# Patient Record
Sex: FEMALE | Race: BLACK OR AFRICAN AMERICAN | Employment: UNEMPLOYED
[De-identification: names, ages, dates, MRNs, and addresses within clinical notes are randomized per-mention and may not be internally consistent; named-entity substitution may affect disease eponyms.]

---

## 2017-01-11 ENCOUNTER — OFFICE VISIT (OUTPATIENT)
Dept: INTERNAL MEDICINE | Facility: CLINIC | Age: 67
End: 2017-01-11

## 2017-01-11 VITALS
SYSTOLIC BLOOD PRESSURE: 146 MMHG | WEIGHT: 262 LBS | BODY MASS INDEX: 44.73 KG/M2 | HEART RATE: 59 BPM | DIASTOLIC BLOOD PRESSURE: 87 MMHG | HEIGHT: 64 IN

## 2017-01-11 DIAGNOSIS — R79.89 LOW VITAMIN D LEVEL: ICD-10-CM

## 2017-01-11 DIAGNOSIS — I10 ESSENTIAL HYPERTENSION: ICD-10-CM

## 2017-01-11 DIAGNOSIS — E11.8 TYPE 2 DIABETES MELLITUS WITH COMPLICATION, WITHOUT LONG-TERM CURRENT USE OF INSULIN (HCC): Primary | ICD-10-CM

## 2017-01-11 DIAGNOSIS — M17.0 PRIMARY OSTEOARTHRITIS OF BOTH KNEES: Chronic | ICD-10-CM

## 2017-01-11 DIAGNOSIS — I25.5 ISCHEMIC CARDIOMYOPATHY: ICD-10-CM

## 2017-01-11 PROCEDURE — 99214 OFFICE O/P EST MOD 30 MIN: CPT | Performed by: INTERNAL MEDICINE

## 2017-01-11 RX ORDER — AMLODIPINE BESYLATE 2.5 MG/1
2.5 TABLET ORAL DAILY
Qty: 30 TABLET | Refills: 3 | Status: SHIPPED | OUTPATIENT
Start: 2017-01-11 | End: 2017-04-11 | Stop reason: SDUPTHER

## 2017-01-11 RX ORDER — B-COMPLEX WITH VITAMIN C
1 TABLET ORAL 2 TIMES DAILY
Qty: 60 TABLET | Refills: 11 | Status: SHIPPED | OUTPATIENT
Start: 2017-01-11 | End: 2018-01-11

## 2017-01-11 RX ORDER — FUROSEMIDE 40 MG/1
40 TABLET ORAL DAILY
Qty: 30 TABLET | Refills: 10 | Status: SHIPPED | OUTPATIENT
Start: 2017-01-11 | End: 2017-10-31 | Stop reason: SDUPTHER

## 2017-01-11 RX ORDER — GLUCOSAMINE HCL/CHONDROITIN SU 500-400 MG
CAPSULE ORAL
Qty: 50 STRIP | Refills: 11 | Status: SHIPPED | OUTPATIENT
Start: 2017-01-11 | End: 2017-07-17 | Stop reason: SDUPTHER

## 2017-01-11 ASSESSMENT — ENCOUNTER SYMPTOMS
BLOOD IN STOOL: 0
CONSTIPATION: 0
BLURRED VISION: 0
NAUSEA: 0
BACK PAIN: 1
ABDOMINAL PAIN: 0
EYE REDNESS: 0
SHORTNESS OF BREATH: 0
COUGH: 0

## 2017-02-06 ENCOUNTER — TELEPHONE (OUTPATIENT)
Dept: INTERNAL MEDICINE | Facility: CLINIC | Age: 67
End: 2017-02-06

## 2017-03-02 ENCOUNTER — HOSPITAL ENCOUNTER (OUTPATIENT)
Dept: PAIN MANAGEMENT | Age: 67
Discharge: HOME | End: 2017-03-02
Payer: COMMERCIAL

## 2017-03-02 VITALS
SYSTOLIC BLOOD PRESSURE: 142 MMHG | DIASTOLIC BLOOD PRESSURE: 80 MMHG | WEIGHT: 262 LBS | TEMPERATURE: 98 F | HEART RATE: 60 BPM | RESPIRATION RATE: 18 BRPM | HEIGHT: 64 IN | BODY MASS INDEX: 44.73 KG/M2

## 2017-03-02 DIAGNOSIS — M47.816 FACET ARTHRITIS OF LUMBAR REGION: Primary | Chronic | ICD-10-CM

## 2017-03-02 DIAGNOSIS — M17.0 PRIMARY OSTEOARTHRITIS OF BOTH KNEES: Chronic | ICD-10-CM

## 2017-03-02 PROCEDURE — 99214 OFFICE O/P EST MOD 30 MIN: CPT | Performed by: NURSE PRACTITIONER

## 2017-03-02 RX ORDER — OXYCODONE HYDROCHLORIDE AND ACETAMINOPHEN 5; 325 MG/1; MG/1
1 TABLET ORAL EVERY 12 HOURS PRN
Qty: 60 TABLET | Refills: 0 | Status: SHIPPED | OUTPATIENT
Start: 2017-03-09 | End: 2017-04-04 | Stop reason: SDUPTHER

## 2017-03-02 ASSESSMENT — PAIN SCALES - GENERAL: PAINLEVEL_OUTOF10: 6

## 2017-03-02 ASSESSMENT — PAIN DESCRIPTION - ORIENTATION: ORIENTATION: RIGHT;LEFT

## 2017-03-02 ASSESSMENT — PAIN DESCRIPTION - PAIN TYPE: TYPE: CHRONIC PAIN

## 2017-03-02 ASSESSMENT — PAIN DESCRIPTION - DESCRIPTORS: DESCRIPTORS: ACHING;CONSTANT;DULL;SHARP

## 2017-03-02 ASSESSMENT — PAIN DESCRIPTION - FREQUENCY: FREQUENCY: CONTINUOUS

## 2017-03-02 ASSESSMENT — PAIN DESCRIPTION - PROGRESSION: CLINICAL_PROGRESSION: NOT CHANGED

## 2017-03-02 ASSESSMENT — PAIN DESCRIPTION - ONSET: ONSET: ON-GOING

## 2017-03-02 ASSESSMENT — PAIN DESCRIPTION - LOCATION: LOCATION: BACK;KNEE

## 2017-03-22 DIAGNOSIS — I10 UNCONTROLLED HYPERTENSION: ICD-10-CM

## 2017-03-23 RX ORDER — METOPROLOL TARTRATE 100 MG/1
TABLET ORAL
Qty: 56 TABLET | Refills: 0 | Status: SHIPPED | OUTPATIENT
Start: 2017-03-23 | End: 2017-04-11 | Stop reason: SDUPTHER

## 2017-03-23 RX ORDER — OMEPRAZOLE 20 MG/1
CAPSULE, DELAYED RELEASE ORAL
Qty: 28 CAPSULE | Refills: 0 | Status: SHIPPED | OUTPATIENT
Start: 2017-03-23 | End: 2017-04-19 | Stop reason: SDUPTHER

## 2017-04-04 ENCOUNTER — HOSPITAL ENCOUNTER (OUTPATIENT)
Dept: PAIN MANAGEMENT | Age: 67
Discharge: HOME | End: 2017-04-04
Payer: MEDICARE

## 2017-04-04 VITALS
TEMPERATURE: 98.1 F | SYSTOLIC BLOOD PRESSURE: 150 MMHG | RESPIRATION RATE: 12 BRPM | DIASTOLIC BLOOD PRESSURE: 60 MMHG | HEART RATE: 85 BPM

## 2017-04-04 DIAGNOSIS — M25.561 CHRONIC PAIN OF RIGHT KNEE: ICD-10-CM

## 2017-04-04 DIAGNOSIS — M96.1 POSTLAMINECTOMY SYNDROME, UNSPECIFIED REGION: Primary | ICD-10-CM

## 2017-04-04 DIAGNOSIS — G89.4 CHRONIC PAIN SYNDROME: ICD-10-CM

## 2017-04-04 DIAGNOSIS — G89.29 CHRONIC PAIN OF RIGHT KNEE: ICD-10-CM

## 2017-04-04 PROCEDURE — 99214 OFFICE O/P EST MOD 30 MIN: CPT

## 2017-04-04 RX ORDER — OXYCODONE HYDROCHLORIDE AND ACETAMINOPHEN 5; 325 MG/1; MG/1
1 TABLET ORAL EVERY 12 HOURS PRN
Qty: 60 TABLET | Refills: 0 | Status: SHIPPED | OUTPATIENT
Start: 2017-04-15 | End: 2017-05-02 | Stop reason: SDUPTHER

## 2017-04-04 ASSESSMENT — ENCOUNTER SYMPTOMS
SHORTNESS OF BREATH: 0
COUGH: 0
CONSTIPATION: 0
BACK PAIN: 1

## 2017-04-04 ASSESSMENT — PAIN DESCRIPTION - ORIENTATION: ORIENTATION: LEFT;RIGHT

## 2017-04-04 ASSESSMENT — PAIN DESCRIPTION - PAIN TYPE: TYPE: CHRONIC PAIN

## 2017-04-04 ASSESSMENT — PAIN SCALES - GENERAL: PAINLEVEL_OUTOF10: 10

## 2017-04-04 ASSESSMENT — PAIN DESCRIPTION - LOCATION: LOCATION: BACK;KNEE

## 2017-04-11 ENCOUNTER — OFFICE VISIT (OUTPATIENT)
Dept: INTERNAL MEDICINE | Age: 67
End: 2017-04-11
Payer: MEDICARE

## 2017-04-11 ENCOUNTER — HOSPITAL ENCOUNTER (OUTPATIENT)
Age: 67
Setting detail: SPECIMEN
Discharge: HOME | End: 2017-04-11
Payer: MEDICARE

## 2017-04-11 VITALS
HEIGHT: 64 IN | SYSTOLIC BLOOD PRESSURE: 140 MMHG | BODY MASS INDEX: 42.68 KG/M2 | DIASTOLIC BLOOD PRESSURE: 92 MMHG | WEIGHT: 250 LBS | HEART RATE: 66 BPM

## 2017-04-11 DIAGNOSIS — I25.5 ISCHEMIC CARDIOMYOPATHY: ICD-10-CM

## 2017-04-11 DIAGNOSIS — M17.0 PRIMARY OSTEOARTHRITIS OF BOTH KNEES: Chronic | ICD-10-CM

## 2017-04-11 DIAGNOSIS — I10 ESSENTIAL HYPERTENSION: ICD-10-CM

## 2017-04-11 DIAGNOSIS — E11.8 TYPE 2 DIABETES MELLITUS WITH COMPLICATION, WITHOUT LONG-TERM CURRENT USE OF INSULIN (HCC): ICD-10-CM

## 2017-04-11 DIAGNOSIS — E66.01 MORBID OBESITY, UNSPECIFIED OBESITY TYPE (HCC): ICD-10-CM

## 2017-04-11 DIAGNOSIS — R79.89 LOW VITAMIN D LEVEL: ICD-10-CM

## 2017-04-11 DIAGNOSIS — G47.33 OSA (OBSTRUCTIVE SLEEP APNEA): ICD-10-CM

## 2017-04-11 DIAGNOSIS — I10 ESSENTIAL HYPERTENSION: Primary | ICD-10-CM

## 2017-04-11 DIAGNOSIS — D64.9 ANEMIA, UNSPECIFIED TYPE: ICD-10-CM

## 2017-04-11 DIAGNOSIS — F32.A MILD DEPRESSION: ICD-10-CM

## 2017-04-11 LAB
ANION GAP SERPL CALCULATED.3IONS-SCNC: 12 MMOL/L (ref 9–17)
BUN BLDV-MCNC: 16 MG/DL (ref 8–23)
BUN/CREAT BLD: ABNORMAL (ref 9–20)
CALCIUM SERPL-MCNC: 9.8 MG/DL (ref 8.6–10.4)
CHLORIDE BLD-SCNC: 103 MMOL/L (ref 98–107)
CHOLESTEROL/HDL RATIO: 3.6
CHOLESTEROL: 189 MG/DL
CO2: 27 MMOL/L (ref 20–31)
CREAT SERPL-MCNC: 1.02 MG/DL (ref 0.5–0.9)
GFR AFRICAN AMERICAN: >60 ML/MIN
GFR NON-AFRICAN AMERICAN: 55 ML/MIN
GFR SERPL CREATININE-BSD FRML MDRD: ABNORMAL ML/MIN/{1.73_M2}
GFR SERPL CREATININE-BSD FRML MDRD: ABNORMAL ML/MIN/{1.73_M2}
GLUCOSE BLD-MCNC: 122 MG/DL (ref 70–99)
HCT VFR BLD CALC: 37.6 % (ref 36–46)
HDLC SERPL-MCNC: 52 MG/DL
HEMOGLOBIN: 12.1 G/DL (ref 12–16)
LDL CHOLESTEROL: 123 MG/DL (ref 0–130)
MCH RBC QN AUTO: 27.3 PG (ref 26–34)
MCHC RBC AUTO-ENTMCNC: 32.3 G/DL (ref 31–37)
MCV RBC AUTO: 84.5 FL (ref 80–100)
PDW BLD-RTO: 16.7 % (ref 12.5–15.4)
PLATELET # BLD: 266 K/UL (ref 140–450)
PMV BLD AUTO: 8.7 FL (ref 6–12)
POTASSIUM SERPL-SCNC: 4.7 MMOL/L (ref 3.7–5.3)
RBC # BLD: 4.45 M/UL (ref 4–5.2)
SODIUM BLD-SCNC: 142 MMOL/L (ref 135–144)
TRIGL SERPL-MCNC: 70 MG/DL
VITAMIN D 25-HYDROXY: 16.7 NG/ML (ref 30–100)
VLDLC SERPL CALC-MCNC: NORMAL MG/DL (ref 1–30)
WBC # BLD: 5.7 K/UL (ref 3.5–11)

## 2017-04-11 PROCEDURE — 82306 VITAMIN D 25 HYDROXY: CPT

## 2017-04-11 PROCEDURE — 99214 OFFICE O/P EST MOD 30 MIN: CPT | Performed by: INTERNAL MEDICINE

## 2017-04-11 PROCEDURE — 80061 LIPID PANEL: CPT

## 2017-04-11 PROCEDURE — 80048 BASIC METABOLIC PNL TOTAL CA: CPT

## 2017-04-11 PROCEDURE — 36415 COLL VENOUS BLD VENIPUNCTURE: CPT

## 2017-04-11 PROCEDURE — 85027 COMPLETE CBC AUTOMATED: CPT

## 2017-04-11 RX ORDER — AMLODIPINE BESYLATE 5 MG/1
5 TABLET ORAL DAILY
Qty: 30 TABLET | Refills: 5 | Status: SHIPPED | OUTPATIENT
Start: 2017-04-11 | End: 2017-07-17 | Stop reason: SDUPTHER

## 2017-04-11 RX ORDER — METOPROLOL TARTRATE 100 MG/1
TABLET ORAL
Qty: 56 TABLET | Refills: 3 | Status: SHIPPED | OUTPATIENT
Start: 2017-04-11 | End: 2017-07-17 | Stop reason: SDUPTHER

## 2017-04-11 RX ORDER — SPIRONOLACTONE 25 MG/1
25 TABLET ORAL DAILY
Qty: 30 TABLET | Refills: 2 | Status: SHIPPED | OUTPATIENT
Start: 2017-04-11 | End: 2017-07-17 | Stop reason: SDUPTHER

## 2017-04-11 ASSESSMENT — ENCOUNTER SYMPTOMS
COUGH: 0
BLOOD IN STOOL: 0
SPUTUM PRODUCTION: 0
BACK PAIN: 1
NAUSEA: 0
PHOTOPHOBIA: 0
BLURRED VISION: 1
SORE THROAT: 0
ABDOMINAL PAIN: 0
CONSTIPATION: 0
EYE REDNESS: 0
SHORTNESS OF BREATH: 0

## 2017-04-17 ENCOUNTER — HOSPITAL ENCOUNTER (OUTPATIENT)
Dept: PAIN MANAGEMENT | Age: 67
Discharge: HOME | End: 2017-04-17
Payer: MEDICARE

## 2017-04-17 VITALS
SYSTOLIC BLOOD PRESSURE: 132 MMHG | HEART RATE: 68 BPM | WEIGHT: 250 LBS | TEMPERATURE: 97.6 F | OXYGEN SATURATION: 99 % | HEIGHT: 64 IN | RESPIRATION RATE: 18 BRPM | DIASTOLIC BLOOD PRESSURE: 83 MMHG | BODY MASS INDEX: 42.68 KG/M2

## 2017-04-17 DIAGNOSIS — M25.561 RIGHT KNEE PAIN, UNSPECIFIED CHRONICITY: ICD-10-CM

## 2017-04-17 LAB — GLUCOSE BLD-MCNC: 130 MG/DL (ref 65–105)

## 2017-04-17 PROCEDURE — 20610 DRAIN/INJ JOINT/BURSA W/O US: CPT

## 2017-04-17 PROCEDURE — 2500000003 HC RX 250 WO HCPCS: Performed by: ANESTHESIOLOGY

## 2017-04-17 PROCEDURE — 2500000003 HC RX 250 WO HCPCS

## 2017-04-17 PROCEDURE — 6360000002 HC RX W HCPCS: Performed by: ANESTHESIOLOGY

## 2017-04-17 PROCEDURE — 77002 NEEDLE LOCALIZATION BY XRAY: CPT

## 2017-04-17 PROCEDURE — 82947 ASSAY GLUCOSE BLOOD QUANT: CPT

## 2017-04-17 PROCEDURE — 2580000003 HC RX 258: Performed by: ANESTHESIOLOGY

## 2017-04-17 RX ORDER — LIDOCAINE HYDROCHLORIDE 10 MG/ML
5 INJECTION, SOLUTION EPIDURAL; INFILTRATION; INTRACAUDAL; PERINEURAL ONCE
Status: COMPLETED | OUTPATIENT
Start: 2017-04-17 | End: 2017-04-17

## 2017-04-17 RX ORDER — MIDAZOLAM HYDROCHLORIDE 1 MG/ML
0.5 INJECTION INTRAMUSCULAR; INTRAVENOUS
Status: DISCONTINUED | OUTPATIENT
Start: 2017-04-17 | End: 2017-04-18 | Stop reason: HOSPADM

## 2017-04-17 RX ORDER — SODIUM CHLORIDE, SODIUM LACTATE, POTASSIUM CHLORIDE, CALCIUM CHLORIDE 600; 310; 30; 20 MG/100ML; MG/100ML; MG/100ML; MG/100ML
500 INJECTION, SOLUTION INTRAVENOUS CONTINUOUS
Status: DISCONTINUED | OUTPATIENT
Start: 2017-04-17 | End: 2017-04-18 | Stop reason: HOSPADM

## 2017-04-17 RX ORDER — FENTANYL CITRATE 50 UG/ML
25 INJECTION, SOLUTION INTRAMUSCULAR; INTRAVENOUS
Status: DISCONTINUED | OUTPATIENT
Start: 2017-04-17 | End: 2017-04-18 | Stop reason: HOSPADM

## 2017-04-17 RX ADMIN — MIDAZOLAM HYDROCHLORIDE 1 MG: 1 INJECTION, SOLUTION INTRAMUSCULAR; INTRAVENOUS at 11:27

## 2017-04-17 RX ADMIN — LIDOCAINE HYDROCHLORIDE 0.2 ML: 10 INJECTION, SOLUTION EPIDURAL; INFILTRATION; INTRACAUDAL; PERINEURAL at 10:48

## 2017-04-17 RX ADMIN — FENTANYL CITRATE 50 MCG: 50 INJECTION, SOLUTION INTRAMUSCULAR; INTRAVENOUS at 11:26

## 2017-04-17 RX ADMIN — SODIUM CHLORIDE, POTASSIUM CHLORIDE, SODIUM LACTATE AND CALCIUM CHLORIDE 500 ML: 600; 310; 30; 20 INJECTION, SOLUTION INTRAVENOUS at 10:48

## 2017-04-17 ASSESSMENT — PAIN DESCRIPTION - LOCATION: LOCATION: BACK;KNEE

## 2017-04-17 ASSESSMENT — PAIN DESCRIPTION - DESCRIPTORS: DESCRIPTORS: CONSTANT;SHARP;STABBING

## 2017-04-17 ASSESSMENT — PAIN - FUNCTIONAL ASSESSMENT
PAIN_FUNCTIONAL_ASSESSMENT: 0-10
PAIN_FUNCTIONAL_ASSESSMENT: 0-10

## 2017-04-17 ASSESSMENT — PAIN SCALES - GENERAL: PAINLEVEL_OUTOF10: 6

## 2017-04-17 ASSESSMENT — PAIN DESCRIPTION - ORIENTATION: ORIENTATION: RIGHT

## 2017-04-17 ASSESSMENT — PAIN DESCRIPTION - FREQUENCY: FREQUENCY: CONTINUOUS

## 2017-04-17 ASSESSMENT — PAIN DESCRIPTION - PAIN TYPE: TYPE: CHRONIC PAIN

## 2017-04-17 ASSESSMENT — PAIN DESCRIPTION - PROGRESSION: CLINICAL_PROGRESSION: GRADUALLY WORSENING

## 2017-04-17 ASSESSMENT — PAIN DESCRIPTION - ONSET: ONSET: ON-GOING

## 2017-04-20 RX ORDER — OMEPRAZOLE 20 MG/1
CAPSULE, DELAYED RELEASE ORAL
Qty: 28 CAPSULE | Refills: 3 | Status: SHIPPED | OUTPATIENT
Start: 2017-04-20 | End: 2017-07-17 | Stop reason: SDUPTHER

## 2017-05-02 ENCOUNTER — HOSPITAL ENCOUNTER (OUTPATIENT)
Dept: PAIN MANAGEMENT | Age: 67
Discharge: HOME | End: 2017-05-02
Payer: MEDICARE

## 2017-05-02 VITALS
TEMPERATURE: 97.5 F | DIASTOLIC BLOOD PRESSURE: 80 MMHG | HEART RATE: 85 BPM | SYSTOLIC BLOOD PRESSURE: 190 MMHG | RESPIRATION RATE: 12 BRPM

## 2017-05-02 PROCEDURE — 80307 DRUG TEST PRSMV CHEM ANLYZR: CPT

## 2017-05-02 PROCEDURE — 99214 OFFICE O/P EST MOD 30 MIN: CPT

## 2017-05-02 RX ORDER — OXYCODONE HYDROCHLORIDE AND ACETAMINOPHEN 5; 325 MG/1; MG/1
1 TABLET ORAL EVERY 12 HOURS PRN
Qty: 60 TABLET | Refills: 0 | Status: SHIPPED | OUTPATIENT
Start: 2017-05-17 | End: 2017-06-14 | Stop reason: SDUPTHER

## 2017-05-02 ASSESSMENT — PAIN DESCRIPTION - ORIENTATION: ORIENTATION: LOWER;RIGHT

## 2017-05-02 ASSESSMENT — PAIN DESCRIPTION - LOCATION: LOCATION: KNEE;BACK

## 2017-05-02 ASSESSMENT — PAIN SCALES - GENERAL: PAINLEVEL_OUTOF10: 5

## 2017-05-02 ASSESSMENT — PAIN DESCRIPTION - PAIN TYPE: TYPE: CHRONIC PAIN

## 2017-05-02 ASSESSMENT — PAIN DESCRIPTION - PROGRESSION: CLINICAL_PROGRESSION: GRADUALLY IMPROVING

## 2017-05-02 ASSESSMENT — PAIN DESCRIPTION - DESCRIPTORS: DESCRIPTORS: CONSTANT;ACHING

## 2017-05-04 LAB
6-ACETYLMORPHINE, UR: NOT DETECTED
7-AMINOCLONAZEPAM, URINE: NOT DETECTED
ALPHA-OH-ALPRAZ, URINE: NOT DETECTED
ALPRAZOLAM, URINE: NOT DETECTED
AMPHETAMINES, URINE: NOT DETECTED
BARBITURATES, URINE: NOT DETECTED
BENZOYLECGONINE, UR: NOT DETECTED
BUPRENORPHINE URINE: NOT DETECTED
CARISOPRODOL, UR: NOT DETECTED
CLONAZEPAM, URINE: NOT DETECTED
CODEINE, URINE: NOT DETECTED
CREATININE URINE: 78.7 MG/DL (ref 20–400)
DIAZEPAM, URINE: NOT DETECTED
EER PAIN MGT DRUG PANEL, HIGH RES/EMIT U: NORMAL
ETHYL GLUCURONIDE UR: NOT DETECTED
FENTANYL URINE: NOT DETECTED
HYDROCODONE, URINE: NOT DETECTED
HYDROMORPHONE, URINE: NOT DETECTED
LORAZEPAM, URINE: NOT DETECTED
MARIJUANA METAB, UR: NOT DETECTED
MDA, UR: NOT DETECTED
MDEA, EVE, UR: NOT DETECTED
MDMA URINE: NOT DETECTED
MEPERIDINE METAB, UR: NOT DETECTED
METHADONE, URINE: NOT DETECTED
METHAMPHETAMINE, URINE: NOT DETECTED
METHYLPHENIDATE: NOT DETECTED
MIDAZOLAM, URINE: NOT DETECTED
MORPHINE URINE: NOT DETECTED
NORBUPRENORPHINE, URINE: NOT DETECTED
NORDIAZEPAM, URINE: NOT DETECTED
NORFENTANYL, URINE: NOT DETECTED
NORHYDROCODONE, URINE: NOT DETECTED
NOROXYCODONE, URINE: PRESENT
NOROXYMORPHONE, URINE: PRESENT
OXAZEPAM, URINE: NOT DETECTED
OXYCODONE URINE: PRESENT
OXYMORPHONE, URINE: PRESENT
PAIN MGT DRUG PANEL, HI RES, UR: NORMAL
PCP,URINE: NOT DETECTED
PHENTERMINE, UR: NOT DETECTED
PROPOXYPHENE, URINE: NOT DETECTED
TAPENTADOL, URINE: NOT DETECTED
TAPENTADOL-O-SULFATE, URINE: NOT DETECTED
TEMAZEPAM, URINE: NOT DETECTED
TRAMADOL, URINE: NOT DETECTED
ZOLPIDEM, URINE: NOT DETECTED

## 2017-06-14 ENCOUNTER — HOSPITAL ENCOUNTER (OUTPATIENT)
Dept: PAIN MANAGEMENT | Age: 67
Discharge: HOME | End: 2017-06-14
Payer: MEDICARE

## 2017-06-14 ENCOUNTER — HOSPITAL ENCOUNTER (EMERGENCY)
Age: 67
Discharge: HOME | End: 2017-06-14
Attending: EMERGENCY MEDICINE
Payer: MEDICARE

## 2017-06-14 VITALS
HEART RATE: 77 BPM | RESPIRATION RATE: 20 BRPM | HEIGHT: 64 IN | OXYGEN SATURATION: 97 % | WEIGHT: 230 LBS | SYSTOLIC BLOOD PRESSURE: 153 MMHG | BODY MASS INDEX: 39.27 KG/M2 | DIASTOLIC BLOOD PRESSURE: 93 MMHG | TEMPERATURE: 98.4 F

## 2017-06-14 VITALS
SYSTOLIC BLOOD PRESSURE: 205 MMHG | RESPIRATION RATE: 18 BRPM | TEMPERATURE: 98.2 F | HEART RATE: 75 BPM | DIASTOLIC BLOOD PRESSURE: 126 MMHG

## 2017-06-14 DIAGNOSIS — M96.1 POSTLAMINECTOMY SYNDROME, UNSPECIFIED REGION: Primary | ICD-10-CM

## 2017-06-14 DIAGNOSIS — I10 ESSENTIAL HYPERTENSION: Primary | ICD-10-CM

## 2017-06-14 DIAGNOSIS — M17.0 PRIMARY OSTEOARTHRITIS OF BOTH KNEES: Chronic | ICD-10-CM

## 2017-06-14 PROCEDURE — 6370000000 HC RX 637 (ALT 250 FOR IP): Performed by: EMERGENCY MEDICINE

## 2017-06-14 PROCEDURE — 99283 EMERGENCY DEPT VISIT LOW MDM: CPT

## 2017-06-14 PROCEDURE — 80307 DRUG TEST PRSMV CHEM ANLYZR: CPT

## 2017-06-14 PROCEDURE — 99214 OFFICE O/P EST MOD 30 MIN: CPT

## 2017-06-14 RX ORDER — ISOSORBIDE MONONITRATE 30 MG/1
30 TABLET, EXTENDED RELEASE ORAL DAILY
Status: DISCONTINUED | OUTPATIENT
Start: 2017-06-14 | End: 2017-06-14 | Stop reason: HOSPADM

## 2017-06-14 RX ORDER — SPIRONOLACTONE 25 MG/1
25 TABLET ORAL DAILY
Status: DISCONTINUED | OUTPATIENT
Start: 2017-06-14 | End: 2017-06-14 | Stop reason: HOSPADM

## 2017-06-14 RX ORDER — METOPROLOL TARTRATE 50 MG/1
100 TABLET, FILM COATED ORAL ONCE
Status: COMPLETED | OUTPATIENT
Start: 2017-06-14 | End: 2017-06-14

## 2017-06-14 RX ORDER — OXYCODONE HYDROCHLORIDE AND ACETAMINOPHEN 5; 325 MG/1; MG/1
1 TABLET ORAL EVERY 12 HOURS PRN
Qty: 28 TABLET | Refills: 0 | Status: SHIPPED | OUTPATIENT
Start: 2017-06-19 | End: 2017-07-14 | Stop reason: SDUPTHER

## 2017-06-14 RX ORDER — AMLODIPINE BESYLATE 10 MG/1
5 TABLET ORAL DAILY
Status: DISCONTINUED | OUTPATIENT
Start: 2017-06-14 | End: 2017-06-14 | Stop reason: HOSPADM

## 2017-06-14 RX ADMIN — METOPROLOL TARTRATE 100 MG: 50 TABLET, FILM COATED ORAL at 13:53

## 2017-06-14 RX ADMIN — AMLODIPINE BESYLATE 5 MG: 10 TABLET ORAL at 13:53

## 2017-06-14 RX ADMIN — ISOSORBIDE MONONITRATE 30 MG: 30 TABLET ORAL at 13:54

## 2017-06-14 RX ADMIN — SPIRONOLACTONE 25 MG: 25 TABLET ORAL at 13:53

## 2017-06-14 ASSESSMENT — PAIN DESCRIPTION - FREQUENCY
FREQUENCY: CONTINUOUS
FREQUENCY: CONTINUOUS

## 2017-06-14 ASSESSMENT — PAIN DESCRIPTION - ORIENTATION
ORIENTATION: LOWER;RIGHT
ORIENTATION: RIGHT

## 2017-06-14 ASSESSMENT — PAIN SCALES - GENERAL
PAINLEVEL_OUTOF10: 6
PAINLEVEL_OUTOF10: 6

## 2017-06-14 ASSESSMENT — ENCOUNTER SYMPTOMS
COUGH: 0
BACK PAIN: 1
CONSTIPATION: 0
SHORTNESS OF BREATH: 0

## 2017-06-14 ASSESSMENT — PAIN DESCRIPTION - ONSET: ONSET: ON-GOING

## 2017-06-14 ASSESSMENT — PAIN DESCRIPTION - DESCRIPTORS
DESCRIPTORS: ACHING;CONSTANT
DESCRIPTORS: ACHING

## 2017-06-14 ASSESSMENT — PAIN DESCRIPTION - LOCATION
LOCATION: HEAD
LOCATION: BACK

## 2017-06-14 ASSESSMENT — PAIN DESCRIPTION - PAIN TYPE
TYPE: CHRONIC PAIN
TYPE: ACUTE PAIN

## 2017-06-14 ASSESSMENT — PAIN DESCRIPTION - PROGRESSION: CLINICAL_PROGRESSION: NOT CHANGED

## 2017-06-15 ENCOUNTER — TELEPHONE (OUTPATIENT)
Dept: INTERNAL MEDICINE | Age: 67
End: 2017-06-15

## 2017-06-15 ASSESSMENT — ENCOUNTER SYMPTOMS
RHINORRHEA: 0
TROUBLE SWALLOWING: 0
NAUSEA: 0
DIARRHEA: 0
BACK PAIN: 0
CONSTIPATION: 0
VOMITING: 0
SHORTNESS OF BREATH: 0
ABDOMINAL PAIN: 0

## 2017-06-17 LAB

## 2017-06-27 ENCOUNTER — TELEPHONE (OUTPATIENT)
Dept: INTERNAL MEDICINE | Age: 67
End: 2017-06-27

## 2017-07-10 DIAGNOSIS — I25.10 CORONARY ARTERY DISEASE INVOLVING NATIVE CORONARY ARTERY OF NATIVE HEART WITHOUT ANGINA PECTORIS: ICD-10-CM

## 2017-07-10 DIAGNOSIS — E78.00 PURE HYPERCHOLESTEROLEMIA: ICD-10-CM

## 2017-07-10 DIAGNOSIS — E11.319 TYPE 2 DIABETES MELLITUS WITH RETINOPATHY OF BOTH EYES, WITHOUT LONG-TERM CURRENT USE OF INSULIN, MACULAR EDEMA PRESENCE UNSPECIFIED, UNSPECIFIED RETINOPATHY SEVERITY (HCC): ICD-10-CM

## 2017-07-10 RX ORDER — ISOSORBIDE MONONITRATE 30 MG/1
TABLET, EXTENDED RELEASE ORAL
Qty: 28 TABLET | Refills: 3 | Status: SHIPPED | OUTPATIENT
Start: 2017-07-10 | End: 2017-07-17 | Stop reason: SDUPTHER

## 2017-07-11 RX ORDER — ISOSORBIDE MONONITRATE 30 MG/1
TABLET, EXTENDED RELEASE ORAL
Qty: 30 TABLET | Refills: 5 | Status: ON HOLD | OUTPATIENT
Start: 2017-07-11 | End: 2018-02-26 | Stop reason: SDUPTHER

## 2017-07-11 RX ORDER — ATORVASTATIN CALCIUM 80 MG/1
TABLET, FILM COATED ORAL
Qty: 30 TABLET | Refills: 5 | Status: ON HOLD | OUTPATIENT
Start: 2017-07-11 | End: 2018-02-26 | Stop reason: SDUPTHER

## 2017-07-14 ENCOUNTER — HOSPITAL ENCOUNTER (OUTPATIENT)
Dept: PAIN MANAGEMENT | Age: 67
Discharge: HOME | End: 2017-07-14
Payer: MEDICARE

## 2017-07-14 VITALS — TEMPERATURE: 97.2 F | SYSTOLIC BLOOD PRESSURE: 130 MMHG | HEART RATE: 62 BPM | DIASTOLIC BLOOD PRESSURE: 74 MMHG

## 2017-07-14 DIAGNOSIS — M47.816 FACET ARTHRITIS OF LUMBAR REGION: Primary | Chronic | ICD-10-CM

## 2017-07-14 DIAGNOSIS — M96.1 POSTLAMINECTOMY SYNDROME, UNSPECIFIED REGION: ICD-10-CM

## 2017-07-14 DIAGNOSIS — M17.0 PRIMARY OSTEOARTHRITIS OF BOTH KNEES: Chronic | ICD-10-CM

## 2017-07-14 PROCEDURE — 99214 OFFICE O/P EST MOD 30 MIN: CPT

## 2017-07-14 RX ORDER — OXYCODONE HYDROCHLORIDE AND ACETAMINOPHEN 5; 325 MG/1; MG/1
1 TABLET ORAL EVERY 12 HOURS PRN
Qty: 28 TABLET | Refills: 0 | Status: SHIPPED | OUTPATIENT
Start: 2017-07-14 | End: 2017-07-27 | Stop reason: SDUPTHER

## 2017-07-14 ASSESSMENT — ENCOUNTER SYMPTOMS
CONSTIPATION: 0
BACK PAIN: 1
COUGH: 0
SHORTNESS OF BREATH: 0

## 2017-07-14 ASSESSMENT — PAIN SCALES - GENERAL: PAINLEVEL_OUTOF10: 7

## 2017-07-17 ENCOUNTER — OFFICE VISIT (OUTPATIENT)
Dept: INTERNAL MEDICINE | Age: 67
End: 2017-07-17
Payer: MEDICARE

## 2017-07-17 VITALS
BODY MASS INDEX: 44.46 KG/M2 | SYSTOLIC BLOOD PRESSURE: 166 MMHG | WEIGHT: 259 LBS | RESPIRATION RATE: 18 BRPM | DIASTOLIC BLOOD PRESSURE: 88 MMHG | HEART RATE: 61 BPM

## 2017-07-17 DIAGNOSIS — E11.8 TYPE 2 DIABETES MELLITUS WITH COMPLICATION, WITHOUT LONG-TERM CURRENT USE OF INSULIN (HCC): Primary | ICD-10-CM

## 2017-07-17 DIAGNOSIS — I25.10 CORONARY ARTERY DISEASE INVOLVING NATIVE CORONARY ARTERY OF NATIVE HEART WITHOUT ANGINA PECTORIS: ICD-10-CM

## 2017-07-17 DIAGNOSIS — F32.A MILD DEPRESSION: ICD-10-CM

## 2017-07-17 DIAGNOSIS — E66.01 MORBID OBESITY DUE TO EXCESS CALORIES (HCC): ICD-10-CM

## 2017-07-17 DIAGNOSIS — I25.5 ISCHEMIC CARDIOMYOPATHY: ICD-10-CM

## 2017-07-17 DIAGNOSIS — M17.0 PRIMARY OSTEOARTHRITIS OF BOTH KNEES: Chronic | ICD-10-CM

## 2017-07-17 DIAGNOSIS — I10 UNCONTROLLED HYPERTENSION: ICD-10-CM

## 2017-07-17 DIAGNOSIS — R79.89 LOW VITAMIN D LEVEL: ICD-10-CM

## 2017-07-17 LAB — HBA1C MFR BLD: 7.1 %

## 2017-07-17 PROCEDURE — 3014F SCREEN MAMMO DOC REV: CPT | Performed by: INTERNAL MEDICINE

## 2017-07-17 PROCEDURE — G8427 DOCREV CUR MEDS BY ELIG CLIN: HCPCS | Performed by: INTERNAL MEDICINE

## 2017-07-17 PROCEDURE — G8417 CALC BMI ABV UP PARAM F/U: HCPCS | Performed by: INTERNAL MEDICINE

## 2017-07-17 PROCEDURE — 1036F TOBACCO NON-USER: CPT | Performed by: INTERNAL MEDICINE

## 2017-07-17 PROCEDURE — 99214 OFFICE O/P EST MOD 30 MIN: CPT | Performed by: INTERNAL MEDICINE

## 2017-07-17 PROCEDURE — 4040F PNEUMOC VAC/ADMIN/RCVD: CPT | Performed by: INTERNAL MEDICINE

## 2017-07-17 PROCEDURE — G8598 ASA/ANTIPLAT THER USED: HCPCS | Performed by: INTERNAL MEDICINE

## 2017-07-17 PROCEDURE — 1123F ACP DISCUSS/DSCN MKR DOCD: CPT | Performed by: INTERNAL MEDICINE

## 2017-07-17 PROCEDURE — 83036 HEMOGLOBIN GLYCOSYLATED A1C: CPT | Performed by: INTERNAL MEDICINE

## 2017-07-17 PROCEDURE — 1090F PRES/ABSN URINE INCON ASSESS: CPT | Performed by: INTERNAL MEDICINE

## 2017-07-17 PROCEDURE — 3017F COLORECTAL CA SCREEN DOC REV: CPT | Performed by: INTERNAL MEDICINE

## 2017-07-17 PROCEDURE — 3046F HEMOGLOBIN A1C LEVEL >9.0%: CPT | Performed by: INTERNAL MEDICINE

## 2017-07-17 PROCEDURE — 99213 OFFICE O/P EST LOW 20 MIN: CPT

## 2017-07-17 PROCEDURE — G8400 PT W/DXA NO RESULTS DOC: HCPCS | Performed by: INTERNAL MEDICINE

## 2017-07-17 RX ORDER — LOSARTAN POTASSIUM 100 MG/1
100 TABLET ORAL DAILY
Qty: 30 TABLET | Refills: 5 | Status: SHIPPED | OUTPATIENT
Start: 2017-07-17 | End: 2017-09-01 | Stop reason: SDUPTHER

## 2017-07-17 RX ORDER — RANOLAZINE 500 MG/1
TABLET, EXTENDED RELEASE ORAL
Qty: 60 TABLET | Refills: 6 | Status: SHIPPED | OUTPATIENT
Start: 2017-07-17 | End: 2017-10-31

## 2017-07-17 RX ORDER — SPIRONOLACTONE 25 MG/1
25 TABLET ORAL DAILY
Qty: 30 TABLET | Refills: 2 | Status: SHIPPED | OUTPATIENT
Start: 2017-07-17 | End: 2017-09-01 | Stop reason: SDUPTHER

## 2017-07-17 RX ORDER — LANCETS 30 GAUGE
EACH MISCELLANEOUS
Qty: 50 EACH | Refills: 11 | Status: SHIPPED | OUTPATIENT
Start: 2017-07-17

## 2017-07-17 RX ORDER — ASPIRIN 81 MG/1
81 TABLET ORAL DAILY
Qty: 30 TABLET | Refills: 11 | Status: SHIPPED | OUTPATIENT
Start: 2017-07-17 | End: 2018-08-13 | Stop reason: SDUPTHER

## 2017-07-17 RX ORDER — CLOPIDOGREL BISULFATE 75 MG/1
75 TABLET ORAL DAILY
Qty: 30 TABLET | Refills: 11 | Status: SHIPPED | OUTPATIENT
Start: 2017-07-17 | End: 2018-06-12 | Stop reason: SDUPTHER

## 2017-07-17 RX ORDER — GLUCOSAMINE HCL/CHONDROITIN SU 500-400 MG
CAPSULE ORAL
Qty: 50 STRIP | Refills: 11 | Status: SHIPPED | OUTPATIENT
Start: 2017-07-17 | End: 2017-10-31 | Stop reason: SDUPTHER

## 2017-07-17 RX ORDER — METOPROLOL TARTRATE 100 MG/1
TABLET ORAL
Qty: 56 TABLET | Refills: 3 | Status: SHIPPED | OUTPATIENT
Start: 2017-07-17 | End: 2017-09-01 | Stop reason: SDUPTHER

## 2017-07-17 RX ORDER — OMEPRAZOLE 20 MG/1
CAPSULE, DELAYED RELEASE ORAL
Qty: 28 CAPSULE | Refills: 3 | Status: SHIPPED | OUTPATIENT
Start: 2017-07-17 | End: 2017-10-31 | Stop reason: SDUPTHER

## 2017-07-17 RX ORDER — MULTIVIT-MIN/IRON FUM/FOLIC AC 7.5 MG-4
1 TABLET ORAL DAILY
Qty: 30 TABLET | Refills: 3 | Status: SHIPPED | OUTPATIENT
Start: 2017-07-17 | End: 2019-07-17

## 2017-07-17 RX ORDER — AMLODIPINE BESYLATE 5 MG/1
5 TABLET ORAL DAILY
Qty: 30 TABLET | Refills: 5 | Status: SHIPPED | OUTPATIENT
Start: 2017-07-17 | End: 2017-10-31 | Stop reason: SDUPTHER

## 2017-07-17 ASSESSMENT — PATIENT HEALTH QUESTIONNAIRE - PHQ9
SUM OF ALL RESPONSES TO PHQ QUESTIONS 1-9: 0
2. FEELING DOWN, DEPRESSED OR HOPELESS: 0
SUM OF ALL RESPONSES TO PHQ9 QUESTIONS 1 & 2: 0
1. LITTLE INTEREST OR PLEASURE IN DOING THINGS: 0

## 2017-07-17 ASSESSMENT — ENCOUNTER SYMPTOMS
ABDOMINAL PAIN: 0
CONSTIPATION: 0
HEARTBURN: 0
SORE THROAT: 0
PHOTOPHOBIA: 0
BACK PAIN: 1
NAUSEA: 0
COUGH: 0
BLURRED VISION: 1
EYE REDNESS: 0
SHORTNESS OF BREATH: 0

## 2017-07-21 ENCOUNTER — TELEPHONE (OUTPATIENT)
Dept: INTERNAL MEDICINE | Age: 67
End: 2017-07-21

## 2017-07-21 DIAGNOSIS — E11.9 TYPE 2 DIABETES MELLITUS WITHOUT COMPLICATION, WITH LONG-TERM CURRENT USE OF INSULIN (HCC): Primary | ICD-10-CM

## 2017-07-21 DIAGNOSIS — Z79.4 TYPE 2 DIABETES MELLITUS WITHOUT COMPLICATION, WITH LONG-TERM CURRENT USE OF INSULIN (HCC): Primary | ICD-10-CM

## 2017-08-11 ENCOUNTER — HOSPITAL ENCOUNTER (OUTPATIENT)
Dept: PAIN MANAGEMENT | Age: 67
Discharge: HOME | End: 2017-08-11
Payer: COMMERCIAL

## 2017-08-16 ENCOUNTER — HOSPITAL ENCOUNTER (OUTPATIENT)
Dept: PAIN MANAGEMENT | Age: 67
Discharge: HOME | End: 2017-08-16
Payer: COMMERCIAL

## 2017-08-16 VITALS
DIASTOLIC BLOOD PRESSURE: 100 MMHG | RESPIRATION RATE: 20 BRPM | HEART RATE: 75 BPM | SYSTOLIC BLOOD PRESSURE: 170 MMHG | BODY MASS INDEX: 44.22 KG/M2 | HEIGHT: 64 IN | TEMPERATURE: 98.2 F | WEIGHT: 259 LBS

## 2017-08-16 DIAGNOSIS — M96.1 POSTLAMINECTOMY SYNDROME: Primary | ICD-10-CM

## 2017-08-16 PROCEDURE — 80307 DRUG TEST PRSMV CHEM ANLYZR: CPT

## 2017-08-16 PROCEDURE — G0463 HOSPITAL OUTPT CLINIC VISIT: HCPCS

## 2017-08-16 PROCEDURE — 99214 OFFICE O/P EST MOD 30 MIN: CPT

## 2017-08-16 PROCEDURE — 99213 OFFICE O/P EST LOW 20 MIN: CPT

## 2017-08-16 RX ORDER — OXYCODONE HYDROCHLORIDE AND ACETAMINOPHEN 5; 325 MG/1; MG/1
1 TABLET ORAL EVERY 12 HOURS PRN
Qty: 28 TABLET | Refills: 0 | Status: SHIPPED | OUTPATIENT
Start: 2017-08-16 | End: 2017-08-30

## 2017-08-16 ASSESSMENT — PAIN DESCRIPTION - PROGRESSION: CLINICAL_PROGRESSION: GRADUALLY WORSENING

## 2017-08-16 ASSESSMENT — PAIN DESCRIPTION - LOCATION: LOCATION: BACK;KNEE

## 2017-08-16 ASSESSMENT — ENCOUNTER SYMPTOMS
COUGH: 0
SHORTNESS OF BREATH: 0
BOWEL INCONTINENCE: 0
BACK PAIN: 1
CONSTIPATION: 0

## 2017-08-16 ASSESSMENT — PAIN DESCRIPTION - DESCRIPTORS: DESCRIPTORS: ACHING;CONSTANT

## 2017-08-16 ASSESSMENT — PAIN DESCRIPTION - PAIN TYPE: TYPE: CHRONIC PAIN

## 2017-08-16 ASSESSMENT — PAIN SCALES - GENERAL: PAINLEVEL_OUTOF10: 7

## 2017-08-16 ASSESSMENT — PAIN DESCRIPTION - FREQUENCY: FREQUENCY: CONTINUOUS

## 2017-08-16 ASSESSMENT — PAIN DESCRIPTION - ORIENTATION: ORIENTATION: RIGHT;LOWER;LEFT

## 2017-08-19 LAB
6-ACETYLMORPHINE, UR: NOT DETECTED
7-AMINOCLONAZEPAM, URINE: NOT DETECTED
ALPHA-OH-ALPRAZ, URINE: NOT DETECTED
ALPRAZOLAM, URINE: NOT DETECTED
AMPHETAMINES, URINE: NOT DETECTED
BARBITURATES, URINE: NOT DETECTED
BENZOYLECGONINE, UR: NOT DETECTED
BUPRENORPHINE URINE: NOT DETECTED
CARISOPRODOL, UR: NOT DETECTED
CLONAZEPAM, URINE: NOT DETECTED
CODEINE, URINE: NOT DETECTED
CREATININE URINE: 137.1 MG/DL (ref 20–400)
DIAZEPAM, URINE: NOT DETECTED
EER PAIN MGT DRUG PANEL, HIGH RES/EMIT U: NORMAL
ETHYL GLUCURONIDE UR: PRESENT
FENTANYL URINE: NOT DETECTED
HYDROCODONE, URINE: NOT DETECTED
HYDROMORPHONE, URINE: NOT DETECTED
LORAZEPAM, URINE: NOT DETECTED
MARIJUANA METAB, UR: NOT DETECTED
MDA, UR: NOT DETECTED
MDEA, EVE, UR: NOT DETECTED
MDMA URINE: NOT DETECTED
MEPERIDINE METAB, UR: NOT DETECTED
METHADONE, URINE: NOT DETECTED
METHAMPHETAMINE, URINE: NOT DETECTED
METHYLPHENIDATE: NOT DETECTED
MIDAZOLAM, URINE: NOT DETECTED
MORPHINE URINE: NOT DETECTED
NORBUPRENORPHINE, URINE: NOT DETECTED
NORDIAZEPAM, URINE: NOT DETECTED
NORFENTANYL, URINE: NOT DETECTED
NORHYDROCODONE, URINE: NOT DETECTED
NOROXYCODONE, URINE: PRESENT
NOROXYMORPHONE, URINE: PRESENT
OXAZEPAM, URINE: NOT DETECTED
OXYCODONE URINE: PRESENT
OXYMORPHONE, URINE: NOT DETECTED
PAIN MGT DRUG PANEL, HI RES, UR: NORMAL
PCP,URINE: NOT DETECTED
PHENTERMINE, UR: NOT DETECTED
PROPOXYPHENE, URINE: NOT DETECTED
TAPENTADOL, URINE: NOT DETECTED
TAPENTADOL-O-SULFATE, URINE: NOT DETECTED
TEMAZEPAM, URINE: NOT DETECTED
TRAMADOL, URINE: NOT DETECTED
ZOLPIDEM, URINE: NOT DETECTED

## 2017-08-29 ENCOUNTER — TELEPHONE (OUTPATIENT)
Dept: INTERNAL MEDICINE | Age: 67
End: 2017-08-29

## 2017-09-01 ENCOUNTER — OFFICE VISIT (OUTPATIENT)
Dept: INTERNAL MEDICINE | Age: 67
End: 2017-09-01
Payer: COMMERCIAL

## 2017-09-01 VITALS
DIASTOLIC BLOOD PRESSURE: 97 MMHG | HEART RATE: 83 BPM | SYSTOLIC BLOOD PRESSURE: 154 MMHG | BODY MASS INDEX: 44.46 KG/M2 | WEIGHT: 259 LBS

## 2017-09-01 DIAGNOSIS — E66.01 MORBID OBESITY WITH BMI OF 40.0-44.9, ADULT (HCC): ICD-10-CM

## 2017-09-01 DIAGNOSIS — I10 UNCONTROLLED HYPERTENSION: ICD-10-CM

## 2017-09-01 DIAGNOSIS — I25.10 CORONARY ARTERY DISEASE INVOLVING NATIVE CORONARY ARTERY OF NATIVE HEART WITHOUT ANGINA PECTORIS: ICD-10-CM

## 2017-09-01 DIAGNOSIS — R07.89 CHEST WALL PAIN: ICD-10-CM

## 2017-09-01 DIAGNOSIS — M17.0 PRIMARY OSTEOARTHRITIS OF BOTH KNEES: ICD-10-CM

## 2017-09-01 DIAGNOSIS — E11.8 TYPE 2 DIABETES MELLITUS WITH COMPLICATION, WITHOUT LONG-TERM CURRENT USE OF INSULIN (HCC): Primary | ICD-10-CM

## 2017-09-01 DIAGNOSIS — I25.5 ISCHEMIC CARDIOMYOPATHY: ICD-10-CM

## 2017-09-01 DIAGNOSIS — M79.641 PAIN OF RIGHT HAND: ICD-10-CM

## 2017-09-01 DIAGNOSIS — F32.A MILD DEPRESSION: ICD-10-CM

## 2017-09-01 PROCEDURE — 99214 OFFICE O/P EST MOD 30 MIN: CPT | Performed by: INTERNAL MEDICINE

## 2017-09-01 RX ORDER — METOPROLOL TARTRATE 100 MG/1
TABLET ORAL
Qty: 56 TABLET | Refills: 3 | Status: SHIPPED | OUTPATIENT
Start: 2017-09-01 | End: 2018-01-29 | Stop reason: SDUPTHER

## 2017-09-01 RX ORDER — SPIRONOLACTONE 25 MG/1
25 TABLET ORAL DAILY
Qty: 30 TABLET | Refills: 2 | Status: SHIPPED | OUTPATIENT
Start: 2017-09-01 | End: 2018-03-12 | Stop reason: SDUPTHER

## 2017-09-01 RX ORDER — LOSARTAN POTASSIUM 100 MG/1
100 TABLET ORAL DAILY
Qty: 30 TABLET | Refills: 5 | Status: SHIPPED | OUTPATIENT
Start: 2017-09-01 | End: 2018-03-12 | Stop reason: SDUPTHER

## 2017-09-01 ASSESSMENT — ENCOUNTER SYMPTOMS
SHORTNESS OF BREATH: 0
ABDOMINAL PAIN: 0
BLURRED VISION: 1
PHOTOPHOBIA: 0
HEARTBURN: 0
SORE THROAT: 0
EYE REDNESS: 0
CONSTIPATION: 0
NAUSEA: 0
BACK PAIN: 1
COUGH: 0

## 2017-09-04 ASSESSMENT — ENCOUNTER SYMPTOMS
EYE PAIN: 0
EYE DISCHARGE: 0

## 2017-09-07 ENCOUNTER — TELEPHONE (OUTPATIENT)
Dept: INTERNAL MEDICINE | Age: 67
End: 2017-09-07

## 2017-09-12 ENCOUNTER — HOSPITAL ENCOUNTER (OUTPATIENT)
Dept: PAIN MANAGEMENT | Age: 67
Discharge: HOME | End: 2017-09-12
Payer: COMMERCIAL

## 2017-09-12 VITALS
HEIGHT: 64 IN | RESPIRATION RATE: 18 BRPM | BODY MASS INDEX: 42.68 KG/M2 | HEART RATE: 63 BPM | WEIGHT: 250 LBS | TEMPERATURE: 97.9 F | DIASTOLIC BLOOD PRESSURE: 77 MMHG | SYSTOLIC BLOOD PRESSURE: 141 MMHG

## 2017-09-12 DIAGNOSIS — M96.1 POSTLAMINECTOMY SYNDROME, UNSPECIFIED REGION: Primary | ICD-10-CM

## 2017-09-12 PROCEDURE — 99213 OFFICE O/P EST LOW 20 MIN: CPT

## 2017-09-12 PROCEDURE — 99214 OFFICE O/P EST MOD 30 MIN: CPT

## 2017-09-12 PROCEDURE — 80307 DRUG TEST PRSMV CHEM ANLYZR: CPT

## 2017-09-12 RX ORDER — TRAMADOL HYDROCHLORIDE 50 MG/1
50 TABLET ORAL 2 TIMES DAILY PRN
Qty: 28 TABLET | Refills: 0 | Status: SHIPPED | OUTPATIENT
Start: 2017-09-12 | End: 2017-09-27 | Stop reason: SDUPTHER

## 2017-09-12 ASSESSMENT — PAIN SCALES - GENERAL: PAINLEVEL_OUTOF10: 5

## 2017-09-12 ASSESSMENT — PAIN DESCRIPTION - ORIENTATION: ORIENTATION: RIGHT;LOWER

## 2017-09-12 ASSESSMENT — PAIN DESCRIPTION - PROGRESSION: CLINICAL_PROGRESSION: NOT CHANGED

## 2017-09-12 ASSESSMENT — PAIN DESCRIPTION - DESCRIPTORS: DESCRIPTORS: CONSTANT;ACHING

## 2017-09-12 ASSESSMENT — PAIN DESCRIPTION - PAIN TYPE: TYPE: CHRONIC PAIN

## 2017-09-13 ENCOUNTER — TELEPHONE (OUTPATIENT)
Dept: INTERNAL MEDICINE | Age: 67
End: 2017-09-13

## 2017-09-15 ENCOUNTER — TELEPHONE (OUTPATIENT)
Dept: INTERNAL MEDICINE | Age: 67
End: 2017-09-15

## 2017-09-16 LAB
6-ACETYLMORPHINE, UR: NOT DETECTED
7-AMINOCLONAZEPAM, URINE: NOT DETECTED
ALPHA-OH-ALPRAZ, URINE: NOT DETECTED
ALPRAZOLAM, URINE: NOT DETECTED
AMPHETAMINES, URINE: NOT DETECTED
BARBITURATES, URINE: NOT DETECTED
BENZOYLECGONINE, UR: NOT DETECTED
BUPRENORPHINE URINE: NOT DETECTED
CARISOPRODOL, UR: NOT DETECTED
CLONAZEPAM, URINE: NOT DETECTED
CODEINE, URINE: NOT DETECTED
CREATININE URINE: 194.1 MG/DL (ref 20–400)
DIAZEPAM, URINE: NOT DETECTED
EER PAIN MGT DRUG PANEL, HIGH RES/EMIT U: NORMAL
ETHYL GLUCURONIDE UR: NOT DETECTED
FENTANYL URINE: NOT DETECTED
HYDROCODONE, URINE: NOT DETECTED
HYDROMORPHONE, URINE: NOT DETECTED
LORAZEPAM, URINE: NOT DETECTED
MARIJUANA METAB, UR: NOT DETECTED
MDA, UR: NOT DETECTED
MDEA, EVE, UR: NOT DETECTED
MDMA URINE: NOT DETECTED
MEPERIDINE METAB, UR: NOT DETECTED
METHADONE, URINE: NOT DETECTED
METHAMPHETAMINE, URINE: NOT DETECTED
METHYLPHENIDATE: NOT DETECTED
MIDAZOLAM, URINE: NOT DETECTED
MORPHINE URINE: NOT DETECTED
NORBUPRENORPHINE, URINE: NOT DETECTED
NORDIAZEPAM, URINE: NOT DETECTED
NORFENTANYL, URINE: NOT DETECTED
NORHYDROCODONE, URINE: NOT DETECTED
NOROXYCODONE, URINE: PRESENT
NOROXYMORPHONE, URINE: NOT DETECTED
OXAZEPAM, URINE: NOT DETECTED
OXYCODONE URINE: PRESENT
OXYMORPHONE, URINE: NOT DETECTED
PAIN MGT DRUG PANEL, HI RES, UR: NORMAL
PCP,URINE: NOT DETECTED
PHENTERMINE, UR: NOT DETECTED
PROPOXYPHENE, URINE: NOT DETECTED
TAPENTADOL, URINE: NOT DETECTED
TAPENTADOL-O-SULFATE, URINE: NOT DETECTED
TEMAZEPAM, URINE: NOT DETECTED
TRAMADOL, URINE: NOT DETECTED
ZOLPIDEM, URINE: NOT DETECTED

## 2017-09-27 ENCOUNTER — TELEPHONE (OUTPATIENT)
Dept: INTERNAL MEDICINE | Age: 67
End: 2017-09-27

## 2017-10-09 ENCOUNTER — HOSPITAL ENCOUNTER (OUTPATIENT)
Dept: PAIN MANAGEMENT | Age: 67
Discharge: HOME | End: 2017-10-09
Payer: COMMERCIAL

## 2017-10-09 VITALS — TEMPERATURE: 98 F | HEART RATE: 62 BPM | SYSTOLIC BLOOD PRESSURE: 151 MMHG | DIASTOLIC BLOOD PRESSURE: 87 MMHG

## 2017-10-09 DIAGNOSIS — M17.11 PRIMARY OSTEOARTHRITIS OF RIGHT KNEE: Chronic | ICD-10-CM

## 2017-10-09 DIAGNOSIS — M47.816 FACET ARTHRITIS OF LUMBAR REGION: Primary | Chronic | ICD-10-CM

## 2017-10-09 DIAGNOSIS — M96.1 POSTLAMINECTOMY SYNDROME: ICD-10-CM

## 2017-10-09 DIAGNOSIS — Z51.81 MEDICATION MONITORING ENCOUNTER: ICD-10-CM

## 2017-10-09 DIAGNOSIS — M96.1 POSTLAMINECTOMY SYNDROME, UNSPECIFIED REGION: ICD-10-CM

## 2017-10-09 PROCEDURE — 99214 OFFICE O/P EST MOD 30 MIN: CPT | Performed by: NURSE PRACTITIONER

## 2017-10-09 PROCEDURE — 99214 OFFICE O/P EST MOD 30 MIN: CPT

## 2017-10-09 RX ORDER — OXYCODONE HYDROCHLORIDE AND ACETAMINOPHEN 5; 325 MG/1; MG/1
1 TABLET ORAL 2 TIMES DAILY PRN
Qty: 28 TABLET | Refills: 0 | Status: SHIPPED | OUTPATIENT
Start: 2017-10-09 | End: 2017-10-20 | Stop reason: SDUPTHER

## 2017-10-09 ASSESSMENT — PAIN DESCRIPTION - PAIN TYPE: TYPE: CHRONIC PAIN

## 2017-10-09 ASSESSMENT — PAIN SCALES - GENERAL: PAINLEVEL_OUTOF10: 6

## 2017-10-09 ASSESSMENT — ENCOUNTER SYMPTOMS
SHORTNESS OF BREATH: 0
COUGH: 0
CONSTIPATION: 0
BACK PAIN: 1

## 2017-10-09 ASSESSMENT — PAIN DESCRIPTION - FREQUENCY: FREQUENCY: CONTINUOUS

## 2017-10-09 ASSESSMENT — PAIN DESCRIPTION - PROGRESSION: CLINICAL_PROGRESSION: NOT CHANGED

## 2017-10-09 ASSESSMENT — PAIN DESCRIPTION - ONSET: ONSET: ON-GOING

## 2017-10-09 ASSESSMENT — PAIN DESCRIPTION - ORIENTATION: ORIENTATION: LEFT

## 2017-10-09 ASSESSMENT — PAIN DESCRIPTION - DESCRIPTORS: DESCRIPTORS: ACHING;CONSTANT

## 2017-10-09 NOTE — PROGRESS NOTES
Patient is here today to review medication contract. Chief Complaint:  Low back and right knee pain    [facility]     Patient complains of constant aching, burning pain in the low back and right knee. Her pain is worse with activity and relieved with rest and pain medication. She had Right knee geniculate nerve block in April with two days of relief. Discussed at length the details regarding the RF. But pt not interested in injection at this time. She was placed on 2 week refills in June 2017 due to shortage, and changed to tramadol 2 week fills in Sept due to memory issues. Per POC note, Dr Rosa Miller agrees to change back to percocet  if family member assumes control of the medication. The patient is here today with her daughter Patria Cortes. Contract procedure explained and questions answerered. She agrees to monitor medication for the patient and come to monthly appts. Pt will remain on 2 week refills at this time. Pt does have visiting home health aid that comes 2-3 times a week. Currently being treated for trigger finger to right 4th digit with PCP, and to refer to ortho is no improvement. HPI:   Back Pain   This is a chronic problem. The problem is unchanged. The pain is present in the lumbar spine, sacro-iliac and gluteal. The quality of the pain is described as aching. The pain radiates to the right foot and left foot. The pain is at a severity of 6/10. The pain is moderate. The pain is worse during the day. The symptoms are aggravated by position, sitting and standing. Pertinent negatives include no chest pain or fever. Risk factors include lack of exercise, menopause and obesity. She has tried analgesics, NSAIDs and muscle relaxant for the symptoms. The treatment provided mild relief. Knee Pain    Incident onset: chronic. The pain is present in the right knee. The quality of the pain is described as aching. The pain is at a severity of 8/10. The pain is moderate. The pain has been constant since onset.  The  NERVE BLOCK  4/23/2012    Right MBNB L3, L4, L5    NERVE BLOCK  5/22/2012    Right MBNB L3, L4, and L5     NERVE BLOCK  01/14/13    Right knee injection #1 - Synvisc    NERVE BLOCK  01/21/13    Right knee injection #2 - Synvisc    NERVE BLOCK  1/28/2013     Rigth knee synvisc injection #3    NERVE BLOCK Right 04/17/2017    Rt genicular nerve block. no steroid used    OTHER SURGICAL HISTORY Right 7/14/2014    synvisc one knee injection    OTHER SURGICAL HISTORY Right 3/16/2015    synvisc one knee injection    OTHER SURGICAL HISTORY Right 06-13-16    synvisc right knee injection    SPINE SURGERY      TONSILLECTOMY      UPPER GASTROINTESTINAL ENDOSCOPY      VENA CAVA FILTER PLACEMENT  2007    PE and B/L LE emboli       Allergies   Allergen Reactions    Bactrim     Penicillins     Tylenol [Acetaminophen] Other (See Comments)     constipation         Current Outpatient Prescriptions:     oxyCODONE-acetaminophen (PERCOCET) 5-325 MG per tablet, Take 1 tablet by mouth 2 times daily as needed for Pain ., Disp: 28 tablet, Rfl: 0    traMADol (ULTRAM) 50 MG tablet, Take 1 tablet by mouth 2 times daily as needed for Pain, Disp: 28 tablet, Rfl: 0    metoprolol (LOPRESSOR) 100 MG tablet, Take 1 tablet by mouth 2 times daily, Disp: 56 tablet, Rfl: 3    spironolactone (ALDACTONE) 25 MG tablet, Take 1 tablet by mouth daily, Disp: 30 tablet, Rfl: 2    losartan (COZAAR) 100 MG tablet, Take 1 tablet by mouth daily, Disp: 30 tablet, Rfl: 5    sertraline (ZOLOFT) 50 MG tablet, Take 1.5 tablets by mouth daily, Disp: 45 tablet, Rfl: 3    glucose blood VI test strips (ASCENSIA AUTODISC VI;ONE TOUCH ULTRA TEST VI) strip, 1 each by In Vitro route 3 times daily As needed. , Disp: 100 each, Rfl: 5    albuterol-ipratropium (COMBIVENT RESPIMAT)  MCG/ACT AERS inhaler, Inhale 1 puff into the lungs every 6 hours as needed for Wheezing, Disp: 1 Inhaler, Rfl: 5    linagliptin (TRADJENTA) 5 MG tablet, Take 1 tablet by Never Used    Alcohol use 0.6 oz/week     1 Cans of beer per week      Comment: occasionally    Drug use: No    Sexual activity: No     Other Topics Concern    Not on file     Social History Narrative    No narrative on file       Review of Systems:  Review of Systems   Constitution: Negative for chills and fever. Cardiovascular: Negative for chest pain. Respiratory: Negative for cough and shortness of breath. Musculoskeletal: Positive for back pain. Gastrointestinal: Negative for constipation. Physical Exam:  BP (!) 151/87   Pulse 62   Temp 98 °F (36.7 °C) (Oral)   LMP  (LMP Unknown)     Physical Exam   Constitutional: She is oriented to person, place, and time and well-developed, well-nourished, and in no distress. obese   Cardiovascular: Normal rate. Pulmonary/Chest: Effort normal.   Musculoskeletal: Normal range of motion. Neurological: She is alert and oriented to person, place, and time. Antalgic gait using a cane     Skin: Skin is warm and dry. Psychiatric: Affect normal.       Record/Diagnostics Review:    XR Lumbar spine 2015     Impression: Moderate to severe spondylosis and degenerative disc disease of the lumbar spine in addition to arthrosis, without convincing evidence of acute fracture.     XR right knee 2015 - Impression: Right knee Tricompartment degenerative arthritis worst in the medial knee compartment. No evidence for acute fractures.       Assessment:  Problem List Items Addressed This Visit     Facet arthritis of lumbar region ([address] - Primary (Chronic)    Relevant Medications    oxyCODONE-acetaminophen (PERCOCET) 5-325 MG per tablet    Knee osteoarthritis (Chronic)    Relevant Medications    oxyCODONE-acetaminophen (PERCOCET) 5-325 MG per tablet    Medication monitoring encounter    Postlaminectomy syndrome    Postlaminectomy syndrome, unspecified region      Other Visit Diagnoses    None.            Treatment Plan:  DISCUSSION: Treatment options discussed with patient and all questions answered to patient's satisfaction. TREATMENT OPTIONS:     Continue opioid therapy, remains on 2 week refills, medication changed to Percocet BID prn  Contract requirements met. Satisfactory pain management plan. Medication helps with personal goals and self care needs. Patient is stable on current regimen of meds and medication is effectively managing pain, we will continue current medications without changes. As always, we encourage daily stretching and strengthening exercises, and recommend minimizing use of pain medications unless patient cannot get through daily activities due to pain.   Follow up appointment made

## 2017-10-11 ENCOUNTER — OFFICE VISIT (OUTPATIENT)
Dept: BEHAVIORAL/MENTAL HEALTH CLINIC | Age: 67
End: 2017-10-11
Payer: COMMERCIAL

## 2017-10-11 DIAGNOSIS — F32.A DEPRESSION, UNSPECIFIED DEPRESSION TYPE: Primary | ICD-10-CM

## 2017-10-11 PROCEDURE — 1036F TOBACCO NON-USER: CPT | Performed by: PSYCHOLOGIST

## 2017-10-11 PROCEDURE — 90791 PSYCH DIAGNOSTIC EVALUATION: CPT | Performed by: PSYCHOLOGIST

## 2017-10-11 NOTE — PATIENT INSTRUCTIONS
1. Aiden from [facility] will be working with you before you leave today. 2. Follow up with Dr. Lloyd Rodriguez after your next appointment with Dr. Frieda Hutchins.

## 2017-10-16 ENCOUNTER — TELEPHONE (OUTPATIENT)
Dept: INTERNAL MEDICINE | Age: 67
End: 2017-10-16

## 2017-10-18 NOTE — TELEPHONE ENCOUNTER
[id number] [address] Mosqueda. Will address next visit.  Please advise her to keep appt next week and bring glucose monitor

## 2017-10-18 NOTE — TELEPHONE ENCOUNTER
PC to [address] [place], [place] [address]Lo[address]-- spoke with her and let her know what MD advised-- she states she will see pt and let her know to bring glucometer to appt

## 2017-10-23 ENCOUNTER — HOSPITAL ENCOUNTER (OUTPATIENT)
Dept: GENERAL RADIOLOGY | Age: 67
Discharge: HOME | End: 2017-10-23
Payer: COMMERCIAL

## 2017-10-23 ENCOUNTER — HOSPITAL ENCOUNTER (OUTPATIENT)
Age: 67
Discharge: HOME | End: 2017-10-23
Payer: COMMERCIAL

## 2017-10-23 DIAGNOSIS — M79.641 PAIN OF RIGHT HAND: ICD-10-CM

## 2017-10-23 DIAGNOSIS — R07.89 CHEST WALL PAIN: ICD-10-CM

## 2017-10-23 PROCEDURE — 71101 X-RAY EXAM UNILAT RIBS/CHEST: CPT

## 2017-10-23 PROCEDURE — 73130 X-RAY EXAM OF HAND: CPT

## 2017-10-24 ENCOUNTER — TELEPHONE (OUTPATIENT)
Dept: BEHAVIORAL/MENTAL HEALTH CLINIC | Age: 67
End: 2017-10-24

## 2017-10-24 NOTE — TELEPHONE ENCOUNTER
Completed outreach after no call, no show. Pt states she forgot this appointment She was informed that she missed two appointments today. She believes that she called to reschedule her appointment to tomorrow with Dr. Shaneka Morgan. Pt sounds as if she woke up recently or is otherwise drowsy and having a hard time understanding this writer's request that she call the main number to reschedule both appointments that she missed today as there is no appointment with Dr. Shaneka Morgan scheduled for tomorrow.

## 2017-10-30 ENCOUNTER — TELEPHONE (OUTPATIENT)
Dept: INTERNAL MEDICINE | Age: 67
End: 2017-10-30

## 2017-10-30 NOTE — TELEPHONE ENCOUNTER
Pt came to window asking to be seen she thought her apt's with Dr. Bebo Alcantara and Dr. Garrett North were today. She missed her apt's last tues 10/24/17 with both Dr's and was scheduled for one week later this tues 10/31/17. Pt was offered a apt with dr. Bebo Alcantara for today but cannot get an apt with Dr. Garrett North until tomorrow. Pt states she will come in tomorrow because she wants both apt's together. Pt also states she will have to ride her scooter tomorrow.

## 2017-10-31 ENCOUNTER — HOSPITAL ENCOUNTER (OUTPATIENT)
Age: 67
Setting detail: SPECIMEN
Discharge: HOME | End: 2017-10-31
Payer: COMMERCIAL

## 2017-10-31 ENCOUNTER — OFFICE VISIT (OUTPATIENT)
Dept: INTERNAL MEDICINE | Age: 67
End: 2017-10-31
Payer: COMMERCIAL

## 2017-10-31 ENCOUNTER — OFFICE VISIT (OUTPATIENT)
Dept: BEHAVIORAL/MENTAL HEALTH CLINIC | Age: 67
End: 2017-10-31
Payer: COMMERCIAL

## 2017-10-31 VITALS
HEIGHT: 64 IN | SYSTOLIC BLOOD PRESSURE: 142 MMHG | WEIGHT: 273 LBS | HEART RATE: 62 BPM | DIASTOLIC BLOOD PRESSURE: 90 MMHG | BODY MASS INDEX: 46.61 KG/M2

## 2017-10-31 DIAGNOSIS — I25.5 ISCHEMIC CARDIOMYOPATHY: ICD-10-CM

## 2017-10-31 DIAGNOSIS — F32.1 MODERATE SINGLE CURRENT EPISODE OF MAJOR DEPRESSIVE DISORDER (HCC): ICD-10-CM

## 2017-10-31 DIAGNOSIS — E78.2 MIXED HYPERLIPIDEMIA: ICD-10-CM

## 2017-10-31 DIAGNOSIS — F32.1 MAJOR DEPRESSIVE DISORDER, SINGLE EPISODE, MODERATE (HCC): Primary | ICD-10-CM

## 2017-10-31 DIAGNOSIS — I10 UNCONTROLLED HYPERTENSION: ICD-10-CM

## 2017-10-31 DIAGNOSIS — E66.01 MORBID OBESITY WITH BMI OF 45.0-49.9, ADULT (HCC): ICD-10-CM

## 2017-10-31 DIAGNOSIS — I10 UNCONTROLLED HYPERTENSION: Primary | ICD-10-CM

## 2017-10-31 DIAGNOSIS — R06.09 DOE (DYSPNEA ON EXERTION): ICD-10-CM

## 2017-10-31 DIAGNOSIS — M17.0 PRIMARY OSTEOARTHRITIS OF BOTH KNEES: ICD-10-CM

## 2017-10-31 LAB
ANION GAP SERPL CALCULATED.3IONS-SCNC: 13 MMOL/L (ref 9–17)
BNP INTERPRETATION: ABNORMAL
BUN BLDV-MCNC: 13 MG/DL (ref 8–23)
BUN/CREAT BLD: ABNORMAL (ref 9–20)
CALCIUM SERPL-MCNC: 9.1 MG/DL (ref 8.6–10.4)
CHLORIDE BLD-SCNC: 104 MMOL/L (ref 98–107)
CO2: 27 MMOL/L (ref 20–31)
CREAT SERPL-MCNC: 0.6 MG/DL (ref 0.5–0.9)
CREATININE URINE: 135.1 MG/DL (ref 28–217)
GFR AFRICAN AMERICAN: >60 ML/MIN
GFR NON-AFRICAN AMERICAN: >60 ML/MIN
GFR SERPL CREATININE-BSD FRML MDRD: ABNORMAL ML/MIN/{1.73_M2}
GFR SERPL CREATININE-BSD FRML MDRD: ABNORMAL ML/MIN/{1.73_M2}
GLUCOSE BLD-MCNC: 138 MG/DL (ref 70–99)
MICROALBUMIN/CREAT 24H UR: 80 MG/L
MICROALBUMIN/CREAT UR-RTO: 59 MCG/MG CREAT
POTASSIUM SERPL-SCNC: 4.6 MMOL/L (ref 3.7–5.3)
PRO-BNP: 1605 PG/ML
SODIUM BLD-SCNC: 144 MMOL/L (ref 135–144)

## 2017-10-31 PROCEDURE — G8484 FLU IMMUNIZE NO ADMIN: HCPCS | Performed by: INTERNAL MEDICINE

## 2017-10-31 PROCEDURE — 1036F TOBACCO NON-USER: CPT | Performed by: PSYCHOLOGIST

## 2017-10-31 PROCEDURE — 3014F SCREEN MAMMO DOC REV: CPT | Performed by: INTERNAL MEDICINE

## 2017-10-31 PROCEDURE — 82570 ASSAY OF URINE CREATININE: CPT

## 2017-10-31 PROCEDURE — 1123F ACP DISCUSS/DSCN MKR DOCD: CPT | Performed by: INTERNAL MEDICINE

## 2017-10-31 PROCEDURE — G8400 PT W/DXA NO RESULTS DOC: HCPCS | Performed by: INTERNAL MEDICINE

## 2017-10-31 PROCEDURE — 4040F PNEUMOC VAC/ADMIN/RCVD: CPT | Performed by: INTERNAL MEDICINE

## 2017-10-31 PROCEDURE — 90834 PSYTX W PT 45 MINUTES: CPT | Performed by: PSYCHOLOGIST

## 2017-10-31 PROCEDURE — 82043 UR ALBUMIN QUANTITATIVE: CPT

## 2017-10-31 PROCEDURE — 1090F PRES/ABSN URINE INCON ASSESS: CPT | Performed by: INTERNAL MEDICINE

## 2017-10-31 PROCEDURE — 3017F COLORECTAL CA SCREEN DOC REV: CPT | Performed by: INTERNAL MEDICINE

## 2017-10-31 PROCEDURE — 83880 ASSAY OF NATRIURETIC PEPTIDE: CPT

## 2017-10-31 PROCEDURE — 1036F TOBACCO NON-USER: CPT | Performed by: INTERNAL MEDICINE

## 2017-10-31 PROCEDURE — G8427 DOCREV CUR MEDS BY ELIG CLIN: HCPCS | Performed by: INTERNAL MEDICINE

## 2017-10-31 PROCEDURE — 3045F PR MOST RECENT HEMOGLOBIN A1C LEVEL 7.0-9.0%: CPT | Performed by: INTERNAL MEDICINE

## 2017-10-31 PROCEDURE — 99214 OFFICE O/P EST MOD 30 MIN: CPT | Performed by: INTERNAL MEDICINE

## 2017-10-31 PROCEDURE — 80048 BASIC METABOLIC PNL TOTAL CA: CPT

## 2017-10-31 PROCEDURE — G8598 ASA/ANTIPLAT THER USED: HCPCS | Performed by: INTERNAL MEDICINE

## 2017-10-31 PROCEDURE — 36415 COLL VENOUS BLD VENIPUNCTURE: CPT

## 2017-10-31 PROCEDURE — G8417 CALC BMI ABV UP PARAM F/U: HCPCS | Performed by: INTERNAL MEDICINE

## 2017-10-31 RX ORDER — FUROSEMIDE 40 MG/1
40 TABLET ORAL DAILY
Qty: 30 TABLET | Refills: 10 | Status: SHIPPED | OUTPATIENT
Start: 2017-10-31 | End: 2018-01-29 | Stop reason: SDUPTHER

## 2017-10-31 RX ORDER — FUROSEMIDE 20 MG/1
20 TABLET ORAL DAILY
Qty: 15 TABLET | Refills: 3 | Status: SHIPPED | OUTPATIENT
Start: 2017-10-31 | End: 2017-11-16

## 2017-10-31 RX ORDER — OMEPRAZOLE 20 MG/1
CAPSULE, DELAYED RELEASE ORAL
Qty: 28 CAPSULE | Refills: 3 | Status: ON HOLD | OUTPATIENT
Start: 2017-10-31 | End: 2018-02-26 | Stop reason: SDUPTHER

## 2017-10-31 RX ORDER — AMLODIPINE BESYLATE 10 MG/1
10 TABLET ORAL DAILY
Qty: 30 TABLET | Refills: 5 | Status: SHIPPED | OUTPATIENT
Start: 2017-10-31 | End: 2017-11-16 | Stop reason: SDUPTHER

## 2017-10-31 ASSESSMENT — ENCOUNTER SYMPTOMS
SHORTNESS OF BREATH: 0
CONSTIPATION: 0
HEARTBURN: 0
COUGH: 0
PHOTOPHOBIA: 0
ABDOMINAL PAIN: 0
EYE REDNESS: 0
NAUSEA: 0
SORE THROAT: 0
BACK PAIN: 1
BLURRED VISION: 1

## 2017-10-31 ASSESSMENT — PATIENT HEALTH QUESTIONNAIRE - PHQ9
4. FEELING TIRED OR HAVING LITTLE ENERGY: 3
5. POOR APPETITE OR OVEREATING: 2
9. THOUGHTS THAT YOU WOULD BE BETTER OFF DEAD, OR OF HURTING YOURSELF: 0
1. LITTLE INTEREST OR PLEASURE IN DOING THINGS: 1
2. FEELING DOWN, DEPRESSED OR HOPELESS: 2
7. TROUBLE CONCENTRATING ON THINGS, SUCH AS READING THE NEWSPAPER OR WATCHING TELEVISION: 0
SUM OF ALL RESPONSES TO PHQ9 QUESTIONS 1 & 2: 3
6. FEELING BAD ABOUT YOURSELF - OR THAT YOU ARE A FAILURE OR HAVE LET YOURSELF OR YOUR FAMILY DOWN: 3
3. TROUBLE FALLING OR STAYING ASLEEP: 3
10. IF YOU CHECKED OFF ANY PROBLEMS, HOW DIFFICULT HAVE THESE PROBLEMS MADE IT FOR YOU TO DO YOUR WORK, TAKE CARE OF THINGS AT HOME, OR GET ALONG WITH OTHER PEOPLE: 3
SUM OF ALL RESPONSES TO PHQ QUESTIONS 1-9: 17
8. MOVING OR SPEAKING SO SLOWLY THAT OTHER PEOPLE COULD HAVE NOTICED. OR THE OPPOSITE, BEING SO FIGETY OR RESTLESS THAT YOU HAVE BEEN MOVING AROUND A LOT MORE THAN USUAL: 3

## 2017-10-31 NOTE — PROGRESS NOTES
[facility]    Progress Note    Date of patient's visit: 10/31/2017    Patient's Name:  Marycarmen Sanchez    YOB: 1950            Patient Care Team:  Alexander Portillo MD as PCP - General (Internal Medicine)  Latosha Nuno MD as Consulting Physician (Pain Management)  Alexander Portillo MD as Consulting Physician (Internal Medicine)  Adalberto Castillo MD as Consulting Physician (Cardiology)    REASON FOR VISIT: Routine outpatient follow     Chief Complaint   Patient presents with    Diabetes    Hypertension    Forms     Pt has transportation forms that she would like filled out          HISTORY OF PRESENT ILLNESS:    History was obtained from the patient. Marycarmen Sanchez is a 74 y.o. is here for follow-up on her diabetes, hypertension and other medical problems. She needs transportation forms filled. She has been riding her scooter but has poor vision and has fallen. She is not safe to ride her scooter on streets. She has been depressed. She has an appt with . She is c/o JONES. She has gained weight. She was wearing heavier shoes and clothes today. She has not seen cardiology since last December. She did not bring in her blood sugars. She is getting her meds now in a bubble pack. She is getting her medications bubble packed from her pharmacy. She saw ophthalmology in August. She has referral to [facility]. Echo 2016  CONCLUSIONS    Summary  LV chamber dimension is within normal limits with increase in the wall  thickness. Systolic function is reduced with a calculated EF of 44%. Apical akinesis noted. Grade I (mild) left ventricular diastolic dysfunction. Estimated right ventricular systolic pressure is 37 mmHg.       Coronary angiogram 2016  Conclusions      Procedure Summary      Apical akinesia with occluded 100% distal LAD   Patent mid LAD and D stents   Patent LCX with distal 40-50% stenosis   RCA distal 90% and mid 80% and had BMS both lesions       Past Medical History:   Diagnosis Date    Allergic rhinitis     Anxiety 10/25/2016    Asthma     CAD (coronary artery disease)     s/p stents RCA and LAD 2009,    Chronic back pain     Congestive heart failure ([address]     Depression     Headache(784.0)     Hypercholesteremia 2/21/2012    Hypertension     Kidney stones     years ago    Obesity     Osteoarthritis     Postlaminectomy syndrome 6/6/2012    Type II or unspecified type diabetes mellitus without mention of complication, not stated as uncontrolled     Unspecified sleep apnea     Urinary incontinence        Past Surgical History:   Procedure Laterality Date    CARDIAC CATHETERIZATION  01/2013    patent stents    COLONOSCOPY  09/2015    normal    CORONARY ANGIOPLASTY WITH STENT PLACEMENT  1012-16    stents x 3    JOINT REPLACEMENT      l knee    KNEE SURGERY  10/21/2013    rt knee synvisc injection     KNEE SURGERY  12/02/2013    knee synvisc injection rt #2    KNEE SURGERY  12/09/2013    rt knee synvisc inj    LUMBAR SPINE SURGERY  2007    NERVE BLOCK  4/23/2012    Right MBNB L3, L4, L5    NERVE BLOCK  5/22/2012    Right MBNB L3, L4, and L5     NERVE BLOCK  01/14/13    Right knee injection #1 - Synvisc    NERVE BLOCK  01/21/13    Right knee injection #2 - Synvisc    NERVE BLOCK  1/28/2013     Rigth knee synvisc injection #3    NERVE BLOCK Right 04/17/2017    Rt genicular nerve block.  no steroid used    OTHER SURGICAL HISTORY Right 7/14/2014    synvisc one knee injection    OTHER SURGICAL HISTORY Right 3/16/2015    synvisc one knee injection    OTHER SURGICAL HISTORY Right 06-13-16    synvisc right knee injection    SPINE SURGERY      TONSILLECTOMY      UPPER GASTROINTESTINAL ENDOSCOPY      VENA CAVA FILTER PLACEMENT  2007    PE and B/L LE emboli         ALLERGIES      Allergies   Allergen Reactions    Bactrim     Penicillins     Tylenol [Acetaminophen] Other (See Comments)     constipation MEDICATIONS:      Current Outpatient Prescriptions on File Prior to Visit   Medication Sig Dispense Refill    oxyCODONE-acetaminophen (PERCOCET) 5-325 MG per tablet Take 1 tablet by mouth 2 times daily as needed for Pain . Earliest Fill Date: 10/23/17 28 tablet 0    metoprolol (LOPRESSOR) 100 MG tablet Take 1 tablet by mouth 2 times daily 56 tablet 3    spironolactone (ALDACTONE) 25 MG tablet Take 1 tablet by mouth daily 30 tablet 2    losartan (COZAAR) 100 MG tablet Take 1 tablet by mouth daily 30 tablet 5    sertraline (ZOLOFT) 50 MG tablet Take 1.5 tablets by mouth daily 45 tablet 3    glucose blood VI test strips (ASCENSIA AUTODISC VI;ONE TOUCH ULTRA TEST VI) strip 1 each by In Vitro route 3 times daily As needed.  100 each 5    albuterol-ipratropium (COMBIVENT RESPIMAT)  MCG/ACT AERS inhaler Inhale 1 puff into the lungs every 6 hours as needed for Wheezing 1 Inhaler 5    linagliptin (TRADJENTA) 5 MG tablet Take 1 tablet by mouth daily 30 tablet 10    omeprazole (PRILOSEC) 20 MG delayed release capsule TAKE 1 CAPSULE BY MOUTH DAILY 28 capsule 3    amLODIPine (NORVASC) 5 MG tablet Take 1 tablet by mouth daily 30 tablet 5    clopidogrel (PLAVIX) 75 MG tablet Take 1 tablet by mouth daily 30 tablet 11    aspirin (RA ASPIRIN EC) 81 MG EC tablet Take 1 tablet by mouth daily 30 tablet 11    Lancets MISC Use 1 -2 times daily Insulin dependent diabetes mellitus 50 each 11    Multiple Vitamins-Minerals (MULTIVITAMIN WITH MINERALS) tablet Take 1 tablet by mouth daily 30 tablet 3    atorvastatin (LIPITOR) 80 MG tablet TAKE 1 TABLET BY MOUTH DAILY 30 tablet 5    isosorbide mononitrate (IMDUR) 30 MG extended release tablet TAKE 1 TABLET BY MOUTH DAILY 30 tablet 5    Calcium Carbonate-Vitamin D (OYSTER SHELL CALCIUM/D) 500-200 MG-UNIT TABS Take 1 tablet by mouth 2 times daily 60 tablet 11    furosemide (LASIX) 40 MG tablet Take 1 tablet by mouth daily 30 tablet 10    nitroGLYCERIN (NITROSTAT) 0.4 MG index is 46.86 kg/m². BP Readings from Last 3 Encounters:   10/31/17 (!) 159/90   10/09/17 (!) 151/87   09/12/17 (!) 141/77        Wt Readings from Last 3 Encounters:   10/31/17 273 lb (123.8 kg)   09/12/17 250 lb (113.4 kg)   09/01/17 259 lb (117.5 kg)       Physical Exam      HENT:  Normocephalic, Atraumatic,  Oropharynx moist, No oral exudates, Nose normal. Neck- Normal range of motion, No tenderness, Supple  Eyes:  PERRL, EOMI, Conjunctiva normal, No icterus  Respiratory:  Normal breath sounds, No respiratory distress, No wheezing, No chest tenderness. Cardiovascular:  Normal heart rate, Normal rhythm, systolic murmur   GI:  Bowel sounds normal, Soft, No tenderness  Musculoskeletal:  Intact distal pulses, 1+ edema  Integument:  Warm, Dry, No erythema, No rash. Lymphatic:  No lymphadenopathy noted. Neurologic:  Alert & oriented x 3, Normal motor function, decreased sensation on feet b/l, function, No focal deficits noted.    Psychiatric:  Affect normal    LABORATORY FINDINGS:    CBC:  Lab Results   Component Value Date    WBC 5.7 04/11/2017    HGB 12.1 04/11/2017     04/11/2017     02/24/2012     BMP:    Lab Results   Component Value Date     04/11/2017    K 4.7 04/11/2017     04/11/2017    CO2 27 04/11/2017    BUN 16 04/11/2017    CREATININE 1.02 04/11/2017    GLUCOSE 122 04/11/2017    GLUCOSE 105 02/24/2012     HEMOGLOBIN A1C:   Lab Results   Component Value Date    LABA1C 7.1 07/17/2017     MICROALBUMIN URINE:   Lab Results   Component Value Date    MICROALBUR 62 10/20/2016     FASTING LIPID PANEL:  Lab Results   Component Value Date    CHOL 189 04/11/2017    HDL 52 04/11/2017    TRIG 70 04/11/2017     Lab Results   Component Value Date    LDLCHOLESTEROL 123 04/11/2017       LIVER PROFILE:  Lab Results   Component Value Date    ALT 12 10/12/2016    AST 21 10/12/2016    PROT 7.7 10/12/2016    BILITOT 0.40 10/12/2016    BILIDIR <0.08 10/12/2016    LABALBU 4.1 10/12/2016 LABALBU 4.2 02/24/2012      THYROID FUNCTION:   Lab Results   Component Value Date    TSH 1.77 07/15/2015      URINE ANALYSIS: No results found for: LABURIN  ASSESSMENT AND PLAN:         1. Uncontrolled hypertension  Continue Metoprolol and losartan    Aldactone  Non compliant    - amLODIPine (NORVASC) 10 MG tablet; Take 1 tablet by mouth daily  Dispense: 30 tablet; Refill: 5  - Basic Metabolic Panel; Future    2. Uncontrolled type 2 diabetes mellitus with diabetic polyneuropathy, with long-term current use of insulin ([facility]  Bring blood sugars  Vision [place] Burn  Poor vision  Podiatry follow up    - Microalbumin, Ur; Future  - Basic Metabolic Panel; Future    3. JONES (dyspnea on exertion)    - furosemide (LASIX) 40 MG tablet; Take 1 tablet by mouth daily  Dispense: 30 tablet; Refill: 10  - furosemide (LASIX) 20 MG tablet; Take 1 tablet by mouth daily for 15 days  Dispense: 15 tablet; Refill: 3  - Brain Natriuretic Peptide; Future  - NM cardiac persantine 1 day; Future    4. Ischemic cardiomyopathy  Increase Lasix to 60 mg daily  Cardiology follow up   Low salt diet    - Basic Metabolic Panel; Future  - furosemide (LASIX) 40 MG tablet; Take 1 tablet by mouth daily  Dispense: 30 tablet; Refill: 10  - furosemide (LASIX) 20 MG tablet; Take 1 tablet by mouth daily for 15 days  Dispense: 15 tablet; Refill: 3  - Brain Natriuretic Peptide; Future  - NM cardiac persantine 1 day; Future    5. Mixed hyperlipidemia  on lipitor    6. Moderate single current episode of major depressive disorder ([facility]    On sertraline  Follow up with     7. Primary osteoarthritis of both knees  Weight loss  Follow up with orthopedics    8. Morbid obesity with BMI of 45.0-49.9, adult ([facility]  Diet changes      FOLLOW UP AND INSTRUCTIONS:   Return in about 2 weeks (around 11/14/2017). 1. Mary Lou Chen received counseling on the following healthy behaviors: nutrition, exercise and medication adherence    2. Reviewed prior labs and health maintenance. 3. Discussed use, benefit, and side effects of prescribed medications. Barriers to medication compliance addressed. All patient questions answered. Pt voiced understanding.        Justina Tillman  Attending Physician, [address] [place] [address], Internal Medicine Residency Program  [address] [place] [address]  10/31/2017, 8:48 AM

## 2017-10-31 NOTE — PROGRESS NOTES
cognitive distortions. Associations were characterized by flight of ideas cognitive processes. Pt was orientated oriented to person, place, time, and general circumstances;  recent:  fair and remote:  fair. Insight and judgment were estimated to be poor, AEB, a poor understanding of cyclical maladaptive patterns, and the ability to use insight to inform behavior change. A:   Attempted with significant difficulty to complete PHQ-9 verbally with pt. She expressed confusion when the scale was provided to her (i.e., 0= not at all, 1= several days, 2= over half the days, 3= nearly everyday), and this was written on a piece of paper, in sharpie, in very large letters/numbers. She then places this on the desk and creates her own responses; therefore, her screening today is an estimate of what she communicated converted into the appropriate scale. PHQ Scores 10/31/2017 7/17/2017 1/11/2016 11/9/2015 11/9/2015   PHQ2 Score 3 0 1 0 0   PHQ9 Score 17 0 1 0 0     Interpretation of Total Score Depression Severity: 1-4 = Minimal depression, 5-9 = Mild depression, 10-14 = Moderate depression, 15-19 = Moderately severe depression, 20-27 = Severe depression    Pt interventions:  Provided education, Discussed self-care (sleep, nutrition, rewarding activities, social support, exercise), Supportive techniques and Identified maladaptive thoughts      Pt Behavioral Change Plan:   1. Make a list of what is hard for you emotionally. Things that stress you out or things that bother you such as pain. 2. Follow up in 2 weeks. Diagnosis:  The encounter diagnosis was Major depressive disorder, single episode, moderate ([address]       Diagnosis Date    Allergic rhinitis     Anxiety 10/25/2016    Asthma     CAD (coronary artery disease)     s/p stents RCA and LAD 2009,    Chronic back pain     Congestive heart failure ([address]     Depression     Headache(784.0)     Hypercholesteremia 2/21/2012    Hypertension     Kidney stones     years ago    Obesity     Osteoarthritis     Postlaminectomy syndrome 6/6/2012    Type II or unspecified type diabetes mellitus without mention of complication, not stated as uncontrolled     Unspecified sleep apnea     Urinary incontinence        History:    Medications:   Current Outpatient Prescriptions   Medication Sig Dispense Refill    amLODIPine (NORVASC) 10 MG tablet Take 1 tablet by mouth daily 30 tablet 5    oxyCODONE-acetaminophen (PERCOCET) 5-325 MG per tablet Take 1 tablet by mouth 2 times daily as needed for Pain . Earliest Fill Date: 10/23/17 28 tablet 0    metoprolol (LOPRESSOR) 100 MG tablet Take 1 tablet by mouth 2 times daily 56 tablet 3    spironolactone (ALDACTONE) 25 MG tablet Take 1 tablet by mouth daily 30 tablet 2    losartan (COZAAR) 100 MG tablet Take 1 tablet by mouth daily 30 tablet 5    sertraline (ZOLOFT) 50 MG tablet Take 1.5 tablets by mouth daily 45 tablet 3    glucose blood VI test strips (ASCENSIA AUTODISC VI;ONE TOUCH ULTRA TEST VI) strip 1 each by In Vitro route 3 times daily As needed.  100 each 5    albuterol-ipratropium (COMBIVENT RESPIMAT)  MCG/ACT AERS inhaler Inhale 1 puff into the lungs every 6 hours as needed for Wheezing 1 Inhaler 5    linagliptin (TRADJENTA) 5 MG tablet Take 1 tablet by mouth daily 30 tablet 10    omeprazole (PRILOSEC) 20 MG delayed release capsule TAKE 1 CAPSULE BY MOUTH DAILY 28 capsule 3    clopidogrel (PLAVIX) 75 MG tablet Take 1 tablet by mouth daily 30 tablet 11    aspirin (RA ASPIRIN EC) 81 MG EC tablet Take 1 tablet by mouth daily 30 tablet 11    Lancets MISC Use 1 -2 times daily Insulin dependent diabetes mellitus 50 each 11    Multiple Vitamins-Minerals (MULTIVITAMIN WITH MINERALS) tablet Take 1 tablet by mouth daily 30 tablet 3    atorvastatin (LIPITOR) 80 MG tablet TAKE 1 TABLET BY MOUTH DAILY 30 tablet 5    isosorbide mononitrate (IMDUR) 30 MG extended release tablet TAKE 1 TABLET BY MOUTH DAILY 30 tablet 5    Calcium Carbonate-Vitamin D (OYSTER SHELL CALCIUM/D) 500-200 MG-UNIT TABS Take 1 tablet by mouth 2 times daily 60 tablet 11    furosemide (LASIX) 40 MG tablet Take 1 tablet by mouth daily 30 tablet 10    nitroGLYCERIN (NITROSTAT) 0.4 MG SL tablet TAKE 1 TABLET UNDER THE TONGUE EVERY 5 MINUTES AS NEEDED FOR CHEST 20 tablet 5     No current facility-administered medications for this visit. Social History:   Social History     Social History    Marital status: Legally      Spouse name: N/A    Number of children: N/A    Years of education: N/A     Occupational History    Not on file. Social History Main Topics    Smoking status: Former Smoker     Years: 0.00    Smokeless tobacco: Never Used    Alcohol use 0.6 oz/week     1 Cans of beer per week      Comment: occasionally    Drug use: No    Sexual activity: No     Other Topics Concern    Not on file     Social History Narrative    No narrative on file       TOBACCO:   reports that she has quit smoking. She quit after 0.00 years of use. She has never used smokeless tobacco.  ETOH:   reports that she drinks about 0.6 oz of alcohol per week .     Family History:   Family History   Problem Relation Age of Onset    Diabetes Mother     Cancer Father     High Blood Pressure Sister     High Blood Pressure Brother

## 2017-11-14 ENCOUNTER — HOSPITAL ENCOUNTER (OUTPATIENT)
Dept: PAIN MANAGEMENT | Age: 67
Discharge: HOME | End: 2017-11-14
Payer: COMMERCIAL

## 2017-11-14 ENCOUNTER — TELEPHONE (OUTPATIENT)
Dept: BEHAVIORAL/MENTAL HEALTH CLINIC | Age: 67
End: 2017-11-14

## 2017-11-14 VITALS
HEART RATE: 61 BPM | SYSTOLIC BLOOD PRESSURE: 142 MMHG | RESPIRATION RATE: 14 BRPM | DIASTOLIC BLOOD PRESSURE: 75 MMHG | TEMPERATURE: 97.6 F

## 2017-11-14 DIAGNOSIS — Z51.81 MEDICATION MONITORING ENCOUNTER: ICD-10-CM

## 2017-11-14 DIAGNOSIS — M96.1 POSTLAMINECTOMY SYNDROME, LUMBAR: ICD-10-CM

## 2017-11-14 DIAGNOSIS — M17.11 PRIMARY OSTEOARTHRITIS OF RIGHT KNEE: Primary | Chronic | ICD-10-CM

## 2017-11-14 PROCEDURE — 99214 OFFICE O/P EST MOD 30 MIN: CPT

## 2017-11-14 PROCEDURE — 99213 OFFICE O/P EST LOW 20 MIN: CPT | Performed by: NURSE PRACTITIONER

## 2017-11-14 RX ORDER — OXYCODONE HYDROCHLORIDE AND ACETAMINOPHEN 5; 325 MG/1; MG/1
1 TABLET ORAL 2 TIMES DAILY PRN
Qty: 28 TABLET | Refills: 0 | Status: SHIPPED | OUTPATIENT
Start: 2017-11-14 | End: 2017-11-28 | Stop reason: SDUPTHER

## 2017-11-14 ASSESSMENT — ENCOUNTER SYMPTOMS
CONSTIPATION: 0
BACK PAIN: 1
SHORTNESS OF BREATH: 0
COUGH: 0
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
## 2017-11-16 ENCOUNTER — OFFICE VISIT (OUTPATIENT)
Dept: INTERNAL MEDICINE | Age: 67
End: 2017-11-16
Payer: COMMERCIAL

## 2017-11-16 VITALS
WEIGHT: 269.8 LBS | HEART RATE: 59 BPM | SYSTOLIC BLOOD PRESSURE: 131 MMHG | BODY MASS INDEX: 46.31 KG/M2 | DIASTOLIC BLOOD PRESSURE: 68 MMHG

## 2017-11-16 DIAGNOSIS — E78.2 MIXED HYPERLIPIDEMIA: ICD-10-CM

## 2017-11-16 DIAGNOSIS — H35.30 MACULAR DEGENERATION: ICD-10-CM

## 2017-11-16 DIAGNOSIS — E66.01 MORBID OBESITY WITH BMI OF 45.0-49.9, ADULT (HCC): ICD-10-CM

## 2017-11-16 DIAGNOSIS — M47.816 LUMBAR SPONDYLOSIS: ICD-10-CM

## 2017-11-16 DIAGNOSIS — M17.11 PRIMARY OSTEOARTHRITIS OF RIGHT KNEE: Chronic | ICD-10-CM

## 2017-11-16 DIAGNOSIS — F32.1 MODERATE MAJOR DEPRESSION (HCC): ICD-10-CM

## 2017-11-16 DIAGNOSIS — I25.5 ISCHEMIC CARDIOMYOPATHY: ICD-10-CM

## 2017-11-16 DIAGNOSIS — I10 ESSENTIAL HYPERTENSION: Primary | ICD-10-CM

## 2017-11-16 PROCEDURE — 1123F ACP DISCUSS/DSCN MKR DOCD: CPT | Performed by: INTERNAL MEDICINE

## 2017-11-16 PROCEDURE — G8484 FLU IMMUNIZE NO ADMIN: HCPCS | Performed by: INTERNAL MEDICINE

## 2017-11-16 PROCEDURE — G8598 ASA/ANTIPLAT THER USED: HCPCS | Performed by: INTERNAL MEDICINE

## 2017-11-16 PROCEDURE — G8400 PT W/DXA NO RESULTS DOC: HCPCS | Performed by: INTERNAL MEDICINE

## 2017-11-16 PROCEDURE — G8417 CALC BMI ABV UP PARAM F/U: HCPCS | Performed by: INTERNAL MEDICINE

## 2017-11-16 PROCEDURE — 3017F COLORECTAL CA SCREEN DOC REV: CPT | Performed by: INTERNAL MEDICINE

## 2017-11-16 PROCEDURE — 1036F TOBACCO NON-USER: CPT | Performed by: INTERNAL MEDICINE

## 2017-11-16 PROCEDURE — 99214 OFFICE O/P EST MOD 30 MIN: CPT | Performed by: INTERNAL MEDICINE

## 2017-11-16 PROCEDURE — 3045F PR MOST RECENT HEMOGLOBIN A1C LEVEL 7.0-9.0%: CPT | Performed by: INTERNAL MEDICINE

## 2017-11-16 PROCEDURE — 4040F PNEUMOC VAC/ADMIN/RCVD: CPT | Performed by: INTERNAL MEDICINE

## 2017-11-16 PROCEDURE — G8427 DOCREV CUR MEDS BY ELIG CLIN: HCPCS | Performed by: INTERNAL MEDICINE

## 2017-11-16 PROCEDURE — 1090F PRES/ABSN URINE INCON ASSESS: CPT | Performed by: INTERNAL MEDICINE

## 2017-11-16 PROCEDURE — 3014F SCREEN MAMMO DOC REV: CPT | Performed by: INTERNAL MEDICINE

## 2017-11-16 RX ORDER — AMLODIPINE BESYLATE 10 MG/1
10 TABLET ORAL DAILY
Qty: 30 TABLET | Refills: 5 | Status: SHIPPED | OUTPATIENT
Start: 2017-11-16 | End: 2018-03-12 | Stop reason: SDUPTHER

## 2017-11-16 ASSESSMENT — ENCOUNTER SYMPTOMS
BACK PAIN: 1
COUGH: 0
ABDOMINAL PAIN: 0
SORE THROAT: 0
PHOTOPHOBIA: 0
EYE REDNESS: 0
NAUSEA: 0
SHORTNESS OF BREATH: 0
HEARTBURN: 0
BLURRED VISION: 1
CONSTIPATION: 0

## 2017-11-16 NOTE — PROGRESS NOTES
to stay with one pharmacy and because of her poor vision it is better if she gets her medications bubble packed. She saw ophthalmology in August. She has referral to [facility]. Echo 2016  CONCLUSIONS    Summary  LV chamber dimension is within normal limits with increase in the wall  thickness. Systolic function is reduced with a calculated EF of 44%. Apical akinesis noted. Grade I (mild) left ventricular diastolic dysfunction. Estimated right ventricular systolic pressure is 37 mmHg.       Coronary angiogram 2016  Conclusions      Procedure Summary      Apical akinesia with occluded 100% distal LAD   Patent mid LAD and D stents   Patent LCX with distal 40-50% stenosis   RCA distal 90% and mid 80% and had BMS both lesions       Past Medical History:   Diagnosis Date    Allergic rhinitis     Anxiety 10/25/2016    Asthma     CAD (coronary artery disease)     s/p stents RCA and LAD 2009,    Chronic back pain     Congestive heart failure ([facility] [address]     Depression     Headache(784.0)     Hypercholesteremia 2/21/2012    Hypertension     Kidney stones     years ago    Obesity     Osteoarthritis     Postlaminectomy syndrome 6/6/2012    Type II or unspecified type diabetes mellitus without mention of complication, not stated as uncontrolled     Unspecified sleep apnea     Urinary incontinence        Past Surgical History:   Procedure Laterality Date    CARDIAC CATHETERIZATION  01/2013    patent stents    COLONOSCOPY  09/2015    normal    CORONARY ANGIOPLASTY WITH STENT PLACEMENT  1012-16    stents x 3    JOINT REPLACEMENT      l knee    KNEE SURGERY  10/21/2013    rt knee synvisc injection     KNEE SURGERY  12/02/2013    knee synvisc injection rt #2    KNEE SURGERY  12/09/2013    rt knee synvisc inj    LUMBAR SPINE SURGERY  2007    NERVE BLOCK  4/23/2012    Right MBNB L3, L4, L5    NERVE BLOCK  5/22/2012    Right MBNB L3, L4, and L5     NERVE BLOCK  01/14/13    Right knee injection #1 - Synvisc    NERVE BLOCK  01/21/13    Right knee injection #2 - Synvisc    NERVE BLOCK  1/28/2013     Rigth knee synvisc injection #3    NERVE BLOCK Right 04/17/2017    Rt genicular nerve block. no steroid used    OTHER SURGICAL HISTORY Right 7/14/2014    synvisc one knee injection    OTHER SURGICAL HISTORY Right 3/16/2015    synvisc one knee injection    OTHER SURGICAL HISTORY Right 06-13-16    synvisc right knee injection    SPINE SURGERY      TONSILLECTOMY      UPPER GASTROINTESTINAL ENDOSCOPY      VENA CAVA FILTER PLACEMENT  2007    PE and B/L LE emboli         ALLERGIES      Allergies   Allergen Reactions    Bactrim     Penicillins     Tylenol [Acetaminophen] Other (See Comments)     constipation       MEDICATIONS:      Current Outpatient Prescriptions on File Prior to Visit   Medication Sig Dispense Refill    oxyCODONE-acetaminophen (PERCOCET) 5-325 MG per tablet Take 1 tablet by mouth 2 times daily as needed for Pain .  28 tablet 0    amLODIPine (NORVASC) 10 MG tablet Take 1 tablet by mouth daily 30 tablet 5    furosemide (LASIX) 40 MG tablet Take 1 tablet by mouth daily 30 tablet 10    omeprazole (PRILOSEC) 20 MG delayed release capsule TAKE 1 CAPSULE BY MOUTH DAILY 28 capsule 3    metoprolol (LOPRESSOR) 100 MG tablet Take 1 tablet by mouth 2 times daily 56 tablet 3    spironolactone (ALDACTONE) 25 MG tablet Take 1 tablet by mouth daily 30 tablet 2    losartan (COZAAR) 100 MG tablet Take 1 tablet by mouth daily 30 tablet 5    sertraline (ZOLOFT) 50 MG tablet Take 1.5 tablets by mouth daily 45 tablet 3    albuterol-ipratropium (COMBIVENT RESPIMAT)  MCG/ACT AERS inhaler Inhale 1 puff into the lungs every 6 hours as needed for Wheezing 1 Inhaler 5    linagliptin (TRADJENTA) 5 MG tablet Take 1 tablet by mouth daily 30 tablet 10    clopidogrel (PLAVIX) 75 MG tablet Take 1 tablet by mouth daily 30 tablet 11    aspirin (RA ASPIRIN EC) 81 MG EC tablet Take 1 tablet by mouth daily 30 tablet 11    Multiple Vitamins-Minerals (MULTIVITAMIN WITH MINERALS) tablet Take 1 tablet by mouth daily 30 tablet 3    atorvastatin (LIPITOR) 80 MG tablet TAKE 1 TABLET BY MOUTH DAILY 30 tablet 5    isosorbide mononitrate (IMDUR) 30 MG extended release tablet TAKE 1 TABLET BY MOUTH DAILY 30 tablet 5    Calcium Carbonate-Vitamin D (OYSTER SHELL CALCIUM/D) 500-200 MG-UNIT TABS Take 1 tablet by mouth 2 times daily 60 tablet 11    furosemide (LASIX) 20 MG tablet Take 1 tablet by mouth daily for 15 days 15 tablet 3    glucose blood VI test strips (ASCENSIA AUTODISC VI;ONE TOUCH ULTRA TEST VI) strip 1 each by In Vitro route 3 times daily As needed. 100 each 5    Lancets MISC Use 1 -2 times daily Insulin dependent diabetes mellitus 50 each 11    nitroGLYCERIN (NITROSTAT) 0.4 MG SL tablet TAKE 1 TABLET UNDER THE TONGUE EVERY 5 MINUTES AS NEEDED FOR CHEST 20 tablet 5     No current facility-administered medications on file prior to visit. SOCIAL HISTORY    Reviewed and no change from previous record. Venice Isbell  reports that she has quit smoking. She quit after 0.00 years of use. She has never used smokeless tobacco.    FAMILY HISTORY:    Reviewed and No change from previous visit    HEALTH MAINTENANCE DUE:      Health Maintenance Due   Topic Date Due    Zostavax vaccine  04/20/2012    Diabetic retinal exam  11/09/2016    DEXA (modify frequency per FRAX score)  04/20/2017       REVIEW OF SYSTEMS:    12 point review of symptoms completed and found to be normal except noted in the HPI    Review of Systems   Constitutional: Positive for malaise/fatigue. Negative for fever. HENT: Negative for congestion and sore throat. Eyes: Positive for blurred vision. Negative for photophobia and redness. Respiratory: Negative for cough and shortness of breath. Cardiovascular: Negative for chest pain, palpitations and leg swelling. Gastrointestinal: Negative for abdominal pain, constipation, heartburn and nausea. Musculoskeletal: Positive for back pain and joint pain. Negative for falls and myalgias. Skin: Negative for itching and rash. Neurological: Positive for tingling and sensory change. Negative for dizziness, tremors, focal weakness, loss of consciousness, weakness and headaches. Endo/Heme/Allergies: Negative for polydipsia. Does not bruise/bleed easily. Psychiatric/Behavioral: Positive for depression. Negative for substance abuse and suicidal ideas. The patient is nervous/anxious. The patient does not have insomnia. PHYSICAL EXAM:      Vitals:    11/16/17 1120   BP: 131/68   Site: Right Arm   Position: Sitting   Cuff Size: Large Adult   Pulse: 59   Weight: 269 lb 12.8 oz (122.4 kg)     Body mass index is 46.31 kg/m². BP Readings from Last 3 Encounters:   11/16/17 131/68   11/14/17 (!) 142/75   10/31/17 (!) 142/90        Wt Readings from Last 3 Encounters:   11/16/17 269 lb 12.8 oz (122.4 kg)   10/31/17 273 lb (123.8 kg)   09/12/17 250 lb (113.4 kg)       Physical Exam      HENT:  Normocephalic, Atraumatic,  Oropharynx moist, No oral exudates, Nose normal. Neck- Normal range of motion, No tenderness, Supple  Eyes:  PERRL, EOMI, Conjunctiva normal, No icterus  Respiratory:  Normal breath sounds, No respiratory distress, No wheezing, No chest tenderness. Cardiovascular:  Normal heart rate, Normal rhythm, systolic murmur   GI:  Bowel sounds normal, Soft, No tenderness  Musculoskeletal:  Intact distal pulses, 1+ edema  Integument:  Warm, Dry, No erythema, No rash. Lymphatic:  No lymphadenopathy noted. Neurologic:  Alert & oriented x 3, Normal motor function, decreased sensation on feet b/l, function, No focal deficits noted.    Psychiatric:  Affect anxious    LABORATORY FINDINGS:    CBC:  Lab Results   Component Value Date    WBC 5.7 04/11/2017    HGB 12.1 04/11/2017     04/11/2017     02/24/2012     BMP:    Lab Results   Component Value Date     10/31/2017    K 4.6 10/31/2017  10/31/2017    CO2 27 10/31/2017    BUN 13 10/31/2017    CREATININE 0.60 10/31/2017    GLUCOSE 138 10/31/2017    GLUCOSE 105 02/24/2012     HEMOGLOBIN A1C:   Lab Results   Component Value Date    LABA1C 7.1 07/17/2017     MICROALBUMIN URINE:   Lab Results   Component Value Date    MICROALBUR 80 10/31/2017     FASTING LIPID PANEL:  Lab Results   Component Value Date    CHOL 189 04/11/2017    HDL 52 04/11/2017    TRIG 70 04/11/2017     Lab Results   Component Value Date    LDLCHOLESTEROL 123 04/11/2017       LIVER PROFILE:  Lab Results   Component Value Date    ALT 12 10/12/2016    AST 21 10/12/2016    PROT 7.7 10/12/2016    BILITOT 0.40 10/12/2016    BILIDIR <0.08 10/12/2016    LABALBU 4.1 10/12/2016    LABALBU 4.2 02/24/2012      THYROID FUNCTION:   Lab Results   Component Value Date    TSH 1.77 07/15/2015      URINE ANALYSIS: No results found for: LABURIN  ASSESSMENT AND PLAN:        1. Essential hypertension  Same meds  Advised to continue with bubble packs ue to poor vision and concerns regarding safety    - amLODIPine (NORVASC) 10 MG tablet; Take 1 tablet by mouth daily  Dispense: 30 tablet; Refill: 5    2. Uncontrolled type 2 diabetes mellitus with diabetic polyneuropathy, with long-term current use of insulin ([facility]  Same meds  Bring blood sugars to visit      3. Ischemic cardiomyopathy  Asa  Statins  BB    4. Mixed hyperlipidemia  Statins      5. Moderate major depression ([facility] [address]  Follow up with     - sertraline (ZOLOFT) 50 MG tablet; Take 1.5 tablets by mouth daily  Dispense: 45 tablet; Refill: 3    6. Morbid obesity with BMI of 45.0-49.9, adult (HCC)  Diet changes    7. Primary osteoarthritis of right knee  Weight loss  PT    8. Lumbar spondylosis  Follows up with pain management    9. Macular degeneration  Follow up with ophthalmology  Sight center referral          FOLLOW UP AND INSTRUCTIONS:   Return in about 3 months (around 2/16/2018).     1. Tori Gray received counseling on the following healthy behaviors: nutrition, exercise and medication adherence    2. Reviewed prior labs and health maintenance. 3. Discussed use, benefit, and side effects of prescribed medications. Barriers to medication compliance addressed. All patient questions answered. Pt voiced understanding.        Phoebe Councilman  Attending Physician, [address] [place] [address], Internal Medicine Residency Program  [address] [place] [address]  11/16/2017, 11:29 AM

## 2017-11-16 NOTE — PATIENT INSTRUCTIONS
Phone call to Δη[address]νη [address] at [facility] out patient pharmacy he states that he will D/C norvasc 5 mg and replace with norvasc 10 mg but patient got this filled 10/31 and zaheer baugh will call insurance if needed for expidited refilled. Your medications for this visit were escribed to your preferred pharmacy. Avs was given and reviewed appt card given with next appt.  SHERIDAN

## 2017-11-16 NOTE — PROGRESS NOTES
Visit Information    Have you changed or started any medications since your last visit including any over-the-counter medicines, vitamins, or herbal medicines? no   Are you having any side effects from any of your medications? -  no  Have you stopped taking any of your medications? Is so, why? -  no    Have you seen any other physician or provider since your last visit? Yes - Records Obtained  Have you had any other diagnostic tests since your last visit? No  Have you been seen in the emergency room and/or had an admission to a hospital since we last saw you? No  Have you had your routine dental cleaning in the past 6 months? no    Have you activated your [employer] account? If not, what are your barriers?  No:      Patient Care Team:  Jay Johnson MD as PCP - General (Internal Medicine)  Bernardino Singh MD as Consulting Physician (Pain Management)  Jay Johnson MD as Consulting Physician (Internal Medicine)  Jairo Pritchard MD as Consulting Physician (Cardiology)    Medical History Review  Past Medical, Family, and Social History reviewed and does contribute to the patient presenting condition    Health Maintenance   Topic Date Due    Zostavax vaccine  04/20/2012    Diabetic retinal exam  11/09/2016    DEXA (modify frequency per FRAX score)  04/20/2017    Cervical cancer screen  12/01/2017 (Originally 7/31/2017)    Flu vaccine (1) 12/13/2017 (Originally 9/1/2017)    DTaP/Tdap/Td vaccine (1 - Tdap) 04/11/2018 (Originally 4/20/1969)    HIV screen  07/13/2018 (Originally 4/20/1965)    Pneumococcal low/med risk (1 of 2 - PCV13) 07/17/2018 (Originally 4/20/2017)    Diabetic foot exam  12/13/2017    Lipid screen  04/11/2018    Breast cancer screen  05/24/2018    Diabetic hemoglobin A1C test  07/17/2018    Diabetic microalbuminuria test  10/31/2018    Colon cancer screen colonoscopy  09/16/2025    Hepatitis C screen  Completed

## 2017-11-17 DIAGNOSIS — I25.5 ISCHEMIC CARDIOMYOPATHY: ICD-10-CM

## 2017-11-17 NOTE — TELEPHONE ENCOUNTER
escribe request for spironolactone (ALDACTONE) 25 MG tablet, future appt scheduled      Health Maintenance   Topic Date Due    Zostavax vaccine  04/20/2012    Diabetic retinal exam  11/09/2016    DEXA (modify frequency per FRAX score)  04/20/2017    Cervical cancer screen  12/01/2017 (Originally 7/31/2017)    Flu vaccine (1) 12/13/2017 (Originally 9/1/2017)    DTaP/Tdap/Td vaccine (1 - Tdap) 04/11/2018 (Originally [birth year]20[birth year]1971)    HIV screen  07/13/2018 (Originally 4/20/1965)    Pneumococcal low/med risk (1 of 2 - PCV13) 07/17/2018 (Originally 4/20/2017)    Diabetic foot exam  12/13/2017    Lipid screen  04/11/2018    Breast cancer screen  05/24/2018    Diabetic hemoglobin A1C test  07/17/2018    Diabetic microalbuminuria test  10/31/2018    Colon cancer screen colonoscopy  09/16/2025    Hepatitis C screen  Completed             (applicable per patient's age: Cancer Screenings, Depression Screening, Fall Risk Screening, Immunizations)    Hemoglobin A1C (%)   Date Value   07/17/2017 7.1   12/13/2016 6.5   08/24/2016 6.6     Microalb/Crt.  Ratio (mcg/mg creat)   Date Value   10/31/2017 59 (H)     LDL Cholesterol (mg/dL)   Date Value   04/11/2017 123     AST (U/L)   Date Value   10/12/2016 21     ALT (U/L)   Date Value   10/12/2016 12     BUN (mg/dL)   Date Value   10/31/2017 13      (goal A1C is < 7)   (goal LDL is <100) need 30-50% reduction from baseline     BP Readings from Last 3 Encounters:   11/16/17 131/68   11/14/17 (!) 142/75   10/31/17 (!) 142/90    (goal /80)      All Future Testing planned in [facility]:  Lab Frequency Next Occurrence   NM cardiac persantine 1 day Once 11/16/2017       Next Visit Date:  Future Appointments  Date Time Provider Shruthi Griggs   12/13/2017 2:00 PM Gaston Ortiz, PhD [facility]   12/13/2017 2:20 PM Otis Toro CNP [facility]   3/6/2018 10:00 AM Jonnie De Leon MD [facility] IM [id number]            Patient Active Problem List:     DM (diabetes mellitus) ([facility] [address]     HTN (hypertension)     CAD (coronary artery disease) s/p 3 stents     Smoker     Back pain     Asthma     Facet arthritis of lumbar region [facility])     Knee osteoarthritis     Edema     Spinal stenosis in cervical region     Chest pain     Hypoglycemia     Lower urinary tract infectious disease     YUDITH (obstructive sleep apnea)     Morbid obesity (HCC)     Knee pain, bilateral     S/P TKR (total knee replacement)     Cervicalgia     Urge incontinence of urine     Lumbar spondylosis     Cervical spondylosis     Chronic use of opiate drugs therapeutic purposes     Chronic knee pain     Hypertensive urgency     Acute coronary syndrome (HCC)     Congestive heart failure (HCC)     Lymphadenopathy     Type 2 diabetes mellitus without complication (HCC)     Chronic pain     Coronary artery disease involving native coronary artery without angina pectoris     Chronic prescription opiate use     Type 2 diabetes mellitus with complication, without long-term current use of insulin (HCC)     S/P coronary artery stent placement - RCA 10/12/16 - Dr. Gabriel Victor     Depression (emotion)     Postlaminectomy syndrome, lumbar     Medication monitoring encounter     Postlaminectomy syndrome     Postlaminectomy syndrome     Primary osteoarthritis of right knee

## 2017-11-20 RX ORDER — SPIRONOLACTONE 25 MG/1
TABLET ORAL
Qty: 28 TABLET | Refills: 0 | Status: SHIPPED | OUTPATIENT
Start: 2017-11-20 | End: 2018-01-29 | Stop reason: SDUPTHER

## 2017-12-13 ENCOUNTER — HOSPITAL ENCOUNTER (OUTPATIENT)
Dept: PAIN MANAGEMENT | Age: 67
Discharge: HOME | End: 2017-12-13
Payer: COMMERCIAL

## 2017-12-13 VITALS
RESPIRATION RATE: 16 BRPM | SYSTOLIC BLOOD PRESSURE: 172 MMHG | DIASTOLIC BLOOD PRESSURE: 96 MMHG | HEART RATE: 62 BPM | TEMPERATURE: 98.2 F

## 2017-12-13 DIAGNOSIS — Z51.81 MEDICATION MONITORING ENCOUNTER: Primary | ICD-10-CM

## 2017-12-13 DIAGNOSIS — M96.1 POSTLAMINECTOMY SYNDROME: ICD-10-CM

## 2017-12-13 PROCEDURE — 99213 OFFICE O/P EST LOW 20 MIN: CPT | Performed by: NURSE PRACTITIONER

## 2017-12-13 PROCEDURE — 99213 OFFICE O/P EST LOW 20 MIN: CPT

## 2017-12-13 PROCEDURE — 99214 OFFICE O/P EST MOD 30 MIN: CPT

## 2017-12-13 RX ORDER — OXYCODONE HYDROCHLORIDE AND ACETAMINOPHEN 5; 325 MG/1; MG/1
1 TABLET ORAL 2 TIMES DAILY PRN
Qty: 28 TABLET | Refills: 0 | Status: SHIPPED | OUTPATIENT
Start: 2017-12-14 | End: 2018-01-10 | Stop reason: SDUPTHER

## 2017-12-13 ASSESSMENT — ENCOUNTER SYMPTOMS
SHORTNESS OF BREATH: 0
CONSTIPATION: 0
BACK PAIN: 1
COUGH: 0

## 2017-12-13 NOTE — PROGRESS NOTES
Patient is here today to review medication contract. Chief Complaint: back pain    PMH:  Patient complains of constant aching, burning pain in the low back and right knee. Her pain is worse with activity and relieved with rest and pain medication. She had Right knee geniculate nerve block in April with two days of relief. Discussed at length the details regarding the RF. But pt not interested in injection at this time. She was placed on 2 week refills in June 2017 due to shortage, and changed to tramadol 2 week fills in Sept due to memory issues. Per POC note, Dr Terrell Dunn agrees to change back to percocet  if family member assumes control of the medication. Her daughter Sheree Lehman has agreed to monitor the medication and come to monthly appts but she has not attended last two appointments. Pt states her daughter works and cannot come with her every month. Pt will remain on 2 week refills at this time. Back Pain   This is a chronic problem. The current episode started more than 1 year ago. The problem occurs intermittently. The problem has been gradually improving since onset. The pain is present in the lumbar spine. Quality: sore. The pain radiates to the left thigh and right thigh. The pain is at a severity of 6/10. The pain is moderate. The pain is worse during the day. The symptoms are aggravated by standing and bending. Stiffness is present in the morning. Associated symptoms include numbness and weakness. Pertinent negatives include no chest pain or fever. Risk factors include sedentary lifestyle, lack of exercise, menopause and obesity. She has tried analgesics and heat for the symptoms. The treatment provided mild relief. Patient denies any new neurological symptoms. No bowel or bladder incontinence, no weakness, and no falling.     Pill count: appropriate    Morphine equivalent: 15    Controlled Substances Monitoring:     Attestation: The Prescription Monitoring Report for this patient was reviewed today. (Neal Ballard CNP)  Documentation: Possible medication side effects, risk of tolerance and/or dependence, and alternative treatments discussed., No signs of potential drug abuse or diversion identified., Existing medication contract. Neal Ballard CNP)    Past Medical History:   Diagnosis Date    Allergic rhinitis     Anxiety 10/25/2016    Asthma     CAD (coronary artery disease)     s/p stents RCA and LAD 2009,    Chronic back pain     Congestive heart failure ([address]     Depression     Headache(784.0)     Hypercholesteremia 2/21/2012    Hypertension     Kidney stones     years ago    Obesity     Osteoarthritis     Postlaminectomy syndrome 6/6/2012    Type II or unspecified type diabetes mellitus without mention of complication, not stated as uncontrolled     Unspecified sleep apnea     Urinary incontinence        Past Surgical History:   Procedure Laterality Date    CARDIAC CATHETERIZATION  01/2013    patent stents    COLONOSCOPY  09/2015    normal    CORONARY ANGIOPLASTY WITH STENT PLACEMENT  1012-16    stents x 3    JOINT REPLACEMENT      l knee    KNEE SURGERY  10/21/2013    rt knee synvisc injection     KNEE SURGERY  12/02/2013    knee synvisc injection rt #2    KNEE SURGERY  12/09/2013    rt knee synvisc inj    LUMBAR SPINE SURGERY  2007    NERVE BLOCK  4/23/2012    Right MBNB L3, L4, L5    NERVE BLOCK  5/22/2012    Right MBNB L3, L4, and L5     NERVE BLOCK  01/14/13    Right knee injection #1 - Synvisc    NERVE BLOCK  01/21/13    Right knee injection #2 - Synvisc    NERVE BLOCK  1/28/2013     Rigth knee synvisc injection #3    NERVE BLOCK Right 04/17/2017    Rt genicular nerve block.  no steroid used    OTHER SURGICAL HISTORY Right 7/14/2014    synvisc one knee injection    OTHER SURGICAL HISTORY Right 3/16/2015    synvisc one knee injection    OTHER SURGICAL HISTORY Right 06-13-16    synvisc right knee injection    SPINE SURGERY      TONSILLECTOMY  UPPER GASTROINTESTINAL ENDOSCOPY      VENA CAVA FILTER PLACEMENT  2007    PE and B/L LE emboli       Allergies   Allergen Reactions    Bactrim     Penicillins     Tylenol [Acetaminophen] Other (See Comments)     constipation         Current Outpatient Prescriptions:     spironolactone (ALDACTONE) 25 MG tablet, TAKE (1) TABLET BY MOUTH ONE TIME DAILY. , Disp: 28 tablet, Rfl: 0    sertraline (ZOLOFT) 50 MG tablet, Take 1.5 tablets by mouth daily, Disp: 45 tablet, Rfl: 3    amLODIPine (NORVASC) 10 MG tablet, Take 1 tablet by mouth daily, Disp: 30 tablet, Rfl: 5    furosemide (LASIX) 40 MG tablet, Take 1 tablet by mouth daily, Disp: 30 tablet, Rfl: 10    omeprazole (PRILOSEC) 20 MG delayed release capsule, TAKE 1 CAPSULE BY MOUTH DAILY, Disp: 28 capsule, Rfl: 3    metoprolol (LOPRESSOR) 100 MG tablet, Take 1 tablet by mouth 2 times daily, Disp: 56 tablet, Rfl: 3    spironolactone (ALDACTONE) 25 MG tablet, Take 1 tablet by mouth daily, Disp: 30 tablet, Rfl: 2    losartan (COZAAR) 100 MG tablet, Take 1 tablet by mouth daily, Disp: 30 tablet, Rfl: 5    glucose blood VI test strips (ASCENSIA AUTODISC VI;ONE TOUCH ULTRA TEST VI) strip, 1 each by In Vitro route 3 times daily As needed. , Disp: 100 each, Rfl: 5    albuterol-ipratropium (COMBIVENT RESPIMAT)  MCG/ACT AERS inhaler, Inhale 1 puff into the lungs every 6 hours as needed for Wheezing, Disp: 1 Inhaler, Rfl: 5    linagliptin (TRADJENTA) 5 MG tablet, Take 1 tablet by mouth daily, Disp: 30 tablet, Rfl: 10    clopidogrel (PLAVIX) 75 MG tablet, Take 1 tablet by mouth daily, Disp: 30 tablet, Rfl: 11    aspirin (RA ASPIRIN EC) 81 MG EC tablet, Take 1 tablet by mouth daily, Disp: 30 tablet, Rfl: 11    Lancets MISC, Use 1 -2 times daily Insulin dependent diabetes mellitus, Disp: 50 each, Rfl: 11    Multiple Vitamins-Minerals (MULTIVITAMIN WITH MINERALS) tablet, Take 1 tablet by mouth daily, Disp: 30 tablet, Rfl: 3    atorvastatin (LIPITOR) 80 MG normal.   Musculoskeletal:        Lumbar back: She exhibits decreased range of motion, tenderness and pain. Neurological: She is alert and oriented to person, place, and time. Skin: Skin is warm and dry. Psychiatric: Affect normal.       Record/Diagnostics Review:    XR Lumbar 2015 -     Findings: There are severe multilevel disc disease, evidenced by loss of disc space and vacuum phenomena, most pronounced at L1-2 and L2-3. There is minimal anterolisthesis of L3 in relation to L4. There is no significant loss of vertebral body heights. Moderate to severe arthrosis is also demonstrated at multiple levels. Bulky marginal osteophytosis are demonstrated at L1-2 and L2-3. Degenerative changes of demonstrated in bilateral sacroiliac joints. Incidental note is made of a permanent IVC filter   which projects over L2-3.     Impression: Moderate to severe spondylosis and degenerative disc disease of the lumbar spine in addition to arthrosis, without convincing evidence of acute fracture. Assessment:  Problem List Items Addressed This Visit     Medication monitoring encounter - Primary    Postlaminectomy syndrome      Other Visit Diagnoses    None. Treatment Plan:  DISCUSSION: Treatment options discussed with patient and all questions answered to patient's satisfaction. TREATMENT OPTIONS:   Continue two week fills - patient is unhappy with this  Discussed with patient importance of daughter coming to appointments with her to help monitor her medication  Patient states she may not want to continue taking the medication - she will let us know  Contract compliance requirements are met. Satisfactory pain management plan. Medications help with personal goals and self-care needs. Follow up appointment scheduled.

## 2017-12-13 NOTE — PLAN OF CARE
[facility] was seen for  a follow-up evaluation to determine level of care and assess for mental health concerns. 1. Level of Care will  remain at high (red) due to history of short pill counts and ETOH in [place]. CNP/Physician notes have expressed concerns about her ability to manage her medication and emphasis on daughter's involvement. 2.   She should be scheduled for their next followup appointment in 1 month. 3. daughter needs to be involved for medication management; or a ; or a system which will ensure compliance.

## 2017-12-13 NOTE — BH NOTE
Abdiaziz Woo was seen today for a follow-up appointment as part of the [facility] for [facility] patients. The focus of today's session was an evaluation of mood disorder symptoms, potential for suicide and potential for chemical dependency or abuse. Diagnosis code: M17.11    Psych code: F32.9    FINDINGS:     1) The patient arrived on time for appointment and was cooperative and good eye contact. Overall affect was depressed. The patient did demonstrate pain behaviors during the session. She was A & O x3.      2) Patient reports current pain level to be 6/10 pain . Patient reports usual pain level is 2-3/10. Pain is exacerbated on an ascending scale of 0-10, by weather. She states pain is relieved by : resting. 3) Patient states current mood is:  \"ok. \" \"I've been hurting\". Patient denied feelings of helplessness, hopelessness, and worthlessness. Patient denied current suicidal thoughts and does not suicidal plan or intent. She does not report issues related to sleep, varies-sometimes gets very little (wakes frequently). The patient does not report issues related to appetite. Further stated that they receive social support from daughter and that they find enjoyment in Tv, visit with friends, talk on phone. Daughter supposed to be coming with patient but Ang Chavez had to work today. \"  Stated she has been helpful with medication management. She stated she didn't know she was supposed to come; this desire has be reported in several previous notes. Pain medication is  helpful. Patient reports functioning is not improved by medication. 4) Current Medications:   Prior to Admission medications    Medication Sig Start Date End Date Taking? Authorizing Provider   spironolactone (ALDACTONE) 25 MG tablet TAKE (1) TABLET BY MOUTH ONE TIME DAILY.  11/20/17   Spenser Blackman MD   sertraline (ZOLOFT) 50 MG tablet Take 1.5 tablets by mouth daily 11/16/17   Spenser Blackman MD amLODIPine (NORVASC) 10 MG tablet Take 1 tablet by mouth daily 11/16/17   Bossman Emerson, MD   furosemide (LASIX) 40 MG tablet Take 1 tablet by mouth daily 10/31/17   Bossman Emerson, MD   omeprazole (PRILOSEC) 20 MG delayed release capsule TAKE 1 CAPSULE BY MOUTH DAILY 10/31/17   Bossman Emerson, MD   metoprolol (LOPRESSOR) 100 MG tablet Take 1 tablet by mouth 2 times daily 9/1/17   Bossman Emerson, MD   spironolactone (ALDACTONE) 25 MG tablet Take 1 tablet by mouth daily 9/1/17   Bossman Emerson, MD   losartan (COZAAR) 100 MG tablet Take 1 tablet by mouth daily 9/1/17   Bossman Emerson, MD   glucose blood VI test strips (ASCENSIA AUTODISC VI;ONE TOUCH ULTRA TEST VI) strip 1 each by In Vitro route 3 times daily As needed.  8/1/17   Willi Luciano MD   albuterol-ipratropium (COMBIVENT RESPIMAT)  MCG/ACT AERS inhaler Inhale 1 puff into the lungs every 6 hours as needed for Wheezing 7/17/17   Bossman Emerson, MD   linagliptin (TRADJENTA) 5 MG tablet Take 1 tablet by mouth daily 7/17/17   Bossman Emerson, MD   clopidogrel (PLAVIX) 75 MG tablet Take 1 tablet by mouth daily 7/17/17   Bossman Emerson, MD   aspirin (RA ASPIRIN EC) 81 MG EC tablet Take 1 tablet by mouth daily 7/17/17   MD Eva Mike MISC Use 1 -2 times daily Insulin dependent diabetes mellitus 7/17/17   Bossman Emerson, MD   Multiple Vitamins-Minerals (MULTIVITAMIN WITH MINERALS) tablet Take 1 tablet by mouth daily 7/17/17   Bossman Emerson, MD   atorvastatin (LIPITOR) 80 MG tablet TAKE 1 TABLET BY MOUTH DAILY 7/11/17   Bossman Emerson MD   isosorbide mononitrate (IMDUR) 30 MG extended release tablet TAKE 1 TABLET BY MOUTH DAILY 7/11/17   Bossman Emerson MD   Calcium Carbonate-Vitamin D (OYSTER SHELL CALCIUM/D) 500-200 MG-UNIT TABS Take 1 tablet by mouth 2 times daily 1/11/17 1/11/18  Bossman Emerson MD   nitroGLYCERIN (NITROSTAT) 0.4 MG SL tablet TAKE 1 TABLET UNDER THE TONGUE EVERY 5 MINUTES AS NEEDED FOR CHEST 4/11/16

## 2018-01-10 ENCOUNTER — HOSPITAL ENCOUNTER (OUTPATIENT)
Dept: PAIN MANAGEMENT | Age: 68
Discharge: HOME | End: 2018-01-10
Payer: COMMERCIAL

## 2018-01-10 VITALS — RESPIRATION RATE: 18 BRPM | HEIGHT: 63 IN | TEMPERATURE: 98.3 F | BODY MASS INDEX: 45.18 KG/M2 | WEIGHT: 255 LBS

## 2018-01-10 DIAGNOSIS — M96.1 POSTLAMINECTOMY SYNDROME: Primary | ICD-10-CM

## 2018-01-10 DIAGNOSIS — Z51.81 MEDICATION MONITORING ENCOUNTER: ICD-10-CM

## 2018-01-10 PROCEDURE — 99214 OFFICE O/P EST MOD 30 MIN: CPT

## 2018-01-10 PROCEDURE — 99213 OFFICE O/P EST LOW 20 MIN: CPT | Performed by: NURSE PRACTITIONER

## 2018-01-10 RX ORDER — OXYCODONE HYDROCHLORIDE AND ACETAMINOPHEN 5; 325 MG/1; MG/1
1 TABLET ORAL 2 TIMES DAILY PRN
Qty: 28 TABLET | Refills: 0 | Status: SHIPPED | OUTPATIENT
Start: 2018-01-10 | End: 2018-01-24 | Stop reason: SDUPTHER

## 2018-01-10 ASSESSMENT — ENCOUNTER SYMPTOMS
CONSTIPATION: 0
COUGH: 0
SHORTNESS OF BREATH: 0
BACK PAIN: 1

## 2018-01-10 NOTE — PROGRESS NOTES
Patient is here today to review medication contract. Chief Complaint: back pain    PMH: Patient complains of constant aching, burning pain in the low back and right knee. Her pain is worse with activity and relieved with rest and pain medication. She had Right knee geniculate nerve block in April with two days of relief. Discussed at length the details regarding the RF. But pt not interested in injection at this time. She was placed on 2 week refills in June 2017 due to shortage, and changed to tramadol 2 week fills in Sept due to memory issues. Per POC note, Dr Darshan Herrera agrees to change back to percocet  if family member assumes control of the medication. Her daughter Sharon Mitchell has agreed to monitor the medication and come to monthly appts but she has not attended last two appointments. Pt states her daughter works and cannot come with her every month. Pt will remain on 2 week refills at this time. She states her daughter will be at her February appointment. Back Pain   This is a chronic problem. The current episode started more than 1 year ago. The problem occurs constantly. The problem has been gradually worsening since onset. The pain is present in the lumbar spine and sacro-iliac (generalized joint pain ). The pain radiates to the right foot, right thigh, left knee, left foot, left thigh and right knee. The pain is at a severity of 8/10. The pain is severe. The pain is the same all the time. The symptoms are aggravated by bending, lying down, sitting and twisting. Stiffness is present all day. Associated symptoms include leg pain and weakness. Pertinent negatives include no chest pain or fever. She has tried analgesics and bed rest for the symptoms. The treatment provided mild relief. Patient denies any new neurological symptoms. No bowel or bladder incontinence, no weakness, and no falling.     Pill count: past due to fill    Morphine equivalent: 15    Controlled Substances Monitoring: Attestation: The Prescription Monitoring Report for this patient was reviewed today. (Mayito Ramirez CNP)  Documentation: Possible medication side effects, risk of tolerance and/or dependence, and alternative treatments discussed., No signs of potential drug abuse or diversion identified. Mayito Ramirez CNP)  Medication Contracts: Existing medication contract. (Mayito Ramirez CNP)    Past Medical History:   Diagnosis Date    Allergic rhinitis     Anxiety 10/25/2016    Asthma     CAD (coronary artery disease)     s/p stents RCA and LAD 2009,    Chronic back pain     Congestive heart failure ([address]     Depression     Headache(784.0)     Hypercholesteremia 2/21/2012    Hypertension     Kidney stones     years ago    Obesity     Osteoarthritis     Postlaminectomy syndrome 6/6/2012    Type II or unspecified type diabetes mellitus without mention of complication, not stated as uncontrolled     Unspecified sleep apnea     Urinary incontinence        Past Surgical History:   Procedure Laterality Date    CARDIAC CATHETERIZATION  01/2013    patent stents    COLONOSCOPY  09/2015    normal    CORONARY ANGIOPLASTY WITH STENT PLACEMENT  1012-16    stents x 3    JOINT REPLACEMENT      l knee    KNEE SURGERY  10/21/2013    rt knee synvisc injection     KNEE SURGERY  12/02/2013    knee synvisc injection rt #2    KNEE SURGERY  12/09/2013    rt knee synvisc inj    LUMBAR SPINE SURGERY  2007    NERVE BLOCK  4/23/2012    Right MBNB L3, L4, L5    NERVE BLOCK  5/22/2012    Right MBNB L3, L4, and L5     NERVE BLOCK  01/14/13    Right knee injection #1 - Synvisc    NERVE BLOCK  01/21/13    Right knee injection #2 - Synvisc    NERVE BLOCK  1/28/2013     Rigth knee synvisc injection #3    NERVE BLOCK Right 04/17/2017    Rt genicular nerve block.  no steroid used    OTHER SURGICAL HISTORY Right 7/14/2014    synvisc one knee injection    OTHER SURGICAL HISTORY Right 3/16/2015 synvisc one knee injection    OTHER SURGICAL HISTORY Right 06-13-16    synvisc right knee injection    SPINE SURGERY      TONSILLECTOMY      UPPER GASTROINTESTINAL ENDOSCOPY      VENA CAVA FILTER PLACEMENT  2007    PE and B/L LE emboli       Allergies   Allergen Reactions    Bactrim     Penicillins     Tylenol [Acetaminophen] Other (See Comments)     constipation         Current Outpatient Prescriptions:     spironolactone (ALDACTONE) 25 MG tablet, TAKE (1) TABLET BY MOUTH ONE TIME DAILY. , Disp: 28 tablet, Rfl: 0    sertraline (ZOLOFT) 50 MG tablet, Take 1.5 tablets by mouth daily, Disp: 45 tablet, Rfl: 3    amLODIPine (NORVASC) 10 MG tablet, Take 1 tablet by mouth daily, Disp: 30 tablet, Rfl: 5    furosemide (LASIX) 40 MG tablet, Take 1 tablet by mouth daily, Disp: 30 tablet, Rfl: 10    omeprazole (PRILOSEC) 20 MG delayed release capsule, TAKE 1 CAPSULE BY MOUTH DAILY, Disp: 28 capsule, Rfl: 3    metoprolol (LOPRESSOR) 100 MG tablet, Take 1 tablet by mouth 2 times daily, Disp: 56 tablet, Rfl: 3    spironolactone (ALDACTONE) 25 MG tablet, Take 1 tablet by mouth daily, Disp: 30 tablet, Rfl: 2    losartan (COZAAR) 100 MG tablet, Take 1 tablet by mouth daily, Disp: 30 tablet, Rfl: 5    glucose blood VI test strips (ASCENSIA AUTODISC VI;ONE TOUCH ULTRA TEST VI) strip, 1 each by In Vitro route 3 times daily As needed. , Disp: 100 each, Rfl: 5    albuterol-ipratropium (COMBIVENT RESPIMAT)  MCG/ACT AERS inhaler, Inhale 1 puff into the lungs every 6 hours as needed for Wheezing, Disp: 1 Inhaler, Rfl: 5    linagliptin (TRADJENTA) 5 MG tablet, Take 1 tablet by mouth daily, Disp: 30 tablet, Rfl: 10    clopidogrel (PLAVIX) 75 MG tablet, Take 1 tablet by mouth daily, Disp: 30 tablet, Rfl: 11    aspirin (RA ASPIRIN EC) 81 MG EC tablet, Take 1 tablet by mouth daily, Disp: 30 tablet, Rfl: 11    Lancets MISC, Use 1 -2 times daily Insulin dependent diabetes mellitus, Disp: 50 each, Rfl: 11    Multiple Vitamins-Minerals (MULTIVITAMIN WITH MINERALS) tablet, Take 1 tablet by mouth daily, Disp: 30 tablet, Rfl: 3    atorvastatin (LIPITOR) 80 MG tablet, TAKE 1 TABLET BY MOUTH DAILY, Disp: 30 tablet, Rfl: 5    isosorbide mononitrate (IMDUR) 30 MG extended release tablet, TAKE 1 TABLET BY MOUTH DAILY, Disp: 30 tablet, Rfl: 5    Calcium Carbonate-Vitamin D (OYSTER SHELL CALCIUM/D) 500-200 MG-UNIT TABS, Take 1 tablet by mouth 2 times daily, Disp: 60 tablet, Rfl: 11    nitroGLYCERIN (NITROSTAT) 0.4 MG SL tablet, TAKE 1 TABLET UNDER THE TONGUE EVERY 5 MINUTES AS NEEDED FOR CHEST, Disp: 20 tablet, Rfl: 5    Family History   Problem Relation Age of Onset    Diabetes Mother     Cancer Father     High Blood Pressure Sister     High Blood Pressure Brother        Social History     Social History    Marital status: Legally      Spouse name: N/A    Number of children: N/A    Years of education: N/A     Occupational History    Not on file. Social History Main Topics    Smoking status: Former Smoker     Years: 0.00    Smokeless tobacco: Never Used    Alcohol use 0.6 oz/week     1 Cans of beer per week      Comment: occasionally    Drug use: No    Sexual activity: No     Other Topics Concern    Not on file     Social History Narrative    No narrative on file       Review of Systems:  Review of Systems   Constitution: Positive for weakness. Negative for chills and fever. Cardiovascular: Negative for chest pain and irregular heartbeat. Respiratory: Negative for cough and shortness of breath. Musculoskeletal: Positive for back pain. Gastrointestinal: Negative for constipation. Neurological: Negative for disturbances in coordination and loss of balance.    Uses cane    Physical Exam:  Temp 98.3 °F (36.8 °C) (Oral)   Resp 18   Ht 5' 3\" (1.6 m)   Wt 255 lb (115.7 kg)   LMP  (LMP Unknown)   BMI 45.17 kg/m²   /91    Physical Exam   Constitutional: She is oriented to person, place, and time and well-developed, well-nourished, and in no distress. HENT:   Head: Normocephalic. Eyes: EOM are normal.   Neck: Normal range of motion. Pulmonary/Chest: Effort normal.   Musculoskeletal:        Lumbar back: She exhibits decreased range of motion, tenderness and pain. Neurological: She is alert and oriented to person, place, and time. Skin: Skin is warm and dry. Psychiatric: Affect normal.       Record/Diagnostics Review:    XR Lumbar 2015 -     Findings: There are severe multilevel disc disease, evidenced by loss of disc space and vacuum phenomena, most pronounced at L1-2 and L2-3. There is minimal anterolisthesis of L3 in relation to L4. There is no significant loss of vertebral body heights. Moderate to severe arthrosis is also demonstrated at multiple levels. Bulky marginal osteophytosis are demonstrated at L1-2 and L2-3. Degenerative changes of demonstrated in bilateral sacroiliac joints. Incidental note is made of a permanent IVC filter   which projects over L2-3.     Impression: Moderate to severe spondylosis and degenerative disc disease of the lumbar spine in addition to arthrosis, without convincing evidence of acute fracture. Assessment:  Problem List Items Addressed This Visit     Medication monitoring encounter    Postlaminectomy syndrome - Primary    Relevant Medications    oxyCODONE-acetaminophen (PERCOCET) 5-325 MG per tablet      Other Visit Diagnoses    None. Treatment Plan:  DISCUSSION: Treatment options discussed with patient and all questions answered to patient's satisfaction. TREATMENT OPTIONS:   Continue current medication  Remain on two week fills  Stressed importance of having her daughter attend appointments to help manage her medication  Contract compliance requirements are met. Satisfactory pain management plan. Medications help with personal goals and self-care needs. Follow up appointment scheduled.

## 2018-01-22 ENCOUNTER — TELEPHONE (OUTPATIENT)
Dept: INTERNAL MEDICINE | Age: 68
End: 2018-01-22

## 2018-01-24 RX ORDER — OXYCODONE HYDROCHLORIDE AND ACETAMINOPHEN 5; 325 MG/1; MG/1
1 TABLET ORAL 2 TIMES DAILY PRN
Qty: 28 TABLET | Refills: 0 | Status: SHIPPED | OUTPATIENT
Start: 2018-01-24 | End: 2018-02-13 | Stop reason: SDUPTHER

## 2018-01-29 DIAGNOSIS — I10 UNCONTROLLED HYPERTENSION: ICD-10-CM

## 2018-01-29 DIAGNOSIS — I25.5 ISCHEMIC CARDIOMYOPATHY: ICD-10-CM

## 2018-01-29 DIAGNOSIS — R06.09 DOE (DYSPNEA ON EXERTION): ICD-10-CM

## 2018-01-29 RX ORDER — FUROSEMIDE 40 MG/1
40 TABLET ORAL DAILY
Qty: 30 TABLET | Refills: 10 | Status: SHIPPED | OUTPATIENT
Start: 2018-01-29 | End: 2018-09-13 | Stop reason: SDUPTHER

## 2018-01-29 RX ORDER — METOPROLOL TARTRATE 100 MG/1
TABLET ORAL
Qty: 56 TABLET | Refills: 3 | Status: SHIPPED | OUTPATIENT
Start: 2018-01-29 | End: 2018-03-12 | Stop reason: SDUPTHER

## 2018-01-29 RX ORDER — B-COMPLEX WITH VITAMIN C
TABLET ORAL
Qty: 60 TABLET | Refills: 11 | Status: ON HOLD | OUTPATIENT
Start: 2018-01-29 | End: 2018-02-26 | Stop reason: SDUPTHER

## 2018-01-29 RX ORDER — SPIRONOLACTONE 25 MG/1
TABLET ORAL
Qty: 28 TABLET | Refills: 0 | Status: SHIPPED | OUTPATIENT
Start: 2018-01-29 | End: 2018-02-13 | Stop reason: SDUPTHER

## 2018-01-29 NOTE — TELEPHONE ENCOUNTER
Next Visit Date:  Future Appointments  Date Time Provider Shruthi Griggs   2/6/2018 11:30 AM Linda Espino, PhD [facility]   2/6/2018 12:30 PM [facility]O RM 1 [facility] PAIN [facility]   3/12/2018 2:00 PM Gena Gross MD [facility]   REFILL REQUEST FOR CALCIUM, SPIRONOLACTONE, LOPRESSOR, LASIX    Health Maintenance   Topic Date Due    Zostavax vaccine  04/20/2012    Diabetic retinal exam  11/09/2016    DEXA (modify frequency per FRAX score)  04/20/2017    Cervical cancer screen  07/31/2017    Flu vaccine (1) 09/01/2017    Diabetic foot exam  12/13/2017    DTaP/Tdap/Td vaccine (1 - Tdap) 04/11/2018 (Originally 4/20/1969)    HIV screen  07/13/2018 (Originally 4/20/1965)    Pneumococcal low/med risk (1 of 2 - PCV13) 07/17/2018 (Originally 4/20/2017)    Lipid screen  04/11/2018    Breast cancer screen  05/24/2018    A1C test (Diabetic or Prediabetic)  07/17/2018    Diabetic microalbuminuria test  10/31/2018    Potassium monitoring  10/31/2018    Creatinine monitoring  10/31/2018    Colon cancer screen colonoscopy  09/16/2025    Hepatitis C screen  Completed       Hemoglobin A1C (%)   Date Value   07/17/2017 7.1   12/13/2016 6.5   08/24/2016 6.6             ( goal A1C is < 7)   Microalb/Crt.  Ratio (mcg/mg creat)   Date Value   10/31/2017 59 (H)     LDL Cholesterol (mg/dL)   Date Value   04/11/2017 123       (goal LDL is <100)   AST (U/L)   Date Value   10/12/2016 21     ALT (U/L)   Date Value   10/12/2016 12     BUN (mg/dL)   Date Value   10/31/2017 13     BP Readings from Last 3 Encounters:   12/13/17 (!) 172/96   11/16/17 131/68   11/14/17 (!) 142/75          (goal 120/80)    All Future Testing planned in [facility]  Lab Frequency Next Occurrence   NM cardiac persantine 1 day Once 03/01/2018               Patient Active Problem List:     DM (diabetes mellitus) ([address]     HTN (hypertension)     CAD (coronary artery disease) s/p 3 stents     Smoker     Back pain     Asthma

## 2018-02-13 ENCOUNTER — HOSPITAL ENCOUNTER (OUTPATIENT)
Age: 68
Discharge: HOME | End: 2018-02-15
Payer: COMMERCIAL

## 2018-02-13 ENCOUNTER — HOSPITAL ENCOUNTER (OUTPATIENT)
Dept: PAIN MANAGEMENT | Age: 68
Discharge: HOME | End: 2018-02-13
Payer: COMMERCIAL

## 2018-02-13 ENCOUNTER — HOSPITAL ENCOUNTER (OUTPATIENT)
Dept: GENERAL RADIOLOGY | Age: 68
Discharge: HOME | End: 2018-02-15
Payer: COMMERCIAL

## 2018-02-13 VITALS
HEIGHT: 63 IN | TEMPERATURE: 97.8 F | BODY MASS INDEX: 45.18 KG/M2 | WEIGHT: 255 LBS | SYSTOLIC BLOOD PRESSURE: 179 MMHG | DIASTOLIC BLOOD PRESSURE: 100 MMHG | RESPIRATION RATE: 18 BRPM | HEART RATE: 61 BPM

## 2018-02-13 DIAGNOSIS — M47.816 LUMBAR SPONDYLOSIS: ICD-10-CM

## 2018-02-13 DIAGNOSIS — M96.1 POSTLAMINECTOMY SYNDROME: ICD-10-CM

## 2018-02-13 DIAGNOSIS — Z79.02 LONG TERM (CURRENT) USE OF ANTITHROMBOTICS/ANTIPLATELETS: Chronic | ICD-10-CM

## 2018-02-13 DIAGNOSIS — M17.11 PRIMARY OSTEOARTHRITIS OF RIGHT KNEE: Chronic | ICD-10-CM

## 2018-02-13 DIAGNOSIS — Z79.891 CHRONIC USE OF OPIATE DRUGS THERAPEUTIC PURPOSES: ICD-10-CM

## 2018-02-13 DIAGNOSIS — G47.33 OSA (OBSTRUCTIVE SLEEP APNEA): ICD-10-CM

## 2018-02-13 DIAGNOSIS — E66.01 MORBID OBESITY (HCC): ICD-10-CM

## 2018-02-13 DIAGNOSIS — M96.1 POSTLAMINECTOMY SYNDROME, LUMBAR: ICD-10-CM

## 2018-02-13 DIAGNOSIS — G89.4 CHRONIC PAIN SYNDROME: ICD-10-CM

## 2018-02-13 DIAGNOSIS — M96.1 POSTLAMINECTOMY SYNDROME, LUMBAR: Primary | ICD-10-CM

## 2018-02-13 PROCEDURE — 99214 OFFICE O/P EST MOD 30 MIN: CPT | Performed by: ANESTHESIOLOGY

## 2018-02-13 PROCEDURE — 99214 OFFICE O/P EST MOD 30 MIN: CPT

## 2018-02-13 PROCEDURE — 72100 X-RAY EXAM L-S SPINE 2/3 VWS: CPT

## 2018-02-13 PROCEDURE — 73560 X-RAY EXAM OF KNEE 1 OR 2: CPT

## 2018-02-13 RX ORDER — RANOLAZINE 500 MG/1
500 TABLET, EXTENDED RELEASE ORAL 2 TIMES DAILY
COMMUNITY
End: 2018-06-12 | Stop reason: ALTCHOICE

## 2018-02-13 RX ORDER — OXYCODONE HYDROCHLORIDE AND ACETAMINOPHEN 5; 325 MG/1; MG/1
1 TABLET ORAL 2 TIMES DAILY PRN
Qty: 28 TABLET | Refills: 0 | Status: ON HOLD | OUTPATIENT
Start: 2018-02-13 | End: 2018-02-28 | Stop reason: HOSPADM

## 2018-02-13 ASSESSMENT — PAIN DESCRIPTION - LOCATION: LOCATION: BACK;BUTTOCKS;LEG

## 2018-02-13 ASSESSMENT — PAIN DESCRIPTION - DESCRIPTORS: DESCRIPTORS: CONSTANT;ACHING

## 2018-02-13 ASSESSMENT — PAIN DESCRIPTION - PAIN TYPE: TYPE: CHRONIC PAIN

## 2018-02-13 ASSESSMENT — PAIN DESCRIPTION - PROGRESSION: CLINICAL_PROGRESSION: NOT CHANGED

## 2018-02-13 ASSESSMENT — ENCOUNTER SYMPTOMS
NAUSEA: 0
BLURRED VISION: 0
VOMITING: 0
COUGH: 0

## 2018-02-13 ASSESSMENT — PAIN DESCRIPTION - FREQUENCY: FREQUENCY: CONTINUOUS

## 2018-02-13 ASSESSMENT — PAIN DESCRIPTION - ONSET: ONSET: ON-GOING

## 2018-02-13 ASSESSMENT — PAIN SCALES - GENERAL: PAINLEVEL_OUTOF10: 8

## 2018-02-13 ASSESSMENT — PAIN DESCRIPTION - ORIENTATION: ORIENTATION: RIGHT;LEFT;LOWER

## 2018-02-13 NOTE — PROGRESS NOTES
[facility] Pain Management  Patient Pain Assessment  Follow Up  No att. providers found    Primary Care Physician: Evangelina Lozano MD    Chief complaint:   Chief Complaint   Patient presents with    Back Pain   . HISTORY OF PRESENT ILLNESS:  Roya Babcock is 74 y.o. female with    HPI  This is a pleasant 62-year-old woman with past medical history of morbid obesity BMI more than 45, obstructive sleep apnea and noncompliant with CPAP and diabetes mellitus last hemoglobin A1c 7.1    She is seen here for chronic pain related to her back and bilateral knee. Past history significant for lumbar spine surgery and left total knee replacement. She continued to suffer from chronic pain related to lower back both legs and knees right sided more than left. She is managed with opioids Percocet 5 mg twice a day  Today she is here for medication refill no signs and symptoms of aberrancy  OA RRS report was reviewed and is consistent with prescription history  Patient denies any side effects of the medication and to find the medication somewhat helpful       Current Pain Assessment  Pain Assessment  Pain Level: 8  Pain Type: Chronic pain  Pain Location: Back, Buttocks, Leg (\"pain all over\", overall joint pain also)  Pain Orientation: Right, Left, Lower  Pain Radiating Towards: LOW BACK TO OMARI LEGS TO OMARI FEET  Pain Descriptors: Constant, Aching  Pain Frequency: Continuous  Pain Onset: On-going  Clinical Progression: Not changed  Effect of Pain on Daily Activities: 3-4/5 lives alone and does adl but family helps,difficult to do housework,cook, etc  Pain Intervention(s): Medication (see eMar), Heat applied, Rest, Other (Comment) (topical rub)  POSS Score (Patient Ctrl Analgesia): 1        Associated Symptoms  1 Associated symptoms: weakness to omari knees and legs  2. Red Flags:    Chills NA              Weight Loss :No              Loss of Bladder Control :No              Loss of Bowel Control: No  3. Last UDS :

## 2018-02-23 ENCOUNTER — APPOINTMENT (OUTPATIENT)
Dept: GENERAL RADIOLOGY | Age: 68
DRG: 194 | End: 2018-02-23
Payer: COMMERCIAL

## 2018-02-23 ENCOUNTER — HOSPITAL ENCOUNTER (INPATIENT)
Age: 68
LOS: 4 days | Discharge: HOME | DRG: 194 | End: 2018-02-28
Attending: EMERGENCY MEDICINE | Admitting: FAMILY MEDICINE
Payer: COMMERCIAL

## 2018-02-23 DIAGNOSIS — M48.02 SPINAL STENOSIS IN CERVICAL REGION: Chronic | ICD-10-CM

## 2018-02-23 DIAGNOSIS — G89.4 CHRONIC PAIN SYNDROME: ICD-10-CM

## 2018-02-23 DIAGNOSIS — J10.1 INFLUENZA B: Primary | ICD-10-CM

## 2018-02-23 LAB
ABSOLUTE EOS #: 0 K/UL (ref 0–0.4)
ABSOLUTE IMMATURE GRANULOCYTE: ABNORMAL K/UL (ref 0–0.3)
ABSOLUTE LYMPH #: 2.6 K/UL (ref 1–4.8)
ABSOLUTE MONO #: 0.7 K/UL (ref 0.2–0.8)
ANION GAP SERPL CALCULATED.3IONS-SCNC: 14 MMOL/L (ref 9–17)
BASOPHILS # BLD: 1 % (ref 0–2)
BASOPHILS ABSOLUTE: 0.1 K/UL (ref 0–0.2)
BUN BLDV-MCNC: 13 MG/DL (ref 8–23)
BUN/CREAT BLD: 13 (ref 9–20)
CALCIUM SERPL-MCNC: 9.2 MG/DL (ref 8.6–10.4)
CHLORIDE BLD-SCNC: 101 MMOL/L (ref 98–107)
CO2: 25 MMOL/L (ref 20–31)
CREAT SERPL-MCNC: 1.03 MG/DL (ref 0.5–0.9)
DIFFERENTIAL TYPE: ABNORMAL
DIRECT EXAM: ABNORMAL
EOSINOPHILS RELATIVE PERCENT: 1 % (ref 1–4)
GFR AFRICAN AMERICAN: >60 ML/MIN
GFR NON-AFRICAN AMERICAN: 54 ML/MIN
GFR SERPL CREATININE-BSD FRML MDRD: ABNORMAL ML/MIN/{1.73_M2}
GFR SERPL CREATININE-BSD FRML MDRD: ABNORMAL ML/MIN/{1.73_M2}
GLUCOSE BLD-MCNC: 127 MG/DL (ref 70–99)
HCT VFR BLD CALC: 39.6 % (ref 36–46)
HEMOGLOBIN: 12.6 G/DL (ref 12–16)
IMMATURE GRANULOCYTES: ABNORMAL %
LYMPHOCYTES # BLD: 40 % (ref 24–44)
Lab: ABNORMAL
MCH RBC QN AUTO: 28.1 PG (ref 26–34)
MCHC RBC AUTO-ENTMCNC: 31.9 G/DL (ref 31–37)
MCV RBC AUTO: 88.2 FL (ref 80–100)
MONOCYTES # BLD: 10 % (ref 1–7)
NRBC AUTOMATED: ABNORMAL PER 100 WBC
PDW BLD-RTO: 14.6 % (ref 11.5–14.5)
PLATELET # BLD: 201 K/UL (ref 130–400)
PLATELET ESTIMATE: ABNORMAL
PMV BLD AUTO: 8.3 FL (ref 6–12)
POTASSIUM SERPL-SCNC: 3.7 MMOL/L (ref 3.7–5.3)
RBC # BLD: 4.49 M/UL (ref 4–5.2)
RBC # BLD: ABNORMAL 10*6/UL
SEG NEUTROPHILS: 48 % (ref 36–66)
SEGMENTED NEUTROPHILS ABSOLUTE COUNT: 3.1 K/UL (ref 1.8–7.7)
SODIUM BLD-SCNC: 140 MMOL/L (ref 135–144)
SPECIMEN DESCRIPTION: ABNORMAL
STATUS: ABNORMAL
WBC # BLD: 6.4 K/UL (ref 3.5–11)
WBC # BLD: ABNORMAL 10*3/UL

## 2018-02-23 PROCEDURE — 85025 COMPLETE CBC W/AUTO DIFF WBC: CPT

## 2018-02-23 PROCEDURE — 94640 AIRWAY INHALATION TREATMENT: CPT

## 2018-02-23 PROCEDURE — 71045 X-RAY EXAM CHEST 1 VIEW: CPT

## 2018-02-23 PROCEDURE — 6370000000 HC RX 637 (ALT 250 FOR IP): Performed by: EMERGENCY MEDICINE

## 2018-02-23 PROCEDURE — 99284 EMERGENCY DEPT VISIT MOD MDM: CPT

## 2018-02-23 PROCEDURE — 80048 BASIC METABOLIC PNL TOTAL CA: CPT

## 2018-02-23 PROCEDURE — 87804 INFLUENZA ASSAY W/OPTIC: CPT

## 2018-02-23 PROCEDURE — 36415 COLL VENOUS BLD VENIPUNCTURE: CPT

## 2018-02-23 PROCEDURE — 94150 VITAL CAPACITY TEST: CPT

## 2018-02-23 PROCEDURE — 6360000002 HC RX W HCPCS: Performed by: EMERGENCY MEDICINE

## 2018-02-23 RX ORDER — ALBUTEROL SULFATE 90 UG/1
2 AEROSOL, METERED RESPIRATORY (INHALATION)
Status: DISCONTINUED | OUTPATIENT
Start: 2018-02-23 | End: 2018-02-24

## 2018-02-23 RX ORDER — SODIUM CHLORIDE 9 MG/ML
INJECTION, SOLUTION INTRAVENOUS CONTINUOUS
Status: DISCONTINUED | OUTPATIENT
Start: 2018-02-23 | End: 2018-02-24 | Stop reason: SDUPTHER

## 2018-02-23 RX ORDER — IPRATROPIUM BROMIDE AND ALBUTEROL SULFATE 2.5; .5 MG/3ML; MG/3ML
1 SOLUTION RESPIRATORY (INHALATION)
Status: DISCONTINUED | OUTPATIENT
Start: 2018-02-23 | End: 2018-02-24

## 2018-02-23 RX ORDER — ALBUTEROL SULFATE 2.5 MG/3ML
5 SOLUTION RESPIRATORY (INHALATION)
Status: DISCONTINUED | OUTPATIENT
Start: 2018-02-23 | End: 2018-02-24

## 2018-02-23 RX ORDER — IBUPROFEN 800 MG/1
800 TABLET ORAL ONCE
Status: COMPLETED | OUTPATIENT
Start: 2018-02-23 | End: 2018-02-23

## 2018-02-23 RX ADMIN — ALBUTEROL SULFATE 5 MG: 5 SOLUTION RESPIRATORY (INHALATION) at 22:59

## 2018-02-23 RX ADMIN — IBUPROFEN 800 MG: 800 TABLET, FILM COATED ORAL at 22:58

## 2018-02-23 ASSESSMENT — ENCOUNTER SYMPTOMS
DIARRHEA: 0
ABDOMINAL PAIN: 0
EYE REDNESS: 0
EYE DISCHARGE: 0
COUGH: 1
CONSTIPATION: 0
VOMITING: 0
FACIAL SWELLING: 0
COLOR CHANGE: 0
SHORTNESS OF BREATH: 1

## 2018-02-23 ASSESSMENT — PAIN SCALES - GENERAL
PAINLEVEL_OUTOF10: 9
PAINLEVEL_OUTOF10: 9

## 2018-02-23 ASSESSMENT — PAIN DESCRIPTION - PAIN TYPE: TYPE: ACUTE PAIN

## 2018-02-23 ASSESSMENT — PAIN DESCRIPTION - PROGRESSION: CLINICAL_PROGRESSION: NOT CHANGED

## 2018-02-23 ASSESSMENT — PAIN DESCRIPTION - ORIENTATION: ORIENTATION: RIGHT;LEFT

## 2018-02-23 ASSESSMENT — PAIN DESCRIPTION - FREQUENCY: FREQUENCY: CONTINUOUS

## 2018-02-23 ASSESSMENT — PAIN DESCRIPTION - LOCATION: LOCATION: LEG

## 2018-02-23 ASSESSMENT — PAIN DESCRIPTION - ONSET: ONSET: ON-GOING

## 2018-02-24 PROBLEM — J10.1 INFLUENZA B: Status: ACTIVE | Noted: 2018-02-24

## 2018-02-24 PROBLEM — J45.901 REACTIVE AIRWAY DISEASE WITH ACUTE EXACERBATION: Status: ACTIVE | Noted: 2018-02-24

## 2018-02-24 LAB
GLUCOSE BLD-MCNC: 131 MG/DL (ref 65–105)
GLUCOSE BLD-MCNC: 237 MG/DL (ref 65–105)
GLUCOSE BLD-MCNC: 279 MG/DL (ref 65–105)
GLUCOSE BLD-MCNC: 284 MG/DL (ref 65–105)

## 2018-02-24 PROCEDURE — 94760 N-INVAS EAR/PLS OXIMETRY 1: CPT

## 2018-02-24 PROCEDURE — 2580000003 HC RX 258: Performed by: NURSE PRACTITIONER

## 2018-02-24 PROCEDURE — 82947 ASSAY GLUCOSE BLOOD QUANT: CPT

## 2018-02-24 PROCEDURE — 6360000002 HC RX W HCPCS: Performed by: INTERNAL MEDICINE

## 2018-02-24 PROCEDURE — 94640 AIRWAY INHALATION TREATMENT: CPT

## 2018-02-24 PROCEDURE — 1200000000 HC SEMI PRIVATE

## 2018-02-24 PROCEDURE — 6370000000 HC RX 637 (ALT 250 FOR IP): Performed by: NURSE PRACTITIONER

## 2018-02-24 PROCEDURE — 6370000000 HC RX 637 (ALT 250 FOR IP): Performed by: INTERNAL MEDICINE

## 2018-02-24 PROCEDURE — 2580000003 HC RX 258: Performed by: EMERGENCY MEDICINE

## 2018-02-24 PROCEDURE — 6370000000 HC RX 637 (ALT 250 FOR IP): Performed by: EMERGENCY MEDICINE

## 2018-02-24 PROCEDURE — 87040 BLOOD CULTURE FOR BACTERIA: CPT

## 2018-02-24 PROCEDURE — 94664 DEMO&/EVAL PT USE INHALER: CPT

## 2018-02-24 PROCEDURE — 6360000002 HC RX W HCPCS: Performed by: NURSE PRACTITIONER

## 2018-02-24 PROCEDURE — 99223 1ST HOSP IP/OBS HIGH 75: CPT | Performed by: INTERNAL MEDICINE

## 2018-02-24 PROCEDURE — 36415 COLL VENOUS BLD VENIPUNCTURE: CPT

## 2018-02-24 RX ORDER — ISOSORBIDE MONONITRATE 30 MG/1
30 TABLET, EXTENDED RELEASE ORAL DAILY
Status: DISCONTINUED | OUTPATIENT
Start: 2018-02-24 | End: 2018-02-28 | Stop reason: HOSPADM

## 2018-02-24 RX ORDER — ALBUTEROL SULFATE 2.5 MG/3ML
2.5 SOLUTION RESPIRATORY (INHALATION)
Status: DISCONTINUED | OUTPATIENT
Start: 2018-02-24 | End: 2018-02-28 | Stop reason: HOSPADM

## 2018-02-24 RX ORDER — AMLODIPINE BESYLATE 10 MG/1
10 TABLET ORAL DAILY
Status: DISCONTINUED | OUTPATIENT
Start: 2018-02-24 | End: 2018-02-28 | Stop reason: HOSPADM

## 2018-02-24 RX ORDER — SODIUM CHLORIDE 0.9 % (FLUSH) 0.9 %
10 SYRINGE (ML) INJECTION PRN
Status: DISCONTINUED | OUTPATIENT
Start: 2018-02-24 | End: 2018-02-28 | Stop reason: HOSPADM

## 2018-02-24 RX ORDER — ONDANSETRON 2 MG/ML
4 INJECTION INTRAMUSCULAR; INTRAVENOUS EVERY 6 HOURS PRN
Status: DISCONTINUED | OUTPATIENT
Start: 2018-02-24 | End: 2018-02-28 | Stop reason: HOSPADM

## 2018-02-24 RX ORDER — ASPIRIN 81 MG/1
81 TABLET ORAL DAILY
Status: DISCONTINUED | OUTPATIENT
Start: 2018-02-24 | End: 2018-02-28 | Stop reason: HOSPADM

## 2018-02-24 RX ORDER — NICOTINE 21 MG/24HR
1 PATCH, TRANSDERMAL 24 HOURS TRANSDERMAL DAILY PRN
Status: DISCONTINUED | OUTPATIENT
Start: 2018-02-24 | End: 2018-02-28 | Stop reason: HOSPADM

## 2018-02-24 RX ORDER — MORPHINE SULFATE 2 MG/ML
2 INJECTION, SOLUTION INTRAMUSCULAR; INTRAVENOUS
Status: DISCONTINUED | OUTPATIENT
Start: 2018-02-24 | End: 2018-02-26

## 2018-02-24 RX ORDER — SPIRONOLACTONE 25 MG/1
25 TABLET ORAL DAILY
Status: DISCONTINUED | OUTPATIENT
Start: 2018-02-24 | End: 2018-02-28 | Stop reason: HOSPADM

## 2018-02-24 RX ORDER — OSELTAMIVIR PHOSPHATE 75 MG/1
75 CAPSULE ORAL 2 TIMES DAILY
Status: DISCONTINUED | OUTPATIENT
Start: 2018-02-24 | End: 2018-02-28 | Stop reason: HOSPADM

## 2018-02-24 RX ORDER — BISACODYL 10 MG
10 SUPPOSITORY, RECTAL RECTAL DAILY PRN
Status: DISCONTINUED | OUTPATIENT
Start: 2018-02-24 | End: 2018-02-28 | Stop reason: HOSPADM

## 2018-02-24 RX ORDER — METOPROLOL TARTRATE 100 MG/1
100 TABLET ORAL 2 TIMES DAILY
Status: DISCONTINUED | OUTPATIENT
Start: 2018-02-24 | End: 2018-02-28 | Stop reason: HOSPADM

## 2018-02-24 RX ORDER — PANTOPRAZOLE SODIUM 40 MG/1
40 TABLET, DELAYED RELEASE ORAL
Status: DISCONTINUED | OUTPATIENT
Start: 2018-02-24 | End: 2018-02-28 | Stop reason: HOSPADM

## 2018-02-24 RX ORDER — LOSARTAN POTASSIUM 100 MG/1
100 TABLET ORAL DAILY
Status: DISCONTINUED | OUTPATIENT
Start: 2018-02-24 | End: 2018-02-28 | Stop reason: HOSPADM

## 2018-02-24 RX ORDER — OSELTAMIVIR PHOSPHATE 75 MG/1
75 CAPSULE ORAL ONCE
Status: COMPLETED | OUTPATIENT
Start: 2018-02-24 | End: 2018-02-24

## 2018-02-24 RX ORDER — NITROGLYCERIN 0.4 MG/1
0.4 TABLET SUBLINGUAL EVERY 5 MIN PRN
Status: DISCONTINUED | OUTPATIENT
Start: 2018-02-24 | End: 2018-02-28 | Stop reason: HOSPADM

## 2018-02-24 RX ORDER — ATORVASTATIN CALCIUM 80 MG/1
80 TABLET, FILM COATED ORAL DAILY
Status: DISCONTINUED | OUTPATIENT
Start: 2018-02-24 | End: 2018-02-28 | Stop reason: HOSPADM

## 2018-02-24 RX ORDER — IPRATROPIUM BROMIDE AND ALBUTEROL SULFATE 2.5; .5 MG/3ML; MG/3ML
1 SOLUTION RESPIRATORY (INHALATION)
Status: DISCONTINUED | OUTPATIENT
Start: 2018-02-24 | End: 2018-02-28 | Stop reason: HOSPADM

## 2018-02-24 RX ORDER — SODIUM CHLORIDE 0.9 % (FLUSH) 0.9 %
10 SYRINGE (ML) INJECTION EVERY 12 HOURS SCHEDULED
Status: DISCONTINUED | OUTPATIENT
Start: 2018-02-24 | End: 2018-02-28 | Stop reason: HOSPADM

## 2018-02-24 RX ORDER — CLOPIDOGREL BISULFATE 75 MG/1
75 TABLET ORAL DAILY
Status: DISCONTINUED | OUTPATIENT
Start: 2018-02-24 | End: 2018-02-28 | Stop reason: HOSPADM

## 2018-02-24 RX ORDER — B-COMPLEX WITH VITAMIN C
1 TABLET ORAL 2 TIMES DAILY
Status: DISCONTINUED | OUTPATIENT
Start: 2018-02-24 | End: 2018-02-24 | Stop reason: CLARIF

## 2018-02-24 RX ORDER — RANOLAZINE 500 MG/1
500 TABLET, EXTENDED RELEASE ORAL 2 TIMES DAILY
Status: DISCONTINUED | OUTPATIENT
Start: 2018-02-24 | End: 2018-02-28 | Stop reason: HOSPADM

## 2018-02-24 RX ORDER — METHYLPREDNISOLONE SODIUM SUCCINATE 125 MG/2ML
80 INJECTION, POWDER, LYOPHILIZED, FOR SOLUTION INTRAMUSCULAR; INTRAVENOUS EVERY 8 HOURS
Status: DISCONTINUED | OUTPATIENT
Start: 2018-02-24 | End: 2018-02-24

## 2018-02-24 RX ORDER — MORPHINE SULFATE 4 MG/ML
4 INJECTION, SOLUTION INTRAMUSCULAR; INTRAVENOUS
Status: DISCONTINUED | OUTPATIENT
Start: 2018-02-24 | End: 2018-02-26

## 2018-02-24 RX ORDER — CALCIUM CARBONATE/VITAMIN D3 600 MG-10
1 TABLET ORAL 2 TIMES DAILY
Status: DISCONTINUED | OUTPATIENT
Start: 2018-02-24 | End: 2018-02-28 | Stop reason: HOSPADM

## 2018-02-24 RX ORDER — AZITHROMYCIN 250 MG/1
500 TABLET, FILM COATED ORAL DAILY
Status: COMPLETED | OUTPATIENT
Start: 2018-02-24 | End: 2018-02-24

## 2018-02-24 RX ORDER — FUROSEMIDE 40 MG/1
40 TABLET ORAL DAILY
Status: DISCONTINUED | OUTPATIENT
Start: 2018-02-24 | End: 2018-02-28 | Stop reason: HOSPADM

## 2018-02-24 RX ORDER — SODIUM CHLORIDE 9 MG/ML
INJECTION, SOLUTION INTRAVENOUS CONTINUOUS
Status: DISCONTINUED | OUTPATIENT
Start: 2018-02-24 | End: 2018-02-28 | Stop reason: HOSPADM

## 2018-02-24 RX ORDER — METHYLPREDNISOLONE SODIUM SUCCINATE 40 MG/ML
40 INJECTION, POWDER, LYOPHILIZED, FOR SOLUTION INTRAMUSCULAR; INTRAVENOUS EVERY 12 HOURS
Status: COMPLETED | OUTPATIENT
Start: 2018-02-24 | End: 2018-02-26

## 2018-02-24 RX ORDER — AZITHROMYCIN 250 MG/1
250 TABLET, FILM COATED ORAL DAILY
Status: COMPLETED | OUTPATIENT
Start: 2018-02-25 | End: 2018-02-28

## 2018-02-24 RX ADMIN — IPRATROPIUM BROMIDE AND ALBUTEROL SULFATE 1 AMPULE: .5; 3 SOLUTION RESPIRATORY (INHALATION) at 07:26

## 2018-02-24 RX ADMIN — RANOLAZINE 500 MG: 500 TABLET, FILM COATED, EXTENDED RELEASE ORAL at 03:51

## 2018-02-24 RX ADMIN — ISOSORBIDE MONONITRATE 30 MG: 30 TABLET ORAL at 08:39

## 2018-02-24 RX ADMIN — IPRATROPIUM BROMIDE AND ALBUTEROL SULFATE 1 AMPULE: .5; 3 SOLUTION RESPIRATORY (INHALATION) at 15:10

## 2018-02-24 RX ADMIN — AMLODIPINE BESYLATE 10 MG: 10 TABLET ORAL at 08:40

## 2018-02-24 RX ADMIN — SODIUM CHLORIDE: 9 INJECTION, SOLUTION INTRAVENOUS at 00:14

## 2018-02-24 RX ADMIN — METHYLPREDNISOLONE SODIUM SUCCINATE 80 MG: 125 INJECTION, POWDER, FOR SOLUTION INTRAMUSCULAR; INTRAVENOUS at 03:51

## 2018-02-24 RX ADMIN — Medication 1 TABLET: at 21:56

## 2018-02-24 RX ADMIN — LINAGLIPTIN 5 MG: 5 TABLET, FILM COATED ORAL at 08:39

## 2018-02-24 RX ADMIN — OSELTAMIVIR PHOSPHATE 75 MG: 75 CAPSULE ORAL at 00:40

## 2018-02-24 RX ADMIN — METOPROLOL TARTRATE 100 MG: 100 TABLET ORAL at 08:39

## 2018-02-24 RX ADMIN — METOPROLOL TARTRATE 100 MG: 100 TABLET ORAL at 03:51

## 2018-02-24 RX ADMIN — ENOXAPARIN SODIUM 40 MG: 40 INJECTION SUBCUTANEOUS at 08:38

## 2018-02-24 RX ADMIN — IPRATROPIUM BROMIDE AND ALBUTEROL SULFATE 1 AMPULE: .5; 3 SOLUTION RESPIRATORY (INHALATION) at 11:01

## 2018-02-24 RX ADMIN — FUROSEMIDE 40 MG: 40 TABLET ORAL at 08:40

## 2018-02-24 RX ADMIN — SODIUM CHLORIDE: 9 INJECTION, SOLUTION INTRAVENOUS at 08:38

## 2018-02-24 RX ADMIN — PANTOPRAZOLE SODIUM 40 MG: 40 TABLET, DELAYED RELEASE ORAL at 08:40

## 2018-02-24 RX ADMIN — ATORVASTATIN CALCIUM 80 MG: 80 TABLET, FILM COATED ORAL at 08:39

## 2018-02-24 RX ADMIN — LOSARTAN POTASSIUM 100 MG: 100 TABLET, FILM COATED ORAL at 08:39

## 2018-02-24 RX ADMIN — CLOPIDOGREL BISULFATE 75 MG: 75 TABLET ORAL at 08:38

## 2018-02-24 RX ADMIN — METHYLPREDNISOLONE SODIUM SUCCINATE: 125 INJECTION, POWDER, FOR SOLUTION INTRAMUSCULAR; INTRAVENOUS at 22:05

## 2018-02-24 RX ADMIN — RANOLAZINE 500 MG: 500 TABLET, FILM COATED, EXTENDED RELEASE ORAL at 21:56

## 2018-02-24 RX ADMIN — Medication 1 TABLET: at 08:39

## 2018-02-24 RX ADMIN — RANOLAZINE 500 MG: 500 TABLET, FILM COATED, EXTENDED RELEASE ORAL at 08:39

## 2018-02-24 RX ADMIN — SPIRONOLACTONE 25 MG: 25 TABLET ORAL at 08:39

## 2018-02-24 RX ADMIN — OSELTAMIVIR PHOSPHATE 75 MG: 75 CAPSULE ORAL at 08:39

## 2018-02-24 RX ADMIN — ASPIRIN 81 MG: 81 TABLET, COATED ORAL at 08:39

## 2018-02-24 RX ADMIN — SODIUM CHLORIDE: 9 INJECTION, SOLUTION INTRAVENOUS at 03:51

## 2018-02-24 RX ADMIN — Medication 10 ML: at 22:01

## 2018-02-24 RX ADMIN — SERTRALINE 75 MG: 25 TABLET, FILM COATED ORAL at 08:39

## 2018-02-24 RX ADMIN — METOPROLOL TARTRATE 100 MG: 100 TABLET ORAL at 21:56

## 2018-02-24 RX ADMIN — AZITHROMYCIN 500 MG: 250 TABLET, FILM COATED ORAL at 08:38

## 2018-02-24 RX ADMIN — OSELTAMIVIR PHOSPHATE 75 MG: 75 CAPSULE ORAL at 22:00

## 2018-02-24 ASSESSMENT — PAIN SCALES - GENERAL
PAINLEVEL_OUTOF10: 8
PAINLEVEL_OUTOF10: 0
PAINLEVEL_OUTOF10: 3

## 2018-02-24 ASSESSMENT — PAIN DESCRIPTION - FREQUENCY: FREQUENCY: INTERMITTENT

## 2018-02-24 ASSESSMENT — PAIN DESCRIPTION - PAIN TYPE: TYPE: ACUTE PAIN

## 2018-02-24 ASSESSMENT — PAIN DESCRIPTION - LOCATION: LOCATION: HEAD

## 2018-02-24 NOTE — FLOWSHEET NOTE
unable to visit patient at this time,  left note of visit and prayer for possible follow up as needed.       02/24/18 1331   Encounter Summary   Services provided to: Patient not available   Referral/Consult From: Rounding   Continue Visiting (2-24-18)   Complexity of Encounter Low   Length of Encounter 15 minutes   Spiritual Assessment Completed Yes   Routine   Type Initial   Assessment Unable to respond   Intervention Prayer   Outcome Did not respond

## 2018-02-24 NOTE — ED PROVIDER NOTES
VIEW OF THE CHEST 2/23/2018 10:50 pm COMPARISON: 10/23/2017 HISTORY: ORDERING SYSTEM PROVIDED HISTORY: cough TECHNOLOGIST PROVIDED HISTORY: Reason for exam:->cough Ordering Physician Provided Reason for Exam: cough x 3 days Acuity: Acute Type of Exam: Initial Additional signs and symptoms: fever Relevant Medical/Surgical History: asthma FINDINGS: Cardiomegaly. No focal consolidation. No significant pleural effusion. No pneumothorax. Degenerative osteoarthritis of the left glenohumeral joint. 1. No focal airspace disease. 2. Cardiomegaly. LABS:  Labs Reviewed   RAPID INFLUENZA A/B ANTIGENS - Abnormal; Notable for the following:        Result Value    Direct Exam POSITIVE for Influenza B Antigen (*)     All other components within normal limits   CBC WITH AUTO DIFFERENTIAL - Abnormal; Notable for the following:     RDW 14.6 (*)     Monocytes 10 (*)     All other components within normal limits   BASIC METABOLIC PANEL - Abnormal; Notable for the following:     Glucose 127 (*)     CREATININE 1.03 (*)     GFR Non- 54 (*)     All other components within normal limits       All other labs were within normal range or not returned as of this dictation.     EMERGENCY DEPARTMENT COURSE and DIFFERENTIAL DIAGNOSIS/MDM:   Vitals:    Vitals:    02/23/18 2347 02/24/18 0011 02/24/18 0012 02/24/18 0015   BP: (!) 172/148  (!) 164/83    Pulse: 102 107     Resp: 21 23     Temp:    100.2 °F (37.9 °C)   TempSrc:    Oral   SpO2: 98% 95%     Weight:       Height:           Orders Placed This Encounter   Medications    ibuprofen (ADVIL;MOTRIN) tablet 800 mg    0.9 % sodium chloride infusion    albuterol (PROVENTIL) nebulizer solution 5 mg    ipratropium-albuterol (DUONEB) nebulizer solution 1 ampule    albuterol (PROVENTIL) nebulizer solution 5 mg    albuterol sulfate  (90 Base) MCG/ACT inhaler 2 puff    AND Linked Order Group     albuterol sulfate  (90 Base) MCG/ACT inhaler 2 puff     ipratropium (ATROVENT HFA) 17 MCG/ACT inhaler 2 puff    ipratropium (ATROVENT) 0.02 % nebulizer solution 0.5 mg    oseltamivir (TAMIFLU) capsule 75 mg       Medical Decision Making: My clinical impression is that she has influenza type B. She has been wheezing and is feeling weak and is being admitted. Treatment diagnosis and disposition were discussed with the patient. CONSULTS:  IP CONSULT TO HOSPITALIST    PROCEDURES:  None    FINAL IMPRESSION      1. Influenza B          DISPOSITION/PLAN   DISPOSITION Decision To Admit 02/24/2018 12:35:07 AM      PATIENT REFERRED TO:   No follow-up provider specified.     DISCHARGE MEDICATIONS:     New Prescriptions    No medications on file         (Please note that portions of this note were completed with a voice recognition program.  Efforts were made to edit the dictations but occasionally words are mis-transcribed.)    Luis Antonio Price MD  Attending Emergency Physician             Luis Antonio Price MD  02/24/18 0997

## 2018-02-24 NOTE — H&P
Onset    Diabetes Mother     Cancer Father     High Blood Pressure Sister     High Blood Pressure Brother        Review of Systems:     Positive and Negative as described in HPI. CONSTITUTIONAL: has fevers overnight  HEENT:  negative for vision, hearing changes, runny nose, throat pain  RESPIRATORY:exertional SOB, coughing. CARDIOVASCULAR:  negative for chest pain, palpitations. GASTROINTESTINAL:  negative for nausea, vomiting, diarrhea, constipation, change in bowel habits, abdominal pain   GENITOURINARY:  negative for difficulty of urination, burning with urination, frequency   INTEGUMENT:  negative for rash, skin lesions, easy bruising   HEMATOLOGIC/LYMPHATIC:  negative for swelling/edema   ALLERGIC/IMMUNOLOGIC:  negative for urticaria , itching  ENDOCRINE:  negative increase in drinking, increase in urination, hot or cold intolerance  MUSCULOSKELETAL:  Multiple area pains in back, legs, feet  NEUROLOGICAL:  negative for headaches, dizziness, lightheadedness, numbness, pain, tingling extremities  BEHAVIOR/PSYCH:  negative for depression, anxiety    Physical Exam:   BP (!) 150/85   Pulse 74   Temp 98.1 °F (36.7 °C) (Oral)   Resp 18   Ht 5' 4\" (1.626 m)   Wt 240 lb (108.9 kg)   LMP  (LMP Unknown)   SpO2 97%   BMI 41.20 kg/m²   Temp (24hrs), Av.3 °F (37.4 °C), Min:97.3 °F (36.3 °C), Max:101 °F (38.3 °C)    Recent Labs      18   0609  18   1121   POCGLU  131*  237*       Intake/Output Summary (Last 24 hours) at 18 1342  Last data filed at 18 7801   Gross per 24 hour   Intake              320 ml   Output                0 ml   Net              320 ml     General Appearance:  alert, well appearing, and in no acute distress  Mental status: oriented to person, place, and time with normal affect  Head:  normocephalic, atraumatic.   Eye: no icterus, redness, pupils equal and reactive, extraocular eye movements intact, conjunctiva clear  Ear: normal external ear, no discharge, hearing intact  Nose:  no drainage noted  Mouth: mucous membranes moist  Neck: supple, no carotid bruits, thyroid not palpable  Lungs: Bilateral equal air entry, bilateral wheezing noted  Cardiovascular: normal rate, regular rhythm, no murmur, gallop, rub. Abdomen: Soft, nontender, nondistended, normal bowel sounds, no hepatomegaly or splenomegaly  Neurologic: There are no new focal motor or sensory deficits, normal muscle tone and bulk, no abnormal sensation, normal speech, cranial nerves II through XII grossly intact  Skin: No gross lesions, rashes, bruising or bleeding on exposed skin area  Extremities:  peripheral pulses palpable, chronic mild lymphedema noted  Psych: normal affect    Investigations:      Laboratory Testing:  Recent Results (from the past 24 hour(s))   Flu A/B Ag Detection    Collection Time: 02/23/18 10:50 PM   Result Value Ref Range    Specimen Description . NASOPHARYNGEAL SWAB     Special Requests NOT REPORTED     Direct Exam POSITIVE for Influenza B Antigen (A)     Direct Exam NEGATIVE for Influenza A Antigen     Direct Exam       Performed at [address], [facility]    Direct Exam  [phone]     Status FINAL 02/23/2018    CBC Auto Differential    Collection Time: 02/23/18 11:35 PM   Result Value Ref Range    WBC 6.4 3.5 - 11.0 k/uL    RBC 4.49 4.0 - 5.2 m/uL    Hemoglobin 12.6 12.0 - 16.0 g/dL    Hematocrit 39.6 36 - 46 %    MCV 88.2 80 - 100 fL    MCH 28.1 26 - 34 pg    MCHC 31.9 31 - 37 g/dL    RDW 14.6 (H) 11.5 - 14.5 %    Platelets 755 267 - 384 k/uL    MPV 8.3 6.0 - 12.0 fL    NRBC Automated NOT REPORTED per 100 WBC    Differential Type NOT REPORTED     Seg Neutrophils 48 36 - 66 %    Lymphocytes 40 24 - 44 %    Monocytes 10 (H) 1 - 7 %    Eosinophils % 1 1 - 4 %    Basophils 1 0 - 2 %    Immature Granulocytes NOT REPORTED 0 %    Segs Absolute 3.10 1.8 - 7.7 k/uL    Absolute Lymph # 2.60 1.0 - 4.8 k/uL    Absolute Mono # 0.70 0.2 - 0.8 k/uL    Absolute Eos # 0.00 0.0 - 0.4 k/uL    Basophils # 0.10 0.0 - 0.2 k/uL    Absolute Immature Granulocyte NOT REPORTED 0.00 - 0.30 k/uL    WBC Morphology NOT REPORTED     RBC Morphology NOT REPORTED     Platelet Estimate NOT REPORTED    Basic Metabolic Panel    Collection Time: 02/23/18 11:35 PM   Result Value Ref Range    Glucose 127 (H) 70 - 99 mg/dL    BUN 13 8 - 23 mg/dL    CREATININE 1.03 (H) 0.50 - 0.90 mg/dL    Bun/Cre Ratio 13 9 - 20    Calcium 9.2 8.6 - 10.4 mg/dL    Sodium 140 135 - 144 mmol/L    Potassium 3.7 3.7 - 5.3 mmol/L    Chloride 101 98 - 107 mmol/L    CO2 25 20 - 31 mmol/L    Anion Gap 14 9 - 17 mmol/L    GFR Non-African American 54 (L) >60 mL/min    GFR African American >60 >60 mL/min    GFR Comment          GFR Staging NOT REPORTED    CULTURE BLOOD #1    Collection Time: 02/24/18  3:36 AM   Result Value Ref Range    Specimen Description       . BLOOD RT HAND 9ML Performed at [address] [place] [address]    Specimen Description  [address] [phone]     Special Requests NOT REPORTED     Culture NO GROWTH 2 HOURS     Culture       Performed at [address] [phone]    Status Pending    CULTURE BLOOD #2    Collection Time: 02/24/18  3:37 AM   Result Value Ref Range    Specimen Description       . BLOOD LT HAND 9ML Performed at [address] [place] [address]    Specimen Description  [address] [phone]     Special Requests NOT REPORTED     Culture NO GROWTH 2 HOURS     Culture       Performed at [address] [phone]    Status Pending    POC Glucose Fingerstick    Collection Time: 02/24/18  6:09 AM   Result Value Ref Range    POC Glucose 131 (H) 65 - 105 mg/dL   POC Glucose Fingerstick    Collection Time: 02/24/18 11:21 AM   Result Value Ref Range    POC Glucose 237 (H) 65 - 105 mg/dL     Assessment :      Principal Problem:    Influenza B  Active

## 2018-02-24 NOTE — ED NOTES
Report given to [facility]- bed 2011.2     Matty Low [facility]  02/24/18 5729

## 2018-02-24 NOTE — PROGRESS NOTES
· Bronchodilator assessment   []    Bronchodilator Assessment    FEV1 % PREDICTED   FEV1 actual: POOR PATIENT EFFORT  PEFR    PEFR % Predicted  RR 16  Bronchodilator assessment at level  3  BRONCHODILATOR ASSESSMENT SCORE  Score 1 2 3 4   Breath Sounds   []  Clear []  Mild Wheezing with good aeration [x]  Moderate I/E wheezing with adequate aeration []  Poor Aeration or diffuse wheezing   Respiratory Rate [x]  Less than 20 []  20-25 []  Greater than 25  []  Greater than 35    Dyspnea []  No SOB  [x]  SOB with minimal activity []  Speaking in partial sentences []  Acute/ At rest   Peakflow (asthma) []  80 % or greater predicted/PB  [x]  Unable []  70% or greater predicted/PB  [x]  Unable []  51%-70% predicted/PB  [x]  Unable []  Less than 50% predicted/PB  []  Unable due to distress   FEV1 % Predicted []  Greater than 69%  [x]  Unable  []  Less than 50%-69%  [x]  Unable  []  Less than 35%-49%  [x]  Unable  []  Less than 35%  []  Unable due to distress     · Bronchodilator assessment   []    Bronchodilator Assessment    FEV1 % PREDICTED   FEV1 actual:   PEFR    PEFR % Predicted   RR 16  Bronchodilator assessment at level  2  BRONCHODILATOR ASSESSMENT SCORE  Score 1 2 3 4   Breath Sounds   []  Clear [x]  Mild Wheezing with good aeration []  Moderate I/E wheezing with adequate aeration []  Poor Aeration or diffuse wheezing   Respiratory Rate [x]  Less than 20 []  20-25 []  Greater than 25  []  Greater than 35    Dyspnea []  No SOB  [x]  SOB with minimal activity []  Speaking in partial sentences []  Acute/ At rest   Peakflow (asthma) []  80 % or greater predicted/PB  [x]  Unable []  70% or greater predicted/PB  [x]  Unable []  51%-70% predicted/PB  [x]  Unable []  Less than 50% predicted/PB  []  Unable due to distress   FEV1 % Predicted []  Greater than 69%  [x]  Unable  []  Less than 50%-69%  [x]  Unable  []  Less than 35%-49%  [x]  Unable  []  Less than 35%  []  Unable due to distress   · Bronchodilator assessment []    Bronchodilator Assessment    FEV1 % PREDICTED   FEV1 actual:   PEFR    PEFR % Predicted   RR   Bronchodilator assessment at level    BRONCHODILATOR ASSESSMENT SCORE  Score 1 2 3 4   Breath Sounds   []  Clear []  Mild Wheezing with good aeration []  Moderate I/E wheezing with adequate aeration []  Poor Aeration or diffuse wheezing   Respiratory Rate []  Less than 20 []  20-25 []  Greater than 25  []  Greater than 35    Dyspnea []  No SOB  []  SOB with minimal activity []  Speaking in partial sentences []  Acute/ At rest   Peakflow (asthma) []  80 % or greater predicted/PB  []  Unable []  70% or greater predicted/PB  []  Unable []  51%-70% predicted/PB  []  Unable []  Less than 50% predicted/PB  []  Unable due to distress   FEV1 % Predicted []  Greater than 69%  []  Unable  []  Less than 50%-69%  []  Unable  []  Less than 35%-49%  []  Unable  []  Less than 35%  []  Unable due to distress     MDI Instruction

## 2018-02-24 NOTE — CARE COORDINATION
Case Management Initial Discharge Plan  Ted Whitaker,         Readmission Risk              Readmission Risk:        4.75       Age 74 or Greater:  1    Admitted from SNF or Requires Paid or Family Care:  0    Currently has CHF,COPD,ARF,CRI,or is on dialysis:  0    Takes more than 5 Prescription Medications:  0    Takes Digoxin,Insulin,Anticoagulants,Narcotics or ASA/Plavix:  [address] in Past 12 Months:  0    On Disability:  0    Patient Considers own Health:  3.75            Met with:patient to discuss discharge plans. Information verified: address, contacts, phone number, , insurance Yes  PCP: Ananda Garcia MD  Date of last visit: 2017    Insurance Provider: [employer]    Discharge Planning  Current Residence:  apt  Living Arrangements:  Alone   Home has 1 stories/0 stairs to climb  Support Systems:  Children, Family Members, Friends/Neighbors  Current Services PTA:   none Agency:  none  Patient able to perform ADL's:Independent  DME in home:  Cpap, cane  DME used to aid ambulation prior to admission:   cane  DME used during admission:  cane    Potential Assistance Needed:  N/A    Pharmacy:  [facility] on [address] Medications:  No  Does patient want to participate in local refill/ meds to beds program?  No    Patient agreeable to home care: No  [place] of choice provided:  n/a      Type of Home Care Services:  None  Patient expects to be discharged to:  home (home)    Prior SNF/Rehab Placement and Facility:  none  Agreeable to SNF/Rehab: No  [place] of choice provided: n/a   Evaluation: no    Expected Discharge date:  18  Follow Up Appointment: Best Day/ Time: Tuesday PM    Transportation provider: medical cab, [place], and dtr  Transportation arrangements needed for discharge: No    Discharge Plan: Met with pt at bedside. Pt lives alone and has been independent in her apt. Pt hs CPAP at home,but needs new mask.   Pt states DME is on

## 2018-02-25 ENCOUNTER — APPOINTMENT (OUTPATIENT)
Dept: GENERAL RADIOLOGY | Age: 68
DRG: 194 | End: 2018-02-25
Payer: COMMERCIAL

## 2018-02-25 LAB
ANION GAP SERPL CALCULATED.3IONS-SCNC: 15 MMOL/L (ref 9–17)
BUN BLDV-MCNC: 16 MG/DL (ref 8–23)
BUN/CREAT BLD: 19 (ref 9–20)
CALCIUM SERPL-MCNC: 8.8 MG/DL (ref 8.6–10.4)
CHLORIDE BLD-SCNC: 102 MMOL/L (ref 98–107)
CO2: 22 MMOL/L (ref 20–31)
CREAT SERPL-MCNC: 0.86 MG/DL (ref 0.5–0.9)
GFR AFRICAN AMERICAN: >60 ML/MIN
GFR NON-AFRICAN AMERICAN: >60 ML/MIN
GFR SERPL CREATININE-BSD FRML MDRD: ABNORMAL ML/MIN/{1.73_M2}
GFR SERPL CREATININE-BSD FRML MDRD: ABNORMAL ML/MIN/{1.73_M2}
GLUCOSE BLD-MCNC: 207 MG/DL (ref 65–105)
GLUCOSE BLD-MCNC: 248 MG/DL (ref 70–99)
GLUCOSE BLD-MCNC: 274 MG/DL (ref 65–105)
HCT VFR BLD CALC: 35.1 % (ref 36–46)
HEMOGLOBIN: 11.4 G/DL (ref 12–16)
MCH RBC QN AUTO: 28.4 PG (ref 26–34)
MCHC RBC AUTO-ENTMCNC: 32.5 G/DL (ref 31–37)
MCV RBC AUTO: 87.2 FL (ref 80–100)
NRBC AUTOMATED: ABNORMAL PER 100 WBC
PDW BLD-RTO: 14.6 % (ref 11.5–14.5)
PLATELET # BLD: 178 K/UL (ref 130–400)
PMV BLD AUTO: 8.9 FL (ref 6–12)
POTASSIUM SERPL-SCNC: 4.7 MMOL/L (ref 3.7–5.3)
RBC # BLD: 4.02 M/UL (ref 4–5.2)
SODIUM BLD-SCNC: 139 MMOL/L (ref 135–144)
WBC # BLD: 4.5 K/UL (ref 3.5–11)

## 2018-02-25 PROCEDURE — 94640 AIRWAY INHALATION TREATMENT: CPT

## 2018-02-25 PROCEDURE — 6370000000 HC RX 637 (ALT 250 FOR IP): Performed by: FAMILY MEDICINE

## 2018-02-25 PROCEDURE — 6370000000 HC RX 637 (ALT 250 FOR IP): Performed by: NURSE PRACTITIONER

## 2018-02-25 PROCEDURE — 99232 SBSQ HOSP IP/OBS MODERATE 35: CPT | Performed by: FAMILY MEDICINE

## 2018-02-25 PROCEDURE — 80048 BASIC METABOLIC PNL TOTAL CA: CPT

## 2018-02-25 PROCEDURE — 6360000002 HC RX W HCPCS: Performed by: NURSE PRACTITIONER

## 2018-02-25 PROCEDURE — 94760 N-INVAS EAR/PLS OXIMETRY 1: CPT

## 2018-02-25 PROCEDURE — 1200000000 HC SEMI PRIVATE

## 2018-02-25 PROCEDURE — 36415 COLL VENOUS BLD VENIPUNCTURE: CPT

## 2018-02-25 PROCEDURE — 6360000002 HC RX W HCPCS: Performed by: INTERNAL MEDICINE

## 2018-02-25 PROCEDURE — 6370000000 HC RX 637 (ALT 250 FOR IP): Performed by: INTERNAL MEDICINE

## 2018-02-25 PROCEDURE — 71045 X-RAY EXAM CHEST 1 VIEW: CPT

## 2018-02-25 PROCEDURE — 82947 ASSAY GLUCOSE BLOOD QUANT: CPT

## 2018-02-25 PROCEDURE — 85027 COMPLETE CBC AUTOMATED: CPT

## 2018-02-25 PROCEDURE — 2580000003 HC RX 258: Performed by: NURSE PRACTITIONER

## 2018-02-25 RX ORDER — GUAIFENESIN 600 MG/1
600 TABLET, EXTENDED RELEASE ORAL 2 TIMES DAILY
Status: DISCONTINUED | OUTPATIENT
Start: 2018-02-25 | End: 2018-02-28 | Stop reason: HOSPADM

## 2018-02-25 RX ADMIN — ISOSORBIDE MONONITRATE 30 MG: 30 TABLET ORAL at 07:54

## 2018-02-25 RX ADMIN — OSELTAMIVIR PHOSPHATE 75 MG: 75 CAPSULE ORAL at 21:08

## 2018-02-25 RX ADMIN — METHYLPREDNISOLONE SODIUM SUCCINATE 40 MG: 125 INJECTION, POWDER, FOR SOLUTION INTRAMUSCULAR; INTRAVENOUS at 12:29

## 2018-02-25 RX ADMIN — MORPHINE SULFATE 2 MG: 2 INJECTION, SOLUTION INTRAMUSCULAR; INTRAVENOUS at 17:16

## 2018-02-25 RX ADMIN — ATORVASTATIN CALCIUM 80 MG: 80 TABLET, FILM COATED ORAL at 07:54

## 2018-02-25 RX ADMIN — METOPROLOL TARTRATE 100 MG: 100 TABLET ORAL at 21:08

## 2018-02-25 RX ADMIN — LOSARTAN POTASSIUM 100 MG: 100 TABLET, FILM COATED ORAL at 07:53

## 2018-02-25 RX ADMIN — METHYLPREDNISOLONE SODIUM SUCCINATE 40 MG: 125 INJECTION, POWDER, FOR SOLUTION INTRAMUSCULAR; INTRAVENOUS at 22:20

## 2018-02-25 RX ADMIN — IPRATROPIUM BROMIDE AND ALBUTEROL SULFATE 1 AMPULE: .5; 3 SOLUTION RESPIRATORY (INHALATION) at 15:13

## 2018-02-25 RX ADMIN — GUAIFENESIN 600 MG: 600 TABLET, EXTENDED RELEASE ORAL at 22:12

## 2018-02-25 RX ADMIN — LINAGLIPTIN 5 MG: 5 TABLET, FILM COATED ORAL at 07:53

## 2018-02-25 RX ADMIN — AZITHROMYCIN 250 MG: 250 TABLET, FILM COATED ORAL at 07:54

## 2018-02-25 RX ADMIN — ENOXAPARIN SODIUM 40 MG: 40 INJECTION SUBCUTANEOUS at 07:54

## 2018-02-25 RX ADMIN — METOPROLOL TARTRATE 100 MG: 100 TABLET ORAL at 07:53

## 2018-02-25 RX ADMIN — PANTOPRAZOLE SODIUM 40 MG: 40 TABLET, DELAYED RELEASE ORAL at 07:54

## 2018-02-25 RX ADMIN — SERTRALINE 75 MG: 25 TABLET, FILM COATED ORAL at 07:54

## 2018-02-25 RX ADMIN — IPRATROPIUM BROMIDE AND ALBUTEROL SULFATE 1 AMPULE: .5; 3 SOLUTION RESPIRATORY (INHALATION) at 20:46

## 2018-02-25 RX ADMIN — IPRATROPIUM BROMIDE AND ALBUTEROL SULFATE 1 AMPULE: .5; 3 SOLUTION RESPIRATORY (INHALATION) at 11:14

## 2018-02-25 RX ADMIN — FUROSEMIDE 40 MG: 40 TABLET ORAL at 07:54

## 2018-02-25 RX ADMIN — CLOPIDOGREL BISULFATE 75 MG: 75 TABLET ORAL at 07:54

## 2018-02-25 RX ADMIN — AMLODIPINE BESYLATE 10 MG: 10 TABLET ORAL at 07:54

## 2018-02-25 RX ADMIN — IPRATROPIUM BROMIDE AND ALBUTEROL SULFATE 1 AMPULE: .5; 3 SOLUTION RESPIRATORY (INHALATION) at 07:23

## 2018-02-25 RX ADMIN — Medication 1 TABLET: at 07:53

## 2018-02-25 RX ADMIN — Medication 1 TABLET: at 21:08

## 2018-02-25 RX ADMIN — Medication 10 ML: at 07:55

## 2018-02-25 RX ADMIN — SPIRONOLACTONE 25 MG: 25 TABLET ORAL at 07:53

## 2018-02-25 RX ADMIN — RANOLAZINE 500 MG: 500 TABLET, FILM COATED, EXTENDED RELEASE ORAL at 07:54

## 2018-02-25 RX ADMIN — OSELTAMIVIR PHOSPHATE 75 MG: 75 CAPSULE ORAL at 07:54

## 2018-02-25 RX ADMIN — ASPIRIN 81 MG: 81 TABLET, COATED ORAL at 07:53

## 2018-02-25 RX ADMIN — RANOLAZINE 500 MG: 500 TABLET, FILM COATED, EXTENDED RELEASE ORAL at 21:08

## 2018-02-25 RX ADMIN — SODIUM CHLORIDE: 9 INJECTION, SOLUTION INTRAVENOUS at 21:09

## 2018-02-25 ASSESSMENT — PAIN SCALES - GENERAL
PAINLEVEL_OUTOF10: 6
PAINLEVEL_OUTOF10: 3

## 2018-02-25 NOTE — PLAN OF CARE
[facility]    Second Visit Note  For more detailed information please refer to the progress note of the day      2/25/2018    4:58 PM    Name:   Dozier Bamberger  MRN:     [id number]     [place]:      [de-identified]   Room:   2011/2011-02  IP Day:  1  Admit Date:  2/23/2018 10:27 PM    PCP:   Bebe Cooney MD  Code Status:  Full Code        Pt vitals were reviewed   New labs were reviewed   Patient was seen  Patient continue to be wheezing. Patient states she feels her chest is congested and wants to bring up phlegm but is unable to. Patient states solumedrol makes her a little nauseated, but no vomiting. Updated plan :   1. Add mucinex  2. Will keep steroid at 40 mg bid due to nausea.          Luis Eduardo Gregory MD  2/25/2018  4:58 PM

## 2018-02-25 NOTE — PLAN OF CARE
Problem: Falls - Risk of  Goal: Absence of falls  Outcome: Ongoing  Patient free from falls this shift. Call light within reach. Side rails up x2. Bed alarm refused. Non skid slippers available. Problem: Activity:  Goal: Energy level will increase  Energy level will increase   Outcome: Ongoing  Patient ambulates independently to bathroom. Patient tolerates well. Encouraged patient to call for help PRN. Problem: Fluid Volume:  Goal: Maintenance of adequate hydration will improve  Maintenance of adequate hydration will improve   Outcome: Ongoing  Patient refuses to IV fluid. Encouraged patient to drink more fluids. Problem: Respiratory:  Goal: Respiratory status will improve  Respiratory status will improve   Outcome: Ongoing  Patient coughing at times. Cough is productive per patient.

## 2018-02-25 NOTE — PROGRESS NOTES
Patient became aggravated with IV pump going off when she bends her arm. She insisted it be turned off. RN offered to move IV, she refused. States she needs sleep to et over the flu.

## 2018-02-25 NOTE — PLAN OF CARE
Problem: Falls - Risk of  Goal: Absence of falls  Outcome: Ongoing  Siderails up x 2  Hourly rounding. Call light in reach. Instructed to call for assist before attempting out of bed. Remains free from falls and accidental injury at this time. Floor free from obstacles, and bed is locked and in lowest position. Adequate lighting provided. Bed alarm on, red falling star and Stay with Me signs posted. Problem:  Activity:  Goal: Energy level will increase  Energy level will increase   Outcome: Ongoing

## 2018-02-25 NOTE — PROGRESS NOTES
mononitrate  30 mg Oral Daily    linagliptin  5 mg Oral Daily    losartan  100 mg Oral Daily    metoprolol  100 mg Oral BID    pantoprazole  40 mg Oral QAM AC    ranolazine  500 mg Oral BID    sertraline  75 mg Oral Daily    spironolactone  25 mg Oral Daily    sodium chloride flush  10 mL Intravenous 2 times per day    enoxaparin  40 mg Subcutaneous Daily    ipratropium-albuterol  1 ampule Inhalation Q4H WA    azithromycin  250 mg Oral Daily    oseltamivir  75 mg Oral BID    Calcium Carb-Cholecalciferol  1 tablet Oral BID    methylPREDNISolone  40 mg Intravenous Q12H     Continuous Infusions:    sodium chloride 75 mL/hr at 18 0838     PRN Meds: nitroGLYCERIN, sodium chloride flush, morphine **OR** morphine, magnesium hydroxide, bisacodyl, ondansetron, nicotine, albuterol    Data:     Past Medical History:   has a past medical history of Allergic rhinitis; Anxiety; Asthma; CAD (coronary artery disease); Chronic back pain; Congestive heart failure ([address] Depression; Headache(784.0); Hiatal hernia; Hypercholesteremia; Hypertension; Kidney stones; Obesity; Osteoarthritis; Postlaminectomy syndrome; Type II or unspecified type diabetes mellitus without mention of complication, not stated as uncontrolled; Unspecified sleep apnea; and Urinary incontinence. Social History:   reports that she has quit smoking. She quit after 0.00 years of use. She has never used smokeless tobacco. She reports that she drinks about 0.6 oz of alcohol per week . She reports that she does not use drugs.      Family History:   Family History   Problem Relation Age of Onset    Diabetes Mother     Cancer Father     High Blood Pressure Sister     High Blood Pressure Brother        Vitals:  BP (!) 166/86   Pulse 64   Temp 98.7 °F (37.1 °C) (Oral)   Resp 18   Ht 5' 4\" (1.626 m)   Wt 240 lb (108.9 kg)   LMP  (LMP Unknown)   SpO2 97%   BMI 41.20 kg/m²   Temp (24hrs), Av.4 °F (36.9 °C), Min:98.1 °F (36.7 °C), is noted at L1-L2 through L5-S1. There is no evidence of an acute fracture. Vascular calcifications are noted in the aorta. There is an IVC filter. There are left-sided renal calculi again demonstrated, the largest of which is noted in the lower pole measuring 9 mm. 1. Severe degenerative disc disease is noted in the lumbar spine, increased since the 11/04/2015 exam. 2. Marked facet arthropathy at L1-L2 through L5-S1. 3. No evidence of an acute fracture. 4. Left-sided renal calculi. Xr Knee Right (1-2 Views)    Result Date: 2/13/2018  EXAMINATION: 2 VIEWS OF THE RIGHT KNEE 2/13/2018 1:55 pm COMPARISON: November 23, 2015. HISTORY: ORDERING SYSTEM PROVIDED HISTORY: Primary osteoarthritis of right knee FINDINGS: Advanced osteoarthritic changes are redemonstrated with genu varus deformity. Significant narrowing of the medial knee joint compartment noted with subchondral sclerosis and prominent articular marginal spurs. The patellofemoral joint appears mildly stent narrowed with articular marginal spur. There is no evidence acute fracture or dislocation no gross soft tissue abnormalities present. Marked atherosclerotic calcification are present below the knee joint. Stable Advanced tricompartment osteoarthritic changes particularly affecting medial knee joint compartment with genu varum deformity. Xr Chest Portable    Result Date: 2/25/2018  EXAMINATION: SINGLE VIEW OF THE CHEST 2/25/2018 7:45 am COMPARISON: February 23 HISTORY: ORDERING SYSTEM PROVIDED HISTORY: AECOPD TECHNOLOGIST PROVIDED HISTORY: Reason for exam:->AECOPD Acuity: Unknown Type of Exam: Unknown FINDINGS: Heart size is enlarged, unchanged. There is no lung consolidation. There is no pneumothorax. Severe left shoulder degenerative changes are noted. Incidentally, there is a vena cava filter in the superior aspect of the right upper quadrant.      No acute abnormality     Xr Chest Portable    Result Date: 2/24/2018  EXAMINATION: SINGLE

## 2018-02-26 DIAGNOSIS — I25.10 CORONARY ARTERY DISEASE INVOLVING NATIVE CORONARY ARTERY OF NATIVE HEART WITHOUT ANGINA PECTORIS: ICD-10-CM

## 2018-02-26 DIAGNOSIS — E11.319 TYPE 2 DIABETES MELLITUS WITH RETINOPATHY OF BOTH EYES, WITHOUT LONG-TERM CURRENT USE OF INSULIN, MACULAR EDEMA PRESENCE UNSPECIFIED, UNSPECIFIED RETINOPATHY SEVERITY (HCC): ICD-10-CM

## 2018-02-26 DIAGNOSIS — E78.00 PURE HYPERCHOLESTEROLEMIA: ICD-10-CM

## 2018-02-26 DIAGNOSIS — I25.5 ISCHEMIC CARDIOMYOPATHY: ICD-10-CM

## 2018-02-26 LAB
GLUCOSE BLD-MCNC: 246 MG/DL (ref 65–105)
GLUCOSE BLD-MCNC: 253 MG/DL (ref 65–105)
GLUCOSE BLD-MCNC: 253 MG/DL (ref 65–105)
GLUCOSE BLD-MCNC: 357 MG/DL (ref 65–105)

## 2018-02-26 PROCEDURE — 97161 PT EVAL LOW COMPLEX 20 MIN: CPT

## 2018-02-26 PROCEDURE — 6370000000 HC RX 637 (ALT 250 FOR IP): Performed by: INTERNAL MEDICINE

## 2018-02-26 PROCEDURE — 94760 N-INVAS EAR/PLS OXIMETRY 1: CPT

## 2018-02-26 PROCEDURE — 2580000003 HC RX 258: Performed by: NURSE PRACTITIONER

## 2018-02-26 PROCEDURE — 94640 AIRWAY INHALATION TREATMENT: CPT

## 2018-02-26 PROCEDURE — 1200000000 HC SEMI PRIVATE

## 2018-02-26 PROCEDURE — 97535 SELF CARE MNGMENT TRAINING: CPT

## 2018-02-26 PROCEDURE — G8979 MOBILITY GOAL STATUS: HCPCS

## 2018-02-26 PROCEDURE — 99232 SBSQ HOSP IP/OBS MODERATE 35: CPT | Performed by: INTERNAL MEDICINE

## 2018-02-26 PROCEDURE — 97530 THERAPEUTIC ACTIVITIES: CPT

## 2018-02-26 PROCEDURE — 97116 GAIT TRAINING THERAPY: CPT

## 2018-02-26 PROCEDURE — 97166 OT EVAL MOD COMPLEX 45 MIN: CPT

## 2018-02-26 PROCEDURE — 6370000000 HC RX 637 (ALT 250 FOR IP): Performed by: NURSE PRACTITIONER

## 2018-02-26 PROCEDURE — 6360000002 HC RX W HCPCS: Performed by: NURSE PRACTITIONER

## 2018-02-26 PROCEDURE — 6370000000 HC RX 637 (ALT 250 FOR IP): Performed by: FAMILY MEDICINE

## 2018-02-26 PROCEDURE — G8978 MOBILITY CURRENT STATUS: HCPCS

## 2018-02-26 PROCEDURE — G8988 SELF CARE GOAL STATUS: HCPCS

## 2018-02-26 PROCEDURE — G8987 SELF CARE CURRENT STATUS: HCPCS

## 2018-02-26 PROCEDURE — 82947 ASSAY GLUCOSE BLOOD QUANT: CPT

## 2018-02-26 PROCEDURE — 6360000002 HC RX W HCPCS: Performed by: INTERNAL MEDICINE

## 2018-02-26 RX ORDER — NICOTINE POLACRILEX 4 MG
15 LOZENGE BUCCAL PRN
Status: DISCONTINUED | OUTPATIENT
Start: 2018-02-26 | End: 2018-02-28 | Stop reason: HOSPADM

## 2018-02-26 RX ORDER — METHYLPREDNISOLONE 4 MG/1
8 TABLET ORAL NIGHTLY
Status: DISCONTINUED | OUTPATIENT
Start: 2018-02-28 | End: 2018-02-28 | Stop reason: HOSPADM

## 2018-02-26 RX ORDER — DEXTROSE MONOHYDRATE 25 G/50ML
12.5 INJECTION, SOLUTION INTRAVENOUS PRN
Status: DISCONTINUED | OUTPATIENT
Start: 2018-02-26 | End: 2018-02-28 | Stop reason: HOSPADM

## 2018-02-26 RX ORDER — METHYLPREDNISOLONE 4 MG/1
4 TABLET ORAL NIGHTLY
Status: DISCONTINUED | OUTPATIENT
Start: 2019-02-01 | End: 2018-02-27

## 2018-02-26 RX ORDER — METHYLPREDNISOLONE 4 MG/1
4 TABLET ORAL
Status: DISCONTINUED | OUTPATIENT
Start: 2018-02-28 | End: 2018-02-28 | Stop reason: HOSPADM

## 2018-02-26 RX ORDER — DEXTROSE MONOHYDRATE 50 MG/ML
100 INJECTION, SOLUTION INTRAVENOUS PRN
Status: DISCONTINUED | OUTPATIENT
Start: 2018-02-26 | End: 2018-02-28 | Stop reason: HOSPADM

## 2018-02-26 RX ORDER — OXYCODONE HYDROCHLORIDE 5 MG/1
5 TABLET ORAL EVERY 4 HOURS PRN
Status: DISCONTINUED | OUTPATIENT
Start: 2018-02-26 | End: 2018-02-28 | Stop reason: HOSPADM

## 2018-02-26 RX ORDER — METHYLPREDNISOLONE 4 MG/1
24 TABLET ORAL ONCE
Status: COMPLETED | OUTPATIENT
Start: 2018-02-27 | End: 2018-02-27

## 2018-02-26 RX ADMIN — GUAIFENESIN 600 MG: 600 TABLET, EXTENDED RELEASE ORAL at 21:56

## 2018-02-26 RX ADMIN — OXYCODONE HYDROCHLORIDE 5 MG: 5 TABLET ORAL at 12:14

## 2018-02-26 RX ADMIN — PANTOPRAZOLE SODIUM 40 MG: 40 TABLET, DELAYED RELEASE ORAL at 05:59

## 2018-02-26 RX ADMIN — ATORVASTATIN CALCIUM 80 MG: 80 TABLET, FILM COATED ORAL at 09:16

## 2018-02-26 RX ADMIN — METHYLPREDNISOLONE SODIUM SUCCINATE 40 MG: 125 INJECTION, POWDER, FOR SOLUTION INTRAMUSCULAR; INTRAVENOUS at 23:24

## 2018-02-26 RX ADMIN — ASPIRIN 81 MG: 81 TABLET, COATED ORAL at 09:16

## 2018-02-26 RX ADMIN — RANOLAZINE 500 MG: 500 TABLET, FILM COATED, EXTENDED RELEASE ORAL at 09:15

## 2018-02-26 RX ADMIN — Medication 1 TABLET: at 09:16

## 2018-02-26 RX ADMIN — IPRATROPIUM BROMIDE AND ALBUTEROL SULFATE 1 AMPULE: .5; 3 SOLUTION RESPIRATORY (INHALATION) at 14:50

## 2018-02-26 RX ADMIN — INSULIN LISPRO 3 UNITS: 100 INJECTION, SOLUTION INTRAVENOUS; SUBCUTANEOUS at 21:57

## 2018-02-26 RX ADMIN — OSELTAMIVIR PHOSPHATE 75 MG: 75 CAPSULE ORAL at 09:16

## 2018-02-26 RX ADMIN — METOPROLOL TARTRATE 100 MG: 100 TABLET ORAL at 09:16

## 2018-02-26 RX ADMIN — METOPROLOL TARTRATE 100 MG: 100 TABLET ORAL at 21:56

## 2018-02-26 RX ADMIN — IPRATROPIUM BROMIDE AND ALBUTEROL SULFATE 1 AMPULE: .5; 3 SOLUTION RESPIRATORY (INHALATION) at 08:36

## 2018-02-26 RX ADMIN — LOSARTAN POTASSIUM 100 MG: 100 TABLET, FILM COATED ORAL at 09:15

## 2018-02-26 RX ADMIN — INSULIN LISPRO 3 UNITS: 100 INJECTION, SOLUTION INTRAVENOUS; SUBCUTANEOUS at 16:33

## 2018-02-26 RX ADMIN — SODIUM CHLORIDE: 9 INJECTION, SOLUTION INTRAVENOUS at 11:10

## 2018-02-26 RX ADMIN — LINAGLIPTIN 5 MG: 5 TABLET, FILM COATED ORAL at 09:15

## 2018-02-26 RX ADMIN — IPRATROPIUM BROMIDE AND ALBUTEROL SULFATE 1 AMPULE: .5; 3 SOLUTION RESPIRATORY (INHALATION) at 18:17

## 2018-02-26 RX ADMIN — SERTRALINE 75 MG: 25 TABLET, FILM COATED ORAL at 09:15

## 2018-02-26 RX ADMIN — GUAIFENESIN 600 MG: 600 TABLET, EXTENDED RELEASE ORAL at 09:15

## 2018-02-26 RX ADMIN — OSELTAMIVIR PHOSPHATE 75 MG: 75 CAPSULE ORAL at 21:56

## 2018-02-26 RX ADMIN — ENOXAPARIN SODIUM 40 MG: 40 INJECTION SUBCUTANEOUS at 09:15

## 2018-02-26 RX ADMIN — Medication 1 TABLET: at 21:56

## 2018-02-26 RX ADMIN — IPRATROPIUM BROMIDE AND ALBUTEROL SULFATE 1 AMPULE: .5; 3 SOLUTION RESPIRATORY (INHALATION) at 12:06

## 2018-02-26 RX ADMIN — SPIRONOLACTONE 25 MG: 25 TABLET ORAL at 09:15

## 2018-02-26 RX ADMIN — AZITHROMYCIN 250 MG: 250 TABLET, FILM COATED ORAL at 09:15

## 2018-02-26 RX ADMIN — AMLODIPINE BESYLATE 10 MG: 10 TABLET ORAL at 09:16

## 2018-02-26 RX ADMIN — METHYLPREDNISOLONE SODIUM SUCCINATE 40 MG: 125 INJECTION, POWDER, FOR SOLUTION INTRAMUSCULAR; INTRAVENOUS at 11:08

## 2018-02-26 RX ADMIN — CLOPIDOGREL BISULFATE 75 MG: 75 TABLET ORAL at 09:16

## 2018-02-26 RX ADMIN — ISOSORBIDE MONONITRATE 30 MG: 30 TABLET ORAL at 09:16

## 2018-02-26 RX ADMIN — OXYCODONE HYDROCHLORIDE 5 MG: 5 TABLET ORAL at 21:56

## 2018-02-26 RX ADMIN — FUROSEMIDE 40 MG: 40 TABLET ORAL at 09:16

## 2018-02-26 RX ADMIN — INSULIN LISPRO 3 UNITS: 100 INJECTION, SOLUTION INTRAVENOUS; SUBCUTANEOUS at 12:13

## 2018-02-26 RX ADMIN — RANOLAZINE 500 MG: 500 TABLET, FILM COATED, EXTENDED RELEASE ORAL at 21:56

## 2018-02-26 ASSESSMENT — PAIN DESCRIPTION - ORIENTATION: ORIENTATION: LEFT

## 2018-02-26 ASSESSMENT — PAIN SCALES - GENERAL
PAINLEVEL_OUTOF10: 0
PAINLEVEL_OUTOF10: 1
PAINLEVEL_OUTOF10: 5
PAINLEVEL_OUTOF10: 6
PAINLEVEL_OUTOF10: 5

## 2018-02-26 ASSESSMENT — PAIN DESCRIPTION - PAIN TYPE: TYPE: ACUTE PAIN

## 2018-02-26 NOTE — PROGRESS NOTES
Physical Therapy  Facility/Department: [facility]  Daily Treatment Note  NAME: Griselda Sheldon  : 1950  MRN: [id number]  Discharge Recommendation:   Home with Home health PT  Date of Service: 2018    Patient Diagnosis(es):   Patient Active Problem List    Diagnosis Date Noted    Influenza B 2018    Reactive airway disease with acute exacerbation 2018    Long term (current) use of antithrombotics/antiplatelets     Medication monitoring encounter 10/09/2017    Postlaminectomy syndrome     Postlaminectomy syndrome     Primary osteoarthritis of right knee     Anxiety 10/25/2016    Depression (emotion) 10/25/2016    Postlaminectomy syndrome, lumbar 10/25/2016    S/P coronary artery stent placement - RCA 10/12/16 - Dr. Hoang Ramires 10/12/2016    Type 2 diabetes mellitus with complication, without long-term current use of insulin ([address] 2016    Chronic prescription opiate use 02/10/2016    Type 2 diabetes mellitus without complication ([address]     Chronic pain     Coronary artery disease involving native coronary artery without angina pectoris     Hypertensive urgency     Acute coronary syndrome (HCC)     Congestive heart failure ([address]     Lymphadenopathy     Chronic knee pain 2015    Chronic use of opiate drugs therapeutic purposes 2014    Lumbar spondylosis 2014    Cervical spondylosis 2014    Urge incontinence of urine 2014    Knee pain, bilateral 2014    S/P TKR (total knee replacement) 2014    Cervicalgia 2014    Morbid obesity ([address] 2014    Lower urinary tract infectious disease 2014    YUDITH (obstructive sleep apnea) 2014    Chest pain 2014    Hypoglycemia 2014    Spinal stenosis in cervical region 2013    Edema 2012    Facet arthritis of lumbar region ([address] 2012    Knee osteoarthritis 2012    DM (diabetes mellitus) ([address] 2012    HTN Restrictions  Restrictions/Precautions  Restrictions/Precautions: General Precautions, Fall Risk     Subjective   General  Chart Reviewed: Yes  General Comment  Comments: Patient requesting help to restroom for cleanup as patient was found urinating in trash can at EOB  Pain Screening  Patient Currently in Pain: Denies  Pain Assessment  Pain Assessment: 0-10  Pain Level: 0  Vital Signs  Patient Currently in Pain: Denies  Oxygen Therapy  O2 Device: None (Room air)            Objective   Transfers  Sit to Stand: Stand by assistance  Stand to sit: Stand by assistance  Stand Pivot Transfers: Stand by assistance  Ambulation  Ambulation?: Yes  Ambulation 1  Surface: level tile  Device: No Device  Assistance: Stand by assistance  Quality of Gait: slightly unsteady, slow shuffled steps to restroom, assistance needed with IV pole management   Distance: 10 ft x 2  Balance  Posture: Fair  Sitting - Static: Good  Sitting - Dynamic: Good  Standing - Static: Good  Standing - Dynamic: Fair;+  Comment: Assisted patient to restroom for cleanup, patient independent but requires assist with set up. Patient BTB at completion of treatment              Assessment   Body structures, Functions, Activity limitations: Decreased functional mobility ; Decreased ADL status; Decreased ROM; Decreased strength;Decreased endurance;Decreased balance  Assessment: Patient demos decreased safety awareness, especially with line management. Patient stood at sink x 10 min for cleanup, noticeable SOB following activity. Continue working with patient to improve endurance and strength to ensure safe return home  REQUIRES PT FOLLOW UP: Yes  Activity Tolerance  Activity Tolerance: Patient Tolerated treatment well;Patient limited by endurance       Discharge Recommendations:  Home with Home health PT             Goals  Short term goals  Time Frame for Short term goals: 12 visits:  Short term goal 1: Pt. to be indep with bed mob.    Short term goal 2: Pt. to

## 2018-02-26 NOTE — PROGRESS NOTES
Patient found sitting at the edge of bed with urine on the floor and a trash can between the legs. Patient states \"I had to go now. \" OT assisted patient to bathroom to get cleaned up. Patient alert and oriented. Patient denies being dizzy or confused. Encouraged use of call light. Patient refusing to have bed alarm on.

## 2018-02-26 NOTE — TELEPHONE ENCOUNTER
Magnesium     POCT glucose     HYPOGLYCEMIA TREATMENT: blood glucose less than 50 mg/dL and patient  ALERT and TOLERATING PO     HYPOGLYCEMIA TREATMENT: blood glucose less than 70 mg/dL and patient ALERT and TOLERATING PO     HYPOGLYCEMIA TREATMENT: blood glucose less than 70 mg/dL and patient NOT ALERT or NPO     POCT Glucose         Next Visit Date:  Future Appointments  Date Time Provider Shruthi Griggs   2/27/2018 10:00 AM [facility] 1 [facility]   3/9/2018 1:20 PM Joie Collet, APRN [facility]   3/12/2018 2:00 PM Lianna Yang MD [id number]            Patient Active Problem List:     DM (diabetes mellitus) ([facility]     HTN (hypertension)     CAD (coronary artery disease) s/p 3 stents     Smoker     Back pain     Asthma     Facet arthritis of lumbar region [facility])     Knee osteoarthritis     Edema     Spinal stenosis in cervical region     Chest pain     Hypoglycemia     Lower urinary tract infectious disease     YUDITH (obstructive sleep apnea)     Morbid obesity (HCC)     Knee pain, bilateral     S/P TKR (total knee replacement)     Cervicalgia     Urge incontinence of urine     Lumbar spondylosis     Cervical spondylosis     Chronic use of opiate drugs therapeutic purposes     Chronic knee pain     Hypertensive urgency     Acute coronary syndrome (HCC)     Congestive heart failure (HCC)     Lymphadenopathy     Type 2 diabetes mellitus without complication (HCC)     Chronic pain     Coronary artery disease involving native coronary artery without angina pectoris     Chronic prescription opiate use     Type 2 diabetes mellitus with complication, without long-term current use of insulin (HCC)     S/P coronary artery stent placement - RCA 10/12/16 - Dr. Roge Flores     Depression (emotion)     Postlaminectomy syndrome, lumbar     Medication monitoring encounter     Postlaminectomy syndrome     Postlaminectomy syndrome     Primary osteoarthritis of right knee     Long

## 2018-02-26 NOTE — PROGRESS NOTES
Physical Therapy    Facility/Department: [facility]  Initial Assessment    NAME: Brandon Cedillo  : 1950  MRN: [id number]  Discharge Recommendation:   Home with Home health PT    Date of Service: 2018  74year old f with history of hypertension, morbid obesity, anxiety, CAD status post stents, CHF presented to ER with complaint of not feeling well, flu like illness with nausea, diarrhea, coughing for last 2 days with associated exertional SOB. Work up showed Influenza positive, wheezing noted with no pneumonia. She feels some what better since admission but still has exertional SOB.        Patient Diagnosis(es): The encounter diagnosis was Influenza B.     has a past medical history of Allergic rhinitis; Anxiety; Asthma; CAD (coronary artery disease); Chronic back pain; Congestive heart failure ([address] Depression; Headache(784.0); Hiatal hernia; Hypercholesteremia; Hypertension; Kidney stones; Obesity; Osteoarthritis; Postlaminectomy syndrome; Type II or unspecified type diabetes mellitus without mention of complication, not stated as uncontrolled; Unspecified sleep apnea; and Urinary incontinence. has a past surgical history that includes Spine surgery; Nerve Block (2012); Nerve Block (2012); Nerve Block (13); Nerve Block (13); Nerve Block (2013 ); Cardiac catheterization (2013); Lumbar spine surgery (); Vena Cava Filter Placement (); Tonsillectomy; joint replacement; knee surgery (10/21/2013); knee surgery (2013); knee surgery (2013); other surgical history (Right, 2014); other surgical history (Right, 3/16/2015); Upper gastrointestinal endoscopy; Colonoscopy (2015); other surgical history (Right, 16); Coronary angioplasty with stent (7179-85); and Nerve Block (Right, 2017).     Restrictions     Vision/Hearing    Macular degeneration/poor eyesight   Subjective   General  Patient assessed for rehabilitation services?: Yes  Pain Screening  Patient Currently in Pain: Yes          Orientation  Orientation  Overall Orientation Status: Within Functional Limits    Social/Functional History  Social/Functional History  Lives With: Alone  Type of Home: Apartment  Home Layout: One level (lives on 3rd floor, has elevator and pt describes long hallways. Uses scooter when able )  Home Access: Level entry, Elevator  Bathroom Shower/Tub: Tub/Shower unit  Bathroom Toilet: Handicap height  Bathroom Equipment: Shower chair, Grab bars in shower, Grab bars around toilet  Home Equipment: Cane, Rolling walker, Electric scooter  ADL Assistance: [address]: Independent  Homemaking Responsibilities: Yes  Ambulation Assistance: Independent  Transfer Assistance: Independent  Active : No  Mode of Transportation: Cab  Additional Comments: history of falls, states she had an aide for a while, but not lately. Has daughters that live nearby and help as needed. Goes to grocery store with motorized scooter. Objective     Observation/Palpation  Posture: Fair    AROM RLE (degrees)  RLE AROM: WFL  AROM LLE (degrees)  LLE AROM : WFL  AROM RUE (degrees)  RUE AROM : WFL  AROM LUE (degrees)  LUE AROM : WFL  Strength RLE  Strength RLE: WFL  Strength LLE  Strength LLE: WFL  Strength RUE  Strength RUE: WFL  Strength LUE  Strength LUE: WFL  Tone RLE  RLE Tone: Normotonic  Tone LLE  LLE Tone: Normotonic     Bed mobility  Comment: no bed mob.  observed  Transfers  Sit to Stand: Contact guard assistance  Stand to sit: Contact guard assistance  Bed to Chair: Contact guard assistance  Ambulation  Ambulation?: Yes  Ambulation 1  Surface: level tile  Device: Rolling Walker  Assistance: Contact guard assistance  Quality of Gait: steady with RW  Distance: 50ft. ; SOB with activity  Comments: Up in chair at start and at end of eval     Balance  Posture: Good  Sitting - Static: Good  Sitting - Dynamic: Good  Standing - Static: Good  Standing - Dynamic: Good;-

## 2018-02-26 NOTE — PROGRESS NOTES
Occupational Therapy   Occupational Therapy Initial Assessment  Date: 2018   Patient Name: Florinda Mcgee  MRN: [id number]     : 1950    Discharge Recommendation:   Home with Home health OT       RN reports patient is medically stable for therapy treatment this date. Chart reviewed prior to treatment and patient is agreeable for therapy. All lines intact and patient positioned comfortably at end of treatment. All patient needs addressed prior to ending therapy session. Patient Diagnosis(es): The encounter diagnosis was Influenza B.     has a past medical history of Allergic rhinitis; Anxiety; Asthma; CAD (coronary artery disease); Chronic back pain; Congestive heart failure ([facility] Depression; Headache(784.0); Hiatal hernia; Hypercholesteremia; Hypertension; Kidney stones; Obesity; Osteoarthritis; Postlaminectomy syndrome; Type II or unspecified type diabetes mellitus without mention of complication, not stated as uncontrolled; Unspecified sleep apnea; and Urinary incontinence. has a past surgical history that includes Spine surgery; Nerve Block (2012); Nerve Block (2012); Nerve Block (13); Nerve Block (13); Nerve Block (2013 ); Cardiac catheterization (2013); Lumbar spine surgery (); Vena Cava Filter Placement (); Tonsillectomy; joint replacement; knee surgery (10/21/2013); knee surgery (2013); knee surgery (2013); other surgical history (Right, 2014); other surgical history (Right, 3/16/2015); Upper gastrointestinal endoscopy; Colonoscopy (2015); other surgical history (Right, 16); Coronary angioplasty with stent (1474-71); and Nerve Block (Right, 2017).            Restrictions  Restrictions/Precautions  Restrictions/Precautions: General Precautions, Fall Risk    Subjective   General  Chart Reviewed: Yes  Patient assessed for rehabilitation services?: Yes  Family / Caregiver Present: No  Pain Assessment  Patient Currently sink for grooming  ADL  Feeding: Independent  Grooming: Stand by assistance  UE Bathing: Minimal assistance  LE Bathing: Minimal assistance;Stand by assistance  UE Dressing: Minimal assistance;Stand by assistance  LE Dressing: Contact guard assistance;Minimal assistance  Toileting: Minimal assistance;Contact guard assistance  Additional Comments: pt fatigues easily and requires cues for pacing. Pt is very talkative and frequently needs to be redirected  Tone RUE  RUE Tone: Normotonic  Tone LUE  LUE Tone: Normotonic  Coordination  Movements Are Fluid And Coordinated: Yes        Transfers  Sit to stand: Contact guard assistance  Stand to sit: Contact guard assistance  Vision - Basic Assessment  Visual History: Macular degeneration  Cognition  Overall Cognitive Status: Exceptions  Following Commands: Follows one step commands with repetition  Attention Span: Difficulty dividing attention; Attends with cues to redirect (pt is very talkative and needs redirection throughout session. Somewhat tangential)  Safety Judgement: Decreased awareness of need for safety  Problem Solving: Assistance required to generate solutions;Assistance required to correct errors made;Decreased awareness of errors;Assistance required to implement solutions  Perception  Overall Perceptual Status: WFL     Sensation  Overall Sensation Status:  (N/T in B feet/legs, has neuropathy)        LUE AROM (degrees)  LUE AROM : WFL  RUE AROM (degrees)  RUE AROM : WFL  LUE Strength  Gross LUE Strength: WFL (L shoulder 3/5, elbow, wrist/hand 3+/5)  RUE Strength  Gross RUE Strength: WFL (R shoulder 3+/5, elbow/wrist/hand 3+/5)                  Assessment   Performance deficits / Impairments: Decreased functional mobility ; Decreased ADL status; Decreased strength;Decreased safe awareness;Decreased cognition;Decreased endurance;Decreased balance  Prognosis: Good  Decision Making: Medium Complexity  Patient Education: OT POC, discharge recommendations, safety with mob and transfers, pacing  Discharge Recommendations: Home with Home health OT; Home with assist PRN  REQUIRES OT FOLLOW UP: Yes  Activity Tolerance  Activity Tolerance: Patient Tolerated treatment well  Safety Devices  Safety Devices in place: Yes  Type of devices: Call light within reach;Nurse notified;Gait belt;Patient at risk for falls; Left in chair        Discharge Recommendations:  Home with Home health OT, Home with assist PRN     Plan   Plan  Times per week: 4-5x/week, 1-2x/day  Current Treatment Recommendations: Strengthening, Balance Training, Functional Mobility Training, Endurance Training, Self-Care / ADL, Safety Education & Training, Equipment Evaluation, Education, & procurement, Patient/Caregiver Education & Training    G-Code  OT G-codes  Functional Assessment Tool Used: [facility]   Score: 19  Functional Limitation: Self care  Self Care Current Status ():  At least 40 percent but less than 60 percent impaired, limited or restricted  Self Care Goal Status (): 0 percent impaired, limited or restricted  OutComes Score                                           AM-PAC Score        AM-PAC Inpatient Daily Activity Raw Score: 19  AM-PAC Inpatient ADL T-Scale Score : 40.22  ADL Inpatient CMS 0-100% Score: 42.8  ADL Inpatient CMS G-Code Modifier : CK    Goals  Short term goals  Time Frame for Short term goals: by discharge, pt will  Short term goal 1: demo S/MI with ADL transfers with good safety  Short term goal 2: demo S/MI with functional mob for ADL completion with good safety and pacing  Short term goal 3: demo S/MI with toileting routine   Short term goal 4: demo I with UB ADLs and SBA with LB ADLs with DME as approp and good pacing  Short term goal 5: verb good understand of fall prevention techs, EC/WS techs, and possible equip needs for use at home  Patient Goals   Patient goals : to go home       Therapy Time   Individual Concurrent Group Co-treatment   Time In 1906 [id number])         Time Out

## 2018-02-26 NOTE — PROGRESS NOTES
[facility] - Progress Note    2018   9:32 AM    Name:  Zoya Lemons  :    1950  Age:  74 y.o. female  MRN:    [id number]     Acct:     [de-identified]   Room:    IP Day: 2  Hospital: [facility] Date: 2018 10:27 PM  PCP: Miguelangel Arevalo MD    Subjective:     C/C:   Chief Complaint   Patient presents with    Fever     x 3 days    Cough     x 3 days. productive cough w/white phlegm. tussin OTC    Extremity Weakness     c/o leg weakness       Interval History: Status: improved. Still complaining about shortness of breath and cough. Patient states she did not get a flu shot this year secondary to a reaction in her left arm from the flu shot the year before. Patient states she is still too weak to go home. She is currently sitting up in a chair. Respirations are unlabored. Cough is present. History: \"Miss Sarah Kwok is a nice 74year old lady with history of hypertension, morbid obesity, anxiety, CAD status post stents, CHF, hypertension. She was admitted to ER with complaint of not feeling well, flu like illness with nausea, diarrhea, coughing for last 2 days with associated exertional SOB. Work up showed Influenza positive, wheezing noted with no pneumonia. She feels some what better since admission but still has exertional SOB. \"    ROS:  Constitutional: Negative for chills, diaphoresis and weight loss. Positive for fatigue. HENT: Negative for ear pain, hearing loss, nosebleeds, sore throat and tinnitus. Eyes: Negative for blurred vision, double vision, photophobia and pain. Respiratory: Negative for hemoptysis, sputum production and wheezing. Positive for exertional shortness of breath and coughing. Cardiovascular: Negative for palpitations, orthopnea, claudication, leg swelling and PND. Gastrointestinal: Negative for abdominal pain, blood in stool, constipation, diarrhea, heartburn, melena, nausea and vomiting.    Genitourinary: Negative for dysuria, flank pain, frequency, hematuria and urgency. Musculoskeletal: Positive for back pain and generalized arthralgias involving legs and feet. Skin: Negative for itching and rash. Neurological: Negative for dizziness, tingling, tremors, sensory change, focal weakness, seizures, weakness and headaches. Endo/Heme/Allergies: Does not bruise/bleed easily. Psychiatric/Behavioral: Negative for depression. The patient is not nervous/anxious. Medications: Allergies:    Allergies   Allergen Reactions    Bactrim     Penicillins     Tylenol [Acetaminophen] Other (See Comments)     constipation       Current Meds:   guaiFENesin (MUCINEX) extended release tablet 600 mg BID   amLODIPine (NORVASC) tablet 10 mg Daily   aspirin EC tablet 81 mg Daily   atorvastatin (LIPITOR) tablet 80 mg Daily   clopidogrel (PLAVIX) tablet 75 mg Daily   furosemide (LASIX) tablet 40 mg Daily   isosorbide mononitrate (IMDUR) extended release tablet 30 mg Daily   linagliptin (TRADJENTA) tablet 5 mg Daily   losartan (COZAAR) tablet 100 mg Daily   metoprolol (LOPRESSOR) tablet 100 mg BID   nitroGLYCERIN (NITROSTAT) SL tablet 0.4 mg Q5 Min PRN   pantoprazole (PROTONIX) tablet 40 mg QAM AC   ranolazine (RANEXA) extended release tablet 500 mg BID   sertraline (ZOLOFT) tablet 75 mg Daily   spironolactone (ALDACTONE) tablet 25 mg Daily   0.9 % sodium chloride infusion Continuous   sodium chloride flush 0.9 % injection 10 mL 2 times per day   sodium chloride flush 0.9 % injection 10 mL PRN   morphine injection 2 mg Q2H PRN   Or    morphine injection 4 mg Q2H PRN   magnesium hydroxide (MILK OF MAGNESIA) 400 MG/5ML suspension 30 mL Daily PRN   bisacodyl (DULCOLAX) suppository 10 mg Daily PRN   ondansetron (ZOFRAN) injection 4 mg Q6H PRN   nicotine (NICODERM CQ) 21 MG/24HR 1 patch Daily PRN   enoxaparin (LOVENOX) injection 40 mg Daily   albuterol (PROVENTIL) nebulizer solution 2.5 mg Q2H PRN   ipratropium-albuterol (DUONEB)

## 2018-02-27 LAB
ABSOLUTE EOS #: 0 K/UL (ref 0–0.4)
ABSOLUTE IMMATURE GRANULOCYTE: ABNORMAL K/UL (ref 0–0.3)
ABSOLUTE LYMPH #: 0.9 K/UL (ref 1–4.8)
ABSOLUTE MONO #: 0.2 K/UL (ref 0.2–0.8)
ANION GAP SERPL CALCULATED.3IONS-SCNC: 13 MMOL/L (ref 9–17)
BASOPHILS # BLD: 1 % (ref 0–2)
BASOPHILS ABSOLUTE: 0 K/UL (ref 0–0.2)
BUN BLDV-MCNC: 17 MG/DL (ref 8–23)
BUN/CREAT BLD: 23 (ref 9–20)
CALCIUM SERPL-MCNC: 8.9 MG/DL (ref 8.6–10.4)
CHLORIDE BLD-SCNC: 97 MMOL/L (ref 98–107)
CO2: 25 MMOL/L (ref 20–31)
CREAT SERPL-MCNC: 0.74 MG/DL (ref 0.5–0.9)
DIFFERENTIAL TYPE: ABNORMAL
EOSINOPHILS RELATIVE PERCENT: 0 % (ref 1–4)
GFR AFRICAN AMERICAN: >60 ML/MIN
GFR NON-AFRICAN AMERICAN: >60 ML/MIN
GFR SERPL CREATININE-BSD FRML MDRD: ABNORMAL ML/MIN/{1.73_M2}
GFR SERPL CREATININE-BSD FRML MDRD: ABNORMAL ML/MIN/{1.73_M2}
GLUCOSE BLD-MCNC: 242 MG/DL (ref 65–105)
GLUCOSE BLD-MCNC: 270 MG/DL (ref 65–105)
GLUCOSE BLD-MCNC: 275 MG/DL (ref 70–99)
GLUCOSE BLD-MCNC: 297 MG/DL (ref 65–105)
GLUCOSE BLD-MCNC: 302 MG/DL (ref 65–105)
HCT VFR BLD CALC: 35.6 % (ref 36–46)
HEMOGLOBIN: 11.6 G/DL (ref 12–16)
IMMATURE GRANULOCYTES: ABNORMAL %
LYMPHOCYTES # BLD: 13 % (ref 24–44)
MAGNESIUM: 1.5 MG/DL (ref 1.6–2.6)
MCH RBC QN AUTO: 28.4 PG (ref 26–34)
MCHC RBC AUTO-ENTMCNC: 32.5 G/DL (ref 31–37)
MCV RBC AUTO: 87.5 FL (ref 80–100)
MONOCYTES # BLD: 3 % (ref 1–7)
NRBC AUTOMATED: ABNORMAL PER 100 WBC
PDW BLD-RTO: 13.6 % (ref 11.5–14.5)
PLATELET # BLD: 195 K/UL (ref 130–400)
PLATELET ESTIMATE: ABNORMAL
PMV BLD AUTO: 8.8 FL (ref 6–12)
POTASSIUM SERPL-SCNC: 4.3 MMOL/L (ref 3.7–5.3)
PROCALCITONIN: 0.08 NG/ML
RBC # BLD: 4.07 M/UL (ref 4–5.2)
RBC # BLD: ABNORMAL 10*6/UL
SEG NEUTROPHILS: 83 % (ref 36–66)
SEGMENTED NEUTROPHILS ABSOLUTE COUNT: 5.6 K/UL (ref 1.8–7.7)
SODIUM BLD-SCNC: 135 MMOL/L (ref 135–144)
WBC # BLD: 6.6 K/UL (ref 3.5–11)
WBC # BLD: ABNORMAL 10*3/UL

## 2018-02-27 PROCEDURE — 94760 N-INVAS EAR/PLS OXIMETRY 1: CPT

## 2018-02-27 PROCEDURE — 36415 COLL VENOUS BLD VENIPUNCTURE: CPT

## 2018-02-27 PROCEDURE — 83735 ASSAY OF MAGNESIUM: CPT

## 2018-02-27 PROCEDURE — 85025 COMPLETE CBC W/AUTO DIFF WBC: CPT

## 2018-02-27 PROCEDURE — 6370000000 HC RX 637 (ALT 250 FOR IP): Performed by: NURSE PRACTITIONER

## 2018-02-27 PROCEDURE — 6370000000 HC RX 637 (ALT 250 FOR IP): Performed by: INTERNAL MEDICINE

## 2018-02-27 PROCEDURE — 84145 PROCALCITONIN (PCT): CPT

## 2018-02-27 PROCEDURE — 6360000002 HC RX W HCPCS: Performed by: INTERNAL MEDICINE

## 2018-02-27 PROCEDURE — 97110 THERAPEUTIC EXERCISES: CPT

## 2018-02-27 PROCEDURE — 82947 ASSAY GLUCOSE BLOOD QUANT: CPT

## 2018-02-27 PROCEDURE — 6360000002 HC RX W HCPCS: Performed by: NURSE PRACTITIONER

## 2018-02-27 PROCEDURE — 6370000000 HC RX 637 (ALT 250 FOR IP): Performed by: FAMILY MEDICINE

## 2018-02-27 PROCEDURE — 1200000000 HC SEMI PRIVATE

## 2018-02-27 PROCEDURE — 80048 BASIC METABOLIC PNL TOTAL CA: CPT

## 2018-02-27 PROCEDURE — 99232 SBSQ HOSP IP/OBS MODERATE 35: CPT | Performed by: INTERNAL MEDICINE

## 2018-02-27 PROCEDURE — 94640 AIRWAY INHALATION TREATMENT: CPT

## 2018-02-27 RX ORDER — METHYLPREDNISOLONE 4 MG/1
4 TABLET ORAL
Status: DISCONTINUED | OUTPATIENT
Start: 2018-02-28 | End: 2018-02-27

## 2018-02-27 RX ORDER — METHYLPREDNISOLONE 4 MG/1
4 TABLET ORAL NIGHTLY
Status: DISCONTINUED | OUTPATIENT
Start: 2018-03-01 | End: 2018-02-28 | Stop reason: HOSPADM

## 2018-02-27 RX ORDER — ATORVASTATIN CALCIUM 80 MG/1
TABLET, FILM COATED ORAL
Qty: 30 TABLET | Refills: 5 | Status: SHIPPED | OUTPATIENT
Start: 2018-02-27 | End: 2018-06-12 | Stop reason: SDUPTHER

## 2018-02-27 RX ORDER — B-COMPLEX WITH VITAMIN C
TABLET ORAL
Qty: 60 TABLET | Refills: 11 | Status: SHIPPED | OUTPATIENT
Start: 2018-02-27 | End: 2019-01-16 | Stop reason: SDUPTHER

## 2018-02-27 RX ORDER — METHYLPREDNISOLONE 4 MG/1
24 TABLET ORAL ONCE
Status: DISCONTINUED | OUTPATIENT
Start: 2018-02-27 | End: 2018-02-27

## 2018-02-27 RX ORDER — ISOSORBIDE MONONITRATE 30 MG/1
TABLET, EXTENDED RELEASE ORAL
Qty: 30 TABLET | Refills: 5 | Status: SHIPPED | OUTPATIENT
Start: 2018-02-27 | End: 2018-08-13 | Stop reason: SDUPTHER

## 2018-02-27 RX ORDER — SPIRONOLACTONE 25 MG/1
TABLET ORAL
Qty: 28 TABLET | Refills: 0 | Status: SHIPPED | OUTPATIENT
Start: 2018-02-27 | End: 2018-02-28 | Stop reason: HOSPADM

## 2018-02-27 RX ORDER — METHYLPREDNISOLONE 4 MG/1
8 TABLET ORAL NIGHTLY
Status: DISCONTINUED | OUTPATIENT
Start: 2018-02-28 | End: 2018-02-27

## 2018-02-27 RX ORDER — OMEPRAZOLE 20 MG/1
CAPSULE, DELAYED RELEASE ORAL
Qty: 28 CAPSULE | Refills: 3 | Status: SHIPPED | OUTPATIENT
Start: 2018-02-27 | End: 2018-06-18 | Stop reason: SDUPTHER

## 2018-02-27 RX ADMIN — CLOPIDOGREL BISULFATE 75 MG: 75 TABLET ORAL at 08:13

## 2018-02-27 RX ADMIN — OSELTAMIVIR PHOSPHATE 75 MG: 75 CAPSULE ORAL at 20:59

## 2018-02-27 RX ADMIN — ISOSORBIDE MONONITRATE 30 MG: 30 TABLET ORAL at 08:13

## 2018-02-27 RX ADMIN — LOSARTAN POTASSIUM 100 MG: 100 TABLET, FILM COATED ORAL at 08:14

## 2018-02-27 RX ADMIN — INSULIN LISPRO 2 UNITS: 100 INJECTION, SOLUTION INTRAVENOUS; SUBCUTANEOUS at 20:59

## 2018-02-27 RX ADMIN — AMLODIPINE BESYLATE 10 MG: 10 TABLET ORAL at 08:14

## 2018-02-27 RX ADMIN — Medication 1 TABLET: at 08:13

## 2018-02-27 RX ADMIN — RANOLAZINE 500 MG: 500 TABLET, FILM COATED, EXTENDED RELEASE ORAL at 08:13

## 2018-02-27 RX ADMIN — FUROSEMIDE 40 MG: 40 TABLET ORAL at 08:13

## 2018-02-27 RX ADMIN — AZITHROMYCIN 250 MG: 250 TABLET, FILM COATED ORAL at 08:13

## 2018-02-27 RX ADMIN — METOPROLOL TARTRATE 100 MG: 100 TABLET ORAL at 08:13

## 2018-02-27 RX ADMIN — PANTOPRAZOLE SODIUM 40 MG: 40 TABLET, DELAYED RELEASE ORAL at 06:51

## 2018-02-27 RX ADMIN — ENOXAPARIN SODIUM 40 MG: 40 INJECTION SUBCUTANEOUS at 08:13

## 2018-02-27 RX ADMIN — GUAIFENESIN 600 MG: 600 TABLET, EXTENDED RELEASE ORAL at 08:13

## 2018-02-27 RX ADMIN — IPRATROPIUM BROMIDE AND ALBUTEROL SULFATE 1 AMPULE: .5; 3 SOLUTION RESPIRATORY (INHALATION) at 07:59

## 2018-02-27 RX ADMIN — IPRATROPIUM BROMIDE AND ALBUTEROL SULFATE 1 AMPULE: .5; 3 SOLUTION RESPIRATORY (INHALATION) at 15:45

## 2018-02-27 RX ADMIN — LINAGLIPTIN 5 MG: 5 TABLET, FILM COATED ORAL at 08:13

## 2018-02-27 RX ADMIN — GUAIFENESIN 600 MG: 600 TABLET, EXTENDED RELEASE ORAL at 20:59

## 2018-02-27 RX ADMIN — ATORVASTATIN CALCIUM 80 MG: 80 TABLET, FILM COATED ORAL at 08:14

## 2018-02-27 RX ADMIN — RANOLAZINE 500 MG: 500 TABLET, FILM COATED, EXTENDED RELEASE ORAL at 20:59

## 2018-02-27 RX ADMIN — INSULIN LISPRO 3 UNITS: 100 INJECTION, SOLUTION INTRAVENOUS; SUBCUTANEOUS at 12:12

## 2018-02-27 RX ADMIN — Medication 1 TABLET: at 20:59

## 2018-02-27 RX ADMIN — INSULIN LISPRO 2 UNITS: 100 INJECTION, SOLUTION INTRAVENOUS; SUBCUTANEOUS at 08:12

## 2018-02-27 RX ADMIN — ASPIRIN 81 MG: 81 TABLET, COATED ORAL at 08:14

## 2018-02-27 RX ADMIN — METHYLPREDNISOLONE 24 MG: 4 TABLET ORAL at 08:13

## 2018-02-27 RX ADMIN — SERTRALINE 75 MG: 25 TABLET, FILM COATED ORAL at 08:14

## 2018-02-27 RX ADMIN — INSULIN LISPRO 3 UNITS: 100 INJECTION, SOLUTION INTRAVENOUS; SUBCUTANEOUS at 16:46

## 2018-02-27 RX ADMIN — ONDANSETRON 4 MG: 2 INJECTION INTRAMUSCULAR; INTRAVENOUS at 08:42

## 2018-02-27 RX ADMIN — OSELTAMIVIR PHOSPHATE 75 MG: 75 CAPSULE ORAL at 08:14

## 2018-02-27 RX ADMIN — IPRATROPIUM BROMIDE AND ALBUTEROL SULFATE 1 AMPULE: .5; 3 SOLUTION RESPIRATORY (INHALATION) at 18:19

## 2018-02-27 RX ADMIN — METOPROLOL TARTRATE 100 MG: 100 TABLET ORAL at 20:59

## 2018-02-27 RX ADMIN — IPRATROPIUM BROMIDE AND ALBUTEROL SULFATE 1 AMPULE: .5; 3 SOLUTION RESPIRATORY (INHALATION) at 11:59

## 2018-02-27 RX ADMIN — SPIRONOLACTONE 25 MG: 25 TABLET ORAL at 08:13

## 2018-02-27 NOTE — PROGRESS NOTES
[facility] - Progress Note    2018   12:46 PM    Name:  Ted Whitaker  :    1950  Age:  74 y.o. female  MRN:    [id number]     Acct:     [de-identified]   Room:     Day: [address]: [facility] Date: 2018 10:27 PM  PCP: Ananda Garcia MD    Subjective:     C/C:   Chief Complaint   Patient presents with    Fever     x 3 days    Cough     x 3 days. productive cough w/white phlegm. tussin OTC    Extremity Weakness     c/o leg weakness       Interval History: Status: Without change. Patient complained of nausea and emesis this a.m. It was verified by nursing. She states that she is not feeling as well today. Still complaining about shortness of breath and cough. Patient states she did not get a flu shot this year secondary to a reaction in her left arm from the flu shot the year before. Patient states she is still too weak to go home. She is currently sitting up in a chair. Respirations are unlabored. Cough is present. It is unlikely the patient can be discharged today. We will repeat her laboratories in the morning and hope to get her home tomorrow. History: \"Miss Crista Bryson is a nice 74year old lady with history of hypertension, morbid obesity, anxiety, CAD status post stents, CHF, hypertension. She was admitted to ER with complaint of not feeling well, flu like illness with nausea, diarrhea, coughing for last 2 days with associated exertional SOB. Work up showed Influenza positive, wheezing noted with no pneumonia. She feels some what better since admission but still has exertional SOB. \"    ROS:  Constitutional: Negative for chills, diaphoresis and weight loss. Positive for fatigue. HENT: Negative for ear pain, hearing loss, nosebleeds, sore throat and tinnitus. Eyes: Negative for blurred vision, double vision, photophobia and pain. Respiratory: Negative for hemoptysis, sputum production and wheezing.  Positive for exertional shortness of breath and coughing. Cardiovascular: Negative for palpitations, orthopnea, claudication, leg swelling and PND. Gastrointestinal: Negative for abdominal pain, blood in stool, constipation, diarrhea, heartburn, melena, nausea and vomiting. Genitourinary: Negative for dysuria, flank pain, frequency, hematuria and urgency. Musculoskeletal: Positive for back pain and generalized arthralgias involving legs and feet. Skin: Negative for itching and rash. Neurological: Negative for dizziness, tingling, tremors, sensory change, focal weakness, seizures, weakness and headaches. Endo/Heme/Allergies: Does not bruise/bleed easily. Psychiatric/Behavioral: Negative for depression. The patient is not nervous/anxious. Medications: Allergies:    Allergies   Allergen Reactions    Bactrim     Penicillins     Tylenol [Acetaminophen] Other (See Comments)     constipation       Current Meds:     insulin lispro (HUMALOG) injection vial 0-6 Units TID WC   insulin lispro (HUMALOG) injection vial 0-3 Units Nightly   glucose (GLUTOSE) 40 % oral gel 15 g PRN   dextrose 50 % solution 12.5 g PRN   glucagon (rDNA) injection 1 mg PRN   dextrose 5 % solution PRN   [START ON 2/28/2018] methylPREDNISolone (MEDROL) tablet 4 mg QAM AC   [START ON 2/28/2018] methylPREDNISolone (MEDROL) tablet 4 mg Lunch   [START ON 2/28/2018] methylPREDNISolone (MEDROL) tablet 4 mg Dinner   [START ON 2/28/2018] methylPREDNISolone (MEDROL) tablet 8 mg Nightly   [START ON 2/1/2019] methylPREDNISolone (MEDROL) tablet 4 mg Nightly   oxyCODONE (ROXICODONE) immediate release tablet 5 mg Q4H PRN   guaiFENesin (MUCINEX) extended release tablet 600 mg BID   amLODIPine (NORVASC) tablet 10 mg Daily   aspirin EC tablet 81 mg Daily   atorvastatin (LIPITOR) tablet 80 mg Daily   clopidogrel (PLAVIX) tablet 75 mg Daily   furosemide (LASIX) tablet 40 mg Daily   isosorbide mononitrate (IMDUR) extended release tablet 30 mg Daily   linagliptin (TRADJENTA) Physical Examination:    /76   Pulse 60   Temp 98.2 °F (36.8 °C) (Oral)   Resp 20   Ht 5' 4\" (1.626 m)   Wt 240 lb 14.4 oz (109.3 kg)   LMP  (LMP Unknown)   SpO2 91%   BMI 41.35 kg/m²     Intake/Output Summary (Last 24 hours) at 02/27/18 1246  Last data filed at 02/26/18 1723   Gross per 24 hour   Intake             1498 ml   Output                0 ml   Net             1498 ml       General Appearance:    Alert, cooperative, no distress, appears stated age   Head:    Normocephalic, without obvious abnormality, atraumatic   Eyes:    PERRL, conjunctiva/corneas clear, EOM's intact        Ears:    Normal external ear canals, both ears   Nose:   Nares normal, septum midline, mucosa normal, no drainage    or sinus tenderness   Throat:   Lips, mucosa, and tongue normal   Neck:   Supple, symmetrical, trachea midline, no adenopathy;        thyroid:  No enlargement/tenderness/nodules; no carotid    bruit or JVD   Back:     Symmetric, no curvature, ROM normal, no CVA tenderness   Lungs:     Clear to auscultation bilaterally, respirations unlabored, mild expiratory wheezing    Chest wall:    No tenderness or deformity   Heart:    Regular rate and rhythm, S1 and S2 normal, no murmur, rub   or gallop   Abdomen:     Soft, obese, non-tender, bowel sounds active all four quadrants,     no masses, no organomegaly   Extremities:   Extremities normal, atraumatic, no cyanosis or edema   Pulses:   2+ and symmetric all extremities   Skin:   Skin color, texture, turgor normal, no rashes or lesions   Lymph nodes:   Cervical, supraclavicular, and axillary nodes normal   Neurologic:   CNII-XII intact.  Normal strength, sensation and reflexes       throughout       Assessment:     Primary Problem  Influenza B     Active Hospital Problems    Diagnosis Date Noted    Influenza B [J10.1] 02/24/2018    Reactive airway disease with acute exacerbation [J45.901] 02/24/2018    Morbid obesity ([facility] [address] [E66.01] 04/03/2014    YUDITH (obstructive sleep apnea) [G47.33] 04/02/2014    Spinal stenosis in cervical region [M48.02] 02/06/2013    CAD (coronary artery disease) s/p 3 stents [I25.10] 02/21/2012     Past Medical History:   Diagnosis Date    Allergic rhinitis     Anxiety 10/25/2016    Asthma     CAD (coronary artery disease)     s/p stents RCA and LAD 2009,    Chronic back pain     Congestive heart failure ([facility] [address]     Depression     Headache(784.0)     Hiatal hernia     Hypercholesteremia 2/21/2012    Hypertension     Kidney stones     years ago    Obesity     Osteoarthritis     Postlaminectomy syndrome 6/6/2012    Type II or unspecified type diabetes mellitus without mention of complication, not stated as uncontrolled     Unspecified sleep apnea     Urinary incontinence         Consultations:     IP CONSULT TO HOSPITALIST    Plan:     1. DC Solu-Medrol after tonight's dose  2. Medrol Dosepak to begin in the morning  3. Continue azithromycin  4. Continue Tamiflu  5. Change Mucinex to Mucinex DM  6. Recheck laboratories in the morning  7.  Tentative discharge tomorrow      Electronically signed by Daija Leach DO on 2/27/2018 at 12:46 PM

## 2018-02-27 NOTE — PROGRESS NOTES
Occupational Therapy  DATE: 2018    NAME: Marcos Matos  MRN: [id number]   : 1950  [facility]  Occupational Therapy Not Seen Note    Patient not available for Occupational Therapy due to:    [] Testing:    [] Hemodialysis    [] Cancelled by RN:    [x]Refusal by Patient: Pt sleeping, attempted to wake, pt continued to deeply sleep and snore.  Nurse notified    [] Surgery:     [] Intubation:     [] Pain Medication:    [] Sedation:     [] Spine Precautions :    [] Medical Instability:    [] Other:        Vonne Najjar ALBRECHT/L

## 2018-02-28 VITALS
DIASTOLIC BLOOD PRESSURE: 83 MMHG | RESPIRATION RATE: 18 BRPM | TEMPERATURE: 98.6 F | BODY MASS INDEX: 41.13 KG/M2 | OXYGEN SATURATION: 98 % | HEART RATE: 66 BPM | HEIGHT: 64 IN | SYSTOLIC BLOOD PRESSURE: 169 MMHG | WEIGHT: 240.9 LBS

## 2018-02-28 PROBLEM — R07.81 RIB PAIN ON LEFT SIDE: Status: ACTIVE | Noted: 2018-02-28

## 2018-02-28 LAB
ABSOLUTE EOS #: 0 K/UL (ref 0–0.4)
ABSOLUTE IMMATURE GRANULOCYTE: ABNORMAL K/UL (ref 0–0.3)
ABSOLUTE LYMPH #: 1.8 K/UL (ref 1–4.8)
ABSOLUTE MONO #: 0.7 K/UL (ref 0.2–0.8)
ANION GAP SERPL CALCULATED.3IONS-SCNC: 12 MMOL/L (ref 9–17)
BASOPHILS # BLD: 1 % (ref 0–2)
BASOPHILS ABSOLUTE: 0 K/UL (ref 0–0.2)
BUN BLDV-MCNC: 17 MG/DL (ref 8–23)
BUN/CREAT BLD: 22 (ref 9–20)
CALCIUM SERPL-MCNC: 9 MG/DL (ref 8.6–10.4)
CHLORIDE BLD-SCNC: 99 MMOL/L (ref 98–107)
CO2: 27 MMOL/L (ref 20–31)
CREAT SERPL-MCNC: 0.78 MG/DL (ref 0.5–0.9)
DIFFERENTIAL TYPE: ABNORMAL
EOSINOPHILS RELATIVE PERCENT: 0 % (ref 1–4)
GFR AFRICAN AMERICAN: >60 ML/MIN
GFR NON-AFRICAN AMERICAN: >60 ML/MIN
GFR SERPL CREATININE-BSD FRML MDRD: ABNORMAL ML/MIN/{1.73_M2}
GFR SERPL CREATININE-BSD FRML MDRD: ABNORMAL ML/MIN/{1.73_M2}
GLUCOSE BLD-MCNC: 197 MG/DL (ref 65–105)
GLUCOSE BLD-MCNC: 210 MG/DL (ref 70–99)
GLUCOSE BLD-MCNC: 231 MG/DL (ref 65–105)
HCT VFR BLD CALC: 37.6 % (ref 36–46)
HEMOGLOBIN: 12.3 G/DL (ref 12–16)
IMMATURE GRANULOCYTES: ABNORMAL %
LYMPHOCYTES # BLD: 24 % (ref 24–44)
MAGNESIUM: 1.5 MG/DL (ref 1.6–2.6)
MCH RBC QN AUTO: 28.6 PG (ref 26–34)
MCHC RBC AUTO-ENTMCNC: 32.8 G/DL (ref 31–37)
MCV RBC AUTO: 87.3 FL (ref 80–100)
MONOCYTES # BLD: 9 % (ref 1–7)
NRBC AUTOMATED: ABNORMAL PER 100 WBC
PDW BLD-RTO: 14.5 % (ref 11.5–14.5)
PLATELET # BLD: 205 K/UL (ref 130–400)
PLATELET ESTIMATE: ABNORMAL
PMV BLD AUTO: 8.6 FL (ref 6–12)
POTASSIUM SERPL-SCNC: 3.9 MMOL/L (ref 3.7–5.3)
RBC # BLD: 4.31 M/UL (ref 4–5.2)
RBC # BLD: ABNORMAL 10*6/UL
SEG NEUTROPHILS: 66 % (ref 36–66)
SEGMENTED NEUTROPHILS ABSOLUTE COUNT: 5 K/UL (ref 1.8–7.7)
SODIUM BLD-SCNC: 138 MMOL/L (ref 135–144)
WBC # BLD: 7.5 K/UL (ref 3.5–11)
WBC # BLD: ABNORMAL 10*3/UL

## 2018-02-28 PROCEDURE — 94760 N-INVAS EAR/PLS OXIMETRY 1: CPT

## 2018-02-28 PROCEDURE — 97110 THERAPEUTIC EXERCISES: CPT

## 2018-02-28 PROCEDURE — 6360000002 HC RX W HCPCS: Performed by: NURSE PRACTITIONER

## 2018-02-28 PROCEDURE — 94640 AIRWAY INHALATION TREATMENT: CPT

## 2018-02-28 PROCEDURE — 6370000000 HC RX 637 (ALT 250 FOR IP): Performed by: FAMILY MEDICINE

## 2018-02-28 PROCEDURE — 6370000000 HC RX 637 (ALT 250 FOR IP): Performed by: NURSE PRACTITIONER

## 2018-02-28 PROCEDURE — 36415 COLL VENOUS BLD VENIPUNCTURE: CPT

## 2018-02-28 PROCEDURE — 80048 BASIC METABOLIC PNL TOTAL CA: CPT

## 2018-02-28 PROCEDURE — 97530 THERAPEUTIC ACTIVITIES: CPT

## 2018-02-28 PROCEDURE — 82947 ASSAY GLUCOSE BLOOD QUANT: CPT

## 2018-02-28 PROCEDURE — 6370000000 HC RX 637 (ALT 250 FOR IP): Performed by: INTERNAL MEDICINE

## 2018-02-28 PROCEDURE — 85025 COMPLETE CBC W/AUTO DIFF WBC: CPT

## 2018-02-28 PROCEDURE — 6360000002 HC RX W HCPCS: Performed by: INTERNAL MEDICINE

## 2018-02-28 PROCEDURE — 97116 GAIT TRAINING THERAPY: CPT

## 2018-02-28 PROCEDURE — 99232 SBSQ HOSP IP/OBS MODERATE 35: CPT | Performed by: INTERNAL MEDICINE

## 2018-02-28 PROCEDURE — 83735 ASSAY OF MAGNESIUM: CPT

## 2018-02-28 RX ORDER — GUAIFENESIN AND DEXTROMETHORPHAN HYDROBROMIDE 1200; 60 MG/1; MG/1
1 TABLET, EXTENDED RELEASE ORAL 2 TIMES DAILY
Qty: 28 TABLET | Refills: 0 | Status: SHIPPED | OUTPATIENT
Start: 2018-02-28 | End: 2018-03-14

## 2018-02-28 RX ORDER — OSELTAMIVIR PHOSPHATE 75 MG/1
75 CAPSULE ORAL 2 TIMES DAILY
Qty: 10 CAPSULE | Refills: 0 | Status: SHIPPED | OUTPATIENT
Start: 2018-02-28 | End: 2018-03-05

## 2018-02-28 RX ORDER — OXYCODONE HYDROCHLORIDE 5 MG/1
5 TABLET ORAL EVERY 6 HOURS PRN
Qty: 20 TABLET | Refills: 0 | Status: SHIPPED | OUTPATIENT
Start: 2018-02-28 | End: 2018-03-05

## 2018-02-28 RX ORDER — AZITHROMYCIN 250 MG/1
250 TABLET, FILM COATED ORAL DAILY
Qty: 5 TABLET | Refills: 0 | Status: SHIPPED | OUTPATIENT
Start: 2018-02-28 | End: 2018-02-28

## 2018-02-28 RX ORDER — AZITHROMYCIN 250 MG/1
250 TABLET, FILM COATED ORAL DAILY
Qty: 5 TABLET | Refills: 0 | Status: SHIPPED | OUTPATIENT
Start: 2018-02-28 | End: 2018-03-05

## 2018-02-28 RX ORDER — METHYLPREDNISOLONE 4 MG/1
TABLET ORAL
Qty: 1 KIT | Refills: 0 | Status: SHIPPED | OUTPATIENT
Start: 2018-02-28 | End: 2018-03-06

## 2018-02-28 RX ADMIN — ASPIRIN 81 MG: 81 TABLET, COATED ORAL at 10:12

## 2018-02-28 RX ADMIN — PANTOPRAZOLE SODIUM 40 MG: 40 TABLET, DELAYED RELEASE ORAL at 06:15

## 2018-02-28 RX ADMIN — METOPROLOL TARTRATE 100 MG: 100 TABLET ORAL at 10:13

## 2018-02-28 RX ADMIN — METHYLPREDNISOLONE 4 MG: 4 TABLET ORAL at 12:50

## 2018-02-28 RX ADMIN — AMLODIPINE BESYLATE 10 MG: 10 TABLET ORAL at 10:12

## 2018-02-28 RX ADMIN — INSULIN LISPRO 2 UNITS: 100 INJECTION, SOLUTION INTRAVENOUS; SUBCUTANEOUS at 12:51

## 2018-02-28 RX ADMIN — FUROSEMIDE 40 MG: 40 TABLET ORAL at 10:12

## 2018-02-28 RX ADMIN — ISOSORBIDE MONONITRATE 30 MG: 30 TABLET ORAL at 10:13

## 2018-02-28 RX ADMIN — METHYLPREDNISOLONE 4 MG: 4 TABLET ORAL at 06:15

## 2018-02-28 RX ADMIN — GUAIFENESIN 600 MG: 600 TABLET, EXTENDED RELEASE ORAL at 10:12

## 2018-02-28 RX ADMIN — RANOLAZINE 500 MG: 500 TABLET, FILM COATED, EXTENDED RELEASE ORAL at 10:13

## 2018-02-28 RX ADMIN — OSELTAMIVIR PHOSPHATE 75 MG: 75 CAPSULE ORAL at 10:13

## 2018-02-28 RX ADMIN — AZITHROMYCIN 250 MG: 250 TABLET, FILM COATED ORAL at 10:27

## 2018-02-28 RX ADMIN — LOSARTAN POTASSIUM 100 MG: 100 TABLET, FILM COATED ORAL at 10:27

## 2018-02-28 RX ADMIN — INSULIN LISPRO 1 UNITS: 100 INJECTION, SOLUTION INTRAVENOUS; SUBCUTANEOUS at 09:35

## 2018-02-28 RX ADMIN — SPIRONOLACTONE 25 MG: 25 TABLET ORAL at 10:14

## 2018-02-28 RX ADMIN — LINAGLIPTIN 5 MG: 5 TABLET, FILM COATED ORAL at 10:13

## 2018-02-28 RX ADMIN — ATORVASTATIN CALCIUM 80 MG: 80 TABLET, FILM COATED ORAL at 10:12

## 2018-02-28 RX ADMIN — SERTRALINE 75 MG: 25 TABLET, FILM COATED ORAL at 10:14

## 2018-02-28 RX ADMIN — Medication 1 TABLET: at 10:12

## 2018-02-28 RX ADMIN — CLOPIDOGREL BISULFATE 75 MG: 75 TABLET ORAL at 10:12

## 2018-02-28 RX ADMIN — IPRATROPIUM BROMIDE AND ALBUTEROL SULFATE 1 AMPULE: .5; 3 SOLUTION RESPIRATORY (INHALATION) at 08:01

## 2018-02-28 RX ADMIN — ENOXAPARIN SODIUM 40 MG: 40 INJECTION SUBCUTANEOUS at 10:12

## 2018-02-28 NOTE — DISCHARGE SUMMARY
[facility] Medicine Discharge Summary      Patient ID: Catherine Pineda  : 1950  MRN: [id number]     Acct:  [de-identified]   Hospital: [facility]      Patient's PCP: Jesus Hernández MD    Admit Date: 2018     Discharge Date:   2018     Admitting Physician: Venetta Cranker, MD    Discharge Physician: Irene Dubose DO     Discharge Diagnoses:    Primary Problem  Influenza B    Active Hospital Problems    Diagnosis Date Noted    Rib pain on left side [R07.81] 2018    Influenza B [J10.1] 2018    Reactive airway disease with acute exacerbation [J45.901] 2018    Morbid obesity ([facility] [address] [E66.01] 2014    YUDITH (obstructive sleep apnea) [G47.33] 2014    Spinal stenosis in cervical region [M48.02] 2013    CAD (coronary artery disease) s/p 3 stents [I25.10] 2012       Recommendations:  1. Discharge today  2. Medrol Dosepak to begin today  3. Continue azithromycin 250 mg daily for 5 days  4. Continue Tamiflu 75 mg twice a day for 5 days  5. Mucinex DM twice a day for 10 days  6. Oxycodone 5 mg 1 every 6 hours when necessary #20 no refills  7. Resume home medications  8.  Follow-up PCP in 1 week    Past Medical History:   Diagnosis Date    Allergic rhinitis     Anxiety 10/25/2016    Asthma     CAD (coronary artery disease)     s/p stents RCA and LAD ,    Chronic back pain     Congestive heart failure ([facility] [address]     Depression     Headache(784.0)     Hiatal hernia     Hypercholesteremia 2012    Hypertension     Kidney stones     years ago    Obesity     Osteoarthritis     Postlaminectomy syndrome 2012    Type II or unspecified type diabetes mellitus without mention of complication, not stated as uncontrolled     Unspecified sleep apnea     Urinary incontinence      The patient was seen and examined on day of discharge and this discharge summary is in conjunction with any daily progress note from day of discharge. Code Status:  Full Code    Hospital Course:     C/C:        Chief Complaint   Patient presents with    Fever       x 3 days    Cough       x 3 days. productive cough w/white phlegm. tussin OTC    Extremity Weakness       c/o leg weakness         Interval History: Status: Improved. Denies any further nausea or vomiting. Patient's shortness of breath is improved although cough is persistent. She is agreeable to be discharged home today. She is complaining about pain below her left ribs which appears to be a chronic issue. Respirations are unlabored. She'll be discharged on azithromycin for 5 more days as well as a Medrol dosepak. She received 5 days of Oxycodone 5 mg every 6 hours #20. Follow-up will be within one week with her family physician.     History: \"Miss Don Ma is a nice 74year old lady with history of hypertension, morbid obesity, anxiety, CAD status post stents, CHF, hypertension. She was admitted to ER with complaint of not feeling well, flu like illness with nausea, diarrhea, coughing for last 2 days with associated exertional SOB. Work up showed Influenza positive, wheezing noted with no pneumonia. She feels some what better since admission but still has exertional SOB.  \"      Consults:  none    Significant Diagnostic Studies: as above, and as follows:     Portable chest x-ray: 2/25/2018  No acute abnormality     Labs:     Hematology:       Recent Labs      02/27/18   0710  02/28/18   0623   WBC  6.6  7.5   RBC  4.07  4.31   HGB  11.6*  12.3   HCT  35.6*  37.6   MCV  87.5  87.3   MCH  28.4  28.6   MCHC  32.5  32.8   RDW  13.6  14.5   PLT  195  205   MPV  8.8  8.6      Chemistry:       Recent Labs      02/27/18   0710  02/28/18   0623   NA  135  138   K  4.3  3.9   CL  97*  99   CO2  25  27   GLUCOSE  275*  210*   BUN  17  17   CREATININE  0.74  0.78   MG  1.5*  1.5*   ANIONGAP  13  12   LABGLOM  >60  >60   GFRAA  >60  >60   CALCIUM  8.9  9.0      Culture and Sensitivities:  POSITIVE metoprolol (LOPRESSOR) 100 MG tablet  TAKE 1 TABLET BY MOUTH 2 TIMES DAILY             Multiple Vitamins-Minerals (MULTIVITAMIN WITH MINERALS) tablet  Take 1 tablet by mouth daily             nitroGLYCERIN (NITROSTAT) 0.4 MG SL tablet  TAKE 1 TABLET UNDER THE TONGUE EVERY 5 MINUTES AS NEEDED FOR CHEST             omeprazole (PRILOSEC) 20 MG delayed release capsule  TAKE 1 CAPSULE BY MOUTH DAILY             oseltamivir (TAMIFLU) 75 MG capsule  Take 1 capsule by mouth 2 times daily for 5 days             oxyCODONE (ROXICODONE) 5 MG immediate release tablet  Take 1 tablet by mouth every 6 hours as needed for Pain for up to 5 days. ranolazine (RANEXA) 500 MG extended release tablet  Take 500 mg by mouth 2 times daily             sertraline (ZOLOFT) 50 MG tablet  Take 1.5 tablets by mouth daily             spironolactone (ALDACTONE) 25 MG tablet  Take 1 tablet by mouth daily                  Activity: activity as tolerated    Diet: cardiac diet    Time Spent on discharge is more than 30 minutes in the examination, evaluation, counseling and review of medications and discharge plan. Electronically signed by Juliet Thurman DO on 2/28/2018 at 10:27 AM     Thank you Dr. aJmil Cordova MD for the opportunity to be involved in this patient's care.

## 2018-02-28 NOTE — PROGRESS NOTES
[facility] - Progress Note    2018   10:10 AM    Name:  Laure Blair  :    1950  Age:  74 y.o. female  MRN:    [id number]     Acct:     [de-identified]   [address]:     [address] [facility]uth[facility] Ch[facility] Date: 2018 10:27 PM  PCP: Maribell Irby MD    Subjective:     C/C:   Chief Complaint   Patient presents with    Fever     x 3 days    Cough     x 3 days. productive cough w/white phlegm. tussin OTC    Extremity Weakness     c/o leg weakness       Interval History: Status: Improved. Denies any further nausea or vomiting. Patient's shortness of breath is improved although cough is persistent. She is agreeable to be discharged home today. She is complaining about pain below her left ribs which appears to be a chronic issue. Respirations are unlabored. She'll be discharged on azithromycin for 5 more days as well as a Medrol dosepak. She received 5 days of Oxycodone 5 mg every 6 hours #20. Follow-up will be within one week with her family physician. History: \"Miss Nicolette Quintana is a nice 74year old lady with history of hypertension, morbid obesity, anxiety, CAD status post stents, CHF, hypertension. She was admitted to ER with complaint of not feeling well, flu like illness with nausea, diarrhea, coughing for last 2 days with associated exertional SOB. Work up showed Influenza positive, wheezing noted with no pneumonia. She feels some what better since admission but still has exertional SOB. \"    ROS:  Constitutional: Negative for chills, diaphoresis and weight loss. Positive for fatigue. HENT: Negative for ear pain, hearing loss, nosebleeds, sore throat and tinnitus. Eyes: Negative for blurred vision, double vision, photophobia and pain. Respiratory: Negative for hemoptysis, sputum production and wheezing. Positive for exertional shortness of breath and coughing.     Cardiovascular: Negative for palpitations, orthopnea, claudication, leg swelling and

## 2018-02-28 NOTE — PROGRESS NOTES
mellitus) ([facility] [address] 02/21/2012    HTN (hypertension) 02/21/2012    CAD (coronary artery disease) s/p 3 stents 02/21/2012    Smoker 02/21/2012    Back pain 02/21/2012    Asthma 02/21/2012       Past Medical History:   Diagnosis Date    Allergic rhinitis     Anxiety 10/25/2016    Asthma     CAD (coronary artery disease)     s/p stents RCA and LAD 2009,    Chronic back pain     Congestive heart failure ([facility] [address]     Depression     Headache(784.0)     Hiatal hernia     Hypercholesteremia 2/21/2012    Hypertension     Kidney stones     years ago    Obesity     Osteoarthritis     Postlaminectomy syndrome 6/6/2012    Type II or unspecified type diabetes mellitus without mention of complication, not stated as uncontrolled     Unspecified sleep apnea     Urinary incontinence      Past Surgical History:   Procedure Laterality Date    CARDIAC CATHETERIZATION  01/2013    patent stents    COLONOSCOPY  09/2015    normal    CORONARY ANGIOPLASTY WITH STENT PLACEMENT  1012-16    stents x 3    JOINT REPLACEMENT      left knee    KNEE SURGERY  10/21/2013    rt knee synvisc injection     KNEE SURGERY  12/02/2013    knee synvisc injection rt #2    KNEE SURGERY  12/09/2013    rt knee synvisc inj    LUMBAR SPINE SURGERY  2007    NERVE BLOCK  4/23/2012    Right MBNB L3, L4, L5    NERVE BLOCK  5/22/2012    Right MBNB L3, L4, and L5     NERVE BLOCK  01/14/13    Right knee injection #1 - Synvisc    NERVE BLOCK  01/21/13    Right knee injection #2 - Synvisc    NERVE BLOCK  1/28/2013     Rigth knee synvisc injection #3    NERVE BLOCK Right 04/17/2017    Rt genicular nerve block.  no steroid used    OTHER SURGICAL HISTORY Right 7/14/2014    synvisc one knee injection    OTHER SURGICAL HISTORY Right 3/16/2015    synvisc one knee injection    OTHER SURGICAL HISTORY Right 06-13-16    synvisc right knee injection    SPINE SURGERY      TONSILLECTOMY      UPPER GASTROINTESTINAL ENDOSCOPY      VENA CAVA FILTER PLACEMENT  2007    PE and B/L LE emboli       Restrictions  Restrictions/Precautions  Restrictions/Precautions: General Precautions, Fall Risk     Subjective   General  Response To Previous Treatment: Patient reporting fatigue but able to participate. Family / Caregiver Present: No  Subjective  Subjective: Patient states that she feels better today but is still tired  Pain Screening  Patient Currently in Pain: Denies  Vital Signs  Patient Currently in Pain: Denies       Orientation  Orientation  Overall Orientation Status: Within Functional Limits     Objective   Bed mobility  Rolling to Left: Supervision  Supine to Sit: Supervision  Scooting: Supervision  Transfers  Sit to Stand: Supervision  Stand to sit: Supervision  Bed to Chair: Stand by assistance  Ambulation  Ambulation?: Yes  Ambulation 1  Surface: level tile  Device: Rolling Walker  Assistance: Supervision  Quality of Gait: Patient demos steadier gait with use of walker with less need for assistance. Patient states that she lives in an apartment with no stairs to negotiate  Distance: 20 feet x 2     Balance  Posture: Good  Sitting - Static: Good  Sitting - Dynamic: Good  Standing - Static: Good  Standing - Dynamic: Good;Fair;+  Exercises  Comments: Patient performed ankle pumps and LAQ exercises in chair x 10 reps          Assessment   Body structures, Functions, Activity limitations: Decreased functional mobility ; Decreased endurance;Decreased balance;Decreased safe awareness     Patient tolerated session well with minimal deficits noted in bed mobility, transfers, ambulation, balance, and endurance this session. Demos improved tolerance to activity today evidenced by improved IND with all mobility and increased endurance this session. At current level of function, patient will benefit from continued inpatient PT services and will likely require home PT to promote improved safety and IND with all functional mobility after discharge.        Prognosis:

## 2018-02-28 NOTE — PLAN OF CARE
Problem: Falls - Risk of  Goal: Absence of falls  Outcome: Ongoing  Room free of clutter  Hourly rounding   Non-skid socks worn  Side rails up x2  Bed low and locked  Call light in reach  Instructed to call out before getting out of bed  Anticipatory needs met  Bed alarm on  Falling star at the door and on wristband      Problem: Pain:  Goal: Pain level will decrease  Pain level will decrease   Outcome: Ongoing  Pain level assessed and rated on a 0-10 scale  Assess characteristics of pain  PRN pain medication given per pt request  Non-pharmacological interventions implemented  Report ineffective pain management to physician  Update pt and family of any changes  Pt instructed to call out with new onset of pain  Continue to monitor

## 2018-03-01 LAB
GLUCOSE BLD-MCNC: 145 MG/DL (ref 65–105)
GLUCOSE BLD-MCNC: 249 MG/DL (ref 65–105)

## 2018-03-02 LAB
CULTURE: NORMAL
Lab: NORMAL
Lab: NORMAL
SPECIMEN DESCRIPTION: NORMAL
STATUS: NORMAL
STATUS: NORMAL

## 2018-03-09 ENCOUNTER — HOSPITAL ENCOUNTER (OUTPATIENT)
Dept: PAIN MANAGEMENT | Age: 68
Discharge: HOME | End: 2018-03-09
Payer: COMMERCIAL

## 2018-03-09 VITALS
RESPIRATION RATE: 18 BRPM | SYSTOLIC BLOOD PRESSURE: 162 MMHG | DIASTOLIC BLOOD PRESSURE: 82 MMHG | HEART RATE: 67 BPM | TEMPERATURE: 98.9 F

## 2018-03-09 DIAGNOSIS — M47.816 FACET ARTHRITIS OF LUMBAR REGION: Primary | Chronic | ICD-10-CM

## 2018-03-09 DIAGNOSIS — M96.1 POSTLAMINECTOMY SYNDROME: ICD-10-CM

## 2018-03-09 DIAGNOSIS — Z51.81 MEDICATION MONITORING ENCOUNTER: ICD-10-CM

## 2018-03-09 DIAGNOSIS — M17.11 PRIMARY OSTEOARTHRITIS OF RIGHT KNEE: Chronic | ICD-10-CM

## 2018-03-09 PROCEDURE — 99215 OFFICE O/P EST HI 40 MIN: CPT

## 2018-03-09 PROCEDURE — 99214 OFFICE O/P EST MOD 30 MIN: CPT | Performed by: NURSE PRACTITIONER

## 2018-03-09 RX ORDER — OXYCODONE HYDROCHLORIDE AND ACETAMINOPHEN 5; 325 MG/1; MG/1
1 TABLET ORAL 2 TIMES DAILY PRN
Qty: 28 TABLET | Refills: 0 | Status: SHIPPED | OUTPATIENT
Start: 2018-03-09 | End: 2018-03-23 | Stop reason: SDUPTHER

## 2018-03-09 ASSESSMENT — ENCOUNTER SYMPTOMS
COUGH: 1
CONSTIPATION: 0
BACK PAIN: 1

## 2018-03-09 NOTE — PROGRESS NOTES
omeprazole (PRILOSEC) 20 MG delayed release capsule, TAKE 1 CAPSULE BY MOUTH DAILY, Disp: 28 capsule, Rfl: 3    ranolazine (RANEXA) 500 MG extended release tablet, Take 500 mg by mouth 2 times daily, Disp: , Rfl:     metoprolol (LOPRESSOR) 100 MG tablet, TAKE 1 TABLET BY MOUTH 2 TIMES DAILY, Disp: 56 tablet, Rfl: 3    furosemide (LASIX) 40 MG tablet, TAKE 1 TABLET BY MOUTH DAILY, Disp: 30 tablet, Rfl: 10    sertraline (ZOLOFT) 50 MG tablet, Take 1.5 tablets by mouth daily, Disp: 45 tablet, Rfl: 3    amLODIPine (NORVASC) 10 MG tablet, Take 1 tablet by mouth daily, Disp: 30 tablet, Rfl: 5    spironolactone (ALDACTONE) 25 MG tablet, Take 1 tablet by mouth daily, Disp: 30 tablet, Rfl: 2    losartan (COZAAR) 100 MG tablet, Take 1 tablet by mouth daily, Disp: 30 tablet, Rfl: 5    glucose blood VI test strips (ASCENSIA AUTODISC VI;ONE TOUCH ULTRA TEST VI) strip, 1 each by In Vitro route 3 times daily As needed. , Disp: 100 each, Rfl: 5    albuterol-ipratropium (COMBIVENT RESPIMAT)  MCG/ACT AERS inhaler, Inhale 1 puff into the lungs every 6 hours as needed for Wheezing, Disp: 1 Inhaler, Rfl: 5    linagliptin (TRADJENTA) 5 MG tablet, Take 1 tablet by mouth daily, Disp: 30 tablet, Rfl: 10    clopidogrel (PLAVIX) 75 MG tablet, Take 1 tablet by mouth daily, Disp: 30 tablet, Rfl: 11    aspirin (RA ASPIRIN EC) 81 MG EC tablet, Take 1 tablet by mouth daily, Disp: 30 tablet, Rfl: 11    Lancets MISC, Use 1 -2 times daily Insulin dependent diabetes mellitus, Disp: 50 each, Rfl: 11    Multiple Vitamins-Minerals (MULTIVITAMIN WITH MINERALS) tablet, Take 1 tablet by mouth daily, Disp: 30 tablet, Rfl: 3    nitroGLYCERIN (NITROSTAT) 0.4 MG SL tablet, TAKE 1 TABLET UNDER THE TONGUE EVERY 5 MINUTES AS NEEDED FOR CHEST, Disp: 20 tablet, Rfl: 5    Family History   Problem Relation Age of Onset    Diabetes Mother     Cancer Father     High Blood Pressure Sister     High Blood Pressure Brother        Social History Social History    Marital status: Legally      Spouse name: N/A    Number of children: N/A    Years of education: N/A     Occupational History    Not on file. Social History Main Topics    Smoking status: Former Smoker     Years: 0.00     Quit date: 3/28/2013    Smokeless tobacco: Never Used    Alcohol use 0.6 oz/week     1 Cans of beer per week      Comment: occasionally    Drug use: No    Sexual activity: No     Other Topics Concern    Not on file     Social History Narrative    No narrative on file       Review of Systems:  Review of Systems   Musculoskeletal: Positive for back pain. Physical Exam:  BP (!) 162/82   Pulse 67   Temp 98.9 °F (37.2 °C) (Oral)   Resp 18   LMP  (LMP Unknown)     Physical Exam    Record/Diagnostics Review:    As above, I did review the imaging    Assessment:        Treatment Plan:  DISCUSSION: Treatment options discussed with patient and all questions answered to patient's satisfaction. OARRS Review: Reviewed and {Blank multiple:04658::\"acceptable\",\"not acceptable\"} for medications prescribed.   TREATMENT OPTIONS:     ***      Dayne Sargent M.D.

## 2018-03-09 NOTE — PROGRESS NOTES
Patient is here today to review medication contract. Chief Complaint:  Low back pain    [facility]   Patient complains of constant aching, burning pain in the low back and right knee. Her pain is worse with activity and relieved with rest and pain medication. She had Right knee geniculate nerve block in April with two days of relief. Discussed at length the details regarding the RF. But pt not interested in injection at this time. She was placed on 2 week refills in June 2017 due to shortage, and changed to tramadol 2 week fills in Sept due to memory issues. Per POC note, Dr Mary Rasmussen agrees to change back to percocet  if family member assumes control of the medication. In Oct her daughter Joann Mckenzie agreed to monitor the medication and come to monthly appts but she has not attended last 3 appointments. Pt states her daughter works and cannot come with her every month. I was able to reach daughter by phone and she will be a March appointment. HPI:   Back Pain   This is a chronic problem. The current episode started more than 1 year ago. The problem occurs intermittently. The problem has been gradually improving since onset. The pain is present in the gluteal, lumbar spine and sacro-iliac. The quality of the pain is described as aching. Radiates to: rt hip. The pain is at a severity of 2/10. The pain is mild. The pain is the same all the time. The symptoms are aggravated by standing, sitting and position. Risk factors include sedentary lifestyle and obesity. She has tried analgesics for the symptoms. The treatment provided mild relief. Knee Pain    The incident occurred more than 1 week ago. The pain is present in the right knee, right thigh and right hip. The pain is at a severity of 6/10. The pain is moderate. The pain has been constant since onset. Associated symptoms include an inability to bear weight. She reports no foreign bodies present. The symptoms are aggravated by movement and weight bearing.  She has use 0.6 oz/week     1 Cans of beer per week      Comment: occasionally    Drug use: No    Sexual activity: No     Other Topics Concern    Not on file     Social History Narrative    No narrative on file       Review of Systems:  Review of Systems   Constitution: Negative for chills and fever. Cardiovascular: Negative for chest pain. Respiratory: Positive for cough. Recent flu dx   Musculoskeletal: Positive for back pain, joint pain, muscle weakness, myalgias and stiffness. Gastrointestinal: Negative for constipation. Neurological: Positive for paresthesias. Physical Exam:  BP (!) 162/82   Pulse 67   Temp 98.9 °F (37.2 °C) (Oral)   Resp 18   LMP  (LMP Unknown)     Physical Exam   Constitutional: She is oriented to person, place, and time and well-developed, well-nourished, and in no distress. Cardiovascular: Normal rate. Pulmonary/Chest: Effort normal.   Musculoskeletal:        Lumbar back: She exhibits decreased range of motion and tenderness. Pain with ROM of knees   Neurological: She is alert and oriented to person, place, and time. Antalgic gait using cane   Skin: Skin is warm and dry. Psychiatric: Affect normal.       Record/Diagnostics Review:    Last andi Sept and was appropriate    XR Lumbar 2015 -      Findings: There are severe multilevel disc disease, evidenced by loss of disc space and vacuum phenomena, most pronounced at L1-2 and L2-3. There is minimal anterolisthesis of L3 in relation to L4. There is no significant loss of vertebral body heights. Moderate to severe arthrosis is also demonstrated at multiple levels. Bulky marginal osteophytosis are demonstrated at L1-2 and L2-3. Degenerative changes of demonstrated in bilateral sacroiliac joints.  Incidental note is made of a permanent IVC filter   which projects over L2-3.     Impression: Moderate to severe spondylosis and degenerative disc disease of the lumbar spine in addition to arthrosis, without

## 2018-03-12 ENCOUNTER — OFFICE VISIT (OUTPATIENT)
Dept: INTERNAL MEDICINE | Age: 68
End: 2018-03-12
Payer: COMMERCIAL

## 2018-03-12 VITALS
HEIGHT: 64 IN | HEART RATE: 66 BPM | WEIGHT: 259 LBS | SYSTOLIC BLOOD PRESSURE: 142 MMHG | DIASTOLIC BLOOD PRESSURE: 88 MMHG | BODY MASS INDEX: 44.22 KG/M2

## 2018-03-12 DIAGNOSIS — I10 UNCONTROLLED HYPERTENSION: ICD-10-CM

## 2018-03-12 DIAGNOSIS — I25.5 ISCHEMIC CARDIOMYOPATHY: ICD-10-CM

## 2018-03-12 DIAGNOSIS — M25.551 RIGHT HIP PAIN: ICD-10-CM

## 2018-03-12 DIAGNOSIS — E66.01 MORBID OBESITY (HCC): ICD-10-CM

## 2018-03-12 DIAGNOSIS — E11.8 TYPE 2 DIABETES MELLITUS WITH COMPLICATION, WITHOUT LONG-TERM CURRENT USE OF INSULIN (HCC): Primary | ICD-10-CM

## 2018-03-12 DIAGNOSIS — I25.10 CORONARY ARTERY DISEASE INVOLVING NATIVE CORONARY ARTERY OF NATIVE HEART WITHOUT ANGINA PECTORIS: ICD-10-CM

## 2018-03-12 DIAGNOSIS — F32.1 MODERATE MAJOR DEPRESSION (HCC): ICD-10-CM

## 2018-03-12 DIAGNOSIS — Z12.39 BREAST CANCER SCREENING: ICD-10-CM

## 2018-03-12 DIAGNOSIS — M17.11 PRIMARY OSTEOARTHRITIS OF RIGHT KNEE: ICD-10-CM

## 2018-03-12 PROBLEM — R07.81 RIB PAIN ON LEFT SIDE: Status: RESOLVED | Noted: 2018-02-28 | Resolved: 2018-03-12

## 2018-03-12 LAB — HBA1C MFR BLD: 7.5 %

## 2018-03-12 PROCEDURE — 4040F PNEUMOC VAC/ADMIN/RCVD: CPT | Performed by: INTERNAL MEDICINE

## 2018-03-12 PROCEDURE — 99214 OFFICE O/P EST MOD 30 MIN: CPT | Performed by: INTERNAL MEDICINE

## 2018-03-12 PROCEDURE — G8484 FLU IMMUNIZE NO ADMIN: HCPCS | Performed by: INTERNAL MEDICINE

## 2018-03-12 PROCEDURE — 1111F DSCHRG MED/CURRENT MED MERGE: CPT | Performed by: INTERNAL MEDICINE

## 2018-03-12 PROCEDURE — 1090F PRES/ABSN URINE INCON ASSESS: CPT | Performed by: INTERNAL MEDICINE

## 2018-03-12 PROCEDURE — 3017F COLORECTAL CA SCREEN DOC REV: CPT | Performed by: INTERNAL MEDICINE

## 2018-03-12 PROCEDURE — G8417 CALC BMI ABV UP PARAM F/U: HCPCS | Performed by: INTERNAL MEDICINE

## 2018-03-12 PROCEDURE — G8598 ASA/ANTIPLAT THER USED: HCPCS | Performed by: INTERNAL MEDICINE

## 2018-03-12 PROCEDURE — 99214 OFFICE O/P EST MOD 30 MIN: CPT

## 2018-03-12 PROCEDURE — 1123F ACP DISCUSS/DSCN MKR DOCD: CPT | Performed by: INTERNAL MEDICINE

## 2018-03-12 PROCEDURE — 3014F SCREEN MAMMO DOC REV: CPT | Performed by: INTERNAL MEDICINE

## 2018-03-12 PROCEDURE — 1036F TOBACCO NON-USER: CPT | Performed by: INTERNAL MEDICINE

## 2018-03-12 PROCEDURE — 3045F PR MOST RECENT HEMOGLOBIN A1C LEVEL 7.0-9.0%: CPT | Performed by: INTERNAL MEDICINE

## 2018-03-12 PROCEDURE — G8427 DOCREV CUR MEDS BY ELIG CLIN: HCPCS | Performed by: INTERNAL MEDICINE

## 2018-03-12 PROCEDURE — G8400 PT W/DXA NO RESULTS DOC: HCPCS | Performed by: INTERNAL MEDICINE

## 2018-03-12 PROCEDURE — 83036 HEMOGLOBIN GLYCOSYLATED A1C: CPT | Performed by: INTERNAL MEDICINE

## 2018-03-12 RX ORDER — SPIRONOLACTONE 25 MG/1
25 TABLET ORAL DAILY
Qty: 30 TABLET | Refills: 2 | Status: SHIPPED | OUTPATIENT
Start: 2018-03-12 | End: 2018-06-12 | Stop reason: SDUPTHER

## 2018-03-12 RX ORDER — AMLODIPINE BESYLATE 10 MG/1
10 TABLET ORAL DAILY
Qty: 30 TABLET | Refills: 5 | Status: SHIPPED | OUTPATIENT
Start: 2018-03-12 | End: 2018-03-12 | Stop reason: SDUPTHER

## 2018-03-12 RX ORDER — GLIMEPIRIDE 2 MG/1
2 TABLET ORAL EVERY MORNING
Qty: 30 TABLET | Refills: 3 | Status: SHIPPED | OUTPATIENT
Start: 2018-03-12 | End: 2018-04-10 | Stop reason: SDUPTHER

## 2018-03-12 RX ORDER — METOPROLOL TARTRATE 100 MG/1
TABLET ORAL
Qty: 56 TABLET | Refills: 3 | Status: SHIPPED | OUTPATIENT
Start: 2018-03-12 | End: 2018-06-12 | Stop reason: SDUPTHER

## 2018-03-12 RX ORDER — AMLODIPINE BESYLATE 10 MG/1
10 TABLET ORAL DAILY
Qty: 30 TABLET | Refills: 5 | Status: SHIPPED | OUTPATIENT
Start: 2018-03-12 | End: 2018-04-10 | Stop reason: SDUPTHER

## 2018-03-12 RX ORDER — LOSARTAN POTASSIUM 100 MG/1
100 TABLET ORAL DAILY
Qty: 30 TABLET | Refills: 5 | Status: SHIPPED | OUTPATIENT
Start: 2018-03-12 | End: 2018-09-13 | Stop reason: SDUPTHER

## 2018-03-12 RX ORDER — SERTRALINE HYDROCHLORIDE 100 MG/1
100 TABLET, FILM COATED ORAL DAILY
Qty: 30 TABLET | Refills: 5 | Status: SHIPPED | OUTPATIENT
Start: 2018-03-12 | End: 2018-09-05 | Stop reason: SDUPTHER

## 2018-03-12 RX ORDER — GLIMEPIRIDE 2 MG/1
2 TABLET ORAL EVERY MORNING
Qty: 30 TABLET | Refills: 3 | Status: SHIPPED | OUTPATIENT
Start: 2018-03-12 | End: 2018-03-12 | Stop reason: SDUPTHER

## 2018-03-12 ASSESSMENT — ENCOUNTER SYMPTOMS
NAUSEA: 0
SHORTNESS OF BREATH: 0
ABDOMINAL PAIN: 0
BACK PAIN: 1
SPUTUM PRODUCTION: 0
BLURRED VISION: 1
CONSTIPATION: 0
EYE REDNESS: 0

## 2018-03-12 NOTE — PROGRESS NOTES
[facility]    Progress Note    Date of patient's visit: 3/12/2018    Patient's Name:  Ted Whitaker    YOB: 1950            Patient Care Team:  Ananda Garcia MD as PCP - General (Internal Medicine)  Rahel Kirkpatrick MD as Consulting Physician (Pain Management)  Ananda Garcia MD as Consulting Physician (Internal Medicine)  Tenzin Mcknight MD as Consulting Physician (Cardiology)    REASON FOR VISIT: Routine outpatient follow     Chief Complaint   Patient presents with    Diabetes    Hypertension     Pt does not have norvasc in her bubble pack    [address] given to patient, dexa pended     Follow-Up from Hospital     Pt states that she was at hospital on 2/23/18 for the flu     Hip Pain     Pt c/o having hip ad leg pain on her R side, states she feels very weak today          HISTORY OF PRESENT ILLNESS:    History was obtained from the patient. Ted Whitaker is a 74 y.o. is here for Follow-up after hospitalization for flu and COPD exacerbation. She is feeling much better. She still taking Tessalon for cough. No fever or chills. She is complaining of fatigue. She has chronic back and knee pain. Today she's also complaining of pain on the right leg on the right thigh on the lateral aspect radiating up from her knee. She had recent x-rays which does show severe osteoarthritis of her right knee. She's had a previous total knee replacement of the left knee. She is following up with pain management for chronic back pain. She does have radiculopathy. She is requesting a hip x-ray also on the right side. Blood pressure is elevated today. It is noted amlodipine is not in her bubble pack. Pharmacy was called and they have forgotten to fill Norvasc for her for the last few months. She has diabetes which is not well controlled. She is only on Linagliptin. She does list just allergy to Bactrim but it's only a rash.   Will try PROFILE:  Lab Results   Component Value Date    ALT 12 10/12/2016    AST 21 10/12/2016    PROT 7.7 10/12/2016    BILITOT 0.40 10/12/2016    BILIDIR <0.08 10/12/2016    LABALBU 4.1 10/12/2016    LABALBU 4.2 02/24/2012      THYROID FUNCTION:   Lab Results   Component Value Date    TSH 1.77 07/15/2015      URINE ANALYSIS: No results found for: LABURIN  ASSESSMENT AND PLAN:    1. Type 2 diabetes mellitus with complication, without long-term current use of insulin (HCC)    - POCT glycosylated hemoglobin (Hb A1C)  - glimepiride (AMARYL) 2 MG tablet; Take 1 tablet by mouth every morning  Dispense: 30 tablet; Refill: 3    2. Primary osteoarthritis of right knee    - Enrique Cardenas DO, Orthoped[facility] [address]    3. Moderate major depression (HCC)    - sertraline (ZOLOFT) 100 MG tablet; Take 1 tablet by mouth daily  Dispense: 30 tablet; Refill: 5    4. Uncontrolled hypertension    - metoprolol (LOPRESSOR) 100 MG tablet; TAKE 1 TABLET BY MOUTH 2 TIMES DAILY  Dispense: 56 tablet; Refill: 3  - spironolactone (ALDACTONE) 25 MG tablet; Take 1 tablet by mouth daily  Dispense: 30 tablet; Refill: 2  - losartan (COZAAR) 100 MG tablet; Take 1 tablet by mouth daily  Dispense: 30 tablet; Refill: 5  - amLODIPine (NORVASC) 10 MG tablet; Take 1 tablet by mouth daily  Dispense: 30 tablet; Refill: 5    5. Right hip pain    - XR HIP RIGHT (2-3 VIEWS); Future    6. Ischemic cardiomyopathy    - metoprolol (LOPRESSOR) 100 MG tablet; TAKE 1 TABLET BY MOUTH 2 TIMES DAILY  Dispense: 56 tablet; Refill: 3  - spironolactone (ALDACTONE) 25 MG tablet; Take 1 tablet by mouth daily  Dispense: 30 tablet; Refill: 2  - losartan (COZAAR) 100 MG tablet; Take 1 tablet by mouth daily  Dispense: 30 tablet; Refill: 5    7. Coronary artery disease involving native coronary artery of native heart without angina pectoris  Asa  statins    8. Morbid obesity ([address]      9. Breast cancer screening    - [facility] DIGITAL SCREEN W CAD BILATERAL;  Future          FOLLOW UP

## 2018-03-16 ENCOUNTER — TELEPHONE (OUTPATIENT)
Dept: INTERNAL MEDICINE | Age: 68
End: 2018-03-16

## 2018-03-16 NOTE — LETTER
[address]  [address] [facility] [address] [facility]  Phone: [phone]  Fax: [phone]    Sakina Pierce MD        March 16, 2018    Julio César Saunders  [address]  [address] [facility] [place]      Dear Darryl Sayres: We are sending this letter because your PCP ordered NM cardiac persantine 1 day  for you to have done at your last visit here and they have not yet been completed. If you can please come to our office on the 2nd floor to  your orders to have them compelted. If you do not have a follow-up appointment scheduled you can either contact the office to make an appointment with us or you can make one when you come in to pick-up your orders. If you have any questions or concerns, please don't hesitate to call.     Sincerely,        Sakina Pierce MD

## 2018-03-18 ASSESSMENT — ENCOUNTER SYMPTOMS: COUGH: 1

## 2018-03-20 DIAGNOSIS — M25.561 RIGHT KNEE PAIN, UNSPECIFIED CHRONICITY: Primary | ICD-10-CM

## 2018-03-21 ENCOUNTER — TELEPHONE (OUTPATIENT)
Dept: INTERNAL MEDICINE | Age: 68
End: 2018-03-21

## 2018-03-21 DIAGNOSIS — M47.816 FACET ARTHRITIS OF LUMBAR REGION: Primary | Chronic | ICD-10-CM

## 2018-03-21 DIAGNOSIS — J45.909 ASTHMA, UNSPECIFIED ASTHMA SEVERITY, UNSPECIFIED WHETHER COMPLICATED, UNSPECIFIED WHETHER PERSISTENT: ICD-10-CM

## 2018-03-21 DIAGNOSIS — I10 HYPERTENSION, UNSPECIFIED TYPE: ICD-10-CM

## 2018-03-22 NOTE — TELEPHONE ENCOUNTER
Spoke to Oliverio Roe at [facility] ---They will do a nurse visit to educate patient on the use of [facility] Monitor. This records WT, BP, P, Heart Rate and Pulse ox and sends the information to [facility]. They will then visit patient if vitals are outside parameter and contact physician.     Order pended

## 2018-03-22 NOTE — TELEPHONE ENCOUNTER
Phone call from C[facility] Baker requesting home health orders she also would like last office note faxed with referral to [phone], please review and advise

## 2018-03-23 ENCOUNTER — HOSPITAL ENCOUNTER (OUTPATIENT)
Age: 68
Discharge: HOME | End: 2018-03-25
Payer: COMMERCIAL

## 2018-03-23 ENCOUNTER — OFFICE VISIT (OUTPATIENT)
Dept: ORTHOPEDIC SURGERY | Age: 68
End: 2018-03-23
Payer: COMMERCIAL

## 2018-03-23 ENCOUNTER — HOSPITAL ENCOUNTER (OUTPATIENT)
Dept: GENERAL RADIOLOGY | Age: 68
Discharge: HOME | End: 2018-03-25
Payer: COMMERCIAL

## 2018-03-23 VITALS — WEIGHT: 260 LBS | HEIGHT: 64 IN | BODY MASS INDEX: 44.39 KG/M2

## 2018-03-23 DIAGNOSIS — M25.551 RIGHT HIP PAIN: ICD-10-CM

## 2018-03-23 DIAGNOSIS — M96.1 POSTLAMINECTOMY SYNDROME: ICD-10-CM

## 2018-03-23 DIAGNOSIS — M17.11 ARTHRITIS OF RIGHT KNEE: Primary | ICD-10-CM

## 2018-03-23 PROCEDURE — 99213 OFFICE O/P EST LOW 20 MIN: CPT | Performed by: ORTHOPAEDIC SURGERY

## 2018-03-23 PROCEDURE — G8427 DOCREV CUR MEDS BY ELIG CLIN: HCPCS | Performed by: ORTHOPAEDIC SURGERY

## 2018-03-23 PROCEDURE — 4040F PNEUMOC VAC/ADMIN/RCVD: CPT | Performed by: ORTHOPAEDIC SURGERY

## 2018-03-23 PROCEDURE — 20610 DRAIN/INJ JOINT/BURSA W/O US: CPT | Performed by: ORTHOPAEDIC SURGERY

## 2018-03-23 PROCEDURE — 1036F TOBACCO NON-USER: CPT | Performed by: ORTHOPAEDIC SURGERY

## 2018-03-23 PROCEDURE — 3014F SCREEN MAMMO DOC REV: CPT | Performed by: ORTHOPAEDIC SURGERY

## 2018-03-23 PROCEDURE — 1090F PRES/ABSN URINE INCON ASSESS: CPT | Performed by: ORTHOPAEDIC SURGERY

## 2018-03-23 PROCEDURE — G8598 ASA/ANTIPLAT THER USED: HCPCS | Performed by: ORTHOPAEDIC SURGERY

## 2018-03-23 PROCEDURE — 3017F COLORECTAL CA SCREEN DOC REV: CPT | Performed by: ORTHOPAEDIC SURGERY

## 2018-03-23 PROCEDURE — 1123F ACP DISCUSS/DSCN MKR DOCD: CPT | Performed by: ORTHOPAEDIC SURGERY

## 2018-03-23 PROCEDURE — G8484 FLU IMMUNIZE NO ADMIN: HCPCS | Performed by: ORTHOPAEDIC SURGERY

## 2018-03-23 PROCEDURE — G8417 CALC BMI ABV UP PARAM F/U: HCPCS | Performed by: ORTHOPAEDIC SURGERY

## 2018-03-23 PROCEDURE — 1111F DSCHRG MED/CURRENT MED MERGE: CPT | Performed by: ORTHOPAEDIC SURGERY

## 2018-03-23 PROCEDURE — G8400 PT W/DXA NO RESULTS DOC: HCPCS | Performed by: ORTHOPAEDIC SURGERY

## 2018-03-23 PROCEDURE — 73502 X-RAY EXAM HIP UNI 2-3 VIEWS: CPT

## 2018-03-23 RX ORDER — BUPIVACAINE HYDROCHLORIDE 2.5 MG/ML
2 INJECTION, SOLUTION INFILTRATION; PERINEURAL ONCE
Status: COMPLETED | OUTPATIENT
Start: 2018-03-23 | End: 2018-03-23

## 2018-03-23 RX ORDER — METHYLPREDNISOLONE ACETATE 80 MG/ML
80 INJECTION, SUSPENSION INTRA-ARTICULAR; INTRALESIONAL; INTRAMUSCULAR; SOFT TISSUE ONCE
Status: COMPLETED | OUTPATIENT
Start: 2018-03-23 | End: 2018-03-23

## 2018-03-23 RX ORDER — OXYCODONE HYDROCHLORIDE AND ACETAMINOPHEN 5; 325 MG/1; MG/1
1 TABLET ORAL 2 TIMES DAILY PRN
Qty: 28 TABLET | Refills: 0 | Status: SHIPPED | OUTPATIENT
Start: 2018-03-23 | End: 2018-04-06

## 2018-03-23 RX ADMIN — METHYLPREDNISOLONE ACETATE 80 MG: 80 INJECTION, SUSPENSION INTRA-ARTICULAR; INTRALESIONAL; INTRAMUSCULAR; SOFT TISSUE at 12:02

## 2018-03-23 RX ADMIN — BUPIVACAINE HYDROCHLORIDE 5 MG: 2.5 INJECTION, SOLUTION INFILTRATION; PERINEURAL at 12:02

## 2018-03-23 ASSESSMENT — ENCOUNTER SYMPTOMS
COUGH: 0
NAUSEA: 0
CONSTIPATION: 0
DIARRHEA: 0

## 2018-04-10 DIAGNOSIS — I10 UNCONTROLLED HYPERTENSION: ICD-10-CM

## 2018-04-10 DIAGNOSIS — E11.8 TYPE 2 DIABETES MELLITUS WITH COMPLICATION, WITHOUT LONG-TERM CURRENT USE OF INSULIN (HCC): ICD-10-CM

## 2018-04-10 RX ORDER — GLIMEPIRIDE 2 MG/1
2 TABLET ORAL EVERY MORNING
Qty: 30 TABLET | Refills: 3 | Status: SHIPPED | OUTPATIENT
Start: 2018-04-10 | End: 2018-06-12 | Stop reason: SDUPTHER

## 2018-04-10 RX ORDER — AMLODIPINE BESYLATE 10 MG/1
10 TABLET ORAL DAILY
Qty: 30 TABLET | Refills: 5 | Status: SHIPPED | OUTPATIENT
Start: 2018-04-10 | End: 2018-09-13 | Stop reason: SDUPTHER

## 2018-04-25 ENCOUNTER — HOSPITAL ENCOUNTER (OUTPATIENT)
Dept: MAMMOGRAPHY | Age: 68
Discharge: HOME | End: 2018-04-27
Payer: COMMERCIAL

## 2018-04-25 DIAGNOSIS — Z12.39 BREAST CANCER SCREENING: ICD-10-CM

## 2018-04-25 PROCEDURE — 77067 SCR MAMMO BI INCL CAD: CPT

## 2018-06-12 ENCOUNTER — OFFICE VISIT (OUTPATIENT)
Dept: INTERNAL MEDICINE | Age: 68
End: 2018-06-12
Payer: COMMERCIAL

## 2018-06-12 VITALS
BODY MASS INDEX: 45.58 KG/M2 | HEART RATE: 60 BPM | SYSTOLIC BLOOD PRESSURE: 142 MMHG | DIASTOLIC BLOOD PRESSURE: 86 MMHG | HEIGHT: 64 IN | WEIGHT: 267 LBS

## 2018-06-12 DIAGNOSIS — E13.3319: ICD-10-CM

## 2018-06-12 DIAGNOSIS — M17.11 PRIMARY OSTEOARTHRITIS OF RIGHT KNEE: Chronic | ICD-10-CM

## 2018-06-12 DIAGNOSIS — E11.42 TYPE 2 DIABETES MELLITUS WITH DIABETIC POLYNEUROPATHY, WITHOUT LONG-TERM CURRENT USE OF INSULIN (HCC): Primary | ICD-10-CM

## 2018-06-12 DIAGNOSIS — I10 ESSENTIAL HYPERTENSION: ICD-10-CM

## 2018-06-12 DIAGNOSIS — Z23 NEED FOR VACCINATION WITH 13-POLYVALENT PNEUMOCOCCAL CONJUGATE VACCINE: ICD-10-CM

## 2018-06-12 DIAGNOSIS — N39.41 URGE INCONTINENCE: ICD-10-CM

## 2018-06-12 DIAGNOSIS — Z23 NEED FOR SHINGLES VACCINE: ICD-10-CM

## 2018-06-12 DIAGNOSIS — E66.01 MORBID OBESITY (HCC): ICD-10-CM

## 2018-06-12 DIAGNOSIS — I25.5 ISCHEMIC CARDIOMYOPATHY: ICD-10-CM

## 2018-06-12 DIAGNOSIS — E78.00 PURE HYPERCHOLESTEROLEMIA: ICD-10-CM

## 2018-06-12 DIAGNOSIS — I25.10 CORONARY ARTERY DISEASE INVOLVING NATIVE CORONARY ARTERY OF NATIVE HEART WITHOUT ANGINA PECTORIS: ICD-10-CM

## 2018-06-12 DIAGNOSIS — Z78.0 POST-MENOPAUSAL: ICD-10-CM

## 2018-06-12 PROCEDURE — 1036F TOBACCO NON-USER: CPT | Performed by: INTERNAL MEDICINE

## 2018-06-12 PROCEDURE — 99214 OFFICE O/P EST MOD 30 MIN: CPT | Performed by: INTERNAL MEDICINE

## 2018-06-12 PROCEDURE — 1090F PRES/ABSN URINE INCON ASSESS: CPT | Performed by: INTERNAL MEDICINE

## 2018-06-12 PROCEDURE — G8427 DOCREV CUR MEDS BY ELIG CLIN: HCPCS | Performed by: INTERNAL MEDICINE

## 2018-06-12 PROCEDURE — G0009 ADMIN PNEUMOCOCCAL VACCINE: HCPCS | Performed by: INTERNAL MEDICINE

## 2018-06-12 PROCEDURE — 3017F COLORECTAL CA SCREEN DOC REV: CPT | Performed by: INTERNAL MEDICINE

## 2018-06-12 PROCEDURE — G8400 PT W/DXA NO RESULTS DOC: HCPCS | Performed by: INTERNAL MEDICINE

## 2018-06-12 PROCEDURE — 4040F PNEUMOC VAC/ADMIN/RCVD: CPT | Performed by: INTERNAL MEDICINE

## 2018-06-12 PROCEDURE — 3045F PR MOST RECENT HEMOGLOBIN A1C LEVEL 7.0-9.0%: CPT | Performed by: INTERNAL MEDICINE

## 2018-06-12 PROCEDURE — 0509F URINE INCON PLAN DOCD: CPT | Performed by: INTERNAL MEDICINE

## 2018-06-12 PROCEDURE — 2022F DILAT RTA XM EVC RTNOPTHY: CPT | Performed by: INTERNAL MEDICINE

## 2018-06-12 PROCEDURE — G8417 CALC BMI ABV UP PARAM F/U: HCPCS | Performed by: INTERNAL MEDICINE

## 2018-06-12 PROCEDURE — G8598 ASA/ANTIPLAT THER USED: HCPCS | Performed by: INTERNAL MEDICINE

## 2018-06-12 PROCEDURE — 1123F ACP DISCUSS/DSCN MKR DOCD: CPT | Performed by: INTERNAL MEDICINE

## 2018-06-12 RX ORDER — CLOPIDOGREL BISULFATE 75 MG/1
75 TABLET ORAL DAILY
Qty: 30 TABLET | Refills: 11 | Status: SHIPPED | OUTPATIENT
Start: 2018-06-12 | End: 2018-08-13 | Stop reason: SDUPTHER

## 2018-06-12 RX ORDER — METOPROLOL TARTRATE 100 MG/1
TABLET ORAL
Qty: 56 TABLET | Refills: 3 | Status: SHIPPED | OUTPATIENT
Start: 2018-06-12 | End: 2018-09-13 | Stop reason: SDUPTHER

## 2018-06-12 RX ORDER — SPIRONOLACTONE 25 MG/1
25 TABLET ORAL DAILY
Qty: 30 TABLET | Refills: 2 | Status: SHIPPED | OUTPATIENT
Start: 2018-06-12 | End: 2018-09-13 | Stop reason: SDUPTHER

## 2018-06-12 RX ORDER — UNDERPADS 23" X 36"
EACH MISCELLANEOUS
Qty: 150 EACH | Refills: 11 | Status: SHIPPED | OUTPATIENT
Start: 2018-06-12

## 2018-06-12 RX ORDER — ATORVASTATIN CALCIUM 80 MG/1
TABLET, FILM COATED ORAL
Qty: 30 TABLET | Refills: 5 | Status: SHIPPED | OUTPATIENT
Start: 2018-06-12 | End: 2018-09-05 | Stop reason: SDUPTHER

## 2018-06-12 RX ORDER — GLIMEPIRIDE 2 MG/1
2 TABLET ORAL EVERY MORNING
Qty: 30 TABLET | Refills: 3 | Status: SHIPPED | OUTPATIENT
Start: 2018-06-12 | End: 2018-09-13 | Stop reason: SDUPTHER

## 2018-06-12 ASSESSMENT — ENCOUNTER SYMPTOMS
ABDOMINAL PAIN: 0
BACK PAIN: 1
COUGH: 0
EYE REDNESS: 0
SHORTNESS OF BREATH: 0
SPUTUM PRODUCTION: 0
BLURRED VISION: 1
CONSTIPATION: 0
NAUSEA: 0

## 2018-06-18 ENCOUNTER — TELEPHONE (OUTPATIENT)
Dept: INTERNAL MEDICINE | Age: 68
End: 2018-06-18

## 2018-06-18 DIAGNOSIS — I25.5 ISCHEMIC CARDIOMYOPATHY: ICD-10-CM

## 2018-06-18 DIAGNOSIS — I10 UNCONTROLLED HYPERTENSION: ICD-10-CM

## 2018-06-19 RX ORDER — SPIRONOLACTONE 25 MG/1
25 TABLET ORAL DAILY
Qty: 30 TABLET | Refills: 2 | Status: SHIPPED | OUTPATIENT
Start: 2018-06-19 | End: 2018-09-13 | Stop reason: SDUPTHER

## 2018-06-19 RX ORDER — OMEPRAZOLE 20 MG/1
CAPSULE, DELAYED RELEASE ORAL
Qty: 28 CAPSULE | Refills: 3 | Status: SHIPPED | OUTPATIENT
Start: 2018-06-19 | End: 2018-10-03 | Stop reason: SDUPTHER

## 2018-06-26 ENCOUNTER — TELEPHONE (OUTPATIENT)
Dept: INTERNAL MEDICINE | Age: 68
End: 2018-06-26

## 2018-07-13 ENCOUNTER — HOSPITAL ENCOUNTER (OUTPATIENT)
Age: 68
Discharge: HOME | End: 2018-07-13
Payer: COMMERCIAL

## 2018-07-13 ENCOUNTER — HOSPITAL ENCOUNTER (OUTPATIENT)
Dept: MAMMOGRAPHY | Age: 68
Discharge: HOME | End: 2018-07-15
Payer: COMMERCIAL

## 2018-07-13 DIAGNOSIS — I25.5 ISCHEMIC CARDIOMYOPATHY: ICD-10-CM

## 2018-07-13 DIAGNOSIS — E78.00 PURE HYPERCHOLESTEROLEMIA: ICD-10-CM

## 2018-07-13 DIAGNOSIS — E11.42 TYPE 2 DIABETES MELLITUS WITH DIABETIC POLYNEUROPATHY, WITHOUT LONG-TERM CURRENT USE OF INSULIN (HCC): ICD-10-CM

## 2018-07-13 DIAGNOSIS — I25.10 CORONARY ARTERY DISEASE INVOLVING NATIVE CORONARY ARTERY OF NATIVE HEART WITHOUT ANGINA PECTORIS: ICD-10-CM

## 2018-07-13 DIAGNOSIS — Z78.0 POST-MENOPAUSAL: ICD-10-CM

## 2018-07-13 LAB
CHOLESTEROL/HDL RATIO: 3.7
CHOLESTEROL: 217 MG/DL
HDLC SERPL-MCNC: 58 MG/DL
LDL CHOLESTEROL: 146 MG/DL (ref 0–130)
TRIGL SERPL-MCNC: 67 MG/DL
VLDLC SERPL CALC-MCNC: ABNORMAL MG/DL (ref 1–30)

## 2018-07-13 PROCEDURE — 36415 COLL VENOUS BLD VENIPUNCTURE: CPT

## 2018-07-13 PROCEDURE — 77080 DXA BONE DENSITY AXIAL: CPT

## 2018-07-13 PROCEDURE — 80061 LIPID PANEL: CPT

## 2018-07-16 ENCOUNTER — TELEPHONE (OUTPATIENT)
Dept: INTERNAL MEDICINE | Age: 68
End: 2018-07-16

## 2018-07-16 ENCOUNTER — NURSE ONLY (OUTPATIENT)
Dept: INTERNAL MEDICINE | Age: 68
End: 2018-07-16
Payer: COMMERCIAL

## 2018-07-16 VITALS — HEART RATE: 55 BPM | DIASTOLIC BLOOD PRESSURE: 68 MMHG | SYSTOLIC BLOOD PRESSURE: 128 MMHG

## 2018-07-16 DIAGNOSIS — I10 ESSENTIAL HYPERTENSION: Primary | ICD-10-CM

## 2018-07-16 PROCEDURE — 99212 OFFICE O/P EST SF 10 MIN: CPT | Performed by: INTERNAL MEDICINE

## 2018-07-16 PROCEDURE — 99211 OFF/OP EST MAY X REQ PHY/QHP: CPT | Performed by: INTERNAL MEDICINE

## 2018-07-16 NOTE — PROGRESS NOTES
S: pt presents at clinic today for bp check and reconcile meds from home with the meds list in the chart. O: pt oriented times 4 , Skin warm dry . Writer checked the current bubble pack from 7/11/18 against the current chart meds list in pt chart . The current bubble pack is correct. A:  (b/p taken see vitals in chart , vitals normal today , all bubble pack medications taken as directed by the pt today.),note sent to Dr. Mick Briggs , pt bubble packs are now correct. Home health called and notified the bubble packs dated 7/11/18 that were delivered to pt last week are now correct . P:.  Angelina Calhoun RN with [employer] [facility] notified the bubble packs are now correct. See phone note. Pt also reported her test strips arrived for her prodigy talking meter. And she now has a lancing device she knows how to use. pt return to clinic [email or web address] w/ DR. Mick Briggs

## 2018-08-13 DIAGNOSIS — I25.5 ISCHEMIC CARDIOMYOPATHY: ICD-10-CM

## 2018-08-13 DIAGNOSIS — I25.10 CORONARY ARTERY DISEASE INVOLVING NATIVE CORONARY ARTERY OF NATIVE HEART WITHOUT ANGINA PECTORIS: ICD-10-CM

## 2018-08-13 DIAGNOSIS — E11.8 TYPE 2 DIABETES MELLITUS WITH COMPLICATION, WITHOUT LONG-TERM CURRENT USE OF INSULIN (HCC): ICD-10-CM

## 2018-08-13 DIAGNOSIS — E11.42 TYPE 2 DIABETES MELLITUS WITH DIABETIC POLYNEUROPATHY, WITHOUT LONG-TERM CURRENT USE OF INSULIN (HCC): ICD-10-CM

## 2018-08-13 RX ORDER — ISOSORBIDE MONONITRATE 30 MG/1
TABLET, EXTENDED RELEASE ORAL
Qty: 30 TABLET | Refills: 5 | Status: SHIPPED | OUTPATIENT
Start: 2018-08-13 | End: 2018-12-19 | Stop reason: SDUPTHER

## 2018-08-13 RX ORDER — ASPIRIN 81 MG/1
81 TABLET ORAL DAILY
Qty: 30 TABLET | Refills: 11 | Status: SHIPPED | OUTPATIENT
Start: 2018-08-13

## 2018-08-13 RX ORDER — CLOPIDOGREL BISULFATE 75 MG/1
75 TABLET ORAL DAILY
Qty: 30 TABLET | Refills: 11 | Status: ON HOLD | OUTPATIENT
Start: 2018-08-13 | End: 2021-01-22 | Stop reason: SDUPTHER

## 2018-09-05 DIAGNOSIS — I25.10 CORONARY ARTERY DISEASE INVOLVING NATIVE CORONARY ARTERY OF NATIVE HEART WITHOUT ANGINA PECTORIS: ICD-10-CM

## 2018-09-05 DIAGNOSIS — E11.42 TYPE 2 DIABETES MELLITUS WITH DIABETIC POLYNEUROPATHY, WITHOUT LONG-TERM CURRENT USE OF INSULIN (HCC): ICD-10-CM

## 2018-09-05 DIAGNOSIS — I25.5 ISCHEMIC CARDIOMYOPATHY: ICD-10-CM

## 2018-09-05 DIAGNOSIS — F32.1 MODERATE MAJOR DEPRESSION (HCC): ICD-10-CM

## 2018-09-05 DIAGNOSIS — E78.00 PURE HYPERCHOLESTEROLEMIA: ICD-10-CM

## 2018-09-05 RX ORDER — ATORVASTATIN CALCIUM 80 MG/1
TABLET, FILM COATED ORAL
Qty: 30 TABLET | Refills: 5 | Status: SHIPPED | OUTPATIENT
Start: 2018-09-05

## 2018-09-05 RX ORDER — SERTRALINE HYDROCHLORIDE 100 MG/1
100 TABLET, FILM COATED ORAL DAILY
Qty: 30 TABLET | Refills: 5 | Status: SHIPPED | OUTPATIENT
Start: 2018-09-05

## 2018-09-05 NOTE — TELEPHONE ENCOUNTER
PC from [place] at . [address] requesting a refill on Atorvastatin 80 mg and Sertraline 100 mg. Health Maintenance   Topic Date Due    Colon cancer screen colonoscopy  10/03/2015    Diabetic foot exam  12/13/2017    Diabetic retinal exam  08/05/2018    Flu vaccine (1) 09/01/2018    DTaP/Tdap/Td vaccine (1 - Tdap) 12/12/2018 (Originally 4/20/1971)    Shingles Vaccine (1 of 2 - 2 Dose Series) 12/12/2018 (Originally 4/20/2002)    Diabetic microalbuminuria test  10/31/2018    Potassium monitoring  02/28/2019    Creatinine monitoring  02/28/2019    A1C test (Diabetic or Prediabetic)  03/12/2019    Pneumococcal low/med risk (2 of 2 - PPSV23) 06/12/2019    Lipid screen  07/13/2019    Breast cancer screen  04/25/2020    DEXA (modify frequency per FRAX score)  Completed    Hepatitis C screen  Completed             (applicable per patient's age: Cancer Screenings, Depression Screening, Fall Risk Screening, Immunizations)    Hemoglobin A1C (%)   Date Value   03/12/2018 7.5   07/17/2017 7.1   12/13/2016 6.5     Microalb/Crt.  Ratio (mcg/mg creat)   Date Value   10/31/2017 59 (H)     LDL Cholesterol (mg/dL)   Date Value   07/13/2018 146 (H)     AST (U/L)   Date Value   10/12/2016 21     ALT (U/L)   Date Value   10/12/2016 12     BUN (mg/dL)   Date Value   02/28/2018 17      (goal A1C is < 7)   (goal LDL is <100) need 30-50% reduction from baseline     BP Readings from Last 3 Encounters:   07/16/18 128/68   06/12/18 (!) 142/86   03/12/18 (!) 142/88    (goal /80)      All Future Testing planned in [facility]:  Lab Frequency Next Occurrence   PT aquatic therapy Once 02/13/2018   XR HIP RIGHT (2-3 VIEWS) Once 03/12/2019       Next Visit Date:  Future Appointments  Date Time Provider Shruthi Griggs   9/13/2018 1:15 PM Moraima Valencia MD [address] IM [facility]            Patient Active Problem List:     DM (diabetes mellitus) ([address]     HTN (hypertension)     CAD (coronary artery disease) s/p 3 stents Smoker     Back pain     Asthma     Facet arthritis of lumbar region [facility])     Knee osteoarthritis     Edema     Spinal stenosis in cervical region     Chest pain     Lower urinary tract infectious disease     YUDITH (obstructive sleep apnea)     Morbid obesity (HCC)     Knee pain, bilateral     S/P TKR (total knee replacement)     Urge incontinence of urine     Lumbar spondylosis     Cervical spondylosis     Chronic use of opiate drugs therapeutic purposes     Chronic knee pain     Hypertensive urgency     Acute coronary syndrome (HCC)     Congestive heart failure (HCC)     Lymphadenopathy     Type 2 diabetes mellitus without complication ([facility])     Chronic pain     Coronary artery disease involving native coronary artery without angina pectoris     Chronic prescription opiate use     Type 2 diabetes mellitus with complication, without long-term current use of insulin ([facility])     S/P coronary artery stent placement - RCA 10/12/16 - Dr. Je Agarwal     Depression (emotion)     Postlaminectomy syndrome, lumbar     Medication monitoring encounter     Postlaminectomy syndrome     Postlaminectomy syndrome     Primary osteoarthritis of right knee     Long term (current) use of antithrombotics/antiplatelets     Influenza B     Reactive airway disease with acute exacerbation

## 2018-09-13 ENCOUNTER — OFFICE VISIT (OUTPATIENT)
Dept: INTERNAL MEDICINE | Age: 68
End: 2018-09-13
Payer: COMMERCIAL

## 2018-09-13 VITALS
SYSTOLIC BLOOD PRESSURE: 140 MMHG | BODY MASS INDEX: 46.52 KG/M2 | WEIGHT: 271 LBS | HEART RATE: 60 BPM | DIASTOLIC BLOOD PRESSURE: 85 MMHG

## 2018-09-13 DIAGNOSIS — I25.10 CORONARY ARTERY DISEASE INVOLVING NATIVE CORONARY ARTERY OF NATIVE HEART WITHOUT ANGINA PECTORIS: ICD-10-CM

## 2018-09-13 DIAGNOSIS — M96.1 POSTLAMINECTOMY SYNDROME, LUMBAR: ICD-10-CM

## 2018-09-13 DIAGNOSIS — M85.80 OSTEOPENIA AFTER MENOPAUSE: ICD-10-CM

## 2018-09-13 DIAGNOSIS — Z78.0 OSTEOPENIA AFTER MENOPAUSE: ICD-10-CM

## 2018-09-13 DIAGNOSIS — E11.42 TYPE 2 DIABETES MELLITUS WITH DIABETIC POLYNEUROPATHY, WITHOUT LONG-TERM CURRENT USE OF INSULIN (HCC): Primary | ICD-10-CM

## 2018-09-13 DIAGNOSIS — I25.5 ISCHEMIC CARDIOMYOPATHY: ICD-10-CM

## 2018-09-13 DIAGNOSIS — M15.9 PRIMARY OSTEOARTHRITIS INVOLVING MULTIPLE JOINTS: ICD-10-CM

## 2018-09-13 DIAGNOSIS — G47.33 OSA (OBSTRUCTIVE SLEEP APNEA): ICD-10-CM

## 2018-09-13 DIAGNOSIS — E66.01 MORBID OBESITY (HCC): ICD-10-CM

## 2018-09-13 DIAGNOSIS — I10 UNCONTROLLED HYPERTENSION: ICD-10-CM

## 2018-09-13 LAB — HBA1C MFR BLD: 6.1 %

## 2018-09-13 PROCEDURE — G8427 DOCREV CUR MEDS BY ELIG CLIN: HCPCS | Performed by: INTERNAL MEDICINE

## 2018-09-13 PROCEDURE — 99214 OFFICE O/P EST MOD 30 MIN: CPT | Performed by: INTERNAL MEDICINE

## 2018-09-13 PROCEDURE — 3017F COLORECTAL CA SCREEN DOC REV: CPT | Performed by: INTERNAL MEDICINE

## 2018-09-13 PROCEDURE — 4040F PNEUMOC VAC/ADMIN/RCVD: CPT | Performed by: INTERNAL MEDICINE

## 2018-09-13 PROCEDURE — 1123F ACP DISCUSS/DSCN MKR DOCD: CPT | Performed by: INTERNAL MEDICINE

## 2018-09-13 PROCEDURE — G8598 ASA/ANTIPLAT THER USED: HCPCS | Performed by: INTERNAL MEDICINE

## 2018-09-13 PROCEDURE — 1090F PRES/ABSN URINE INCON ASSESS: CPT | Performed by: INTERNAL MEDICINE

## 2018-09-13 PROCEDURE — 1036F TOBACCO NON-USER: CPT | Performed by: INTERNAL MEDICINE

## 2018-09-13 PROCEDURE — G8417 CALC BMI ABV UP PARAM F/U: HCPCS | Performed by: INTERNAL MEDICINE

## 2018-09-13 PROCEDURE — 83036 HEMOGLOBIN GLYCOSYLATED A1C: CPT | Performed by: INTERNAL MEDICINE

## 2018-09-13 PROCEDURE — 1101F PT FALLS ASSESS-DOCD LE1/YR: CPT | Performed by: INTERNAL MEDICINE

## 2018-09-13 PROCEDURE — G8399 PT W/DXA RESULTS DOCUMENT: HCPCS | Performed by: INTERNAL MEDICINE

## 2018-09-13 PROCEDURE — 99211 OFF/OP EST MAY X REQ PHY/QHP: CPT | Performed by: INTERNAL MEDICINE

## 2018-09-13 PROCEDURE — 3044F HG A1C LEVEL LT 7.0%: CPT | Performed by: INTERNAL MEDICINE

## 2018-09-13 PROCEDURE — 2022F DILAT RTA XM EVC RTNOPTHY: CPT | Performed by: INTERNAL MEDICINE

## 2018-09-13 RX ORDER — GLIMEPIRIDE 2 MG/1
2 TABLET ORAL EVERY MORNING
Qty: 30 TABLET | Refills: 3 | Status: SHIPPED | OUTPATIENT
Start: 2018-09-13 | End: 2018-12-19 | Stop reason: SDUPTHER

## 2018-09-13 RX ORDER — LOSARTAN POTASSIUM 100 MG/1
100 TABLET ORAL DAILY
Qty: 30 TABLET | Refills: 5 | Status: ON HOLD | OUTPATIENT
Start: 2018-09-13 | End: 2020-12-29 | Stop reason: SDUPTHER

## 2018-09-13 RX ORDER — METOPROLOL TARTRATE 100 MG/1
TABLET ORAL
Qty: 56 TABLET | Refills: 3 | Status: SHIPPED | OUTPATIENT
Start: 2018-09-13 | End: 2018-12-19 | Stop reason: SDUPTHER

## 2018-09-13 RX ORDER — SPIRONOLACTONE 50 MG/1
50 TABLET, FILM COATED ORAL DAILY
Qty: 30 TABLET | Refills: 3 | Status: SHIPPED | OUTPATIENT
Start: 2018-09-13 | End: 2018-12-19 | Stop reason: SDUPTHER

## 2018-09-13 RX ORDER — AMLODIPINE BESYLATE 10 MG/1
10 TABLET ORAL DAILY
Qty: 30 TABLET | Refills: 5 | Status: ON HOLD | OUTPATIENT
Start: 2018-09-13 | End: 2021-01-20 | Stop reason: HOSPADM

## 2018-09-13 RX ORDER — FUROSEMIDE 40 MG/1
40 TABLET ORAL DAILY
Qty: 30 TABLET | Refills: 10 | Status: SHIPPED | OUTPATIENT
Start: 2018-09-13

## 2018-09-13 ASSESSMENT — ENCOUNTER SYMPTOMS
SHORTNESS OF BREATH: 0
SPUTUM PRODUCTION: 0
COUGH: 0
SINUS PAIN: 0
BLURRED VISION: 1
ABDOMINAL PAIN: 0
NAUSEA: 0
CONSTIPATION: 0
BACK PAIN: 1
EYE REDNESS: 0

## 2018-09-13 NOTE — PATIENT INSTRUCTIONS
Return To Clinic Wednesday 12/19/2018 @ 3:00. After Visit Summary  given and reviewed. It is very important for your care that you keep your appointment. If for some reason you are unable to keep your appointment it is equally important that you call our office at [phone] to cancel your appointment and reschedule. Failure to do so may result in your termination from our practice.     -Bloodwork orders given to patient, they will have them done before their next visit.   -Order for ECHO given to patient--scheduling will contact patient to set up appt. Scheduling information given to patient as well so they can call and schedule if they prefer. --Kathe        Records from [facility] eye requested

## 2018-09-13 NOTE — PROGRESS NOTES
[facility]    Progress Note    Date of patient's visit: 9/13/2018    Patient's Name:  Deepti Rivera    YOB: 1950            Patient Care Team:  Gay Zavala MD as PCP - General (Internal Medicine)  Tamara Linton MD as Consulting Physician (Pain Management)  Gay Zavala MD as Consulting Physician (Internal Medicine)  She Falk MD as Consulting Physician (Cardiology)    REASON FOR VISIT: Routine outpatient follow     Chief Complaint   Patient presents with    Diabetes    Hypertension    Health Maintenance     pt needs to make eye appt. Will request records from [address] [place] [address]:    History was obtained from the patient. Deepti Rivera is a 79 y.o. 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 Overall she is doing well. Blood pressure still running a little high. She is getting her medications blister packed and she brought it with her. She is complaining of little increasing swelling of her lower extremities. She is not compliant with diet. She has obstructive sleep apnea and has not been compliant with her CPAP. She says she needs a new mask. She denies chest pain. No shortness of breath. She is following up with podiatry and ophthalmologist.     She is no longer going to pain clinic. She was getting Percocets for back and knee pain. Unfortunately they're concerned about her memory and want her daughter to be present with her at every appointment but daughter is working and is unable to make all the appointments.     She has a history of coronary artery disease with stents. I'm not sure when she last saw cardiology. .  We will get notes from cardiologist.  Sunday Barney has chronic back, hip and knee pain.   She has seen  omeprazole (PRILOSEC) 20 MG delayed release capsule TAKE ONE CAPSULE BY MOUTH DAILY 28 capsule 3    Incontinence Supply Disposable (INCONTINENCE BRIEF LARGE) MISC Use 4-5/day as needed 150 each 11    metoprolol (LOPRESSOR) 100 MG tablet TAKE 1 TABLET BY MOUTH 2 TIMES DAILY 56 tablet 3    glimepiride (AMARYL) 2 MG tablet Take 1 tablet by mouth every morning 30 tablet 3    amLODIPine (NORVASC) 10 MG tablet Take 1 tablet by mouth daily 30 tablet 5    losartan (COZAAR) 100 MG tablet Take 1 tablet by mouth daily 30 tablet 5    Calcium Carbonate-Vitamin D (OYSTER SHELL CALCIUM/D) 500-200 MG-UNIT TABS TAKE 1 TABLET BY MOUTH 2 TIMES DAILY 60 tablet 11    furosemide (LASIX) 40 MG tablet TAKE 1 TABLET BY MOUTH DAILY 30 tablet 10    glucose blood VI test strips (ASCENSIA AUTODISC VI;ONE TOUCH ULTRA TEST VI) strip 1 each by In Vitro route 3 times daily As needed. 100 each 5    albuterol-ipratropium (COMBIVENT RESPIMAT)  MCG/ACT AERS inhaler Inhale 1 puff into the lungs every 6 hours as needed for Wheezing 1 Inhaler 5    Lancets MISC Use 1 -2 times daily Insulin dependent diabetes mellitus 50 each 11    Multiple Vitamins-Minerals (MULTIVITAMIN WITH MINERALS) tablet Take 1 tablet by mouth daily 30 tablet 3    nitroGLYCERIN (NITROSTAT) 0.4 MG SL tablet TAKE 1 TABLET UNDER THE TONGUE EVERY 5 MINUTES AS NEEDED FOR CHEST 20 tablet 5     No current facility-administered medications on file prior to visit. SOCIAL HISTORY    Reviewed and no change from previous record. Felicitas Gordon  reports that she quit smoking about 5 years ago. She smoked 0.50 packs per day for 0.00 years.  She has never used smokeless tobacco.    FAMILY HISTORY:    Reviewed and No change from previous visit    HEALTH MAINTENANCE DUE:      Health Maintenance Due   Topic Date Due    Colon cancer screen colonoscopy  10/03/2015    Diabetic foot exam  12/13/2017    Diabetic retinal exam  08/05/2018    Flu vaccine (1) 09/01/2018       REVIEW OF SYSTEMS:    12 point review of symptoms completed and found to be normal except noted in the HPI    Review of Systems   Constitutional: Positive for malaise/fatigue. Negative for fever and weight loss. HENT: Negative for congestion and sinus pain. Eyes: Positive for blurred vision. Negative for redness. Respiratory: Negative for cough, sputum production and shortness of breath. Cardiovascular: Positive for leg swelling. Negative for chest pain and palpitations. Gastrointestinal: Negative for abdominal pain, constipation and nausea. Musculoskeletal: Positive for back pain and joint pain. Skin: Negative for itching and rash. Neurological: Positive for sensory change. Negative for dizziness, loss of consciousness and headaches. Psychiatric/Behavioral: Positive for depression. Negative for substance abuse and suicidal ideas. The patient is not nervous/anxious and does not have insomnia. PHYSICAL EXAM:      Vitals:    09/13/18 1257   BP: (!) 140/85   Site: Left Upper Arm   Position: Sitting   Cuff Size: Large Adult   Pulse: 60   Weight: 271 lb (122.9 kg)     Body mass index is 46.52 kg/m². BP Readings from Last 3 Encounters:   09/13/18 (!) 140/85   07/16/18 128/68   06/12/18 (!) 142/86        Wt Readings from Last 3 Encounters:   09/13/18 271 lb (122.9 kg)   06/12/18 267 lb (121.1 kg)   03/23/18 260 lb (117.9 kg)       Physical Exam      HENT:  Normocephalic, Atraumatic, Bilateral external ears normal, Oropharynx moist. Neck- Normal range of motion, No tenderness, Supple, No stridor. Eyes:  PERRL, EOMI, Conjunctiva normal, No discharge. Respiratory:  Normal breath sounds, No respiratory distress, No wheezing, No chest tenderness. Cardiovascular:  Normal heart rate, Normal rhythm, No murmurs, No rubs, No gallops. GI:  Bowel sounds normal, Soft, No tenderness  Musculoskeletal:  Intact distal pulses, No edema, No tenderness. Integument:  Warm, Dry, No erythema, No rash.    Lymphatic: No lymphadenopathy noted. Neurologic:  Alert & oriented x 3, Normal motor function, Normal sensory function, No focal deficits noted. Psychiatric:  Affect normal    LABORATORY FINDINGS:    CBC:  Lab Results   Component Value Date    WBC 7.5 02/28/2018    HGB 12.3 02/28/2018     02/28/2018     02/24/2012     BMP:    Lab Results   Component Value Date     02/28/2018    K 3.9 02/28/2018    CL 99 02/28/2018    CO2 27 02/28/2018    BUN 17 02/28/2018    CREATININE 0.78 02/28/2018    GLUCOSE 210 02/28/2018    GLUCOSE 105 02/24/2012     HEMOGLOBIN A1C:   Lab Results   Component Value Date    LABA1C 6.1 09/13/2018     MICROALBUMIN URINE:   Lab Results   Component Value Date    MICROALBUR 80 10/31/2017     FASTING LIPID PANEL:  Lab Results   Component Value Date    CHOL 217 (H) 07/13/2018    HDL 58 07/13/2018    TRIG 67 07/13/2018     Lab Results   Component Value Date    LDLCHOLESTEROL 146 (H) 07/13/2018       LIVER PROFILE:  Lab Results   Component Value Date    ALT 12 10/12/2016    AST 21 10/12/2016    PROT 7.7 10/12/2016    BILITOT 0.40 10/12/2016    BILIDIR <0.08 10/12/2016    LABALBU 4.1 10/12/2016    LABALBU 4.2 02/24/2012      THYROID FUNCTION:   Lab Results   Component Value Date    TSH 1.77 07/15/2015      URINE ANALYSIS: No results found for: LABURIN  ASSESSMENT AND PLAN:    1. Type 2 diabetes mellitus with diabetic polyneuropathy, without long-term current use of insulin ([facility])    - POCT glycosylated hemoglobin (Hb A1C)  - glimepiride (AMARYL) 2 MG tablet; Take 1 tablet by mouth every morning  Dispense: 30 tablet; Refill: 3  - Basic Metabolic Panel; Future    2. Uncontrolled hypertension    - metoprolol (LOPRESSOR) 100 MG tablet; TAKE 1 TABLET BY MOUTH 2 TIMES DAILY  Dispense: 56 tablet; Refill: 3  - amLODIPine (NORVASC) 10 MG tablet; Take 1 tablet by mouth daily  Dispense: 30 tablet; Refill: 5  - losartan (COZAAR) 100 MG tablet; Take 1 tablet by mouth daily  Dispense: 30 tablet;  Refill:

## 2018-09-13 NOTE — PROGRESS NOTES
Visit Information    Have you changed or started any medications since your last visit including any over-the-counter medicines, vitamins, or herbal medicines? no   Are you having any side effects from any of your medications? -  no  Have you stopped taking any of your medications? Is so, why? -  no    Have you seen any other physician or provider since your last visit? Yes - Records Obtained  Have you had any other diagnostic tests since your last visit? Yes - Records Obtained  Have you been seen in the emergency room and/or had an admission to a hospital since we last saw you? Yes - Records Obtained  Have you had your routine dental cleaning in the past 6 months? no    Have you activated your [employer] account? If not, what are your barriers?  No    Patient Care Team:  Joaquin Hawkins MD as PCP - General (Internal Medicine)  Kaye Landrum MD as Consulting Physician (Pain Management)  Joaquin Hawkins MD as Consulting Physician (Internal Medicine)  Leonel Banuelos MD as Consulting Physician (Cardiology)    Medical History Review  Past Medical, Family, and Social History reviewed and does contribute to the patient presenting condition    Health Maintenance   Topic Date Due    Colon cancer screen colonoscopy  10/03/2015    Diabetic foot exam  12/13/2017    Diabetic retinal exam  08/05/2018    Flu vaccine (1) 09/01/2018    DTaP/Tdap/Td vaccine (1 - Tdap) 12/12/2018 (Originally 4/20/1969)    Shingles Vaccine (1 of 2 - 2 Dose Series) 12/12/2018 (Originally 4/20/2002)    Diabetic microalbuminuria test  10/31/2018    Potassium monitoring  02/28/2019    Creatinine monitoring  02/28/2019    A1C test (Diabetic or Prediabetic)  03/12/2019    Pneumococcal low/med risk (2 of 2 - PPSV23) 06/12/2019    Lipid screen  07/13/2019    Breast cancer screen  04/25/2020    DEXA (modify frequency per FRAX score)  Completed    Hepatitis C screen  Completed

## 2018-09-25 ENCOUNTER — TELEPHONE (OUTPATIENT)
Dept: INTERNAL MEDICINE | Age: 68
End: 2018-09-25

## 2018-09-25 NOTE — TELEPHONE ENCOUNTER
Jacqueline ESPINOZA/[facility] called to verify changes in BP medication for pt . RN went over recent AVS pt showed her from last visit with PCP and it showed an increase in the spirolactone to 50 mg daily confirmed with Jacqueline ESPINOZA this was increased at the last follow up apt. Jacqueline ESPINOZA reports she will check the new bubble packs that are due to be delivered and make sure the change has been made.

## 2018-10-04 RX ORDER — OMEPRAZOLE 20 MG/1
CAPSULE, DELAYED RELEASE ORAL
Qty: 28 CAPSULE | Refills: 3 | Status: SHIPPED | OUTPATIENT
Start: 2018-10-04 | End: 2019-01-16 | Stop reason: SDUPTHER

## 2018-10-09 ENCOUNTER — HOSPITAL ENCOUNTER (OUTPATIENT)
Dept: NON INVASIVE DIAGNOSTICS | Age: 68
Discharge: HOME | End: 2018-10-09
Payer: COMMERCIAL

## 2018-10-09 DIAGNOSIS — I25.5 ISCHEMIC CARDIOMYOPATHY: ICD-10-CM

## 2018-10-09 LAB
LV EF: 55 %
LVEF MODALITY: NORMAL

## 2018-10-09 PROCEDURE — 93306 TTE W/DOPPLER COMPLETE: CPT

## 2018-10-31 ENCOUNTER — TELEPHONE (OUTPATIENT)
Dept: INTERNAL MEDICINE | Age: 68
End: 2018-10-31

## 2018-11-02 NOTE — TELEPHONE ENCOUNTER
Unable to locate forms. Left VM for Chiki Bishop asking if she can resend the paperwork to the office. Fax number given was [phone].

## 2018-11-20 ENCOUNTER — TELEPHONE (OUTPATIENT)
Dept: INTERNAL MEDICINE | Age: 68
End: 2018-11-20

## 2018-12-19 ENCOUNTER — HOSPITAL ENCOUNTER (OUTPATIENT)
Age: 68
Setting detail: SPECIMEN
Discharge: HOME | End: 2018-12-19
Payer: COMMERCIAL

## 2018-12-19 ENCOUNTER — OFFICE VISIT (OUTPATIENT)
Dept: INTERNAL MEDICINE | Age: 68
End: 2018-12-19
Payer: COMMERCIAL

## 2018-12-19 VITALS
SYSTOLIC BLOOD PRESSURE: 145 MMHG | BODY MASS INDEX: 46.35 KG/M2 | HEART RATE: 54 BPM | DIASTOLIC BLOOD PRESSURE: 80 MMHG | WEIGHT: 270 LBS

## 2018-12-19 DIAGNOSIS — F33.1 MODERATE EPISODE OF RECURRENT MAJOR DEPRESSIVE DISORDER (HCC): ICD-10-CM

## 2018-12-19 DIAGNOSIS — I10 ESSENTIAL HYPERTENSION: ICD-10-CM

## 2018-12-19 DIAGNOSIS — R53.82 CHRONIC FATIGUE: ICD-10-CM

## 2018-12-19 DIAGNOSIS — E11.42 TYPE 2 DIABETES MELLITUS WITH DIABETIC POLYNEUROPATHY, WITHOUT LONG-TERM CURRENT USE OF INSULIN (HCC): Primary | ICD-10-CM

## 2018-12-19 DIAGNOSIS — F41.9 ANXIETY: ICD-10-CM

## 2018-12-19 DIAGNOSIS — G47.33 OSA (OBSTRUCTIVE SLEEP APNEA): ICD-10-CM

## 2018-12-19 DIAGNOSIS — G89.29 CHRONIC LOW BACK PAIN WITH SCIATICA, SCIATICA LATERALITY UNSPECIFIED, UNSPECIFIED BACK PAIN LATERALITY: ICD-10-CM

## 2018-12-19 DIAGNOSIS — I25.5 ISCHEMIC CARDIOMYOPATHY: ICD-10-CM

## 2018-12-19 DIAGNOSIS — E11.42 TYPE 2 DIABETES MELLITUS WITH DIABETIC POLYNEUROPATHY, WITHOUT LONG-TERM CURRENT USE OF INSULIN (HCC): ICD-10-CM

## 2018-12-19 DIAGNOSIS — E66.01 MORBID OBESITY WITH BMI OF 45.0-49.9, ADULT (HCC): ICD-10-CM

## 2018-12-19 DIAGNOSIS — M54.40 CHRONIC LOW BACK PAIN WITH SCIATICA, SCIATICA LATERALITY UNSPECIFIED, UNSPECIFIED BACK PAIN LATERALITY: ICD-10-CM

## 2018-12-19 DIAGNOSIS — M17.11 PRIMARY OSTEOARTHRITIS OF RIGHT KNEE: Chronic | ICD-10-CM

## 2018-12-19 DIAGNOSIS — I25.10 CORONARY ARTERY DISEASE INVOLVING NATIVE CORONARY ARTERY OF NATIVE HEART WITHOUT ANGINA PECTORIS: ICD-10-CM

## 2018-12-19 LAB
ALBUMIN SERPL-MCNC: 4.1 G/DL (ref 3.5–5.2)
ALBUMIN/GLOBULIN RATIO: 1.2 (ref 1–2.5)
ALP BLD-CCNC: 78 U/L (ref 35–104)
ALT SERPL-CCNC: 11 U/L (ref 5–33)
ANION GAP SERPL CALCULATED.3IONS-SCNC: 16 MMOL/L (ref 9–17)
AST SERPL-CCNC: 23 U/L
BILIRUB SERPL-MCNC: 0.37 MG/DL (ref 0.3–1.2)
BILIRUBIN DIRECT: 0.1 MG/DL
BILIRUBIN, INDIRECT: 0.27 MG/DL (ref 0–1)
BUN BLDV-MCNC: 20 MG/DL (ref 8–23)
BUN/CREAT BLD: NORMAL (ref 9–20)
CALCIUM SERPL-MCNC: 9.9 MG/DL (ref 8.6–10.4)
CHLORIDE BLD-SCNC: 105 MMOL/L (ref 98–107)
CO2: 21 MMOL/L (ref 20–31)
CREAT SERPL-MCNC: 0.66 MG/DL (ref 0.5–0.9)
CREATININE URINE: 160.7 MG/DL (ref 28–217)
GFR AFRICAN AMERICAN: >60 ML/MIN
GFR NON-AFRICAN AMERICAN: >60 ML/MIN
GFR SERPL CREATININE-BSD FRML MDRD: NORMAL ML/MIN/{1.73_M2}
GFR SERPL CREATININE-BSD FRML MDRD: NORMAL ML/MIN/{1.73_M2}
GLOBULIN: NORMAL G/DL (ref 1.5–3.8)
GLUCOSE BLD-MCNC: 74 MG/DL (ref 70–99)
HCT VFR BLD CALC: 42.1 % (ref 36.3–47.1)
HEMOGLOBIN: 13.1 G/DL (ref 11.9–15.1)
MCH RBC QN AUTO: 27.8 PG (ref 25.2–33.5)
MCHC RBC AUTO-ENTMCNC: 31.1 G/DL (ref 28.4–34.8)
MCV RBC AUTO: 89.2 FL (ref 82.6–102.9)
MICROALBUMIN/CREAT 24H UR: 63 MG/L
MICROALBUMIN/CREAT UR-RTO: 39 MCG/MG CREAT
NRBC AUTOMATED: 0 PER 100 WBC
PDW BLD-RTO: 13.6 % (ref 11.8–14.4)
PLATELET # BLD: 276 K/UL (ref 138–453)
PMV BLD AUTO: 10.9 FL (ref 8.1–13.5)
POTASSIUM SERPL-SCNC: 4.6 MMOL/L (ref 3.7–5.3)
RBC # BLD: 4.72 M/UL (ref 3.95–5.11)
SODIUM BLD-SCNC: 142 MMOL/L (ref 135–144)
TOTAL PROTEIN: 7.5 G/DL (ref 6.4–8.3)
TSH SERPL DL<=0.05 MIU/L-ACNC: 1.38 MIU/L (ref 0.3–5)
WBC # BLD: 6.1 K/UL (ref 3.5–11.3)

## 2018-12-19 PROCEDURE — 3017F COLORECTAL CA SCREEN DOC REV: CPT | Performed by: INTERNAL MEDICINE

## 2018-12-19 PROCEDURE — 36415 COLL VENOUS BLD VENIPUNCTURE: CPT

## 2018-12-19 PROCEDURE — G8484 FLU IMMUNIZE NO ADMIN: HCPCS | Performed by: INTERNAL MEDICINE

## 2018-12-19 PROCEDURE — 99211 OFF/OP EST MAY X REQ PHY/QHP: CPT | Performed by: INTERNAL MEDICINE

## 2018-12-19 PROCEDURE — 1036F TOBACCO NON-USER: CPT | Performed by: INTERNAL MEDICINE

## 2018-12-19 PROCEDURE — G8417 CALC BMI ABV UP PARAM F/U: HCPCS | Performed by: INTERNAL MEDICINE

## 2018-12-19 PROCEDURE — 80048 BASIC METABOLIC PNL TOTAL CA: CPT

## 2018-12-19 PROCEDURE — 82043 UR ALBUMIN QUANTITATIVE: CPT

## 2018-12-19 PROCEDURE — 1101F PT FALLS ASSESS-DOCD LE1/YR: CPT | Performed by: INTERNAL MEDICINE

## 2018-12-19 PROCEDURE — 85027 COMPLETE CBC AUTOMATED: CPT

## 2018-12-19 PROCEDURE — 80076 HEPATIC FUNCTION PANEL: CPT

## 2018-12-19 PROCEDURE — 99214 OFFICE O/P EST MOD 30 MIN: CPT | Performed by: INTERNAL MEDICINE

## 2018-12-19 PROCEDURE — 1090F PRES/ABSN URINE INCON ASSESS: CPT | Performed by: INTERNAL MEDICINE

## 2018-12-19 PROCEDURE — G8427 DOCREV CUR MEDS BY ELIG CLIN: HCPCS | Performed by: INTERNAL MEDICINE

## 2018-12-19 PROCEDURE — G8598 ASA/ANTIPLAT THER USED: HCPCS | Performed by: INTERNAL MEDICINE

## 2018-12-19 PROCEDURE — 3044F HG A1C LEVEL LT 7.0%: CPT | Performed by: INTERNAL MEDICINE

## 2018-12-19 PROCEDURE — 84443 ASSAY THYROID STIM HORMONE: CPT

## 2018-12-19 PROCEDURE — 2022F DILAT RTA XM EVC RTNOPTHY: CPT | Performed by: INTERNAL MEDICINE

## 2018-12-19 PROCEDURE — 4040F PNEUMOC VAC/ADMIN/RCVD: CPT | Performed by: INTERNAL MEDICINE

## 2018-12-19 PROCEDURE — 82570 ASSAY OF URINE CREATININE: CPT

## 2018-12-19 PROCEDURE — G8399 PT W/DXA RESULTS DOCUMENT: HCPCS | Performed by: INTERNAL MEDICINE

## 2018-12-19 PROCEDURE — 1123F ACP DISCUSS/DSCN MKR DOCD: CPT | Performed by: INTERNAL MEDICINE

## 2018-12-19 RX ORDER — GLIMEPIRIDE 2 MG/1
2 TABLET ORAL EVERY MORNING
Qty: 30 TABLET | Refills: 3 | Status: SHIPPED | OUTPATIENT
Start: 2018-12-19

## 2018-12-19 RX ORDER — SPIRONOLACTONE 50 MG/1
50 TABLET, FILM COATED ORAL DAILY
Qty: 30 TABLET | Refills: 3 | Status: ON HOLD | OUTPATIENT
Start: 2018-12-19 | End: 2020-12-29 | Stop reason: HOSPADM

## 2018-12-19 RX ORDER — MELOXICAM 15 MG/1
15 TABLET ORAL DAILY
Qty: 30 TABLET | Refills: 3 | Status: ON HOLD | OUTPATIENT
Start: 2018-12-19 | End: 2020-12-25

## 2018-12-19 RX ORDER — METOPROLOL TARTRATE 100 MG/1
TABLET ORAL
Qty: 56 TABLET | Refills: 3 | Status: ON HOLD | OUTPATIENT
Start: 2018-12-19 | End: 2020-12-29 | Stop reason: HOSPADM

## 2018-12-19 RX ORDER — CLONIDINE HYDROCHLORIDE 0.1 MG/1
0.1 TABLET ORAL DAILY
Qty: 30 TABLET | Refills: 3 | Status: ON HOLD | OUTPATIENT
Start: 2018-12-19 | End: 2021-01-20 | Stop reason: SDUPTHER

## 2018-12-19 RX ORDER — ISOSORBIDE MONONITRATE 30 MG/1
TABLET, EXTENDED RELEASE ORAL
Qty: 30 TABLET | Refills: 5 | Status: ON HOLD | OUTPATIENT
Start: 2018-12-19 | End: 2020-12-29 | Stop reason: HOSPADM

## 2018-12-19 ASSESSMENT — ENCOUNTER SYMPTOMS
CONSTIPATION: 0
COUGH: 0
ABDOMINAL PAIN: 0
SHORTNESS OF BREATH: 0
RHINORRHEA: 1
WHEEZING: 0
BACK PAIN: 1
EYE REDNESS: 0

## 2018-12-19 NOTE — PROGRESS NOTES
isosorbide mononitrate (IMDUR) 30 MG extended release tablet TAKE 1 TABLET BY MOUTH DAILY 30 tablet 5    aspirin (RA ASPIRIN EC) 81 MG EC tablet Take 1 tablet by mouth daily 30 tablet 11    Incontinence Supply Disposable (INCONTINENCE BRIEF LARGE) MISC Use 4-5/day as needed 150 each 11    Calcium Carbonate-Vitamin D (OYSTER SHELL CALCIUM/D) 500-200 MG-UNIT TABS TAKE 1 TABLET BY MOUTH 2 TIMES DAILY 60 tablet 11    glucose blood VI test strips (ASCENSIA AUTODISC VI;ONE TOUCH ULTRA TEST VI) strip 1 each by In Vitro route 3 times daily As needed. 100 each 5    albuterol-ipratropium (COMBIVENT RESPIMAT)  MCG/ACT AERS inhaler Inhale 1 puff into the lungs every 6 hours as needed for Wheezing 1 Inhaler 5    Lancets MISC Use 1 -2 times daily Insulin dependent diabetes mellitus 50 each 11    Multiple Vitamins-Minerals (MULTIVITAMIN WITH MINERALS) tablet Take 1 tablet by mouth daily 30 tablet 3    nitroGLYCERIN (NITROSTAT) 0.4 MG SL tablet TAKE 1 TABLET UNDER THE TONGUE EVERY 5 MINUTES AS NEEDED FOR CHEST 20 tablet 5     No current facility-administered medications on file prior to visit. SOCIAL HISTORY    Reviewed and no change from previous record. Thea Deleon  reports that she quit smoking about 5 years ago. She smoked 0.50 packs per day for 0.00 years. She has never used smokeless tobacco.    FAMILY HISTORY:    Reviewed and No change from previous visit    HEALTH MAINTENANCE DUE:      Health Maintenance Due   Topic Date Due    DTaP/Tdap/Td vaccine (1 - Tdap) 04/20/1971    Shingles Vaccine (1 of 2 - 2 Dose Series) 04/20/2002    Colon cancer screen colonoscopy  10/03/2015    Diabetic foot exam  12/13/2017    Flu vaccine (1) 09/01/2018    Diabetic microalbuminuria test  10/31/2018       REVIEW OF SYSTEMS:    12 point review of symptoms completed and found to be normal except noted in the HPI    Review of Systems   Constitutional: Positive for fatigue. Negative for chills and fever.    HENT: Positive for congestion and rhinorrhea. Eyes: Negative for redness and visual disturbance. Respiratory: Negative for cough, shortness of breath and wheezing. Cardiovascular: Positive for leg swelling. Negative for chest pain and palpitations. Gastrointestinal: Negative for abdominal pain, constipation and diarrhea. Endocrine: Negative for polydipsia and polyuria. Genitourinary: Positive for urgency. Negative for dysuria and frequency. Musculoskeletal: Positive for arthralgias, back pain and gait problem. Negative for joint swelling and myalgias. Allergic/Immunologic: Positive for environmental allergies. Negative for immunocompromised state. Neurological: Negative for dizziness, weakness and numbness. Hematological: Negative for adenopathy. Does not bruise/bleed easily. Psychiatric/Behavioral: Positive for decreased concentration, dysphoric mood and sleep disturbance. Negative for self-injury and suicidal ideas. The patient is nervous/anxious. PHYSICAL EXAM:     Vitals:    12/19/18 1506   BP: (!) 149/81   Site: Right Upper Arm   Position: Sitting   Cuff Size: Large Adult   Pulse: 54   Weight: 270 lb (122.5 kg)     Body mass index is 46.35 kg/m². BP Readings from Last 3 Encounters:   12/19/18 (!) 149/81   09/13/18 (!) 140/85   07/16/18 128/68        Wt Readings from Last 3 Encounters:   12/19/18 270 lb (122.5 kg)   09/13/18 271 lb (122.9 kg)   06/12/18 267 lb (121.1 kg)       Physical Exam      HENT: Normocephalic, Atraumatic, Bilateral external ears normal, Oropharynx moist, . Neck- Normal range of motion, No tenderness, Supple, No stridor. Eyes:  PERRL, EOMI, Conjunctiva normal, No discharge. Respiratory:  Normalbreath sounds, No respiratory distress, No wheezing, No chest tenderness. Cardiovascular:  Normal heart rate, Normal rhythm, No murmur  Musculoskeletal:  Intact distal pulses, No edema, b/l knee  tenderness, Back- No tenderness. decreased ROM of spine.    Integument:  Warm, Dry, No erythema, No rash. Lymphatic:  No lymphadenopathy noted. Neurologic:  Alert & oriented x 3, Normal motor function, decreased sensation of both feet with monofilament  Psychiatric:  Affect flat    LABORATORY FINDINGS:    CBC:  Lab Results   Component Value Date    WBC 7.5 02/28/2018    HGB 12.3 02/28/2018     02/28/2018     02/24/2012     BMP:    Lab Results   Component Value Date     02/28/2018    K 3.9 02/28/2018    CL 99 02/28/2018    CO2 27 02/28/2018    BUN 17 02/28/2018    CREATININE 0.78 02/28/2018    GLUCOSE 210 02/28/2018    GLUCOSE 105 02/24/2012     HEMOGLOBIN A1C:   Lab Results   Component Value Date    LABA1C 6.1 09/13/2018     MICROALBUMIN URINE:   Lab Results   Component Value Date    MICROALBUR 80 10/31/2017     FASTING LIPID [email or web address]  Lab Results   Component Value Date    LDLCHOLESTEROL 146 (H) 07/13/2018       LIVER PROFILE:  Lab Results   Component Value Date    ALT 12 10/12/2016    AST 21 10/12/2016    PROT 7.7 10/12/2016    BILITOT 0.40 10/12/2016    BILIDIR <0.08 10/12/2016    LABALBU 4.1 10/12/2016    LABALBU 4.2 02/24/2012      THYROID FUNCTION:   Lab Results   Component Value Date    TSH 1.77 07/15/2015      URINEANALYSIS: No results found for: LABURIN  ASSESSMENT AND PLAN:    1. Type 2 diabetes mellitus with diabetic polyneuropathy, without long-term current use of insulin (HCC)    - Basic Metabolic Panel; Future  - Microalbumin / Creatinine Urine Ratio; Future  - glimepiride (AMARYL) 2 MG tablet; Take 1 tablet by mouth every morning  Dispense: 30 tablet; Refill: 3  - linagliptin (TRADJENTA) 5 MG tablet; Take 1 tablet by mouth daily  Dispense: 30 tablet; Refill: 10    2. Primary osteoarthritis of right knee  Advised PT - refuses  Orthopedics    - meloxicam (MOBIC) 15 MG tablet; Take 1 tablet by mouth daily  Dispense: 30 tablet; Refill: 3    3. Essential hypertension    - Basic Metabolic Panel;  Future  - metoprolol (LOPRESSOR)

## 2018-12-23 ASSESSMENT — ENCOUNTER SYMPTOMS: DIARRHEA: 0

## 2019-01-17 RX ORDER — B-COMPLEX WITH VITAMIN C
TABLET ORAL
Qty: 60 TABLET | Refills: 3 | Status: SHIPPED | OUTPATIENT
Start: 2019-01-17

## 2019-01-17 RX ORDER — OMEPRAZOLE 20 MG/1
CAPSULE, DELAYED RELEASE ORAL
Qty: 28 CAPSULE | Refills: 3 | Status: ON HOLD | OUTPATIENT
Start: 2019-01-17 | End: 2020-12-25

## 2019-02-06 ENCOUNTER — TELEPHONE (OUTPATIENT)
Dept: INTERNAL MEDICINE | Age: 69
End: 2019-02-06

## 2019-04-05 ENCOUNTER — HOSPITAL ENCOUNTER (OUTPATIENT)
Age: 69
Setting detail: SPECIMEN
Discharge: HOME | End: 2019-04-05
Payer: COMMERCIAL

## 2019-04-05 LAB
ABSOLUTE EOS #: 0.24 K/UL (ref 0–0.44)
ABSOLUTE IMMATURE GRANULOCYTE: <0.03 K/UL (ref 0–0.3)
ABSOLUTE LYMPH #: 1.78 K/UL (ref 1.1–3.7)
ABSOLUTE MONO #: 0.54 K/UL (ref 0.1–1.2)
ALBUMIN SERPL-MCNC: 4.1 G/DL (ref 3.5–5.2)
ALBUMIN/GLOBULIN RATIO: 1.2 (ref 1–2.5)
ALP BLD-CCNC: 82 U/L (ref 35–104)
ALT SERPL-CCNC: 11 U/L (ref 5–33)
ANION GAP SERPL CALCULATED.3IONS-SCNC: 15 MMOL/L (ref 9–17)
AST SERPL-CCNC: 15 U/L
BASOPHILS # BLD: 0 % (ref 0–2)
BASOPHILS ABSOLUTE: <0.03 K/UL (ref 0–0.2)
BILIRUB SERPL-MCNC: 0.29 MG/DL (ref 0.3–1.2)
BUN BLDV-MCNC: 20 MG/DL (ref 8–23)
BUN/CREAT BLD: ABNORMAL (ref 9–20)
CALCIUM SERPL-MCNC: 9.5 MG/DL (ref 8.6–10.4)
CHLORIDE BLD-SCNC: 107 MMOL/L (ref 98–107)
CHOLESTEROL/HDL RATIO: 3.9
CHOLESTEROL: 188 MG/DL
CO2: 22 MMOL/L (ref 20–31)
CREAT SERPL-MCNC: 0.78 MG/DL (ref 0.5–0.9)
DIFFERENTIAL TYPE: NORMAL
EOSINOPHILS RELATIVE PERCENT: 4 % (ref 1–4)
GFR AFRICAN AMERICAN: >60 ML/MIN
GFR NON-AFRICAN AMERICAN: >60 ML/MIN
GFR SERPL CREATININE-BSD FRML MDRD: ABNORMAL ML/MIN/{1.73_M2}
GFR SERPL CREATININE-BSD FRML MDRD: ABNORMAL ML/MIN/{1.73_M2}
GLUCOSE BLD-MCNC: 104 MG/DL (ref 70–99)
HCT VFR BLD CALC: 39.9 % (ref 36.3–47.1)
HDLC SERPL-MCNC: 48 MG/DL
HEMOGLOBIN: 12.6 G/DL (ref 11.9–15.1)
IMMATURE GRANULOCYTES: 0 %
LDL CHOLESTEROL: 105 MG/DL (ref 0–130)
LYMPHOCYTES # BLD: 33 % (ref 24–43)
MCH RBC QN AUTO: 28.4 PG (ref 25.2–33.5)
MCHC RBC AUTO-ENTMCNC: 31.6 G/DL (ref 28.4–34.8)
MCV RBC AUTO: 90.1 FL (ref 82.6–102.9)
MONOCYTES # BLD: 10 % (ref 3–12)
NRBC AUTOMATED: 0 PER 100 WBC
PDW BLD-RTO: 13.8 % (ref 11.8–14.4)
PLATELET # BLD: 249 K/UL (ref 138–453)
PLATELET ESTIMATE: NORMAL
PMV BLD AUTO: 11.3 FL (ref 8.1–13.5)
POTASSIUM SERPL-SCNC: 4.3 MMOL/L (ref 3.7–5.3)
RBC # BLD: 4.43 M/UL (ref 3.95–5.11)
RBC # BLD: NORMAL 10*6/UL
SEG NEUTROPHILS: 53 % (ref 36–65)
SEGMENTED NEUTROPHILS ABSOLUTE COUNT: 2.88 K/UL (ref 1.5–8.1)
SODIUM BLD-SCNC: 144 MMOL/L (ref 135–144)
TOTAL PROTEIN: 7.4 G/DL (ref 6.4–8.3)
TRIGL SERPL-MCNC: 177 MG/DL
VLDLC SERPL CALC-MCNC: ABNORMAL MG/DL (ref 1–30)
WBC # BLD: 5.5 K/UL (ref 3.5–11.3)
WBC # BLD: NORMAL 10*3/UL

## 2019-04-07 LAB
ESTIMATED AVERAGE GLUCOSE: 137 MG/DL
HBA1C MFR BLD: 6.4 % (ref 4–6)

## 2019-04-29 ENCOUNTER — APPOINTMENT (OUTPATIENT)
Dept: GENERAL RADIOLOGY | Age: 69
End: 2019-04-29
Payer: COMMERCIAL

## 2019-04-29 ENCOUNTER — HOSPITAL ENCOUNTER (EMERGENCY)
Age: 69
Discharge: HOME | End: 2019-04-29
Attending: EMERGENCY MEDICINE
Payer: COMMERCIAL

## 2019-04-29 VITALS
DIASTOLIC BLOOD PRESSURE: 78 MMHG | HEART RATE: 72 BPM | WEIGHT: 230 LBS | RESPIRATION RATE: 20 BRPM | BODY MASS INDEX: 39.27 KG/M2 | TEMPERATURE: 98.8 F | HEIGHT: 64 IN | OXYGEN SATURATION: 96 % | SYSTOLIC BLOOD PRESSURE: 134 MMHG

## 2019-04-29 DIAGNOSIS — N39.0 URINARY TRACT INFECTION WITHOUT HEMATURIA, SITE UNSPECIFIED: ICD-10-CM

## 2019-04-29 DIAGNOSIS — J06.9 ACUTE UPPER RESPIRATORY INFECTION: Primary | ICD-10-CM

## 2019-04-29 LAB — CHP ED QC CHECK: NORMAL

## 2019-04-29 PROCEDURE — 99284 EMERGENCY DEPT VISIT MOD MDM: CPT

## 2019-04-29 PROCEDURE — 71046 X-RAY EXAM CHEST 2 VIEWS: CPT

## 2019-04-29 PROCEDURE — 81003 URINALYSIS AUTO W/O SCOPE: CPT

## 2019-04-29 RX ORDER — CEPHALEXIN 500 MG/1
500 CAPSULE ORAL 2 TIMES DAILY
Qty: 14 CAPSULE | Refills: 0 | Status: ON HOLD | OUTPATIENT
Start: 2019-04-29 | End: 2020-12-29 | Stop reason: HOSPADM

## 2019-04-29 RX ORDER — GUAIFENESIN AND CODEINE PHOSPHATE 100; 10 MG/5ML; MG/5ML
5 SOLUTION ORAL 3 TIMES DAILY PRN
Qty: 118 ML | Refills: 0 | Status: SHIPPED | OUTPATIENT
Start: 2019-04-29 | End: 2019-05-04

## 2019-04-29 ASSESSMENT — PAIN SCALES - GENERAL: PAINLEVEL_OUTOF10: 7

## 2019-04-30 ASSESSMENT — ENCOUNTER SYMPTOMS
SINUS PAIN: 1
CONSTIPATION: 0
SINUS PRESSURE: 1
RHINORRHEA: 0
ABDOMINAL PAIN: 0
COLOR CHANGE: 0
SHORTNESS OF BREATH: 0
SORE THROAT: 0
NAUSEA: 0
COUGH: 1
DIARRHEA: 0
WHEEZING: 0
VOMITING: 0

## 2019-04-30 NOTE — ED PROVIDER NOTES
tablet by mouth daily, Disp-30 tablet, R-3Normal      meloxicam (MOBIC) 15 MG tablet Take 1 tablet by mouth daily, Disp-30 tablet, R-3Normal      amLODIPine (NORVASC) 10 MG tablet Take 1 tablet by mouth daily, Disp-30 tablet, R-5Normal      losartan (COZAAR) 100 MG tablet Take 1 tablet by mouth daily, Disp-30 tablet, R-5Normal      furosemide (LASIX) 40 MG tablet Take 1 tablet by mouth daily, Disp-30 tablet, R-10Normal      atorvastatin (LIPITOR) 80 MG tablet TAKE 1 TABLET BY MOUTH DAILY, Disp-30 tablet, R-5Normal      sertraline (ZOLOFT) 100 MG tablet Take 1 tablet by mouth daily, Disp-30 tablet, R-5Normal      clopidogrel (PLAVIX) 75 MG tablet Take 1 tablet by mouth daily, Disp-30 tablet, R-11Normal      aspirin (RA ASPIRIN EC) 81 MG EC tablet Take 1 tablet by mouth daily, Disp-30 tablet, R-11Normal      Incontinence Supply Disposable (INCONTINENCE BRIEF LARGE) MISC Disp-150 each, R-11, PrintUse 4-5/day as needed      glucose blood VI test strips (ASCENSIA AUTODISC VI;ONE TOUCH ULTRA TEST VI) strip 3 TIMES DAILY Starting 8/1/2017, Until Discontinued, Disp-100 each, R-5, PrintAs needed.       albuterol-ipratropium (COMBIVENT RESPIMAT)  MCG/ACT AERS inhaler Inhale 1 puff into the lungs every 6 hours as needed for Wheezing, Disp-1 Inhaler, R-5Normal      Lancets MISC Disp-50 each, R-11, NormalUse 1 -2 times daily Insulin dependent diabetes mellitus      Multiple Vitamins-Minerals (MULTIVITAMIN WITH MINERALS) tablet Take 1 tablet by mouth daily, Disp-30 tablet, R-3Normal      nitroGLYCERIN (NITROSTAT) 0.4 MG SL tablet TAKE 1 TABLET UNDER THE TONGUE EVERY 5 MINUTES AS NEEDED FOR CHEST, Disp-20 tablet, R-5             PAST MEDICAL HISTORY         Diagnosis Date    Allergic rhinitis     Anxiety 10/25/2016    Asthma     CAD (coronary artery disease)     s/p stents RCA and LAD 2009,    Chronic back pain     Congestive heart failure ([facility] [address]     Depression     Headache(784.0)     Hiatal hernia     Hypercholesteremia 2012    Hypertension     Kidney stones     years ago    Obesity     Osteoarthritis     Postlaminectomy syndrome 2012    Type II or unspecified type diabetes mellitus without mention of complication, not stated as uncontrolled     Unspecified sleep apnea     Urinary incontinence        SURGICAL HISTORY           Procedure Laterality Date    CARDIAC CATHETERIZATION  2013    patent stents    COLONOSCOPY  2015    normal    CORONARY ANGIOPLASTY WITH STENT PLACEMENT  1012-16    stents x 3    JOINT REPLACEMENT      left knee    KNEE SURGERY  10/21/2013    rt knee synvisc injection     KNEE SURGERY  2013    knee synvisc injection rt #2    KNEE SURGERY  2013    rt knee synvisc inj    LUMBAR SPINE SURGERY  2007    NERVE BLOCK  2012    Right MBNB L3, L4, L5    NERVE BLOCK  2012    Right MBNB L3, L4, and L5     NERVE BLOCK  13    Right knee injection #1 - Synvisc    NERVE BLOCK  13    Right knee injection #2 - Synvisc    NERVE BLOCK  2013     Rigth knee synvisc injection #3    NERVE BLOCK Right 2017    Rt genicular nerve block. no steroid used    OTHER SURGICAL HISTORY Right 2014    synvisc one knee injection    OTHER SURGICAL HISTORY Right 3/16/2015    synvisc one knee injection    OTHER SURGICAL HISTORY Right 16    synvisc right knee injection    SPINE SURGERY      TONSILLECTOMY      UPPER GASTROINTESTINAL ENDOSCOPY      VENA CAVA FILTER PLACEMENT      PE and B/L LE emboli         FAMILY HISTORY           Problem Relation Age of Onset    Diabetes Mother     Cancer Father     High Blood Pressure Sister     High Blood Pressure Brother      Family Status   Relation Name Status    Mother      Father      Sister  (Not Specified)    Brother  (Not Specified)        SOCIAL HISTORY      reports that she quit smoking about 6 years ago. She smoked 0.50 packs per day for 0.00 years.  She has cervical adenopathy. Neurological: She is alert and oriented to person, place, and time. Skin: Skin is warm and dry. No rash noted. Psychiatric: She has a normal mood and affect. Vitals reviewed. RADIOLOGY:   Non-plain film images such as CT, Ultrasound and MRI are read by the radiologist. Plain radiographic images are visualized and preliminarily interpreted by the emergency physician with the below findings:    Xr Chest Standard (2 Vw)    Result Date: 4/29/2019  EXAMINATION: TWO VIEWS OF THE CHEST 4/29/2019 8:23 pm COMPARISON: February 25, 2018 HISTORY: ORDERING SYSTEM PROVIDED HISTORY: cough and congestion TECHNOLOGIST PROVIDED HISTORY: cough and congestion Ordering Physician Provided Reason for Exam: cough Acuity: Acute Type of Exam: Initial Additional signs and symptoms: congestion Relevant Medical/Surgical History: CAD, CHF FINDINGS: No focal consolidation. Mild cardiomegaly. Atherosclerotic calcification of the thoracic aorta. No pulmonary edema. Severe left shoulder degenerative changes. No acute findings. Interpretation per the Radiologist below, if available at the time of this note:    XR CHEST STANDARD (2 VW)   Final Result   No acute findings. LABS:  Labs Reviewed   POCT URINALYSIS DIPSTICK - Normal       All other labs were within normal range or not returned as of this dictation. EMERGENCY DEPARTMENT COURSE and DIFFERENTIAL DIAGNOSIS/MDM:   Vitals:    Vitals:    04/29/19 1940 04/29/19 1944   BP: 134/78    Pulse: 72    Resp: 20    Temp: 98.8 °F (37.1 °C)    TempSrc: Oral    SpO2: 96%    Weight:  230 lb (104.3 kg)   Height:  5' 4\" (1.626 m)       Medical Decision Making: She was found to have a urinary tract infection in addition to an upper respiratory infection as her chest x-ray is negative. We'll place her on Keflex which will cover both UTI and upper respiratory infection.   She is also given some Robitussin with codeine for her cough that will help her sleep. Follow-up with her primary care physician. FINAL IMPRESSION      1. Acute upper respiratory infection    2. Urinary tract infection without hematuria, site unspecified          DISPOSITION/PLAN   DISPOSITION Decision To Discharge 04/29/2019 09:33:48 PM      PATIENT REFERRED TO:   Lorenzo Martinez MD  [facility] [address]  [phone]    Call in 2 days      [address] [facility]  [address] [facility]  [phone]    If symptoms worsen      DISCHARGE MEDICATIONS:     Discharge Medication List as of 4/29/2019  9:35 PM      START taking these medications    Details   cephALEXin (KEFLEX) 500 MG capsule Take 1 capsule by mouth 2 times daily, Disp-14 capsule, R-0Print      guaiFENesin-codeine (TUSSI-ORGANIDIN NR) 100-10 MG/5ML syrup Take 5 mLs by mouth 3 times daily as needed for Cough for up to 5 days. , Disp-118 mL, R-0Print                 (Please note that portions of this note were completed with a voice recognition program.  Efforts were made to edit the dictations but occasionally words are mis-transcribed.)    [address] [facility] NP, APRN - CNP  Certified Nurse Practitioner          Deedee Langley, APRN - CNP  04/30/19 [address], APRN - CNP  04/30/19 1126

## 2019-04-30 NOTE — ED PROVIDER NOTES
The patient was seen and examined by me in conjunction with the mid-level provider. I agree with his/her assessment and treatment plan. Urinalysis shows presence of UTI and this will be treated. Chest x-ray per radiologist shows no infiltrates.      Karl Ambrose MD  04/29/19 2433

## 2019-07-16 DIAGNOSIS — M25.561 ACUTE PAIN OF RIGHT KNEE: Primary | ICD-10-CM

## 2019-07-17 ENCOUNTER — INITIAL CONSULT (OUTPATIENT)
Dept: PAIN MANAGEMENT | Age: 69
End: 2019-07-17
Payer: COMMERCIAL

## 2019-07-17 VITALS
SYSTOLIC BLOOD PRESSURE: 169 MMHG | BODY MASS INDEX: 40.75 KG/M2 | OXYGEN SATURATION: 96 % | DIASTOLIC BLOOD PRESSURE: 82 MMHG | WEIGHT: 230 LBS | HEART RATE: 76 BPM | HEIGHT: 63 IN

## 2019-07-17 DIAGNOSIS — M96.1 POSTLAMINECTOMY SYNDROME, LUMBAR: ICD-10-CM

## 2019-07-17 DIAGNOSIS — G47.33 OSA (OBSTRUCTIVE SLEEP APNEA): ICD-10-CM

## 2019-07-17 DIAGNOSIS — M17.11 PRIMARY OSTEOARTHRITIS OF RIGHT KNEE: Chronic | ICD-10-CM

## 2019-07-17 DIAGNOSIS — G89.4 CHRONIC PAIN SYNDROME: Primary | ICD-10-CM

## 2019-07-17 DIAGNOSIS — E66.01 MORBID OBESITY (HCC): ICD-10-CM

## 2019-07-17 DIAGNOSIS — Z79.02 LONG TERM (CURRENT) USE OF ANTITHROMBOTICS/ANTIPLATELETS: Chronic | ICD-10-CM

## 2019-07-17 PROCEDURE — 3017F COLORECTAL CA SCREEN DOC REV: CPT | Performed by: ANESTHESIOLOGY

## 2019-07-17 PROCEDURE — G8598 ASA/ANTIPLAT THER USED: HCPCS | Performed by: ANESTHESIOLOGY

## 2019-07-17 PROCEDURE — G8417 CALC BMI ABV UP PARAM F/U: HCPCS | Performed by: ANESTHESIOLOGY

## 2019-07-17 PROCEDURE — G8399 PT W/DXA RESULTS DOCUMENT: HCPCS | Performed by: ANESTHESIOLOGY

## 2019-07-17 PROCEDURE — 1090F PRES/ABSN URINE INCON ASSESS: CPT | Performed by: ANESTHESIOLOGY

## 2019-07-17 PROCEDURE — G8427 DOCREV CUR MEDS BY ELIG CLIN: HCPCS | Performed by: ANESTHESIOLOGY

## 2019-07-17 PROCEDURE — 99214 OFFICE O/P EST MOD 30 MIN: CPT | Performed by: ANESTHESIOLOGY

## 2019-07-17 PROCEDURE — 1036F TOBACCO NON-USER: CPT | Performed by: ANESTHESIOLOGY

## 2019-07-17 PROCEDURE — 1123F ACP DISCUSS/DSCN MKR DOCD: CPT | Performed by: ANESTHESIOLOGY

## 2019-07-17 PROCEDURE — 4040F PNEUMOC VAC/ADMIN/RCVD: CPT | Performed by: ANESTHESIOLOGY

## 2019-07-17 ASSESSMENT — ENCOUNTER SYMPTOMS
BACK PAIN: 1
RESPIRATORY NEGATIVE: 1

## 2019-07-17 NOTE — PROGRESS NOTES
The patient is a 81 y. o. Non-/non  female. Chief Complaint   Patient presents with    Neck Pain    Knee Pain    Back Pain        HPI  72-year-old woman who is been seen in my clinic for many years  She is seen for generalized chronic body pain  She identified her pain in her neck shoulder low back and both legs and both knees  She is able to locate majority of her pain in the lower back  Back pain is in the lumbar area across midline affect both sides with radiation down both legs  She had multiple previous lumbar spine surgeries  No physical therapy for back pain for many years  No lumbar spine injections for many years  No recent diagnostic work-up for lumbar spine  Denies any loss of bladder or bowel control    Chronic knee pain also have a history of chronic bilateral knee arthritis  History of left total knee replacement surgery  For right knee pain she is diagnosed with knee osteoarthritis and is planning to see orthopedics surgeon at [place] tomorrow  No recent knee injection      In past have been on opioids and is asking for opioids for pain management  Patient is here for back pain and knee pain, she states she has not had nothing for pain in a few months. She was seen at [facility].   Clinic in 2018    Past Medical History:   Diagnosis Date    Allergic rhinitis     Anxiety 10/25/2016    Asthma     CAD (coronary artery disease)     s/p stents RCA and LAD 2009,    Chronic back pain     Congestive heart failure ([address]     Depression     Headache(784.0)     Hiatal hernia     Hypercholesteremia 2/21/2012    Hypertension     Kidney stones     years ago    Obesity     Osteoarthritis     Postlaminectomy syndrome 6/6/2012    Type II or unspecified type diabetes mellitus without mention of complication, not stated as uncontrolled     Unspecified sleep apnea     Urinary incontinence       Past Surgical History:   Procedure Laterality Date    CARDIAC CATHETERIZATION

## 2019-08-05 ENCOUNTER — OFFICE VISIT (OUTPATIENT)
Dept: ORTHOPEDIC SURGERY | Age: 69
End: 2019-08-05
Payer: COMMERCIAL

## 2019-08-05 VITALS — BODY MASS INDEX: 39.27 KG/M2 | WEIGHT: 230 LBS | HEIGHT: 64 IN

## 2019-08-05 DIAGNOSIS — M17.11 PRIMARY OSTEOARTHRITIS OF RIGHT KNEE: Primary | ICD-10-CM

## 2019-08-05 PROCEDURE — 3017F COLORECTAL CA SCREEN DOC REV: CPT | Performed by: STUDENT IN AN ORGANIZED HEALTH CARE EDUCATION/TRAINING PROGRAM

## 2019-08-05 PROCEDURE — G8399 PT W/DXA RESULTS DOCUMENT: HCPCS | Performed by: STUDENT IN AN ORGANIZED HEALTH CARE EDUCATION/TRAINING PROGRAM

## 2019-08-05 PROCEDURE — 1090F PRES/ABSN URINE INCON ASSESS: CPT | Performed by: STUDENT IN AN ORGANIZED HEALTH CARE EDUCATION/TRAINING PROGRAM

## 2019-08-05 PROCEDURE — 4040F PNEUMOC VAC/ADMIN/RCVD: CPT | Performed by: STUDENT IN AN ORGANIZED HEALTH CARE EDUCATION/TRAINING PROGRAM

## 2019-08-05 PROCEDURE — G8417 CALC BMI ABV UP PARAM F/U: HCPCS | Performed by: STUDENT IN AN ORGANIZED HEALTH CARE EDUCATION/TRAINING PROGRAM

## 2019-08-05 PROCEDURE — G8598 ASA/ANTIPLAT THER USED: HCPCS | Performed by: STUDENT IN AN ORGANIZED HEALTH CARE EDUCATION/TRAINING PROGRAM

## 2019-08-05 PROCEDURE — 99213 OFFICE O/P EST LOW 20 MIN: CPT | Performed by: STUDENT IN AN ORGANIZED HEALTH CARE EDUCATION/TRAINING PROGRAM

## 2019-08-05 PROCEDURE — G8428 CUR MEDS NOT DOCUMENT: HCPCS | Performed by: STUDENT IN AN ORGANIZED HEALTH CARE EDUCATION/TRAINING PROGRAM

## 2019-08-05 PROCEDURE — 1123F ACP DISCUSS/DSCN MKR DOCD: CPT | Performed by: STUDENT IN AN ORGANIZED HEALTH CARE EDUCATION/TRAINING PROGRAM

## 2019-08-05 PROCEDURE — 1036F TOBACCO NON-USER: CPT | Performed by: STUDENT IN AN ORGANIZED HEALTH CARE EDUCATION/TRAINING PROGRAM

## 2019-08-05 NOTE — PROGRESS NOTES
[facility]  [address] [place] [address]  Dept: [phone]  Dept Fax: [phone]        Ambulatory Follow Up      Subjective:   Anyi Massey is a 81y.o. year old female who presents to our office today for routine followup regarding her     Chief Complaint   Patient presents with    Pain     righ tknee pain        HPI  Patient is a 72-year-old female here today for chronic right knee pain. Patient was last seen over one year ago at which time right knee corticosteroid injection was performed. Patient was given a medial  brace as well as physical therapy and given a prescription for Mobic. Patient states that none of the conservative interventions have provided much pain relief up to this point. She is here today to discuss possible surgical intervention for her chronic right knee pain. Patient notes that the knee pain is causing significant disability and inability to perform her daily activities to the pain. Of note patient does have a significant recent past medical history of multiple stents placed to the heart. Patient is also diabetic with most recent hemoglobin A1c of 6.4. Patient denies any new injuries or falls. She denies any numbness or tingling. She denies any recent fevers, chills, chest pain, shortness of breath, nausea, vomiting. Review of Systems  Negative otherwise noted in the HPI. I have reviewed the CC, HPI, ROS, PMH, FHX, Social History. I agree with the documentation provided by other staff, residents, and/or medical students and have reviewed their documentation prior to providing my signature indicating agreement. Objective :   General: Anyi Massey is a 81 y.o. female who is alert and oriented and sitting comfortably in our office. Neuro: alert. oriented  Eyes: Extra-ocular muscles intact  Mouth: Oral mucosa moist. No perioral lesions  Pulm: Respirations unlabored and regular.   Skin: warm, well perfused  Psych:   Patient has good fund of knowledge and displays understanging of exam, diagnosis, and plan. RLE: Range of motion from 5 to 95 degrees tender to palpation along the medial joint line, lateral joint line, and patellofemoral compartment. .  Crepitations throughout range of motion. Mild to moderate joint effusion noted. No increased erythema or warmth. Knee is stable to varus, valgus stress. Negative Lockman, negative anterior/posterior drawer. Radiology:   History:   73-year-old female with chronic right knee pain. Findings:   Views: 4V  Right Knee   Weight bearing: Yes   Findings: Tricompartmental degenerative joint disease with Medial lateral and patellofemoral joint space narrowing. osteophytic changes with subchondral sclerosis cystic changes noted both medially and laterally. Previous comparison films March 23, 2018 demonstrated gradual progression of her right knee osteoarthritis. Impression:  Tricompartmental osteoarthritis. Assessment:   1. Severe right knee osteoarthritis. Plan:   -Discussed with the patient both conservative and surgical treatment options. At this time patient has several medical issues over the past year which would preclude her from surgical intervention at this time.   -At this time we recommend discussion with her cardiologist to determine what our risks would be for any sort of surgical intervention. We also recommend follow-up with her primary care physician for assessment of her diabetic control.  -We will continue with conservative management at this time.    -Patient will need insurance prior authorization prior to receiving hyaluronic acid injections. We will submit for that at today's visit. Patient will follow-up when approved.   -Recommend anti-inflammatories, follow-up with cardiology to determine whether patient can safely take anti-inflammatories.  -Patient can follow-up in 3 months for repeat evaluation or sooner if symptoms

## 2019-10-03 ENCOUNTER — APPOINTMENT (OUTPATIENT)
Dept: GENERAL RADIOLOGY | Age: 69
End: 2019-10-03
Payer: COMMERCIAL

## 2019-10-03 ENCOUNTER — HOSPITAL ENCOUNTER (EMERGENCY)
Age: 69
Discharge: HOME | End: 2019-10-03
Attending: EMERGENCY MEDICINE
Payer: COMMERCIAL

## 2019-10-03 ENCOUNTER — APPOINTMENT (OUTPATIENT)
Dept: CT IMAGING | Age: 69
End: 2019-10-03
Payer: COMMERCIAL

## 2019-10-03 VITALS
OXYGEN SATURATION: 94 % | BODY MASS INDEX: 42.68 KG/M2 | HEART RATE: 60 BPM | HEIGHT: 64 IN | SYSTOLIC BLOOD PRESSURE: 154 MMHG | DIASTOLIC BLOOD PRESSURE: 74 MMHG | WEIGHT: 250 LBS | TEMPERATURE: 98.1 F | RESPIRATION RATE: 18 BRPM

## 2019-10-03 DIAGNOSIS — S39.012A STRAIN OF LUMBAR REGION, INITIAL ENCOUNTER: ICD-10-CM

## 2019-10-03 DIAGNOSIS — S16.1XXA STRAIN OF NECK MUSCLE, INITIAL ENCOUNTER: Primary | ICD-10-CM

## 2019-10-03 PROCEDURE — 73562 X-RAY EXAM OF KNEE 3: CPT

## 2019-10-03 PROCEDURE — 72125 CT NECK SPINE W/O DYE: CPT

## 2019-10-03 PROCEDURE — 74176 CT ABD & PELVIS W/O CONTRAST: CPT

## 2019-10-03 PROCEDURE — 99285 EMERGENCY DEPT VISIT HI MDM: CPT

## 2019-10-03 PROCEDURE — 71250 CT THORAX DX C-: CPT

## 2019-10-03 RX ORDER — PANTOPRAZOLE SODIUM 20 MG/1
20 TABLET, DELAYED RELEASE ORAL DAILY
Status: ON HOLD | COMMUNITY
End: 2020-12-25

## 2019-10-03 RX ORDER — HYDROCODONE BITARTRATE AND ACETAMINOPHEN 5; 325 MG/1; MG/1
1 TABLET ORAL EVERY 6 HOURS PRN
Qty: 12 TABLET | Refills: 0 | Status: SHIPPED | OUTPATIENT
Start: 2019-10-03 | End: 2019-10-06

## 2019-10-03 ASSESSMENT — PAIN DESCRIPTION - LOCATION: LOCATION: GENERALIZED;KNEE

## 2019-10-03 ASSESSMENT — PAIN DESCRIPTION - DESCRIPTORS: DESCRIPTORS: ACHING;THROBBING

## 2019-10-03 ASSESSMENT — ENCOUNTER SYMPTOMS
DIARRHEA: 0
ABDOMINAL PAIN: 0
SHORTNESS OF BREATH: 0
SINUS PRESSURE: 0
CONSTIPATION: 0
WHEEZING: 0
NAUSEA: 0
VOMITING: 0
COLOR CHANGE: 0
COUGH: 0
RHINORRHEA: 0
SORE THROAT: 0

## 2019-10-03 ASSESSMENT — PAIN DESCRIPTION - PAIN TYPE: TYPE: ACUTE PAIN

## 2019-10-03 ASSESSMENT — PAIN SCALES - GENERAL: PAINLEVEL_OUTOF10: 10

## 2019-10-03 ASSESSMENT — PAIN DESCRIPTION - ORIENTATION: ORIENTATION: LEFT;RIGHT

## 2019-11-15 ENCOUNTER — HOSPITAL ENCOUNTER (OUTPATIENT)
Dept: GENERAL RADIOLOGY | Age: 69
Discharge: HOME | End: 2019-11-17
Payer: COMMERCIAL

## 2019-11-15 ENCOUNTER — APPOINTMENT (OUTPATIENT)
Dept: GENERAL RADIOLOGY | Age: 69
End: 2019-11-15
Payer: COMMERCIAL

## 2019-11-15 ENCOUNTER — HOSPITAL ENCOUNTER (EMERGENCY)
Age: 69
Discharge: HOME | End: 2019-11-15
Attending: EMERGENCY MEDICINE
Payer: COMMERCIAL

## 2019-11-15 ENCOUNTER — HOSPITAL ENCOUNTER (OUTPATIENT)
Age: 69
Discharge: HOME | End: 2019-11-17
Payer: COMMERCIAL

## 2019-11-15 VITALS
HEIGHT: 64 IN | RESPIRATION RATE: 20 BRPM | HEART RATE: 82 BPM | DIASTOLIC BLOOD PRESSURE: 90 MMHG | TEMPERATURE: 98.2 F | BODY MASS INDEX: 42.68 KG/M2 | SYSTOLIC BLOOD PRESSURE: 139 MMHG | WEIGHT: 250 LBS | OXYGEN SATURATION: 98 %

## 2019-11-15 DIAGNOSIS — M47.812 SPONDYLOSIS WITHOUT MYELOPATHY OR RADICULOPATHY, CERVICAL REGION: ICD-10-CM

## 2019-11-15 DIAGNOSIS — J40 BRONCHITIS: Primary | ICD-10-CM

## 2019-11-15 DIAGNOSIS — M54.50 LOW BACK PAIN, UNSPECIFIED BACK PAIN LATERALITY, UNSPECIFIED CHRONICITY, UNSPECIFIED WHETHER SCIATICA PRESENT: ICD-10-CM

## 2019-11-15 DIAGNOSIS — M25.512 LEFT SHOULDER PAIN, UNSPECIFIED CHRONICITY: ICD-10-CM

## 2019-11-15 PROCEDURE — 99283 EMERGENCY DEPT VISIT LOW MDM: CPT

## 2019-11-15 PROCEDURE — 73030 X-RAY EXAM OF SHOULDER: CPT

## 2019-11-15 PROCEDURE — 72100 X-RAY EXAM L-S SPINE 2/3 VWS: CPT

## 2019-11-15 PROCEDURE — 71046 X-RAY EXAM CHEST 2 VIEWS: CPT

## 2019-11-15 PROCEDURE — 72040 X-RAY EXAM NECK SPINE 2-3 VW: CPT

## 2019-11-15 RX ORDER — FLUTICASONE PROPIONATE 50 MCG
1 SPRAY, SUSPENSION (ML) NASAL DAILY
Qty: 1 BOTTLE | Refills: 0 | Status: ON HOLD | OUTPATIENT
Start: 2019-11-15 | End: 2020-12-29 | Stop reason: HOSPADM

## 2019-11-15 RX ORDER — BENZONATATE 100 MG/1
100-200 CAPSULE ORAL 3 TIMES DAILY PRN
Qty: 60 CAPSULE | Refills: 0 | Status: SHIPPED | OUTPATIENT
Start: 2019-11-15 | End: 2019-11-22

## 2019-11-15 ASSESSMENT — ENCOUNTER SYMPTOMS
SORE THROAT: 0
COUGH: 0
SHORTNESS OF BREATH: 0
RHINORRHEA: 1
VOICE CHANGE: 0
SINUS PRESSURE: 0
TROUBLE SWALLOWING: 0
COLOR CHANGE: 0

## 2020-12-15 ENCOUNTER — APPOINTMENT (OUTPATIENT)
Dept: CT IMAGING | Age: 70
DRG: 064 | End: 2020-12-15
Payer: COMMERCIAL

## 2020-12-15 ENCOUNTER — HOSPITAL ENCOUNTER (INPATIENT)
Age: 70
LOS: 15 days | Discharge: NURSING, REHAB OR LONG-TERM CARE | DRG: 064 | End: 2020-12-30
Attending: EMERGENCY MEDICINE | Admitting: INTERNAL MEDICINE
Payer: COMMERCIAL

## 2020-12-15 PROBLEM — I63.411 CEREBROVASCULAR ACCIDENT (CVA) DUE TO EMBOLIC OCCLUSION OF RIGHT MIDDLE CEREBRAL ARTERY (HCC): Status: ACTIVE | Noted: 2020-12-15

## 2020-12-15 LAB
% CKMB: 1.2 % (ref 0–3)
ABSOLUTE EOS #: 0.03 K/UL (ref 0–0.44)
ABSOLUTE IMMATURE GRANULOCYTE: 0.03 K/UL (ref 0–0.3)
ABSOLUTE LYMPH #: 0.99 K/UL (ref 1.1–3.7)
ABSOLUTE MONO #: 0.68 K/UL (ref 0.1–1.2)
ALLEN TEST: ABNORMAL
ANION GAP SERPL CALCULATED.3IONS-SCNC: 15 MMOL/L (ref 9–17)
ANION GAP: 12 MMOL/L (ref 7–16)
BASOPHILS # BLD: 0 % (ref 0–2)
BASOPHILS ABSOLUTE: <0.03 K/UL (ref 0–0.2)
BUN BLDV-MCNC: 12 MG/DL (ref 8–23)
BUN/CREAT BLD: ABNORMAL (ref 9–20)
CALCIUM SERPL-MCNC: 9.5 MG/DL (ref 8.6–10.4)
CHLORIDE BLD-SCNC: 104 MMOL/L (ref 98–107)
CK MB: 19.5 NG/ML
CKMB INTERPRETATION: ABNORMAL
CO2: 20 MMOL/L (ref 20–31)
CREAT SERPL-MCNC: 0.56 MG/DL (ref 0.5–0.9)
DIFFERENTIAL TYPE: ABNORMAL
EOSINOPHILS RELATIVE PERCENT: 0 % (ref 1–4)
FIO2: ABNORMAL
GFR AFRICAN AMERICAN: >60 ML/MIN
GFR NON-AFRICAN AMERICAN: >60 ML/MIN
GFR NON-AFRICAN AMERICAN: >60 ML/MIN
GFR SERPL CREATININE-BSD FRML MDRD: >60 ML/MIN
GFR SERPL CREATININE-BSD FRML MDRD: ABNORMAL ML/MIN/{1.73_M2}
GFR SERPL CREATININE-BSD FRML MDRD: ABNORMAL ML/MIN/{1.73_M2}
GFR SERPL CREATININE-BSD FRML MDRD: NORMAL ML/MIN/{1.73_M2}
GLUCOSE BLD-MCNC: 104 MG/DL (ref 65–105)
GLUCOSE BLD-MCNC: 138 MG/DL (ref 70–99)
GLUCOSE BLD-MCNC: 145 MG/DL (ref 74–100)
HCO3 VENOUS: 26.1 MMOL/L (ref 22–29)
HCT VFR BLD CALC: 42.7 % (ref 36.3–47.1)
HEMOGLOBIN: 13.3 G/DL (ref 11.9–15.1)
IMMATURE GRANULOCYTES: 0 %
INR BLD: 1
LYMPHOCYTES # BLD: 14 % (ref 24–43)
MCH RBC QN AUTO: 27.8 PG (ref 25.2–33.5)
MCHC RBC AUTO-ENTMCNC: 31.1 G/DL (ref 28.4–34.8)
MCV RBC AUTO: 89.1 FL (ref 82.6–102.9)
MODE: ABNORMAL
MONOCYTES # BLD: 10 % (ref 3–12)
MYOGLOBIN: 1554 NG/ML (ref 25–58)
NEGATIVE BASE EXCESS, VEN: ABNORMAL (ref 0–2)
NRBC AUTOMATED: 0 PER 100 WBC
O2 DEVICE/FLOW/%: ABNORMAL
O2 SAT, VEN: 22 % (ref 60–85)
PARTIAL THROMBOPLASTIN TIME: 22.5 SEC (ref 20.5–30.5)
PATIENT TEMP: ABNORMAL
PCO2, VEN: 44.9 MM HG (ref 41–51)
PDW BLD-RTO: 13.4 % (ref 11.8–14.4)
PH VENOUS: 7.37 (ref 7.32–7.43)
PLATELET # BLD: 282 K/UL (ref 138–453)
PLATELET ESTIMATE: ABNORMAL
PMV BLD AUTO: 9.8 FL (ref 8.1–13.5)
PO2, VEN: 16.6 MM HG (ref 30–50)
POC CHLORIDE: 105 MMOL/L (ref 98–107)
POC CREATININE: 0.63 MG/DL (ref 0.51–1.19)
POC HEMATOCRIT: 46 % (ref 36–46)
POC HEMOGLOBIN: 15.6 G/DL (ref 12–16)
POC IONIZED CALCIUM: 1.21 MMOL/L (ref 1.15–1.33)
POC LACTIC ACID: 1.94 MMOL/L (ref 0.56–1.39)
POC PCO2 TEMP: ABNORMAL MM HG
POC PH TEMP: ABNORMAL
POC PO2 TEMP: ABNORMAL MM HG
POC POTASSIUM: 4 MMOL/L (ref 3.5–4.5)
POC SODIUM: 143 MMOL/L (ref 138–146)
POSITIVE BASE EXCESS, VEN: 0 (ref 0–3)
POTASSIUM SERPL-SCNC: 4.1 MMOL/L (ref 3.7–5.3)
PROTHROMBIN TIME: 10.3 SEC (ref 9–12)
RBC # BLD: 4.79 M/UL (ref 3.95–5.11)
RBC # BLD: ABNORMAL 10*6/UL
SAMPLE SITE: ABNORMAL
SARS-COV-2, RAPID: NOT DETECTED
SARS-COV-2: NORMAL
SARS-COV-2: NORMAL
SEG NEUTROPHILS: 76 % (ref 36–65)
SEGMENTED NEUTROPHILS ABSOLUTE COUNT: 5.38 K/UL (ref 1.5–8.1)
SODIUM BLD-SCNC: 139 MMOL/L (ref 135–144)
SOURCE: NORMAL
TOTAL CK: 1609 U/L (ref 26–192)
TOTAL CO2, VENOUS: 28 MMOL/L (ref 23–30)
TROPONIN INTERP: ABNORMAL
TROPONIN T: ABNORMAL NG/ML
TROPONIN, HIGH SENSITIVITY: 15 NG/L (ref 0–14)
WBC # BLD: 7.1 K/UL (ref 3.5–11.3)
WBC # BLD: ABNORMAL 10*3/UL

## 2020-12-15 PROCEDURE — 83735 ASSAY OF MAGNESIUM: CPT

## 2020-12-15 PROCEDURE — 82435 ASSAY OF BLOOD CHLORIDE: CPT

## 2020-12-15 PROCEDURE — 82565 ASSAY OF CREATININE: CPT

## 2020-12-15 PROCEDURE — 85730 THROMBOPLASTIN TIME PARTIAL: CPT

## 2020-12-15 PROCEDURE — U0003 INFECTIOUS AGENT DETECTION BY NUCLEIC ACID (DNA OR RNA); SEVERE ACUTE RESPIRATORY SYNDROME CORONAVIRUS 2 (SARS-COV-2) (CORONAVIRUS DISEASE [COVID-19]), AMPLIFIED PROBE TECHNIQUE, MAKING USE OF HIGH THROUGHPUT TECHNOLOGIES AS DESCRIBED BY CMS-2020-01-R: HCPCS

## 2020-12-15 PROCEDURE — 84100 ASSAY OF PHOSPHORUS: CPT

## 2020-12-15 PROCEDURE — 36415 COLL VENOUS BLD VENIPUNCTURE: CPT

## 2020-12-15 PROCEDURE — 83605 ASSAY OF LACTIC ACID: CPT

## 2020-12-15 PROCEDURE — 82947 ASSAY GLUCOSE BLOOD QUANT: CPT

## 2020-12-15 PROCEDURE — 6360000004 HC RX CONTRAST MEDICATION: Performed by: PSYCHIATRY & NEUROLOGY

## 2020-12-15 PROCEDURE — 80061 LIPID PANEL: CPT

## 2020-12-15 PROCEDURE — 84295 ASSAY OF SERUM SODIUM: CPT

## 2020-12-15 PROCEDURE — 93005 ELECTROCARDIOGRAM TRACING: CPT | Performed by: EMERGENCY MEDICINE

## 2020-12-15 PROCEDURE — 83036 HEMOGLOBIN GLYCOSYLATED A1C: CPT

## 2020-12-15 PROCEDURE — 2500000003 HC RX 250 WO HCPCS: Performed by: STUDENT IN AN ORGANIZED HEALTH CARE EDUCATION/TRAINING PROGRAM

## 2020-12-15 PROCEDURE — 80048 BASIC METABOLIC PNL TOTAL CA: CPT

## 2020-12-15 PROCEDURE — 0042T CT BRAIN PERFUSION: CPT

## 2020-12-15 PROCEDURE — 82550 ASSAY OF CK (CPK): CPT

## 2020-12-15 PROCEDURE — 2580000003 HC RX 258: Performed by: STUDENT IN AN ORGANIZED HEALTH CARE EDUCATION/TRAINING PROGRAM

## 2020-12-15 PROCEDURE — 99222 1ST HOSP IP/OBS MODERATE 55: CPT | Performed by: PSYCHIATRY & NEUROLOGY

## 2020-12-15 PROCEDURE — 6360000002 HC RX W HCPCS: Performed by: STUDENT IN AN ORGANIZED HEALTH CARE EDUCATION/TRAINING PROGRAM

## 2020-12-15 PROCEDURE — 6360000004 HC RX CONTRAST MEDICATION: Performed by: STUDENT IN AN ORGANIZED HEALTH CARE EDUCATION/TRAINING PROGRAM

## 2020-12-15 PROCEDURE — U0002 COVID-19 LAB TEST NON-CDC: HCPCS

## 2020-12-15 PROCEDURE — 84132 ASSAY OF SERUM POTASSIUM: CPT

## 2020-12-15 PROCEDURE — 85610 PROTHROMBIN TIME: CPT

## 2020-12-15 PROCEDURE — 84484 ASSAY OF TROPONIN QUANT: CPT

## 2020-12-15 PROCEDURE — 70496 CT ANGIOGRAPHY HEAD: CPT

## 2020-12-15 PROCEDURE — 70450 CT HEAD/BRAIN W/O DYE: CPT

## 2020-12-15 PROCEDURE — 83874 ASSAY OF MYOGLOBIN: CPT

## 2020-12-15 PROCEDURE — 82803 BLOOD GASES ANY COMBINATION: CPT

## 2020-12-15 PROCEDURE — 82553 CREATINE MB FRACTION: CPT

## 2020-12-15 PROCEDURE — 85025 COMPLETE CBC W/AUTO DIFF WBC: CPT

## 2020-12-15 PROCEDURE — 99285 EMERGENCY DEPT VISIT HI MDM: CPT

## 2020-12-15 PROCEDURE — 2000000003 HC NEURO ICU R&B

## 2020-12-15 PROCEDURE — 85014 HEMATOCRIT: CPT

## 2020-12-15 PROCEDURE — 82330 ASSAY OF CALCIUM: CPT

## 2020-12-15 RX ORDER — SODIUM CHLORIDE 0.9 % (FLUSH) 0.9 %
10 SYRINGE (ML) INJECTION PRN
Status: DISCONTINUED | OUTPATIENT
Start: 2020-12-15 | End: 2020-12-30 | Stop reason: HOSPADM

## 2020-12-15 RX ORDER — SODIUM CHLORIDE 0.9 % (FLUSH) 0.9 %
10 SYRINGE (ML) INJECTION EVERY 12 HOURS SCHEDULED
Status: DISCONTINUED | OUTPATIENT
Start: 2020-12-15 | End: 2020-12-30 | Stop reason: HOSPADM

## 2020-12-15 RX ORDER — ONDANSETRON 2 MG/ML
4 INJECTION INTRAMUSCULAR; INTRAVENOUS EVERY 6 HOURS PRN
Status: DISCONTINUED | OUTPATIENT
Start: 2020-12-15 | End: 2020-12-30 | Stop reason: HOSPADM

## 2020-12-15 RX ORDER — 0.9 % SODIUM CHLORIDE 0.9 %
1000 INTRAVENOUS SOLUTION INTRAVENOUS ONCE
Status: DISCONTINUED | OUTPATIENT
Start: 2020-12-15 | End: 2020-12-25

## 2020-12-15 RX ORDER — ASPIRIN 81 MG/1
81 TABLET ORAL DAILY
Status: DISCONTINUED | OUTPATIENT
Start: 2020-12-16 | End: 2020-12-30 | Stop reason: HOSPADM

## 2020-12-15 RX ORDER — IPRATROPIUM BROMIDE AND ALBUTEROL SULFATE 2.5; .5 MG/3ML; MG/3ML
1 SOLUTION RESPIRATORY (INHALATION)
Status: DISCONTINUED | OUTPATIENT
Start: 2020-12-16 | End: 2020-12-30 | Stop reason: HOSPADM

## 2020-12-15 RX ORDER — SENNA PLUS 8.6 MG/1
1 TABLET ORAL DAILY PRN
Status: DISCONTINUED | OUTPATIENT
Start: 2020-12-15 | End: 2020-12-16

## 2020-12-15 RX ORDER — ATORVASTATIN CALCIUM 80 MG/1
1 TABLET, FILM COATED ORAL DAILY
Status: DISCONTINUED | OUTPATIENT
Start: 2020-12-16 | End: 2020-12-15

## 2020-12-15 RX ORDER — ATORVASTATIN CALCIUM 80 MG/1
80 TABLET, FILM COATED ORAL NIGHTLY
Status: DISCONTINUED | OUTPATIENT
Start: 2020-12-15 | End: 2020-12-30 | Stop reason: HOSPADM

## 2020-12-15 RX ORDER — ASPIRIN 300 MG/1
300 SUPPOSITORY RECTAL DAILY
Status: DISCONTINUED | OUTPATIENT
Start: 2020-12-16 | End: 2020-12-30 | Stop reason: HOSPADM

## 2020-12-15 RX ORDER — HEPARIN SODIUM 10000 [USP'U]/100ML
9.5 INJECTION, SOLUTION INTRAVENOUS CONTINUOUS
Status: DISCONTINUED | OUTPATIENT
Start: 2020-12-15 | End: 2020-12-16

## 2020-12-15 RX ORDER — FUROSEMIDE 40 MG/1
40 TABLET ORAL DAILY
Status: DISCONTINUED | OUTPATIENT
Start: 2020-12-16 | End: 2020-12-16

## 2020-12-15 RX ORDER — PROMETHAZINE HYDROCHLORIDE 12.5 MG/1
12.5 TABLET ORAL EVERY 6 HOURS PRN
Status: DISCONTINUED | OUTPATIENT
Start: 2020-12-15 | End: 2020-12-30 | Stop reason: HOSPADM

## 2020-12-15 RX ADMIN — IOPAMIDOL 90 ML: 755 INJECTION, SOLUTION INTRAVENOUS at 18:13

## 2020-12-15 RX ADMIN — IOPAMIDOL 50 ML: 755 INJECTION, SOLUTION INTRAVENOUS at 18:19

## 2020-12-15 RX ADMIN — FAMOTIDINE 20 MG: 10 INJECTION INTRAVENOUS at 22:27

## 2020-12-15 RX ADMIN — HEPARIN SODIUM AND DEXTROSE 9.5 UNITS/KG/HR: 10000; 5 INJECTION INTRAVENOUS at 22:31

## 2020-12-15 RX ADMIN — SODIUM CHLORIDE, PRESERVATIVE FREE 10 ML: 5 INJECTION INTRAVENOUS at 22:28

## 2020-12-15 ASSESSMENT — PAIN - FUNCTIONAL ASSESSMENT: PAIN_FUNCTIONAL_ASSESSMENT: 0-10

## 2020-12-15 NOTE — ED NOTES
Bed: 13  Expected date: 12/15/20  Expected time: 5:31 PM  Means of arrival:   Comments:  LS 1     Elizabeth Kuo RN  12/15/20 [id number]

## 2020-12-15 NOTE — ED PROVIDER NOTES
[facility]     Emergency Department     Faculty Attestation    I performed a history and physical examination of the patient and discussed management with the resident. I reviewed the residents note and agree with the documented findings and plan of care. Any areas of disagreement are noted on the chart. I was personally present for the key portions of any procedures. I have documented in the chart those procedures where I was not present during the key portions. I have reviewed the emergency nurses triage note. I agree with the chief complaint, past medical history, past surgical history, allergies, medications, social and family history as documented unless otherwise noted below. For Physician Assistant/ Nurse Practitioner cases/documentation I have personally evaluated this patient and have completed at least one if not all key elements of the E/M (history, physical exam, and MDM). Additional findings are as noted. I have personally seen and evaluated the patient. I find the patient's history and physical exam are consistent with the NP/PA documentation. I agree with the care provided, treatment rendered, disposition and follow-up plan. Last known well at 2200 last evening. The patient is unable to speak clearly with weakness and right-sided gaze blood pressure as noted to 10 systolic at presently a stroke alert has been called. Critical Care     Alvaro Hodge M.D.   Attending Emergency  Physician              Tigist Sales MD  12/15/20 9580

## 2020-12-15 NOTE — ED NOTES
The following labs were labeled with patient stickers & tubed to lab;    []Lavender   []On Ice  []Blue  []Green/ Yellow  []Green/ Black []On Ice  []Pink  []Red  []Yellow    [x]COVID-19 Swab [x]Rapid    []Urine Sample  []Pelvic Cultures    []Blood Cultures       Ethan Clinton RN  12/15/20 [id number]

## 2020-12-15 NOTE — CONSULTS
Endovascular Neurosurgery Consult      Reason for evaluation: R MCA LVO    SUBJECTIVE:   History of Chief Complaint:    67yo female with pmh of dmii, htn, komal, cad, hld, chf. Pt presents with acute R MCA syndrome. At baseline pt walks with a walker, no focal deficits. She lives alone and is able to perform IADLs. LKN 8AM 12/14/2020. Pt was found down the next day with L side weakness. sbp 210, glu 138. Allergies  is allergic to bactrim; penicillins; and tylenol [acetaminophen]. Medications  Prior to Admission medications    Medication Sig Start Date End Date Taking? Authorizing Provider   fluticasone (FLONASE) 50 MCG/ACT nasal spray 1 spray by Each Nostril route daily 11/15/19   CEE Kinsey CNP   pantoprazole (PROTONIX) 20 MG tablet Take 20 mg by mouth daily    Historical Provider, MD   diclofenac sodium (VOLTAREN) 1 % GEL Apply 2 g topically 4 times daily 7/17/19   Glenys Campbell MD   cephALEXin [facility]) 500 MG capsule Take 1 capsule by mouth 2 times daily 4/29/19   CEE Carbone CNP   Calcium Carbonate-Vitamin D (OYSTER SHELL CALCIUM/D) 500-200 MG-UNIT TABS TAKE ONE TABLET BY MOUTH TWO TIMES A DAY 1/17/19   Charlie Todd MD   omeprazole (PRILOSEC) 20 MG delayed release capsule TAKE 1 CAPSULE BY MOUTH ONCE DAILY.  1/17/19   Charlie Todd MD   glimepiride (AMARYL) 2 MG tablet Take 1 tablet by mouth every morning 12/19/18   Charlie Todd MD   metoprolol (LOPRESSOR) 100 MG tablet TAKE 1 TABLET BY MOUTH 2 TIMES DAILY 12/19/18   Charlie Todd MD   spironolactone (ALDACTONE) 50 MG tablet Take 1 tablet by mouth daily 12/19/18   Charlie Todd MD   linagliptin (TRADJENTA) 5 MG tablet Take 1 tablet by mouth daily 12/19/18   Charlie Todd MD   isosorbide mononitrate (IMDUR) 30 MG extended release tablet TAKE 1 TABLET BY MOUTH DAILY  Patient taking differently: 2 times daily TAKE 1 TABLET BY MOUTH DAILY 12/19/18   Charlie Todd MD cloNIDine (CATAPRES) 0.1 MG tablet Take 1 tablet by mouth daily 12/19/18   Sofía Robertson MD   meloxicam ZAHRABETZY WELLER [facility]) 15 MG tablet Take 1 tablet by mouth daily 12/19/18   Sofía Robertson MD   amLODIPine (NORVASC) 10 MG tablet Take 1 tablet by mouth daily 9/13/18   Sofía Robertson MD   losartan (COZAAR) 100 MG tablet Take 1 tablet by mouth daily 9/13/18   Sofía Robertson MD   furosemide (LASIX) 40 MG tablet Take 1 tablet by mouth daily 9/13/18   Sofía Robertson MD   atorvastatin (LIPITOR) 80 MG tablet TAKE 1 TABLET BY MOUTH DAILY 9/5/18   Sofía Robertson MD   sertraline (ZOLOFT) 100 MG tablet Take 1 tablet by mouth daily 9/5/18   Sofía Robertson MD   clopidogrel (PLAVIX) 75 MG tablet Take 1 tablet by mouth daily 8/13/18   Ann Hardwick MD   aspirin (RA ASPIRIN EC) 81 MG EC tablet Take 1 tablet by mouth daily 8/13/18   Ann Hardwick MD   Incontinence Supply Disposable (INCONTINENCE BRIEF LARGE) MISC Use 4-5/day as needed 6/12/18   Sofía Robertson MD   glucose blood VI test strips (ASCENSIA AUTODISC VI;ONE TOUCH ULTRA TEST VI) strip 1 each by In Vitro route 3 times daily As needed.  8/1/17   Willi Khan MD   albuterol-ipratropium (COMBIVENT RESPIMAT)  MCG/ACT AERS inhaler Inhale 1 puff into the lungs every 6 hours as needed for Wheezing 7/17/17   Sofaí Robertson MD   Lancets MISC Use 1 -2 times daily Insulin dependent diabetes mellitus 7/17/17   Sofía Robertson MD    Scheduled Meds:   sodium chloride  1,000 mL Intravenous Once     Continuous Infusions:  PRN Meds:.  Past Medical History has a past medical history of Allergic rhinitis, Anxiety, Asthma, CAD (coronary artery disease), Chronic back pain, Congestive heart failure ([facility] [address] Depression, Headache(784.0), Hiatal hernia, Hypercholesteremia, Hypertension, Kidney stones, Obesity, Osteoarthritis, Postlaminectomy syndrome, Type II or unspecified type diabetes mellitus without mention of complication, not stated as uncontrolled, Unspecified sleep apnea, and Urinary incontinence. Past Surgical History   has a past surgical history that includes Spine surgery; Nerve Block (4/23/2012); Nerve Block (5/22/2012); Nerve Block (01/14/13); Nerve Block (01/21/13); Nerve Block (1/28/2013 ); Cardiac catheterization (01/2013); Lumbar spine surgery (2007); Vena Cava Filter Placement (2007); Tonsillectomy; joint replacement; knee surgery (10/21/2013); knee surgery (12/02/2013); knee surgery (12/09/2013); other surgical history (Right, 7/14/2014); other surgical history (Right, 3/16/2015); Upper gastrointestinal endoscopy; Colonoscopy (09/2015); other surgical history (Right, 06-13-16); Coronary angioplasty with stent (2556-91); and Nerve Block (Right, 04/17/2017). Social History   reports that she quit smoking about 7 years ago. She smoked 0.50 packs per day for 0.00 years. She has never used smokeless tobacco.   reports current alcohol use of about 1.0 standard drinks of alcohol per week. reports no history of drug use. Family History  family history includes Cancer in her father; Diabetes in her mother; High Blood Pressure in her brother and sister.     Review of Systems:  CONSTITUTIONAL:  negative for fevers, chills, fatigue and malaise    EYES:  negative for double vision, blurred vision and photophobia     HEENT:  negative for tinnitus, epistaxis and sore throat    RESPIRATORY:  negative for cough, shortness of breath, wheezing    CARDIOVASCULAR:  negative for chest pain, palpitations, syncope, edema    GASTROINTESTINAL:  negative for nausea, vomiting GENITOURINARY:  negative for incontinence    MUSCULOSKELETAL:  negative for neck or back pain    NEUROLOGICAL:  Negative for weakness and tingling  negative for headaches and dizziness    PSYCHIATRIC:  negative for anxiety      Review of systems otherwise negative. OBJECTIVE:     Vitals:    12/15/20 1846   BP: (!) 180/126   Pulse: 100   Resp: 22   Temp:    SpO2: 96%        General:  Gen: obese habitus, NAD  HEENT: NCAT, mucosa moist  Cvs: RRR, S1 S2 normal  Resp: symmetric unlabored breathing  Abd: s/nd/nt  Ext: no edema  Skin: no lesions seen, warm and dry    Neuro:  Gen: awake and alert, oriented x3. Lang/speech: no aphasia. Severe dysarthria. Follows commands. CN: PERRL, R gaze fixed, L hemianopia, V1-3 intact, L face severe droop, hearing intact  Motor: R hemibody 5/5. L hemibody 0/5l  Sense: LT intact in all 4 ext. L neglect  Coord: no gross ataxia  DTR: deferred  Gait: deferred    NIH Stroke Scale:   1a  Level of consciousness: 1 - not alert but arousable by minor stimulation to obey, answer or respond   1b. LOC questions:  0 - answers both questions correctly   1c. LOC commands: 0 - performs both tasks correctly   2. Best Gaze: 2 - forced deviation, or total gaze paresis not overcome by oculocephalic maneuver   3. Visual: 2 - complete hemianopia   4. Facial Palsy: 2 - partial paralysis (total or near total paralysis of the lower face)   5a. Motor left arm: 4 - no movement   5b. Motor right arm: 0 - no drift, limb holds 90 (or 45) degrees for full 10 seconds   6a. Motor left le - no movement   6b  Motor right le - no drift; leg holds 30 degree position for full 5 seconds   7. Limb Ataxia: 0 - absent   8. Sensory: 0 - normal; no sensory loss   9. Best Language:  0 - no aphasia, normal   10.  Dysarthria: 2 - severe; patient speech is so slurred as to be unintelligible in the absence of or our of proportion to any dysphagia, or is mute/anarthric 11. Extinction and Inattention: 2 - profound vanessa-inattention or vanessa- inattention to more than one modality. Does not recognize own hand or orients only to one side of space          Total:   19     MRS: 3      LABS:   Reviewed. RADIOLOGY:   Images were personally reviewed including:  CTP brain 12/15/2020  1. Moderate volume acute ischemic infarct in the right middle cerebral artery   territory.  No intracranial hemorrhage or mass effect. 2. Moderate volume penumbra in the peripheral right middle cerebral artery   territory based on perfusion images. 3. Acute nearly occlusive thrombosis of the M1 and M2 segments right middle   cerebral artery. 4. Severe stenosis of the M1 segment left middle cerebral artery. 5. Severe stenosis of the A1 segment left anterior cerebral artery. 6. 75% stenosis of the proximal left internal carotid secondary to vessel   kinking and superimposed atherosclerotic plaque. 7. 40% stenosis of the proximal right internal carotid artery. 8. Moderate stenosis of the V4 segment left vertebral artery. CT/A head and neck 12/15/2020  1. Moderate volume acute ischemic infarct in the right middle cerebral artery   territory.  No intracranial hemorrhage or mass effect. 2. Moderate volume penumbra in the peripheral right middle cerebral artery   territory based on perfusion images. 3. Acute nearly occlusive thrombosis of the M1 and M2 segments right middle   cerebral artery. 4. Severe stenosis of the M1 segment left middle cerebral artery. 5. Severe stenosis of the A1 segment left anterior cerebral artery. 6. 75% stenosis of the proximal left internal carotid secondary to vessel   kinking and superimposed atherosclerotic plaque. 7. 40% stenosis of the proximal right internal carotid artery. 8. Moderate stenosis of the V4 segment left vertebral artery.        ASSESSMENT: 67yo female with pmh of dmii, htn, komal, cad, hld, chf. Pt presents with acute R MCA syndrome, imaging showed R M1 occlusion with completed stroke, as well as diffuse athero. Stroke etio large vessel vs cardioembolic. PLAN:   --no tpa or mt, outside of window. Completed stroke. --admit to nsicu at least q1h chects  --start hep gtt no bolus aptt 70-90  --check MRI brain wo con in the am  --additional care and workup as per neuro    Case discussed with Dr. Magalys Tejada attending.     Alondra Montero MD, PhD   Stroke, [facility]  [facility]  Electronically signed 12/15/2020 at 6:53 PM

## 2020-12-15 NOTE — ED NOTES
Patient returned from CT, remains on continuous monitor. Dr Russel Najera to [facility] [facility] to speak with family. Patients clothing and linens changed, urine soaked. Patient's skin cleansed and patient placed in gown. Dr Dusty Parmar at bedside.      Alistair Morley RN  12/15/20 [id number]

## 2020-12-15 NOTE — ED NOTES
Patient to ED via EMS, patient last seen last night at ten pm.  Patient found on the ground by a family member today and called EMS. Patient has incomprehensible speech, weakness, and a right sided gaze. Patient has hx of HTN and DM. Patient has left sided weakness and left sided heminattention. Dr Amanda Jane at bedside, patient placed on full cardiac monitor.      Yakelin Walker RN  12/15/20 [id number]

## 2020-12-15 NOTE — ED PROVIDER NOTES
I did not see or evaluate this patient was involved in her care. Signed onto her chart in error.     Lazarus Jing, MS, RD  PGY-2 Emergency Medicine       Ephraim Rubin DO  Resident  12/15/20 [id number]

## 2020-12-15 NOTE — CONSULTS
[id number] [facility] Neurology   IN-PATIENT SERVICE    NEUROLOGY Stroke CONSULT  NOTE            Date:   12/16/2020  Patient name:  Stephanie Cox  Date of admission:  12/15/2020  YOB: 1950      Chief Complaint:   Acute onset dense left side hemiparesis with right gaze preference LKW 10PM 12/14/2020    Reason for Consult:    Stroke alert   History of Present Illness: The patient is a 78 y.o. -American female who presents with subacute dense left hemiparesis with right forced gaze. Past medical history significant for hypertension, type 2 diabetes, CAD status post RCA and LAD stenting in 2009, hyperlipidemia, CHF. Patient presented to ER by EMS. Patient's family found her lying on the floor and called the emergency department. History was obtained by patient's daughter as patient unable to answer questions appropriately. Patient's daughter states that she last spoke to her mother 12/14/2020 at 8 AM when she did not have any strokelike symptoms. At her baseline patient is able to walk with a walker assistive device approximately 2 blocks and is otherwise independent and lives by herself at home. MRS score of 3. Patient's daughter states that they found her in her apartment lying on the ground unresponsive. On EMS arrival patient was noted to have dense left hemiparesis with right-sided gaze unable to cross midline. On arrival to the emergency department patient's blood pressure was 210/115, heart rate 97 afebrile 98.6. Stroke panel significant for glucose 138, sodium 139, hemoglobin 13.3, platelets 150, total CK 1609, myoglobin 1554, INR 1.0, PTT 22.5, high-sensitivity troponin XV. COVID-19 negative. Patient given 1 L fluid bolus along with aspirin 81 mg.  NERVE BLOCK  01/21/13    Right knee injection #2 - Synvisc    NERVE BLOCK  1/28/2013     Rigth knee synvisc injection #3    NERVE BLOCK Right 04/17/2017    Rt genicular nerve block. no steroid used    OTHER SURGICAL HISTORY Right 7/14/2014    synvisc one knee injection    OTHER SURGICAL HISTORY Right 3/16/2015    synvisc one knee injection    OTHER SURGICAL HISTORY Right 06-13-16    synvisc right knee injection    SPINE SURGERY      TONSILLECTOMY      UPPER GASTROINTESTINAL ENDOSCOPY      VENA CAVA FILTER PLACEMENT  2007    PE and B/L LE emboli        Medications Prior to Admission:     Prior to Admission medications    Medication Sig Start Date End Date Taking? Authorizing Provider   fluticasone (FLONASE) 50 MCG/ACT nasal spray 1 spray by Each Nostril route daily 11/15/19   CEE Howe CNP   pantoprazole (PROTONIX) 20 MG tablet Take 20 mg by mouth daily    Historical Provider, MD   diclofenac sodium (VOLTAREN) 1 % GEL Apply 2 g topically 4 times daily 7/17/19   Howard Pham MD   cephALEXin [facility]) 500 MG capsule Take 1 capsule by mouth 2 times daily 4/29/19   CEE West CNP   Calcium Carbonate-Vitamin D (OYSTER SHELL CALCIUM/D) 500-200 MG-UNIT TABS TAKE ONE TABLET BY MOUTH TWO TIMES A DAY 1/17/19   Maico Salas MD   omeprazole (PRILOSEC) 20 MG delayed release capsule TAKE 1 CAPSULE BY MOUTH ONCE DAILY.  1/17/19   Maico Salas MD   glimepiride (AMARYL) 2 MG tablet Take 1 tablet by mouth every morning 12/19/18   Maico Salas MD   metoprolol (LOPRESSOR) 100 MG tablet TAKE 1 TABLET BY MOUTH 2 TIMES DAILY 12/19/18   Maico Salas MD   spironolactone (ALDACTONE) 50 MG tablet Take 1 tablet by mouth daily 12/19/18   Maico Salas MD   linagliptin (TRADJENTA) 5 MG tablet Take 1 tablet by mouth daily 12/19/18   Maico Salas MD   isosorbide mononitrate (IMDUR) 30 MG extended release tablet TAKE 1 TABLET BY MOUTH DAILY Patient taking differently: 2 times daily TAKE 1 TABLET BY MOUTH DAILY 12/19/18   Ben Spaulding MD   cloNIDine (CATAPRES) 0.1 MG tablet Take 1 tablet by mouth daily 12/19/18   Ben Spaulding MD   meloxicam ZAHRA WELLER [facility]) 15 MG tablet Take 1 tablet by mouth daily 12/19/18   Ben Spaulding MD   amLODIPine (NORVASC) 10 MG tablet Take 1 tablet by mouth daily 9/13/18   Ben Spaulding MD   losartan (COZAAR) 100 MG tablet Take 1 tablet by mouth daily 9/13/18   Ben Spaulding MD   furosemide (LASIX) 40 MG tablet Take 1 tablet by mouth daily 9/13/18   Ben Spaulding MD   atorvastatin (LIPITOR) 80 MG tablet TAKE 1 TABLET BY MOUTH DAILY 9/5/18   Ben Spaulding MD   sertraline (ZOLOFT) 100 MG tablet Take 1 tablet by mouth daily 9/5/18   Ben Spaulding MD   clopidogrel (PLAVIX) 75 MG tablet Take 1 tablet by mouth daily 8/13/18   Noel Tabares MD   aspirin (RA ASPIRIN EC) 81 MG EC tablet Take 1 tablet by mouth daily 8/13/18   Noel Tabares MD   Incontinence Supply Disposable (INCONTINENCE BRIEF LARGE) MISC Use 4-5/day as needed 6/12/18   Ben Spaulding MD   glucose blood VI test strips (ASCENSIA AUTODISC VI;ONE TOUCH ULTRA TEST VI) strip 1 each by In Vitro route 3 times daily As needed. 8/1/17   Willi Mayer MD   albuterol-ipratropium (COMBIVENT RESPIMAT)  MCG/ACT AERS inhaler Inhale 1 puff into the lungs every 6 hours as needed for Wheezing 7/17/17   Ben Spaulding MD   Lancets MISC Use 1 -2 times daily Insulin dependent diabetes mellitus 7/17/17   Ben Spaulding MD        Allergies:     Bactrim, Penicillins, and Tylenol [acetaminophen]    Social History:     Tobacco:    reports that she quit smoking about 7 years ago. She smoked 0.50 packs per day for 0.00 years. She has never used smokeless tobacco.  Alcohol:      reports current alcohol use of about 1.0 standard drinks of alcohol per week. Drug Use:  reports no history of drug use.     Family History:     Family History Problem Relation Age of Onset    Diabetes Mother     Cancer Father     High Blood Pressure Sister     High Blood Pressure Brother        Review of Systems:       Constitutional Negative for fever and chills   HEENT Negative for ear discharge, ear pain, nosebleed   Eyes Negative for photophobia, pain and discharge   Respiratory Negative for hemoptysis and sputum   Cardiovascular Negative for orthopnea, claudication and PND   Gastrointestinal Negative for abdominal pain, diarrhea, blood in stool   Musculoskeletal Negative for joint pain, negative for myalgia   Skin Negative for rash or itching   hematology Negative for ecchymosis, anemia   Psychiatric Negative for suicidal ideation, anxiety, depression, hallucinations       Physical Exam:   BP (!) 164/78   Pulse 64   Temp 98.4 °F (36.9 °C) (Oral)   Resp 20   Ht 5' 4\" (1.626 m)   Wt 231 lb 9.6 oz (105.1 kg)   LMP  (LMP Unknown)   SpO2 99%   BMI 39.75 kg/m²   Temp (24hrs), Av.3 °F (36.8 °C), Min:97.9 °F (36.6 °C), Max:98.6 °F (37 °C)        NEUROLOGIC EXAMINATION    Mental status   Alert and oriented x 3; following all commands; Patient does not have any receptive or expressive aphasia although speech is severely dysarthric limiting rate and volume of her speech     Cranial nerves   II - visual fields complete hemianopsia; pupils reactive  III, IV, VI  extraocular muscles intact; no BRIAN; no nystagmus; no ptosis   V - normal facial sensation                                                               VII -decreased right nasolabial fold                                                          VIII - intact hearing                                                                                Motor function  Strength:   5/5 RUE, 5/5 RLE  0/5 LUE, 0/5  LLE  Normal bulk and tone.       Sensory function Intact to touch, pin, vibration, proprioception throughout     Cerebellar Intact finger-nose-finger testing of right upper extremity LDLCHOLESTEROL 159 (H) 12/16/2020    HDL 48 12/16/2020    TRIG 177 (H) 04/05/2019    ALT 11 04/05/2019    AST 15 04/05/2019    TSH 1.38 12/19/2018    INR 1.0 12/15/2020    LABA1C 6.4 (H) 04/05/2019    LABMICR 39 (H) 12/19/2018           Imaging/Diagnostics:      CT head  And CTA head and neck  And CT perfusion 12/15/2020   Impression   1. Moderate volume acute ischemic infarct in the right middle cerebral artery   territory.  No intracranial hemorrhage or mass effect. 2. Moderate volume penumbra in the peripheral right middle cerebral artery   territory based on perfusion images. 3. Acute nearly occlusive thrombosis of the M1 and M2 segments right middle   cerebral artery. 4. Severe stenosis of the M1 segment left middle cerebral artery. 5. Severe stenosis of the A1 segment left anterior cerebral artery. 6. 75% stenosis of the proximal left internal carotid secondary to vessel   kinking and superimposed atherosclerotic plaque. 7. 40% stenosis of the proximal right internal carotid artery. 8. Moderate stenosis of the V4 segment left vertebral artery. As demonstrated above you can see right M1 and M2 occlusion with significant right hemisphere hypodensities consistent with right MCA territory acute infarct. Patient also noted to have bilateral clinoid and ICA atheromatous disease    MRI Brain -ordered by NICU team       2D ECHO    Impression:    Assessment  1. Acute large vessel occlusion with dense left hemiparesis  2. Right MCA M1 occlusion  3. Cerebral edema following large territory infarct  4. Bilateral ICA atheromatous disease    Other Comorbidities:    ? Asthma  ? Type 2 diabetes  ? Hypertension  ? CAD  ?  CHF    Plan:     Imaging   - CT Head  WO performed in ED  - CTA Head and Neck formed in ED  - CT Brain Perfusion performed in ED  - MRI Brain WO-ordered and pending  - ECHO      Medication   -  Aspirin 81 mg daily    - Folic acid 1mg BID  - Atorvastatin Lipitor 80 mg nightly Labs   - Fasting Lipid panel  - HgbA1c lab      - PT, OT, Speech eval   - Hydrate with 1000cc bolus then IVF NS @ 100cc/hr   - Telemetry   - Neuro checks per protocol  - We recommend SBP <200  - Blood glucose goal less than 180  - Please avoid dextrose containing solutions      Patient was discussed with the attending Dr. Pedro Pablo Paredes and Dr. Carmona [facility]       Electronically signed by Jose A Mccoy MD on 12/16/2020 at 5:45 Gwen Leon MD   PGY 2 Neurology Resident  12/16/2020 at 5:45 AM

## 2020-12-15 NOTE — ED PROVIDER NOTES
[facility]  Emergency Department Encounter  EmergencyMedicine Resident     Pt Laith Huffman  MRN: [id number]  [place] 1950  Date of evaluation: 12/15/20  PCP:  Avel Philip MD    CHIEF COMPLAINT       Chief Complaint   Patient presents with    Cerebrovascular Accident       HISTORY OF PRESENT ILLNESS  (Location/Symptom, Timing/Onset, Context/Setting, Quality, Duration, Modifying Factors, Severity.)      Vimal Raphael is a 78 y.o. female who presents to the emergency department with acute altered mental status and focal neuro deficits. Stroke alert was called due to new onset dense left hemiplegia and right gaze preference. Patient unable to provide detailed history so history was supplemented by EMS. Apparently the last known well was 10 PM last night witnessed by family members. The patient was found down in her home today with acute alteration of mental status, dense left hemiplegia, right gaze preference, and extreme slurring of speech. Patient however is able to mentate here in states specifically that she has never had a stroke before. She attempts multiple times to list all of her medications but is unable to answer questions in an organized manner. Chart review demonstrates a history of diabetes, hypertension, smoking, hypertensive urgency, acute coronary syndrome, congestive heart failure, coronary stent placement in 2016. Last echocardiogram in 2018 showed normal left ventricular size and EF 24% with diastolic dysfunction, mild to moderate tricuspid regurgitation, and estimated right ventricular systolic blood pressure of 41.8 mmHg.     PAST MEDICAL / SURGICAL / SOCIAL / FAMILY HISTORY has a past medical history of Allergic rhinitis, Anxiety, Asthma, CAD (coronary artery disease), Chronic back pain, Congestive heart failure ([facility] [address] Depression, Headache(784.0), Hiatal hernia, Hypercholesteremia, Hypertension, Kidney stones, Obesity, Osteoarthritis, Postlaminectomy syndrome, Type II or unspecified type diabetes mellitus without mention of complication, not stated as uncontrolled, Unspecified sleep apnea, and Urinary incontinence. has a past surgical history that includes Spine surgery; Nerve Block (2012); Nerve Block (2012); Nerve Block (13); Nerve Block (13); Nerve Block (2013 ); Cardiac catheterization (2013); Lumbar spine surgery (); Vena Cava Filter Placement (); Tonsillectomy; joint replacement; knee surgery (10/21/2013); knee surgery (2013); knee surgery (2013); other surgical history (Right, 2014); other surgical history (Right, 3/16/2015); Upper gastrointestinal endoscopy; Colonoscopy (2015); other surgical history (Right, 16); Coronary angioplasty with stent (5884-98); and Nerve Block (Right, 2017). Social History     Socioeconomic History    Marital status: Legally      Spouse name: Not on file    Number of children: Not on file    Years of education: Not on file    Highest education level: Not on file   Occupational History    Not on file   Social Needs    Financial resource strain: Not on file    Food insecurity     Worry: Not on file     Inability: Not on file    Transportation needs     Medical: Not on file     Non-medical: Not on file   Tobacco Use    Smoking status: Former Smoker     Packs/day: 0.50     Years: 0.00     Pack years: 0.00     Quit date: 3/28/2013     Years since quittin.7    Smokeless tobacco: Never Used   Substance and Sexual Activity    Alcohol use: Yes     Alcohol/week: 1.0 standard drinks     Types: 1 Cans of beer per week     Comment: occasionally    Drug use:  No  Sexual activity: Not on file   Lifestyle    Physical activity     Days per week: Not on file     Minutes per session: Not on file    Stress: Not on file   Relationships    Social connections     Talks on phone: Not on file     Gets together: Not on file     Attends Yazdanism service: Not on file     Active member of club or organization: Not on file     Attends meetings of clubs or organizations: Not on file     Relationship status: Not on file    Intimate partner violence     Fear of current or ex partner: Not on file     Emotionally abused: Not on file     Physically abused: Not on file     Forced sexual activity: Not on file   Other Topics Concern    Not on file   Social History Narrative    Not on file       Family History   Problem Relation Age of Onset    Diabetes Mother     Cancer Father     High Blood Pressure Sister     High Blood Pressure Brother        Allergies:  Bactrim, Penicillins, and Tylenol [acetaminophen]    Home Medications:  Prior to Admission medications    Medication Sig Start Date End Date Taking? Authorizing Provider   fluticasone (FLONASE) 50 MCG/ACT nasal spray 1 spray by Each Nostril route daily 11/15/19   CEE Cadena CNP   pantoprazole (PROTONIX) 20 MG tablet Take 20 mg by mouth daily    Historical Provider, MD   diclofenac sodium (VOLTAREN) 1 % GEL Apply 2 g topically 4 times daily 7/17/19   Tushar Steiner MD   cephALEXin [facility]) 500 MG capsule Take 1 capsule by mouth 2 times daily 4/29/19   CEE Lim CNP   Calcium Carbonate-Vitamin D (OYSTER SHELL CALCIUM/D) 500-200 MG-UNIT TABS TAKE ONE TABLET BY MOUTH TWO TIMES A DAY 1/17/19   Geovani Conn MD   omeprazole (PRILOSEC) 20 MG delayed release capsule TAKE 1 CAPSULE BY MOUTH ONCE DAILY.  1/17/19   Geovani Conn MD   glimepiride (AMARYL) 2 MG tablet Take 1 tablet by mouth every morning 12/19/18   Geovani Conn MD metoprolol (LOPRESSOR) 100 MG tablet TAKE 1 TABLET BY MOUTH 2 TIMES DAILY 12/19/18   Asael Bain MD   spironolactone (ALDACTONE) 50 MG tablet Take 1 tablet by mouth daily 12/19/18   Asael Bain MD   linagliptin (TRADJENTA) 5 MG tablet Take 1 tablet by mouth daily 12/19/18   Asael Bain MD   isosorbide mononitrate (IMDUR) 30 MG extended release tablet TAKE 1 TABLET BY MOUTH DAILY  Patient taking differently: 2 times daily TAKE 1 TABLET BY MOUTH DAILY 12/19/18   Asael Bain MD   cloNIDine (CATAPRES) 0.1 MG tablet Take 1 tablet by mouth daily 12/19/18   Asael Bain MD   meloxicam [facility]) 15 MG tablet Take 1 tablet by mouth daily 12/19/18   Asael Bain MD   amLODIPine (NORVASC) 10 MG tablet Take 1 tablet by mouth daily 9/13/18   Asael Bain MD   losartan (COZAAR) 100 MG tablet Take 1 tablet by mouth daily 9/13/18   Asael Bain MD   furosemide (LASIX) 40 MG tablet Take 1 tablet by mouth daily 9/13/18   Asael Bain MD   atorvastatin (LIPITOR) 80 MG tablet TAKE 1 TABLET BY MOUTH DAILY 9/5/18   Asael Bain MD   sertraline (ZOLOFT) 100 MG tablet Take 1 tablet by mouth daily 9/5/18   Asael Bain MD   clopidogrel (PLAVIX) 75 MG tablet Take 1 tablet by mouth daily 8/13/18   Enriqueta Lopez MD   aspirin (RA ASPIRIN EC) 81 MG EC tablet Take 1 tablet by mouth daily 8/13/18   Enriqueta Lopez MD   Incontinence Supply Disposable (INCONTINENCE BRIEF LARGE) MISC Use 4-5/day as needed 6/12/18   Asael Bain MD   glucose blood VI test strips (ASCENSIA AUTODISC VI;ONE TOUCH ULTRA TEST VI) strip 1 each by In Vitro route 3 times daily As needed.  8/1/17   Wlili Martinez MD   albuterol-ipratropium (COMBIVENT RESPIMAT)  MCG/ACT AERS inhaler Inhale 1 puff into the lungs every 6 hours as needed for Wheezing 7/17/17   Asael Bain MD   Lancets MISC Use 1 -2 times daily Insulin dependent diabetes mellitus 7/17/17   Asael Bain MD       REVIEW OF SYSTEMS (2-9 systems for level 4, 10 or more for level 5)      Review of Systems   Unable to perform ROS: Mental status change       PHYSICAL EXAM   (up to 7 for level 4, 8 or more for level 5)      INITIAL VITALS:   BP (!) 213/80   Pulse 80   Temp 98.1 °F (36.7 °C) (Oral)   Resp 16   Ht 5' 4\" (1.626 m)   Wt 231 lb 9.6 oz (105.1 kg)   LMP  (LMP Unknown)   SpO2 95%   BMI 39.75 kg/m²     Physical Exam  Vitals signs and nursing note reviewed. Constitutional:       Appearance: She is obese. She is diaphoretic. Comments: Strong smell of urine   HENT:      Head: Normocephalic and atraumatic. Right Ear: External ear normal.      Left Ear: External ear normal.      Nose: Nose normal.      Mouth/Throat:      Mouth: Mucous membranes are moist.      Pharynx: Oropharynx is clear. Eyes:      Pupils: Pupils are equal, round, and reactive to light. Comments: Significant right-sided gaze preference   Neck:      Musculoskeletal: Normal range of motion. Cardiovascular:      Rate and Rhythm: Normal rate and regular rhythm. Heart sounds: Normal heart sounds. No murmur. No friction rub. No gallop. Pulmonary:      Effort: Pulmonary effort is normal. No respiratory distress. Breath sounds: Normal breath sounds. No stridor. No wheezing or rales. Comments: Patient cooperative with deep inspiration  Chest:      Chest wall: No tenderness. Abdominal:      General: There is no distension. Palpations: Abdomen is soft. There is no mass. Tenderness: There is no abdominal tenderness. There is no guarding or rebound. Musculoskeletal:         General: No swelling, tenderness or signs of injury. Skin:     Capillary Refill: Capillary refill takes less than 2 seconds. Coloration: Skin is not pale. Findings: No bruising or erythema. Neurological:      Mental Status: She is alert. Sensory: Sensory deficit present. Motor: Weakness present. Comments: Hemiplegia and gaze deficit as above         DIFFERENTIAL  DIAGNOSIS     PLAN (LABS / IMAGING / EKG):  Orders Placed This Encounter   Procedures    CT HEAD WO CONTRAST    CTA HEAD NECK W CONTRAST    CT BRAIN PERFUSION    MRI BRAIN WO CONTRAST    CT HEAD WO CONTRAST    XR ABDOMEN FOR NG/OG/NE TUBE PLACEMENT    Hemoglobin and hematocrit, blood    SODIUM (POC)    POTASSIUM (POC)    CHLORIDE (POC)    CALCIUM, IONIC (POC)    STROKE PANEL    COVID-19    HEMOGLOBIN A1C    CBC    Hemoglobin A1c    SPECIMEN REJECTION    CK    Lipid, Fasting    Myoglobin, Serum    PREVIOUS SPECIMEN    Magnesium    Phosphorus    Troponin    Troponin    Basic Metabolic Panel    CBC WITH AUTO DIFFERENTIAL    BASIC METABOLIC PANEL    CALCIUM, IONIZED    MAGNESIUM    Diet NPO Effective Now    Vital signs    Up as tolerated    NIHSS/Neuro Checks    Tobacco cessation education    Swallow screen by nursing before diet and oral medications started.     Stroke education    Telemetry monitoring - continuous duration    Notify Physician    Nursing communication    Nursing communication    Misc nursing order (specify)    Tube insertion NG    Full Code    Inpatient consult to Neurocritical care    Inpatient consult to Neurocritical care    Inpatient consult to IV Team    OT eval and treat    PT evaluation and treat    HHN Treatment    Initiate Oxygen Therapy Protocol    Speech Language Pathology (SLP) eval and treat    SLP clinical swallow evaluation    Venous Blood Gas, POC    Creatinine W/GFR Point of Care    Lactic Acid, POC    POCT Glucose    Anion Gap (Calc) POC    POC Glucose Fingerstick    POC Glucose Fingerstick    POC Glucose Fingerstick    POC Glucose Fingerstick    EKG 12 Lead    EKG 12 Lead    EKG REPORT    EKG REPORT    RHYTHM STRIP REPORT    EKG 12 Lead    ECHO Complete 2D W Doppler W Color    PATIENT STATUS (FROM ED OR OR/PROCEDURAL) Inpatient MEDICATIONS ORDERED:  Orders Placed This Encounter   Medications    iopamidol (ISOVUE-370) 76 % injection 90 mL    iopamidol (ISOVUE-370) 76 % injection 50 mL    0.9 % sodium chloride bolus    ipratropium-albuterol (DUONEB) nebulizer solution 1 ampule    DISCONTD: atorvastatin (LIPITOR) tablet 80 mg    DISCONTD: furosemide (LASIX) tablet 40 mg    insulin lispro (HUMALOG) injection vial 0-12 Units    insulin lispro (HUMALOG) injection vial 0-6 Units    sodium chloride flush 0.9 % injection 10 mL    sodium chloride flush 0.9 % injection 10 mL    OR Linked Order Group     promethazine (PHENERGAN) tablet 12.5 mg     ondansetron (ZOFRAN) injection 4 mg    DISCONTD: enoxaparin (LOVENOX) injection 40 mg    OR Linked Order Group     aspirin EC tablet 81 mg     aspirin suppository 300 mg    DISCONTD: senna (SENOKOT) tablet 8.6 mg    atorvastatin (LIPITOR) tablet 80 mg    famotidine (PEPCID) injection 20 mg    DISCONTD: heparin 25,000 units in dextrose 5% 250 mL infusion    0.9 % sodium chloride infusion    magnesium sulfate 2 g in 50 mL IVPB premix    sertraline (ZOLOFT) tablet 100 mg    sennosides-docusate sodium (SENOKOT-S) 8.6-50 MG tablet 2 tablet    enoxaparin (LOVENOX) injection 30 mg       DDX: Imre Bronson is a 78 y.o. female who presents to the emergency department with acute altered mental status.  Differential diagnosis includes ACS, CVA, TIA, electrolyte abnormality, sepsis    DIAGNOSTIC RESULTS / EMERGENCY DEPARTMENT COURSE / MDM   LAB RESULTS:  Results for orders placed or performed during the hospital encounter of 12/15/20   Hemoglobin and hematocrit, blood   Result Value Ref Range    POC Hemoglobin 15.6 12.0 - 16.0 g/dL    POC Hematocrit 46 36 - 46 %   SODIUM (POC)   Result Value Ref Range    POC Sodium 143 138 - 146 mmol/L   POTASSIUM (POC)   Result Value Ref Range    POC Potassium 4.0 3.5 - 4.5 mmol/L   CHLORIDE (POC)   Result Value Ref Range    POC Chloride 105 98 - 107 mmol/L CALCIUM, IONIC (POC)   Result Value Ref Range    POC Ionized Calcium 1.21 1.15 - 1.33 mmol/L   STROKE PANEL   Result Value Ref Range    Glucose 138 (H) 70 - 99 mg/dL    BUN 12 8 - 23 mg/dL    CREATININE 0.56 0.50 - 0.90 mg/dL    Bun/Cre Ratio NOT REPORTED 9 - 20    Calcium 9.5 8.6 - 10.4 mg/dL    Sodium 139 135 - 144 mmol/L    Potassium 4.1 3.7 - 5.3 mmol/L    Chloride 104 98 - 107 mmol/L    CO2 20 20 - 31 mmol/L    Anion Gap 15 9 - 17 mmol/L    GFR Non-African American >60 >60 mL/min    GFR African American >60 >60 mL/min    GFR Comment          GFR Staging NOT REPORTED     WBC 7.1 3.5 - 11.3 k/uL    RBC 4.79 3.95 - 5.11 m/uL    Hemoglobin 13.3 11.9 - 15.1 g/dL    Hematocrit 42.7 36.3 - 47.1 %    MCV 89.1 82.6 - 102.9 fL    MCH 27.8 25.2 - 33.5 pg    MCHC 31.1 28.4 - 34.8 g/dL    RDW 13.4 11.8 - 14.4 %    Platelets 594 092 - 082 k/uL    MPV 9.8 8.1 - 13.5 fL    NRBC Automated 0.0 0.0 per 100 WBC    Total CK 1,609 (H) 26 - 192 U/L    CK-MB 19.5 (H) <5.4 ng/mL    % CKMB 1.2 0.0 - 3.0 %    CKMB Interpretation COMPATIBLE WITH SKELETAL MUSCLE ORIGIN     Differential Type NOT REPORTED     Seg Neutrophils 76 (H) 36 - 65 %    Lymphocytes 14 (L) 24 - 43 %    Monocytes 10 3 - 12 %    Eosinophils % 0 (L) 1 - 4 %    Basophils 0 0 - 2 %    Immature Granulocytes 0 0 %    Segs Absolute 5.38 1.50 - 8.10 k/uL    Absolute Lymph # 0.99 (L) 1.10 - 3.70 k/uL    Absolute Mono # 0.68 0.10 - 1.20 k/uL    Absolute Eos # 0.03 0.00 - 0.44 k/uL    Basophils Absolute <0.03 0.00 - 0.20 k/uL    Absolute Immature Granulocyte 0.03 0.00 - 0.30 k/uL    WBC Morphology NOT REPORTED     RBC Morphology NOT REPORTED     Platelet Estimate NOT REPORTED     Myoglobin 1,554 (H) 25 - 58 ng/mL    Protime 10.3 9.0 - 12.0 sec    INR 1.0     PTT 22.5 20.5 - 30.5 sec    Troponin, High Sensitivity 15 (H) 0 - 14 ng/L    Troponin T NOT REPORTED <0.03 ng/mL    Troponin Interp NOT REPORTED    COVID-19    Specimen: Other   Result Value Ref Range    SARS-CoV-2 SARS-CoV-2, Rapid Not Detected Not Detected    Source . NASOPHARYNGEAL SWAB     SARS-CoV-2         HEMOGLOBIN A1C   Result Value Ref Range    Hemoglobin A1C 6.9 (H) 4.0 - 6.0 %    Estimated Avg Glucose 151 mg/dL   CBC   Result Value Ref Range    WBC 6.9 3.5 - 11.3 k/uL    RBC 4.32 3.95 - 5.11 m/uL    Hemoglobin 12.1 11.9 - 15.1 g/dL    Hematocrit 41.2 36.3 - 47.1 %    MCV 95.4 82.6 - 102.9 fL    MCH 28.0 25.2 - 33.5 pg    MCHC 29.4 28.4 - 34.8 g/dL    RDW 13.6 11.8 - 14.4 %    Platelets 371 225 - 258 k/uL    MPV 10.0 8.1 - 13.5 fL    NRBC Automated 0.0 0.0 per 100 WBC   Hemoglobin A1c   Result Value Ref Range    Hemoglobin A1C 6.8 (H) 4.0 - 6.0 %    Estimated Avg Glucose 148 mg/dL   APTT   Result Value Ref Range    PTT 31.1 (H) 20.5 - 30.5 sec   SPECIMEN REJECTION   Result Value Ref Range    Specimen Source . BLOOD     Ordered Test EDENILSON,CK,LIPRF     Reason for Rejection       Unable to perform testing: Specimen quantity not sufficient.    - NOT REPORTED    CK   Result Value Ref Range    Total CK 1,921 (H) 26 - 192 U/L   Lipid, Fasting   Result Value Ref Range    Cholesterol, Fasting 220 (H) <200 mg/dL    HDL 48 >40 mg/dL    LDL Cholesterol 159 (H) 0 - 130 mg/dL    Chol/HDL Ratio 4.6 <5    Triglyceride, Fasting 64 <150 mg/dL    VLDL NOT REPORTED 1 - 30 mg/dL   Myoglobin, Serum   Result Value Ref Range    Myoglobin 532 (H) 25 - 58 ng/mL   Magnesium   Result Value Ref Range    Magnesium 1.6 1.6 - 2.6 mg/dL   Phosphorus   Result Value Ref Range    Phosphorus 3.2 2.6 - 4.5 mg/dL   Troponin   Result Value Ref Range    Troponin, High Sensitivity 21 (H) 0 - 14 ng/L    Troponin T NOT REPORTED <0.03 ng/mL    Troponin Interp NOT REPORTED    Troponin   Result Value Ref Range    Troponin, High Sensitivity 22 (H) 0 - 14 ng/L    Troponin T NOT REPORTED <0.03 ng/mL    Troponin Interp NOT REPORTED    Basic Metabolic Panel   Result Value Ref Range    Glucose 106 (H) 70 - 99 mg/dL    BUN 12 8 - 23 mg/dL CREATININE 0.57 0.50 - 0.90 mg/dL    Bun/Cre Ratio NOT REPORTED 9 - 20    Calcium 9.2 8.6 - 10.4 mg/dL    Sodium 140 135 - 144 mmol/L    Potassium 4.5 3.7 - 5.3 mmol/L    Chloride 106 98 - 107 mmol/L    CO2 13 (L) 20 - 31 mmol/L    Anion Gap 21 (H) 9 - 17 mmol/L    GFR Non-African American >60 >60 mL/min    GFR African American >60 >60 mL/min    GFR Comment          GFR Staging NOT REPORTED    Venous Blood Gas, POC   Result Value Ref Range    pH, [place] 7.373 7.320 - 7.430    pCO2, [place] 44.9 41.0 - 51.0 mm Hg    pO2, [place] 16.6 (L) 30.0 - 50.0 mm Hg    HCO3, Venous 26.1 22.0 - 29.0 mmol/L    Total CO2, Venous 28 23.0 - 30.0 mmol/L    Negative Base Excess, [place] NOT REPORTED 0.0 - 2.0    Positive Base Excess, [place] 0 0.0 - 3.0    O2 Sat, [place] 22 (L) 60.0 - 85.0 %    O2 Device/Flow/% NOT REPORTED     [place]ango Test NOT REPORTED     Sample Site NOT REPORTED     Mode NOT REPORTED     FIO2 NOT REPORTED     Pt Temp NOT REPORTED     POC pH Temp NOT REPORTED     POC pCO2 Temp NOT REPORTED mm Hg    POC pO2 Temp NOT REPORTED mm Hg   Creatinine W/GFR Point of Care   Result Value Ref Range    POC Creatinine 0.63 0.51 - 1.19 mg/dL    GFR Comment >60 >60 mL/min    GFR Non-African American >60 >60 mL/min    GFR Comment         Lactic Acid, POC   Result Value Ref Range    POC Lactic Acid 1.94 (H) 0.56 - 1.39 mmol/L   POCT Glucose   Result Value Ref Range    POC Glucose 145 (H) 74 - 100 mg/dL   Anion Gap (Calc) POC   Result Value Ref Range    Anion Gap 12 7 - 16 mmol/L   POC Glucose Fingerstick   Result Value Ref Range    POC Glucose 104 65 - 105 mg/dL   POC Glucose Fingerstick   Result Value Ref Range    POC Glucose 111 (H) 65 - 105 mg/dL   POC Glucose Fingerstick   Result Value Ref Range    POC Glucose 103 65 - 105 mg/dL   POC Glucose Fingerstick   Result Value Ref Range    POC Glucose 91 65 - 105 mg/dL   EKG 12 Lead   Result Value Ref Range    Ventricular Rate 96 BPM    Atrial Rate 96 BPM    P-R Interval 154 ms    QRS Duration 84 ms EXAMINATION: CT OF THE HEAD WITHOUT CONTRAST; CTA OF THE HEAD AND NECK WITH CONTRAST; CTA OF THE HEAD WITH CONTRAST WITH PERFUSION 12/15/2020 5:53 pm; 12/15/2020 6:19 pm; 12/15/2020 6:18 pm: TECHNIQUE: CT of the head was performed without the administration of intravenous contrast. Dose modulation, iterative reconstruction, and/or weight based adjustment of the mA/kV was utilized to reduce the radiation dose to as low as reasonably achievable.; CTA of the head and neck was performed with the administration of intravenous contrast. Multiplanar reformatted images are provided for review. MIP images are provided for review. Stenosis of the internal carotid arteries measured using NASCET criteria. Dose modulation, iterative reconstruction, and/or weight based adjustment of the mA/kV was utilized to reduce the radiation dose to as low as reasonably achievable.; CTA of the head/brain was performed with the administration of intravenous contrast. Multiplanar reformatted images are provided for review. MIP images are provided for review. Dose modulation, iterative reconstruction, and/or weight based adjustment of the mA/kV was utilized to reduce the radiation dose to as low as reasonably achievable. Noncontrast CT of the head with reconstructed 2-D images are also provided for review. COMPARISON: None.  HISTORY: ORDERING SYSTEM PROVIDED HISTORY: left sided weakness and right gaze TECHNOLOGIST PROVIDED HISTORY: left sided weakness and right gaze Reason for Exam: left sided weakness and right gaze; ORDERING SYSTEM PROVIDED HISTORY: left sided weakness and right gaze TECHNOLOGIST PROVIDED HISTORY: left sided weakness and right gaze Reason for Exam: stroke  left sided weakness and right gaze Acuity: Acute Type of Exam: Initial; ORDERING SYSTEM PROVIDED HISTORY: Left sided weakness right gaze TECHNOLOGIST PROVIDED HISTORY: Left sided weakness right gaze FINDINGS: CT HEAD: BRAIN/VENTRICLES:  Confluent loss of gray-white differentiation involving the right insula and surrounding right frontal lobe extending into the right basal ganglia. No intracranial hemorrhage. Mild-to-moderate chronic white matter microvascular ischemic changes. No hydrocephalus or extra-axial fluid collection. Midline maintained. Basal cisterns patent. Small volume high left frontal encephalomalacia in keeping with sequela of prior infarct. Chronic appearing lacunar infarcts in the left basal ganglia. ORBITS: The visualized portion of the orbits demonstrate no acute abnormality. SINUSES:  The visualized paranasal sinuses and mastoid air cells demonstrate no acute abnormality. SOFT TISSUES/SKULL: No acute abnormality of the visualized skull or soft tissues. CTA NECK: AORTIC ARCH/ARCH VESSELS: No dissection or arterial injury. No significant stenosis of the brachiocephalic or subclavian arteries. CAROTID ARTERIES: Right: The right common carotid artery is normal in caliber. There is calcified atherosclerotic plaque involving the right carotid bifurcation and bulb causing 40% stenosis of the proximal right internal carotid artery. The right external carotid artery is patent. No dissection. Left: The left common carotid artery is normal in caliber. There is calcified atherosclerotic plaque and kinking of the left carotid bulb causing 75% focal stenosis 1.6 cm distal to the origin of the left internal carotid artery. The left external carotid artery is patent. No dissection. VERTEBRAL ARTERIES: No dissection, arterial injury, or significant stenosis. SOFT TISSUES: The lung apices are clear. No cervical or superior mediastinal lymphadenopathy. The larynx and pharynx are unremarkable. No acute abnormality of the salivary and thyroid glands. BONES: No acute osseous abnormality. Mild-to-moderate degenerative changes in the cervical spine.  CTA HEAD: ANTERIOR CIRCULATION: Moderate to severe calcific atherosclerotic stenosis of the cavernous internal carotid arteries. Near complete occlusion of the distal M1 and proximal M2 segments right middle cerebral artery with paucity of contrast in more distal branches. No focal stenosis of the right anterior cerebral artery. Severe stenosis of the proximal A1 segment left anterior cerebral artery. Patent anterior communicating artery. Severe stenosis of the M1 segment left middle cerebral artery. No aneurysm. POSTERIOR CIRCULATION: Moderate stenosis of the proximal intracranial left vertebral artery. No right vertebral, basilar, or left posterior cerebral artery stenosis. Moderate stenosis of the P2 segment right posterior cerebral artery. OTHER: No dural venous sinus thrombosis on this non-dedicated study. CT PERFUSION: There is increased mean transit time in the visualized right middle cerebral artery territory somewhat greater in extent than the underlying hypodensity on head CT. Moderate volume underlying core infarct is noted. 1. Moderate volume acute ischemic infarct in the right middle cerebral artery territory. No intracranial hemorrhage or mass effect. 2. Moderate volume penumbra in the peripheral right middle cerebral artery territory based on perfusion images. 3. Acute nearly occlusive thrombosis of the M1 and M2 segments right middle cerebral artery. 4. Severe stenosis of the M1 segment left middle cerebral artery. 5. Severe stenosis of the A1 segment left anterior cerebral artery. 6. 75% stenosis of the proximal left internal carotid secondary to vessel kinking and superimposed atherosclerotic plaque. 7. 40% stenosis of the proximal right internal carotid artery. 8. Moderate stenosis of the V4 segment left vertebral artery. Critical findings were discussed with Dr. Maury Hutchinson at 6:25 p.m. on 12/15/2020.      Ct Brain Perfusion    Result Date: 12/15/2020 carotid arteries. Near complete occlusion of the distal M1 and proximal M2 segments right middle cerebral artery with paucity of contrast in more distal branches. No focal stenosis of the right anterior cerebral artery. Severe stenosis of the proximal A1 segment left anterior cerebral artery. Patent anterior communicating artery. Severe stenosis of the M1 segment left middle cerebral artery. No aneurysm. POSTERIOR CIRCULATION: Moderate stenosis of the proximal intracranial left vertebral artery. No right vertebral, basilar, or left posterior cerebral artery stenosis. Moderate stenosis of the P2 segment right posterior cerebral artery. OTHER: No dural venous sinus thrombosis on this non-dedicated study. CT PERFUSION: There is increased mean transit time in the visualized right middle cerebral artery territory somewhat greater in extent than the underlying hypodensity on head CT. Moderate volume underlying core infarct is noted. 1. Moderate volume acute ischemic infarct in the right middle cerebral artery territory. No intracranial hemorrhage or mass effect. 2. Moderate volume penumbra in the peripheral right middle cerebral artery territory based on perfusion images. 3. Acute nearly occlusive thrombosis of the M1 and M2 segments right middle cerebral artery. 4. Severe stenosis of the M1 segment left middle cerebral artery. 5. Severe stenosis of the A1 segment left anterior cerebral artery. 6. 75% stenosis of the proximal left internal carotid secondary to vessel kinking and superimposed atherosclerotic plaque. 7. 40% stenosis of the proximal right internal carotid artery. 8. Moderate stenosis of the V4 segment left vertebral artery. Critical findings were discussed with Dr. Nico Salazar at 6:25 p.m. on 12/15/2020.      Cta Head Neck W Contrast    Result Date: 12/15/2020 carotid arteries. Near complete occlusion of the distal M1 and proximal M2 segments right middle cerebral artery with paucity of contrast in more distal branches. No focal stenosis of the right anterior cerebral artery. Severe stenosis of the proximal A1 segment left anterior cerebral artery. Patent anterior communicating artery. Severe stenosis of the M1 segment left middle cerebral artery. No aneurysm. POSTERIOR CIRCULATION: Moderate stenosis of the proximal intracranial left vertebral artery. No right vertebral, basilar, or left posterior cerebral artery stenosis. Moderate stenosis of the P2 segment right posterior cerebral artery. OTHER: No dural venous sinus thrombosis on this non-dedicated study. CT PERFUSION: There is increased mean transit time in the visualized right middle cerebral artery territory somewhat greater in extent than the underlying hypodensity on head CT. Moderate volume underlying core infarct is noted. 1. Moderate volume acute ischemic infarct in the right middle cerebral artery territory. No intracranial hemorrhage or mass effect. 2. Moderate volume penumbra in the peripheral right middle cerebral artery territory based on perfusion images. 3. Acute nearly occlusive thrombosis of the M1 and M2 segments right middle cerebral artery. 4. Severe stenosis of the M1 segment left middle cerebral artery. 5. Severe stenosis of the A1 segment left anterior cerebral artery. 6. 75% stenosis of the proximal left internal carotid secondary to vessel kinking and superimposed atherosclerotic plaque. 7. 40% stenosis of the proximal right internal carotid artery. 8. Moderate stenosis of the V4 segment left vertebral artery. Critical findings were discussed with Dr. Erickson Narayanan at 6:25 p.m. on 12/15/2020.        EKG  EKG Interpretation    Interpreted by emergency department physician    Rhythm: normal sinus with PAC  Rate: normal  Axis: Left  Ectopy: none Conduction: SD interval 154, QRS 84, QTc 497 with poor R wave progression  ST Segments: no acute change  T Waves: no acute change  Q Waves: none    Clinical Impression: Nonspecific EKG    Raul Calle MD    All EKG's are interpreted by the Emergency Department Physician who either signs or co-signs this chart in the absence of a cardiologist.    EMERGENCY DEPARTMENT COURSE:       PROCEDURES:  None    CONSULTS:  IP CONSULT TO NEUROCRITICAL CARE  IP CONSULT TO NEUROCRITICAL CARE  IP CONSULT TO IV TEAM    CRITICAL CARE:  Please see attending note. FINAL IMPRESSION      1. Acute cerebrovascular accident (CVA) ([address]          DISPOSITION / [address] Admitted 12/15/2020 07:01:59 PM      PATIENT REFERRED TO:  No follow-up provider specified. DISCHARGE MEDICATIONS:  Current Discharge Medication List          Yuval Matthews MD  Emergency Medicine Resident    This patient was evaluated in the Emergency Department for symptoms described in the history of present illness. He/she was evaluated in the context of the global COVID-19 pandemic, which necessitated consideration that the patient might be at risk for infection with the SARS-CoV-2 virus that causes COVID-19. Institutional protocols and algorithms that pertain to the evaluation of patients at risk for COVID-19 are in a state of rapid change based on information released by regulatory bodies including the CDC and federal and state organizations. These policies and algorithms were followed during the patient's care in the ED.     (Please note that portions of thisnote were completed with a voice recognition program.  Efforts were made to edit the dictations but occasionally words are mis-transcribed.)        Yuval Matthews MD  Resident  12/16/20 2003

## 2020-12-16 ENCOUNTER — APPOINTMENT (OUTPATIENT)
Dept: MRI IMAGING | Age: 70
DRG: 064 | End: 2020-12-16
Payer: COMMERCIAL

## 2020-12-16 ENCOUNTER — APPOINTMENT (OUTPATIENT)
Dept: GENERAL RADIOLOGY | Age: 70
DRG: 064 | End: 2020-12-16
Payer: COMMERCIAL

## 2020-12-16 LAB
-: NORMAL
ANION GAP SERPL CALCULATED.3IONS-SCNC: 21 MMOL/L (ref 9–17)
BUN BLDV-MCNC: 12 MG/DL (ref 8–23)
BUN/CREAT BLD: ABNORMAL (ref 9–20)
CALCIUM SERPL-MCNC: 9.2 MG/DL (ref 8.6–10.4)
CHLORIDE BLD-SCNC: 106 MMOL/L (ref 98–107)
CHOLESTEROL, FASTING: 220 MG/DL
CHOLESTEROL/HDL RATIO: 4.6
CO2: 13 MMOL/L (ref 20–31)
CREAT SERPL-MCNC: 0.57 MG/DL (ref 0.5–0.9)
EKG ATRIAL RATE: 65 BPM
EKG ATRIAL RATE: 96 BPM
EKG P AXIS: 58 DEGREES
EKG P AXIS: 70 DEGREES
EKG P-R INTERVAL: 152 MS
EKG P-R INTERVAL: 154 MS
EKG Q-T INTERVAL: 394 MS
EKG Q-T INTERVAL: 460 MS
EKG QRS DURATION: 84 MS
EKG QRS DURATION: 86 MS
EKG QTC CALCULATION (BAZETT): 478 MS
EKG QTC CALCULATION (BAZETT): 497 MS
EKG R AXIS: -3 DEGREES
EKG R AXIS: 9 DEGREES
EKG T AXIS: 31 DEGREES
EKG T AXIS: 73 DEGREES
EKG VENTRICULAR RATE: 65 BPM
EKG VENTRICULAR RATE: 96 BPM
ESTIMATED AVERAGE GLUCOSE: 148 MG/DL
ESTIMATED AVERAGE GLUCOSE: 151 MG/DL
GFR AFRICAN AMERICAN: >60 ML/MIN
GFR NON-AFRICAN AMERICAN: >60 ML/MIN
GFR SERPL CREATININE-BSD FRML MDRD: ABNORMAL ML/MIN/{1.73_M2}
GFR SERPL CREATININE-BSD FRML MDRD: ABNORMAL ML/MIN/{1.73_M2}
GLUCOSE BLD-MCNC: 103 MG/DL (ref 65–105)
GLUCOSE BLD-MCNC: 103 MG/DL (ref 65–105)
GLUCOSE BLD-MCNC: 106 MG/DL (ref 70–99)
GLUCOSE BLD-MCNC: 111 MG/DL (ref 65–105)
GLUCOSE BLD-MCNC: 91 MG/DL (ref 65–105)
HBA1C MFR BLD: 6.8 % (ref 4–6)
HBA1C MFR BLD: 6.9 % (ref 4–6)
HCT VFR BLD CALC: 41.2 % (ref 36.3–47.1)
HDLC SERPL-MCNC: 48 MG/DL
HEMOGLOBIN: 12.1 G/DL (ref 11.9–15.1)
LDL CHOLESTEROL: 159 MG/DL (ref 0–130)
LV EF: 50 %
LVEF MODALITY: NORMAL
MAGNESIUM: 1.6 MG/DL (ref 1.6–2.6)
MCH RBC QN AUTO: 28 PG (ref 25.2–33.5)
MCHC RBC AUTO-ENTMCNC: 29.4 G/DL (ref 28.4–34.8)
MCV RBC AUTO: 95.4 FL (ref 82.6–102.9)
MYOGLOBIN: 532 NG/ML (ref 25–58)
NRBC AUTOMATED: 0 PER 100 WBC
PARTIAL THROMBOPLASTIN TIME: 31.1 SEC (ref 20.5–30.5)
PDW BLD-RTO: 13.6 % (ref 11.8–14.4)
PHOSPHORUS: 3.2 MG/DL (ref 2.6–4.5)
PLATELET # BLD: 232 K/UL (ref 138–453)
PMV BLD AUTO: 10 FL (ref 8.1–13.5)
POTASSIUM SERPL-SCNC: 4.5 MMOL/L (ref 3.7–5.3)
RBC # BLD: 4.32 M/UL (ref 3.95–5.11)
REASON FOR REJECTION: NORMAL
SODIUM BLD-SCNC: 140 MMOL/L (ref 135–144)
TOTAL CK: 1921 U/L (ref 26–192)
TRIGLYCERIDE, FASTING: 64 MG/DL
TROPONIN INTERP: ABNORMAL
TROPONIN INTERP: ABNORMAL
TROPONIN T: ABNORMAL NG/ML
TROPONIN T: ABNORMAL NG/ML
TROPONIN, HIGH SENSITIVITY: 21 NG/L (ref 0–14)
TROPONIN, HIGH SENSITIVITY: 22 NG/L (ref 0–14)
VLDLC SERPL CALC-MCNC: ABNORMAL MG/DL (ref 1–30)
WBC # BLD: 6.9 K/UL (ref 3.5–11.3)
ZZ NTE CLEAN UP: ORDERED TEST: NORMAL
ZZ NTE WITH NAME CLEAN UP: SPECIMEN SOURCE: NORMAL

## 2020-12-16 PROCEDURE — 83036 HEMOGLOBIN GLYCOSYLATED A1C: CPT

## 2020-12-16 PROCEDURE — 74018 RADEX ABDOMEN 1 VIEW: CPT

## 2020-12-16 PROCEDURE — 2580000003 HC RX 258: Performed by: PSYCHIATRY & NEUROLOGY

## 2020-12-16 PROCEDURE — 83735 ASSAY OF MAGNESIUM: CPT

## 2020-12-16 PROCEDURE — 92523 SPEECH SOUND LANG COMPREHEN: CPT

## 2020-12-16 PROCEDURE — 99291 CRITICAL CARE FIRST HOUR: CPT | Performed by: PSYCHIATRY & NEUROLOGY

## 2020-12-16 PROCEDURE — 6370000000 HC RX 637 (ALT 250 FOR IP): Performed by: STUDENT IN AN ORGANIZED HEALTH CARE EDUCATION/TRAINING PROGRAM

## 2020-12-16 PROCEDURE — 2500000003 HC RX 250 WO HCPCS: Performed by: STUDENT IN AN ORGANIZED HEALTH CARE EDUCATION/TRAINING PROGRAM

## 2020-12-16 PROCEDURE — 70551 MRI BRAIN STEM W/O DYE: CPT

## 2020-12-16 PROCEDURE — 99233 SBSQ HOSP IP/OBS HIGH 50: CPT | Performed by: PSYCHIATRY & NEUROLOGY

## 2020-12-16 PROCEDURE — 93005 ELECTROCARDIOGRAM TRACING: CPT | Performed by: NURSE PRACTITIONER

## 2020-12-16 PROCEDURE — 36415 COLL VENOUS BLD VENIPUNCTURE: CPT

## 2020-12-16 PROCEDURE — 82550 ASSAY OF CK (CPK): CPT

## 2020-12-16 PROCEDURE — 93010 ELECTROCARDIOGRAM REPORT: CPT | Performed by: INTERNAL MEDICINE

## 2020-12-16 PROCEDURE — 84100 ASSAY OF PHOSPHORUS: CPT

## 2020-12-16 PROCEDURE — 83874 ASSAY OF MYOGLOBIN: CPT

## 2020-12-16 PROCEDURE — 85027 COMPLETE CBC AUTOMATED: CPT

## 2020-12-16 PROCEDURE — 97166 OT EVAL MOD COMPLEX 45 MIN: CPT

## 2020-12-16 PROCEDURE — 6360000002 HC RX W HCPCS: Performed by: NURSE PRACTITIONER

## 2020-12-16 PROCEDURE — 2580000003 HC RX 258: Performed by: STUDENT IN AN ORGANIZED HEALTH CARE EDUCATION/TRAINING PROGRAM

## 2020-12-16 PROCEDURE — 93306 TTE W/DOPPLER COMPLETE: CPT

## 2020-12-16 PROCEDURE — 80048 BASIC METABOLIC PNL TOTAL CA: CPT

## 2020-12-16 PROCEDURE — 97530 THERAPEUTIC ACTIVITIES: CPT

## 2020-12-16 PROCEDURE — 6360000002 HC RX W HCPCS: Performed by: STUDENT IN AN ORGANIZED HEALTH CARE EDUCATION/TRAINING PROGRAM

## 2020-12-16 PROCEDURE — 94640 AIRWAY INHALATION TREATMENT: CPT

## 2020-12-16 PROCEDURE — 93005 ELECTROCARDIOGRAM TRACING: CPT | Performed by: STUDENT IN AN ORGANIZED HEALTH CARE EDUCATION/TRAINING PROGRAM

## 2020-12-16 PROCEDURE — 85730 THROMBOPLASTIN TIME PARTIAL: CPT

## 2020-12-16 PROCEDURE — 76937 US GUIDE VASCULAR ACCESS: CPT

## 2020-12-16 PROCEDURE — 97162 PT EVAL MOD COMPLEX 30 MIN: CPT

## 2020-12-16 PROCEDURE — 2000000003 HC NEURO ICU R&B

## 2020-12-16 PROCEDURE — 84484 ASSAY OF TROPONIN QUANT: CPT

## 2020-12-16 PROCEDURE — 82947 ASSAY GLUCOSE BLOOD QUANT: CPT

## 2020-12-16 PROCEDURE — 80061 LIPID PANEL: CPT

## 2020-12-16 PROCEDURE — APPNB60 APP NON BILLABLE TIME 46-60 MINS: Performed by: NURSE PRACTITIONER

## 2020-12-16 PROCEDURE — 92610 EVALUATE SWALLOWING FUNCTION: CPT

## 2020-12-16 RX ORDER — SODIUM CHLORIDE 9 MG/ML
INJECTION, SOLUTION INTRAVENOUS CONTINUOUS
Status: DISCONTINUED | OUTPATIENT
Start: 2020-12-16 | End: 2020-12-21

## 2020-12-16 RX ORDER — SENNA AND DOCUSATE SODIUM 50; 8.6 MG/1; MG/1
2 TABLET, FILM COATED ORAL DAILY PRN
Status: DISCONTINUED | OUTPATIENT
Start: 2020-12-16 | End: 2020-12-18

## 2020-12-16 RX ORDER — RIVASTIGMINE 9.5 MG/24H
1 PATCH, EXTENDED RELEASE TRANSDERMAL DAILY
Status: DISCONTINUED | OUTPATIENT
Start: 2020-12-16 | End: 2020-12-22

## 2020-12-16 RX ORDER — MAGNESIUM SULFATE IN WATER 40 MG/ML
2 INJECTION, SOLUTION INTRAVENOUS ONCE
Status: COMPLETED | OUTPATIENT
Start: 2020-12-16 | End: 2020-12-16

## 2020-12-16 RX ADMIN — FAMOTIDINE 20 MG: 10 INJECTION INTRAVENOUS at 08:24

## 2020-12-16 RX ADMIN — ASPIRIN 300 MG: 300 SUPPOSITORY RECTAL at 10:18

## 2020-12-16 RX ADMIN — IPRATROPIUM BROMIDE AND ALBUTEROL SULFATE 1 AMPULE: .5; 3 SOLUTION RESPIRATORY (INHALATION) at 15:51

## 2020-12-16 RX ADMIN — SODIUM CHLORIDE: 9 INJECTION, SOLUTION INTRAVENOUS at 06:14

## 2020-12-16 RX ADMIN — FAMOTIDINE 20 MG: 10 INJECTION INTRAVENOUS at 20:42

## 2020-12-16 RX ADMIN — ENOXAPARIN SODIUM 30 MG: 100 INJECTION SUBCUTANEOUS at 10:22

## 2020-12-16 RX ADMIN — IPRATROPIUM BROMIDE AND ALBUTEROL SULFATE 1 AMPULE: .5; 3 SOLUTION RESPIRATORY (INHALATION) at 11:27

## 2020-12-16 RX ADMIN — MAGNESIUM SULFATE 2 G: 2 INJECTION INTRAVENOUS at 10:22

## 2020-12-16 RX ADMIN — IPRATROPIUM BROMIDE AND ALBUTEROL SULFATE 1 AMPULE: .5; 3 SOLUTION RESPIRATORY (INHALATION) at 20:28

## 2020-12-16 RX ADMIN — ATORVASTATIN CALCIUM 80 MG: 80 TABLET, FILM COATED ORAL at 20:42

## 2020-12-16 RX ADMIN — IPRATROPIUM BROMIDE AND ALBUTEROL SULFATE 1 AMPULE: .5; 3 SOLUTION RESPIRATORY (INHALATION) at 07:40

## 2020-12-16 RX ADMIN — SODIUM CHLORIDE, PRESERVATIVE FREE 10 ML: 5 INJECTION INTRAVENOUS at 20:43

## 2020-12-16 ASSESSMENT — PAIN DESCRIPTION - LOCATION: LOCATION: NECK

## 2020-12-16 ASSESSMENT — PAIN DESCRIPTION - DESCRIPTORS: DESCRIPTORS: ACHING;SORE

## 2020-12-16 ASSESSMENT — PAIN DESCRIPTION - ONSET: ONSET: ON-GOING

## 2020-12-16 ASSESSMENT — PAIN SCALES - GENERAL
PAINLEVEL_OUTOF10: 0
PAINLEVEL_OUTOF10: 0

## 2020-12-16 ASSESSMENT — PAIN DESCRIPTION - FREQUENCY: FREQUENCY: CONTINUOUS

## 2020-12-16 ASSESSMENT — PAIN DESCRIPTION - ORIENTATION: ORIENTATION: RIGHT;POSTERIOR

## 2020-12-16 ASSESSMENT — PAIN DESCRIPTION - PROGRESSION: CLINICAL_PROGRESSION: NOT CHANGED

## 2020-12-16 ASSESSMENT — PAIN - FUNCTIONAL ASSESSMENT
PAIN_FUNCTIONAL_ASSESSMENT: 0-10
PAIN_FUNCTIONAL_ASSESSMENT: ACTIVITIES ARE NOT PREVENTED
PAIN_FUNCTIONAL_ASSESSMENT: 0-10

## 2020-12-16 NOTE — PROGRESS NOTES
Occupational Therapy   Occupational Therapy Initial Assessment  Date: 2020   Patient Name: Nidia Contreras  MRN: [id number]     : 1950    Date of Service: 2020    Discharge Recommendations: Further therapy recommended at discharge. The patient should be able to tolerate at least three hours of therapy per day over 5 days or 15 hours over 7 days. Patient would benefit from continued therapy after discharge  OT Equipment Recommendations  Other: CTA    Assessment   Performance deficits / Impairments: Decreased functional mobility ; Decreased endurance;Decreased ADL status; Decreased vision/visual deficit; Decreased high-level IADLs;Decreased balance  Prognosis: Good  Decision Making: Medium Complexity  OT Education: OT Role;Plan of Care  Patient Education: Poor return d/t confusion from pt  REQUIRES OT FOLLOW UP: Yes  Activity Tolerance  Activity Tolerance: Patient limited by fatigue;Treatment limited secondary to decreased cognition  Activity Tolerance: L weakness  Safety Devices  Safety Devices in place: Yes  Type of devices: All fall risk precautions in place;Call light within reach; Left in bed;Nurse notified; Bed alarm in place  Restraints  Initially in place: No         Patient Diagnosis(es): The encounter diagnosis was Acute cerebrovascular accident (CVA) ([address] has a past medical history of Allergic rhinitis, Anxiety, Asthma, CAD (coronary artery disease), Chronic back pain, Congestive heart failure ([address] Depression, Headache(784.0), Hiatal hernia, Hypercholesteremia, Hypertension, Kidney stones, Obesity, Osteoarthritis, Postlaminectomy syndrome, Type II or unspecified type diabetes mellitus without mention of complication, not stated as uncontrolled, Unspecified sleep apnea, and Urinary incontinence. has a past surgical history that includes Spine surgery; Nerve Block (4/23/2012); Nerve Block (5/22/2012); Nerve Block (01/14/13); Nerve Block (01/21/13); Nerve Block (1/28/2013 ); Cardiac catheterization (01/2013); Lumbar spine surgery (2007); Vena Cava Filter Placement (2007); Tonsillectomy; joint replacement; knee surgery (10/21/2013); knee surgery (12/02/2013); knee surgery (12/09/2013); other surgical history (Right, 7/14/2014); other surgical history (Right, 3/16/2015); Upper gastrointestinal endoscopy; Colonoscopy (09/2015); other surgical history (Right, 06-13-16); Coronary angioplasty with stent (2970-62); and Nerve Block (Right, 04/17/2017). Restrictions  Restrictions/Precautions  Restrictions/Precautions: General Precautions, Fall Risk, Up as Tolerated  Required Braces or Orthoses?: No  Position Activity Restriction  Other position/activity restrictions: SBP goal <220    Subjective   General  Patient assessed for rehabilitation services?: Yes  Family / Caregiver Present: No  Diagnosis: L weakness, R MCA infarct, found down, AMS  Patient Currently in Pain: Denies  Pain Assessment  Pain Assessment: 0-10  Pain Level: 0  Pain Location: Neck  Pain Orientation: Right;Posterior  Pain Descriptors: Aching; Sore  Pain Frequency: Continuous  Pain Onset: On-going  Clinical Progression: Not changed  Functional Pain Assessment: Activities are not prevented  Vital Signs  Pulse: 68  Resp: 19  BP: 132/73  MAP (mmHg): 91  Level of Consciousness: Responds to Voice (1)  Patient Currently in Pain: Denies  Oxygen Therapy  SpO2: 95 %  Social/Functional History  Social/Functional History  Lives With: Alone  Type of Home: Apartment  Home Layout: One level  Home Access: Level entry  Home Equipment: Rolling walker, [place] [employer] Help From: Family, Friend(s)  ADL Assistance: Independent  Homemaking Assistance: Independent  Homemaking Responsibilities: Yes  Ambulation Assistance: Independent Transfer Assistance: Independent  Active : No  Mode of Transportation: Cab, Family  Additional Comments: Pt is a questionable historian with no family at bedside     Objective   Vision: Impaired  Vision Exceptions: Visual field cut(Pt kept eyes closed for most of session, able to look L with cues)  Hearing: Within functional limits    Orientation  Overall Orientation Status: Impaired  Orientation Level: Oriented to person;Disoriented to time;Disoriented to place;Oriented to situation  Observation/Palpation  Posture: Poor  Balance  Sitting Balance: Maximum assistance(Major L and posterior lean noted this date, pt pushing away negatively with RUE, pt would not allow writer to reposition RUE properly, pt sat for ~3-4 min total, pt demo'd WB'ing to LUE for 1 min this date)  Standing Balance: Unable to assess(comment)  Functional Mobility  Functional Mobility Comments: BRITNI  ADL  Feeding: Maximum assistance; Increased time to complete;Verbal cueing;Setup  Grooming: Maximum assistance; Increased time to complete;Setup;Verbal cueing  UE Bathing: Maximum assistance; Increased time to complete;Setup;Verbal cueing  LE Bathing: Maximum assistance; Increased time to complete;Setup;Verbal cueing  UE Dressing: Maximum assistance; Increased time to complete;Setup;Verbal cueing  LE Dressing: Dependent/Total  Toileting: Dependent/Total  Additional Comments: Pt required max A to sit EOB, major L lean, ADL's lmited this date d/t L weakness and confusion  Tone RUE  RUE Tone: Normotonic  Tone LUE  LUE Tone: Hypotonic  LUE Modified Evan Scale  LUE Modified Evan Scale Completed: No  Coordination  Movements Are Fluid And Coordinated: No  Coordination and Movement description: Fine motor impairments;Gross motor impairments; Left UE     Bed mobility  Supine to Sit: Maximum assistance  Sit to Supine: Maximum assistance;2 Person assistance  Scootin Person assistance;Maximal assistance  Transfers  Sit to stand: Unable to assess Short term goal 2: demo [address] through LUE during func activity >4 min to promote func regain of movement/sensation  Short term goal 3: demo ADL UB bathing/dressing activity with setup, adaptive tech's, and min A  Short term goal 4: demo supine<>sit transfer to EOB using railing PRN, min Ax2  Short term goal 5: notify OTR to update goals as mobility progresses to standing, etc.     Therapy Time   Individual Concurrent Group Co-treatment   Time In 1319         Time Out 1341         Minutes 22         Timed Code Treatment Minutes: [address], OTR/L

## 2020-12-16 NOTE — PROGRESS NOTES
Speech Language Pathology  [facility]  Initial Speech/Language/Cognitive Assessment    NAME: Nicky Butler  : 1950   MRN: [id number]  ADMISSION DATE: 12/15/2020  ADMITTING DIAGNOSIS: has DM (diabetes mellitus) ([address] HTN (hypertension); Smoker; Asthma; Knee osteoarthritis; Edema; Spinal stenosis in cervical region; Chest pain; YUDITH (obstructive sleep apnea); Morbid obesity ([address] Knee pain, bilateral; S/P TKR (total knee replacement); Urge incontinence of urine; Lumbar spondylosis; Cervical spondylosis; Chronic use of opiate drugs therapeutic purposes; Chronic knee pain; Hypertensive urgency; Acute coronary syndrome ([address] Congestive heart failure ([address] Lymphadenopathy; Chronic pain; Coronary artery disease involving native coronary artery without angina pectoris; Chronic prescription opiate use; Type 2 diabetes mellitus with complication, without long-term current use of insulin ([facility]); S/P coronary artery stent placement - RCA 10/12/16 - Dr. Syeda Dee; Anxiety; Depression (emotion); Postlaminectomy syndrome, lumbar; Medication monitoring encounter; Postlaminectomy syndrome; Postlaminectomy syndrome; Primary osteoarthritis of right knee; Long term (current) use of antithrombotics/antiplatelets; Influenza B; Reactive airway disease with acute exacerbation; Cerebrovascular accident (CVA) due to embolic occlusion of right middle cerebral artery ([address] Acute cerebrovascular accident (CVA) ([address]  Right middle cerebral artery stroke [facility]); and Non-traumatic rhabdomyolysis on their problem list.    Date of Eval: 2020   Evaluating Therapist: Amol Hernández SLP Primary Complaint: The patient is a 78 y.o. -American female who presents with subacute dense left hemiparesis with right forced gaze. Past medical history significant for hypertension, type 2 diabetes, CAD status post RCA and LAD stenting in 2009, hyperlipidemia, CHF. Patient presented to ER by EMS. Patient's family found her lying on the floor and called the emergency department. History was obtained by patient's daughter as patient unable to answer questions appropriately. Patient's daughter states that she last spoke to her mother 12/14/2020 at 8 AM when she did not have any strokelike symptoms. At her baseline patient is able to walk with a walker assistive device approximately 2 blocks and is otherwise independent and lives by herself at home. MRS score of 3. Patient's daughter states that they found her in her apartment lying on the ground unresponsive. On EMS arrival patient was noted to have dense left hemiparesis with right-sided gaze unable to cross midline. Pain:  Pain Assessment  Pain Assessment: 0-10  Pain Level: 0  Patient's Stated Pain Goal: No pain    Assessment:  Pt. Presents with mild-moderate cognitive deficits characterized by difficulty with verbal sequencing, immediate recall of 5 units and abstract reasoning. Left facial weakness noted. Moderate-severe dysarthria noted. Pt. With visual field cut. ST to follow up to address noted deficits. Education provided. Recommendations:  Requires SLP Intervention: Yes  Duration/Frequency of Treatment: 3-5 x week  D/C Recommendations: Further therapy recommended at discharge. Plan:   Goals:  Short-term Goals  Goal 1: Pt. will complete OMEX for facial weakness 10-20 x per session. Goal 2: Pt. will utilize dysarthria compensatory strategies to increase speech clarity. Goal 3: Pt. will complete 4-6 step verbal sequencing tasks with 90% accuracy. Goal 4: Pt. will recall 3-5 units without distractions with 90% accuracy. Goal 5: Pt. will complete abstract reasoning tasks with 90%a ccuracy. Goal 6: Pt. will complete remainder of cognitive evaluation. Patient/family involved in developing goals and treatment plan: yes    Subjective:  General  Chart Reviewed: Yes  Family / Caregiver Present: Yes(sister)  Social/Functional History  Lives With: Alone  Vision  Vision: Impaired  Vision Exceptions: Visual field cut  Hearing  Hearing: Within functional limits           Objective:     Oral/Motor  Oral Motor: Exceptions to [facility]  Labial Symmetry: Abnormal symmetry left       Expression  Primary Mode of Expression: Verbal      Motor Speech  Motor Speech: Exceptions to WFL  Dysarthria : Moderate         Cognition:      Orientation  Overall Orientation Status: Within Functional Limits(Pt. not oriented to place)  Attention  Attention: Exceptions to WFL(Pt. demonstrated difficulty staying on task. Often required re-direction back to task)  Memory  Memory: Exceptions to [facility]  Short-term Memory: To be assessed in therapy  Immediate Memory: Mild(2/3, 2/3, 5/5)  Problem Solving  Problem Solving: Exceptions to [facility]  Verbal Reasoning Skills: Moderate(Antonyms: 0/3, Inductive Reasoning: 3/4, Deductive Reasoning: NT, Similarities/Differences: NT)  Safety/Judgement  Safety/Judgement: Exceptions to [facility]  Insight: To be assessed in therapy  Flexibility of Thought: To be assessed in therapy   Verbal Sequencing: Moderate (2/4)  Word Associations:  NT  Word Generation: NT    Prognosis:  Speech Therapy Prognosis  Prognosis: Fair  Individuals consulted  Consulted and agree with results and recommendations: Patient; Family member  Family member consulted: daughter    Education:  Patient Education: yes  Patient Education Response: Verbalizes understanding          Therapy Time:   Individual Concurrent Group Co-treatment   Time In 1140         Time Out 1150         Minutes 10 Owen Lyn M.A.  CCC-SLP  12/16/2020 1:20 PM

## 2020-12-16 NOTE — PROGRESS NOTES
Speech Language Pathology  Facility/Department: [address]   CLINICAL BEDSIDE SWALLOW EVALUATION    NAME: Lady Valdivia  : 1950  MRN: [id number]    ADMISSION DATE: 12/15/2020  ADMITTING DIAGNOSIS: has DM (diabetes mellitus) ([address] HTN (hypertension); Smoker; Asthma; Knee osteoarthritis; Edema; Spinal stenosis in cervical region; Chest pain; YUDITH (obstructive sleep apnea); Morbid obesity ([address] Knee pain, bilateral; S/P TKR (total knee replacement); Urge incontinence of urine; Lumbar spondylosis; Cervical spondylosis; Chronic use of opiate drugs therapeutic purposes; Chronic knee pain; Hypertensive urgency; Acute coronary syndrome ([address] Congestive heart failure ([address] Lymphadenopathy; Chronic pain; Coronary artery disease involving native coronary artery without angina pectoris; Chronic prescription opiate use; Type 2 diabetes mellitus with complication, without long-term current use of insulin ([facility]); S/P coronary artery stent placement - RCA 10/12/16 - Dr. Marni Vogt; Anxiety; Depression (emotion); Postlaminectomy syndrome, lumbar; Medication monitoring encounter; Postlaminectomy syndrome; Postlaminectomy syndrome; Primary osteoarthritis of right knee; Long term (current) use of antithrombotics/antiplatelets; Influenza B; Reactive airway disease with acute exacerbation; Cerebrovascular accident (CVA) due to embolic occlusion of right middle cerebral artery ([address] Acute cerebrovascular accident (CVA) ([address]  Right middle cerebral artery stroke [facility]); and Non-traumatic rhabdomyolysis on their problem list.    Date of Eval: 2020  Evaluating Therapist: Alcira Torres    Current Diet level:  Current Diet : NPO  Current Liquid Diet : NPO Primary Complaint The patient is a 78 y.o. -American female who presents with subacute dense left hemiparesis with right forced gaze. Past medical history significant for hypertension, type 2 diabetes, CAD status post RCA and LAD stenting in 2009, hyperlipidemia, CHF. Patient presented to ER by EMS. Patient's family found her lying on the floor and called the emergency department. History was obtained by patient's daughter as patient unable to answer questions appropriately. Patient's daughter states that she last spoke to her mother 12/14/2020 at 8 AM when she did not have any strokelike symptoms. At her baseline patient is able to walk with a walker assistive device approximately 2 blocks and is otherwise independent and lives by herself at home. MRS score of 3. Patient's daughter states that they found her in her apartment lying on the ground unresponsive. On EMS arrival patient was noted to have dense left hemiparesis with right-sided gaze unable to cross midline. Pain:  Pain Assessment  Pain Assessment: 0-10  Pain Level: 0  Patient's Stated Pain Goal: No pain    Reason for Referral  Myriam Juárez was referred for a bedside swallow evaluation to assess the efficiency of her swallow function, identify signs and symptoms of aspiration and make recommendations regarding safe dietary consistencies, effective compensatory strategies, and safe eating environment. Impression  Pt. With wet/gurgly vocal quality prior to all PO given. Pt. Demonstrated difficulty taking PO from spoon and moving bolus A-P. Left facial weakness noted. Pt. With + s/s of aspiration with PO given. No additional PO given at this time. Pt. Not appropriate for PO at this time. ST to recommend NPO. Repeat bedside swallow study when pt. Appropriate. Results and recommendations reported to RN.             Treatment Plan  Requires SLP Intervention: Yes  Duration/Frequency of Treatment: 3-5 x week D/C Recommendations: Further therapy recommended at discharge. Recommended Diet and Intervention  Diet Solids Recommendation: NPO  Liquid Consistency Recommendation: NPO  Recommended Form of Meds: Via alternative means of nutrition  Recommendations: NPO  Therapeutic Interventions: Oral motor exercises; Skylar    Treatment/Goals  Dysphagia Goals: The patient will tolerate repeat BSE when able. General  Chart Reviewed: Yes  Behavior/Cognition: Alert; Cooperative  Temperature Spikes Noted: No  Respiratory Status: Room air  O2 Device: None (Room air)  Communication Observation: Dysarthria  Follows Directions: Simple  Patient Positioning: Upright in bed  Consistencies Administered: Dysphagia Pureed (Dysphagia I)    Vision/Hearing  Vision  Vision: Impaired  Vision Exceptions: Visual field cut  Hearing  Hearing: Within functional limits    Oral Motor Deficits  Oral/Motor  Oral Motor: Exceptions to [facility]  Labial Symmetry: Abnormal symmetry left    Oral Phase Dysfunction  Oral Phase  Oral Phase: Exceptions  Oral Phase  Oral Phase - Comment: Pt. took minimal amount from spoon. Demonstrated difficulty moving bolus A-P. Indicators of Pharyngeal Phase Dysfunction   Pharyngeal Phase  Pharyngeal Phase: Exceptions  Pharyngeal Phase   Pharyngeal: Pt. with wet/gurgly vocal quality prior to all PO given.  + s/s of aspiration noted after taking trial of puree. Prognosis  Prognosis  Prognosis for safe diet advancement: fair  Individuals consulted  Consulted and agree with results and recommendations: Patient; Family member;RN  Family member consulted: daughter    Education  Patient Education: yes  Patient Education Response: Demonstrated understanding             Therapy Time  SLP Individual Minutes  Time In: 1150  Time Out: 1200  Minutes: Belle Stephens M.A.  CCC-SLP  12/16/2020 1:29 PM

## 2020-12-16 NOTE — PLAN OF CARE
Problem: OXYGENATION/RESPIRATORY FUNCTION  Goal: Patient will achieve/maintain normal respiratory rate/effort  Description: Respiratory rate and effort will be within normal limits for the patient  Outcome: Ongoing

## 2020-12-16 NOTE — H&P
Neuro ICU History & Physical    Patient Name: Zaida Randle  Patient : 1950  Room/Bed: RADHA/RADHA  Code Status: Full code  Allergies: Allergies   Allergen Reactions    Bactrim     Penicillins     Tylenol [Acetaminophen] Other (See Comments)     constipation       CHIEF COMPLAINT     AMA, left sided weakness, found down    HPI    History Obtained From: EHR, pt's family    The patient is a 78 y.o. female with a past medical history that includes diabetes, CAD, hyperlipidemia, CHF who presented to the emergency department for altered mental status as a stroke alert. Family last spoke to patient at 8 AM yesterday morning. Patient was found down at her home, incontinent, unresponsive. Brought in via EMS. Upon arrival evaluated by stroke team, and NIH 19. Left hemiparesis and right-sided gaze preference. On arrival to the emergency department patient's blood pressure was 210/115, heart rate 97 afebrile 98.6. Stroke panel significant for glucose 138, sodium 139, hemoglobin 13.3, platelets 074, total CK 1609, myoglobin 1554, INR 1.0, PTT 22.5, high-sensitivity troponin XV. COVID-19 negative. Patient given 1 L fluid bolus along with aspirin 81 mg.     Patient's case was discussed in detail with endovascular neurosurgery team and stroke team along with patient's family. Decision was made that because patient was greater than 24 hours no longer thrombectomy candidate and CT perfusion scan showing large volume infarcted tissue with little to 0 penumbra. Patient's family agreed to patient being admitted to neuro critical care for close neurologic monitoring and possible hypertonic and hyperosmolar treatment of her cerebral edema.       Admitted to ICU From: ED  Reason for ICU Admission: acute ischemic stroke       PATIENT HISTORY   Past Medical History:        Diagnosis Date    Allergic rhinitis     Anxiety 10/25/2016    Asthma     CAD (coronary artery disease)     s/p stents RCA and LAD ,  Chronic back pain     Congestive heart failure (HCC)     Depression     Headache(784.0)     Hiatal hernia     Hypercholesteremia 2/21/2012    Hypertension     Kidney stones     years ago    Obesity     Osteoarthritis     Postlaminectomy syndrome 6/6/2012    Type II or unspecified type diabetes mellitus without mention of complication, not stated as uncontrolled     Unspecified sleep apnea     Urinary incontinence        Past Surgical History:        Procedure Laterality Date    CARDIAC CATHETERIZATION  01/2013    patent stents    COLONOSCOPY  09/2015    normal    CORONARY ANGIOPLASTY WITH STENT PLACEMENT  1012-16    stents x 3    JOINT REPLACEMENT      left knee    KNEE SURGERY  10/21/2013    rt knee synvisc injection     KNEE SURGERY  12/02/2013    knee synvisc injection rt #2    KNEE SURGERY  12/09/2013    rt knee synvisc inj    LUMBAR SPINE SURGERY  2007    NERVE BLOCK  4/23/2012    Right MBNB L3, L4, L5    NERVE BLOCK  5/22/2012    Right MBNB L3, L4, and L5     NERVE BLOCK  01/14/13    Right knee injection #1 - Synvisc    NERVE BLOCK  01/21/13    Right knee injection #2 - Synvisc    NERVE BLOCK  1/28/2013     Rigth knee synvisc injection #3    NERVE BLOCK Right 04/17/2017    Rt genicular nerve block.  no steroid used    OTHER SURGICAL HISTORY Right 7/14/2014    synvisc one knee injection    OTHER SURGICAL HISTORY Right 3/16/2015    synvisc one knee injection    OTHER SURGICAL HISTORY Right 06-13-16    synvisc right knee injection    SPINE SURGERY      TONSILLECTOMY      UPPER GASTROINTESTINAL ENDOSCOPY      VENA CAVA FILTER PLACEMENT  2007    PE and B/L LE emboli       Social History:   Social History     Socioeconomic History    Marital status: Legally      Spouse name: Not on file    Number of children: Not on file    Years of education: Not on file    Highest education level: Not on file   Occupational History    Not on file   Social Needs  Financial resource strain: Not on file    Food insecurity     Worry: Not on file     Inability: Not on file   [employer] needs     Medical: Not on file     Non-medical: Not on file   Tobacco Use    Smoking status: Former Smoker     Packs/day: 0.50     Years: 0.00     Pack years: 0.00     Quit date: 3/28/2013     Years since quittin.7    Smokeless tobacco: Never Used   Substance and Sexual Activity    Alcohol use: Yes     Alcohol/week: 1.0 standard drinks     Types: 1 Cans of beer per week     Comment: occasionally    Drug use: No    Sexual activity: Not on file   Lifestyle    Physical activity     Days per week: Not on file     Minutes per session: Not on file    Stress: Not on file   Relationships    Social connections     Talks on phone: Not on file     Gets together: Not on file     Attends Orthodox service: Not on file     Active member of club or organization: Not on file     Attends meetings of clubs or organizations: Not on file     Relationship status: Not on file    Intimate partner violence     Fear of current or ex partner: Not on file     Emotionally abused: Not on file     Physically abused: Not on file     Forced sexual activity: Not on file   Other Topics Concern    Not on file   Social History Narrative    Not on file       Family History:       Problem Relation Age of Onset    Diabetes Mother     Cancer Father     High Blood Pressure Sister     High Blood Pressure Brother        Allergies:    Bactrim, Penicillins, and Tylenol [acetaminophen]    Medications Prior to Admission:    Not in a hospital admission.     Current Medications:  Current Facility-Administered Medications: 0.9 % sodium chloride bolus, 1,000 mL, Intravenous, Once    REVIEW OF SYSTEMS     CONSTITUTIONAL: negative for fatigue and malaise   EYES: negative for double vision and photophobia    HEENT: negative for tinnitus and sore throat   RESPIRATORY: negative for cough, shortness of breath CARDIOVASCULAR: negative for chest pain, palpitations, or syncope   GASTROINTESTINAL: negative for abdominal pain, nausea, vomiting, diarrhea, or constipation    GENITOURINARY: negative for incontinence or retention    MUSCULOSKELETAL: negative for neck or back pain, negative for extremity pain   NEUROLOGICAL: Altered mental status, left sided weakness   PSYCHIATRIC: negative for agitation, hallucination, SI/HI   SKIN Negative for spontaneous contusions, rashes, or lesions      PHYSICAL EXAM:     BP (!) 201/124   Pulse 115   Temp 98.6 °F (37 °C) (Oral)   Resp 22   Wt 250 lb (113.4 kg)   LMP  (LMP Unknown)   SpO2 97%   BMI 42.91 kg/m²     PHYSICAL EXAM:  CONSTITUTIONAL:  Appears acutely ill. Dysarthric. HEAD:  normocephalic, atraumatic    EYES:  PERRLA, rightward gaze that does not cross midline   ENT:  moist mucous membranes   LUNGS:  Equal air entry bilaterally   CARDIOVASCULAR:  normal s1 / s2   ABDOMEN:  Soft, no rigidity   NECK supple, symmetric, no midline tenderness to palpation    BACK without midline tenderness, step-offs or deformities    EXTREMITIES Normal ROM with no deformities   NEUROLOGIC:  Mental Status:  Somnolent but wakes to verbal stimuli.  Follows simple commands             Cranial Nerves:    III: Pupils:  equal, round, reactive to light  III,IV,VI: Extra Ocular Movements: abnormal, rightward gaze that does not cross midline  V: Facial sensation:  normal  VII: Facial strength: abnormal right facial droop    Motor Exam:    Drift:  present - left  Tone:  normal    Flaccid LUE and LLE  5/5 strength RUE and RLE    Sensory:    Touch:    Right Upper Extremity:  normal  Left Upper Extremity:  abnormal - mild paresthesias   Right Lower Extremity:  normal  Left Lower Extremity:  abnormal - mild paresthesias    Deep Tendon Reflexes:    Right Knee:  2+  Left Knee:  2+    Plantar Response:  Right:  downgoing  Left:  downgoing    Clonus:  absent SKIN No obvious ecchymosis, rashes, or lesions      NIH in ED per stroke team 19. LABS AND IMAGING:     RECENT LABS:  CBC with Differential:    Lab Results   Component Value Date    WBC 7.1 12/15/2020    RBC 4.79 12/15/2020    RBC 4.21 02/24/2012    HGB 13.3 12/15/2020    HCT 42.7 12/15/2020     12/15/2020     02/24/2012    MCV 89.1 12/15/2020    MCH 27.8 12/15/2020    MCHC 31.1 12/15/2020    RDW 13.4 12/15/2020    LYMPHOPCT 14 12/15/2020    MONOPCT 10 12/15/2020    BASOPCT 0 12/15/2020    MONOSABS 0.68 12/15/2020    LYMPHSABS 0.99 12/15/2020    EOSABS 0.03 12/15/2020    BASOSABS <0.03 12/15/2020    DIFFTYPE NOT REPORTED 12/15/2020     BMP:    Lab Results   Component Value Date     12/15/2020    K 4.1 12/15/2020     12/15/2020    CO2 20 12/15/2020    BUN 12 12/15/2020    LABALBU 4.1 04/05/2019    LABALBU 4.2 02/24/2012    CREATININE 0.56 12/15/2020    CALCIUM 9.5 12/15/2020    GFRAA >60 12/15/2020    LABGLOM >60 12/15/2020    GLUCOSE 138 12/15/2020    GLUCOSE 105 02/24/2012       RADIOLOGY:     CT BRAIN PERFUSION   Final Result   Addendum 1 of 1   ADDENDUM:   The full set of diagnostic perfusion images were sent for review at 7:15    p.m.   on 12/15/2020 and demonstrate large volume elevated M TT in the visualized   right middle cerebral artery territory with elevated relative cerebral    blood   volume and decreased relative cerebral blood flow. Final      CTA HEAD NECK W CONTRAST   Final Result   Addendum 1 of 1   ADDENDUM:   The full set of diagnostic perfusion images were sent for review at 7:15    p.m.   on 12/15/2020 and demonstrate large volume elevated M TT in the visualized   right middle cerebral artery territory with elevated relative cerebral    blood   volume and decreased relative cerebral blood flow.          Final      CT HEAD WO CONTRAST   Final Result   Addendum 1 of 1   ADDENDUM:   The full set of diagnostic perfusion images were sent for review at 7:15 p.m.   on 12/15/2020 and demonstrate large volume elevated M TT in the visualized   right middle cerebral artery territory with elevated relative cerebral    blood   volume and decreased relative cerebral blood flow. Final      MRI brain with contrast    (Results Pending)       Labs and Images reviewed with:    Dr. Melody Mohr:         ASSESSMENT:     This is a 78 y.o. female presenting to the ED as a stroke alert after being found down with dense left hemiparesis, dysarthria, and rightward gaze. Acute large vessel occlusion. Cerebral edema. Not candidate for tPA or thrombectomy. Admit to neuro ICU for medical management and monitoring of edema. Patient care will be discussed with attending, will reevaluate patient along with attending.      PLAN/MEDICAL DECISION MAKING:      NEUROLOGIC:  - CT demonstrating acute ischemic infarct in R MCA territory; no mass effect or hemorrhage  - loaded with ASA and plavix in ED  - MRI pending  - aspirin 81mg daily   - Lipitor 80 mg nightly  - heparin infusion started (no bolus)  - Goal SBP < 180  - Neuro checks per protocol    CARDIOVASCULAR:  - Hx of CHF and CAD  - frequent PVCs on monitor  - electrolytes WNL  - trop 15 > 21; will continue to trend  - fasting lipid panel pending  - Continue telemetry    PULMONARY:  - CXR normal  - goal SpO 2 > 90  - hx of smoking    RENAL/FLUID/ELECTROLYTE:  - BUN 12/ Creatinine 0.56  - no GILA but will continue to monitor in setting of mild rhabdo  - Monitor I/Os  - IVF: 100cc/h  - Replace electrolytes PRN  - Daily BMP    GI/NUTRITION:  NUTRITION:  No diet orders on file  - Bowel regimen: senokot PRN  - GI prophylaxis: Pepcid BID  - speech evaluation/swallow study prior to starting diet    ID:  - Afebrile in ED  - covid negative   - WBC 7.1  - CXR unremarkable  - Continue to monitor for fevers  - Daily CBC    HEME:   - H&H 12.1/41.2  - Platelets 162  - Daily CBC    ENDOCRINE: - Continue to monitor blood glucose, goal <180  - hx of T2DM  - ISS  - A1C pending    OTHER:  - PT/OT/ST     PROPHYLAXIS:  Stress ulcer: H2 blocker    DVT PROPHYLAXIS:  - SCD sleeves - Thigh High   - No chemoprophylaxis as pt on Heparin infusion    DISPOSITION: Admit to Neuro ICU      Damian Taylor DO  Neuro Critical Care Service   Pager [phone]  12/15/2020     8:27 PM

## 2020-12-16 NOTE — PROGRESS NOTES
has a past medical history of Allergic rhinitis, Anxiety, Asthma, CAD (coronary artery disease), Chronic back pain, Congestive heart failure ([facility] [address] Depression, Headache(784.0), Hiatal hernia, Hypercholesteremia, Hypertension, Kidney stones, Obesity, Osteoarthritis, Postlaminectomy syndrome, Type II or unspecified type diabetes mellitus without mention of complication, not stated as uncontrolled, Unspecified sleep apnea, and Urinary incontinence. has a past surgical history that includes Spine surgery; Nerve Block (4/23/2012); Nerve Block (5/22/2012); Nerve Block (01/14/13); Nerve Block (01/21/13); Nerve Block (1/28/2013 ); Cardiac catheterization (01/2013); Lumbar spine surgery (2007); Vena Cava Filter Placement (2007); Tonsillectomy; joint replacement; knee surgery (10/21/2013); knee surgery (12/02/2013); knee surgery (12/09/2013); other surgical history (Right, 7/14/2014); other surgical history (Right, 3/16/2015); Upper gastrointestinal endoscopy; Colonoscopy (09/2015); other surgical history (Right, 06-13-16); Coronary angioplasty with stent (9634-50); and Nerve Block (Right, 04/17/2017). Restrictions  Restrictions/Precautions  Restrictions/Precautions: General Precautions, Fall Risk, Up as Tolerated  Required Braces or Orthoses?: No  Vision/Hearing  Vision: Impaired  Vision Exceptions: Visual field cut  Hearing: Within functional limits     Subjective  General  Patient assessed for rehabilitation services?: Yes  Response To Previous Treatment: Not applicable  Family / Caregiver Present: No  Follows Commands: Impaired  Pain Screening  Patient Currently in Pain: Yes  Pain Assessment  Pain Assessment: 0-10(pt didn't rank, just repeating \"my neck hurts\")  Pain Location: Neck  Pain Orientation: Right;Posterior  Pain Descriptors: Aching; Sore  Pain Frequency: Continuous  Pain Onset: On-going  Clinical Progression: Not changed  Functional Pain Assessment: Activities are not prevented  Vital Signs Patient Currently in Pain: Yes  Pre Treatment Pain Screening  Intervention List: Patient able to continue with treatment    Orientation  Orientation  Overall Orientation Status: Within Functional Limits(pt oriented, talking about \"spirits\" that hit her in the head causing her stroke)  Social/Functional History  Social/Functional History  Lives With: Alone  Type of Home: Apartment  Home Layout: One level(3rd story per pt)  Home Access: Elevator  Receives Help From: Family, Friend(s)  ADL Assistance: Independent  Ambulation Assistance: Independent  Transfer Assistance: Independent    Objective     Observation/Palpation  Posture: Poor    PROM RLE (degrees)  RLE PROM: WFL  PROM LLE (degrees)  LLE PROM: WFL  PROM RUE (degrees)  RUE PROM: WFL  PROM LUE (degrees)  LUE PROM: WFL  Strength RLE  Strength RLE: WFL  Strength LLE  Strength LLE: Exception--no active movement noted  Strength RUE  Strength RUE: WFL  Strength LUE  Strength LUE: Exception--no active movement noted  Tone RLE  RLE Tone: Normotonic  Tone LLE  LLE Tone: Hypertonic(increased extensor tone)  Sensation  Overall Sensation Status: Impaired  Bed mobility  Rolling to Left: Maximum assistance;2 Person assistance  Rolling to Right: Maximum assistance;2 Person assistance  Supine to Sit: Maximum assistance  Sit to Supine: Maximum assistance;2 Person assistance  Scootin Person assistance;Maximal assistance  Transfers  Sit to Stand: Unable to assess  Stand to sit: Unable to assess  Bed to Chair: Unable to assess  Stand Pivot Transfers: Unable to assess  Ambulation  Ambulation?: No  Stairs/Curb  Stairs?: No     Balance  Posture: Poor  Sitting - Static: Poor  Sitting - Dynamic: Poor  Other exercises  Other exercises 1: PROM FÉLIX/LEs x 10 reps  Other exercises 2: A/AAROM RUE/LE's x 10 reps     Plan   Plan  Times per week: 5-6 visits weekly  Times per day: Daily

## 2020-12-16 NOTE — PLAN OF CARE
Problem: HEMODYNAMIC STATUS  Goal: Patient has stable vital signs and fluid balance  Outcome: Ongoing  Note: Patient with stable vital signs and fluid balance.

## 2020-12-16 NOTE — ED NOTES
Pt resting on stretcher, no respiratory distress noted, pt updated on plan of care, will continue to monitor, call light in reach. Family at pt. Bedside.       Nasir Hensley RN  12/15/20 [id number]

## 2020-12-16 NOTE — ED NOTES
Report called to 5B RN. All questions answered at this time.       Arvell Brunner, RN  12/15/20 [id number]

## 2020-12-16 NOTE — PROGRESS NOTES
Physical Therapy  DATE: 2020    NAME: Nicky Butler  MRN: [id number]   : 1950    Patient not seen this date for Physical Therapy due to:  [] Blood transfusion in progress  [] Hemodialysis  [] Patient Declined  [] Spine Precautions   [] Strict Bedrest  [] Surgery/ Procedure  [x] Testing--MRI--check back as time allows      [] Other        [] PT is being discontinued at this time. Patient independent. No further needs. [] PT is being discontinued at this time due to declining physical/ medical status. Therapy is not appropriate at this time.     Lebron Desai, PT

## 2020-12-16 NOTE — PROGRESS NOTES
Daily Progress Note  Neuro Critical Care    Patient Name: Shannan Moreno  Patient : 1950  Room/Bed: 0526/0526-01  Code Status: Full  Allergies: Allergies   Allergen Reactions    Bactrim     Penicillins     Tylenol [Acetaminophen] Other (See Comments)     constipation       CHIEF COMPLAINT:      AMS, left sided weakness, found down     INTERVAL HISTORY    Initial Presentation (Admitted 12/15/20): The patient is a 78 y.o. female with a past medical history that includes diabetes, CAD, hyperlipidemia, CHF who presented to the emergency department for altered mental status as a stroke alert. Family last spoke to patient at 8 AM yesterday morning. Patient was found down at her home, incontinent, unresponsive. Brought in via EMS. Upon arrival evaluated by stroke team, and NIH 19. Left hemiparesis and right-sided gaze preference.      On arrival to the emergency department patient's blood pressure was 210/115, heart rate 97 afebrile 98. 6.  Stroke panel significant for glucose 138, sodium 139, hemoglobin 13.3, platelets 968, total CK 1609, myoglobin 1554, INR 1.0, PTT 22.5, high-sensitivity troponin XV.  COVID-19 negative.  Patient given 1 L fluid bolus along with aspirin 81 mg.     Patient's case was discussed in detail with endovascular neurosurgery team and stroke team along with patient's family. Tyler Prathers was made that because patient was greater than 24 hours no longer thrombectomy candidate and CT perfusion scan showing large volume infarcted tissue with little to 0 penumbra.  Patient's family agreed to patient being admitted to neuro critical care for close neurologic monitoring and possible hypertonic and hyperosmolar treatment of her cerebral edema.      Last 24h: 12/16/20 0100 (!) 147/67   69 19 96 %     12/16/20 0000 128/85 97.9 °F (36.6 °C) Oral 70 19 94 %     12/15/20 2300 (!) 170/79   69 21 97 %     12/15/20 2200 (!) 184/143   74 20 95 %     12/15/20 2105       5' 4\" (1.626 m) 231 lb 9.6 oz (105.1 kg)   12/15/20 2100 (!) 172/80   74 22 96 %     12/15/20 2037 123/67 98.3 °F (36.8 °C) Oral 99 22      12/15/20 2036 123/67   104 21        Estimated body mass index is 39.75 kg/m² as calculated from the following:    Height as of this encounter: 5' 4\" (1.626 m). Weight as of this encounter: 231 lb 9.6 oz (105.1 kg).  []<16 Severe malnutrition  []1616.99 Moderate malnutrition  []1718.49 Mild malnutrition  []18.524.9 Normal  []2529.9 Overweight (not obese)  []3034.9 Obese class 1 (Low Risk)  [x]3539.9 Obese class 2 (Moderate Risk)  []?40 Obese class 3 (High Risk)    RECENT LABS:   Lab Results   Component Value Date    WBC 6.9 12/16/2020    HGB 12.1 12/16/2020    HCT 41.2 12/16/2020     12/16/2020    CHOL 188 04/05/2019    TRIG 177 (H) 04/05/2019    HDL 48 12/16/2020    ALT 11 04/05/2019    AST 15 04/05/2019     12/15/2020    K 4.1 12/15/2020     12/15/2020    CREATININE 0.56 12/15/2020    BUN 12 12/15/2020    CO2 20 12/15/2020    TSH 1.38 12/19/2018    INR 1.0 12/15/2020    LABA1C 6.4 (H) 04/05/2019    LABMICR 39 (H) 12/19/2018     24 HOUR INTAKE/OUTPUT:    Intake/Output Summary (Last 24 hours) at 12/16/2020 0726  Last data filed at 12/16/2020 0400  Gross per 24 hour   Intake 60 ml   Output 320 ml   Net -260 ml       IMAGING:   XR ABDOMEN FOR NG/OG/NE TUBE PLACEMENT   Final Result   Enteric tube in expected position. MRI BRAIN WO CONTRAST   Final Result   Acute/subacute right MCA distribution infarct with mild associated edema and   subsequent mass effect upon the right lateral ventricle.          CT BRAIN PERFUSION   Final Result   Addendum 1 of 1   ADDENDUM: The full set of diagnostic perfusion images were sent for review at 7:15    p.m.   on 12/15/2020 and demonstrate large volume elevated M TT in the visualized   right middle cerebral artery territory with elevated relative cerebral    blood   volume and decreased relative cerebral blood flow. Final      CTA HEAD NECK W CONTRAST   Final Result   Addendum 1 of 1   ADDENDUM:   The full set of diagnostic perfusion images were sent for review at 7:15    p.m.   on 12/15/2020 and demonstrate large volume elevated M TT in the visualized   right middle cerebral artery territory with elevated relative cerebral    blood   volume and decreased relative cerebral blood flow. Final      CT HEAD WO CONTRAST   Final Result   Addendum 1 of 1   ADDENDUM:   The full set of diagnostic perfusion images were sent for review at 7:15    p.m.   on 12/15/2020 and demonstrate large volume elevated M TT in the visualized   right middle cerebral artery territory with elevated relative cerebral    blood   volume and decreased relative cerebral blood flow. Final      CT HEAD WO CONTRAST    (Results Pending)         Labs and Images reviewed with:  [] Dr. Eusebio Holman. [place]    [x] Dr. Patricia Weldon  [] Dr. Danielle Damon  [] There are no new interval images to review. PHYSICAL EXAM       CONSTITUTIONAL:  Awake and alert, oriented x 3. Follows commands. Dysarthria.  No aphasia   HEAD:  normocephalic, atraumatic    EYES:  PERRL, Right gaze deviation   ENT:  moist mucous membranes   NECK:  supple, symmetric   LUNGS:  Equal air entry bilaterally   CARDIOVASCULAR:  normal s1 / s2, RRR, distal pulses intact   ABDOMEN:  Soft, no rigidity   NEUROLOGIC:  Mental Status:  A & O x3,awake             Cranial Nerves:    II: Visual fields:  abnormal - No blink to threat left quadrants  III: Pupils:  equal, round, reactive to light  III,IV,VI: Extra Ocular Movements: abnormal Right gaze  VII: Facial strength: abnormal Left facial droop Motor Exam:    Drift:  present - Left upper and left lower  Tone:  normal    Motor exam is 4 out of 5 right upper, 3 out of 5 right lower. Spontaneous movement left upper and left lower. Sensory:    Touch:    Right Upper Extremity:  normal  Left Upper Extremity:  abnormal - neglect  Right Lower Extremity:  normal  Left Lower Extremity:  abnormal - neglect     NIH Stroke Scale Total (if not done complete detailed one below):    1a.  Level of consciousness:  0 - alert; keenly responsive  1b. Level of consciousness questions:  0 - answers both questions correctly  1c. Level of consciousness questions:  0 - performs both tasks correctly  2. Best Gaze:  2 - forced deviation, or total gaze paresis not overcome by oculocephalic maneuver  3. Visual:  2 - complete hemianopia  4. Facial Palsy:  1 - minor paralysis (flattened nasolabial fold, asymmetric on smiling)  5a. Motor left arm:  3 - no effort against gravity, limb falls  5b. Motor right arm:  0 - no drift, limb holds 90 (or 45) degrees for full 10 seconds  6a. Motor left leg:  3 - no effort against gravity; leg falls to bed immediately  6b. Motor right le - no drift; leg holds 30 degree position for full 5 seconds  7. Limb Ataxia:  0 - absent  8. Sensory:  1 - mild to moderate sensory loss; patient feels pinprick is less sharp or is dull on the affected side; there is a loss of superficial pain with pinprick but patient is aware of being touched   9. Best Language:  0 - no aphasia, normal  10. Dysarthria:  1 - mild to moderate, patient slurs at least some words and at worst, can be understood with some difficulty  11. Extinction and Inattention:  2 - profound vanessa-inattention or vanessa- inattention to more than one modality. Does not recognize own hand or orients only to one side of space   TOTAL:     DRAINS:  [x] There are no drains for Neuro Critical Care to monitor at this time.      ASSESSMENT AND PLAN: [] OK for out of ICU from Neuro Critical Care standpoint    We will continue to follow along. For any changes in exam or patient status please contact Neuro Critical Care.       CEE Marsh - CNP  Neuro Critical Care  Pager [phone]  12/16/2020     7:26 AM

## 2020-12-16 NOTE — PLAN OF CARE
Problem: ACTIVITY INTOLERANCE/IMPAIRED MOBILITY  Goal: Mobility/activity is maintained at optimum level for patient  Outcome: Ongoing     Problem: COMMUNICATION IMPAIRMENT  Goal: Ability to express needs and understand communication  Outcome: Ongoing   Neuro assessment completed, fall precautions in place, aspirations precautions in place, assess for barriers in communication and mobility, interventions to assist in communication and mobility in place, encouraged to call for assistance, adaptive devices used as needed, assess emotional state and support offered, encouraged patient to communicate by available means, and support systems included in patient care.

## 2020-12-17 ENCOUNTER — APPOINTMENT (OUTPATIENT)
Dept: CT IMAGING | Age: 70
DRG: 064 | End: 2020-12-17
Payer: COMMERCIAL

## 2020-12-17 LAB
ABSOLUTE EOS #: 0.09 K/UL (ref 0–0.44)
ABSOLUTE IMMATURE GRANULOCYTE: 0.04 K/UL (ref 0–0.3)
ABSOLUTE LYMPH #: 1.3 K/UL (ref 1.1–3.7)
ABSOLUTE MONO #: 0.65 K/UL (ref 0.1–1.2)
ANION GAP SERPL CALCULATED.3IONS-SCNC: 7 MMOL/L (ref 9–17)
BASOPHILS # BLD: 0 % (ref 0–2)
BASOPHILS ABSOLUTE: <0.03 K/UL (ref 0–0.2)
BUN BLDV-MCNC: 11 MG/DL (ref 8–23)
BUN/CREAT BLD: ABNORMAL (ref 9–20)
CALCIUM IONIZED: 1.16 MMOL/L (ref 1.13–1.33)
CALCIUM SERPL-MCNC: 8.5 MG/DL (ref 8.6–10.4)
CHLORIDE BLD-SCNC: 113 MMOL/L (ref 98–107)
CO2: 21 MMOL/L (ref 20–31)
CREAT SERPL-MCNC: 0.51 MG/DL (ref 0.5–0.9)
DIFFERENTIAL TYPE: ABNORMAL
EKG ATRIAL RATE: 82 BPM
EKG P AXIS: 70 DEGREES
EKG P-R INTERVAL: 150 MS
EKG Q-T INTERVAL: 428 MS
EKG QRS DURATION: 102 MS
EKG QTC CALCULATION (BAZETT): 500 MS
EKG R AXIS: -3 DEGREES
EKG T AXIS: 60 DEGREES
EKG VENTRICULAR RATE: 82 BPM
EOSINOPHILS RELATIVE PERCENT: 1 % (ref 1–4)
GFR AFRICAN AMERICAN: >60 ML/MIN
GFR NON-AFRICAN AMERICAN: >60 ML/MIN
GFR SERPL CREATININE-BSD FRML MDRD: ABNORMAL ML/MIN/{1.73_M2}
GFR SERPL CREATININE-BSD FRML MDRD: ABNORMAL ML/MIN/{1.73_M2}
GLUCOSE BLD-MCNC: 120 MG/DL (ref 65–105)
GLUCOSE BLD-MCNC: 127 MG/DL (ref 65–105)
GLUCOSE BLD-MCNC: 129 MG/DL (ref 70–99)
GLUCOSE BLD-MCNC: 132 MG/DL (ref 65–105)
HCT VFR BLD CALC: 34.6 % (ref 36.3–47.1)
HEMOGLOBIN: 10.4 G/DL (ref 11.9–15.1)
IMMATURE GRANULOCYTES: 1 %
LYMPHOCYTES # BLD: 19 % (ref 24–43)
MAGNESIUM: 1.7 MG/DL (ref 1.6–2.6)
MCH RBC QN AUTO: 27.9 PG (ref 25.2–33.5)
MCHC RBC AUTO-ENTMCNC: 30.1 G/DL (ref 28.4–34.8)
MCV RBC AUTO: 92.8 FL (ref 82.6–102.9)
MONOCYTES # BLD: 10 % (ref 3–12)
NRBC AUTOMATED: 0 PER 100 WBC
PDW BLD-RTO: 13.8 % (ref 11.8–14.4)
PLATELET # BLD: 204 K/UL (ref 138–453)
PLATELET ESTIMATE: ABNORMAL
PMV BLD AUTO: 10 FL (ref 8.1–13.5)
POTASSIUM SERPL-SCNC: 3.3 MMOL/L (ref 3.7–5.3)
RBC # BLD: 3.73 M/UL (ref 3.95–5.11)
RBC # BLD: ABNORMAL 10*6/UL
SEG NEUTROPHILS: 69 % (ref 36–65)
SEGMENTED NEUTROPHILS ABSOLUTE COUNT: 4.59 K/UL (ref 1.5–8.1)
SODIUM BLD-SCNC: 141 MMOL/L (ref 135–144)
TOTAL CK: 1138 U/L (ref 26–192)
TROPONIN INTERP: ABNORMAL
TROPONIN INTERP: ABNORMAL
TROPONIN T: ABNORMAL NG/ML
TROPONIN T: ABNORMAL NG/ML
TROPONIN, HIGH SENSITIVITY: 21 NG/L (ref 0–14)
TROPONIN, HIGH SENSITIVITY: 23 NG/L (ref 0–14)
WBC # BLD: 6.7 K/UL (ref 3.5–11.3)
WBC # BLD: ABNORMAL 10*3/UL

## 2020-12-17 PROCEDURE — 6360000002 HC RX W HCPCS: Performed by: NURSE PRACTITIONER

## 2020-12-17 PROCEDURE — 6360000002 HC RX W HCPCS: Performed by: STUDENT IN AN ORGANIZED HEALTH CARE EDUCATION/TRAINING PROGRAM

## 2020-12-17 PROCEDURE — 6370000000 HC RX 637 (ALT 250 FOR IP): Performed by: STUDENT IN AN ORGANIZED HEALTH CARE EDUCATION/TRAINING PROGRAM

## 2020-12-17 PROCEDURE — 82947 ASSAY GLUCOSE BLOOD QUANT: CPT

## 2020-12-17 PROCEDURE — 2580000003 HC RX 258: Performed by: STUDENT IN AN ORGANIZED HEALTH CARE EDUCATION/TRAINING PROGRAM

## 2020-12-17 PROCEDURE — 97129 THER IVNTJ 1ST 15 MIN: CPT

## 2020-12-17 PROCEDURE — 82330 ASSAY OF CALCIUM: CPT

## 2020-12-17 PROCEDURE — 2000000003 HC NEURO ICU R&B

## 2020-12-17 PROCEDURE — 83735 ASSAY OF MAGNESIUM: CPT

## 2020-12-17 PROCEDURE — 85025 COMPLETE CBC W/AUTO DIFF WBC: CPT

## 2020-12-17 PROCEDURE — 94640 AIRWAY INHALATION TREATMENT: CPT

## 2020-12-17 PROCEDURE — 93010 ELECTROCARDIOGRAM REPORT: CPT | Performed by: INTERNAL MEDICINE

## 2020-12-17 PROCEDURE — 2580000003 HC RX 258: Performed by: PSYCHIATRY & NEUROLOGY

## 2020-12-17 PROCEDURE — 70450 CT HEAD/BRAIN W/O DYE: CPT

## 2020-12-17 PROCEDURE — 82550 ASSAY OF CK (CPK): CPT

## 2020-12-17 PROCEDURE — 80048 BASIC METABOLIC PNL TOTAL CA: CPT

## 2020-12-17 PROCEDURE — APPNB45 APP NON BILLABLE 31-45 MINUTES: Performed by: NURSE PRACTITIONER

## 2020-12-17 PROCEDURE — 36415 COLL VENOUS BLD VENIPUNCTURE: CPT

## 2020-12-17 PROCEDURE — 99233 SBSQ HOSP IP/OBS HIGH 50: CPT | Performed by: PSYCHIATRY & NEUROLOGY

## 2020-12-17 PROCEDURE — 6370000000 HC RX 637 (ALT 250 FOR IP): Performed by: NURSE PRACTITIONER

## 2020-12-17 PROCEDURE — 84484 ASSAY OF TROPONIN QUANT: CPT

## 2020-12-17 RX ORDER — METOPROLOL TARTRATE 50 MG/1
50 TABLET, FILM COATED ORAL 2 TIMES DAILY
Status: DISCONTINUED | OUTPATIENT
Start: 2020-12-17 | End: 2020-12-19

## 2020-12-17 RX ORDER — DEXTROSE MONOHYDRATE 50 MG/ML
100 INJECTION, SOLUTION INTRAVENOUS PRN
Status: DISCONTINUED | OUTPATIENT
Start: 2020-12-17 | End: 2020-12-30 | Stop reason: HOSPADM

## 2020-12-17 RX ORDER — NICOTINE POLACRILEX 4 MG
15 LOZENGE BUCCAL PRN
Status: DISCONTINUED | OUTPATIENT
Start: 2020-12-17 | End: 2020-12-30 | Stop reason: HOSPADM

## 2020-12-17 RX ORDER — ACETAMINOPHEN 325 MG/1
650 TABLET ORAL EVERY 4 HOURS PRN
Status: DISCONTINUED | OUTPATIENT
Start: 2020-12-17 | End: 2020-12-30 | Stop reason: HOSPADM

## 2020-12-17 RX ORDER — QUETIAPINE FUMARATE 25 MG/1
50 TABLET, FILM COATED ORAL 2 TIMES DAILY
Status: DISCONTINUED | OUTPATIENT
Start: 2020-12-17 | End: 2020-12-17

## 2020-12-17 RX ORDER — ACETAMINOPHEN 325 MG/1
650 TABLET ORAL EVERY 4 HOURS PRN
Status: DISCONTINUED | OUTPATIENT
Start: 2020-12-17 | End: 2020-12-17

## 2020-12-17 RX ORDER — QUETIAPINE FUMARATE 25 MG/1
50 TABLET, FILM COATED ORAL NIGHTLY
Status: DISCONTINUED | OUTPATIENT
Start: 2020-12-18 | End: 2020-12-22

## 2020-12-17 RX ORDER — MAGNESIUM SULFATE 1 G/100ML
2 INJECTION INTRAVENOUS ONCE
Status: COMPLETED | OUTPATIENT
Start: 2020-12-17 | End: 2020-12-17

## 2020-12-17 RX ORDER — RIVASTIGMINE 9.5 MG/24H
1 PATCH, EXTENDED RELEASE TRANSDERMAL DAILY
COMMUNITY

## 2020-12-17 RX ORDER — DEXTROSE MONOHYDRATE 25 G/50ML
12.5 INJECTION, SOLUTION INTRAVENOUS PRN
Status: DISCONTINUED | OUTPATIENT
Start: 2020-12-17 | End: 2020-12-30 | Stop reason: HOSPADM

## 2020-12-17 RX ORDER — PANTOPRAZOLE SODIUM 40 MG/1
40 TABLET, DELAYED RELEASE ORAL
Status: DISCONTINUED | OUTPATIENT
Start: 2020-12-17 | End: 2020-12-20

## 2020-12-17 RX ORDER — QUETIAPINE FUMARATE 25 MG/1
25 TABLET, FILM COATED ORAL 2 TIMES DAILY
COMMUNITY

## 2020-12-17 RX ORDER — MAGNESIUM OXIDE 400 MG/1
400 TABLET ORAL 2 TIMES DAILY
Status: ON HOLD | COMMUNITY
End: 2020-12-25

## 2020-12-17 RX ADMIN — SODIUM CHLORIDE: 9 INJECTION, SOLUTION INTRAVENOUS at 12:54

## 2020-12-17 RX ADMIN — ENOXAPARIN SODIUM 30 MG: 100 INJECTION SUBCUTANEOUS at 08:37

## 2020-12-17 RX ADMIN — METOPROLOL TARTRATE 50 MG: 50 TABLET, FILM COATED ORAL at 10:46

## 2020-12-17 RX ADMIN — ATORVASTATIN CALCIUM 80 MG: 80 TABLET, FILM COATED ORAL at 20:15

## 2020-12-17 RX ADMIN — QUETIAPINE FUMARATE 50 MG: 25 TABLET ORAL at 08:37

## 2020-12-17 RX ADMIN — IPRATROPIUM BROMIDE AND ALBUTEROL SULFATE 1 AMPULE: .5; 3 SOLUTION RESPIRATORY (INHALATION) at 20:22

## 2020-12-17 RX ADMIN — IPRATROPIUM BROMIDE AND ALBUTEROL SULFATE 1 AMPULE: .5; 3 SOLUTION RESPIRATORY (INHALATION) at 11:25

## 2020-12-17 RX ADMIN — ACETAMINOPHEN 650 MG: 325 TABLET ORAL at 21:33

## 2020-12-17 RX ADMIN — METOPROLOL TARTRATE 50 MG: 50 TABLET, FILM COATED ORAL at 20:15

## 2020-12-17 RX ADMIN — SODIUM CHLORIDE, PRESERVATIVE FREE 10 ML: 5 INJECTION INTRAVENOUS at 20:15

## 2020-12-17 RX ADMIN — POTASSIUM BICARBONATE 40 MEQ: 782 TABLET, EFFERVESCENT ORAL at 08:37

## 2020-12-17 RX ADMIN — POTASSIUM BICARBONATE 40 MEQ: 782 TABLET, EFFERVESCENT ORAL at 12:46

## 2020-12-17 RX ADMIN — IPRATROPIUM BROMIDE AND ALBUTEROL SULFATE 1 AMPULE: .5; 3 SOLUTION RESPIRATORY (INHALATION) at 16:28

## 2020-12-17 RX ADMIN — MAGNESIUM SULFATE HEPTAHYDRATE 2 G: 1 INJECTION, SOLUTION INTRAVENOUS at 08:38

## 2020-12-17 RX ADMIN — IPRATROPIUM BROMIDE AND ALBUTEROL SULFATE 1 AMPULE: .5; 3 SOLUTION RESPIRATORY (INHALATION) at 08:10

## 2020-12-17 RX ADMIN — SERTRALINE 100 MG: 50 TABLET, FILM COATED ORAL at 08:37

## 2020-12-17 RX ADMIN — SODIUM CHLORIDE, PRESERVATIVE FREE 10 ML: 5 INJECTION INTRAVENOUS at 08:37

## 2020-12-17 RX ADMIN — Medication 81 MG: at 08:37

## 2020-12-17 ASSESSMENT — PAIN SCALES - GENERAL
PAINLEVEL_OUTOF10: 0

## 2020-12-17 NOTE — PROGRESS NOTES
Endovascular Neurosurgery Progress Note    SUBJECTIVE:   No reported events overnight. Pt this am remains dysarthric, and has difficulty making her needs known. Review of Systems:  CONSTITUTIONAL:  negative for fevers, chills, fatigue and malaise    EYES:  negative for double vision, blurred vision and photophobia     HEENT:  negative for tinnitus, epistaxis and sore throat    RESPIRATORY:  negative for cough, shortness of breath, wheezing    CARDIOVASCULAR:  negative for chest pain, palpitations, syncope, edema    GASTROINTESTINAL:  negative for nausea, vomiting    GENITOURINARY:  negative for incontinence    MUSCULOSKELETAL:  negative for neck or back pain    NEUROLOGICAL:  Negative for weakness and tingling  negative for headaches and dizziness    PSYCHIATRIC:  negative for anxiety      Review of systems otherwise negative. OBJECTIVE:     Vitals:    12/16/20 2100   BP: (!) 170/102   Pulse: 113   Resp: 23   Temp:    SpO2: 94%        General:  Gen: obese habitus, NAD  HEENT: NCAT, mucosa moist  Cvs: RRR, S1 S2 normal  Resp: symmetric unlabored breathing  Abd: s/nd/nt  Ext: no edema  Skin: no lesions seen, warm and dry     Neuro:  Gen: awake and alert, oriented x3. Lang/speech: no aphasia. Severe dysarthria. Follows commands. CN: PERRL, R gaze fixed, L hemianopia, V1-3 intact, L face severe droop, hearing intact  Motor: R hemibody 5/5. LUE 0/5. LLE 2/5  Sense: LT intact in all 4 ext. L neglect  Coord: no gross ataxia  DTR: deferred  Gait: deferred     NIH Stroke Scale:   1a  Level of consciousness: 0   1b. LOC questions:  0 - answers both questions correctly   1c. LOC commands: 0 - performs both tasks correctly   2. Best Gaze: 2 - forced deviation, or total gaze paresis not overcome by oculocephalic maneuver   3. Visual: 2 - complete hemianopia   4. Facial Palsy: 2 - partial paralysis (total or near total paralysis of the lower face)   5a.  Motor left arm: 4 - no movement 5b.  Motor right arm: 0 - no drift, limb holds 90 (or 45) degrees for full 10 seconds   6a. Motor left leg: 3   6b  Motor right le - no drift; leg holds 30 degree position for full 5 seconds   7. Limb Ataxia: 0 - absent   8. Sensory: 0 - normal; no sensory loss   9. Best Language:  0 - no aphasia, normal   10. Dysarthria: 2 - severe; patient speech is so slurred as to be unintelligible in the absence of or our of proportion to any dysphagia, or is mute/anarthric    11. Extinction and Inattention: 2 - profound vanessa-inattention or vanessa- inattention to more than one modality. Does not recognize own hand or orients only to one side of space              Total:   17      MRS: 3        LABS:   Reviewed. RADIOLOGY:   Images were personally reviewed including:  MRI brain 2020  Acute/subacute right MCA distribution infarct with mild associated edema and   subsequent mass effect upon the right lateral ventricle. CTP brain 12/15/2020  1. Moderate volume acute ischemic infarct in the right middle cerebral artery   territory.  No intracranial hemorrhage or mass effect. 2. Moderate volume penumbra in the peripheral right middle cerebral artery   territory based on perfusion images. 3. Acute nearly occlusive thrombosis of the M1 and M2 segments right middle   cerebral artery. 4. Severe stenosis of the M1 segment left middle cerebral artery. 5. Severe stenosis of the A1 segment left anterior cerebral artery. 6. 75% stenosis of the proximal left internal carotid secondary to vessel   kinking and superimposed atherosclerotic plaque. 7. 40% stenosis of the proximal right internal carotid artery. 8. Moderate stenosis of the V4 segment left vertebral artery.      CT/A head and neck 12/15/2020  1. Moderate volume acute ischemic infarct in the right middle cerebral artery   territory.  No intracranial hemorrhage or mass effect.    2. Moderate volume penumbra in the peripheral right middle cerebral artery

## 2020-12-17 NOTE — PROGRESS NOTES
Daily Progress Note  Neuro Critical Care    Patient Name: Debbie Griffin  Patient : 1950  Room/Bed: 0526/0526-01  Code Status: Full  Allergies: Allergies   Allergen Reactions    Bactrim     Penicillins     Tylenol [Acetaminophen] Other (See Comments)     constipation       CHIEF COMPLAINT:      AMS, left sided weakness, found down     INTERVAL HISTORY    Initial Presentation (Admitted 12/15/20): The patient is a 78 y.o. female with a past medical history that includes diabetes, CAD, hyperlipidemia, CHF who presented to the emergency department for altered mental status as a stroke alert. Family last spoke to patient at 8 AM yesterday morning. Patient was found down at her home, incontinent, unresponsive. Brought in via EMS. Upon arrival evaluated by stroke team, and NIH 19. Left hemiparesis and right-sided gaze preference.      On arrival to the emergency department patient's blood pressure was 210/115, heart rate 97 afebrile 98. 6.  Stroke panel significant for glucose 138, sodium 139, hemoglobin 13.3, platelets 912, total CK 1609, myoglobin 1554, INR 1.0, PTT 22.5, high-sensitivity troponin XV.  COVID-19 negative.  Patient given 1 L fluid bolus along with aspirin 81 mg.     Patient's case was discussed in detail with endovascular neurosurgery team and stroke team along with patient's family. Donald Pill was made that because patient was greater than 24 hours no longer thrombectomy candidate and CT perfusion scan showing large volume infarcted tissue with little to 0 penumbra.  Patient's family agreed to patient being admitted to neuro critical care for close neurologic monitoring and possible hypertonic and hyperosmolar treatment of her cerebral edema.      Hospital Course:  : MRI brain revealed a large territory R MCA infarct. Heparin infusion discontinued. Failed bedside swallow, NG tube placed.      Last 24h: No acute events overnight. Home medications resumed. CT head this morning stable, no hemorrhagic conversion or significant cerebral edema. Lopressor 50 mg BID started, home medication. Heat pack placed to neck for occipital neuralgia. Will plan to start DAPT on  for intracranial atherosclerosis if imaging and edema remains stable.      CURRENT MEDICATIONS:  SCHEDULED MEDICATIONS:   potassium bicarb-citric acid  40 mEq Oral BID    magnesium sulfate  2 g Intravenous Once    insulin lispro  0-12 Units Subcutaneous Q6H    QUEtiapine  50 mg Oral BID    pantoprazole  40 mg Oral QAM AC    sertraline  100 mg Oral Daily    enoxaparin  30 mg Subcutaneous Daily    rivastigmine  1 patch Transdermal Daily    sodium chloride  1,000 mL Intravenous Once    ipratropium-albuterol  1 ampule Inhalation Q4H WA    sodium chloride flush  10 mL Intravenous 2 times per day    aspirin  81 mg Oral Daily    Or    aspirin  300 mg Rectal Daily    atorvastatin  80 mg Oral Nightly     CONTINUOUS INFUSIONS:   dextrose      sodium chloride 75 mL/hr at 20 0614     PRN MEDICATIONS:   glucose, dextrose, glucagon (rDNA), dextrose, sennosides-docusate sodium, sodium chloride flush, promethazine **OR** ondansetron    VITALS:  Temperature Range: Temp: 98.6 °F (37 °C) Temp  Av.3 °F (36.8 °C)  Min: 97.9 °F (36.6 °C)  Max: 98.6 °F (37 °C)  BP Range: Systolic (95ZBI), TGW:478 , Min:99 , DWY:876     Diastolic (39XWK), EDJ:74, Min:27, Max:161    Pulse Range: Pulse  Av.1  Min: 62  Max: 113  Respiration Range: Resp  Av  Min: 15  Max: 26  Current Pulse Ox: SpO2: 98 %  24HR Pulse Ox Range: SpO2  Av.2 %  Min: 92 %  Max: 99 %  Patient Vitals for the past 12 hrs:   BP Temp Temp src Pulse Resp SpO2   20 0536 (!) 202/95   92 25    20 0533 (!) 202/87   102 24    20 0529 (!) 163/69   89 26    20 0400 (!) 139/46 98.6 °F (37 °C) Oral 68 21 98 %   20 0300 (!) 99/51   109 26 97 % XR ABDOMEN FOR NG/OG/NE TUBE PLACEMENT   Final Result   Enteric tube in expected position. MRI BRAIN WO CONTRAST   Final Result   Acute/subacute right MCA distribution infarct with mild associated edema and   subsequent mass effect upon the right lateral ventricle. CT BRAIN PERFUSION   Final Result   Addendum 1 of 1   ADDENDUM:   The full set of diagnostic perfusion images were sent for review at 7:15    p.m.   on 12/15/2020 and demonstrate large volume elevated M TT in the visualized   right middle cerebral artery territory with elevated relative cerebral    blood   volume and decreased relative cerebral blood flow. Final      CTA HEAD NECK W CONTRAST   Final Result   Addendum 1 of 1   ADDENDUM:   The full set of diagnostic perfusion images were sent for review at 7:15    p.m.   on 12/15/2020 and demonstrate large volume elevated M TT in the visualized   right middle cerebral artery territory with elevated relative cerebral    blood   volume and decreased relative cerebral blood flow. Final      CT HEAD WO CONTRAST   Final Result   Addendum 1 of 1   ADDENDUM:   The full set of diagnostic perfusion images were sent for review at 7:15    p.m.   on 12/15/2020 and demonstrate large volume elevated M TT in the visualized   right middle cerebral artery territory with elevated relative cerebral    blood   volume and decreased relative cerebral blood flow. Final            Labs and Images reviewed with:  [] Dr. Anibal Guevara    [x] Dr. Rogelio Garcia  [] Dr. Marleen Carvajal  [] There are no new interval images to review. PHYSICAL EXAM       CONSTITUTIONAL:  Opens eyes to verbal stimulation, oriented x 3. Follows commands. Dysarthria.  No aphasia   HEAD:  normocephalic, atraumatic    EYES:  PERRL, Right gaze deviation   ENT:  moist mucous membranes   NECK:  supple, symmetric   LUNGS:  Equal air entry bilaterally   CARDIOVASCULAR:  normal s1 / s2, RRR, distal pulses intact ABDOMEN:  Soft, no rigidity   NEUROLOGIC:  Mental Status:  A & O x3,awake             Cranial Nerves:    II: Visual fields:  abnormal - No blink to threat left quadrants  III: Pupils:  equal, round, reactive to light  III,IV,VI: Extra Ocular Movements: abnormal Right gaze  VII: Facial strength: abnormal Left facial droop    Motor Exam:    Drift:  present - Left upper and left lower  Tone:  normal    Motor exam is 4 out of 5 right upper, 3 out of 5 right lower. Spontaneous movement left upper and left lower. Sensory:    Touch:    Right Upper Extremity:  normal  Left Upper Extremity:  abnormal - neglect  Right Lower Extremity:  normal  Left Lower Extremity:  abnormal - neglect       DRAINS:  [x] There are no drains for Neuro Critical Care to monitor at this time. ASSESSMENT AND PLAN:       This is a 78 y.o. female presenting to the ED as a stroke alert after being found down with dense left hemiparesis, dysarthria, and rightward gaze. Acute large vessel occlusion. Cerebral edema. Not candidate for tPA or thrombectomy.  Admit to neuro ICU for medical management and monitoring of edema.     PLAN/MEDICAL DECISION MAKING:        NEUROLOGIC:  - CT demonstrating acute ischemic infarct in R MCA territory; no mass effect or hemorrhage  - loaded with ASA and plavix in ED  - MRI revealed acute right MCA distribution infarct with mild edema   - CT head this morning stable  - Aspirin 81 mg and Lipitor 80 mg QHS for secondary stroke ppx  - Intracranial atherosclerosis, Consider adding plavix on Sunday if cerebral edema stable and out of vanessa-crani watch period  - Goal SBP < 220  - Neuro checks per protocol     CARDIOVASCULAR:  - SBP < 220  - Hx of CHF and CAD  - frequent PVCs on monitor  - Magnesium 2g IV, Mag 1.7  - Resume half home Lopressor dose, 50 mg BID  - trop 15 > 21-22  - Mixed hyperlipidemia, Cholesterol 220, ; Lipitor 80 mg QHS - Echocardiogram: EF 50%, moderate to severe LVH, mild diastolic dysfunction, negative bubble  - Continue telemetry     PULMONARY:  - CXR normal  - goal SpO 2 > 90  - hx of smoking  - Duoneb q4h while awake     RENAL/FLUID/ELECTROLYTE:  - BUN 11/ Creatinine 0.51  - Continue to trend CK/myoglobin  - Monitor I/Os  - IVF: NS @ 75 mL/hr  - Replace electrolytes PRN  - Daily BMP     GI/NUTRITION:  NUTRITION:  Dietitian consult for tube feeds  - Bowel regimen: senokot PRN  - GI prophylaxis: Pepcid BID     ID:  - Afebrile, Tmax 37  - covid negative   - WBC 6.7  - CXR unremarkable  - Continue to monitor for fevers  - Daily CBC     HEME:   - H&H 10.4/34.6  - Platelets 314  - Daily CBC     ENDOCRINE:  - Continue to monitor blood glucose, goal <180  - hx of T2DM  - Medium dose ISS  - A1C 6.8     OTHER:  - PT/OT/ST      PROPHYLAXIS:  Stress ulcer: H2 blocker     DVT PROPHYLAXIS:  - SCD sleeves - Thigh High   - Lovenox    DISPOSITION:  [x] To remain ICU: close neurological monitoring  [] OK for out of ICU from Neuro Critical Care standpoint    We will continue to follow along. For any changes in exam or patient status please contact Neuro Critical Care.       CEE Tubbs - CNP  Neuro Critical Care  Pager [phone]  12/17/2020     7:14 AM

## 2020-12-17 NOTE — PROGRESS NOTES
Comprehensive Nutrition Assessment    Type and Reason for Visit:  Consult    Nutrition Recommendations/Plan:   -Initiate TF of Glucerna (Diabetic) at goal rate of 55ml per hr to provide 1584 kcal, 79gm protein  -Monitor tolerance/adequacy and adjust as needed    Nutrition Assessment:  Consult received for tube feeding. Admitted with altered mental status/ischemic infarct. Failed bedside swallow study yesterday and NG placed. RN reports would like tube feeding started today. Malnutrition Assessment:  Malnutrition Status:  Insufficient data    Context:  Acute Illness     Findings of the 6 clinical characteristics of malnutrition:  Energy Intake:  Unable to assess  Weight Loss:  Unable to assess     Muscle Mass Loss:  Unable to assess    Fluid Accumulation:  Unable to assess     Strength:  Not Performed    Estimated Daily Nutrient Needs:  Energy (kcal):  2242-1430 kcal (15-18kcal/kg); Weight Used for Energy Requirements:  Current     Protein (g):  60-80 gm (1.2-1.4 gm/kg);  Weight Used for Protein Requirements:  Ideal             Nutrition Related Findings:  labs/meds reviewed, active bowel sounds      Wounds:  None       Current Nutrition Therapies:    DIET TUBE FEED CONTINUOUS/CYCLIC NPO; Diabetic; Nasogastric; Continuous; 20; 55    Anthropometric Measures:  · Height: 5' 4\" (162.6 cm)  · Current Body Weight: 231 lb 11.3 oz (105.1 kg)     · Ideal Body Weight: 120 lbs;  · BMI: 39.8  · BMI Categories: Obese Class 2 (BMI 35.0 -39.9)       Nutrition Diagnosis:   · Inadequate oral intake related to swallowing difficulty as evidenced by nutrition support - enteral nutrition      Nutrition Interventions:   Food and/or Nutrient Delivery:  Start Tube Feeding  Nutrition Education/Counseling:  Education not indicated   Coordination of Nutrition Care:  Continue to monitor while inpatient    Goals:  Meet 100% of estimated nutrition needs       Nutrition Monitoring and Evaluation: Behavioral-Environmental Outcomes:  None Identified   Food/Nutrient Intake Outcomes:  Enteral Nutrition Intake/Tolerance  Physical Signs/Symptoms Outcomes:  Biochemical Data, Chewing or Swallowing, Nutrition Focused Physical Findings, Weight     Discharge Planning:     Too soon to determine     Electronically signed by Johnna Francisco RD, LD on 12/17/20 at 2:39 PM EST    Contact: [phone]

## 2020-12-17 NOTE — PROGRESS NOTES
Speech Language Pathology  Speech Language Pathology  [facility] Note    Date: 12/17/2020  Patients Name: Vivek Simon  MRN: [id number]  Diagnosis:   Patient Active Problem List   Diagnosis Code    DM (diabetes mellitus) ([facility] E11.9    HTN (hypertension) I10    Smoker F17.200    Asthma J45.909    Knee osteoarthritis M17.10    Edema R60.9    Spinal stenosis in cervical region M48.02    Chest pain R07.9    YUDITH (obstructive sleep apnea) G47.33    Morbid obesity ([facility] E66.01    Knee pain, bilateral M25.561, M25.562    S/P TKR (total knee replacement) Z96.659    Urge incontinence of urine N39.41    Lumbar spondylosis M47.816    Cervical spondylosis M47.812    Chronic use of opiate drugs therapeutic purposes Z79.891    Chronic knee pain M25.569, G89.29    Hypertensive urgency I16.0    Acute coronary syndrome (HCC) I24.9    Congestive heart failure (HCC) I50.9    Lymphadenopathy R59.1    Chronic pain G89.29    Coronary artery disease involving native coronary artery without angina pectoris I25.10    Chronic prescription opiate use Z79.891    Type 2 diabetes mellitus with complication, without long-term current use of insulin (HCC) E11.8    S/P coronary artery stent placement - RCA 10/12/16 - Dr. Mayur Vazquez Z95.5    Anxiety F41.9    Depression (emotion) F32.9    Postlaminectomy syndrome, lumbar M96.1    Medication monitoring encounter Z51.81    Postlaminectomy syndrome M96.1    Postlaminectomy syndrome M96.1    Primary osteoarthritis of right knee M17.11    Long term (current) use of antithrombotics/antiplatelets O65.10    Influenza B J10.1    Reactive airway disease with acute exacerbation J45. 0    Cerebrovascular accident (CVA) due to embolic occlusion of right middle cerebral artery (HCC) I63.411    Acute cerebrovascular accident (CVA) ([facility] I63.9    Right middle cerebral artery stroke ([facility] P63.533  Non-traumatic rhabdomyolysis M62.82       Pain: 0/10    Cognitive Treatment    Treatment time:  1199-1253      Subjective: [] Alert [x] Cooperative     [] Confused     [] Agitated    [x] Lethargic      Objective/Assessment: **remainder of cognitive evaluation completed**    Attention: Pt. Mildly lethargic. Occasionally required repetitions with tasks. Recall: Delayed recall of 3 units:  0/3 increased to 3/3 with min-mod verbal cues            0/3 increased to 3/3 with min verbal cues    Organization: Word Generation, concrete:  6 in 30 seconds (mild)    Problem Solving/Reasoning: Antonyms: 1/3, did not increase with max verbal cues                Inductive Reasonin/4                Similarities/Differences: 2/4 increased to 3/4 with mod verbal cues                Deductive Reasonin/3 increased to 3/3 with min verbal cues    Other: Pt. Continues with dysarthria/decreased intelligibility. However, pt. With increase intelligibility from previous session. New Goals:  1. Pt. Will recall 3-5 units with distractions with 90% accuracy. 2. Pt. Will complete deductive reasoning tasks with 90% accuracy. 3. Pt. Will generate 8-10 members of a category with 90% accuracy. Plan:  [x] Continue ST services    [] Discharge from ST:      Discharge recommendations: [] Inpatient Rehab   [] [place]   [] Outpatient Therapy  [] Follow up at trauma clinic   [x] Other: Further therapy recommended at discharge. Treatment completed by: Jayjay Cortes M.A.  CCC-SLP

## 2020-12-18 ENCOUNTER — APPOINTMENT (OUTPATIENT)
Dept: GENERAL RADIOLOGY | Age: 70
DRG: 064 | End: 2020-12-18
Payer: COMMERCIAL

## 2020-12-18 LAB
-: ABNORMAL
ABSOLUTE EOS #: 0.23 K/UL (ref 0–0.44)
ABSOLUTE IMMATURE GRANULOCYTE: <0.03 K/UL (ref 0–0.3)
ABSOLUTE LYMPH #: 1.58 K/UL (ref 1.1–3.7)
ABSOLUTE MONO #: 0.72 K/UL (ref 0.1–1.2)
AMORPHOUS: ABNORMAL
ANION GAP SERPL CALCULATED.3IONS-SCNC: 10 MMOL/L (ref 9–17)
BACTERIA: ABNORMAL
BASOPHILS # BLD: 0 % (ref 0–2)
BASOPHILS ABSOLUTE: 0.03 K/UL (ref 0–0.2)
BILIRUBIN URINE: NEGATIVE
BUN BLDV-MCNC: 12 MG/DL (ref 8–23)
BUN/CREAT BLD: ABNORMAL (ref 9–20)
CALCIUM IONIZED: 1.13 MMOL/L (ref 1.13–1.33)
CALCIUM SERPL-MCNC: 9 MG/DL (ref 8.6–10.4)
CASTS UA: ABNORMAL /LPF (ref 0–2)
CASTS UA: ABNORMAL /LPF (ref 0–2)
CHLORIDE BLD-SCNC: 107 MMOL/L (ref 98–107)
CO2: 22 MMOL/L (ref 20–31)
COLOR: YELLOW
CREAT SERPL-MCNC: 0.42 MG/DL (ref 0.5–0.9)
CRYSTALS, UA: ABNORMAL /HPF
DIFFERENTIAL TYPE: ABNORMAL
EOSINOPHILS RELATIVE PERCENT: 3 % (ref 1–4)
EPITHELIAL CELLS UA: ABNORMAL /HPF (ref 0–5)
GFR AFRICAN AMERICAN: >60 ML/MIN
GFR NON-AFRICAN AMERICAN: >60 ML/MIN
GFR SERPL CREATININE-BSD FRML MDRD: ABNORMAL ML/MIN/{1.73_M2}
GFR SERPL CREATININE-BSD FRML MDRD: ABNORMAL ML/MIN/{1.73_M2}
GLUCOSE BLD-MCNC: 146 MG/DL (ref 65–105)
GLUCOSE BLD-MCNC: 165 MG/DL (ref 70–99)
GLUCOSE BLD-MCNC: 173 MG/DL (ref 65–105)
GLUCOSE URINE: NEGATIVE
HCT VFR BLD CALC: 36.6 % (ref 36.3–47.1)
HEMOGLOBIN: 11.1 G/DL (ref 11.9–15.1)
IMMATURE GRANULOCYTES: 0 %
KETONES, URINE: NEGATIVE
LEUKOCYTE ESTERASE, URINE: ABNORMAL
LYMPHOCYTES # BLD: 22 % (ref 24–43)
MAGNESIUM: 1.8 MG/DL (ref 1.6–2.6)
MCH RBC QN AUTO: 27.5 PG (ref 25.2–33.5)
MCHC RBC AUTO-ENTMCNC: 30.3 G/DL (ref 28.4–34.8)
MCV RBC AUTO: 90.8 FL (ref 82.6–102.9)
MONOCYTES # BLD: 10 % (ref 3–12)
MUCUS: ABNORMAL
NITRITE, URINE: NEGATIVE
NRBC AUTOMATED: 0 PER 100 WBC
OTHER OBSERVATIONS UA: ABNORMAL
PDW BLD-RTO: 13.6 % (ref 11.8–14.4)
PH UA: 7.5 (ref 5–8)
PLATELET # BLD: 227 K/UL (ref 138–453)
PLATELET ESTIMATE: ABNORMAL
PMV BLD AUTO: 10.2 FL (ref 8.1–13.5)
POTASSIUM SERPL-SCNC: 4.3 MMOL/L (ref 3.7–5.3)
PROTEIN UA: NEGATIVE
RBC # BLD: 4.03 M/UL (ref 3.95–5.11)
RBC # BLD: ABNORMAL 10*6/UL
RBC UA: ABNORMAL /HPF (ref 0–2)
RENAL EPITHELIAL, UA: ABNORMAL /HPF
SEG NEUTROPHILS: 65 % (ref 36–65)
SEGMENTED NEUTROPHILS ABSOLUTE COUNT: 4.72 K/UL (ref 1.5–8.1)
SODIUM BLD-SCNC: 139 MMOL/L (ref 135–144)
SPECIFIC GRAVITY UA: 1.02 (ref 1–1.03)
TRICHOMONAS: ABNORMAL
TURBIDITY: ABNORMAL
URINE HGB: ABNORMAL
UROBILINOGEN, URINE: ABNORMAL
WBC # BLD: 7.3 K/UL (ref 3.5–11.3)
WBC # BLD: ABNORMAL 10*3/UL
WBC UA: ABNORMAL /HPF (ref 0–5)
YEAST: ABNORMAL

## 2020-12-18 PROCEDURE — 51702 INSERT TEMP BLADDER CATH: CPT

## 2020-12-18 PROCEDURE — 71045 X-RAY EXAM CHEST 1 VIEW: CPT

## 2020-12-18 PROCEDURE — 6370000000 HC RX 637 (ALT 250 FOR IP): Performed by: STUDENT IN AN ORGANIZED HEALTH CARE EDUCATION/TRAINING PROGRAM

## 2020-12-18 PROCEDURE — 2580000003 HC RX 258: Performed by: STUDENT IN AN ORGANIZED HEALTH CARE EDUCATION/TRAINING PROGRAM

## 2020-12-18 PROCEDURE — 82330 ASSAY OF CALCIUM: CPT

## 2020-12-18 PROCEDURE — 87086 URINE CULTURE/COLONY COUNT: CPT

## 2020-12-18 PROCEDURE — 87088 URINE BACTERIA CULTURE: CPT

## 2020-12-18 PROCEDURE — 6370000000 HC RX 637 (ALT 250 FOR IP): Performed by: NURSE PRACTITIONER

## 2020-12-18 PROCEDURE — 82947 ASSAY GLUCOSE BLOOD QUANT: CPT

## 2020-12-18 PROCEDURE — 87186 SC STD MICRODIL/AGAR DIL: CPT

## 2020-12-18 PROCEDURE — 99233 SBSQ HOSP IP/OBS HIGH 50: CPT | Performed by: PSYCHIATRY & NEUROLOGY

## 2020-12-18 PROCEDURE — 6360000002 HC RX W HCPCS: Performed by: STUDENT IN AN ORGANIZED HEALTH CARE EDUCATION/TRAINING PROGRAM

## 2020-12-18 PROCEDURE — 83735 ASSAY OF MAGNESIUM: CPT

## 2020-12-18 PROCEDURE — 2580000003 HC RX 258: Performed by: NURSE PRACTITIONER

## 2020-12-18 PROCEDURE — 85025 COMPLETE CBC W/AUTO DIFF WBC: CPT

## 2020-12-18 PROCEDURE — 6360000002 HC RX W HCPCS: Performed by: NURSE PRACTITIONER

## 2020-12-18 PROCEDURE — 94640 AIRWAY INHALATION TREATMENT: CPT

## 2020-12-18 PROCEDURE — 80048 BASIC METABOLIC PNL TOTAL CA: CPT

## 2020-12-18 PROCEDURE — 74018 RADEX ABDOMEN 1 VIEW: CPT

## 2020-12-18 PROCEDURE — 94761 N-INVAS EAR/PLS OXIMETRY MLT: CPT

## 2020-12-18 PROCEDURE — 2000000003 HC NEURO ICU R&B

## 2020-12-18 PROCEDURE — 36415 COLL VENOUS BLD VENIPUNCTURE: CPT

## 2020-12-18 PROCEDURE — 81001 URINALYSIS AUTO W/SCOPE: CPT

## 2020-12-18 PROCEDURE — APPNB45 APP NON BILLABLE 31-45 MINUTES: Performed by: NURSE PRACTITIONER

## 2020-12-18 RX ORDER — MAGNESIUM SULFATE IN WATER 40 MG/ML
2 INJECTION, SOLUTION INTRAVENOUS ONCE
Status: COMPLETED | OUTPATIENT
Start: 2020-12-18 | End: 2020-12-18

## 2020-12-18 RX ORDER — FUROSEMIDE 10 MG/ML
INJECTION INTRAMUSCULAR; INTRAVENOUS
Status: DISPENSED
Start: 2020-12-18 | End: 2020-12-18

## 2020-12-18 RX ORDER — FUROSEMIDE 10 MG/ML
20 INJECTION INTRAMUSCULAR; INTRAVENOUS ONCE
Status: COMPLETED | OUTPATIENT
Start: 2020-12-18 | End: 2020-12-18

## 2020-12-18 RX ORDER — FUROSEMIDE 40 MG/1
40 TABLET ORAL DAILY
Status: DISCONTINUED | OUTPATIENT
Start: 2020-12-19 | End: 2020-12-30 | Stop reason: HOSPADM

## 2020-12-18 RX ORDER — SENNA AND DOCUSATE SODIUM 50; 8.6 MG/1; MG/1
2 TABLET, FILM COATED ORAL DAILY
Status: DISCONTINUED | OUTPATIENT
Start: 2020-12-18 | End: 2020-12-21

## 2020-12-18 RX ADMIN — METOPROLOL TARTRATE 50 MG: 50 TABLET, FILM COATED ORAL at 08:58

## 2020-12-18 RX ADMIN — ATORVASTATIN CALCIUM 80 MG: 80 TABLET, FILM COATED ORAL at 20:21

## 2020-12-18 RX ADMIN — Medication 81 MG: at 08:58

## 2020-12-18 RX ADMIN — METOPROLOL TARTRATE 50 MG: 50 TABLET, FILM COATED ORAL at 20:21

## 2020-12-18 RX ADMIN — INSULIN LISPRO 2 UNITS: 100 INJECTION, SOLUTION INTRAVENOUS; SUBCUTANEOUS at 01:55

## 2020-12-18 RX ADMIN — IPRATROPIUM BROMIDE AND ALBUTEROL SULFATE 1 AMPULE: .5; 3 SOLUTION RESPIRATORY (INHALATION) at 07:27

## 2020-12-18 RX ADMIN — INSULIN LISPRO 2 UNITS: 100 INJECTION, SOLUTION INTRAVENOUS; SUBCUTANEOUS at 20:21

## 2020-12-18 RX ADMIN — CEFTRIAXONE SODIUM 1 G: 1 INJECTION, POWDER, FOR SOLUTION INTRAMUSCULAR; INTRAVENOUS at 17:54

## 2020-12-18 RX ADMIN — ENOXAPARIN SODIUM 30 MG: 100 INJECTION SUBCUTANEOUS at 08:58

## 2020-12-18 RX ADMIN — INSULIN LISPRO 2 UNITS: 100 INJECTION, SOLUTION INTRAVENOUS; SUBCUTANEOUS at 09:19

## 2020-12-18 RX ADMIN — IPRATROPIUM BROMIDE AND ALBUTEROL SULFATE 1 AMPULE: .5; 3 SOLUTION RESPIRATORY (INHALATION) at 15:16

## 2020-12-18 RX ADMIN — SODIUM CHLORIDE, PRESERVATIVE FREE 10 ML: 5 INJECTION INTRAVENOUS at 08:59

## 2020-12-18 RX ADMIN — FUROSEMIDE 20 MG: 10 INJECTION, SOLUTION INTRAVENOUS at 10:06

## 2020-12-18 RX ADMIN — IPRATROPIUM BROMIDE AND ALBUTEROL SULFATE 1 AMPULE: .5; 3 SOLUTION RESPIRATORY (INHALATION) at 20:16

## 2020-12-18 RX ADMIN — SODIUM CHLORIDE, PRESERVATIVE FREE 10 ML: 5 INJECTION INTRAVENOUS at 21:37

## 2020-12-18 RX ADMIN — STANDARDIZED SENNA CONCENTRATE AND DOCUSATE SODIUM 2 TABLET: 8.6; 5 TABLET ORAL at 08:58

## 2020-12-18 RX ADMIN — MAGNESIUM SULFATE 2 G: 2 INJECTION INTRAVENOUS at 09:17

## 2020-12-18 RX ADMIN — INSULIN LISPRO 2 UNITS: 100 INJECTION, SOLUTION INTRAVENOUS; SUBCUTANEOUS at 13:30

## 2020-12-18 RX ADMIN — QUETIAPINE FUMARATE 50 MG: 25 TABLET ORAL at 20:21

## 2020-12-18 RX ADMIN — SERTRALINE 100 MG: 50 TABLET, FILM COATED ORAL at 08:59

## 2020-12-18 RX ADMIN — IPRATROPIUM BROMIDE AND ALBUTEROL SULFATE 1 AMPULE: .5; 3 SOLUTION RESPIRATORY (INHALATION) at 11:31

## 2020-12-18 ASSESSMENT — PAIN SCALES - GENERAL
PAINLEVEL_OUTOF10: 0
PAINLEVEL_OUTOF10: 0

## 2020-12-18 NOTE — PROGRESS NOTES
Occupational Therapy    Occupational Therapy Not Seen Note    DATE: 2020  Name: Amara Caicedo  : 1950  MRN: [id number]    Patient not available for Occupational Therapy due to:     Other: Pt with RT     Next Scheduled Treatment: Attempt at later date    Electronically signed by CRUZITO Estrella on 2020 at 11:57 AM

## 2020-12-18 NOTE — PROGRESS NOTES
Speech Language Pathology  [address] [facility]  Speech Language Pathology    Date: 12/18/2020  Patient Name: Vivek Simon  YOB: 1950   AGE: 78 y.o. MRN: [id number]        Patient Not Available for Speech Therapy     Due to:  [] Testing  [] Hemodialysis  [] Cancelled by RN  [] Surgery   [] Intubation/Sedation/Pain Medication  [] Medical instability  [x] Other: Pt. Unable to remain awake and alert to complete therapy at this time    Next scheduled treatment: 12/21  Completed by: Juan Fajardo M.A.  CCC-SLP

## 2020-12-18 NOTE — PROGRESS NOTES
Endovascular Neurosurgery Progress Note    SUBJECTIVE:   No reported events overnight. Pt this am remains dysarthric, and has difficulty making her needs known. Pt has c/o thirst.    Review of Systems:  CONSTITUTIONAL:  negative for fevers, chills, fatigue and malaise    EYES:  negative for double vision, blurred vision and photophobia     HEENT:  negative for tinnitus, epistaxis and sore throat    RESPIRATORY:  negative for cough, shortness of breath, wheezing    CARDIOVASCULAR:  negative for chest pain, palpitations, syncope, edema    GASTROINTESTINAL:  negative for nausea, vomiting    GENITOURINARY:  negative for incontinence    MUSCULOSKELETAL:  negative for neck or back pain    NEUROLOGICAL:  Negative for weakness and tingling  negative for headaches and dizziness    PSYCHIATRIC:  negative for anxiety      Review of systems otherwise negative. OBJECTIVE:     Vitals:    12/17/20 2200   BP: (!) 159/56   Pulse: 62   Resp: 22   Temp:    SpO2: 96%        General:  Gen: obese habitus, NAD  HEENT: NCAT, mucosa moist  Cvs: RRR, S1 S2 normal  Resp: symmetric unlabored breathing  Abd: s/nd/nt  Ext: no edema  Skin: no lesions seen, warm and dry     Neuro:  Gen: awake and alert, oriented x3. Lang/speech: no aphasia. Severe dysarthria. Follows commands. CN: PERRL, R gaze fixed, L hemianopia, V1-3 intact, L face severe droop, hearing intact  Motor: R hemibody  At least 3/5. LUE 0/5. LLE 2/5  Sense: LT intact in all 4 ext. L neglect  Coord: no gross ataxia  DTR: deferred  Gait: deferred     NIH Stroke Scale:   1a  Level of consciousness: 0   1b. LOC questions:  0 - answers both questions correctly   1c. LOC commands: 0 - performs both tasks correctly   2. Best Gaze: 2 - forced deviation, or total gaze paresis not overcome by oculocephalic maneuver   3. Visual: 2 - complete hemianopia   4. Facial Palsy: 2 - partial paralysis (total or near total paralysis of the lower face)   5a.  Motor left arm: 4 - no movement 5b.  Motor right arm: 0 - no drift, limb holds 90 (or 45) degrees for full 10 seconds   6a. Motor left leg: 3   6b  Motor right le - no drift; leg holds 30 degree position for full 5 seconds   7. Limb Ataxia: 0 - absent   8. Sensory: 0 - normal; no sensory loss   9. Best Language:  0 - no aphasia, normal   10. Dysarthria: 2 - severe; patient speech is so slurred as to be unintelligible in the absence of or our of proportion to any dysphagia, or is mute/anarthric    11. Extinction and Inattention: 2 - profound vanessa-inattention or vanessa- inattention to more than one modality. Does not recognize own hand or orients only to one side of space              Total:   17      MRS: 4        LABS:   Reviewed. RADIOLOGY:   Images were personally reviewed including:  Repeat CT head 2020  1. Interval increased extent and interval evolution of large right MCA acute   infarction with associated cerebral edema and partial effacement of the right   lateral ventricle. 2. Interval associated development of minimal leftward midline shift   measuring 2 mm at the level of the septum pellucidum. 3. No evidence of acute intracranial hemorrhage. 4. Moderate cerebral white matter chronic microvascular ischemic disease.           MRI brain 2020  Acute/subacute right MCA distribution infarct with mild associated edema and   subsequent mass effect upon the right lateral ventricle. CTP brain 12/15/2020  1. Moderate volume acute ischemic infarct in the right middle cerebral artery   territory.  No intracranial hemorrhage or mass effect. 2. Moderate volume penumbra in the peripheral right middle cerebral artery   territory based on perfusion images. 3. Acute nearly occlusive thrombosis of the M1 and M2 segments right middle   cerebral artery. 4. Severe stenosis of the M1 segment left middle cerebral artery. 5. Severe stenosis of the A1 segment left anterior cerebral artery.

## 2020-12-18 NOTE — PROGRESS NOTES
: CT head this morning stable, no hemorrhagic conversion or significant cerebral edema. Lopressor 50 mg BID started, home medication. Last 24h:    Overnight, patient had low urine output, will resume home lasix and give 20 mg IVP today. Pavon placed just to ensure adequate urine output throughout the day. Tmax 38.1 last night, UA consistent with infection, culture pending. SLP will re-evaluate need for PEG on Monday.     CURRENT MEDICATIONS:  SCHEDULED MEDICATIONS:   magnesium sulfate  2 g Intravenous Once    sennosides-docusate sodium  2 tablet Oral Daily    insulin lispro  0-12 Units Subcutaneous Q6H    pantoprazole  40 mg Oral QAM AC    metoprolol tartrate  50 mg Oral BID    QUEtiapine  50 mg Oral Nightly    sertraline  100 mg Oral Daily    enoxaparin  30 mg Subcutaneous Daily    rivastigmine  1 patch Transdermal Daily    sodium chloride  1,000 mL Intravenous Once    ipratropium-albuterol  1 ampule Inhalation Q4H WA    sodium chloride flush  10 mL Intravenous 2 times per day    aspirin  81 mg Oral Daily    Or    aspirin  300 mg Rectal Daily    atorvastatin  80 mg Oral Nightly     CONTINUOUS INFUSIONS:   dextrose      sodium chloride 100 mL/hr at 20 0230     PRN MEDICATIONS:   glucose, dextrose, glucagon (rDNA), dextrose, acetaminophen, sodium chloride flush, promethazine **OR** ondansetron    VITALS:  Temperature Range: Temp: 98.5 °F (36.9 °C) Temp  Av.6 °F (37 °C)  Min: 97.3 °F (36.3 °C)  Max: 100.6 °F (38.1 °C)  BP Range: Systolic (09TMZ), UUR:035 , Min:143 , MSJ:294     Diastolic (43WRD), MNP:44, Min:47, Max:112    Pulse Range: Pulse  Av.5  Min: 55  Max: 96  Respiration Range: Resp  Av.4  Min: 17  Max: 28  Current Pulse Ox: SpO2: 97 %  24HR Pulse Ox Range: SpO2  Av %  Min: 93 %  Max: 99 %  Patient Vitals for the past 12 hrs:   BP Temp Temp src Pulse Resp SpO2   20 0700 (!) 194/81   76 22 97 %   20 0606 (!) 173/82   70 20 97 % 12/18/20 0605 (!) 173/82   73 22 96 %   12/18/20 0500 (!) 165/82   71 21 97 %   12/18/20 0400 (!) 165/72 98.5 °F (36.9 °C) Oral 57 20 96 %   12/18/20 0300 (!) 180/78   67 24 96 %   12/18/20 0200 (!) 158/64   55 20 96 %   12/18/20 0100 (!) 151/62   57 19 94 %   12/18/20 0000 (!) 170/70 97.3 °F (36.3 °C) Oral 56 19 93 %   12/17/20 2300 (!) 143/58   58 23 95 %   12/17/20 2200 (!) 159/56   62 22 96 %   12/17/20 2100 (!) 165/67   63 22 94 %   12/17/20 2023     20 94 %   12/17/20 2000 (!) 217/108 100.6 °F (38.1 °C) Oral 92 28 95 %     Estimated body mass index is 39.75 kg/m² as calculated from the following:    Height as of this encounter: 5' 4\" (1.626 m). Weight as of this encounter: 231 lb 9.6 oz (105.1 kg).  []<16 Severe malnutrition  []1616.99 Moderate malnutrition  []1718.49 Mild malnutrition  []18.524.9 Normal  []2529.9 Overweight (not obese)  []3034.9 Obese class 1 (Low Risk)  [x]3539.9 Obese class 2 (Moderate Risk)  []?40 Obese class 3 (High Risk)    RECENT LABS:   Lab Results   Component Value Date    WBC 7.3 12/18/2020    HGB 11.1 (L) 12/18/2020    HCT 36.6 12/18/2020     12/18/2020    CHOL 188 04/05/2019    TRIG 177 (H) 04/05/2019    HDL 48 12/16/2020    ALT 11 04/05/2019    AST 15 04/05/2019     12/18/2020    K 4.3 12/18/2020     12/18/2020    CREATININE 0.42 (L) 12/18/2020    BUN 12 12/18/2020    CO2 22 12/18/2020    TSH 1.38 12/19/2018    INR 1.0 12/15/2020    LABA1C 6.8 (H) 12/16/2020    LABMICR 39 (H) 12/19/2018     24 HOUR INTAKE/OUTPUT:    Intake/Output Summary (Last 24 hours) at 12/18/2020 0747  Last data filed at 12/18/2020 0400  Gross per 24 hour   Intake 2588 ml   Output 250 ml   Net 2338 ml       IMAGING:   XR ABDOMEN FOR NG/OG/NE TUBE PLACEMENT   Final Result   Appropriate radiographic positioning of nasogastric tube. Recommend clinical correlation prior to use.          CT HEAD WO CONTRAST   Final Result 1. Interval increased extent and interval evolution of large right MCA acute   infarction with associated cerebral edema and partial effacement of the right   lateral ventricle. 2. Interval associated development of minimal leftward midline shift   measuring 2 mm at the level of the septum pellucidum. 3. No evidence of acute intracranial hemorrhage. 4. Moderate cerebral white matter chronic microvascular ischemic disease. XR ABDOMEN FOR NG/OG/NE TUBE PLACEMENT   Final Result   Enteric tube in expected position. MRI BRAIN WO CONTRAST   Final Result   Acute/subacute right MCA distribution infarct with mild associated edema and   subsequent mass effect upon the right lateral ventricle. CT BRAIN PERFUSION   Final Result   Addendum 1 of 1   ADDENDUM:   The full set of diagnostic perfusion images were sent for review at 7:15    p.m.   on 12/15/2020 and demonstrate large volume elevated M TT in the visualized   right middle cerebral artery territory with elevated relative cerebral    blood   volume and decreased relative cerebral blood flow. Final      CTA HEAD NECK W CONTRAST   Final Result   Addendum 1 of 1   ADDENDUM:   The full set of diagnostic perfusion images were sent for review at 7:15    p.m.   on 12/15/2020 and demonstrate large volume elevated M TT in the visualized   right middle cerebral artery territory with elevated relative cerebral    blood   volume and decreased relative cerebral blood flow. Final      CT HEAD WO CONTRAST   Final Result   Addendum 1 of 1   ADDENDUM:   The full set of diagnostic perfusion images were sent for review at 7:15    p.m.   on 12/15/2020 and demonstrate large volume elevated M TT in the visualized   right middle cerebral artery territory with elevated relative cerebral    blood   volume and decreased relative cerebral blood flow.          Final      XR CHEST PORTABLE    (Results Pending)         Labs and Images reviewed with: [] Dr. Hugh Galicia. [place]    [x] Dr. Billy Crawford  [] Dr. Clarice Gibbons  [] There are no new interval images to review. PHYSICAL EXAM       CONSTITUTIONAL:  Opens eyes to verbal stimulation, oriented x 3. Follows commands. Dysarthria. No aphasia   HEAD:  normocephalic, atraumatic    EYES:  PERRL, Right gaze deviation   ENT:  moist mucous membranes   NECK:  supple, symmetric   LUNGS:  Equal air entry bilaterally   CARDIOVASCULAR:  normal s1 / s2, RRR, distal pulses intact   ABDOMEN:  Soft, no rigidity   NEUROLOGIC:  Mental Status:  A & O x3,awake             Cranial Nerves:    II: Visual fields:  abnormal - No blink to threat left quadrants  III: Pupils:  equal, round, reactive to light  III,IV,VI: Extra Ocular Movements: abnormal Right gaze  VII: Facial strength: abnormal Left facial droop    Motor Exam:    Drift:  present - Left upper and left lower  Tone:  normal    Motor exam is 4 out of 5 right upper, 3 out of 5 right lower. Spontaneous movement left upper and left lower. Sensory:    Touch:    Right Upper Extremity:  normal  Left Upper Extremity:  abnormal - neglect  Right Lower Extremity:  normal  Left Lower Extremity:  abnormal - neglect       DRAINS:  [x] There are no drains for Neuro Critical Care to monitor at this time. ASSESSMENT AND PLAN:       This is a 78 y.o. female presenting to the ED as a stroke alert after being found down with dense left hemiparesis, dysarthria, and rightward gaze. Acute large vessel occlusion. Cerebral edema. Not candidate for tPA or thrombectomy.  Admit to neuro ICU for medical management and monitoring of edema.     PLAN/MEDICAL DECISION MAKING:        NEUROLOGIC:  - CT demonstrating acute ischemic infarct in R MCA territory; no mass effect or hemorrhage  - loaded with ASA and plavix in ED  - MRI revealed acute right MCA distribution infarct with mild edema   - CT head this morning stable  - Aspirin 81 mg and Lipitor 80 mg QHS for secondary stroke ppx

## 2020-12-18 NOTE — FLOWSHEET NOTE
DATE: 2020    NAME: Justa Barrios  MRN: [id number]   : 1950    Patient not seen this date for Physical Therapy due to:  [] Blood transfusion in progress  [] Hemodialysis  [] Patient Declined  [] Spine Precautions   [] Strict Bedrest  [] Surgery/ Procedure  [] Testing      [x] Other: Pt receiving a breathing treatment with respiratory upon arrival.        [] PT is being discontinued at this time. Patient independent. No further needs. [] PT is being discontinued at this time due to declining physical/ medical status. Therapy is not appropriate at this time.     Rob Da Silva, VIVIAN

## 2020-12-19 LAB
ABSOLUTE EOS #: 0.17 K/UL (ref 0–0.44)
ABSOLUTE IMMATURE GRANULOCYTE: 0.04 K/UL (ref 0–0.3)
ABSOLUTE LYMPH #: 1.58 K/UL (ref 1.1–3.7)
ABSOLUTE MONO #: 0.9 K/UL (ref 0.1–1.2)
ANION GAP SERPL CALCULATED.3IONS-SCNC: 8 MMOL/L (ref 9–17)
BASOPHILS # BLD: 0 % (ref 0–2)
BASOPHILS ABSOLUTE: <0.03 K/UL (ref 0–0.2)
BUN BLDV-MCNC: 14 MG/DL (ref 8–23)
BUN/CREAT BLD: ABNORMAL (ref 9–20)
CALCIUM IONIZED: 1.29 MMOL/L (ref 1.13–1.33)
CALCIUM SERPL-MCNC: 9.1 MG/DL (ref 8.6–10.4)
CHLORIDE BLD-SCNC: 107 MMOL/L (ref 98–107)
CO2: 24 MMOL/L (ref 20–31)
CREAT SERPL-MCNC: 0.49 MG/DL (ref 0.5–0.9)
DIFFERENTIAL TYPE: ABNORMAL
EOSINOPHILS RELATIVE PERCENT: 2 % (ref 1–4)
GFR AFRICAN AMERICAN: >60 ML/MIN
GFR NON-AFRICAN AMERICAN: >60 ML/MIN
GFR SERPL CREATININE-BSD FRML MDRD: ABNORMAL ML/MIN/{1.73_M2}
GFR SERPL CREATININE-BSD FRML MDRD: ABNORMAL ML/MIN/{1.73_M2}
GLUCOSE BLD-MCNC: 190 MG/DL (ref 65–105)
GLUCOSE BLD-MCNC: 195 MG/DL (ref 65–105)
GLUCOSE BLD-MCNC: 201 MG/DL (ref 70–99)
GLUCOSE BLD-MCNC: 202 MG/DL (ref 65–105)
GLUCOSE BLD-MCNC: 205 MG/DL (ref 65–105)
GLUCOSE BLD-MCNC: 213 MG/DL (ref 65–105)
HCT VFR BLD CALC: 35.6 % (ref 36.3–47.1)
HEMOGLOBIN: 11 G/DL (ref 11.9–15.1)
IMMATURE GRANULOCYTES: 1 %
LYMPHOCYTES # BLD: 20 % (ref 24–43)
MAGNESIUM: 1.7 MG/DL (ref 1.6–2.6)
MCH RBC QN AUTO: 27.5 PG (ref 25.2–33.5)
MCHC RBC AUTO-ENTMCNC: 30.9 G/DL (ref 28.4–34.8)
MCV RBC AUTO: 89 FL (ref 82.6–102.9)
MONOCYTES # BLD: 11 % (ref 3–12)
NRBC AUTOMATED: 0 PER 100 WBC
PDW BLD-RTO: 13.6 % (ref 11.8–14.4)
PLATELET # BLD: 223 K/UL (ref 138–453)
PLATELET ESTIMATE: ABNORMAL
PMV BLD AUTO: 10.4 FL (ref 8.1–13.5)
POTASSIUM SERPL-SCNC: 4.3 MMOL/L (ref 3.7–5.3)
RBC # BLD: 4 M/UL (ref 3.95–5.11)
RBC # BLD: ABNORMAL 10*6/UL
SEG NEUTROPHILS: 66 % (ref 36–65)
SEGMENTED NEUTROPHILS ABSOLUTE COUNT: 5.23 K/UL (ref 1.5–8.1)
SODIUM BLD-SCNC: 139 MMOL/L (ref 135–144)
TOTAL CK: 325 U/L (ref 26–192)
WBC # BLD: 7.9 K/UL (ref 3.5–11.3)
WBC # BLD: ABNORMAL 10*3/UL

## 2020-12-19 PROCEDURE — 83735 ASSAY OF MAGNESIUM: CPT

## 2020-12-19 PROCEDURE — 80048 BASIC METABOLIC PNL TOTAL CA: CPT

## 2020-12-19 PROCEDURE — 6370000000 HC RX 637 (ALT 250 FOR IP): Performed by: STUDENT IN AN ORGANIZED HEALTH CARE EDUCATION/TRAINING PROGRAM

## 2020-12-19 PROCEDURE — 6360000002 HC RX W HCPCS: Performed by: NURSE PRACTITIONER

## 2020-12-19 PROCEDURE — 2500000003 HC RX 250 WO HCPCS: Performed by: STUDENT IN AN ORGANIZED HEALTH CARE EDUCATION/TRAINING PROGRAM

## 2020-12-19 PROCEDURE — 2580000003 HC RX 258: Performed by: STUDENT IN AN ORGANIZED HEALTH CARE EDUCATION/TRAINING PROGRAM

## 2020-12-19 PROCEDURE — 2580000003 HC RX 258: Performed by: NURSE PRACTITIONER

## 2020-12-19 PROCEDURE — 6370000000 HC RX 637 (ALT 250 FOR IP): Performed by: PSYCHIATRY & NEUROLOGY

## 2020-12-19 PROCEDURE — 94640 AIRWAY INHALATION TREATMENT: CPT

## 2020-12-19 PROCEDURE — 6370000000 HC RX 637 (ALT 250 FOR IP): Performed by: NURSE PRACTITIONER

## 2020-12-19 PROCEDURE — 6360000002 HC RX W HCPCS: Performed by: STUDENT IN AN ORGANIZED HEALTH CARE EDUCATION/TRAINING PROGRAM

## 2020-12-19 PROCEDURE — 2000000003 HC NEURO ICU R&B

## 2020-12-19 PROCEDURE — 82947 ASSAY GLUCOSE BLOOD QUANT: CPT

## 2020-12-19 PROCEDURE — 99233 SBSQ HOSP IP/OBS HIGH 50: CPT | Performed by: PSYCHIATRY & NEUROLOGY

## 2020-12-19 PROCEDURE — 82330 ASSAY OF CALCIUM: CPT

## 2020-12-19 PROCEDURE — 82550 ASSAY OF CK (CPK): CPT

## 2020-12-19 PROCEDURE — 36415 COLL VENOUS BLD VENIPUNCTURE: CPT

## 2020-12-19 PROCEDURE — 85025 COMPLETE CBC W/AUTO DIFF WBC: CPT

## 2020-12-19 PROCEDURE — 31720 CLEARANCE OF AIRWAYS: CPT

## 2020-12-19 PROCEDURE — 94761 N-INVAS EAR/PLS OXIMETRY MLT: CPT

## 2020-12-19 RX ORDER — INSULIN GLARGINE 100 [IU]/ML
6 INJECTION, SOLUTION SUBCUTANEOUS NIGHTLY
Status: DISCONTINUED | OUTPATIENT
Start: 2020-12-19 | End: 2020-12-21

## 2020-12-19 RX ORDER — AMLODIPINE BESYLATE 5 MG/1
5 TABLET ORAL DAILY
Status: DISCONTINUED | OUTPATIENT
Start: 2020-12-19 | End: 2020-12-25

## 2020-12-19 RX ORDER — LABETALOL HYDROCHLORIDE 5 MG/ML
10 INJECTION, SOLUTION INTRAVENOUS
Status: DISCONTINUED | OUTPATIENT
Start: 2020-12-19 | End: 2020-12-30 | Stop reason: HOSPADM

## 2020-12-19 RX ORDER — LABETALOL HYDROCHLORIDE 5 MG/ML
10 INJECTION, SOLUTION INTRAVENOUS ONCE
Status: COMPLETED | OUTPATIENT
Start: 2020-12-19 | End: 2020-12-19

## 2020-12-19 RX ADMIN — METOPROLOL TARTRATE 75 MG: 25 TABLET ORAL at 09:10

## 2020-12-19 RX ADMIN — ENOXAPARIN SODIUM 30 MG: 100 INJECTION SUBCUTANEOUS at 09:10

## 2020-12-19 RX ADMIN — INSULIN GLARGINE 6 UNITS: 100 INJECTION, SOLUTION SUBCUTANEOUS at 19:56

## 2020-12-19 RX ADMIN — SERTRALINE 100 MG: 50 TABLET, FILM COATED ORAL at 09:10

## 2020-12-19 RX ADMIN — INSULIN LISPRO 2 UNITS: 100 INJECTION, SOLUTION INTRAVENOUS; SUBCUTANEOUS at 01:50

## 2020-12-19 RX ADMIN — ACETAMINOPHEN 650 MG: 325 TABLET ORAL at 00:37

## 2020-12-19 RX ADMIN — QUETIAPINE FUMARATE 50 MG: 25 TABLET ORAL at 19:50

## 2020-12-19 RX ADMIN — METOPROLOL TARTRATE 75 MG: 25 TABLET ORAL at 19:50

## 2020-12-19 RX ADMIN — IPRATROPIUM BROMIDE AND ALBUTEROL SULFATE 1 AMPULE: .5; 3 SOLUTION RESPIRATORY (INHALATION) at 11:09

## 2020-12-19 RX ADMIN — SODIUM CHLORIDE, PRESERVATIVE FREE 10 ML: 5 INJECTION INTRAVENOUS at 19:51

## 2020-12-19 RX ADMIN — CEFTRIAXONE SODIUM 1 G: 1 INJECTION, POWDER, FOR SOLUTION INTRAMUSCULAR; INTRAVENOUS at 16:13

## 2020-12-19 RX ADMIN — AMLODIPINE BESYLATE 5 MG: 5 TABLET ORAL at 09:10

## 2020-12-19 RX ADMIN — IPRATROPIUM BROMIDE AND ALBUTEROL SULFATE 1 AMPULE: .5; 3 SOLUTION RESPIRATORY (INHALATION) at 07:27

## 2020-12-19 RX ADMIN — Medication 10 MG: at 00:16

## 2020-12-19 RX ADMIN — SODIUM CHLORIDE, PRESERVATIVE FREE 10 ML: 5 INJECTION INTRAVENOUS at 09:12

## 2020-12-19 RX ADMIN — Medication 10 MG: at 13:57

## 2020-12-19 RX ADMIN — IPRATROPIUM BROMIDE AND ALBUTEROL SULFATE 1 AMPULE: .5; 3 SOLUTION RESPIRATORY (INHALATION) at 20:21

## 2020-12-19 RX ADMIN — Medication 81 MG: at 09:10

## 2020-12-19 RX ADMIN — INSULIN LISPRO 4 UNITS: 100 INJECTION, SOLUTION INTRAVENOUS; SUBCUTANEOUS at 06:14

## 2020-12-19 RX ADMIN — Medication 10 MG: at 11:39

## 2020-12-19 RX ADMIN — Medication 10 MG: at 18:28

## 2020-12-19 RX ADMIN — FUROSEMIDE 40 MG: 40 TABLET ORAL at 09:10

## 2020-12-19 RX ADMIN — IPRATROPIUM BROMIDE AND ALBUTEROL SULFATE 1 AMPULE: .5; 3 SOLUTION RESPIRATORY (INHALATION) at 15:41

## 2020-12-19 RX ADMIN — STANDARDIZED SENNA CONCENTRATE AND DOCUSATE SODIUM 2 TABLET: 8.6; 5 TABLET ORAL at 09:10

## 2020-12-19 RX ADMIN — INSULIN LISPRO 4 UNITS: 100 INJECTION, SOLUTION INTRAVENOUS; SUBCUTANEOUS at 19:56

## 2020-12-19 RX ADMIN — ATORVASTATIN CALCIUM 80 MG: 80 TABLET, FILM COATED ORAL at 19:50

## 2020-12-19 RX ADMIN — PANTOPRAZOLE SODIUM 40 MG: 40 TABLET, DELAYED RELEASE ORAL at 06:14

## 2020-12-19 ASSESSMENT — PAIN SCALES - GENERAL
PAINLEVEL_OUTOF10: 0

## 2020-12-19 NOTE — PROGRESS NOTES
Daily Progress Note  Neuro Critical Care    Patient Name: Shelby Child  Patient : 1950  Room/Bed: 0526/0526-01  Code Status: Full Code  Allergies: Allergies   Allergen Reactions    Bactrim     Penicillins     Tylenol [Acetaminophen] Other (See Comments)     constipation       CHIEF COMPLAINT:        AMS, left sided weakness, found down     INTERVAL HISTORY    Initial Presentation (Admitted 12/15/20): The patient is a 76 y. o. female with a past medical history that includes diabetes, CAD, hyperlipidemia, CHF who presented to the emergency department for altered mental status as a stroke alert.  Family last spoke to patient at 8 AM yesterday morning.  Patient was found down at her home, incontinent, unresponsive.  Brought in via EMS. [address] arrival evaluated by stroke team, and VLP 68.  Left hemiparesis and right-sided gaze preference.      On arrival to the emergency department patient's blood pressure was 210/115, heart rate 97 afebrile 98. 6.  Stroke panel significant for glucose 138, sodium 139, hemoglobin 13.3, platelets 608, total CK 1609, myoglobin 1554, INR 1.0, PTT 22.5, high-sensitivity troponin XV.  COVID-19 negative.  Patient given 1 L fluid bolus along with aspirin 81 mg.     Patient's case was discussed in detail with endovascular neurosurgery team and stroke team along with patient's family. Alberta Le was made that because patient was greater than 24 hours no longer thrombectomy candidate and CT perfusion scan showing large volume infarcted tissue with little to 0 penumbra.  Patient's family agreed to patient being admitted to neuro critical care for close neurologic monitoring and possible hypertonic and hyperosmolar treatment of her cerebral edema.       Hospital Course:  : MRI brain revealed a large territory R MCA infarct. Heparin infusion discontinued. Failed bedside swallow, NG tube placed. : CT head this morning stable, no hemorrhagic conversion or significant cerebral edema. Lopressor 50 mg BID started, home medication. : Low urine output despite fluids. Resumed home Lasix with improvement of urine output. Pavon placed to monitor. One-time fever overnight. UA consistent with infection, culture pending. Started on abx.        Last 24h:   No acute events overnight. Afebrile. Urine output improved. 670 cc out overnight. Tolerating tube feeds.     CURRENT MEDICATIONS:  SCHEDULED MEDICATIONS:   metoprolol tartrate  75 mg Oral BID    amLODIPine  5 mg Per NG tube Daily    insulin glargine  6 Units Subcutaneous Nightly    sennosides-docusate sodium  2 tablet Oral Daily    furosemide  40 mg Oral Daily    cefTRIAXone (ROCEPHIN) IV  1 g Intravenous Q24H    insulin lispro  0-12 Units Subcutaneous Q6H    pantoprazole  40 mg Oral QAM AC    QUEtiapine  50 mg Oral Nightly    sertraline  100 mg Oral Daily    enoxaparin  30 mg Subcutaneous Daily    rivastigmine  1 patch Transdermal Daily    sodium chloride  1,000 mL Intravenous Once    ipratropium-albuterol  1 ampule Inhalation Q4H WA    sodium chloride flush  10 mL Intravenous 2 times per day    aspirin  81 mg Oral Daily    Or    aspirin  300 mg Rectal Daily    atorvastatin  80 mg Oral Nightly     CONTINUOUS INFUSIONS:   dextrose      sodium chloride 100 mL/hr at 20 0230     PRN MEDICATIONS:   glucose, dextrose, glucagon (rDNA), dextrose, acetaminophen, sodium chloride flush, promethazine **OR** ondansetron    VITALS:  Temperature Range: Temp: 98.4 °F (36.9 °C) Temp  Av.3 °F (36.8 °C)  Min: 98.2 °F (36.8 °C)  Max: 98.4 °F (36.9 °C)  BP Range: Systolic (06LRK), QKP:717 , Min:109 , LQP:312     Diastolic (69VTF), PGW:11, Min:51, Max:98    Pulse Range: Pulse  Av.6  Min: 60  Max: 90  Respiration Range: Resp  Av.7  Min: 18  Max: 31  Current Pulse Ox: SpO2: 96 % 24HR Pulse Ox Range: SpO2  Av.4 %  Min: 91 %  Max: 97 %  Patient Vitals for the past 12 hrs:   BP Temp Temp src Pulse Resp SpO2   20 0700 (!) 204/69   73 23    20 0605 (!) 177/69   83 25    20 0500 (!) 172/77   72 19    20 0400 (!) 154/61 98.4 °F (36.9 °C) Oral 71 19 96 %   20 0300 (!) 153/65   70 22    20 0200 (!) 151/61   68 23    20 0100 (!) 174/65   67 20    20 0040 (!) 194/77   72 23    20 0005 (!) 190/64 98.2 °F (36.8 °C) Oral 71 23 95 %   20 2320 (!) 176/66   71 21    20 2305 (!) 220/63   78 23    20 2210 (!) 156/51   60 18    20 2100 (!) 109/54   70 24    20 2018     22 95 %   20 2000 (!) 170/98 98.2 °F (36.8 °C) Oral 87 (!) 31 95 %     Estimated body mass index is 39.75 kg/m² as calculated from the following:    Height as of this encounter: 5' 4\" (1.626 m).     Weight as of this encounter: 231 lb 9.6 oz (105.1 kg).  []<16 Severe malnutrition  []1616.99 Moderate malnutrition  []1718.49 Mild malnutrition  []18.524.9 Normal  []2529.9 Overweight (not obese)  []3034.9 Obese class 1 (Low Risk)  [x]3539.9 Obese class 2 (Moderate Risk)  []?40 Obese class 3 (High Risk)    RECENT LABS:   Lab Results   Component Value Date    WBC 7.9 2020    HGB 11.0 (L) 2020    HCT 35.6 (L) 2020     2020    CHOL 188 2019    TRIG 177 (H) 2019    HDL 48 2020    ALT 11 2019    AST 15 2019     2020    K 4.3 2020     2020    CREATININE 0.49 (L) 2020    BUN 14 2020    CO2 24 2020    TSH 1.38 2018    INR 1.0 12/15/2020    LABA1C 6.8 (H) 2020    LABMICR 39 (H) 2018     24 HOUR INTAKE/OUTPUT:    Intake/Output Summary (Last 24 hours) at 2020 0721  Last data filed at 2020 0720  Gross per 24 hour   Intake 1955.87 ml   Output 1845 ml   Net 110.87 ml       IMAGING: XR CHEST PORTABLE   Final Result   Borderline cardiomegaly. No convincing evidence for acute cardiopulmonary   pathology. XR ABDOMEN FOR NG/OG/NE TUBE PLACEMENT   Final Result   Appropriate radiographic positioning of nasogastric tube. Recommend clinical correlation prior to use. CT HEAD WO CONTRAST   Final Result   1. Interval increased extent and interval evolution of large right MCA acute   infarction with associated cerebral edema and partial effacement of the right   lateral ventricle. 2. Interval associated development of minimal leftward midline shift   measuring 2 mm at the level of the septum pellucidum. 3. No evidence of acute intracranial hemorrhage. 4. Moderate cerebral white matter chronic microvascular ischemic disease. XR ABDOMEN FOR NG/OG/NE TUBE PLACEMENT   Final Result   Enteric tube in expected position. MRI BRAIN WO CONTRAST   Final Result   Acute/subacute right MCA distribution infarct with mild associated edema and   subsequent mass effect upon the right lateral ventricle. CT BRAIN PERFUSION   Final Result   Addendum 1 of 1   ADDENDUM:   The full set of diagnostic perfusion images were sent for review at 7:15    p.m.   on 12/15/2020 and demonstrate large volume elevated M TT in the visualized   right middle cerebral artery territory with elevated relative cerebral    blood   volume and decreased relative cerebral blood flow. Final      CTA HEAD NECK W CONTRAST   Final Result   Addendum 1 of 1   ADDENDUM:   The full set of diagnostic perfusion images were sent for review at 7:15    p.m.   on 12/15/2020 and demonstrate large volume elevated M TT in the visualized   right middle cerebral artery territory with elevated relative cerebral    blood   volume and decreased relative cerebral blood flow.          Final      CT HEAD WO CONTRAST   Final Result   Addendum 1 of 1   ADDENDUM: The full set of diagnostic perfusion images were sent for review at 7:15    p.m.   on 12/15/2020 and demonstrate large volume elevated M TT in the visualized   right middle cerebral artery territory with elevated relative cerebral    blood   volume and decreased relative cerebral blood flow. Final        Labs and Images reviewed with:  [] Dr. Joseph Guevara    [x] Dr. Tana Favre  [] Dr. Maharaj Score  [] There are no new interval images to review. PHYSICAL EXAM       CONSTITUTIONAL:  Eyes closed but will open to verbal stimulation, alert and oriented x 3, in no acute distress. Nontoxic. Dysarthric. No aphasia. HEAD:  normocephalic, atraumatic    EYES:  PERRLA, EOMI.   ENT:  moist mucous membranes   NECK:  supple, symmetric   LUNGS:  Equal air entry bilaterally   CARDIOVASCULAR:  normal s1 / s2, RRR, distal pulses intact   ABDOMEN:  Soft, no rigidity   NEUROLOGIC:  Mental Status:  A & O x3, awake             Cranial Nerves:    II: Visual fields:  abnormal - No blink to threat left quadrants  III: Pupils:  equal, round, reactive to light  III,IV,VI: Extra Ocular Movements: abnormal Right gaze  VII: Facial strength: abnormal Left facial droop     Motor Exam:    Drift:  present - Left upper and left lower  Tone:  normal     Motor exam is 4 out of 5 right upper, 3 out of 5 right lower. 0/5 strength LUE.       Sensory:    Touch:    Right Upper Extremity:  normal  Left Upper Extremity:  abnormal - neglect  Right Lower Extremity:  normal  Left Lower Extremity:  abnormal - neglect          DRAINS/LINES:    Pavon catheter (placed 12/19/20)    ASSESSMENT AND PLAN:       This is X 20 y. o. female presenting to the ED as a stroke alert after being found down with dense left hemiparesis, dysarthria, and rightward gaze. Acute large vessel occlusion. Cerebral edema. Not candidate for tPA or thrombectomy. Admit to neuro ICU for medical management and monitoring of edema.     NEUROLOGIC: - CT demonstrating acute ischemic infarct in R MCA territory; no mass effect or hemorrhage  - loaded with ASA and plavix in ED  - MRI revealed acute right MCA distribution infarct with mild edema   - CT head this morning stable  - Aspirin 81 mg and Lipitor 80 mg QHS for secondary stroke ppx  - Intracranial atherosclerosis  - plan to start plavix tomorrow 12/20  - Repeat CT if change in mentation  - Goal SBP < 180  - Neuro checks per protocol     CARDIOVASCULAR:  - SBP < 180  - Hx of CHF and CAD  - frequent PVCs on monitor  - Magnesium 2g IV, Mag 1.7  - Resume half home Lopressor dose, 50 mg BID  - trop 15 > 21-22  - Mixed hyperlipidemia, Cholesterol 220, ; Lipitor 80 mg QHS  - Echocardiogram: EF 50%, moderate to severe LVH, mild diastolic dysfunction, negative bubble  - start on Norvasc 5mg QD  - Continue telemetry     PULMONARY:  - CXR normal  - goal SpO 2 > 90  - hx of smoking  - Duoneb q4h while awake  - Resume home Lasix  - suctioning as needed for secretions; if not tolerating secretions or aspiration may need consult for trach     RENAL/FLUID/ELECTROLYTE:  - BUN 14/ Creatinine 0.49  - Good urine output overnight; 1700 out in past 24h  - IVF: NS maintenance for total fluids of 75cc/h  - Replace electrolytes PRN  - Daily BMP    GI/NUTRITION:  NUTRITION:  DIET TUBE FEED CONTINUOUS/CYCLIC NPO; Diabetic; Nasogastric; Continuous; 20; 55  - tube feeds at goal  - repeat swallow study with speech on Monday 12/21; if failed will need consult for PEG  - Bowel regimen: senokot PRN  - GI prophylaxis: Pepcid BID    ID:  - Afebrile; Tmax 37.2  - covid negative   - WBC 7.9  - UA consistent with UTI, start Rocephin - patient states she has never had reaction to PCN  - CXR unremarkable  - Continue to monitor for fevers  - Daily CBC     HEME:   - H&H 11.1/36.6  - Platelets 227  - Daily CBC     ENDOCRINE:  - Continue to monitor blood glucose, goal <180  - hx of T2DM  - Medium dose ISS  - A1C 6.8

## 2020-12-19 NOTE — PROGRESS NOTES
Endovascular Neurosurgery Progress Note    SUBJECTIVE:   No reported events overnight. Pt this am remains dysarthric, and has difficulty making her needs known. Review of Systems:  CONSTITUTIONAL:  negative for fevers, chills, fatigue and malaise    EYES:  negative for double vision, blurred vision and photophobia     HEENT:  negative for tinnitus, epistaxis and sore throat    RESPIRATORY:  negative for cough, shortness of breath, wheezing    CARDIOVASCULAR:  negative for chest pain, palpitations, syncope, edema    GASTROINTESTINAL:  negative for nausea, vomiting    GENITOURINARY:  negative for incontinence    MUSCULOSKELETAL:  negative for neck or back pain    NEUROLOGICAL:  Negative for weakness and tingling  negative for headaches and dizziness    PSYCHIATRIC:  negative for anxiety      Review of systems otherwise negative. OBJECTIVE:     Vitals:    12/18/20 2018   BP:    Pulse:    Resp: 22   Temp:    SpO2: 95%        General:  Gen: obese habitus, NAD  HEENT: NCAT, mucosa moist  Cvs: RRR, S1 S2 normal  Resp: symmetric unlabored breathing  Abd: s/nd/nt  Ext: no edema  Skin: no lesions seen, warm and dry     Neuro:  Gen: awake and alert, oriented x3. Lang/speech: no aphasia. Severe dysarthria. Follows commands. CN: PERRL, R gaze fixed, L hemianopia, V1-3 intact, L face severe droop, hearing intact  Motor: R hemibody  At least 3/5. LUE 0/5. LLE 2/5  Sense: LT intact in all 4 ext. L neglect  Coord: no gross ataxia  DTR: deferred  Gait: deferred     NIH Stroke Scale:   1a  Level of consciousness: 0   1b. LOC questions:  0 - answers both questions correctly   1c. LOC commands: 0 - performs both tasks correctly   2. Best Gaze: 2 - forced deviation, or total gaze paresis not overcome by oculocephalic maneuver   3. Visual: 2 - complete hemianopia   4. Facial Palsy: 2 - partial paralysis (total or near total paralysis of the lower face)   5a.  Motor left arm: 4 - no movement 5b.  Motor right arm: 0 - no drift, limb holds 90 (or 45) degrees for full 10 seconds   6a. Motor left leg: 3   6b  Motor right le - no drift; leg holds 30 degree position for full 5 seconds   7. Limb Ataxia: 0 - absent   8. Sensory: 0 - normal; no sensory loss   9. Best Language:  0 - no aphasia, normal   10. Dysarthria: 2 - severe; patient speech is so slurred as to be unintelligible in the absence of or our of proportion to any dysphagia, or is mute/anarthric    11. Extinction and Inattention: 2 - profound vanessa-inattention or vanessa- inattention to more than one modality. Does not recognize own hand or orients only to one side of space              Total:   17      MRS: 4        LABS:   Reviewed. RADIOLOGY:   Images were personally reviewed including:  Repeat CT head 2020  1. Interval increased extent and interval evolution of large right MCA acute   infarction with associated cerebral edema and partial effacement of the right   lateral ventricle. 2. Interval associated development of minimal leftward midline shift   measuring 2 mm at the level of the septum pellucidum. 3. No evidence of acute intracranial hemorrhage. 4. Moderate cerebral white matter chronic microvascular ischemic disease.           MRI brain 2020  Acute/subacute right MCA distribution infarct with mild associated edema and   subsequent mass effect upon the right lateral ventricle. CTP brain 12/15/2020  1. Moderate volume acute ischemic infarct in the right middle cerebral artery   territory.  No intracranial hemorrhage or mass effect. 2. Moderate volume penumbra in the peripheral right middle cerebral artery   territory based on perfusion images. 3. Acute nearly occlusive thrombosis of the M1 and M2 segments right middle   cerebral artery. 4. Severe stenosis of the M1 segment left middle cerebral artery. 5. Severe stenosis of the A1 segment left anterior cerebral artery. 6. 75% stenosis of the proximal left internal carotid secondary to vessel   kinking and superimposed atherosclerotic plaque. 7. 40% stenosis of the proximal right internal carotid artery. 8. Moderate stenosis of the V4 segment left vertebral artery.      CT/A head and neck 12/15/2020  1. Moderate volume acute ischemic infarct in the right middle cerebral artery   territory.  No intracranial hemorrhage or mass effect. 2. Moderate volume penumbra in the peripheral right middle cerebral artery   territory based on perfusion images. 3. Acute nearly occlusive thrombosis of the M1 and M2 segments right middle   cerebral artery. 4. Severe stenosis of the M1 segment left middle cerebral artery. 5. Severe stenosis of the A1 segment left anterior cerebral artery. 6. 75% stenosis of the proximal left internal carotid secondary to vessel   kinking and superimposed atherosclerotic plaque. 7. 40% stenosis of the proximal right internal carotid artery. 8. Moderate stenosis of the V4 segment left vertebral artery. ASSESSMENT:   67yo female with pmh of dmii, htn, komal, cad, hld, chf. Pt presents with acute R MCA syndrome, imaging showed R M1 occlusion with completed stroke, as well as diffuse athero. Stroke etio large vessel vs cardioembolic. MRI evolving R MCA stroke with edema 12/16/2020. CT head repeat 12/17/2020 stable. PLAN:   --continue asa 81, lipitor 80  --rec add plavix 75 when stable to do so  --lipitor 80  --sbp <140  --additional care and workup as per neuro     Case discussed with Dr. Jaylan Lambert attending.     Jarrod Fortune MD, PhD    Stroke, [facility]  [facility]  Electronically signed 12/18/2020 at 8:58 PM

## 2020-12-19 NOTE — PROGRESS NOTES
ENDOVASCULAR NEUROSURGERY PROGRESS NOTE  12/19/2020 9:58 AM  Subjective:   Admit Date: 12/15/2020  PCP: Jyoti Perez MD    No new acute events. Objective:   Vitals: BP (!) 167/68   Pulse 70   Temp 98.4 °F (36.9 °C) (Oral)   Resp 20   Ht 5' 4\" (1.626 m)   Wt 231 lb 9.6 oz (105.1 kg)   LMP  (LMP Unknown)   SpO2 96%   BMI 39.75 kg/m²   General appearance: Lying in bed, NAD, NG feeding. HEENT: Atraumatic. Neck: Neck is supple. Lungs: No respiratory distress noted. Has NG tube. Heart: normal sinus rhythm on tele. .   Abdomen: Soft nontender. Extremities: No lower limb edema noted. Neurologic:  following simple commands intermittently, she is sleepy, able to name simple objects  CN: Has intact extraocular muscles movements, has left-sided facial droop noted. MOTOR: Flaccid on the left body side and upper extremity, minimally withdrawing left lower extremity. Moving right upper and right lower extremities against gravity. SENSORY: Grossly intact sensation both upper and lower extremities.     MRS: 4     Medications and labs:   Scheduled Meds:   metoprolol tartrate  75 mg Oral BID    amLODIPine  5 mg Per NG tube Daily    insulin glargine  6 Units Subcutaneous Nightly    sennosides-docusate sodium  2 tablet Oral Daily    furosemide  40 mg Oral Daily    cefTRIAXone (ROCEPHIN) IV  1 g Intravenous Q24H    insulin lispro  0-12 Units Subcutaneous Q6H    pantoprazole  40 mg Oral QAM AC    QUEtiapine  50 mg Oral Nightly    sertraline  100 mg Oral Daily    enoxaparin  30 mg Subcutaneous Daily    rivastigmine  1 patch Transdermal Daily    sodium chloride  1,000 mL Intravenous Once    ipratropium-albuterol  1 ampule Inhalation Q4H WA    sodium chloride flush  10 mL Intravenous 2 times per day    aspirin  81 mg Oral Daily    Or    aspirin  300 mg Rectal Daily    atorvastatin  80 mg Oral Nightly     Continuous Infusions:   dextrose      sodium chloride 100 mL/hr at 12/18/20 0230     CBC: Recent Labs     12/17/20  0451 12/18/20  0509 12/19/20  0427   WBC 6.7 7.3 7.9   HGB 10.4* 11.1* 11.0*    227 223     BMP:    Recent Labs     12/17/20  0451 12/18/20  0509 12/19/20  0427    139 139   K 3.3* 4.3 4.3   * 107 107   CO2 21 22 24   BUN 11 12 14   CREATININE 0.51 0.42* 0.49*   GLUCOSE 129* 165* 201*     Hepatic: No results for input(s): AST, ALT, ALB, BILITOT, ALKPHOS in the last 72 hours. Troponin: No results for input(s): TROPONINI in the last 72 hours. BNP: No results for input(s): BNP in the last 72 hours. Lipids: No results for input(s): CHOL, HDL in the last 72 hours. Invalid input(s): LDLCALCU  INR: No results for input(s): INR in the last 72 hours. Images were personally reviewed including:  Repeat CT head 12/17/2020  1. Interval increased extent and interval evolution of large right MCA acute   infarction with associated cerebral edema and partial effacement of the right   lateral ventricle. 2. Interval associated development of minimal leftward midline shift   measuring 2 mm at the level of the septum pellucidum. 3. No evidence of acute intracranial hemorrhage. 4. Moderate cerebral white matter chronic microvascular ischemic disease.             MRI brain 12/16/2020  Acute/subacute right MCA distribution infarct with mild associated edema and   subsequent mass effect upon the right lateral ventricle.      CTP brain 12/15/2020  1. Moderate volume acute ischemic infarct in the right middle cerebral artery   territory.  No intracranial hemorrhage or mass effect. 2. Moderate volume penumbra in the peripheral right middle cerebral artery   territory based on perfusion images. 3. Acute nearly occlusive thrombosis of the M1 and M2 segments right middle   cerebral artery. 4. Severe stenosis of the M1 segment left middle cerebral artery. 5. Severe stenosis of the A1 segment left anterior cerebral artery.

## 2020-12-20 LAB
ABSOLUTE EOS #: 0.22 K/UL (ref 0–0.44)
ABSOLUTE IMMATURE GRANULOCYTE: 0.04 K/UL (ref 0–0.3)
ABSOLUTE LYMPH #: 1.78 K/UL (ref 1.1–3.7)
ABSOLUTE MONO #: 0.83 K/UL (ref 0.1–1.2)
ANION GAP SERPL CALCULATED.3IONS-SCNC: 9 MMOL/L (ref 9–17)
BASOPHILS # BLD: 0 % (ref 0–2)
BASOPHILS ABSOLUTE: <0.03 K/UL (ref 0–0.2)
BUN BLDV-MCNC: 19 MG/DL (ref 8–23)
BUN/CREAT BLD: ABNORMAL (ref 9–20)
CALCIUM IONIZED: 1.21 MMOL/L (ref 1.13–1.33)
CALCIUM SERPL-MCNC: 9.3 MG/DL (ref 8.6–10.4)
CHLORIDE BLD-SCNC: 103 MMOL/L (ref 98–107)
CO2: 29 MMOL/L (ref 20–31)
CREAT SERPL-MCNC: 0.52 MG/DL (ref 0.5–0.9)
CULTURE: ABNORMAL
DIFFERENTIAL TYPE: ABNORMAL
EOSINOPHILS RELATIVE PERCENT: 3 % (ref 1–4)
GFR AFRICAN AMERICAN: >60 ML/MIN
GFR NON-AFRICAN AMERICAN: >60 ML/MIN
GFR SERPL CREATININE-BSD FRML MDRD: ABNORMAL ML/MIN/{1.73_M2}
GFR SERPL CREATININE-BSD FRML MDRD: ABNORMAL ML/MIN/{1.73_M2}
GLUCOSE BLD-MCNC: 170 MG/DL (ref 65–105)
GLUCOSE BLD-MCNC: 184 MG/DL (ref 65–105)
GLUCOSE BLD-MCNC: 201 MG/DL (ref 70–99)
GLUCOSE BLD-MCNC: 204 MG/DL (ref 65–105)
GLUCOSE BLD-MCNC: 212 MG/DL (ref 65–105)
GLUCOSE BLD-MCNC: 226 MG/DL (ref 65–105)
HCT VFR BLD CALC: 35.6 % (ref 36.3–47.1)
HEMOGLOBIN: 11.1 G/DL (ref 11.9–15.1)
IMMATURE GRANULOCYTES: 1 %
LYMPHOCYTES # BLD: 26 % (ref 24–43)
Lab: ABNORMAL
MAGNESIUM: 1.5 MG/DL (ref 1.6–2.6)
MCH RBC QN AUTO: 28.2 PG (ref 25.2–33.5)
MCHC RBC AUTO-ENTMCNC: 31.2 G/DL (ref 28.4–34.8)
MCV RBC AUTO: 90.4 FL (ref 82.6–102.9)
MONOCYTES # BLD: 12 % (ref 3–12)
NRBC AUTOMATED: 0 PER 100 WBC
PDW BLD-RTO: 13.6 % (ref 11.8–14.4)
PLATELET # BLD: 215 K/UL (ref 138–453)
PLATELET ESTIMATE: ABNORMAL
PMV BLD AUTO: 10.5 FL (ref 8.1–13.5)
POTASSIUM SERPL-SCNC: 4.5 MMOL/L (ref 3.7–5.3)
RBC # BLD: 3.94 M/UL (ref 3.95–5.11)
RBC # BLD: ABNORMAL 10*6/UL
SEG NEUTROPHILS: 58 % (ref 36–65)
SEGMENTED NEUTROPHILS ABSOLUTE COUNT: 3.89 K/UL (ref 1.5–8.1)
SODIUM BLD-SCNC: 141 MMOL/L (ref 135–144)
SPECIMEN DESCRIPTION: ABNORMAL
WBC # BLD: 6.8 K/UL (ref 3.5–11.3)
WBC # BLD: ABNORMAL 10*3/UL

## 2020-12-20 PROCEDURE — 97530 THERAPEUTIC ACTIVITIES: CPT

## 2020-12-20 PROCEDURE — 51798 US URINE CAPACITY MEASURE: CPT

## 2020-12-20 PROCEDURE — 2000000003 HC NEURO ICU R&B

## 2020-12-20 PROCEDURE — 94640 AIRWAY INHALATION TREATMENT: CPT

## 2020-12-20 PROCEDURE — 6360000002 HC RX W HCPCS: Performed by: STUDENT IN AN ORGANIZED HEALTH CARE EDUCATION/TRAINING PROGRAM

## 2020-12-20 PROCEDURE — 2580000003 HC RX 258: Performed by: NURSE PRACTITIONER

## 2020-12-20 PROCEDURE — 6370000000 HC RX 637 (ALT 250 FOR IP): Performed by: PSYCHIATRY & NEUROLOGY

## 2020-12-20 PROCEDURE — 2580000003 HC RX 258: Performed by: STUDENT IN AN ORGANIZED HEALTH CARE EDUCATION/TRAINING PROGRAM

## 2020-12-20 PROCEDURE — 80048 BASIC METABOLIC PNL TOTAL CA: CPT

## 2020-12-20 PROCEDURE — 6370000000 HC RX 637 (ALT 250 FOR IP): Performed by: STUDENT IN AN ORGANIZED HEALTH CARE EDUCATION/TRAINING PROGRAM

## 2020-12-20 PROCEDURE — 6370000000 HC RX 637 (ALT 250 FOR IP): Performed by: NURSE PRACTITIONER

## 2020-12-20 PROCEDURE — 82330 ASSAY OF CALCIUM: CPT

## 2020-12-20 PROCEDURE — 99233 SBSQ HOSP IP/OBS HIGH 50: CPT | Performed by: PSYCHIATRY & NEUROLOGY

## 2020-12-20 PROCEDURE — 2700000000 HC OXYGEN THERAPY PER DAY

## 2020-12-20 PROCEDURE — 85025 COMPLETE CBC W/AUTO DIFF WBC: CPT

## 2020-12-20 PROCEDURE — 83735 ASSAY OF MAGNESIUM: CPT

## 2020-12-20 PROCEDURE — 36415 COLL VENOUS BLD VENIPUNCTURE: CPT

## 2020-12-20 PROCEDURE — 2500000003 HC RX 250 WO HCPCS: Performed by: STUDENT IN AN ORGANIZED HEALTH CARE EDUCATION/TRAINING PROGRAM

## 2020-12-20 PROCEDURE — 6360000002 HC RX W HCPCS: Performed by: NURSE PRACTITIONER

## 2020-12-20 PROCEDURE — 97535 SELF CARE MNGMENT TRAINING: CPT

## 2020-12-20 PROCEDURE — 97110 THERAPEUTIC EXERCISES: CPT

## 2020-12-20 PROCEDURE — 51701 INSERT BLADDER CATHETER: CPT

## 2020-12-20 PROCEDURE — 82947 ASSAY GLUCOSE BLOOD QUANT: CPT

## 2020-12-20 RX ORDER — CLOPIDOGREL BISULFATE 75 MG/1
75 TABLET ORAL DAILY
Status: DISCONTINUED | OUTPATIENT
Start: 2020-12-20 | End: 2020-12-30 | Stop reason: HOSPADM

## 2020-12-20 RX ORDER — MAGNESIUM SULFATE 1 G/100ML
1 INJECTION INTRAVENOUS
Status: COMPLETED | OUTPATIENT
Start: 2020-12-20 | End: 2020-12-20

## 2020-12-20 RX ADMIN — PANTOPRAZOLE SODIUM 40 MG: 40 TABLET, DELAYED RELEASE ORAL at 08:11

## 2020-12-20 RX ADMIN — ATORVASTATIN CALCIUM 80 MG: 80 TABLET, FILM COATED ORAL at 19:56

## 2020-12-20 RX ADMIN — METOPROLOL TARTRATE 75 MG: 25 TABLET ORAL at 19:57

## 2020-12-20 RX ADMIN — IPRATROPIUM BROMIDE AND ALBUTEROL SULFATE 1 AMPULE: .5; 3 SOLUTION RESPIRATORY (INHALATION) at 20:11

## 2020-12-20 RX ADMIN — IPRATROPIUM BROMIDE AND ALBUTEROL SULFATE 1 AMPULE: .5; 3 SOLUTION RESPIRATORY (INHALATION) at 15:13

## 2020-12-20 RX ADMIN — IPRATROPIUM BROMIDE AND ALBUTEROL SULFATE 1 AMPULE: .5; 3 SOLUTION RESPIRATORY (INHALATION) at 10:36

## 2020-12-20 RX ADMIN — SERTRALINE 100 MG: 50 TABLET, FILM COATED ORAL at 08:11

## 2020-12-20 RX ADMIN — METOPROLOL TARTRATE 75 MG: 25 TABLET ORAL at 08:12

## 2020-12-20 RX ADMIN — FAMOTIDINE 20 MG: 10 INJECTION INTRAVENOUS at 14:11

## 2020-12-20 RX ADMIN — Medication 10 MG: at 16:03

## 2020-12-20 RX ADMIN — MAGNESIUM SULFATE HEPTAHYDRATE 1 G: 1 INJECTION, SOLUTION INTRAVENOUS at 08:10

## 2020-12-20 RX ADMIN — INSULIN LISPRO 6 UNITS: 100 INJECTION, SOLUTION INTRAVENOUS; SUBCUTANEOUS at 13:14

## 2020-12-20 RX ADMIN — SODIUM CHLORIDE, PRESERVATIVE FREE 10 ML: 5 INJECTION INTRAVENOUS at 20:13

## 2020-12-20 RX ADMIN — INSULIN LISPRO 2 UNITS: 100 INJECTION, SOLUTION INTRAVENOUS; SUBCUTANEOUS at 20:02

## 2020-12-20 RX ADMIN — MAGNESIUM SULFATE HEPTAHYDRATE 1 G: 1 INJECTION, SOLUTION INTRAVENOUS at 08:11

## 2020-12-20 RX ADMIN — CEFTRIAXONE SODIUM 1 G: 1 INJECTION, POWDER, FOR SOLUTION INTRAMUSCULAR; INTRAVENOUS at 16:03

## 2020-12-20 RX ADMIN — INSULIN LISPRO 4 UNITS: 100 INJECTION, SOLUTION INTRAVENOUS; SUBCUTANEOUS at 02:19

## 2020-12-20 RX ADMIN — QUETIAPINE FUMARATE 50 MG: 25 TABLET ORAL at 19:57

## 2020-12-20 RX ADMIN — INSULIN LISPRO 2 UNITS: 100 INJECTION, SOLUTION INTRAVENOUS; SUBCUTANEOUS at 06:41

## 2020-12-20 RX ADMIN — IPRATROPIUM BROMIDE AND ALBUTEROL SULFATE 1 AMPULE: .5; 3 SOLUTION RESPIRATORY (INHALATION) at 08:15

## 2020-12-20 RX ADMIN — CLOPIDOGREL 75 MG: 75 TABLET, FILM COATED ORAL at 08:25

## 2020-12-20 RX ADMIN — ENOXAPARIN SODIUM 30 MG: 100 INJECTION SUBCUTANEOUS at 08:12

## 2020-12-20 RX ADMIN — Medication 81 MG: at 08:11

## 2020-12-20 RX ADMIN — AMLODIPINE BESYLATE 5 MG: 5 TABLET ORAL at 08:12

## 2020-12-20 RX ADMIN — FUROSEMIDE 40 MG: 40 TABLET ORAL at 08:11

## 2020-12-20 RX ADMIN — INSULIN GLARGINE 6 UNITS: 100 INJECTION, SOLUTION SUBCUTANEOUS at 20:20

## 2020-12-20 RX ADMIN — ACETAMINOPHEN 650 MG: 325 TABLET ORAL at 20:24

## 2020-12-20 RX ADMIN — FAMOTIDINE 20 MG: 10 INJECTION INTRAVENOUS at 19:58

## 2020-12-20 RX ADMIN — INSULIN LISPRO 6 UNITS: 100 INJECTION, SOLUTION INTRAVENOUS; SUBCUTANEOUS at 18:09

## 2020-12-20 ASSESSMENT — PAIN SCALES - GENERAL: PAINLEVEL_OUTOF10: 0

## 2020-12-20 NOTE — PROGRESS NOTES
ENDOVASCULAR NEUROSURGERY PROGRESS NOTE  12/20/2020 8:12 AM  Subjective:   Admit Date: 12/15/2020  PCP: Stephani Collins MD    No new acute events. Started on Plavix. Objective:   Vitals: BP (!) 178/71   Pulse 79   Temp 98.3 °F (36.8 °C) (Oral)   Resp 21   Ht 5' 4\" (1.626 m)   Wt 231 lb 9.6 oz (105.1 kg)   LMP  (LMP Unknown)   SpO2 100%   BMI 39.75 kg/m²   General appearance: Lying in bed, NAD, NG feeding. HEENT: Atraumatic. Neck: Neck is supple. Lungs: No respiratory distress noted. Has NG tube. Heart: normal sinus rhythm on tele. .   Abdomen: Soft nontender. Extremities: No lower limb edema noted. Neurologic:  following simple commands. CN: Has intact extraocular muscles movements, has left-sided facial droop noted. MOTOR: Flaccid on the left body side and upper extremity, minimally withdrawing left lower extremity on the bed. Moving right upper and right lower extremities against gravity. SENSORY: Grossly intact sensation both upper and lower extremities.     MRS: 4     Medications and labs:   Scheduled Meds:   clopidogrel  75 mg Per NG tube Daily    magnesium sulfate  1 g Intravenous Q1H    metoprolol tartrate  75 mg Oral BID    amLODIPine  5 mg Per NG tube Daily    insulin glargine  6 Units Subcutaneous Nightly    sennosides-docusate sodium  2 tablet Oral Daily    furosemide  40 mg Oral Daily    cefTRIAXone (ROCEPHIN) IV  1 g Intravenous Q24H    insulin lispro  0-12 Units Subcutaneous Q6H    pantoprazole  40 mg Oral QAM AC    QUEtiapine  50 mg Oral Nightly    sertraline  100 mg Oral Daily    enoxaparin  30 mg Subcutaneous Daily    rivastigmine  1 patch Transdermal Daily    sodium chloride  1,000 mL Intravenous Once    ipratropium-albuterol  1 ampule Inhalation Q4H WA    sodium chloride flush  10 mL Intravenous 2 times per day    aspirin  81 mg Oral Daily    Or    aspirin  300 mg Rectal Daily    atorvastatin  80 mg Oral Nightly     Continuous Infusions:  dextrose      sodium chloride 100 mL/hr at 12/18/20 0230     CBC:   Recent Labs     12/18/20  0509 12/19/20  0427 12/20/20  0540   WBC 7.3 7.9 6.8   HGB 11.1* 11.0* 11.1*    223 215     BMP:    Recent Labs     12/18/20  0509 12/19/20  0427 12/20/20  0540    139 141   K 4.3 4.3 4.5    107 103   CO2 22 24 29   BUN 12 14 19   CREATININE 0.42* 0.49* 0.52   GLUCOSE 165* 201* 201*     Hepatic: No results for input(s): AST, ALT, ALB, BILITOT, ALKPHOS in the last 72 hours. Troponin: No results for input(s): TROPONINI in the last 72 hours. BNP: No results for input(s): BNP in the last 72 hours. Lipids: No results for input(s): CHOL, HDL in the last 72 hours. Invalid input(s): LDLCALCU  INR: No results for input(s): INR in the last 72 hours. Images were personally reviewed including:  Repeat CT head 12/17/2020  1. Interval increased extent and interval evolution of large right MCA acute   infarction with associated cerebral edema and partial effacement of the right   lateral ventricle. 2. Interval associated development of minimal leftward midline shift   measuring 2 mm at the level of the septum pellucidum. 3. No evidence of acute intracranial hemorrhage. 4. Moderate cerebral white matter chronic microvascular ischemic disease.             MRI brain 12/16/2020  Acute/subacute right MCA distribution infarct with mild associated edema and   subsequent mass effect upon the right lateral ventricle.      CTP brain 12/15/2020  1. Moderate volume acute ischemic infarct in the right middle cerebral artery   territory.  No intracranial hemorrhage or mass effect. 2. Moderate volume penumbra in the peripheral right middle cerebral artery   territory based on perfusion images. 3. Acute nearly occlusive thrombosis of the M1 and M2 segments right middle   cerebral artery. 4. Severe stenosis of the M1 segment left middle cerebral artery. 5. Severe stenosis of the A1 segment left anterior cerebral artery. 6. 75% stenosis of the proximal left internal carotid secondary to vessel   kinking and superimposed atherosclerotic plaque. 7. 40% stenosis of the proximal right internal carotid artery. 8. Moderate stenosis of the V4 segment left vertebral artery.      CT/A head and neck 12/15/2020  1. Moderate volume acute ischemic infarct in the right middle cerebral artery   territory.  No intracranial hemorrhage or mass effect. 2. Moderate volume penumbra in the peripheral right middle cerebral artery   territory based on perfusion images. 3. Acute nearly occlusive thrombosis of the M1 and M2 segments right middle   cerebral artery. 4. Severe stenosis of the M1 segment left middle cerebral artery. 5. Severe stenosis of the A1 segment left anterior cerebral artery. 6. 75% stenosis of the proximal left internal carotid secondary to vessel   kinking and superimposed atherosclerotic plaque. 7. 40% stenosis of the proximal right internal carotid artery. 8. Moderate stenosis of the V4 segment left vertebral artery.         Assessment and Recommendations:   77-year-old -American female with pmh of dmii, htn, komal, cad, hld, chf. Pt presents with acute R MCA syndrome, imaging showed R M1 occlusion with completed stroke, as well as diffuse athero.     MRI evolving R MCA stroke with edema 12/16/2020. CT head repeat 12/17/2020 stable. Lipid profile reviewed, , total cholesterol 220, HDL 48. Hemoglobin A1c 6.8.     Stroke etiology likely ICAD.     PLAN:   --continue asa 81, high intensity statin with lipitor 80  --Plavix 75 mg daily  --lipitor 80  --sbp <140  --additional care and workup as per neuro      Westley Andersen MD  Stroke, [facility]  [id number]  Electronically signed 12/20/2020 at 8:12 AM

## 2020-12-20 NOTE — PROGRESS NOTES
Relayed message to mom. She understood.   has a past surgical history that includes Spine surgery; Nerve Block (2012); Nerve Block (2012); Nerve Block (13); Nerve Block (13); Nerve Block (2013 ); Cardiac catheterization (2013); Lumbar spine surgery (); Vena Cava Filter Placement (); Tonsillectomy; joint replacement; knee surgery (10/21/2013); knee surgery (2013); knee surgery (2013); other surgical history (Right, 2014); other surgical history (Right, 3/16/2015); Upper gastrointestinal endoscopy; Colonoscopy (2015); other surgical history (Right, 16); Coronary angioplasty with stent (8448-28); and Nerve Block (Right, 2017). Restrictions  Restrictions/Precautions  Restrictions/Precautions: General Precautions, Fall Risk, Up as Tolerated  Required Braces or Orthoses?: No  Position Activity Restriction  Other position/activity restrictions: SBP goal <220  Subjective   General  Chart Reviewed: Yes  Response To Previous Treatment: Patient with no complaints from previous session. Family / Caregiver Present: Yes(Daughter present during treatment)  Subjective  Subjective: Pt and RN agreeable to PT this morning. Pt supine in bed upon arrival visiting with her daughter. Co-treat with OT. Pain Screening  Patient Currently in Pain: Yes(BRITNI pain location or level)  Vital Signs  Patient Currently in Pain: Yes(BRITNI pain location or level)       Orientation  Orientation  Overall Orientation Status: Within Functional Limits  Objective   Bed mobility  Rolling to Left: Maximum assistance;2 Person assistance  Rolling to Right: Maximum assistance;2 Person assistance  Supine to Sit: Maximum assistance;2 Person assistance  Sit to Supine: Maximum assistance;2 Person assistance  Scootin Person assistance;Maximal assistance  Transfers  Comment: Unable to assess transfers.   Ambulation  Ambulation?: No     Balance  Posture: Poor  Sitting - Static: Poor  Sitting - Dynamic: Poor

## 2020-12-20 NOTE — PROGRESS NOTES
Occupational Therapy  Facility/Department: [address]  Daily Treatment Note  NAME: Sunday Alston  : 1950  MRN: [id number]    Date of Service: 2020    Discharge Recommendations:  Patient would benefit from continued therapy after discharge in order to increase pt balance, activity tolerance, safety and independence. Assessment   Performance deficits / Impairments: Decreased functional mobility ; Decreased endurance;Decreased ADL status; Decreased vision/visual deficit; Decreased high-level IADLs;Decreased balance;Decreased safe awareness;Decreased cognition;Decreased ROM; Decreased strength;Decreased fine motor control;Decreased coordination;Decreased posture  Prognosis: Fair  OT Education: OT Role;Transfer Training  Patient Education: purpose of therapy; proper hand and foot placement; balance maintaince; LUE WB  Barriers to Learning: pt demo P carry over  REQUIRES OT FOLLOW UP: Yes  Activity Tolerance  Activity Tolerance: Patient limited by fatigue;Treatment limited secondary to decreased cognition  Activity Tolerance: L weakness  Safety Devices  Safety Devices in place: Yes  Type of devices: Patient at risk for falls; Left in bed;Call light within reach; Bed alarm in place         Patient Diagnosis(es): The encounter diagnosis was Acute cerebrovascular accident (CVA) ([address] has a past medical history of Allergic rhinitis, Anxiety, Asthma, CAD (coronary artery disease), Chronic back pain, Congestive heart failure ([address] Depression, Headache(784.0), Hiatal hernia, Hypercholesteremia, Hypertension, Kidney stones, Obesity, Osteoarthritis, Postlaminectomy syndrome, Type II or unspecified type diabetes mellitus without mention of complication, not stated as uncontrolled, Unspecified sleep apnea, and Urinary incontinence. has a past surgical history that includes Spine surgery; Nerve Block (4/23/2012); Nerve Block (5/22/2012); Nerve Block (01/14/13); Nerve Block (01/21/13); Nerve Block (1/28/2013 ); Cardiac catheterization (01/2013); Lumbar spine surgery (2007); Vena Cava Filter Placement (2007); Tonsillectomy; joint replacement; knee surgery (10/21/2013); knee surgery (12/02/2013); knee surgery (12/09/2013); other surgical history (Right, 7/14/2014); other surgical history (Right, 3/16/2015); Upper gastrointestinal endoscopy; Colonoscopy (09/2015); other surgical history (Right, 06-13-16); Coronary angioplasty with stent (3905-89); and Nerve Block (Right, 04/17/2017). Restrictions  Restrictions/Precautions  Restrictions/Precautions: General Precautions, Fall Risk, Up as Tolerated  Required Braces or Orthoses?: No  Position Activity Restriction  Other position/activity restrictions: SBP goal <220  Subjective   General  Chart Reviewed: Yes  Patient assessed for rehabilitation services?: Yes  Family / Caregiver Present: Yes(pt daughter)  Diagnosis: L weakness, R MCA infarct, found down, AMS  General Comment  Comments: Pt c/o pain however did not note location or rate  Pain Assessment  Non-Pharmaceutical Pain Intervention(s): Distraction;Repositioned; Therapeutic presence  Vital Signs  Patient Currently in Pain: Yes   Orientation  Orientation  Overall Orientation Status: Impaired(pt oriented to name)  Orientation Level: Oriented to person;Disoriented to time;Disoriented to place;Oriented to situation  Objective    ADL  Grooming: Maximum assistance;Setup;Verbal cueing; Increased time to complete(face washing completed seated at EOB utilizing RUE with [place] assist)  LE Dressing: Dependent/Total(to doff/don socks)  Additional Comments: Pt following approx <25%of commands during session req increased assist and cues  Balance  Sitting Balance: Maximum assistance(Pt tolerated approx 10 min at EOB during LUE WB) Short term goal 1: demo static/dynamic sitting tolerance during func activity for 10 min+ with min Ax1  Short term goal 2: demo [address] through LUE during func activity >4 min to promote func regain of movement/sensation  Short term goal 3: demo ADL UB bathing/dressing activity with setup, adaptive tech's, and min A  Short term goal 4: demo supine<>sit transfer to EOB using railing PRN, min Ax2  Short term goal 5: notify OTR to update goals as mobility progresses to standing, etc.       Therapy Time   Individual Concurrent Group Co-treatment   Time In 1014         Time Out 1041         Minutes 27         Timed Code Treatment Minutes: 13 Minutes(14 min co tx with PTA)       AMBROCIO Bourne/MARIANN

## 2020-12-20 NOTE — PROGRESS NOTES
Daily Progress Note  Neuro Critical Care    Patient Name: Shelby Child  Patient : 1950  Room/Bed: 0526/0526-01  Code Status: Full code  Allergies: Allergies   Allergen Reactions    Bactrim     Penicillins     Tylenol [Acetaminophen] Other (See Comments)     constipation       CHIEF COMPLAINT:       AMS, left sided weakness, found down     INTERVAL HISTORY    Initial Presentation (Admitted 12/15/20): The patient is a 76 y. o. female with a past medical history that includes diabetes, CAD, hyperlipidemia, CHF who presented to the emergency department for altered mental status as a stroke alert.  Family last spoke to patient at 8 AM yesterday morning.  Patient was found down at her home, incontinent, unresponsive.  Brought in via EMS. [address] arrival evaluated by stroke team, and MDV 34.  Left hemiparesis and right-sided gaze preference.      On arrival to the emergency department patient's blood pressure was 210/115, heart rate 97 afebrile 98. 6.  Stroke panel significant for glucose 138, sodium 139, hemoglobin 13.3, platelets 546, total CK 1609, myoglobin 1554, INR 1.0, PTT 22.5, high-sensitivity troponin XV.  COVID-19 negative.  Patient given 1 L fluid bolus along with aspirin 81 mg.     Patient's case was discussed in detail with endovascular neurosurgery team and stroke team along with patient's family. Alberta Le was made that because patient was greater than 24 hours no longer thrombectomy candidate and CT perfusion scan showing large volume infarcted tissue with little to 0 penumbra.  Patient's family agreed to patient being admitted to neuro critical care for close neurologic monitoring and possible hypertonic and hyperosmolar treatment of her cerebral edema.       Hospital Course:  : MRI brain revealed a large territory R MCA infarct. Heparin infusion discontinued.  Failed bedside swallow, NG tube placed.  : CT head this morning stable, no hemorrhagic conversion or significant cerebral edema. Lopressor 50 mg BID started, home medication. : Low urine output despite fluids. Resumed home Lasix with improvement of urine output. Pavon placed to monitor. One-time fever overnight. UA consistent with infection, culture pending. Started on abx.        Last 24h:   No acute events overnight. Requiring frequent suctioning throughout night. Complaining of sore throat today. Decreased urine output overnight, only 400cc total. Urinary retention this morning; straight cath x1. Tube feeds at goal. Total fluids 75cc/h. Plan to start plavix today.        CURRENT MEDICATIONS:  SCHEDULED MEDICATIONS:   clopidogrel  75 mg Per NG tube Daily    metoprolol tartrate  75 mg Oral BID    amLODIPine  5 mg Per NG tube Daily    insulin glargine  6 Units Subcutaneous Nightly    sennosides-docusate sodium  2 tablet Oral Daily    furosemide  40 mg Oral Daily    cefTRIAXone (ROCEPHIN) IV  1 g Intravenous Q24H    insulin lispro  0-12 Units Subcutaneous Q6H    pantoprazole  40 mg Oral QAM AC    QUEtiapine  50 mg Oral Nightly    sertraline  100 mg Oral Daily    enoxaparin  30 mg Subcutaneous Daily    rivastigmine  1 patch Transdermal Daily    sodium chloride  1,000 mL Intravenous Once    ipratropium-albuterol  1 ampule Inhalation Q4H WA    sodium chloride flush  10 mL Intravenous 2 times per day    aspirin  81 mg Oral Daily    Or    aspirin  300 mg Rectal Daily    atorvastatin  80 mg Oral Nightly     CONTINUOUS INFUSIONS:   dextrose      sodium chloride 100 mL/hr at 20 0230     PRN MEDICATIONS:   labetalol, glucose, dextrose, glucagon (rDNA), dextrose, acetaminophen, sodium chloride flush, promethazine **OR** ondansetron    VITALS:  Temperature Range: Temp: 98.3 °F (36.8 °C) Temp  Av.6 °F (37 °C)  Min: 98.1 °F (36.7 °C)  Max: 99.3 °F (37.4 °C)  BP Range: Systolic (99LBO), BLS:482 , Min:116 , Max:201 Diastolic (29KMA), EOZ:19, Min:36, Max:102    Pulse Range: Pulse  Av.4  Min: 60  Max: 89  Respiration Range: Resp  Av.9  Min: 17  Max: 23  Current Pulse Ox: SpO2: 100 %  24HR Pulse Ox Range: SpO2  Av.9 %  Min: 94 %  Max: 100 %  Patient Vitals for the past 12 hrs:   BP Temp Temp src Pulse Resp SpO2   20 0700 (!) 178/71   79 21    20 0605 (!) 186/74   89 20    20 0505 (!) 121/48   63 18    20 0400 (!) 147/52 98.3 °F (36.8 °C) Oral 64 17 100 %   20 0305 (!) 116/36   60 17    20 0215 (!) 127/40   64 23    20 0115 (!) 168/72   63 20    20 0000 (!) 158/66 98.5 °F (36.9 °C) Oral 71 22 96 %   20 2300 134/71   64 20    20 2200 (!) 138/51   68 21    20 2110 (!) 126/57   65 19 97 %   20     22 95 %   20 (!) 170/59 98.1 °F (36.7 °C) Oral 72 23 95 %     Estimated body mass index is 39.75 kg/m² as calculated from the following:    Height as of this encounter: 5' 4\" (1.626 m).     Weight as of this encounter: 231 lb 9.6 oz (105.1 kg).  []<16 Severe malnutrition  []1616.99 Moderate malnutrition  []1718.49 Mild malnutrition  []18.524.9 Normal  []2529.9 Overweight (not obese)  []3034.9 Obese class 1 (Low Risk)  [x]3539.9 Obese class 2 (Moderate Risk)  []?40 Obese class 3 (High Risk)    RECENT LABS:   Lab Results   Component Value Date    WBC 6.8 2020    HGB 11.1 (L) 2020    HCT 35.6 (L) 2020     2020    CHOL 188 2019    TRIG 177 (H) 2019    HDL 48 2020    ALT 11 2019    AST 15 2019     2020    K 4.5 2020     2020    CREATININE 0.52 2020    BUN 19 2020    CO2 29 2020    TSH 1.38 2018    INR 1.0 12/15/2020    LABA1C 6.8 (H) 2020    LABMICR 39 (H) 2018     24 HOUR INTAKE/OUTPUT:    Intake/Output Summary (Last 24 hours) at 2020 0741  Last data filed at 2020 0500 Gross per 24 hour   Intake 1743.06 ml   Output 1665 ml   Net 78.06 ml       IMAGING:   XR CHEST PORTABLE   Final Result   Borderline cardiomegaly. No convincing evidence for acute cardiopulmonary   pathology. XR ABDOMEN FOR NG/OG/NE TUBE PLACEMENT   Final Result   Appropriate radiographic positioning of nasogastric tube. Recommend clinical correlation prior to use. CT HEAD WO CONTRAST   Final Result   1. Interval increased extent and interval evolution of large right MCA acute   infarction with associated cerebral edema and partial effacement of the right   lateral ventricle. 2. Interval associated development of minimal leftward midline shift   measuring 2 mm at the level of the septum pellucidum. 3. No evidence of acute intracranial hemorrhage. 4. Moderate cerebral white matter chronic microvascular ischemic disease. XR ABDOMEN FOR NG/OG/NE TUBE PLACEMENT   Final Result   Enteric tube in expected position. MRI BRAIN WO CONTRAST   Final Result   Acute/subacute right MCA distribution infarct with mild associated edema and   subsequent mass effect upon the right lateral ventricle. CT BRAIN PERFUSION   Final Result   Addendum 1 of 1   ADDENDUM:   The full set of diagnostic perfusion images were sent for review at 7:15    p.m.   on 12/15/2020 and demonstrate large volume elevated M TT in the visualized   right middle cerebral artery territory with elevated relative cerebral    blood   volume and decreased relative cerebral blood flow. Final      CTA HEAD NECK W CONTRAST   Final Result   Addendum 1 of 1   ADDENDUM:   The full set of diagnostic perfusion images were sent for review at 7:15    p.m.   on 12/15/2020 and demonstrate large volume elevated M TT in the visualized   right middle cerebral artery territory with elevated relative cerebral    blood   volume and decreased relative cerebral blood flow.          Final      CT HEAD WO CONTRAST   Final Result Addendum 1 of 1   ADDENDUM:   The full set of diagnostic perfusion images were sent for review at 7:15    p.m.   on 12/15/2020 and demonstrate large volume elevated M TT in the visualized   right middle cerebral artery territory with elevated relative cerebral    blood   volume and decreased relative cerebral blood flow. Final            Labs and Images reviewed with:  [] Dr. Ottoniel Guevara    [x] Dr. Ziyad Monteiro  [] Dr. Zia Bhatt  [] There are no new interval images to review. PHYSICAL EXAM          CONSTITUTIONAL:  Eyes closed but will open to verbal stimulation, alert and oriented x 3, in no acute distress. Nontoxic. Dysarthric. No aphasia. HEAD:  normocephalic, atraumatic    EYES:  PERRLA, EOMI.   ENT:  moist mucous membranes   NECK:  supple, symmetric   LUNGS:  Equal air entry bilaterally   CARDIOVASCULAR:  normal s1 / s2, RRR, distal pulses intact   ABDOMEN:  Soft, no rigidity   NEUROLOGIC:  Mental Status:  A & O x3, awake, complete left sided neglect             Cranial Nerves:    II: Visual fields:  abnormal - No blink to threat left quadrants  III: Pupils:  equal, round, reactive to light  III,IV,VI: Extra Ocular Movements: abnormal Right gaze  VII: Facial strength: abnormal Left facial droop     Motor Exam:    Drift:  present - Left upper and left lower  Tone:  normal     Motor exam is 4 out of 5 right upper, 3 out of 5 right lower. 0/5 strength LUE.       Sensory:    Touch:    Right Upper Extremity:  normal  Left Upper Extremity:  abnormal - neglect  Right Lower Extremity:  normal  Left Lower Extremity:  abnormal - neglect           ASSESSMENT AND PLAN:       This is a 76 y. o. female presenting to the ED as a stroke alert after being found down with dense left hemiparesis, dysarthria, and rightward gaze. Acute large vessel occlusion. Cerebral edema. Not candidate for tPA or thrombectomy.  Admit to neuro ICU for medical management and monitoring of edema.     NEUROLOGIC: - CT demonstrating acute ischemic infarct in R MCA territory; no mass effect or hemorrhage  - loaded with ASA and plavix in ED  - MRI revealed acute right MCA distribution infarct with mild edema   - Aspirin 81 mg and Lipitor 80 mg QHS for secondary stroke ppx  - Intracranial atherosclerosis  - will start plavix today  - Repeat CT if change in mentation  - Goal SBP < 180  - Seroquel 50mg nightly  - Neuro checks per protocol     CARDIOVASCULAR:  - SBP < 180  - Hx of CHF and CAD  - frequent PVCs on monitor  - Mag 1.7 > 1.5; will replete with 2 grams mag sulfate  - Resume half home Lopressor dose, 50 mg BID  - trop 15>21>22  - Mixed hyperlipidemia, Cholesterol 220, ; Lipitor 80 mg QHS  - Echocardiogram: EF 50%, moderate to severe LVH, mild diastolic dysfunction, negative bubble  - continue Norvasc 5mg QD  - Continue telemetry       PULMONARY:  - CXR normal  - goal SpO 2 > 90  - hx of smoking  - Duoneb q4h while awake  - Resume home Lasix  - Worsening secretions and needing frequent suctioning   - will discuss trach with daughter      RENAL/FLUID/ELECTROLYTE:  - BUN 14/ Creatinine 0.49  - Good urine output overnight; 1700 out in past 24h  - IVF: NS maintenance for total fluids of 75cc/h  - Replace electrolytes PRN  - Daily BMP    GI/NUTRITION:  NUTRITION:  DIET TUBE FEED CONTINUOUS/CYCLIC NPO; Diabetic; Nasogastric; Continuous; 20; 55  - tube feeds at goal  - repeat swallow study with speech on Monday 12/21; if failed will need consult for PEG  - Bowel regimen: senokot PRN  - GI prophylaxis: Pepcid BID  - repeat swallow study n 12/21; if fails then surgery consult for PEG tube     ID:  - remains afebrile   - covid negative   - WBC 6.8  - UA consistent with UTI, continue Rocephin    - started Rocephin 12/18  - CXR unremarkable  - Continue to monitor for fevers  - Daily CBC    HEME:   - H&H 11.1/35.6  - Platelets 227  - Daily CBC     ENDOCRINE:  - Continue to monitor blood glucose, goal <180  - hx of T2DM - started lantus 6units on 12/19  - Alan dose insulin sliding scale  - adjust lantus tomorrow 12/21 as needed  - A1C 6.8     OTHER:  - PT/OT/ST      PROPHYLAXIS:  Stress ulcer: Pepcid     DVT PROPHYLAXIS:  - SCD sleeves - Thigh High   - Lovenox for DVT prophylaxis     DISPOSITION:  [x] To remain ICU:  [] OK for out of ICU from Neuro Critical Care standpoint    We will continue to follow along. For any changes in exam or patient status please contact Neuro Critical Care.       Junaid Campbell DO  Neuro Critical Care  Pager [phone]  12/20/2020     7:41 AM

## 2020-12-21 ENCOUNTER — APPOINTMENT (OUTPATIENT)
Dept: GENERAL RADIOLOGY | Age: 70
DRG: 064 | End: 2020-12-21
Payer: COMMERCIAL

## 2020-12-21 LAB
ABSOLUTE EOS #: 0.24 K/UL (ref 0–0.44)
ABSOLUTE IMMATURE GRANULOCYTE: 0.04 K/UL (ref 0–0.3)
ABSOLUTE LYMPH #: 1.39 K/UL (ref 1.1–3.7)
ABSOLUTE MONO #: 0.84 K/UL (ref 0.1–1.2)
ANION GAP SERPL CALCULATED.3IONS-SCNC: 10 MMOL/L (ref 9–17)
BASOPHILS # BLD: 0 % (ref 0–2)
BASOPHILS ABSOLUTE: <0.03 K/UL (ref 0–0.2)
BUN BLDV-MCNC: 22 MG/DL (ref 8–23)
BUN/CREAT BLD: ABNORMAL (ref 9–20)
CALCIUM IONIZED: 1.22 MMOL/L (ref 1.13–1.33)
CALCIUM SERPL-MCNC: 9.1 MG/DL (ref 8.6–10.4)
CHLORIDE BLD-SCNC: 100 MMOL/L (ref 98–107)
CO2: 27 MMOL/L (ref 20–31)
CREAT SERPL-MCNC: 0.63 MG/DL (ref 0.5–0.9)
DIFFERENTIAL TYPE: ABNORMAL
EOSINOPHILS RELATIVE PERCENT: 4 % (ref 1–4)
GFR AFRICAN AMERICAN: >60 ML/MIN
GFR NON-AFRICAN AMERICAN: >60 ML/MIN
GFR SERPL CREATININE-BSD FRML MDRD: ABNORMAL ML/MIN/{1.73_M2}
GFR SERPL CREATININE-BSD FRML MDRD: ABNORMAL ML/MIN/{1.73_M2}
GLUCOSE BLD-MCNC: 147 MG/DL (ref 65–105)
GLUCOSE BLD-MCNC: 161 MG/DL (ref 65–105)
GLUCOSE BLD-MCNC: 171 MG/DL (ref 65–105)
GLUCOSE BLD-MCNC: 186 MG/DL (ref 65–105)
GLUCOSE BLD-MCNC: 202 MG/DL (ref 70–99)
GLUCOSE BLD-MCNC: 222 MG/DL (ref 65–105)
HCT VFR BLD CALC: 36.1 % (ref 36.3–47.1)
HEMOGLOBIN: 11 G/DL (ref 11.9–15.1)
IMMATURE GRANULOCYTES: 1 %
LYMPHOCYTES # BLD: 20 % (ref 24–43)
MAGNESIUM: 1.7 MG/DL (ref 1.6–2.6)
MCH RBC QN AUTO: 27.8 PG (ref 25.2–33.5)
MCHC RBC AUTO-ENTMCNC: 30.5 G/DL (ref 28.4–34.8)
MCV RBC AUTO: 91.4 FL (ref 82.6–102.9)
MONOCYTES # BLD: 12 % (ref 3–12)
NRBC AUTOMATED: 0 PER 100 WBC
PDW BLD-RTO: 13.4 % (ref 11.8–14.4)
PLATELET # BLD: 225 K/UL (ref 138–453)
PLATELET ESTIMATE: ABNORMAL
PMV BLD AUTO: 10.4 FL (ref 8.1–13.5)
POTASSIUM SERPL-SCNC: 4.5 MMOL/L (ref 3.7–5.3)
RBC # BLD: 3.95 M/UL (ref 3.95–5.11)
RBC # BLD: ABNORMAL 10*6/UL
SEG NEUTROPHILS: 63 % (ref 36–65)
SEGMENTED NEUTROPHILS ABSOLUTE COUNT: 4.33 K/UL (ref 1.5–8.1)
SODIUM BLD-SCNC: 137 MMOL/L (ref 135–144)
WBC # BLD: 6.9 K/UL (ref 3.5–11.3)
WBC # BLD: ABNORMAL 10*3/UL

## 2020-12-21 PROCEDURE — 2580000003 HC RX 258: Performed by: NURSE PRACTITIONER

## 2020-12-21 PROCEDURE — 2700000000 HC OXYGEN THERAPY PER DAY

## 2020-12-21 PROCEDURE — APPNB45 APP NON BILLABLE 31-45 MINUTES: Performed by: NURSE PRACTITIONER

## 2020-12-21 PROCEDURE — 82330 ASSAY OF CALCIUM: CPT

## 2020-12-21 PROCEDURE — 6360000002 HC RX W HCPCS: Performed by: STUDENT IN AN ORGANIZED HEALTH CARE EDUCATION/TRAINING PROGRAM

## 2020-12-21 PROCEDURE — 6360000002 HC RX W HCPCS: Performed by: NURSE PRACTITIONER

## 2020-12-21 PROCEDURE — 82947 ASSAY GLUCOSE BLOOD QUANT: CPT

## 2020-12-21 PROCEDURE — 6370000000 HC RX 637 (ALT 250 FOR IP): Performed by: NURSE PRACTITIONER

## 2020-12-21 PROCEDURE — 99291 CRITICAL CARE FIRST HOUR: CPT | Performed by: PSYCHIATRY & NEUROLOGY

## 2020-12-21 PROCEDURE — 6370000000 HC RX 637 (ALT 250 FOR IP): Performed by: STUDENT IN AN ORGANIZED HEALTH CARE EDUCATION/TRAINING PROGRAM

## 2020-12-21 PROCEDURE — 97129 THER IVNTJ 1ST 15 MIN: CPT

## 2020-12-21 PROCEDURE — 94640 AIRWAY INHALATION TREATMENT: CPT

## 2020-12-21 PROCEDURE — 97110 THERAPEUTIC EXERCISES: CPT

## 2020-12-21 PROCEDURE — 71045 X-RAY EXAM CHEST 1 VIEW: CPT

## 2020-12-21 PROCEDURE — 80048 BASIC METABOLIC PNL TOTAL CA: CPT

## 2020-12-21 PROCEDURE — 6370000000 HC RX 637 (ALT 250 FOR IP): Performed by: PSYCHIATRY & NEUROLOGY

## 2020-12-21 PROCEDURE — 2500000003 HC RX 250 WO HCPCS: Performed by: STUDENT IN AN ORGANIZED HEALTH CARE EDUCATION/TRAINING PROGRAM

## 2020-12-21 PROCEDURE — 36415 COLL VENOUS BLD VENIPUNCTURE: CPT

## 2020-12-21 PROCEDURE — 2580000003 HC RX 258: Performed by: STUDENT IN AN ORGANIZED HEALTH CARE EDUCATION/TRAINING PROGRAM

## 2020-12-21 PROCEDURE — 85025 COMPLETE CBC W/AUTO DIFF WBC: CPT

## 2020-12-21 PROCEDURE — 83735 ASSAY OF MAGNESIUM: CPT

## 2020-12-21 PROCEDURE — 97530 THERAPEUTIC ACTIVITIES: CPT

## 2020-12-21 PROCEDURE — 2000000003 HC NEURO ICU R&B

## 2020-12-21 PROCEDURE — 99233 SBSQ HOSP IP/OBS HIGH 50: CPT | Performed by: PSYCHIATRY & NEUROLOGY

## 2020-12-21 RX ORDER — MAGNESIUM SULFATE 1 G/100ML
1 INJECTION INTRAVENOUS ONCE
Status: COMPLETED | OUTPATIENT
Start: 2020-12-21 | End: 2020-12-21

## 2020-12-21 RX ORDER — INSULIN GLARGINE 100 [IU]/ML
8 INJECTION, SOLUTION SUBCUTANEOUS NIGHTLY
Status: DISCONTINUED | OUTPATIENT
Start: 2020-12-21 | End: 2020-12-23

## 2020-12-21 RX ORDER — SENNOSIDES 8.8 MG/5ML
5 LIQUID ORAL NIGHTLY
Status: DISCONTINUED | OUTPATIENT
Start: 2020-12-21 | End: 2020-12-30 | Stop reason: HOSPADM

## 2020-12-21 RX ADMIN — Medication 10 MG: at 17:14

## 2020-12-21 RX ADMIN — FAMOTIDINE 20 MG: 10 INJECTION INTRAVENOUS at 19:49

## 2020-12-21 RX ADMIN — INSULIN LISPRO 2 UNITS: 100 INJECTION, SOLUTION INTRAVENOUS; SUBCUTANEOUS at 19:50

## 2020-12-21 RX ADMIN — METOPROLOL TARTRATE 75 MG: 25 TABLET ORAL at 08:37

## 2020-12-21 RX ADMIN — FAMOTIDINE 20 MG: 10 INJECTION INTRAVENOUS at 08:38

## 2020-12-21 RX ADMIN — SODIUM CHLORIDE, PRESERVATIVE FREE 10 ML: 5 INJECTION INTRAVENOUS at 20:05

## 2020-12-21 RX ADMIN — IPRATROPIUM BROMIDE AND ALBUTEROL SULFATE 1 AMPULE: .5; 3 SOLUTION RESPIRATORY (INHALATION) at 11:10

## 2020-12-21 RX ADMIN — CLOPIDOGREL 75 MG: 75 TABLET, FILM COATED ORAL at 08:37

## 2020-12-21 RX ADMIN — METOPROLOL TARTRATE 75 MG: 25 TABLET ORAL at 19:49

## 2020-12-21 RX ADMIN — SERTRALINE 100 MG: 50 TABLET, FILM COATED ORAL at 08:37

## 2020-12-21 RX ADMIN — SODIUM CHLORIDE, PRESERVATIVE FREE 10 ML: 5 INJECTION INTRAVENOUS at 08:46

## 2020-12-21 RX ADMIN — QUETIAPINE FUMARATE 50 MG: 25 TABLET ORAL at 19:49

## 2020-12-21 RX ADMIN — Medication 81 MG: at 08:37

## 2020-12-21 RX ADMIN — INSULIN LISPRO 6 UNITS: 100 INJECTION, SOLUTION INTRAVENOUS; SUBCUTANEOUS at 08:41

## 2020-12-21 RX ADMIN — INSULIN GLARGINE 8 UNITS: 100 INJECTION, SOLUTION SUBCUTANEOUS at 19:50

## 2020-12-21 RX ADMIN — ATORVASTATIN CALCIUM 80 MG: 80 TABLET, FILM COATED ORAL at 19:50

## 2020-12-21 RX ADMIN — INSULIN LISPRO 3 UNITS: 100 INJECTION, SOLUTION INTRAVENOUS; SUBCUTANEOUS at 12:15

## 2020-12-21 RX ADMIN — FUROSEMIDE 40 MG: 40 TABLET ORAL at 08:37

## 2020-12-21 RX ADMIN — ENOXAPARIN SODIUM 30 MG: 100 INJECTION SUBCUTANEOUS at 08:38

## 2020-12-21 RX ADMIN — IPRATROPIUM BROMIDE AND ALBUTEROL SULFATE 1 AMPULE: .5; 3 SOLUTION RESPIRATORY (INHALATION) at 07:26

## 2020-12-21 RX ADMIN — CEFTRIAXONE SODIUM 1 G: 1 INJECTION, POWDER, FOR SOLUTION INTRAMUSCULAR; INTRAVENOUS at 16:48

## 2020-12-21 RX ADMIN — MAGNESIUM SULFATE HEPTAHYDRATE 1 G: 1 INJECTION, SOLUTION INTRAVENOUS at 08:41

## 2020-12-21 RX ADMIN — AMLODIPINE BESYLATE 5 MG: 5 TABLET ORAL at 08:37

## 2020-12-21 RX ADMIN — SENNOSIDES 8.8 MG: 8.8 LIQUID ORAL at 19:50

## 2020-12-21 RX ADMIN — IPRATROPIUM BROMIDE AND ALBUTEROL SULFATE 1 AMPULE: .5; 3 SOLUTION RESPIRATORY (INHALATION) at 15:35

## 2020-12-21 NOTE — PROGRESS NOTES
has a past medical history of Allergic rhinitis, Anxiety, Asthma, CAD (coronary artery disease), Chronic back pain, Congestive heart failure ([facility] [address] Depression, Headache(784.0), Hiatal hernia, Hypercholesteremia, Hypertension, Kidney stones, Obesity, Osteoarthritis, Postlaminectomy syndrome, Type II or unspecified type diabetes mellitus without mention of complication, not stated as uncontrolled, Unspecified sleep apnea, and Urinary incontinence. has a past surgical history that includes Spine surgery; Nerve Block (4/23/2012); Nerve Block (5/22/2012); Nerve Block (01/14/13); Nerve Block (01/21/13); Nerve Block (1/28/2013 ); Cardiac catheterization (01/2013); Lumbar spine surgery (2007); Vena Cava Filter Placement (2007); Tonsillectomy; joint replacement; knee surgery (10/21/2013); knee surgery (12/02/2013); knee surgery (12/09/2013); other surgical history (Right, 7/14/2014); other surgical history (Right, 3/16/2015); Upper gastrointestinal endoscopy; Colonoscopy (09/2015); other surgical history (Right, 06-13-16); Coronary angioplasty with stent (0160-72); and Nerve Block (Right, 04/17/2017).     Restrictions  Restrictions/Precautions  Restrictions/Precautions: General Precautions, Fall Risk, Up as Tolerated  Required Braces or Orthoses?: No  Position Activity Restriction  Other position/activity restrictions: SBP goal <220  Subjective   General  Response To Previous Treatment: Patient unable to report, no changes reported from family or staff  Family / Caregiver Present: No  Pain Screening  Patient Currently in Pain: (pt speaking throughout PT session, but difficult to understand, and didn't pertain to anything we were doing ie talking about a barbeque with family, etc.)  Vital Signs  Patient Currently in Pain: (pt speaking throughout PT session, but difficult to understand, and didn't pertain to anything we were doing ie talking about a barbeque with family, etc.)       Orientation  Orientation Overall Orientation Status: Impaired  Orientation Level: Unable to assess(pt didn't answer any orientation questions)     Objective   Bed mobility  Rolling to Left: 2 Person assistance;Dependent/Total  Rolling to Right: 2 Person assistance;Dependent/Total  Supine to Sit: Dependent/Total(HOB completely elevated)  Sit to Supine: 2 Person assistance;Dependent/Total  Scootin Person assistance;Dependent/Total  Transfers  Sit to Stand: Unable to assess  Stand to sit: Unable to assess  Bed to Chair: Unable to assess  Stand Pivot Transfers: Unable to assess  Ambulation  Ambulation?: No  Stairs/Curb  Stairs?: No     Balance  Posture: Poor  Sitting - Static: Poor  Sitting - Dynamic: Poor  Comments: Pt sat EOB ~10 minutes requiring MaxA to maintain balance.  Pt pushes with RUE to the L  Other exercises  Other exercises 1: PROM FÉLIX/LEs x 10 reps  Other exercises 2: A/AAROM RUE/LE's x 10 reps--pt needed tactile cues to participate with exercise RU/LEs   AM-PAC Score     AM-PAC Inpatient Mobility without Stair Climbing Raw Score : 7 (20 1506)  AM-PAC Inpatient without Stair Climbing T-Scale Score : 28.66 (20 1506)  Mobility Inpatient CMS 0-100% Score: 86.29 (20 1506)  Mobility Inpatient without Stair CMS G-Code Modifier : CM (20 150)       Goals  Short term goals  Time Frame for Short term goals: 12 visits  Short term goal 1: bed mobility with mod A+1  Short term goal 2: transfers mod A+2 in mandie tino  Short term goal 3: WC mobility x 25' with min A+1  Short term goal 4: progress to gait with appropriate device x 15' with max A+2 as appropriate  Patient Goals   Patient goals : pt didn't verbalize goal    Plan    Plan  Times per week: 5-6 visits weekly  Times per day: Daily Current Treatment Recommendations: Strengthening, ROM, Balance Training, Functional Mobility Training, Transfer Training, Endurance Training, Wheelchair Mobility Training, Gait Training, Neuromuscular Re-education, Cognitive Reorientation, Pain Management, Home Exercise Program, Safety Education & Training, Patient/Caregiver Education & Training, Positioning  Safety Devices  Type of devices: Patient at risk for falls, Left in bed, Nurse notified  Restraints  Initially in place: No     Therapy Time   Individual Concurrent Group Co-treatment   Time In 1543         Time Out 1617         Minutes 34                 [place], [place]

## 2020-12-21 NOTE — PROGRESS NOTES
ENDOVASCULAR NEUROSURGERY PROGRESS NOTE  12/21/2020 10:00 AM  Subjective:   Admit Date: 12/15/2020  PCP: Hayden Nazario MD    No new acute events. On dual antiplatelet therapy. Objective:   Vitals: BP (!) 169/73   Pulse 63   Temp 99.4 °F (37.4 °C) (Axillary)   Resp 17   Ht 5' 4\" (1.626 m)   Wt 231 lb 9.6 oz (105.1 kg)   LMP  (LMP Unknown)   SpO2 98%   BMI 39.75 kg/m²   General appearance: Lying in bed, NAD, NG feeding. HEENT: Atraumatic. Neck: Neck is supple. Lungs: No respiratory distress noted. Has NG tube. Heart: normal sinus rhythm on tele. .   Abdomen: Soft nontender. Extremities: No lower limb edema noted. Neurologic:  following simple commands. CN: Has intact extraocular muscles movements, has left-sided facial droop noted. MOTOR: Flaccid on the left body side and upper extremity, minimally withdrawing left lower extremity on the bed. Moving right upper and right lower extremities against gravity. SENSORY: Grossly intact sensation both upper and lower extremities.     MRS: 4     Medications and labs:   Scheduled Meds:   senna  5 mL Oral Nightly    docusate  100 mg Oral Daily    insulin glargine  8 Units Subcutaneous Nightly    clopidogrel  75 mg Per NG tube Daily    insulin lispro  0-18 Units Subcutaneous TID WC    insulin lispro  0-9 Units Subcutaneous Nightly    famotidine (PEPCID) injection  20 mg Intravenous BID    metoprolol tartrate  75 mg Oral BID    amLODIPine  5 mg Per NG tube Daily    furosemide  40 mg Oral Daily    cefTRIAXone (ROCEPHIN) IV  1 g Intravenous Q24H    QUEtiapine  50 mg Oral Nightly    sertraline  100 mg Oral Daily    enoxaparin  30 mg Subcutaneous Daily    rivastigmine  1 patch Transdermal Daily    sodium chloride  1,000 mL Intravenous Once    ipratropium-albuterol  1 ampule Inhalation Q4H WA    sodium chloride flush  10 mL Intravenous 2 times per day    aspirin  81 mg Oral Daily    Or    aspirin  300 mg Rectal Daily  atorvastatin  80 mg Oral Nightly     Continuous Infusions:   dextrose      sodium chloride 100 mL/hr at 12/18/20 0230     CBC:   Recent Labs     12/19/20  0427 12/20/20  0540 12/21/20  0320   WBC 7.9 6.8 6.9   HGB 11.0* 11.1* 11.0*    215 225     BMP:    Recent Labs     12/19/20  0427 12/20/20  0540 12/21/20  0320    141 137   K 4.3 4.5 4.5    103 100   CO2 24 29 27   BUN 14 19 22   CREATININE 0.49* 0.52 0.63   GLUCOSE 201* 201* 202*     Hepatic: No results for input(s): AST, ALT, ALB, BILITOT, ALKPHOS in the last 72 hours. Troponin: No results for input(s): TROPONINI in the last 72 hours. BNP: No results for input(s): BNP in the last 72 hours. Lipids: No results for input(s): CHOL, HDL in the last 72 hours. Invalid input(s): LDLCALCU  INR: No results for input(s): INR in the last 72 hours. Images were personally reviewed including:  Repeat CT head 12/17/2020  1. Interval increased extent and interval evolution of large right MCA acute   infarction with associated cerebral edema and partial effacement of the right   lateral ventricle. 2. Interval associated development of minimal leftward midline shift   measuring 2 mm at the level of the septum pellucidum. 3. No evidence of acute intracranial hemorrhage. 4. Moderate cerebral white matter chronic microvascular ischemic disease.             MRI brain 12/16/2020  Acute/subacute right MCA distribution infarct with mild associated edema and   subsequent mass effect upon the right lateral ventricle.      CTP brain 12/15/2020  1. Moderate volume acute ischemic infarct in the right middle cerebral artery   territory.  No intracranial hemorrhage or mass effect. 2. Moderate volume penumbra in the peripheral right middle cerebral artery   territory based on perfusion images. 3. Acute nearly occlusive thrombosis of the M1 and M2 segments right middle   cerebral artery. Marlin  Electronically signed 12/21/2020 at 10:00 AM

## 2020-12-21 NOTE — PROGRESS NOTES
[address]  Speech Language Pathology--Bedside Study Swallow    Date: 12/21/2020  Patient Name: Vimal Raphael  YOB: 1950   AGE: 78 y.o. MRN: [id number]        Patient Not Available for Speech Therapy     Due to:  [] Testing  [] Hemodialysis  [] Cancelled by RN  [] Surgery   [] Intubation/Sedation/Pain Medication  [] Medical instability  [x] Other: Pt. With wet/gurgly vocal quality. RN reports pt. Is being suctioned. Pt. Lethargic and requiring max verbal and tactile cues to remain awake and alert. Not appropriate for repeat bedside swallow study at this time. Next scheduled treatment: as appropriate    Completed by: Owen Lyn M.A.  CCC-SLP

## 2020-12-21 NOTE — PROGRESS NOTES
Speech Language Pathology  Speech Language Pathology  [facility] Treatment Note    Date: 12/21/2020  Patients Name: Zaida Randle  MRN: [id number]  Patient Active Problem List   Diagnosis Code    DM (diabetes mellitus) ([facility] E11.9    HTN (hypertension) I10    Smoker F17.200    Asthma J45.909    Knee osteoarthritis M17.10    Edema R60.9    Spinal stenosis in cervical region M48.02    Chest pain R07.9    YDUITH (obstructive sleep apnea) G47.33    [facility] E66.01    Knee pain, bilateral M25.561, M25.562    S/P TKR (total knee replacement) Z96.659    Urge incontinence of urine N39.41    Lumbar spondylosis M47.816    Cervical spondylosis M47.812    Chronic use of opiate drugs therapeutic purposes Z79.891    Chronic knee pain M25.569, G89.29    Hypertensive urgency I16.0    Acute coronary syndrome (HCC) I24.9    Congestive heart failure (HCC) I50.9    Lymphadenopathy R59.1    Chronic pain G89.29    Coronary artery disease involving native coronary artery without angina pectoris I25.10    Chronic prescription opiate use Z79.891    Type 2 diabetes mellitus with complication, without long-term current use of insulin (HCC) E11.8    S/P coronary artery stent placement - RCA 10/12/16 - Dr. Susan Lawrence Z95.5    Anxiety F41.9    Depression (emotion) F32.9    Postlaminectomy syndrome, lumbar M96.1    Medication monitoring encounter Z51.81    Postlaminectomy syndrome M96.1    Postlaminectomy syndrome M96.1    Primary osteoarthritis of right knee M17.11    Long term (current) use of antithrombotics/antiplatelets S84.11    Influenza B J10.1    Reactive airway disease with acute exacerbation J45. 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 Lethargic and required MAX verbal and tactile cues to remain awake and alert to complete tasks    Problem Solving/Reasoning: Opposites:  70% increased to 80% with mod verbal cues               Stating situational problems:  70% increased to 90% with min verbal cues              Multiple uses for objects:  50% increased to 80% with min verbal cues and choice of 2    Other: Pt. Continues with moderate-severe dysarthria. Increased intelligibility with known context. Left facial weakness. Plan:  [x] Continue ST services    [] Discharge from ST:      Discharge recommendations: [] Inpatient Rehab   [] [place]   [] Outpatient Therapy  [] Follow up at trauma clinic   [x] Other: Further therapy recommended at discharge. Treatment completed by: Michael Lucero M.A. CCC-SLP

## 2020-12-21 NOTE — PROGRESS NOTES
Comprehensive Nutrition Assessment    Type and Reason for Visit:  Reassess    Nutrition Recommendations/Plan: Continue diabetic TF at 55 mL/hr as tolerated. Nutrition Assessment:  Pt failed repeat swallow study today per RN. Tolerating TF at goal. +BM 12/19. Lantus increased today per RN. Malnutrition Assessment:  Malnutrition Status:  Insufficient data      Estimated Daily Nutrient Needs:  Energy (kcal):  8280-7085 kcal (15-18kcal/kg); Weight Used for Energy Requirements:  Current     Protein (g):  60-80 gm (1.2-1.4 gm/kg); Weight Used for Protein Requirements:  Ideal          Nutrition Related Findings:  Meds/labs reviewed      Wounds:  None       Current Nutrition Therapies:    Current Tube Feeding (TF) Orders:  · Feeding Route: Nasogastric  · Formula: Diabetic  · Schedule: Continuous  · Current TF & Flush Orders Provides: Glucerna 1.2 at 55 mL/hr = 1584 kcals, 79 gm protein  · Goal TF & Flush Orders Provides: as above      Anthropometric Measures:  · Height: 5' 4\" (162.6 cm)  · Current Body Weight: 231 lb 11.3 oz (105.1 kg)   · Ideal Body Weight: 120 lbs; % Ideal Body Weight 193.1 %   · BMI: 39.8  · BMI Categories: Obese Class 2 (BMI 35.0 -39.9)       Nutrition Diagnosis:   · Inadequate oral intake related to swallowing difficulty as evidenced by nutrition support - enteral nutrition      Nutrition Interventions:   Food and/or Nutrient Delivery:  Continue Current Tube Feeding  Nutrition Education/Counseling:  No recommendation at this time   Coordination of Nutrition Care:  Continue to monitor while inpatient    Goals:  Meet 100% of estimated nutrition needs       Nutrition Monitoring and Evaluation:   Behavioral-Environmental Outcomes:  None Identified   Food/Nutrient Intake Outcomes:  Enteral Nutrition Intake/Tolerance  Physical Signs/Symptoms Outcomes:  Chewing or Swallowing, Weight, Biochemical Data, Nutrition Focused Physical Findings     Discharge Planning:     Too soon to determine Electronically signed by Natalya Rivera MS, RD, LD on 12/21/20 at 12:01 PM EST    Contact: [phone]

## 2020-12-22 LAB
ABSOLUTE EOS #: 0.25 K/UL (ref 0–0.44)
ABSOLUTE IMMATURE GRANULOCYTE: 0.06 K/UL (ref 0–0.3)
ABSOLUTE LYMPH #: 1.47 K/UL (ref 1.1–3.7)
ABSOLUTE MONO #: 1.08 K/UL (ref 0.1–1.2)
ANION GAP SERPL CALCULATED.3IONS-SCNC: 10 MMOL/L (ref 9–17)
BASOPHILS # BLD: 1 % (ref 0–2)
BASOPHILS ABSOLUTE: 0.05 K/UL (ref 0–0.2)
BUN BLDV-MCNC: 22 MG/DL (ref 8–23)
BUN/CREAT BLD: ABNORMAL (ref 9–20)
CALCIUM IONIZED: 1.23 MMOL/L (ref 1.13–1.33)
CALCIUM SERPL-MCNC: 9.4 MG/DL (ref 8.6–10.4)
CHLORIDE BLD-SCNC: 99 MMOL/L (ref 98–107)
CO2: 29 MMOL/L (ref 20–31)
CREAT SERPL-MCNC: 0.53 MG/DL (ref 0.5–0.9)
DIFFERENTIAL TYPE: ABNORMAL
EOSINOPHILS RELATIVE PERCENT: 3 % (ref 1–4)
GFR AFRICAN AMERICAN: >60 ML/MIN
GFR NON-AFRICAN AMERICAN: >60 ML/MIN
GFR SERPL CREATININE-BSD FRML MDRD: ABNORMAL ML/MIN/{1.73_M2}
GFR SERPL CREATININE-BSD FRML MDRD: ABNORMAL ML/MIN/{1.73_M2}
GLUCOSE BLD-MCNC: 176 MG/DL (ref 65–105)
GLUCOSE BLD-MCNC: 176 MG/DL (ref 65–105)
GLUCOSE BLD-MCNC: 185 MG/DL (ref 65–105)
GLUCOSE BLD-MCNC: 191 MG/DL (ref 65–105)
GLUCOSE BLD-MCNC: 217 MG/DL (ref 70–99)
HCT VFR BLD CALC: 35.9 % (ref 36.3–47.1)
HEMOGLOBIN: 10.9 G/DL (ref 11.9–15.1)
IMMATURE GRANULOCYTES: 1 %
LYMPHOCYTES # BLD: 17 % (ref 24–43)
MAGNESIUM: 1.7 MG/DL (ref 1.6–2.6)
MCH RBC QN AUTO: 27.7 PG (ref 25.2–33.5)
MCHC RBC AUTO-ENTMCNC: 30.4 G/DL (ref 28.4–34.8)
MCV RBC AUTO: 91.3 FL (ref 82.6–102.9)
MONOCYTES # BLD: 12 % (ref 3–12)
NRBC AUTOMATED: 0 PER 100 WBC
PDW BLD-RTO: 13.2 % (ref 11.8–14.4)
PLATELET # BLD: 254 K/UL (ref 138–453)
PLATELET ESTIMATE: ABNORMAL
PMV BLD AUTO: 10.3 FL (ref 8.1–13.5)
POTASSIUM SERPL-SCNC: 4.6 MMOL/L (ref 3.7–5.3)
RBC # BLD: 3.93 M/UL (ref 3.95–5.11)
RBC # BLD: ABNORMAL 10*6/UL
SEG NEUTROPHILS: 66 % (ref 36–65)
SEGMENTED NEUTROPHILS ABSOLUTE COUNT: 6 K/UL (ref 1.5–8.1)
SODIUM BLD-SCNC: 138 MMOL/L (ref 135–144)
WBC # BLD: 8.9 K/UL (ref 3.5–11.3)
WBC # BLD: ABNORMAL 10*3/UL

## 2020-12-22 PROCEDURE — 2700000000 HC OXYGEN THERAPY PER DAY

## 2020-12-22 PROCEDURE — 80048 BASIC METABOLIC PNL TOTAL CA: CPT

## 2020-12-22 PROCEDURE — 36415 COLL VENOUS BLD VENIPUNCTURE: CPT

## 2020-12-22 PROCEDURE — 6370000000 HC RX 637 (ALT 250 FOR IP): Performed by: NURSE PRACTITIONER

## 2020-12-22 PROCEDURE — 6360000002 HC RX W HCPCS: Performed by: STUDENT IN AN ORGANIZED HEALTH CARE EDUCATION/TRAINING PROGRAM

## 2020-12-22 PROCEDURE — 6360000002 HC RX W HCPCS: Performed by: NURSE PRACTITIONER

## 2020-12-22 PROCEDURE — 2580000003 HC RX 258: Performed by: STUDENT IN AN ORGANIZED HEALTH CARE EDUCATION/TRAINING PROGRAM

## 2020-12-22 PROCEDURE — 82330 ASSAY OF CALCIUM: CPT

## 2020-12-22 PROCEDURE — 2060000000 HC ICU INTERMEDIATE R&B

## 2020-12-22 PROCEDURE — 97130 THER IVNTJ EA ADDL 15 MIN: CPT

## 2020-12-22 PROCEDURE — 94761 N-INVAS EAR/PLS OXIMETRY MLT: CPT

## 2020-12-22 PROCEDURE — 6370000000 HC RX 637 (ALT 250 FOR IP): Performed by: STUDENT IN AN ORGANIZED HEALTH CARE EDUCATION/TRAINING PROGRAM

## 2020-12-22 PROCEDURE — 2500000003 HC RX 250 WO HCPCS: Performed by: STUDENT IN AN ORGANIZED HEALTH CARE EDUCATION/TRAINING PROGRAM

## 2020-12-22 PROCEDURE — 83735 ASSAY OF MAGNESIUM: CPT

## 2020-12-22 PROCEDURE — 97530 THERAPEUTIC ACTIVITIES: CPT

## 2020-12-22 PROCEDURE — 82947 ASSAY GLUCOSE BLOOD QUANT: CPT

## 2020-12-22 PROCEDURE — APPNB45 APP NON BILLABLE 31-45 MINUTES: Performed by: NURSE PRACTITIONER

## 2020-12-22 PROCEDURE — 97129 THER IVNTJ 1ST 15 MIN: CPT

## 2020-12-22 PROCEDURE — 6370000000 HC RX 637 (ALT 250 FOR IP): Performed by: PSYCHIATRY & NEUROLOGY

## 2020-12-22 PROCEDURE — 94640 AIRWAY INHALATION TREATMENT: CPT

## 2020-12-22 PROCEDURE — 99221 1ST HOSP IP/OBS SF/LOW 40: CPT | Performed by: PHYSICAL MEDICINE & REHABILITATION

## 2020-12-22 PROCEDURE — 2580000003 HC RX 258: Performed by: NURSE PRACTITIONER

## 2020-12-22 PROCEDURE — 85025 COMPLETE CBC W/AUTO DIFF WBC: CPT

## 2020-12-22 PROCEDURE — 97110 THERAPEUTIC EXERCISES: CPT

## 2020-12-22 PROCEDURE — 99233 SBSQ HOSP IP/OBS HIGH 50: CPT | Performed by: PSYCHIATRY & NEUROLOGY

## 2020-12-22 RX ORDER — QUETIAPINE FUMARATE 25 MG/1
25 TABLET, FILM COATED ORAL NIGHTLY
Status: DISCONTINUED | OUTPATIENT
Start: 2020-12-22 | End: 2020-12-30 | Stop reason: HOSPADM

## 2020-12-22 RX ADMIN — AMLODIPINE BESYLATE 5 MG: 5 TABLET ORAL at 08:28

## 2020-12-22 RX ADMIN — QUETIAPINE FUMARATE 25 MG: 25 TABLET ORAL at 22:47

## 2020-12-22 RX ADMIN — FUROSEMIDE 40 MG: 40 TABLET ORAL at 08:28

## 2020-12-22 RX ADMIN — ENOXAPARIN SODIUM 30 MG: 100 INJECTION SUBCUTANEOUS at 08:31

## 2020-12-22 RX ADMIN — FAMOTIDINE 20 MG: 10 INJECTION INTRAVENOUS at 08:29

## 2020-12-22 RX ADMIN — IPRATROPIUM BROMIDE AND ALBUTEROL SULFATE 1 AMPULE: .5; 3 SOLUTION RESPIRATORY (INHALATION) at 20:54

## 2020-12-22 RX ADMIN — DOCUSATE SODIUM 100 MG: 50 LIQUID ORAL at 08:28

## 2020-12-22 RX ADMIN — IPRATROPIUM BROMIDE AND ALBUTEROL SULFATE 1 AMPULE: .5; 3 SOLUTION RESPIRATORY (INHALATION) at 12:04

## 2020-12-22 RX ADMIN — SERTRALINE 100 MG: 50 TABLET, FILM COATED ORAL at 08:28

## 2020-12-22 RX ADMIN — INSULIN LISPRO 2 UNITS: 100 INJECTION, SOLUTION INTRAVENOUS; SUBCUTANEOUS at 22:43

## 2020-12-22 RX ADMIN — CEFTRIAXONE SODIUM 1 G: 1 INJECTION, POWDER, FOR SOLUTION INTRAMUSCULAR; INTRAVENOUS at 17:47

## 2020-12-22 RX ADMIN — Medication 81 MG: at 08:28

## 2020-12-22 RX ADMIN — IPRATROPIUM BROMIDE AND ALBUTEROL SULFATE 1 AMPULE: .5; 3 SOLUTION RESPIRATORY (INHALATION) at 07:16

## 2020-12-22 RX ADMIN — METOPROLOL TARTRATE 75 MG: 25 TABLET ORAL at 08:28

## 2020-12-22 RX ADMIN — CLOPIDOGREL 75 MG: 75 TABLET, FILM COATED ORAL at 08:28

## 2020-12-22 RX ADMIN — METOPROLOL TARTRATE 75 MG: 25 TABLET ORAL at 22:47

## 2020-12-22 RX ADMIN — SODIUM CHLORIDE, PRESERVATIVE FREE 10 ML: 5 INJECTION INTRAVENOUS at 08:29

## 2020-12-22 RX ADMIN — SODIUM CHLORIDE, PRESERVATIVE FREE 10 ML: 5 INJECTION INTRAVENOUS at 22:54

## 2020-12-22 RX ADMIN — INSULIN LISPRO 3 UNITS: 100 INJECTION, SOLUTION INTRAVENOUS; SUBCUTANEOUS at 09:00

## 2020-12-22 RX ADMIN — ACETAMINOPHEN 650 MG: 325 TABLET ORAL at 22:53

## 2020-12-22 RX ADMIN — ATORVASTATIN CALCIUM 80 MG: 80 TABLET, FILM COATED ORAL at 22:47

## 2020-12-22 RX ADMIN — FAMOTIDINE 20 MG: 10 INJECTION INTRAVENOUS at 22:44

## 2020-12-22 RX ADMIN — INSULIN LISPRO 3 UNITS: 100 INJECTION, SOLUTION INTRAVENOUS; SUBCUTANEOUS at 17:49

## 2020-12-22 RX ADMIN — INSULIN LISPRO 3 UNITS: 100 INJECTION, SOLUTION INTRAVENOUS; SUBCUTANEOUS at 12:08

## 2020-12-22 RX ADMIN — IPRATROPIUM BROMIDE AND ALBUTEROL SULFATE 1 AMPULE: .5; 3 SOLUTION RESPIRATORY (INHALATION) at 15:43

## 2020-12-22 RX ADMIN — INSULIN GLARGINE 8 UNITS: 100 INJECTION, SOLUTION SUBCUTANEOUS at 22:46

## 2020-12-22 ASSESSMENT — PAIN SCALES - GENERAL
PAINLEVEL_OUTOF10: 5
PAINLEVEL_OUTOF10: 0

## 2020-12-22 ASSESSMENT — PAIN DESCRIPTION - LOCATION: LOCATION: HEAD

## 2020-12-22 ASSESSMENT — PAIN DESCRIPTION - PAIN TYPE: TYPE: ACUTE PAIN

## 2020-12-22 ASSESSMENT — PAIN DESCRIPTION - DESCRIPTORS: DESCRIPTORS: HEADACHE

## 2020-12-22 NOTE — CONSULTS
General Surgery:  Consult Note        PATIENT NAME: Zaida Randle   YOB: 1950    ADMISSION DATE: 12/15/2020  5:39 PM     Admitting Provider: Dr. [facility] Physician: Dr. Burns Age: 12/21/2020    Chief Complaint:  Right MCA stroke  Consult Regarding:  PEG tube placement     HISTORY OF PRESENT ILLNESS:  The patient is a 78 y.o. female with history of CAD, HTN, DM2, CHF EF 55%, and HLD ho was admitted on 12/15 after sustaining a right middle cerebral artery ischemic stroke. Patient was started on aspirin and Plavix. She is currently being treated for a UTI with Rocephin. General surgery was consulted for PEG tube placement. Patient is alert and oriented. She is tolerating goal tube feeds at 55ml/hr. She failed a bedside swallow study today. ECHO performed on 12/16 showed an EF of 55%. Past surgical history includes coronary angioplasty with stent x3, multiple knee surgeries and IVC filter placement for history of pulmonary emboli.       Past Medical History:        Diagnosis Date    Allergic rhinitis     Anxiety 10/25/2016    Asthma     CAD (coronary artery disease)     s/p stents RCA and LAD 2009,    Chronic back pain     Congestive heart failure ([facility]     Depression     Headache(784.0)     Hiatal hernia     Hypercholesteremia 2/21/2012    Hypertension     Kidney stones     years ago    Obesity     Osteoarthritis     Postlaminectomy syndrome 6/6/2012    Type II or unspecified type diabetes mellitus without mention of complication, not stated as uncontrolled     Unspecified sleep apnea     Urinary incontinence        Past Surgical History:        Procedure Laterality Date    CARDIAC CATHETERIZATION  01/2013    patent stents    COLONOSCOPY  09/2015    normal    CORONARY ANGIOPLASTY WITH STENT PLACEMENT  1012-16    stents x 3    JOINT REPLACEMENT      left knee    KNEE SURGERY  10/21/2013    rt knee synvisc injection     KNEE SURGERY  12/02/2013 knee synvisc injection rt #2    KNEE SURGERY  12/09/2013    rt knee synvisc inj    LUMBAR SPINE SURGERY  2007    NERVE BLOCK  4/23/2012    Right MBNB L3, L4, L5    NERVE BLOCK  5/22/2012    Right MBNB L3, L4, and L5     NERVE BLOCK  01/14/13    Right knee injection #1 - Synvisc    NERVE BLOCK  01/21/13    Right knee injection #2 - Synvisc    NERVE BLOCK  1/28/2013     Rigth knee synvisc injection #3    NERVE BLOCK Right 04/17/2017    Rt genicular nerve block. no steroid used    OTHER SURGICAL HISTORY Right 7/14/2014    synvisc one knee injection    OTHER SURGICAL HISTORY Right 3/16/2015    synvisc one knee injection    OTHER SURGICAL HISTORY Right 06-13-16    synvisc right knee injection    SPINE SURGERY      TONSILLECTOMY      UPPER GASTROINTESTINAL ENDOSCOPY      VENA CAVA FILTER PLACEMENT  2007    PE and B/L LE emboli       Medications Prior to Admission:   Medications Prior to Admission: rivastigmine (EXELON) 9.5 MG/24HR, Place 1 patch onto the skin daily  QUEtiapine (SEROQUEL) 25 MG tablet, Take 25 mg by mouth 2 times daily  magnesium oxide (MAG-OX) 400 MG tablet, Take 400 mg by mouth 2 times daily  fluticasone (FLONASE) 50 MCG/ACT nasal spray, 1 spray by Each Nostril route daily  pantoprazole (PROTONIX) 20 MG tablet, Take 20 mg by mouth daily  diclofenac sodium (VOLTAREN) 1 % GEL, Apply 2 g topically 4 times daily  cephALEXin (KEFLEX) 500 MG capsule, Take 1 capsule by mouth 2 times daily  Calcium Carbonate-Vitamin D (OYSTER SHELL CALCIUM/D) 500-200 MG-UNIT TABS, TAKE ONE TABLET BY MOUTH TWO TIMES A DAY  omeprazole (PRILOSEC) 20 MG delayed release capsule, TAKE 1 CAPSULE BY MOUTH ONCE DAILY.   glimepiride (AMARYL) 2 MG tablet, Take 1 tablet by mouth every morning  metoprolol (LOPRESSOR) 100 MG tablet, TAKE 1 TABLET BY MOUTH 2 TIMES DAILY  spironolactone (ALDACTONE) 50 MG tablet, Take 1 tablet by mouth daily  linagliptin (TRADJENTA) 5 MG tablet, Take 1 tablet by mouth daily isosorbide mononitrate (IMDUR) 30 MG extended release tablet, TAKE 1 TABLET BY MOUTH DAILY (Patient taking differently: 2 times daily TAKE 1 TABLET BY MOUTH DAILY)  cloNIDine (CATAPRES) 0.1 MG tablet, Take 1 tablet by mouth daily  meloxicam (MOBIC) 15 MG tablet, Take 1 tablet by mouth daily  amLODIPine (NORVASC) 10 MG tablet, Take 1 tablet by mouth daily  losartan (COZAAR) 100 MG tablet, Take 1 tablet by mouth daily  furosemide (LASIX) 40 MG tablet, Take 1 tablet by mouth daily  atorvastatin (LIPITOR) 80 MG tablet, TAKE 1 TABLET BY MOUTH DAILY  sertraline (ZOLOFT) 100 MG tablet, Take 1 tablet by mouth daily  clopidogrel (PLAVIX) 75 MG tablet, Take 1 tablet by mouth daily  aspirin (RA ASPIRIN EC) 81 MG EC tablet, Take 1 tablet by mouth daily  Incontinence Supply Disposable (INCONTINENCE BRIEF LARGE) MISC, Use 4-5/day as needed  glucose blood VI test strips (ASCENSIA AUTODISC VI;ONE TOUCH ULTRA TEST VI) strip, 1 each by In Vitro route 3 times daily As needed.   albuterol-ipratropium (COMBIVENT RESPIMAT)  MCG/ACT AERS inhaler, Inhale 1 puff into the lungs every 6 hours as needed for Wheezing  Lancets MISC, Use 1 -2 times daily Insulin dependent diabetes mellitus    Allergies:  Bactrim, Penicillins, and Tylenol [acetaminophen]    Social History:   Social History     Socioeconomic History    Marital status: Legally      Spouse name: Not on file    Number of children: Not on file    Years of education: Not on file    Highest education level: Not on file   Occupational History    Not on file   Social Needs    Financial resource strain: Not on file    Food insecurity     Worry: Not on file     Inability: Not on file    Transportation needs     Medical: Not on file     Non-medical: Not on file   Tobacco Use    Smoking status: Former Smoker     Packs/day: 0.50     Years: 0.00     Pack years: 0.00     Quit date: 3/28/2013     Years since quittin.7    Smokeless tobacco: Never Used Substance and Sexual Activity    Alcohol use: Yes     Alcohol/week: 1.0 standard drinks     Types: 1 Cans of beer per week     Comment: occasionally    Drug use: No    Sexual activity: Not on file   Lifestyle    Physical activity     Days per week: Not on file     Minutes per session: Not on file    Stress: Not on file   Relationships    Social connections     Talks on phone: Not on file     Gets together: Not on file     Attends [employer] service: Not on file     Active member of club or organization: Not on file     Attends meetings of clubs or organizations: Not on file     Relationship status: Not on file    Intimate partner violence     Fear of current or ex partner: Not on file     Emotionally abused: Not on file     Physically abused: Not on file     Forced sexual activity: Not on file   Other Topics Concern    Not on file   Social History Narrative    Not on file       Family History:       Problem Relation Age of Onset    Diabetes Mother     Cancer Father     High Blood Pressure Sister     High Blood Pressure Brother        REVIEW OF SYSTEMS:    CONSTITUTIONAL: Denies recent weight loss, fatigue, fevers, chills. HEENT: Denies rhinorrhea, dysphagia, odynphagia. CARDIOVASCULAR: Denies history of MI, recent chest pain. RESPIRATORY: History of PE in 2007  GASTROINTESTINAL: Denies constipation and diarrhea  GENITOURINARY: Denies increased frequency or dysuria. HEMATOLOGIC/LYMPHATIC: History of DVT  ENDOCRINE: Positive diabetes mellitus type 2. Review of systems negative unless listed above.     PHYSICAL EXAM:    VITALS:  BP (!) 167/78   Pulse 72   Temp 98.4 °F (36.9 °C) (Oral)   Resp 21   Ht 5' 4\" (1.626 m)   Wt 231 lb 9.6 oz (105.1 kg)   LMP  (LMP Unknown)   SpO2 97%   BMI 39.75 kg/m²   INTAKE/OUTPUT:     Intake/Output Summary (Last 24 hours) at 12/21/2020 1942  Last data filed at 12/21/2020 1810  Gross per 24 hour   Intake 1698 ml   Output 1100 ml   Net 598 ml CONSTITUTIONAL:  awake, somnolent, not distressed and moderately obese  HEENT: Normocephalic/atraumatic, without obvious abnormality. NGT  NECK:  Supple, symmetrical, trachea midline   CARDIOVASCULAR: Regular rate and rhythm   LUNGS: Audible expiratory wheeze, symmetrical rise and fall of chest  ABDOMEN: soft, non-tender, non-distended  MUSCULOSKELETAL: Muscle strength 0/5 on left side, moving right side spontaneously    NEUROLOGIC: Follows commands with right upper and lower extremities, withdrawals to painful stimuli on the left. SKIN: No cyanosis, rashes, or edema noted.    Orientation:   oriented to person, place, and time    IV access: PIV  Purwick in place    CBC with Differential:    Lab Results   Component Value Date    WBC 6.9 12/21/2020    RBC 3.95 12/21/2020    RBC 4.21 02/24/2012    HGB 11.0 12/21/2020    HCT 36.1 12/21/2020     12/21/2020     02/24/2012    MCV 91.4 12/21/2020    MCH 27.8 12/21/2020    MCHC 30.5 12/21/2020    RDW 13.4 12/21/2020    LYMPHOPCT 20 12/21/2020    MONOPCT 12 12/21/2020    BASOPCT 0 12/21/2020    MONOSABS 0.84 12/21/2020    LYMPHSABS 1.39 12/21/2020    EOSABS 0.24 12/21/2020    BASOSABS <0.03 12/21/2020    DIFFTYPE NOT REPORTED 12/21/2020     BMP:    Lab Results   Component Value Date     12/21/2020    K 4.5 12/21/2020     12/21/2020    CO2 27 12/21/2020    BUN 22 12/21/2020    LABALBU 4.1 04/05/2019    LABALBU 4.2 02/24/2012    CREATININE 0.63 12/21/2020    CALCIUM 9.1 12/21/2020    GFRAA >60 12/21/2020    LABGLOM >60 12/21/2020    GLUCOSE 202 12/21/2020    GLUCOSE 105 02/24/2012       Pertinent Radiology:   Echo Complete 2d W Doppler W Color    Result Date: 12/16/2020 [id number] Transthoracic Echocardiography Report (TTE)  Patient Name Jodi Dougherty    Date of Study               12/16/2020               SANDER   Date of      1950  Gender                      Female  Birth   Age          78 year(s)  Race                        Black   Room Number  8611        Height:                     62 inch, 157.48 cm   Corporate ID [id number]    Weight:                     250 pounds, 113.4 kg  #   Patient Acct [de-identified]   BSA:          2.1 m^2       BMI:      45.73  #                                                              kg/m^2   MR #         [id number]     [facility]   Accession #  [id number]  Interpreting Physician      [facility]   Fellow                   Referring Nurse                           Practitioner   Interpreting             Referring Physician         Saeid Guaman MD  Fellow  Type of Study   TTE procedure:2D Echocardiogram, M-Mode, Doppler, Color Doppler, Bubble  Study. Procedure Date Date: 12/16/2020 Start: 10:27 AM Study Location: [facility] Technical Quality: Fair visualization Indications:CVA. History / Tech. Comments: Procedure explained to patient. Study done bedside in Neuro ICU. H/O DM, YUDITH, morbid obesity Patient Status: Inpatient Height: 62 inches Weight: 250.01 pounds BSA: 2.1 m^2 BMI: 45.73 kg/m^2 Allergies   - Other:(Drug Allergies - bactrim, tylenol). CONCLUSIONS Summary Technically difficult, not all walls visualized. Left ventricle is normal in size. Global left ventricular systolic function is low normal. Estimated ejection fraction is 50 % . Moderate to severe left ventricular hypertrophy. Increased septal thickness is noted. Grade I (mild) left ventricular diastolic dysfunction. Left atrium is moderately dilated. Negative bubble study, no shunt noted via injection of agitated saline. Thickened mitral valve leaflets. Mitral annular calcification is seen. At least mild tricuspid regurgitation. Estimated right ventricular systolic pressure is 30 mmHg. Signature ----------------------------------------------------------------------------  Electronically signed by Coleen Nolan(Sonographer) on 12/16/2020  01:15 PM ---------------------------------------------------------------------------- ----------------------------------------------------------------------------  Electronically signed by Justin Ndiaye(Interpreting physician) on 12/16/2020  01:22 PM ---------------------------------------------------------------------------- FINDINGS Left Atrium Left atrium is moderately dilated. Inter-atrial septum appears so be intact. No shunt seen by color Doppler. Negative bubble study, no shunt noted via injection of agitated saline. Left Ventricle Technically difficult, not all walls visualized. Left ventricle is normal in size. Global left ventricular systolic function is low normal. Estimated ejection fraction is 50 % . Moderate to severe left ventricular hypertrophy. Increased septal thickness is noted. Grade I (mild) left ventricular diastolic dysfunction. Right Atrium Right atrium is normal size . Right Ventricle Normal right ventricular size and function. Mitral Valve Thickened mitral valve leaflets. Mitral annular calcification is seen. No mitral regurgitation. No mitral stenosis. Aortic Valve Aortic valve structure and function normal. Aortic valve is trileaflet. No aortic insufficiency. Tricuspid Valve Normal tricuspid valve leaflets. At least mild tricuspid regurgitation. No tricuspid stenosis. Estimated right ventricular systolic pressure is 30 mmHg. Pulmonic Valve Pulmonic valve is normal in structure and function. No pulmonic insufficiency. No evidence of pulmonic stenosis. Pericardial Effusion No significant pericardial effusion is seen. Miscellaneous Normal aortic root dimension. E/E' average = 19.4. IVC not well visualized.  M-mode / 2D Measurements & Calculations:   LVIDd:4.9 cm(3.7 - 5.6 cm) Diastolic AMEZFD:331 ml  UFUEK:8.7 cm(2.2 - 4.0 cm)       Aortic Root:2.8 cm(2.0 - 3.7 cm)  IVSd:1.72 cm(0.6 - 1.1 cm)       LA Dimension: 3.8 cm(1.9 - 4.0 cm)  LVPWd:1.39 cm(0.6 - 1.1 cm)      LA volume/Index: 85.5 ml /41m^2  Fractional Shortenin.53 %    LVOT:2.2 cm                                   RVDd:3.1 cm   Mitral:                                 Aortic   Valve Area (P1/2-Time): 2.34 cm^2       Peak Velocity: 2.25 m/s  Peak E-Wave: 0.84 m/s                   Mean Velocity: 1.34 m/s  Peak A-Wave: 1.30 m/s                   Peak Gradient: 20.25 mmHg  E/A Ratio: 0.64                         Mean Gradient: 9 mmHg  Peak Gradient: 2.8 mmHg  Mean Gradient: 2 mmHg  Deceleration Time: 401 msec             Area (continuity): 2.52 cm^2  P1/2t: 94 msec                          AV VTI: 45.2 cm   Area (continuity): 3.61 cm^2  Mean Velocity: 0.70 m/s   Tricuspid:                              Pulmonic:   Estimated RVSP: 30 mmHg                 Peak Velocity: 1.32 m/s  Peak TR Velocity: 2.50 m/s              Peak Gradient: 6.97 mmHg  Peak TR Gradient: 25 mmHg  Estimated RA Pressure: 5 mmHg                                           Estimated PASP: 30 mmHg  Diastology / Tissue Doppler Septal Wall E' velocity:0.03 m/s Septal Wall E/E':24.8 Lateral Wall E' velocity:0.06 m/s Lateral Wall E/E':14    Ct Head Wo Contrast    Addendum Date: 12/15/2020    ADDENDUM: The full set of diagnostic perfusion images were sent for review at 7:15 p.m. on 12/15/2020 and demonstrate large volume elevated M TT in the visualized right middle cerebral artery territory with elevated relative cerebral blood volume and decreased relative cerebral blood flow.      Result Date: 12/15/2020 EXAMINATION: CT OF THE HEAD WITHOUT CONTRAST; CTA OF THE HEAD AND NECK WITH CONTRAST; CTA OF THE HEAD WITH CONTRAST WITH PERFUSION 12/15/2020 5:53 pm; 12/15/2020 6:19 pm; 12/15/2020 6:18 pm: TECHNIQUE: CT of the head was performed without the administration of intravenous contrast. Dose modulation, iterative reconstruction, and/or weight based adjustment of the mA/kV was utilized to reduce the radiation dose to as low as reasonably achievable.; CTA of the head and neck was performed with the administration of intravenous contrast. Multiplanar reformatted images are provided for review. MIP images are provided for review. Stenosis of the internal carotid arteries measured using NASCET criteria. Dose modulation, iterative reconstruction, and/or weight based adjustment of the mA/kV was utilized to reduce the radiation dose to as low as reasonably achievable.; CTA of the head/brain was performed with the administration of intravenous contrast. Multiplanar reformatted images are provided for review. MIP images are provided for review. Dose modulation, iterative reconstruction, and/or weight based adjustment of the mA/kV was utilized to reduce the radiation dose to as low as reasonably achievable. Noncontrast CT of the head with reconstructed 2-D images are also provided for review. COMPARISON: None.  HISTORY: ORDERING SYSTEM PROVIDED HISTORY: left sided weakness and right gaze TECHNOLOGIST PROVIDED HISTORY: left sided weakness and right gaze Reason for Exam: left sided weakness and right gaze; ORDERING SYSTEM PROVIDED HISTORY: left sided weakness and right gaze TECHNOLOGIST PROVIDED HISTORY: left sided weakness and right gaze Reason for Exam: stroke  left sided weakness and right gaze Acuity: Acute Type of Exam: Initial; ORDERING SYSTEM PROVIDED HISTORY: Left sided weakness right gaze TECHNOLOGIST PROVIDED HISTORY: Left sided weakness right gaze FINDINGS: CT HEAD: BRAIN/VENTRICLES:  Confluent loss of gray-white ADDENDUM: The full set of diagnostic perfusion images were sent for review at 7:15 p.m. on 12/15/2020 and demonstrate large volume elevated M TT in the visualized right middle cerebral artery territory with elevated relative cerebral blood volume and decreased relative cerebral blood flow.      Result Date: 12/15/2020 carotid arteries. Near complete occlusion of the distal M1 and proximal M2 segments right middle cerebral artery with paucity of contrast in more distal branches. No focal stenosis of the right anterior cerebral artery. Severe stenosis of the proximal A1 segment left anterior cerebral artery. Patent anterior communicating artery. Severe stenosis of the M1 segment left middle cerebral artery. No aneurysm. POSTERIOR CIRCULATION: Moderate stenosis of the proximal intracranial left vertebral artery. No right vertebral, basilar, or left posterior cerebral artery stenosis. Moderate stenosis of the P2 segment right posterior cerebral artery. OTHER: No dural venous sinus thrombosis on this non-dedicated study. CT PERFUSION: There is increased mean transit time in the visualized right middle cerebral artery territory somewhat greater in extent than the underlying hypodensity on head CT. Moderate volume underlying core infarct is noted. 1. Moderate volume acute ischemic infarct in the right middle cerebral artery territory. No intracranial hemorrhage or mass effect. 2. Moderate volume penumbra in the peripheral right middle cerebral artery territory based on perfusion images. 3. Acute nearly occlusive thrombosis of the M1 and M2 segments right middle cerebral artery. 4. Severe stenosis of the M1 segment left middle cerebral artery. 5. Severe stenosis of the A1 segment left anterior cerebral artery. 6. 75% stenosis of the proximal left internal carotid secondary to vessel kinking and superimposed atherosclerotic plaque. 7. 40% stenosis of the proximal right internal carotid artery. 8. Moderate stenosis of the V4 segment left vertebral artery. Critical findings were discussed with Dr. Franklyn Martinez at 6:25 p.m. on 12/15/2020.      Xr Abdomen For Ng/og/ne Tube Placement    Result Date: 12/16/2020 carotid arteries. Near complete occlusion of the distal M1 and proximal M2 segments right middle cerebral artery with paucity of contrast in more distal branches. No focal stenosis of the right anterior cerebral artery. Severe stenosis of the proximal A1 segment left anterior cerebral artery. Patent anterior communicating artery. Severe stenosis of the M1 segment left middle cerebral artery. No aneurysm. POSTERIOR CIRCULATION: Moderate stenosis of the proximal intracranial left vertebral artery. No right vertebral, basilar, or left posterior cerebral artery stenosis. Moderate stenosis of the P2 segment right posterior cerebral artery. OTHER: No dural venous sinus thrombosis on this non-dedicated study. CT PERFUSION: There is increased mean transit time in the visualized right middle cerebral artery territory somewhat greater in extent than the underlying hypodensity on head CT. Moderate volume underlying core infarct is noted. 1. Moderate volume acute ischemic infarct in the right middle cerebral artery territory. No intracranial hemorrhage or mass effect. 2. Moderate volume penumbra in the peripheral right middle cerebral artery territory based on perfusion images. 3. Acute nearly occlusive thrombosis of the M1 and M2 segments right middle cerebral artery. 4. Severe stenosis of the M1 segment left middle cerebral artery. 5. Severe stenosis of the A1 segment left anterior cerebral artery. 6. 75% stenosis of the proximal left internal carotid secondary to vessel kinking and superimposed atherosclerotic plaque. 7. 40% stenosis of the proximal right internal carotid artery. 8. Moderate stenosis of the V4 segment left vertebral artery. Critical findings were discussed with Dr. Goldie Lou at 6:25 p.m. on 12/15/2020.      Mri Brain Wo Contrast    Result Date: 12/16/2020 EXAMINATION: MRI OF THE BRAIN WITHOUT CONTRAST  12/16/2020 9:49 am TECHNIQUE: Multiplanar multisequence MRI of the brain was performed without the administration of intravenous contrast. COMPARISON: CT brain performed 12/15/2020. HISTORY: ORDERING SYSTEM PROVIDED HISTORY: R MCA stroke, evaluate stroke burden and hemorrhagic conversion TECHNOLOGIST PROVIDED HISTORY: R MCA stroke, evaluate stroke burden and hemorrhagic conversion FINDINGS: INTRACRANIAL STRUCTURES/VENTRICLES: The sellar and suprasellar structures, optic chiasm, corpus callosum, pineal gland, tectum, and midline brainstem structures are unremarkable. The craniocervical junction is unremarkable. There is no acute hemorrhage or midline shift. There is restricted diffusion and associated FLAIR signal abnormality throughout the right MCA distribution consistent with acute/subacute ischemia. There is associated mild edema and mass effect upon the right lateral ventricle. There is underlying chronic microvascular disease. The ventricular structures are otherwise unremarkable. The infratentorial structures including the cerebellopontine angles and internal auditory canals are unremarkable. There is no abnormal blooming artifact on susceptibility weighted imaging. ORBITS: The visualized portion of the orbits demonstrate no acute abnormality. SINUSES: The visualized paranasal sinuses and mastoid air cells are well aerated. BONES/SOFT TISSUES: The bone marrow signal intensity appears normal. The soft tissues demonstrate no acute abnormality. Acute/subacute right MCA distribution infarct with mild associated edema and subsequent mass effect upon the right lateral ventricle.          ASSESSMENT:  Active Hospital Problems    Diagnosis Date Noted    Ischemic cerebral stroke due to intracranial large artery atherosclerosis ([address] [I63.59]     Acute cerebrovascular accident (CVA) ([address] [I63.9]     Right middle cerebral artery stroke ([address] [I63.511]  Non-traumatic rhabdomyolysis [M62.82]     Cerebrovascular accident (CVA) due to embolic occlusion of right middle cerebral artery ([facility] [I63.411] 12/15/2020    S/P coronary artery stent placement - RCA 10/12/16 - Dr. Tyrell Maynard [Z95.5] 10/12/2016    Mor[facility] [E66.01] 04/03/2014    YDUITH (obstructive sleep apnea) [G47.33] 04/02/2014    DM (diabetes mellitus) ([facility] [E11.9] 02/21/2012       1. Tia Horn 77 yo F who presents with a right MCA ischemic stroke resulting in dysphagia and left sided hemiplegia. Plan:  1. Continue medical mgmt and supportive care per primary  2. Plan for PEG tube placement in next few days, will update with exact timing. Consent was obtain over the phone with daughter. All risks and complications of the procedure was explain, daughter is agreeable to PEG tube placement     Electronically signed by Bert Gonzalez MD  on 12/21/2020 at 7:42 PM     Attending Physician Statement  I have discussed the case with Dr Daryl Beckwith, including pertinent history and exam findings with the resident. I have seen and examined the patient and the key elements of the encounter have been performed by me. I agree with the assessment, plan and orders as documented by the resident.       Electronically signed by Mekhi Weiss IV, DO  on 12/24/2020 at 9:39 AM

## 2020-12-22 NOTE — CARE COORDINATION
Called patient's daughter Ru Dumont. Informed her that I will need a SNF choice as the physicians may discharge her and bring her back for the PEG on Monday. She states that her and her sister have been discussing it but no finalized plan. She tells me that she will call her sister and call me back.

## 2020-12-22 NOTE — PROGRESS NOTES
12/17: CT head this morning stable, no hemorrhagic conversion or significant cerebral edema. Lopressor 50 mg BID started, home medication. 12/18: Low urine output despite fluids.  Resumed home Lasix with improvement of urine output.  Pavon placed to monitor.  One-time fever overnight.  UA consistent with infection, culture pending. Started on abx.   12/20: Requiring frequent suctioning throughout night. Decreased urine output overnight, only 400cc total. Urinary retention this morning; straight cath x1. Tube feeds at goal. Total fluids 75cc/h. Plavix started for intracranial atherosclerosis. 12/21: Patient having difficulty managing oral secretions and requiring oral suctioning overnight. One time low grade fever overnight, 37.8C. CXR with no acute infiltrate. Continues on Rocephin for UTI. Failed speech bedside swallow. Gen Surg consulted for PEG. Last 24h:  No acute events overnight. Patient drowsy this morning. Will change Seroquel to 25mg QHS PRN. Clinical exam remains stable. General Surgery following for PEG, timing to be determined. PM&R feels patient is not a candidate for acute inpatient rehab.       CURRENT MEDICATIONS:  SCHEDULED MEDICATIONS:   senna  5 mL Oral Nightly    docusate  100 mg Oral Daily    insulin glargine  8 Units Subcutaneous Nightly    clopidogrel  75 mg Per NG tube Daily    insulin lispro  0-18 Units Subcutaneous TID     insulin lispro  0-9 Units Subcutaneous Nightly    famotidine (PEPCID) injection  20 mg Intravenous BID    metoprolol tartrate  75 mg Oral BID    amLODIPine  5 mg Per NG tube Daily    furosemide  40 mg Oral Daily    cefTRIAXone (ROCEPHIN) IV  1 g Intravenous Q24H    QUEtiapine  50 mg Oral Nightly    sertraline  100 mg Oral Daily    enoxaparin  30 mg Subcutaneous Daily    rivastigmine  1 patch Transdermal Daily    sodium chloride  1,000 mL Intravenous Once    ipratropium-albuterol  1 ampule Inhalation Q4H WA  sodium chloride flush  10 mL Intravenous 2 times per day    aspirin  81 mg Oral Daily    Or    aspirin  300 mg Rectal Daily    atorvastatin  80 mg Oral Nightly     CONTINUOUS INFUSIONS:   dextrose       PRN MEDICATIONS:   labetalol, glucose, dextrose, glucagon (rDNA), dextrose, acetaminophen, sodium chloride flush, promethazine **OR** ondansetron    VITALS:  Temperature Range: Temp: 99.2 °F (37.3 °C) Temp  Av.2 °F (37.3 °C)  Min: 98.4 °F (36.9 °C)  Max: 99.9 °F (37.7 °C)  BP Range: Systolic (76JFF), KXE:807 , Min:115 , VCH:490     Diastolic (96DFB), DLJ:68, Min:47, Max:81    Pulse Range: Pulse  Av.6  Min: 58  Max: 84  Respiration Range: Resp  Av.3  Min: 17  Max: 24  Current Pulse Ox: SpO2: 99 %  24HR Pulse Ox Range: SpO2  Av.2 %  Min: 93 %  Max: 100 %  Patient Vitals for the past 12 hrs:   BP Temp Pulse Resp SpO2   20 0600 (!) 166/69  77 22 99 %   20 0500 (!) 167/76  70 18 98 %   20 0400  99.2 °F (37.3 °C) 64 20 97 %   20 0300 (!) 146/58  75 19 97 %   20 0200 136/60  70 23 98 %   20 0100 (!) 164/71  63 19 99 %   20 0000 (!) 136/49 98.9 °F (37.2 °C) 61 19 98 %   20 2300 (!) 148/54  65 20 98 %   20 2200 (!) 153/62  62 20 100 %   20 2100 (!) 115/47  58 19 99 %   20 2000 (!) 194/74 99.9 °F (37.7 °C) 75 20 96 %   20 1900 (!) 167/78  72 21      Estimated body mass index is 39.75 kg/m² as calculated from the following:    Height as of this encounter: 5' 4\" (1.626 m).     Weight as of this encounter: 231 lb 9.6 oz (105.1 kg).  []<16 Severe malnutrition  []1616.99 Moderate malnutrition  []1718.49 Mild malnutrition  []18.524.9 Normal  []2529.9 Overweight (not obese)  []3034.9 Obese class 1 (Low Risk)  [x]3539.9 Obese class 2 (Moderate Risk)  []?40 Obese class 3 (High Risk)    RECENT LABS:   Lab Results   Component Value Date    WBC 8.9 2020    HGB 10.9 (L) 2020    HCT 35.9 (L) 2020  12/22/2020    CHOL 188 04/05/2019    TRIG 177 (H) 04/05/2019    HDL 48 12/16/2020    ALT 11 04/05/2019    AST 15 04/05/2019     12/22/2020    K 4.6 12/22/2020    CL 99 12/22/2020    CREATININE 0.53 12/22/2020    BUN 22 12/22/2020    CO2 29 12/22/2020    TSH 1.38 12/19/2018    INR 1.0 12/15/2020    LABA1C 6.8 (H) 12/16/2020    LABMICR 39 (H) 12/19/2018     24 HOUR INTAKE/OUTPUT:    Intake/Output Summary (Last 24 hours) at 12/22/2020 0659  Last data filed at 12/22/2020 0400  Gross per 24 hour   Intake 1491 ml   Output 900 ml   Net 591 ml       IMAGING:   Xr Chest Portable  Result Date: 12/21/2020  No acute cardiopulmonary disease. Cardiomegaly with central vascular congestion with no overt failure or focal infiltrate. Mri Brain Wo Contrast  Result Date: 12/16/2020  Acute/subacute right MCA distribution infarct with mild associated edema and subsequent mass effect upon the right lateral ventricle. Labs and Images reviewed with:  [] Dr. Miranda Plata. [place]    [] Dr. Hernandez Merrill  [x] Dr. Sunil Anne  [] There are no new interval images to review. PHYSICAL EXAM       CONSTITUTIONAL:  Drowsy but awakens to voice. Oriented x3 but forgetful and slow to respond. Following commands. Mild intermittent dysarthria, no aphasia. HEAD:  normocephalic, atraumatic    EYES:  PERRL, Forced right gaze deviation   ENT:  moist mucous membranes   NECK:  supple, symmetric   LUNGS:  Equal air entry bilaterally, expiratory wheezes   CARDIOVASCULAR:  normal s1 / s2, RRR, distal pulses intact   ABDOMEN:  Soft, no rigidity, normal bowel sounds    NEUROLOGIC:  Mental Status:  Drowsy but awakens to voice. Oriented x3 but forgetful and slow to respond. Following commands.                Cranial Nerves:    II: Visual fields:  abnormal - absent blink to threat on the left  III: Pupils:  equal, round, reactive to light  III,IV,VI: Extra Ocular Movements: abnormal - right VII: Facial strength: abnormal - left facial droop    Motor Exam:      5/5 strength right upper extremity. 4/5 strength right lower extremity, able to antigravity. 0/5 strength left upper extremity, no movement to pain. 1/5 strength left lower extremity, withdraws to pain. Sensory:    Touch:    Right Upper Extremity:  normal  Left Upper Extremity:  abnormal - appears severely decreased  Right Lower Extremity:  normal  Left Lower Extremity:  abnormal - appears severely decreased       DRAINS:  [x] There are no drains for Neuro Critical Care to monitor at this time. ASSESSMENT AND PLAN:       The patient is a 77 yo female with a history of DM 2, HTN, YUDITH, CAD s/p stents, CHF, back pain and smoking who was admitted to the Neuro ICU for management of a large right MCA territory infarction likely secondary to intracranial atherosclerosis.       NEUROLOGIC:  - Large right MCA territory infarction  - Etiology likely secondary to intracranial atherosclerosis  - Continue Aspirin 81mg QD, Plavix 75mg QD (held in preparation for PEG) and Lipitor 80mg QHS for secondary stroke prevention  - Goal SBP<180  - Neuro checks per protocol    CARDIOVASCULAR:  - Goal SBP<180  - Avoid symptomatic hypotension in setting of severe ICAD  - Slowly normalize blood pressures over next 1-2 weeks  - Continue home Norvasc 5mg QD, Lasix 40mg QD, Lopressor 75mg BID  - Echo EF 50%, negative bubble, grade I diastolic dysfunction  - Mixed hyperlipidemia; , cholesterol 220  - Lipitor 80mg QHS  - Continue telemetry    PULMONARY:  - Maintaining O2 sats on nasal canula  - Appears to be managing secretions better today, continue to monitor closely  - Oral suctioning PRN  - History of smoking  - Duonebs PRN    RENAL/FLUID/ELECTROLYTE:  - Normal renal functioning  - BUN 22/ Creatinine 0.53  - Monitor I&O; 1491/900  - Continue home Lasix 40mg QD  - Stop maintenance IVF  - Replace electrolytes PRN  - Daily BMP    GI/NUTRITION:  NUTRITION: DIET TUBE FEED CONTINUOUS/CYCLIC NPO; Diabetic; Nasogastric; Continuous; 20; 55   - Dysphagia secondary to stroke  - Failed speech bedside swallow x2 (most recently 12/21)  - General Surgery following for PEG, timing TBD  - NG in place, tolerating tube feeds at goal  - Small bowel movement 12/20  - Bowel regimen: Senna&Colace daily  - GI prophylaxis: Pepcid    ID:  - Afebrile, Tmax 37.7C  - No leukocytosis, WBC 8.9  - Acute E.coli UTI, continue Rocephin day 5/7  - CXR 12/21 with no acute infiltrate  - Continue to monitor for fevers  - Daily CBC    HEME:   - H&H 10.9/35.9  - Platelets 080  - Daily CBC    ENDOCRINE:  - Continue to monitor blood glucose, goal <180  - Type 2 diabetes  - Hemoglobin A1C 6.8  - Hyperglycemia improving  - Continue Lantus 8u QHS and high insulin sliding scale    OTHER:  - PT/OT/ST  - PM&R consulted; not a candidate for ARU  - Code Status: FULL    PROPHYLAXIS:  Stress ulcer: H2 blocker    DVT PROPHYLAXIS:  - SCD sleeves - Thigh High   - Lovenox    DISPOSITION:  [x] OK for transfer to stepdown. We will sign off to the General Neurology team.    For any changes in exam or patient status please contact Neuro Critical Care.       Thelda Cabot, APRN - [place]  Neuro Critical Care  Pager [phone]  12/22/2020     6:59 AM

## 2020-12-22 NOTE — CONSULTS
Respiratory: negative for cough and shortness of breath  Cardiovascular: negative for chest pain, dyspnea and palpitations  Gastrointestinal: negative for abdominal pain, change in bowel habits, constipation, nausea and vomiting  Genitourinary:negative for dysuria, frequency, hesitancy and urinary incontinence  Integument/breast: negative for pruritus and rash  Musculoskeletal:negative for muscle weakness and stiff joints  Neurological: negative for dizziness, headaches and weakness  Behavioral/Psych: negative for decreased appetite, depression and fatigue    Functional History:  PTA: Independent with all activities. Current:  PT:       Restrictions/Precautions: General Precautions, Fall Risk, Up as Tolerated  Other position/activity restrictions: SBP goal <220     Bed mobility  Rolling to Left: 2 Person assistance;Dependent/Total  Rolling to Right: 2 Person assistance;Dependent/Total  Supine to Sit: Dependent/Total(HOB completely elevated)  Sit to Supine: 2 Person assistance;Dependent/Total  Scootin Person assistance;Dependent/Total      Transfers  Sit to Stand: Unable to assess  Stand to sit: Unable to assess  Bed to Chair: Unable to assess  Stand Pivot Transfers: Unable to assess  Comment: Unable to assess transfers. Transfers  Sit to Stand: Unable to assess  Stand to sit: Unable to assess  Bed to Chair: Unable to assess  Stand Pivot Transfers: Unable to assess  Comment: Unable to assess transfers. Ambulation  Ambulation?: No       OT:   ADL  Grooming: Maximum assistance;Setup;Verbal cueing; Increased time to complete(face washing completed seated at EOB utilizing RUE with [place] assist)  LE Dressing: Dependent/Total(to doff/don socks)  Additional Comments: Pt following approx <25%of commands during session req increased assist and cues  Balance  Sitting Balance: Maximum assistance(Pt tolerated approx 10 min at EOB during LUE WB)  Standing Balance: Unable to assess(comment)      ST:     Attention: Pt. Lethargic and required MAX verbal and tactile cues to remain awake and alert to complete tasks     Problem Solving/Reasoning: Opposites:  70% increased to 80% with mod verbal cues                          Stating situational problems:  70% increased to 90% with min verbal cues                         Multiple uses for objects:  50% increased to 80% with min verbal cues and choice of 2     Other: Pt. Continues with moderate-severe dysarthria. Increased intelligibility with known context.   Left facial weakness    Past Medical History:        Diagnosis Date    Allergic rhinitis     Anxiety 10/25/2016    Asthma     CAD (coronary artery disease)     s/p stents RCA and LAD 2009,    Chronic back pain     Congest[facility] failure ([facility]     Depression     Headache(784.0)     Hiatal hernia     Hypercholesteremia 2/21/2012    Hypertension     Kidney stones     years ago    Obesity     Osteoarthritis     Postlaminectomy syndrome 6/6/2012    Type II or unspecified type diabetes mellitus without mention of complication, not stated as uncontrolled     Unspecified sleep apnea     Urinary incontinence        Past Surgical History:        Procedure Laterality Date    CARDIAC CATHETERIZATION  01/2013    patent stents    COLONOSCOPY  09/2015    normal    CORONARY ANGIOPLASTY WITH STENT PLACEMENT  1012-16    stents x 3    JOINT REPLACEMENT      left knee    KNEE SURGERY  10/21/2013    rt knee synvisc injection     KNEE SURGERY  12/02/2013    knee synvisc injection rt #2    KNEE SURGERY  12/09/2013    rt knee synvisc inj    LUMBAR SPINE SURGERY  2007    NERVE BLOCK  4/23/2012    Right MBNB L3, L4, L5    NERVE BLOCK  5/22/2012    Right MBNB L3, L4, and L5     NERVE BLOCK  01/14/13    Right knee injection #1 - Synvisc    NERVE BLOCK  01/21/13    Right knee injection #2 - Synvisc    NERVE BLOCK  1/28/2013     [facility] knee synvisc injection #3    NERVE BLOCK Right 04/17/2017 Rt genicular nerve block. no steroid used    OTHER SURGICAL HISTORY Right 7/14/2014    synvisc one knee injection    OTHER SURGICAL HISTORY Right 3/16/2015    synvisc one knee injection    OTHER SURGICAL HISTORY Right 06-13-16    synvisc right knee injection    SPINE SURGERY      TONSILLECTOMY      UPPER GASTROINTESTINAL ENDOSCOPY      VENA CAVA FILTER PLACEMENT  2007    PE and B/L LE emboli       Allergies:     Allergies   Allergen Reactions    Bactrim     Penicillins     Tylenol [Acetaminophen] Other (See Comments)     constipation        Current Medications:   Current Facility-Administered Medications: senna (SENOKOT) 8.8 MG/5ML syrup 8.8 mg, 5 mL, Oral, Nightly  docusate (COLACE) 50 MG/5ML liquid 100 mg, 100 mg, Oral, Daily  insulin glargine (LANTUS) injection vial 8 Units, 8 Units, Subcutaneous, Nightly  clopidogrel (PLAVIX) tablet 75 mg, 75 mg, Per NG tube, Daily  insulin lispro (HUMALOG) injection vial 0-18 Units, 0-18 Units, Subcutaneous, TID WC  insulin lispro (HUMALOG) injection vial 0-9 Units, 0-9 Units, Subcutaneous, Nightly  famotidine (PEPCID) injection 20 mg, 20 mg, Intravenous, BID  metoprolol tartrate (LOPRESSOR) tablet 75 mg, 75 mg, Oral, BID  amLODIPine (NORVASC) tablet 5 mg, 5 mg, Per NG tube, Daily  labetalol (NORMODYNE;TRANDATE) injection 10 mg, 10 mg, Intravenous, Q1H PRN  furosemide (LASIX) tablet 40 mg, 40 mg, Oral, Daily  cefTRIAXone (ROCEPHIN) 1 g IVPB in 50 mL D5W minibag, 1 g, Intravenous, Q24H  glucose (GLUTOSE) 40 % oral gel 15 g, 15 g, Oral, PRN  dextrose 50 % IV solution, 12.5 g, Intravenous, PRN  glucagon (rDNA) injection 1 mg, 1 mg, Intramuscular, PRN  dextrose 5 % solution, 100 mL/hr, Intravenous, PRN  QUEtiapine (SEROQUEL) tablet 50 mg, 50 mg, Oral, Nightly  acetaminophen (TYLENOL) tablet 650 mg, 650 mg, Oral, Q4H PRN  sertraline (ZOLOFT) tablet 100 mg, 100 mg, Oral, Daily  enoxaparin (LOVENOX) injection 30 mg, 30 mg, Subcutaneous, Daily rivastigmine (EXELON) 9.5 MG/24HR 1 patch, 1 patch, Transdermal, Daily  0.9 % sodium chloride bolus, 1,000 mL, Intravenous, Once  ipratropium-albuterol (DUONEB) nebulizer solution 1 ampule, 1 ampule, Inhalation, Q4H WA  sodium chloride flush 0.9 % injection 10 mL, 10 mL, Intravenous, 2 times per day  sodium chloride flush 0.9 % injection 10 mL, 10 mL, Intravenous, PRN  promethazine (PHENERGAN) tablet 12.5 mg, 12.5 mg, Oral, Q6H PRN **OR** ondansetron (ZOFRAN) injection 4 mg, 4 mg, Intravenous, Q6H PRN  aspirin EC tablet 81 mg, 81 mg, Oral, Daily **OR** aspirin suppository 300 mg, 300 mg, Rectal, Daily  atorvastatin (LIPITOR) tablet 80 mg, 80 mg, Oral, Nightly    Social History:  Social History     Socioeconomic History    Marital status: Legally      Spouse name: Not on file    Number of children: Not on file    Years of education: Not on file    Highest education level: Not on file   Occupational History    Not on file   Social Needs    Financial resource strain: Not on file    Food insecurity     Worry: Not on file     Inability: Not on file    Transportation needs     Medical: Not on file     Non-medical: Not on file   Tobacco Use    Smoking status: Former Smoker     Packs/day: 0.50     Years: 0.00     Pack years: 0.00     Quit date: 3/28/2013     Years since quittin.7    Smokeless tobacco: Never Used   Substance and Sexual Activity    Alcohol use:  Yes     Alcohol/week: 1.0 standard drinks     Types: 1 Cans of beer per week     Comment: occasionally    Drug use: No    Sexual activity: Not on file   Lifestyle    Physical activity     Days per week: Not on file     Minutes per session: Not on file    Stress: Not on file   Relationships    Social connections     Talks on phone: Not on file     Gets together: Not on file     Attends Scientologist service: Not on file     Active member of club or organization: Not on file     Attends meetings of clubs or organizations: Not on file Relationship status: Not on file    Intimate partner violence     Fear of current or ex partner: Not on file     Emotionally abused: Not on file     Physically abused: Not on file     Forced sexual activity: Not on file   Other Topics Concern    Not on file   Social History Narrative    Not on file       Family History:       Problem Relation Age of Onset    Diabetes Mother     Cancer Father     High Blood Pressure Sister     High Blood Pressure Brother            Physical Exam:    BP (!) 184/72   Pulse 66   Temp 99.5 °F (37.5 °C) (Oral)   Resp 19   Ht 5' 4\" (1.626 m)   Wt 231 lb 9.6 oz (105.1 kg)   LMP  (LMP Unknown)   SpO2 97%   BMI 39.75 kg/m²     General appearance: Opens eyes to command though prefers to keep them closed  Head: N possible left facial droop  Eyes: conjunctivae clear. Neck:  symmetrical, trachea midline. Lungs: clear to auscultation bilaterally. Heart: regular rate and rhythm, no murmur. Abdomen: soft, non-tender; bowel sounds normal.  Extremities: extremities normal, atraumatic, no edema, normal tone. Mental status: Alert, oriented-she knew the year, president and location,.   follow one-step commands. ,     Sensory: I difficult to assessappears intact right upper and lower extremity question on left. Motor: Muscle tone and bulk are normal bilaterally. No pronator drift. 0/5 left upper and lower extremity, 4/5 right upper and lower extremity    Plantar reflex is down going bilaterally. Coordination: finger to nose normal bilaterally.       Diagnostics:  CBC   Lab Results   Component Value Date    WBC 8.9 12/22/2020    RBC 3.93 12/22/2020    RBC 4.21 02/24/2012    HGB 10.9 12/22/2020    HCT 35.9 12/22/2020    MCV 91.3 12/22/2020    RDW 13.2 12/22/2020     12/22/2020     02/24/2012     BMP    Lab Results   Component Value Date     12/22/2020    K 4.6 12/22/2020    CL 99 12/22/2020    CO2 29 12/22/2020    BUN 22 12/22/2020

## 2020-12-22 NOTE — PROGRESS NOTES
Speech Language Pathology  Speech Language Pathology  [facility]    Cognitive Treatment Note    Date: 12/22/2020  Patients Name: Renetta Guerra  MRN: [id number]  Patient Active Problem List   Diagnosis Code    [facility] mellitus) [facility] E11.9    HTN (hypertension) I10    Smoker F17.200    Asthma J45.909    Knee osteoarthritis M17.10    Edema R60.9    Spinal stenosis in cervical region M48.02    Chest pain R07.9    YUDITH (obstructive sleep apnea) G47.33    Morbid obesity ([facility] E66.01    Knee pain, bilateral M25.561, M25.562    S/P TKR (total knee replacement) Z96.659    Urge incontinence of urine N39.41    Lumbar spondylosis M47.816    Cervical spondylosis M47.812    Chronic use of opiate drugs therapeutic purposes Z79.891    Chronic knee pain M25.569, G89.29    Hypertensive urgency I16.0    Acute coronary syndrome (HCC) I24.9    Congestive heart failure (HCC) I50.9    Lymphadenopathy R59.1    Chronic pain G89.29    Coronary artery disease involving native coronary artery without angina pectoris I25.10    Chronic prescription opiate use Z79.891    Type 2 diabetes mellitus with complication, without long-term current use of insulin (HCC) E11.8    S/P coronary artery stent placement - RCA 10/12/16 - Dr. Maryuri Christianson Z95.5    Anxiety F41.9    Depression (emotion) F32.9    Postlaminectomy syndrome, lumbar M96.1    Medication monitoring encounter Z51.81    Postlaminectomy syndrome M96.1    Postlaminectomy syndrome M96.1    Primary osteoarthritis of right knee M17.11    Long term (current) use of antithrombotics/antiplatelets P68.35    Influenza B J10.1    Reactive airway disease with acute exacerbation J45. [address]    Cerebrovascular accident (CVA) due to embolic occlusion of right middle cerebral artery (HCC) I63.411    Acute cerebrovascular accident (CVA) ([facility] I63.9    Right middle cerebral artery stroke (HCC) I63.511    Non-traumatic rhabdomyolysis M62.82  Ischemic cerebral stroke due to intracranial large artery atherosclerosis (HCC) I63.59       Pain: 0/10    Cognitive Treatment    Treatment time:  4215-9156      Subjective: [] Alert [x] Cooperative     [] Confused     [] Agitated    [x] Lethargic      Objective/Assessment:    Attention: Pt. Lethargic and required max verbal cues throughout session to remain awake and alert. Pt. Occasionally would start talking about unrelated topic and required re-directions back to tasks. Organization: Word generation, concrete:  40% increased to 86% with min verbal cues and choice of 2. Problem Solving/Reasoning: Similarities/Differences:  20% increased to 80% with choice of 2. Deductive Reasonin% increased to 80% with min verbal cues. Other: Pt. Continues with moderate dysarthria. Decreased intelligibility that increases with known context. Plan:  [x] Continue ST services    [] Discharge from ST:      Discharge recommendations: [] Inpatient Rehab   [] [place]   [] Outpatient Therapy  [] Follow up at trauma clinic   [x] Other: Further therapy recommended at discharge. Treatment completed by: Marcelino Son M.A.  CCC-SLP

## 2020-12-22 NOTE — PROGRESS NOTES
Due to patient having Plavix up to yesterday, would not recommend PEG tube for 5 days, which would unfortunately be 12/25, and no elective cases are offered. PEG can be offered next Monday. If patient has place for discharge, recommend discharge and present next Monday for elective, outpatient PEG tube placement.  Will defer to primary team.     Electronically signed by Marquita Castillo DO on 12/22/2020 at 2:25 PM

## 2020-12-22 NOTE — PROGRESS NOTES
has a past surgical history that includes Spine surgery; Nerve Block (4/23/2012); Nerve Block (5/22/2012); Nerve Block (01/14/13); Nerve Block (01/21/13); Nerve Block (1/28/2013 ); Cardiac catheterization (01/2013); Lumbar spine surgery (2007); Vena Cava Filter Placement (2007); Tonsillectomy; joint replacement; knee surgery (10/21/2013); knee surgery (12/02/2013); knee surgery (12/09/2013); other surgical history (Right, 7/14/2014); other surgical history (Right, 3/16/2015); Upper gastrointestinal endoscopy; Colonoscopy (09/2015); other surgical history (Right, 06-13-16); Coronary angioplasty with stent (1201-99); and Nerve Block (Right, 04/17/2017). Restrictions  Restrictions/Precautions  Restrictions/Precautions: General Precautions, Fall Risk, Up as Tolerated  Required Braces or Orthoses?: No  Position Activity Restriction  Other position/activity restrictions: SBP goal <180  Subjective   General  Chart Reviewed: Yes  Patient assessed for rehabilitation services?: Yes  Family / Caregiver Present: Yes(pt daughter)  Diagnosis: L weakness, R MCA infarct, found down, AMS  General Comment  Comments: Pt c/o pain however did not note location or rate  Pain Assessment  Pain Assessment: 0-10  Pain Level: 0  Vital Signs  Patient Currently in Pain: Denies   Orientation  Orientation  Overall Orientation Status: Within Functional Limits  Objective    ADL  Feeding: NPO(Feeding tube)  LE Dressing: Setup;Verbal cueing; Increased time to complete;Maximum assistance  Toileting: Dependent/Total  Additional Comments: Pt rolled L/R with assist in bed for brief change, external female catheter in, pt unable to assist with donning either sock while supported sitting EOB       Balance  Sitting Balance: Maximum assistance(x2-3, pt sat for ~9 min on EOB, pt likes to push to L with RUE, PTA and RN supported pt's back this date and writer held pt's RUE as pt pulled (elbow flexion)) Standing Balance: Unable to assess(comment)(Not appropriate to attempt as pt is max Ax3 to simply static sit EOB)  Standing Balance  Comment: BRITNI sec to pt P sitting balance  Functional Mobility  Functional Mobility Comments: BRITNI     Transfers  Sit to stand: Unable to assess  Stand to sit: Unable to assess  Transfer Comments: BRITNI d/t poor sitting tolerance, major fall risk      Cognition  Overall Cognitive Status: Exceptions  Following Commands: Follows one step commands with repetition; Follows one step commands with increased time  Attention Span: Difficulty attending to directions; Difficulty dividing attention  Safety Judgement: Decreased awareness of need for assistance;Decreased awareness of need for safety  Problem Solving: Assistance required to generate solutions;Assistance required to identify errors made;Assistance required to correct errors made;Assistance required to implement solutions  Insights: Decreased awareness of deficits  Cognition Comment: Pt pleasant and cooperative with therapy this date, keeps thanking therapists and saying \"Bless you\", pt only open eyes with vc's, confused         Plan   Plan  Times per week: 4-5x    AM-PAC Score        AM-[facility] Inpatient Daily Activity Raw Score: 10 (12/16/20 1527)  AM-PAC Inpatient ADL T-Scale Score : 27.31 (12/16/20 1527)  ADL Inpatient CMS 0-100% Score: 74.7 (12/16/20 1527)  ADL Inpatient CMS G-Code Modifier : CL (12/16/20 1527)    Goals  Short term goals  Time Frame for Short term goals: Pt will by discharge  Short term goal 1: demo static/dynamic sitting tolerance during func activity for 10 min+ with min Ax1  Short term goal 2: demo [address] through LUE during func activity >4 min to promote func regain of movement/sensation  Short term goal 3: demo ADL UB bathing/dressing activity with setup, adaptive tech's, and min A  Short term goal 4: demo supine<>sit transfer to EOB using railing PRN, min Ax2 Short term goal 5: notify OTR to update goals as mobility progresses to standing, etc.     Therapy Time   Individual Concurrent Group Co-treatment   Time In 1125         Time Out 1145         Minutes 20         Timed Code Treatment Minutes: 10 Minutes       Chantelle Boudreaux OTR/MARIANN

## 2020-12-22 NOTE — PROGRESS NOTES
Physical Therapy  [address]  Daily Treatment Note  NAME: Marino Smith  : 1950  MRN: [id number]    Date of Service: 2020    Discharge Recommendations:  Patient would benefit from continued therapy after discharge   PT Equipment Recommendations  Equipment Needed: No    Assessment   Body structures, Functions, Activity limitations: Decreased functional mobility ; Decreased strength;Decreased safe awareness;Decreased cognition;Decreased endurance;Decreased balance;Decreased fine motor control;Decreased coordination;Decreased posture  Assessment: Pt sat EOB x 8 min with MAX A x2 due to 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 Recommend continued PT after d/c to address deficits  Prognosis: Fair  REQUIRES PT FOLLOW UP: Yes  Activity Tolerance  Activity Tolerance: Patient limited by cognitive status; Patient limited by endurance; Patient Tolerated treatment well     Patient Diagnosis(es): The encounter diagnosis was Acute cerebrovascular accident (CVA) ([facility] [address] has a past medical history of Allergic rhinitis, Anxiety, Asthma, CAD (coronary artery disease), Chronic back pain, Congestive heart failure ([address] Depression, Headache(784.0), Hiatal hernia, Hypercholesteremia, Hypertension, Kidney stones, Obesity, Osteoarthritis, Postlaminectomy syndrome, Type II or unspecified type diabetes mellitus without mention of complication, not stated as uncontrolled, Unspecified sleep apnea, and Urinary incontinence. has a past surgical history that includes Spine surgery; Nerve Block (4/23/2012); Nerve Block (5/22/2012); Nerve Block (01/14/13); Nerve Block (01/21/13); Nerve Block (1/28/2013 ); Cardiac catheterization (01/2013); Lumbar spine surgery (2007); Vena Cava Filter Placement (2007); Tonsillectomy; joint replacement; knee surgery (10/21/2013); knee surgery (12/02/2013); knee surgery (12/09/2013); other surgical history (Right, 7/14/2014); other surgical history (Right, 3/16/2015); Upper gastrointestinal endoscopy; Colonoscopy (09/2015); other surgical history (Right, 06-13-16); Coronary angioplasty with stent (8897-77); and Nerve Block (Right, 04/17/2017). Restrictions  Restrictions/Precautions  Restrictions/Precautions: General Precautions, Fall Risk, Up as Tolerated  Required Braces or Orthoses?: No  Position Activity Restriction  Other position/activity restrictions: SBP goal <180  Subjective   General  Response To Previous Treatment: Patient with no complaints from previous session.   Family / Caregiver Present: No  Subjective  Subjective: Pt agreeable to PT, somehgutierrez lethargic  Pain Screening  Patient Currently in Pain: Denies  Vital Signs  Patient Currently in Pain: Denies       Orientation  Orientation  Overall Orientation Status: Impaired  Orientation Level: Disoriented to time;Oriented to person  Cognition      Objective   Bed mobility  Rolling to Left: Maximum assistance;2 Person assistance  Rolling to Right: Maximum assistance;2 Person assistance  Supine to Sit: Maximum assistance;2 Person assistance  Sit to Supine: Maximum assistance;2 Person assistance  Scooting: Maximal assistance;2 Person assistance  Transfers  Sit to Stand: Unable to assess  Comment: unable to assess transfers due to poor seated balance, heavy pushers syndrome  Ambulation  Ambulation?: No     Balance  Posture: Fair  Sitting - Static: Poor Sitting - Dynamic: Poor;-(Pt sat EOB x ~8 min, requiring MAX A x2 throughout due to post and significnat L lateral lean.  Demo pushers syndrome, with difficutly correcting despite MAX cues.)    Exercise  B LE AAROM in all planes x 10     Goals  Short term goals  Time Frame for Short term goals: 12 visits  Short term goal 1: bed mobility with mod A+1  Short term goal 2: transfers mod A+2 in mandie suggs  Short term goal 3: WC mobility x 25' with min A+1  Short term goal 4: progress to gait with appropriate device x 15' with max A+2 as appropriate  Patient Goals   Patient goals : pt didn't verbalize goal    Plan    Plan  Times per week: 5-6 visits weekly  Times per day: Daily  Current Treatment Recommendations: Strengthening, ROM, Balance Training, Functional Mobility Training, Transfer Training, Endurance Training, Wheelchair Mobility Training, Gait Training, Neuromuscular Re-education, Cognitive Reorientation, Pain Management, Home Exercise Program, Safety Education & Training, Patient/Caregiver Education & Training, Positioning  Safety Devices  Type of devices: Patient at risk for falls, Left in bed, Nurse notified  Restraints  Initially in place: No     Therapy Time   Individual Concurrent Group Co-treatment   Time In 1130         Time Out 1158         Minutes 28         Timed Code Treatment Minutes: [address] [facility], \[facility]\""

## 2020-12-23 LAB
ABSOLUTE EOS #: 0.2 K/UL (ref 0–0.44)
ABSOLUTE IMMATURE GRANULOCYTE: 0.05 K/UL (ref 0–0.3)
ABSOLUTE LYMPH #: 1.5 K/UL (ref 1.1–3.7)
ABSOLUTE MONO #: 0.93 K/UL (ref 0.1–1.2)
ANION GAP SERPL CALCULATED.3IONS-SCNC: 10 MMOL/L (ref 9–17)
BASOPHILS # BLD: 1 % (ref 0–2)
BASOPHILS ABSOLUTE: 0.04 K/UL (ref 0–0.2)
BUN BLDV-MCNC: 22 MG/DL (ref 8–23)
BUN/CREAT BLD: ABNORMAL (ref 9–20)
CALCIUM SERPL-MCNC: 9.7 MG/DL (ref 8.6–10.4)
CHLORIDE BLD-SCNC: 97 MMOL/L (ref 98–107)
CO2: 30 MMOL/L (ref 20–31)
CREAT SERPL-MCNC: 0.5 MG/DL (ref 0.5–0.9)
DIFFERENTIAL TYPE: ABNORMAL
EOSINOPHILS RELATIVE PERCENT: 3 % (ref 1–4)
GFR AFRICAN AMERICAN: >60 ML/MIN
GFR NON-AFRICAN AMERICAN: >60 ML/MIN
GFR SERPL CREATININE-BSD FRML MDRD: ABNORMAL ML/MIN/{1.73_M2}
GFR SERPL CREATININE-BSD FRML MDRD: ABNORMAL ML/MIN/{1.73_M2}
GLUCOSE BLD-MCNC: 185 MG/DL (ref 65–105)
GLUCOSE BLD-MCNC: 214 MG/DL (ref 65–105)
GLUCOSE BLD-MCNC: 216 MG/DL (ref 65–105)
GLUCOSE BLD-MCNC: 218 MG/DL (ref 65–105)
GLUCOSE BLD-MCNC: 221 MG/DL (ref 70–99)
HCT VFR BLD CALC: 36.6 % (ref 36.3–47.1)
HEMOGLOBIN: 11.2 G/DL (ref 11.9–15.1)
IMMATURE GRANULOCYTES: 1 %
LYMPHOCYTES # BLD: 19 % (ref 24–43)
MAGNESIUM: 1.8 MG/DL (ref 1.6–2.6)
MCH RBC QN AUTO: 27.7 PG (ref 25.2–33.5)
MCHC RBC AUTO-ENTMCNC: 30.6 G/DL (ref 28.4–34.8)
MCV RBC AUTO: 90.4 FL (ref 82.6–102.9)
MONOCYTES # BLD: 12 % (ref 3–12)
NRBC AUTOMATED: 0 PER 100 WBC
PDW BLD-RTO: 12.9 % (ref 11.8–14.4)
PLATELET # BLD: 260 K/UL (ref 138–453)
PLATELET ESTIMATE: ABNORMAL
PMV BLD AUTO: 10.4 FL (ref 8.1–13.5)
POTASSIUM SERPL-SCNC: 4.6 MMOL/L (ref 3.7–5.3)
RBC # BLD: 4.05 M/UL (ref 3.95–5.11)
RBC # BLD: ABNORMAL 10*6/UL
SEG NEUTROPHILS: 64 % (ref 36–65)
SEGMENTED NEUTROPHILS ABSOLUTE COUNT: 5.06 K/UL (ref 1.5–8.1)
SODIUM BLD-SCNC: 137 MMOL/L (ref 135–144)
WBC # BLD: 7.8 K/UL (ref 3.5–11.3)
WBC # BLD: ABNORMAL 10*3/UL

## 2020-12-23 PROCEDURE — 2500000003 HC RX 250 WO HCPCS: Performed by: STUDENT IN AN ORGANIZED HEALTH CARE EDUCATION/TRAINING PROGRAM

## 2020-12-23 PROCEDURE — 6360000002 HC RX W HCPCS: Performed by: NURSE PRACTITIONER

## 2020-12-23 PROCEDURE — 85025 COMPLETE CBC W/AUTO DIFF WBC: CPT

## 2020-12-23 PROCEDURE — 6370000000 HC RX 637 (ALT 250 FOR IP): Performed by: PSYCHIATRY & NEUROLOGY

## 2020-12-23 PROCEDURE — 6370000000 HC RX 637 (ALT 250 FOR IP): Performed by: NURSE PRACTITIONER

## 2020-12-23 PROCEDURE — 97535 SELF CARE MNGMENT TRAINING: CPT

## 2020-12-23 PROCEDURE — 83735 ASSAY OF MAGNESIUM: CPT

## 2020-12-23 PROCEDURE — 36415 COLL VENOUS BLD VENIPUNCTURE: CPT

## 2020-12-23 PROCEDURE — 2700000000 HC OXYGEN THERAPY PER DAY

## 2020-12-23 PROCEDURE — 6370000000 HC RX 637 (ALT 250 FOR IP): Performed by: STUDENT IN AN ORGANIZED HEALTH CARE EDUCATION/TRAINING PROGRAM

## 2020-12-23 PROCEDURE — 99233 SBSQ HOSP IP/OBS HIGH 50: CPT | Performed by: PSYCHIATRY & NEUROLOGY

## 2020-12-23 PROCEDURE — 97530 THERAPEUTIC ACTIVITIES: CPT

## 2020-12-23 PROCEDURE — 2580000003 HC RX 258: Performed by: STUDENT IN AN ORGANIZED HEALTH CARE EDUCATION/TRAINING PROGRAM

## 2020-12-23 PROCEDURE — 80048 BASIC METABOLIC PNL TOTAL CA: CPT

## 2020-12-23 PROCEDURE — 94640 AIRWAY INHALATION TREATMENT: CPT

## 2020-12-23 PROCEDURE — 94761 N-INVAS EAR/PLS OXIMETRY MLT: CPT

## 2020-12-23 PROCEDURE — 82947 ASSAY GLUCOSE BLOOD QUANT: CPT

## 2020-12-23 PROCEDURE — 97129 THER IVNTJ 1ST 15 MIN: CPT

## 2020-12-23 PROCEDURE — 2060000000 HC ICU INTERMEDIATE R&B

## 2020-12-23 PROCEDURE — 6360000002 HC RX W HCPCS: Performed by: STUDENT IN AN ORGANIZED HEALTH CARE EDUCATION/TRAINING PROGRAM

## 2020-12-23 PROCEDURE — 2580000003 HC RX 258: Performed by: NURSE PRACTITIONER

## 2020-12-23 RX ORDER — INSULIN GLARGINE 100 [IU]/ML
10 INJECTION, SOLUTION SUBCUTANEOUS NIGHTLY
Status: DISCONTINUED | OUTPATIENT
Start: 2020-12-23 | End: 2020-12-25

## 2020-12-23 RX ADMIN — FUROSEMIDE 40 MG: 40 TABLET ORAL at 12:50

## 2020-12-23 RX ADMIN — ENOXAPARIN SODIUM 30 MG: 100 INJECTION SUBCUTANEOUS at 12:51

## 2020-12-23 RX ADMIN — METOPROLOL TARTRATE 75 MG: 25 TABLET ORAL at 12:49

## 2020-12-23 RX ADMIN — METOPROLOL TARTRATE 75 MG: 25 TABLET ORAL at 20:30

## 2020-12-23 RX ADMIN — ACETAMINOPHEN 650 MG: 325 TABLET ORAL at 20:38

## 2020-12-23 RX ADMIN — IPRATROPIUM BROMIDE AND ALBUTEROL SULFATE 1 AMPULE: .5; 3 SOLUTION RESPIRATORY (INHALATION) at 08:22

## 2020-12-23 RX ADMIN — INSULIN LISPRO 3 UNITS: 100 INJECTION, SOLUTION INTRAVENOUS; SUBCUTANEOUS at 17:30

## 2020-12-23 RX ADMIN — INSULIN GLARGINE 10 UNITS: 100 INJECTION, SOLUTION SUBCUTANEOUS at 20:26

## 2020-12-23 RX ADMIN — DOCUSATE SODIUM 100 MG: 50 LIQUID ORAL at 12:52

## 2020-12-23 RX ADMIN — SODIUM CHLORIDE, PRESERVATIVE FREE 10 ML: 5 INJECTION INTRAVENOUS at 20:31

## 2020-12-23 RX ADMIN — IPRATROPIUM BROMIDE AND ALBUTEROL SULFATE 1 AMPULE: .5; 3 SOLUTION RESPIRATORY (INHALATION) at 15:44

## 2020-12-23 RX ADMIN — FAMOTIDINE 20 MG: 10 INJECTION INTRAVENOUS at 20:30

## 2020-12-23 RX ADMIN — QUETIAPINE FUMARATE 25 MG: 25 TABLET ORAL at 20:31

## 2020-12-23 RX ADMIN — INSULIN LISPRO 3 UNITS: 100 INJECTION, SOLUTION INTRAVENOUS; SUBCUTANEOUS at 20:26

## 2020-12-23 RX ADMIN — CEFTRIAXONE SODIUM 1 G: 1 INJECTION, POWDER, FOR SOLUTION INTRAMUSCULAR; INTRAVENOUS at 17:30

## 2020-12-23 RX ADMIN — ATORVASTATIN CALCIUM 80 MG: 80 TABLET, FILM COATED ORAL at 20:30

## 2020-12-23 RX ADMIN — IPRATROPIUM BROMIDE AND ALBUTEROL SULFATE 1 AMPULE: .5; 3 SOLUTION RESPIRATORY (INHALATION) at 11:44

## 2020-12-23 RX ADMIN — FAMOTIDINE 20 MG: 10 INJECTION INTRAVENOUS at 12:50

## 2020-12-23 RX ADMIN — Medication 81 MG: at 12:49

## 2020-12-23 RX ADMIN — AMLODIPINE BESYLATE 5 MG: 5 TABLET ORAL at 12:50

## 2020-12-23 RX ADMIN — SENNOSIDES 8.8 MG: 8.8 LIQUID ORAL at 20:31

## 2020-12-23 RX ADMIN — SENNOSIDES 8.8 MG: 8.8 LIQUID ORAL at 01:41

## 2020-12-23 RX ADMIN — IPRATROPIUM BROMIDE AND ALBUTEROL SULFATE 1 AMPULE: .5; 3 SOLUTION RESPIRATORY (INHALATION) at 20:10

## 2020-12-23 RX ADMIN — SODIUM CHLORIDE, PRESERVATIVE FREE 10 ML: 5 INJECTION INTRAVENOUS at 12:52

## 2020-12-23 RX ADMIN — SERTRALINE 100 MG: 50 TABLET, FILM COATED ORAL at 12:50

## 2020-12-23 RX ADMIN — INSULIN LISPRO 6 UNITS: 100 INJECTION, SOLUTION INTRAVENOUS; SUBCUTANEOUS at 13:59

## 2020-12-23 ASSESSMENT — PAIN DESCRIPTION - PAIN TYPE: TYPE: ACUTE PAIN

## 2020-12-23 ASSESSMENT — PAIN DESCRIPTION - PROGRESSION
CLINICAL_PROGRESSION: NOT CHANGED

## 2020-12-23 ASSESSMENT — PAIN - FUNCTIONAL ASSESSMENT: PAIN_FUNCTIONAL_ASSESSMENT: ACTIVITIES ARE NOT PREVENTED

## 2020-12-23 ASSESSMENT — PAIN DESCRIPTION - DESCRIPTORS: DESCRIPTORS: HEADACHE;ACHING

## 2020-12-23 ASSESSMENT — PAIN SCALES - GENERAL
PAINLEVEL_OUTOF10: 8
PAINLEVEL_OUTOF10: 8

## 2020-12-23 ASSESSMENT — PAIN DESCRIPTION - LOCATION: LOCATION: NECK;HEAD

## 2020-12-23 ASSESSMENT — PAIN DESCRIPTION - ONSET: ONSET: ON-GOING

## 2020-12-23 ASSESSMENT — PAIN DESCRIPTION - ORIENTATION: ORIENTATION: POSTERIOR;RIGHT

## 2020-12-23 ASSESSMENT — PAIN DESCRIPTION - FREQUENCY: FREQUENCY: CONTINUOUS

## 2020-12-23 NOTE — PLAN OF CARE
Problem: HEMODYNAMIC STATUS  Goal: Patient has stable vital signs and fluid balance  Outcome: Met This Shift  Note: Neuro assessment completed, fall precautions in place, aspirations precautions in place, assess for barriers in communication and mobility, interventions to assist in communication and mobility in place, encouraged to call for assistance, adaptive devices used as needed, assess emotional state and support offered, encouraged patient to communicate by available means, and support systems included in patient care. Problem: OXYGENATION/RESPIRATORY FUNCTION  Goal: Patient will achieve/maintain normal respiratory rate/effort  Description: Respiratory rate and effort will be within normal limits for the patient  12/23/2020 0503 by Juanpablo Rizo RN  Outcome: Met This Shift  12/22/2020 2235 by She Cox RCP  Outcome: Ongoing     Problem: Restraint Use - Nonviolent/Non-Self-Destructive Behavior:  Goal: Absence of restraint indications  Description: Absence of restraint indications  Outcome: Met This Shift  Note: Visual and safety checks every hour. Vital signs, circulation checks, ROM, skin integrity assessed every 2 hours. Goal: Absence of restraint-related injury  Description: Absence of restraint-related injury  Outcome: Met This Shift     Problem: Skin Integrity:  Goal: Will show no infection signs and symptoms  Description: Will show no infection signs and symptoms  Outcome: Met This Shift  Note: Patient repositioned every two hours. Mepilex in place on sacrum. Zinc cream and moisture barrier cream applied to buttocks. Vitals signs stable. Skin assessed and no new skin breakdown found.    Goal: Absence of new skin breakdown  Description: Absence of new skin breakdown  Outcome: Met This Shift     Problem: Falls - Risk of:  Goal: Will remain free from falls  Description: Will remain free from falls  Outcome: Met This Shift Note: Patient remained free from injury. Patient verbalized understanding of need for the safety precautions. Demonstrates proper use of assistive devices. Bed remains in the lowest position. Call light remains within reach. [place] Program in use. Goal: Absence of physical injury  Description: Absence of physical injury  Outcome: Met This Shift     Problem: Pain:  Goal: Pain level will decrease  Description: Pain level will decrease  Outcome: Met This Shift  Note: Pain level assessment complete. Pt rated pain at 5/10. Pt educated on pain scale and control interventions. PRN pain medication given per pt request. Pt instructed to call out with new onset of pain or unrelieved pain. Will continue to monitor.         Problem: ACTIVITY INTOLERANCE/IMPAIRED MOBILITY  Goal: Mobility/activity is maintained at optimum level for patient  Outcome: Ongoing     Problem: COMMUNICATION IMPAIRMENT  Goal: Ability to express needs and understand communication  Outcome: Ongoing

## 2020-12-23 NOTE — PLAN OF CARE
Problem: OXYGENATION/RESPIRATORY FUNCTION  Goal: Patient will achieve/maintain normal respiratory rate/effort  Description: Respiratory rate and effort will be within normal limits for the patient  12/22/2020 2235 by Rachel Kern RCP  Outcome: Ongoing     Problem: RESPIRATORY  Intervention: RESPIRATORY THERAPY  Note: BRONCHOSPASM/BRONCHOCONSTRICTION     [x]         IMPROVE AERATION/BREATH SOUNDS  [x]   ADMINISTER BRONCHODILATOR THERAPY AS APPROPRIATE  [x]   ASSESS BREATH SOUNDS  []   IMPLEMENT AEROSOL/MDI PROTOCOL  [x]   PATIENT EDUCATION AS NEEDED

## 2020-12-23 NOTE — CARE COORDINATION
Spoke to Magalys (daughter) this AM she has chosen [facility] as first choice. And [facility]  as second choice.   Referrals sent

## 2020-12-23 NOTE — PROGRESS NOTES
Patient's case was discussed in detail with endovascular neurosurgery team and stroke team along with patient's family. Avani Larkin was made that because patient was greater than 24 hours no longer thrombectomy candidate and CT perfusion scan showing large volume infarcted tissue with little to 0 penumbra.  Patient's family agreed to patient being admitted to neuro critical care for close neurologic monitoring and possible hypertonic and hyperosmolar treatment of her cerebral edema.        Hospital Course:  12/16: MRI brain revealed a large territory R MCA infarct. Heparin infusion discontinued. Failed bedside swallow, NG tube placed.   12/17: CT head this morning stable, no hemorrhagic conversion or significant cerebral edema. Lopressor 50 mg BID started, home medication. 12/18: Low urine output despite fluids.  Resumed home Lasix with improvement of urine output.  Pavon placed to monitor.  One-time fever overnight.  UA consistent with infection, culture pending. Started on abx.   12/20: Requiring frequent suctioning throughout night.  Decreased urine output overnight, only 400cc total. Urinary retention this morning; straight cath x1. Tube feeds at goal. Total fluids 75cc/h. Plavix started for intracranial atherosclerosis. 12/21: Patient having difficulty managing oral secretions and requiring oral suctioning overnight. One time low grade fever overnight, 37.8C.  CXR with no acute infiltrate.  Continues on Rocephin for UTI. Failed speech bedside swallow.  Gen Surg consulted for PEG. 12/22: Per general surgery team earliest patient can receive PEG tube is 12/28. We will continue to hold Plavix. 12/23: Continue to monitor blood glucose. Increase Lantus. Blood pressure goal less than 180. Monitor for signs of aspiration. Plan for PEG placement on 12/28. Monitor for aspiration. Will need likely need rehab placement upon discharge.  Continue rocephin once daily for UTI throught 12/24 Procedures during patient's ICU stay:     None    Current Vitals:     BP (!) 150/84   Pulse 67   Temp 99.6 °F (37.6 °C) (Axillary)   Resp 22   Ht 5' 4\" (1.626 m)   Wt 231 lb 9.6 oz (105.1 kg)   LMP  (LMP Unknown)   SpO2 98%   BMI 39.75 kg/m²       Cultures:     Blood cultures:                 [x] None drawn      [] Negative             []  Positive (Details:  )  Urine Culture:                   [] None drawn      [] Negative             [x]  Positive (Details: E-Coli )  Sputum Culture:               [x] None drawn       [] Negative             []  Positive (Details:  )   Endotracheal aspirate:     [x] None drawn       [] Negative             []  Positive (Details:  )       Consults:     1. Neurology   2.  General Surgery    Assessment:     Patient Active Problem List    Diagnosis Date Noted    Ischemic cerebral stroke due to intracranial large artery atherosclerosis ([address]     Acute cerebrovascular accident (CVA) ([address]     Right middle cerebral artery stroke ([address]     Non-traumatic rhabdomyolysis     Cerebrovascular accident (CVA) due to embolic occlusion of right middle cerebral artery ([address] 12/15/2020    Influenza B 02/24/2018    Reactive airway disease with acute exacerbation 02/24/2018    Long term (current) use of antithrombotics/antiplatelets 27/69/5911    Medication monitoring encounter 10/09/2017    Postlaminectomy syndrome     Postlaminectomy syndrome     Primary osteoarthritis of right knee     Anxiety 10/25/2016    Depression (emotion) 10/25/2016    Postlaminectomy syndrome, lumbar 10/25/2016    S/P coronary artery stent placement - RCA 10/12/16 - Dr. George Conf[employer]ian[employer] 10/12/2016    Type 2 diabetes mellitus with complication, without long-term current use of insulin ([address] 06/16/2016    Chronic prescription opiate use 02/10/2016    Chronic pain     Coronary artery disease involving native coronary artery without angina pectoris     Hypertensive urgency     Acute coronary syndrome ([address]  Congestive heart failure ([facility] [address]     Lymphadenopathy     Chronic knee pain 02/24/2015    Chronic use of opiate drugs therapeutic purposes 11/25/2014    Lumbar spondylosis 08/27/2014    Cervical spondylosis 08/27/2014    Urge incontinence of urine 07/31/2014    Knee pain, bilateral 06/25/2014    S/P TKR (total knee replacement) 06/25/2014    Morbid obesity ([facility] [address] 04/03/2014    YUDITH (obstructive sleep apnea) 04/02/2014    Chest pain 03/31/2014    Spinal stenosis in cervical region 02/06/2013    Edema 08/07/2012    Knee osteoarthritis 06/06/2012    DM (diabetes mellitus) ([facility] [address] 02/21/2012    HTN (hypertension) 02/21/2012    Smoker 02/21/2012    Asthma 02/21/2012       Additional assessment:    1. S/p completed ischemic CVA  2. DM  3. HTN  4. Dysphagia; needs PEG  5. At risk for aspiration  6. UTI; on rocephin last dose on 12/25    Recommended Follow-up:      1. Follow with gen surg recs for PEG (hold Plavix)  2. Monitor for aspiration    Above mentioned assessment and plan was discussed by me with the admitting medicine resident. The medicine team assigned to the patient by medicine admitting resident will be following up the patient from now onwards on the floor.      Jhon Gonzalez DO  Neuro Critical Care  12/23/2020, 1:12 PM

## 2020-12-23 NOTE — PROGRESS NOTES
Daily Progress Note  Neuro Critical Care    Patient Name: Tia Horn  Patient : 1950  Room/Bed: 1153/5877-18  Code Status: Full Code  Allergies: Allergies   Allergen Reactions    Bactrim     Penicillins     Tylenol [Acetaminophen] Other (See Comments)     constipation       CHIEF COMPLAINT:        Left sided weakness     INTERVAL HISTORY    Initial Presentation (Admitted 12/15/20): The patient is a 76 y. o. female with a past medical history that includes diabetes, CAD, hyperlipidemia and CHF who presented to the emergency department for altered mental status as a stroke alert.  Family last spoke to patient at 8 AM yesterday morning.  Patient was found down at her home, incontinent, unresponsive.  Brought in via EMS. [address] arrival evaluated by stroke team, initial COLTON 33; left hemiparesis and right-sided gaze preference. On arrival to the emergency department patient's blood pressure was 210/115, heart rate 97 afebrile 98. 6.  Stroke panel significant for glucose 138, sodium 139, hemoglobin 13.3, platelets 042, total CK 1609, myoglobin 1554, INR 1.0, PTT 22.5, high-sensitivity troponin XV.  COVID-19 negative.  Patient given 1 L fluid bolus along with aspirin 81 mg.     Patient's case was discussed in detail with endovascular neurosurgery team and stroke team along with patient's family. Mliss Smart was made that because patient was greater than 24 hours no longer thrombectomy candidate and CT perfusion scan showing large volume infarcted tissue with little to 0 penumbra.  Patient's family agreed to patient being admitted to neuro critical care for close neurologic monitoring and possible hypertonic and hyperosmolar treatment of her cerebral edema.       Hospital Course:  : MRI brain revealed a large territory R MCA infarct. Heparin infusion discontinued.  Failed bedside swallow, NG tube placed.   ipratropium-albuterol  1 ampule Inhalation Q4H WA    sodium chloride flush  10 mL Intravenous 2 times per day    aspirin  81 mg Oral Daily    Or    aspirin  300 mg Rectal Daily    atorvastatin  80 mg Oral Nightly     CONTINUOUS INFUSIONS:   dextrose       PRN MEDICATIONS:   labetalol, glucose, dextrose, glucagon (rDNA), dextrose, acetaminophen, sodium chloride flush, promethazine **OR** ondansetron    VITALS:  Temperature Range: Temp: 97.7 °F (36.5 °C) Temp  Av.4 °F (36.9 °C)  Min: 97.7 °F (36.5 °C)  Max: 99.5 °F (37.5 °C)  BP Range: Systolic (98AML), SER:422 , Min:108 , RRT:309     Diastolic (34NMA), DBS:01, Min:54, Max:86    Pulse Range: Pulse  Av  Min: 55  Max: 86  Respiration Range: Resp  Av  Min: 13  Max: 23  Current Pulse Ox: SpO2: 97 %  24HR Pulse Ox Range: SpO2  Av.1 %  Min: 93 %  Max: 100 %  Patient Vitals for the past 12 hrs:   BP Temp Temp src Pulse Resp SpO2   20 0826     18 97 %   20 0749 135/67 97.7 °F (36.5 °C) Axillary 59 17 98 %   20 0600 (!) 142/63 97.9 °F (36.6 °C) Axillary 59 18    20 0542 (!) 164/80 97.9 °F (36.6 °C) Axillary 70 23 95 %   20 0400 (!) 160/66 97.7 °F (36.5 °C)  67 22 96 %   20 0342 (!) 144/69 97.7 °F (36.5 °C) Axillary 62 21 98 %   20 0200 121/62 98.1 °F (36.7 °C) Axillary 55 17 99 %   20 0142 119/64 98.1 °F (36.7 °C) Axillary 69 18 94 %   20 0000 (!) 108/57 98.7 °F (37.1 °C) Axillary 56 18 99 %   20 2342 121/61 98.7 °F (37.1 °C) Axillary 56 20 94 %   20 2200 (!) 178/86 99.5 °F (37.5 °C) Axillary 86 19 94 %     Estimated body mass index is 39.75 kg/m² as calculated from the following:    Height as of this encounter: 5' 4\" (1.626 m).     Weight as of this encounter: 231 lb 9.6 oz (105.1 kg).  []<16 Severe malnutrition  []1616.99 Moderate malnutrition  []1718.49 Mild malnutrition  []18.524.9 Normal  []2529.9 Overweight (not obese)  []3034.9 Obese class 1 (Low Risk) [x]3539.9 Obese class 2 (Moderate Risk)  []?40 Obese class 3 (High Risk)    RECENT LABS:   Lab Results   Component Value Date    WBC 8.9 12/22/2020    HGB 10.9 (L) 12/22/2020    HCT 35.9 (L) 12/22/2020     12/22/2020    CHOL 188 04/05/2019    TRIG 177 (H) 04/05/2019    HDL 48 12/16/2020    ALT 11 04/05/2019    AST 15 04/05/2019     12/22/2020    K 4.6 12/22/2020    CL 99 12/22/2020    CREATININE 0.53 12/22/2020    BUN 22 12/22/2020    CO2 29 12/22/2020    TSH 1.38 12/19/2018    INR 1.0 12/15/2020    LABA1C 6.8 (H) 12/16/2020    LABMICR 39 (H) 12/19/2018     24 HOUR INTAKE/OUTPUT:    Intake/Output Summary (Last 24 hours) at 12/23/2020 0947  Last data filed at 12/23/2020 0400  Gross per 24 hour   Intake 1254 ml   Output 925 ml   Net 329 ml       IMAGING:     Labs and Images reviewed with:  [] Dr. Lazarus Babcock. [place]    [] Dr. Eran Courtney  [x] Dr. Mike Borges   [x] There are no new interval images to review. PHYSICAL EXAM       CONSTITUTIONAL:  Somnolent but wakes with verbal stimuli. Eyes stay closed unless request pt to open. oriented x 3, in no acute distress. Nontoxic. Mild dysarthria. No aphasia. HEAD:  normocephalic, atraumatic    EYES:  PERRLA, Forced rightward gaze deviation   ENT:  moist mucous membranes   NECK:  supple, symmetric   LUNGS:  Equal air entry bilaterally; intermittent rhonchi   CARDIOVASCULAR:  normal s1 / s2, RRR, distal pulses intact   ABDOMEN:  Soft, no rigidity   NEUROLOGIC:  Mental Status:  Drowsy but wakes to verbal stimulation. Oriented x3. Follows commands. Cranial Nerves:    II: Visual fields:  abnormal - left field deficit  III: Pupils:  equal, round, reactive to light  III,IV,VI: Extra Ocular Movements: intact; rightward gaze preference   VII: Facial strength: abnormal left facial droop    Motor Exam:    Drift:  present - left sided     5/5 strength right upper extremity. 4/5 strength right lower extremity, able to antigravity. 0/5 strength left upper extremity, no movement to pain. 1/5 strength left lower extremity, withdraws to pain. Sensory:    Touch:    Right Upper Extremity:  normal  Left Upper Extremity:  abnormal -diminished sensation to touch and noxious stimuli  Right Lower Extremity:  normal  Left Lower Extremity:  abnormal -diminished sensation to touch and noxious stimuli      Plantar Response:  Right:  downgoing  Left:  equivocal    Clonus:  absent           DRAINS:  [x]? There are no drains for Neuro Critical Care to monitor at this time.      ASSESSMENT AND PLAN:       The patient is a 77 yo female with a history of DM 2, HTN, YUDITH, CAD s/p stents, CHF, back pain and smoking who was admitted to the Neuro ICU for management of a large right MCA territory infarction likely secondary to intracranial atherosclerosis.       NEUROLOGIC:  - Large right MCA territory infarction  - Etiology likely secondary to intracranial atherosclerosis  - Continue Aspirin 81mg QD, Plavix 75mg QD (held in preparation for PEG) and Lipitor 80mg QHS for secondary stroke prevention  - Goal SBP<180   - Neuro checks per protocol     CARDIOVASCULAR:  - Goal SBP<180   - Avoid symptomatic hypotension in setting of severe ICAD  - Slowly normalize blood pressures over next 1-2 weeks  - Continue home Norvasc 5mg QD, Lasix 40mg QD, Lopressor 75mg BID  - Echo EF 50%, negative bubble, grade I diastolic dysfunction  - Mixed hyperlipidemia; , cholesterol 220  - Lipitor 80mg QHS  - Continue telemetry     PULMONARY:  - Maintaining O2 sats on nasal canula  - Appears to be managing secretions better today, continue to monitor closely   - Oral suctioning PRN  - History of smoking   - Duonebs PRN  - repeat CXR and consider abx to cover for aspiration pneumonia if develops leukocytosis      RENAL/FLUID/ELECTROLYTE:  - Normal renal functioning  - BUN 22/ Creatinine 0.53  - Monitor I&O; 1560/925  - Continue home Lasix 40mg PO QD  - hold IVF

## 2020-12-23 NOTE — CONSULTS
 COLONOSCOPY  09/2015    normal    CORONARY ANGIOPLASTY WITH STENT PLACEMENT  1012-16    stents x 3    JOINT REPLACEMENT      left knee    KNEE SURGERY  10/21/2013    rt knee synvisc injection     KNEE SURGERY  12/02/2013    knee synvisc injection rt #2    KNEE SURGERY  12/09/2013    rt knee synvisc inj    LUMBAR SPINE SURGERY  2007    NERVE BLOCK  4/23/2012    Right MBNB L3, L4, L5    NERVE BLOCK  5/22/2012    Right MBNB L3, L4, and L5     NERVE BLOCK  01/14/13    Right knee injection #1 - Synvisc    NERVE BLOCK  01/21/13    Right knee injection #2 - Synvisc    NERVE BLOCK  1/28/2013     Rigth knee synvisc injection #3    NERVE BLOCK Right 04/17/2017    Rt genicular nerve block. no steroid used    OTHER SURGICAL HISTORY Right 7/14/2014    synvisc one knee injection    OTHER SURGICAL HISTORY Right 3/16/2015    synvisc one knee injection    OTHER SURGICAL HISTORY Right 06-13-16    synvisc right knee injection    SPINE SURGERY      TONSILLECTOMY      UPPER GASTROINTESTINAL ENDOSCOPY      VENA CAVA FILTER PLACEMENT  2007    PE and B/L LE emboli       Social History:  Eugenio Singh  reports that she quit smoking about 7 years ago. She smoked 0.50 packs per day for 0.00 years. She has never used smokeless tobacco. She reports current alcohol use of about 1.0 standard drinks of alcohol per week. She reports that she does not use drugs.     Family History   Problem Relation Age of Onset    Diabetes Mother     Cancer Father     High Blood Pressure Sister     High Blood Pressure Brother        Medications:   insulin glargine  10 Units Subcutaneous Nightly    QUEtiapine  25 mg Oral Nightly    cefTRIAXone (ROCEPHIN) IV  1 g Intravenous Q24H    senna  5 mL Oral Nightly    docusate  100 mg Oral Daily    [Held by provider] clopidogrel  75 mg Per NG tube Daily    insulin lispro  0-18 Units Subcutaneous TID WC    insulin lispro  0-9 Units Subcutaneous Nightly  famotidine (PEPCID) injection  20 mg Intravenous BID    metoprolol tartrate  75 mg Oral BID    amLODIPine  5 mg Per NG tube Daily    furosemide  40 mg Oral Daily    sertraline  100 mg Oral Daily    enoxaparin  30 mg Subcutaneous Daily    sodium chloride  1,000 mL Intravenous Once    ipratropium-albuterol  1 ampule Inhalation Q4H WA    sodium chloride flush  10 mL Intravenous 2 times per day    aspirin  81 mg Oral Daily    Or    aspirin  300 mg Rectal Daily    atorvastatin  80 mg Oral Nightly       Allergies:   Eugenio Singh is allergic to bactrim; penicillins; and tylenol [acetaminophen]. ROS:   Constitutional Negative for fever and chills   HEENT Negative for ear discharge, ear pain, nosebleed   Eyes Negative for photophobia, pain and discharge   Respiratory Negative for hemoptysis and sputum   Cardiovascular Negative for orthopnea, claudication and PND   Gastrointestinal Negative for abdominal pain, diarrhea, blood in stool   Musculoskeletal Negative for joint pain, negative for myalgia   Skin Negative for rash or itching   hematology Negative for ecchymosis, anemia   Psychiatric Negative for suicidal ideation, anxiety, depression, hallucinations       Objective:   BP (!) 143/69   Pulse 63   Temp 99.1 °F (37.3 °C) (Oral)   Resp 22   Ht 5' 4\" (1.626 m)   Wt 231 lb 9.6 oz (105.1 kg)   LMP  (LMP Unknown)   SpO2 96%   BMI 39.75 kg/m²         General examination:    HEENT: Normocephalic, atraumatic, neck supple (-)nuchal rigidity  Lungs: Respirations unlabored, chest wall no deformity  Heart: Regular rate rhythm  Abdomen: Soft, non distended, non tender  Extremities: No cyanosis, clubbing or edema, 2+ pulses  Skin: Intact, normal skin color, no ecchymosis    Neurological examination:  Somnolent but arousable intermittently following commands. Incoherent speech. Pupils equal and reactive, right gaze preference. Left facial droop, left tongue deviation. Spontaneously moving right upper and lower extremity. Plegic on the left side  Withdraws left lower extremity. LABS:    CBC:   Recent Labs     12/21/20  0320 12/22/20  0459 12/23/20  1015   WBC 6.9 8.9 7.8   HGB 11.0* 10.9* 11.2*    254 260     BMP:    Recent Labs     12/21/20  0320 12/22/20  0459 12/23/20  1015    138 137   K 4.5 4.6 4.6    99 97*   CO2 27 29 30   BUN 22 22 22   CREATININE 0.63 0.53 0.50   GLUCOSE 202* 217* 221*         Lab Results   Component Value Date    CHOL 188 04/05/2019    LDLCHOLESTEROL 159 (H) 12/16/2020    HDL 48 12/16/2020    TRIG 177 (H) 04/05/2019    ALT 11 04/05/2019    AST 15 04/05/2019    TSH 1.38 12/19/2018    INR 1.0 12/15/2020    LABA1C 6.8 (H) 12/16/2020    LABMICR 39 (H) 12/19/2018       No results found for: PHENYTOIN, PHENYTOIN, VALPROATE, CBMZ    IMAGING:   CT head -     CTA head and neck -     MRI brain -     2 D echo -      All of the above medications, clinical laboratory, imaging and other diagnostic tests were reviewed by myself. 79-year-old with right MCA infarct. PEG tube placement pending. Patient is awake and able to follow commands. She does have left-sided plegia, Plavix is currently being held as patient is scheduled for a PEG tube placement at the end of the week. We will continue to monitor patient's neurological status. Patient is currently tolerating NG tube feeds with blood pressure monitoring ongoing. Neurologicalischemic stroke. Continue to monitor for any neurological changes. Continue aspirin as directed. Continue high-dose statin therapy. Continue on telemetry as directed. Labetalol/hydralazine as needed if BP out of parameters. Cleviprex drip if BP persistently elevated. PT/OTeval and treat. Continue NG tube feeds. PEG tube pending. Plavix currently on hold due to pending PEG tube placement. Continue monitor fingersticks. Sliding scale as directed. Continue ceftriaxone for UTI. Lasix mg daily. IMPRESSION:  1.     2.     3. PLAN:  -   -   -   -   -       Thank you for this very interesting consultation.       Electronically signed by Maia Vogt MD on 12/23/2020 at 6:23 PM      Tr Alonso, [address] [place] [address]

## 2020-12-23 NOTE — PROGRESS NOTES
Sit to Supine: Maximum assistance;2 Person assistance  Scooting: Maximal assistance  Comment: HOB elevated  Cognition  Overall Cognitive Status: Exceptions  Arousal/Alertness: Delayed responses to stimuli  Following Commands: Follows one step commands with repetition; Follows one step commands with increased time  Attention Span: Difficulty attending to directions; Difficulty dividing attention  Memory: Decreased recall of precautions;Decreased recall of recent events  Safety Judgement: Decreased awareness of need for assistance;Decreased awareness of need for safety  Problem Solving: Assistance required to generate solutions;Assistance required to identify errors made;Assistance required to correct errors made;Assistance required to implement solutions  Insights: Decreased awareness of deficits  Initiation: Requires cues for all  Sequencing: Requires cues for all     Pt and RN agreeable to therapy this day. ADL tasks of grooming completed see above for LOF. 4 min static sitting completed with strong L lean pt pushing from midline with RUE. Pt req continuous verbal cues to open eyes and attempt L sided visual scanning pt unable to scan past midline. At session end pt supine in bed L side lying with LUE elevated, BLE elevated, call light in reach and bed alarm on.    Plan   Plan  Times per week: 4-5x   Cont POC    Goals  Short term goals  Time Frame for Short term goals: Pt will by discharge  Short term goal 1: demo static/dynamic sitting tolerance during func activity for 10 min+ with min Ax1  Short term goal 2: demo [address] through LUE during func activity >4 min to promote func regain of movement/sensation  Short term goal 3: demo ADL UB bathing/dressing activity with setup, adaptive tech's, and min A  Short term goal 4: demo supine<>sit transfer to EOB using railing PRN, min Ax2  Short term goal 5: notify OTR to update goals as mobility progresses to standing, etc.       Therapy Time Individual Concurrent Group Co-treatment   Time In 1445         Time Out 1519         Minutes 34         Timed Code Treatment Minutes: [address], ALBRECHT/L

## 2020-12-23 NOTE — PROGRESS NOTES
Speech Language Pathology  Speech Language Pathology  [facility] Note    Date: 12/23/2020  Patients Name: Vivek Simon  MRN: [id number]  Diagnosis:   Patient Active Problem List   Diagnosis Code    DM (diabetes mellitus) ([facility] E11.9    HTN (hypertension) I10    Smoker F17.200    Asthma J45.909    Knee osteoarthritis M17.10    Edema R60.9    Spinal stenosis in cervical region M48.02    Chest pain R07.9    YUDITH (obstructive sleep apnea) G47.33    Morbid obesity ([facility] E66.01    Knee pain, bilateral M25.561, M25.562    S/P TKR (total knee replacement) Z96.659    Urge incontinence of urine N39.41    Lumbar spondylosis M47.816    Cervical spondylosis M47.812    Chronic use of opiate drugs therapeutic purposes Z79.891    Chronic knee pain M25.569, G89.29    Hypertensive urgency I16.0    Acute coronary syndrome (HCC) I24.9    Congestive heart failure (HCC) I50.9    Lymphadenopathy R59.1    Chronic pain G89.29    Coronary artery disease involving native coronary artery without angina pectoris I25.10    Chronic prescription opiate use Z79.891    Type 2 diabetes mellitus with complication, without long-term current use of insulin (HCC) E11.8    S/P coronary artery stent placement - RCA 10/12/16 - Dr. Mayur Vazquez Z95.5    Anxiety F41.9    Depression (emotion) F32.9    Postlaminectomy syndrome, lumbar M96.1    Medication monitoring encounter Z51.81    Postlaminectomy syndrome M96.1    Postlaminectomy syndrome M96.1    Primary osteoarthritis of right knee M17.11    Long term (current) use of antithrombotics/antiplatelets B63.23    Influenza B J10.1    Reactive airway disease with acute exacerbation J45. 0    Cerebrovascular accident (CVA) due to embolic occlusion of right middle cerebral artery (HCC) I63.411    Acute cerebrovascular accident (CVA) ([facility] I63.9    [facility] O66.189  Non-traumatic rhabdomyolysis M62.82    Ischemic cerebral stroke due to intracranial large artery atherosclerosis (HCC) I63.59       Pain: denies    Cognitive Treatment    Treatment time: 0913-0921    Subjective: [x] Alert [x] Cooperative     [] Confused     [] Agitated    [] Lethargic      Objective/Assessment:  Attention: pt lethargic, required max verbal cues to remain alert. Recall:  Delayed Recall, 3 Related Units: 0/3 independently with 5-minute delay, increased to 2/3 with max verbal cues    Memory & Mental Manipulation, Size 3 Words: 70% (7/10) accuracy independently, increased to 100% (10/10) with x2 verbal repetitions and mod verbal cues    Other: Attempted further tasks, however pt very lethargic and not able to maintain alerteness despite max verbal & tactile cues. Session concluded early. ST to continue to follow. Plan:  [x] Continue ST services    [] Discharge from [address] [place] [address] Dr      Discharge recommendations: Further therapy recommended at discharge. Treatment completed by:  Kendrick Sen M.S. [id number] [facility]

## 2020-12-24 LAB
ABSOLUTE EOS #: 0.23 K/UL (ref 0–0.44)
ABSOLUTE IMMATURE GRANULOCYTE: 0.07 K/UL (ref 0–0.3)
ABSOLUTE LYMPH #: 1.63 K/UL (ref 1.1–3.7)
ABSOLUTE MONO #: 0.81 K/UL (ref 0.1–1.2)
ANION GAP SERPL CALCULATED.3IONS-SCNC: 10 MMOL/L (ref 9–17)
BASOPHILS # BLD: 1 % (ref 0–2)
BASOPHILS ABSOLUTE: 0.04 K/UL (ref 0–0.2)
BUN BLDV-MCNC: 24 MG/DL (ref 8–23)
BUN/CREAT BLD: ABNORMAL (ref 9–20)
CALCIUM SERPL-MCNC: 9.7 MG/DL (ref 8.6–10.4)
CHLORIDE BLD-SCNC: 99 MMOL/L (ref 98–107)
CO2: 30 MMOL/L (ref 20–31)
CREAT SERPL-MCNC: 0.49 MG/DL (ref 0.5–0.9)
DIFFERENTIAL TYPE: ABNORMAL
EOSINOPHILS RELATIVE PERCENT: 3 % (ref 1–4)
GFR AFRICAN AMERICAN: >60 ML/MIN
GFR NON-AFRICAN AMERICAN: >60 ML/MIN
GFR SERPL CREATININE-BSD FRML MDRD: ABNORMAL ML/MIN/{1.73_M2}
GFR SERPL CREATININE-BSD FRML MDRD: ABNORMAL ML/MIN/{1.73_M2}
GLUCOSE BLD-MCNC: 170 MG/DL (ref 65–105)
GLUCOSE BLD-MCNC: 195 MG/DL (ref 65–105)
GLUCOSE BLD-MCNC: 196 MG/DL (ref 65–105)
GLUCOSE BLD-MCNC: 210 MG/DL (ref 70–99)
GLUCOSE BLD-MCNC: 265 MG/DL (ref 65–105)
HCT VFR BLD CALC: 39.4 % (ref 36.3–47.1)
HEMOGLOBIN: 11.9 G/DL (ref 11.9–15.1)
IMMATURE GRANULOCYTES: 1 %
LYMPHOCYTES # BLD: 22 % (ref 24–43)
MAGNESIUM: 1.9 MG/DL (ref 1.6–2.6)
MCH RBC QN AUTO: 27.7 PG (ref 25.2–33.5)
MCHC RBC AUTO-ENTMCNC: 30.2 G/DL (ref 28.4–34.8)
MCV RBC AUTO: 91.8 FL (ref 82.6–102.9)
MONOCYTES # BLD: 11 % (ref 3–12)
NRBC AUTOMATED: 0 PER 100 WBC
PDW BLD-RTO: 12.8 % (ref 11.8–14.4)
PLATELET # BLD: ABNORMAL K/UL (ref 138–453)
PLATELET ESTIMATE: ABNORMAL
PLATELET, FLUORESCENCE: NORMAL K/UL (ref 138–453)
PLATELET, IMMATURE FRACTION: NORMAL % (ref 1.1–10.3)
PMV BLD AUTO: ABNORMAL FL (ref 8.1–13.5)
POTASSIUM SERPL-SCNC: 4.6 MMOL/L (ref 3.7–5.3)
RBC # BLD: 4.29 M/UL (ref 3.95–5.11)
RBC # BLD: ABNORMAL 10*6/UL
SEG NEUTROPHILS: 63 % (ref 36–65)
SEGMENTED NEUTROPHILS ABSOLUTE COUNT: 4.8 K/UL (ref 1.5–8.1)
SODIUM BLD-SCNC: 139 MMOL/L (ref 135–144)
WBC # BLD: 7.6 K/UL (ref 3.5–11.3)
WBC # BLD: ABNORMAL 10*3/UL

## 2020-12-24 PROCEDURE — 94761 N-INVAS EAR/PLS OXIMETRY MLT: CPT

## 2020-12-24 PROCEDURE — 2580000003 HC RX 258: Performed by: STUDENT IN AN ORGANIZED HEALTH CARE EDUCATION/TRAINING PROGRAM

## 2020-12-24 PROCEDURE — 80048 BASIC METABOLIC PNL TOTAL CA: CPT

## 2020-12-24 PROCEDURE — 6370000000 HC RX 637 (ALT 250 FOR IP): Performed by: STUDENT IN AN ORGANIZED HEALTH CARE EDUCATION/TRAINING PROGRAM

## 2020-12-24 PROCEDURE — 2500000003 HC RX 250 WO HCPCS: Performed by: STUDENT IN AN ORGANIZED HEALTH CARE EDUCATION/TRAINING PROGRAM

## 2020-12-24 PROCEDURE — 99233 SBSQ HOSP IP/OBS HIGH 50: CPT | Performed by: INTERNAL MEDICINE

## 2020-12-24 PROCEDURE — 6370000000 HC RX 637 (ALT 250 FOR IP): Performed by: NURSE PRACTITIONER

## 2020-12-24 PROCEDURE — 85025 COMPLETE CBC W/AUTO DIFF WBC: CPT

## 2020-12-24 PROCEDURE — 85055 RETICULATED PLATELET ASSAY: CPT

## 2020-12-24 PROCEDURE — 6360000002 HC RX W HCPCS: Performed by: NURSE PRACTITIONER

## 2020-12-24 PROCEDURE — 2700000000 HC OXYGEN THERAPY PER DAY

## 2020-12-24 PROCEDURE — 36415 COLL VENOUS BLD VENIPUNCTURE: CPT

## 2020-12-24 PROCEDURE — 6370000000 HC RX 637 (ALT 250 FOR IP): Performed by: PSYCHIATRY & NEUROLOGY

## 2020-12-24 PROCEDURE — 83735 ASSAY OF MAGNESIUM: CPT

## 2020-12-24 PROCEDURE — 2060000000 HC ICU INTERMEDIATE R&B

## 2020-12-24 PROCEDURE — 51798 US URINE CAPACITY MEASURE: CPT

## 2020-12-24 PROCEDURE — 97129 THER IVNTJ 1ST 15 MIN: CPT

## 2020-12-24 PROCEDURE — 82947 ASSAY GLUCOSE BLOOD QUANT: CPT

## 2020-12-24 PROCEDURE — 99223 1ST HOSP IP/OBS HIGH 75: CPT | Performed by: STUDENT IN AN ORGANIZED HEALTH CARE EDUCATION/TRAINING PROGRAM

## 2020-12-24 PROCEDURE — 94640 AIRWAY INHALATION TREATMENT: CPT

## 2020-12-24 PROCEDURE — 2580000003 HC RX 258: Performed by: NURSE PRACTITIONER

## 2020-12-24 PROCEDURE — 6360000002 HC RX W HCPCS: Performed by: STUDENT IN AN ORGANIZED HEALTH CARE EDUCATION/TRAINING PROGRAM

## 2020-12-24 RX ORDER — GLYCOPYRROLATE 1 MG/1
1 TABLET ORAL ONCE
Status: COMPLETED | OUTPATIENT
Start: 2020-12-24 | End: 2020-12-25

## 2020-12-24 RX ADMIN — FAMOTIDINE 20 MG: 10 INJECTION INTRAVENOUS at 09:16

## 2020-12-24 RX ADMIN — AMLODIPINE BESYLATE 5 MG: 5 TABLET ORAL at 09:16

## 2020-12-24 RX ADMIN — Medication 81 MG: at 09:16

## 2020-12-24 RX ADMIN — DOCUSATE SODIUM 100 MG: 50 LIQUID ORAL at 09:15

## 2020-12-24 RX ADMIN — INSULIN GLARGINE 10 UNITS: 100 INJECTION, SOLUTION SUBCUTANEOUS at 20:30

## 2020-12-24 RX ADMIN — IPRATROPIUM BROMIDE AND ALBUTEROL SULFATE 1 AMPULE: .5; 3 SOLUTION RESPIRATORY (INHALATION) at 19:49

## 2020-12-24 RX ADMIN — METOPROLOL TARTRATE 75 MG: 25 TABLET ORAL at 09:16

## 2020-12-24 RX ADMIN — INSULIN LISPRO 3 UNITS: 100 INJECTION, SOLUTION INTRAVENOUS; SUBCUTANEOUS at 13:15

## 2020-12-24 RX ADMIN — ATORVASTATIN CALCIUM 80 MG: 80 TABLET, FILM COATED ORAL at 20:30

## 2020-12-24 RX ADMIN — QUETIAPINE FUMARATE 25 MG: 25 TABLET ORAL at 20:30

## 2020-12-24 RX ADMIN — IPRATROPIUM BROMIDE AND ALBUTEROL SULFATE 1 AMPULE: .5; 3 SOLUTION RESPIRATORY (INHALATION) at 08:25

## 2020-12-24 RX ADMIN — IPRATROPIUM BROMIDE AND ALBUTEROL SULFATE 1 AMPULE: .5; 3 SOLUTION RESPIRATORY (INHALATION) at 12:22

## 2020-12-24 RX ADMIN — SODIUM CHLORIDE, PRESERVATIVE FREE 10 ML: 5 INJECTION INTRAVENOUS at 20:29

## 2020-12-24 RX ADMIN — SENNOSIDES 8.8 MG: 8.8 LIQUID ORAL at 20:30

## 2020-12-24 RX ADMIN — FAMOTIDINE 20 MG: 10 INJECTION INTRAVENOUS at 20:30

## 2020-12-24 RX ADMIN — SERTRALINE 100 MG: 50 TABLET, FILM COATED ORAL at 09:16

## 2020-12-24 RX ADMIN — METOPROLOL TARTRATE 75 MG: 25 TABLET ORAL at 20:30

## 2020-12-24 RX ADMIN — ENOXAPARIN SODIUM 30 MG: 100 INJECTION SUBCUTANEOUS at 10:32

## 2020-12-24 RX ADMIN — INSULIN LISPRO 9 UNITS: 100 INJECTION, SOLUTION INTRAVENOUS; SUBCUTANEOUS at 09:26

## 2020-12-24 RX ADMIN — IPRATROPIUM BROMIDE AND ALBUTEROL SULFATE 1 AMPULE: .5; 3 SOLUTION RESPIRATORY (INHALATION) at 15:44

## 2020-12-24 RX ADMIN — FUROSEMIDE 40 MG: 40 TABLET ORAL at 09:16

## 2020-12-24 RX ADMIN — CEFTRIAXONE SODIUM 1 G: 1 INJECTION, POWDER, FOR SOLUTION INTRAMUSCULAR; INTRAVENOUS at 20:30

## 2020-12-24 RX ADMIN — INSULIN LISPRO 2 UNITS: 100 INJECTION, SOLUTION INTRAVENOUS; SUBCUTANEOUS at 20:31

## 2020-12-24 ASSESSMENT — PAIN DESCRIPTION - PROGRESSION

## 2020-12-24 ASSESSMENT — PAIN SCALES - GENERAL: PAINLEVEL_OUTOF10: 0

## 2020-12-24 ASSESSMENT — PAIN SCALES - WONG BAKER: WONGBAKER_NUMERICALRESPONSE: 2

## 2020-12-24 NOTE — PLAN OF CARE
Problem: OXYGENATION/RESPIRATORY FUNCTION  Goal: Patient will achieve/maintain normal respiratory rate/effort  Description: Respiratory rate and effort will be within normal limits for the patient  Outcome: Ongoing   BRONCHOSPASM/BRONCHOCONSTRICTION     [x]         IMPROVE AERATION/BREATH SOUNDS  [x]   ADMINISTER BRONCHODILATOR THERAPY AS APPROPRIATE  [x]   ASSESS BREATH SOUNDS  []   IMPLEMENT AEROSOL/MDI PROTOCOL  [x]   PATIENT EDUCATION AS NEEDED

## 2020-12-24 NOTE — PROGRESS NOTES
[address] [place] [address]     Department of Internal Medicine - Staff Internal Medicine Service   ICU PATIENT TRANSFER NOTE        Patient:  Tia Horn  YOB: 1950  MRN: [id number]     Acct: [de-identified]     Admit date: 12/15/2020    Code Status:-  Full code     Reason for ICU Admission:-       SUPPORT DEVICES: [] Ventilator [] BIPAP  [x] Nasal Cannula [] Room Air    Consultations:- [] Cardiology [] Nephrology  [] Hemo onco  [] GI                               [] ID [] ENT  [] Rheum [] Endo   []Physiotherapy                                 Others:- Neurology, General Surgery    NUTRITION:  [] NPO [x] Tube Feeding (Specify: ) [] TPN  [] PO    Central Lines:- [] No   [] Yes           If yes - Days/Date of Insertion. Pt seen,examined and Chart reviewed. ICU COURSE:    Initial Presentation (Admitted 12/15/20): The patient is a 76 y. o. female with a past medical history that includes diabetes, CAD, hyperlipidemia and CHF who presented to the emergency department for altered mental status as a stroke alert.  Family last spoke to patient at 8 AM in the morning.  Patient was found down at her home, incontinent, unresponsive.  Brought in via EMS. [address] arrival evaluated by stroke team, initial CRK 77; left hemiparesis and right-sided gaze preference.  On arrival to the emergency department patient's blood pressure was 210/115, heart rate 97 afebrile 98. 6.  Stroke panel significant for glucose 138, sodium 139, hemoglobin 13.3, platelets 203, total CK 1609, myoglobin 1554, INR 1.0, PTT 22.5, high-sensitivity troponin XV.  COVID-19 negative.  Patient given 1 L fluid bolus along with aspirin 81 mg.    Patient's case was discussed in detail with endovascular neurosurgery team and stroke team along with patient's family. Maria Isabel Bazzi was made that because patient was greater than 24 hours no longer thrombectomy candidate and CT perfusion scan showing large volume infarcted tissue with little to 0 penumbra.  Patient's family agreed to patient being admitted to neuro critical care for close neurologic monitoring and possible hypertonic and hyperosmolar treatment of her cerebral edema.        Hospital Course:  12/16: MRI brain revealed a large territory R MCA infarct. Heparin infusion discontinued. Failed bedside swallow, NG tube placed.   12/17: CT head this morning stable, no hemorrhagic conversion or significant cerebral edema. Lopressor 50 mg BID started, home medication. 12/18: Low urine output despite fluids.  Resumed home Lasix with improvement of urine output.  Pavon placed to monitor.  One-time fever overnight.  UA consistent with infection, culture pending. Started on abx.   12/20: Requiring frequent suctioning throughout night.  Decreased urine output overnight, only 400cc total. Urinary retention this morning; straight cath x1. Tube feeds at goal. Total fluids 75cc/h. Plavix started for intracranial atherosclerosis. 12/21: Patient having difficulty managing oral secretions and requiring oral suctioning overnight. One time low grade fever overnight, 37.8C.  CXR with no acute infiltrate.  Continues on Rocephin for UTI. Failed speech bedside swallow.  Gen Surg consulted for PEG. 12/22: Per general surgery team earliest patient can receive PEG tube is 12/28. We will continue to hold Plavix. 12/23: Continue to monitor blood glucose. Increase Lantus. Blood pressure goal less than 180. Monitor for signs of aspiration. Plan for PEG placement on 12/28. Monitor for aspiration. Will need likely need rehab placement upon discharge.  Continue rocephin once daily for UTI throught 12/24 Patient was seen on the floor. Patient resolved management and is intermittently talking and following commands. Patient is hemodynamically stable. Patient withdraws to pain. Patient scheduled for PEG placement. Plavix held. Physical Exam:   Vitals: BP (!) 178/79   Pulse 68   Temp 98.6 °F (37 °C) (Axillary)   Resp 20   Ht 5' 4\" (1.626 m)   Wt 231 lb 9.6 oz (105.1 kg)   LMP  (LMP Unknown)   SpO2 96%   BMI 39.75 kg/m²   24 hour intake/output:    Intake/Output Summary (Last 24 hours) at 12/24/2020 0018  Last data filed at 12/23/2020 2000  Gross per 24 hour   Intake 1424 ml   Output 1475 ml   Net -51 ml     Last 3 weights:   Wt Readings from Last 3 Encounters:   12/15/20 231 lb 9.6 oz (105.1 kg)   11/15/19 250 lb (113.4 kg)   10/03/19 250 lb (113.4 kg)       General appearance -somnolent and drowsy  Mental status - alert, oriented to person, place, and time  Eyes - pupils equal and reactive, extraocular eye movements intact  Mouth - mucous membranes moist, pharynx normal without lesions  Neck - supple, no significant adenopathy  Chest - clear to auscultation, no wheezes  Heart - normal rate, regular rhythm, normal S1, S2  Abdomen - soft, nontender, nondistended  Neurological - alert, oriented, normal speech, no focal deficits   Extremities - peripheral pulses normal, no pedal edema  Skin - normal coloration and turgor, no rashes      Medications:Current Inpatient  Scheduled Meds:   insulin glargine  10 Units Subcutaneous Nightly    QUEtiapine  25 mg Oral Nightly    cefTRIAXone (ROCEPHIN) IV  1 g Intravenous Q24H    senna  5 mL Oral Nightly    docusate  100 mg Oral Daily    [Held by provider] clopidogrel  75 mg Per NG tube Daily    insulin lispro  0-18 Units Subcutaneous TID     insulin lispro  0-9 Units Subcutaneous Nightly    famotidine (PEPCID) injection  20 mg Intravenous BID    metoprolol tartrate  75 mg Oral BID    amLODIPine  5 mg Per NG tube Daily    furosemide  40 mg Oral Daily  sertraline  100 mg Oral Daily    enoxaparin  30 mg Subcutaneous Daily    sodium chloride  1,000 mL Intravenous Once    ipratropium-albuterol  1 ampule Inhalation Q4H WA    sodium chloride flush  10 mL Intravenous 2 times per day    aspirin  81 mg Oral Daily    Or    aspirin  300 mg Rectal Daily    atorvastatin  80 mg Oral Nightly     Continuous Infusions:   dextrose       PRN Meds:labetalol, glucose, dextrose, glucagon (rDNA), dextrose, acetaminophen, sodium chloride flush, promethazine **OR** ondansetron    Objective:    CBC:   Recent Labs     12/21/20  0320 12/22/20  0459 12/23/20  1015   WBC 6.9 8.9 7.8   HGB 11.0* 10.9* 11.2*    254 260     BMP:    Recent Labs     12/21/20  0320 12/22/20  0459 12/23/20  1015    138 137   K 4.5 4.6 4.6    99 97*   CO2 27 29 30   BUN 22 22 22   CREATININE 0.63 0.53 0.50   GLUCOSE 202* 217* 221*     Calcium:  Recent Labs     12/23/20  1015   CALCIUM 9.7     Ionized Calcium:No results for input(s): IONCA in the last 72 hours. Magnesium:  Recent Labs     12/23/20  1015   MG 1.8     Phosphorus:No results for input(s): PHOS in the last 72 hours. BNP:No results for input(s): BNP in the last 72 hours. Glucose:  Recent Labs     12/23/20  1120 12/23/20  1523 12/23/20  2024   POCGLU 216* 185* 218*     HgbA1C: No results for input(s): LABA1C in the last 72 hours. INR: No results for input(s): INR in the last 72 hours. Hepatic: No results for input(s): ALKPHOS, ALT, AST, PROT, BILITOT, BILIDIR, LABALBU in the last 72 hours. Amylase and Lipase:No results for input(s): LACTA, AMYLASE in the last 72 hours. Lactic Acid: No results for input(s): LACTA in the last 72 hours. CARDIAC ENZYMES:No results for input(s): CKTOTAL, CKMB, CKMBINDEX, TROPONINI in the last 72 hours. BNP: No results for input(s): BNP in the last 72 hours. Lipids: No results for input(s): CHOL, TRIG, HDL, LDLCALC in the last 72 hours.     Invalid input(s): LDL I have discussed the care of Tia Horn, including pertinent history and exam findings,  with the resident. I have seen and examined the patient and the key elements of all parts of the encounter have been performed by me. I agree with the assessment, plan and orders as documented by the resident with additions . Treatment plan Discussed with nursing staff in detail , all questions answered . Electronically signed by Rissa Mcguire MD on   12/24/20 at 11:50 AM EST    Please note that this chart was generated using voice recognition Dragon dictation software. Although every effort was made to ensure the accuracy of this automated transcription, some errors in transcription may have occurred.

## 2020-12-24 NOTE — PROGRESS NOTES
 Non-traumatic rhabdomyolysis M62.82    Ischemic cerebral stroke due to intracranial large artery atherosclerosis (HCC) I63.59       Pain: denies    Cognitive Treatment    Treatment time: 0925-0933    Subjective: [] Alert [] Cooperative     [x] Confused     [] Agitated    [x] Lethargic    Objective/Assessment:  Attention: pt continues to be very lethargic, requires mod-max verbal cues to remain attentive to task    Orientation: pt oriented to self, place, and time independently    Recall:   Delayed Recall, 3 Related Words: 0/3 independently following 5 and 7 minute delays; no increase despite max verbal cues & model    Immediate Memory, 4 Numbers: 0/2, increased to 1/2 with x2 verbal repetitions. Perseverations observed during this task from previous ST tasks    Immediate Memory, 3 Numbers: 1/2 independently, no increase despite repetitions    Organization: NT    Problem Solving/Reasoning:   Making Word Deductions: 1/2 independently, increased to 2/2 with max verbal cues. Pt stated \"this is over my head\"    Other: Pt continues with facial weakness. Session concluded early, as pt became increasingly lethargic and not attentive despite max verbal & tactile cues. Plan:  [x] Continue ST services    [] Discharge from ST:      Discharge recommendations: Further therapy recommended at discharge. Treatment completed by:  Rocio Garcia M.S. [id number] [facility]

## 2020-12-24 NOTE — PROGRESS NOTES
Comprehensive Nutrition Assessment    Type and Reason for Visit:  Reassess    Nutrition Recommendations/Plan: Continue diabetic TF at 55 mL/hr as tolerated. Nutrition Assessment:  Pt tolerating TF well at goal per RN. Blood glucose remains elevated. Estimated Daily Nutrient Needs:  Energy (kcal):  1942-1483 kcal (15-18kcal/kg); Weight Used for Energy Requirements:  Current     Protein (g):  60-80 gm (1.2-1.4 gm/kg); Weight Used for Protein Requirements:  Ideal          Nutrition Related Findings:  Meds/labs reviewed      Wounds:  None       Current Nutrition Therapies:    Current Tube Feeding (TF) Orders:  · Feeding Route: Nasogastric  · Formula: Diabetic  · Schedule: Continuous  · Current TF & Flush Orders Provides: Glucerna 1.2 at 55 mL/hr = 1584 kcals, 79 gm protein  · Goal TF & Flush Orders Provides: as above      Anthropometric Measures:  · Height: 5' 4\" (162.6 cm)  · Current Body Weight: 231 lb 11.3 oz (105.1 kg)   · Ideal Body Weight: 120 lbs; % Ideal Body Weight 193.1 %   · BMI: 39.8  · BMI Categories: Obese Class 2 (BMI 35.0 -39.9)       Nutrition Diagnosis:   · Inadequate oral intake related to swallowing difficulty as evidenced by nutrition support - enteral nutrition      Nutrition Interventions:   Food and/or Nutrient Delivery:  Continue Current Tube Feeding  Nutrition Education/Counseling:  No recommendation at this time   Coordination of Nutrition Care:  Continue to monitor while inpatient    Goals:  Meet 100% of estimated nutrition needs       Nutrition Monitoring and Evaluation:   Behavioral-Environmental Outcomes:  None Identified   Food/Nutrient Intake Outcomes:  Enteral Nutrition Intake/Tolerance  Physical Signs/Symptoms Outcomes:  Biochemical Data, Weight, Nutrition Focused Physical Findings     Discharge Planning:     Too soon to determine     Electronically signed by Gilbert Linder, MS, RD, LD on 12/24/20 at 11:08 AM EST    Contact: [phone]

## 2020-12-24 NOTE — PROGRESS NOTES
[facility]  Internal Medicine Teaching Residency Program  Inpatient Daily Progress Note  ______________________________________________________________________________    Patient: Swetha Agudelo  YOB: 1950   DGN:[id number]    Acct: [de-identified]     Room: [facility]5469-  Admit date: 12/15/2020  Today's date: 20  Number of days in the hospital: 9    SUBJECTIVE   Admitting Diagnosis: Cerebrovascular accident (CVA) due to embolic occlusion of right middle cerebral artery ([address]  CC: Altered mental status  Pt examined at bedside. Chart & results reviewed. Hemodynamically stable. No acute complaints. Patient's blood pressure well controlled with SBP less than 180  Patient is still drowsy and somnolent. Plavix still held and waiting for PEG placement as per general surgery. BRIEF HISTORY     73-year-old female came with past medical history of diabetes, CAD, hyperlipidemia and CHF presented to the ED with altered mental status. Patient was found to have right MCA infarct and UTI. Patient failed bedside swallow twice and NG tube placed. Plan is for PEG placement as per general surgery on . Plavix is held for PEG placement.     OBJECTIVE     Vital Signs:  BP (!) 148/74   Pulse 64   Temp 97.5 °F (36.4 °C) (Axillary)   Resp 16   Ht 5' 4\" (1.626 m)   Wt 231 lb 9.6 oz (105.1 kg)   LMP  (LMP Unknown)   SpO2 99%   BMI 39.75 kg/m²     Temp (24hrs), Av.5 °F (36.9 °C), Min:97.5 °F (36.4 °C), Max:99.1 °F (37.3 °C)    In: 1088   Out: 1700 [Urine:1700]     General appearance -somnolent and drowsy  Mental status - alert, oriented to person, place, and time  Eyes - pupils equal and reactive, extraocular eye movements intact  Mouth - mucous membranes moist, pharynx normal without lesions  Neck - supple, no significant adenopathy  Chest - clear to auscultation, no wheezes  Heart - normal rate, regular rhythm, normal S1, S2 Abdomen - soft, nontender, nondistended  Neurological - alert, oriented, normal speech, no focal deficits   Extremities - peripheral pulses normal, no pedal edema  Skin - normal coloration and turgor, no rashes           Medications:  Scheduled Medications:    insulin glargine  10 Units Subcutaneous Nightly    QUEtiapine  25 mg Oral Nightly    cefTRIAXone (ROCEPHIN) IV  1 g Intravenous Q24H    senna  5 mL Oral Nightly    docusate  100 mg Oral Daily    [Held by provider] clopidogrel  75 mg Per NG tube Daily    insulin lispro  0-18 Units Subcutaneous TID WC    insulin lispro  0-9 Units Subcutaneous Nightly    famotidine (PEPCID) injection  20 mg Intravenous BID    metoprolol tartrate  75 mg Oral BID    amLODIPine  5 mg Per NG tube Daily    furosemide  40 mg Oral Daily    sertraline  100 mg Oral Daily    enoxaparin  30 mg Subcutaneous Daily    sodium chloride  1,000 mL Intravenous Once    ipratropium-albuterol  1 ampule Inhalation Q4H WA    sodium chloride flush  10 mL Intravenous 2 times per day    aspirin  81 mg Oral Daily    Or    aspirin  300 mg Rectal Daily    atorvastatin  80 mg Oral Nightly     Continuous Infusions:    dextrose       PRN Medications    labetalol, 10 mg, Q1H PRN      glucose, 15 g, PRN      dextrose, 12.5 g, PRN      glucagon (rDNA), 1 mg, PRN      dextrose, 100 mL/hr, PRN      acetaminophen, 650 mg, Q4H PRN      sodium chloride flush, 10 mL, PRN      promethazine, 12.5 mg, Q6H PRN    Or      ondansetron, 4 mg, Q6H PRN        Diagnostic Labs:  CBC:   Recent Labs     12/22/20 0459 12/23/20  1015 12/24/20  0424   WBC 8.9 7.8 7.6   RBC 3.93* 4.05 4.29   HGB 10.9* 11.2* 11.9   HCT 35.9* 36.6 39.4   MCV 91.3 90.4 91.8   RDW 13.2 12.9 12.8    260 See Reflexed IPF Result     BMP:   Recent Labs     12/22/20 0459 12/23/20  1015 12/24/20  0424    137 139   K 4.6 4.6 4.6   CL 99 97* 99   CO2 29 30 30   BUN 22 22 24*   CREATININE 0.53 0.50 0.49* BNP: No results for input(s): BNP in the last 72 hours. PT/INR: No results for input(s): PROTIME, INR in the last 72 hours. APTT: No results for input(s): APTT in the last 72 hours. CARDIAC ENZYMES: No results for input(s): CKMB, CKMBINDEX, TROPONINI in the last 72 hours. Invalid input(s): CKTOTAL;3  FASTING LIPID PANEL:  Lab Results   Component Value Date    CHOL 188 04/05/2019    HDL 48 12/16/2020    TRIG 177 (H) 04/05/2019     LIVER PROFILE: No results for input(s): AST, ALT, ALB, BILIDIR, BILITOT, ALKPHOS in the last 72 hours. MICROBIOLOGY:   Lab Results   Component Value Date/Time    CULTURE ESCHERICHIA COLI >[id number] CFU/ML (A) 12/18/2020 09:31 PM       Imaging:    Xr Chest Portable    Result Date: 12/21/2020  No acute cardiopulmonary disease. Cardiomegaly with central vascular congestion with no overt failure or focal infiltrate. Xr Chest Portable    Result Date: 12/18/2020  Borderline cardiomegaly. No convincing evidence for acute cardiopulmonary pathology. Xr Abdomen For Ng/og/ne Tube Placement    Result Date: 12/18/2020  Appropriate radiographic positioning of nasogastric tube. Recommend clinical correlation prior to use. ASSESSMENT & PLAN     ASSESSMENT / PLAN:     Large right MCA territory infarction likely secondary to intracranial atherosclerosis  -Continue aspirin 81 mg.  Plavix held in preparation for PEG  -Continue early Lipitor 80 mg for secondary stroke prevention  -Goal SBP<180.   Continue Norvasc, Lasix, Lopressor  -Continue telemetry and neurochecks per protocol     Hyperlipidemia  -We will continue Lipitor 80 mg daily with a goal of LDL less than 70     Dysphagia secondary to stroke  -Failed speech bedside swallow x2  -General surgery consulted for PEG placement on 12/28, will hold Plavix     Type 2 diabetes mellitus  -Patient continued with Lantus 10 units high-dose insulin sliding scale  -Hypoglycemia protocol     UTI  -Patient continued on ceftriaxone 1 g IV daily    DVT prophylaxis: Lovenox  GI prophylaxis: Pepcid     PT OT: Ongoing  Discharge planning: Ongoing      Fiordaliza Campbell MD  Internal Medicine Resident, PGY-1  [facility];  [place], [place]  12/24/2020, 11:45 AM

## 2020-12-24 NOTE — PROGRESS NOTES
Neurology Resident Progress Note      SUBJECTIVE:  This is a 78 y.o.  female admitted 12/15/2020 for Cerebrovascular accident (CVA) due to embolic occlusion of right middle cerebral artery ([facility] [address] [I63.411]  This is a follow-up neurology progress note. The patient was seen and examined and the chart was reviewed. There were no acute events overnight. ROS  Constitutional: no fever, chills, fatigue  HENT: No change in vision or hearing   Respiratory: No cough, SOB, wheezing. Cardiovascular:  No chest pain, palpitations, leg swelling. Gastrointestinal: No nausea, vomiting, diarrhea. Genitourinary: No increased frequency, urgency. Musculoskeletal: No myalgia or arthralgia. Skin: No rashes or scarring or bruises. Neurological: No headache, paresthesia, or focal weakness. Endo/Heme/Allergies: Negative for itchy eyes or runny nose. Psychiatric/Behavioral: No anxiety or depressed mood. ELIE Paez is a  78 y.o. female admitted on 12/15/2020 with . The patient was seen and examined and the chart was reviewed. Patient has a past medical history of coronary artery disease hyperlipidemia's and CHF who initially presented for altered mental status. Presented with an NIH stroke scale of 19 with left hemiparesis and right gaze preference. Patient's blood pressure was 210/115 heart rate 97. Patient underwent a CT perfusion which showed a large volume infarct with no penumbra. Patient was not a candidate for thrombectomy as he was outside the window. Hypertonic and hyperosmolar treatment for cerebral edema was initiated. Patient was treated for UTI with Rocephin. Repeat CT did not show any hemorrhagic conversion. Patient's blood sugars were elevated and Lantus was added and subsequently increased. Plavix had to be held as patient was unable to pass swallow and required a PEG tube. Patient is scheduled for PEG tube placement at the end of this week. Neurologically, patient does display a left facial droop with right gaze preference and left-sided plegia. Speech is incoherent at times.        insulin glargine  10 Units Subcutaneous Nightly    QUEtiapine  25 mg Oral Nightly    cefTRIAXone (ROCEPHIN) IV  1 g Intravenous Q24H    senna  5 mL Oral Nightly    docusate  100 mg Oral Daily    [Held by provider] clopidogrel  75 mg Per NG tube Daily    insulin lispro  0-18 Units Subcutaneous TID WC    insulin lispro  0-9 Units Subcutaneous Nightly    famotidine (PEPCID) injection  20 mg Intravenous BID    metoprolol tartrate  75 mg Oral BID    amLODIPine  5 mg Per NG tube Daily    furosemide  40 mg Oral Daily    sertraline  100 mg Oral Daily    enoxaparin  30 mg Subcutaneous Daily    sodium chloride  1,000 mL Intravenous Once    ipratropium-albuterol  1 ampule Inhalation Q4H WA    sodium chloride flush  10 mL Intravenous 2 times per day    aspirin  81 mg Oral Daily    Or    aspirin  300 mg Rectal Daily    atorvastatin  80 mg Oral Nightly       Past Medical History:   Diagnosis Date    Allergic rhinitis     Anxiety 10/25/2016    Asthma     CAD (coronary artery disease)     s/p stents RCA and LAD 2009,    Chronic back pain     Congestive heart failure (HCC)     Depression     Headache(784.0)     Hiatal hernia     Hypercholesteremia 2/21/2012    Hypertension     Kidney stones     years ago    Obesity     Osteoarthritis     Postlaminectomy syndrome 6/6/2012    Type II or unspecified type diabetes mellitus without mention of complication, not stated as uncontrolled     Unspecified sleep apnea     Urinary incontinence        Past Surgical History:   Procedure Laterality Date    CARDIAC CATHETERIZATION  01/2013    patent stents    COLONOSCOPY  09/2015    normal    CORONARY ANGIOPLASTY WITH STENT PLACEMENT  1012-16    stents x 3    JOINT REPLACEMENT      left knee    KNEE SURGERY  10/21/2013    rt knee synvisc injection  KNEE SURGERY  12/02/2013    knee synvisc injection rt #2    KNEE SURGERY  12/09/2013    rt knee synvisc inj    LUMBAR SPINE SURGERY  2007    NERVE BLOCK  4/23/2012    Right MBNB L3, L4, L5    NERVE BLOCK  5/22/2012    Right MBNB L3, L4, and L5     NERVE BLOCK  01/14/13    Right knee injection #1 - Synvisc    NERVE BLOCK  01/21/13    Right knee injection #2 - Synvisc    NERVE BLOCK  1/28/2013     Rigth knee synvisc injection #3    NERVE BLOCK Right 04/17/2017    Rt genicular nerve block. no steroid used    OTHER SURGICAL HISTORY Right 7/14/2014    synvisc one knee injection    OTHER SURGICAL HISTORY Right 3/16/2015    synvisc one knee injection    OTHER SURGICAL HISTORY Right 06-13-16    synvisc right knee injection    SPINE SURGERY      TONSILLECTOMY      UPPER GASTROINTESTINAL ENDOSCOPY      VENA CAVA FILTER PLACEMENT  2007    PE and B/L LE emboli       PHYSICAL EXAM:      Blood pressure (!) 144/80, pulse 64, temperature 98.7 °F (37.1 °C), temperature source Axillary, resp. rate 24, height 5' 4\" (1.626 m), weight 231 lb 9.6 oz (105.1 kg), SpO2 96 %, not currently breastfeeding. General examination:    HEENT: Normocephalic, atraumatic, neck supple (-)nuchal rigidity  Lungs: Respirations unlabored, chest wall no deformity  Heart: Regular rate rhythm  Abdomen: Soft, non distended, non tender  Extremities: No cyanosis, clubbing or edema, 2+ pulses  Skin: Intact, normal skin color, no ecchymosis     Neurological examination:  Somnolent but arousable intermittently following commands. Incoherent speech. Pupils equal and reactive, right gaze preference. Left facial droop, left tongue deviation. Spontaneously moving right upper and lower extremity. Plegic on the left side  Withdraws left lower extremity.     Investigations:      Laboratory Testing:  Recent Results (from the past 24 hour(s))   POC Glucose Fingerstick    Collection Time: 12/23/20  8:24 PM   Result Value Ref Range POC Glucose 218 (H) 65 - 105 mg/dL   CBC WITH AUTO DIFFERENTIAL    Collection Time: 12/24/20  4:24 AM   Result Value Ref Range    WBC 7.6 3.5 - 11.3 k/uL    RBC 4.29 3.95 - 5.11 m/uL    Hemoglobin 11.9 11.9 - 15.1 g/dL    Hematocrit 39.4 36.3 - 47.1 %    MCV 91.8 82.6 - 102.9 fL    MCH 27.7 25.2 - 33.5 pg    MCHC 30.2 28.4 - 34.8 g/dL    RDW 12.8 11.8 - 14.4 %    Platelets See Reflexed IPF Result 138 - 453 k/uL    MPV NOT REPORTED 8.1 - 13.5 fL    NRBC Automated 0.0 0.0 per 100 WBC    Differential Type NOT REPORTED     WBC Morphology NOT REPORTED     RBC Morphology NOT REPORTED     Platelet Estimate NOT REPORTED     Seg Neutrophils 63 36 - 65 %    Lymphocytes 22 (L) 24 - 43 %    Monocytes 11 3 - 12 %    Eosinophils % 3 1 - 4 %    Basophils 1 0 - 2 %    Immature Granulocytes 1 (H) 0 %    Segs Absolute 4.80 1.50 - 8.10 k/uL    Absolute Lymph # 1.63 1.10 - 3.70 k/uL    Absolute Mono # 0.81 0.10 - 1.20 k/uL    Absolute Eos # 0.23 0.00 - 0.44 k/uL    Basophils Absolute 0.04 0.00 - 0.20 k/uL    Absolute Immature Granulocyte 0.07 0.00 - 0.30 k/uL   BASIC METABOLIC PANEL    Collection Time: 12/24/20  4:24 AM   Result Value Ref Range    Glucose 210 (H) 70 - 99 mg/dL    BUN 24 (H) 8 - 23 mg/dL    CREATININE 0.49 (L) 0.50 - 0.90 mg/dL    Bun/Cre Ratio NOT REPORTED 9 - 20    Calcium 9.7 8.6 - 10.4 mg/dL    Sodium 139 135 - 144 mmol/L    Potassium 4.6 3.7 - 5.3 mmol/L    Chloride 99 98 - 107 mmol/L    CO2 30 20 - 31 mmol/L    Anion Gap 10 9 - 17 mmol/L    GFR Non-African American >60 >60 mL/min    GFR African American >60 >60 mL/min    GFR Comment          GFR Staging NOT REPORTED    MAGNESIUM    Collection Time: 12/24/20  4:24 AM   Result Value Ref Range    Magnesium 1.9 1.6 - 2.6 mg/dL   Immature Platelet Fraction    Collection Time: 12/24/20  4:24 AM   Result Value Ref Range    Platelet, Immature Fraction NOT REPORTED 1.1 - 10.3 %    Platelet, Fluorescence Platelet clumps present, count appears adequate.  138 - 453 k/uL POC Glucose Fingerstick    Collection Time: 12/24/20  8:39 AM   Result Value Ref Range    POC Glucose 265 (H) 65 - 105 mg/dL   POC Glucose Fingerstick    Collection Time: 12/24/20 12:50 PM   Result Value Ref Range    POC Glucose 195 (H) 65 - 105 mg/dL       Imaging/Diagnostics:  Echo Complete 2d W Doppler W Color    Result Date: 12/16/2020 [id number] Transthoracic Echocardiography Report (TTE)  Patient Name Wesley Lopez    Date of Study               12/16/2020               SANDER   Date of      1950  Gender                      Female  Birth   Age          78 year(s)  Race                        Black   Room Number  5210        Height:                     62 inch, 157.48 cm   Corporate ID [id number]    Weight:                     250 pounds, 113.4 kg  #   Patient Acct [de-identified]   BSA:          2.1 m^2       BMI:      45.73  #                                                              kg/m^2   MR #         [id number]     Richardson Donaldson   Accession #  [id number]  Interpreting Physician      [address] [place] [address]   Fellow                   Referring Nurse                           Practitioner   Interpreting             Referring Physician         Quin Mccloud MD  Fellow  Type of Study   TTE procedure:2D Echocardiogram, M-Mode, Doppler, Color Doppler, Bubble  Study. Procedure Date Date: 12/16/2020 Start: 10:27 AM Study Location: [facility] Technical Quality: Fair visualization Indications:CVA. History / Tech. Comments: Procedure explained to patient. Study done bedside in Neuro ICU. H/O DM, YUDITH, morbid obesity Patient Status: Inpatient Height: 62 inches Weight: 250.01 pounds BSA: 2.1 m^2 BMI: 45.73 kg/m^2 Allergies   - Other:(Drug Allergies - bactrim, tylenol). CONCLUSIONS Summary Technically difficult, not all walls visualized. Left ventricle is normal in size. Global left ventricular systolic function is low normal. Estimated ejection fraction is 50 % . Moderate to severe left ventricular hypertrophy. Increased septal thickness is noted. Grade I (mild) left ventricular diastolic dysfunction. Left atrium is moderately dilated. Negative bubble study, no shunt noted via injection of agitated saline. Thickened mitral valve leaflets. Mitral annular calcification is seen. At least mild tricuspid regurgitation. EXAMINATION: CT OF THE HEAD WITHOUT CONTRAST  12/17/2020 5:20 am TECHNIQUE: CT of the head was performed without the administration of intravenous contrast. Dose modulation, iterative reconstruction, and/or weight based adjustment of the mA/kV was utilized to reduce the radiation dose to as low as reasonably achievable. COMPARISON: CT head without contrast December 15, 2020. CT brain perfusion December 15, 2020. HISTORY: ORDERING SYSTEM PROVIDED HISTORY: R MCA infarct, eval for swelling/hemorrhagic conversion TECHNOLOGIST PROVIDED HISTORY: R MCA infarct, eval for swelling/hemorrhagic conversion Reason for Exam: Right MCA infarct, Evaluate for swelling/hemorrhagic conversion Acuity: Acute Type of Exam: Subsequent/Follow-up FINDINGS: BRAIN/VENTRICLES: There is no acute intracranial hemorrhage, mass effect or midline shift. No abnormal extra-axial fluid collection. Interval evolution and increased extent of right MCA distribution right cerebral acute ischemia is noted with geographic hypoattenuation consistent with associated edema noted involving the right frontal, right temporal, right insular, right parietal lobes. .  Partial effacement of the right lateral ventricle is evident. Minimal leftward midline shift is seen secondary to the mass effect which measures 2 mm. .  There is no evidence of hydrocephalus. Ill-defined hypoattenuation is present within cerebral white matter consistent with moderate microvascular ischemic change. ORBITS: The visualized portion of the orbits demonstrate no acute abnormality. SINUSES: The visualized paranasal sinuses and mastoid air cells demonstrate no acute abnormality. SOFT TISSUES/SKULL:  No acute abnormality of the visualized skull or soft tissues. 1. Interval increased extent and interval evolution of large right MCA acute infarction with associated cerebral edema and partial effacement of the right lateral ventricle. 2. Interval associated development of minimal leftward midline shift measuring 2 mm at the level of the septum pellucidum. 3. No evidence of acute intracranial hemorrhage. 4. Moderate cerebral white matter chronic microvascular ischemic disease. Ct Head Wo Contrast    Addendum Date: 12/15/2020    ADDENDUM: The full set of diagnostic perfusion images were sent for review at 7:15 p.m. on 12/15/2020 and demonstrate large volume elevated M TT in the visualized right middle cerebral artery territory with elevated relative cerebral blood volume and decreased relative cerebral blood flow.      Result Date: 12/15/2020 differentiation involving the right insula and surrounding right frontal lobe extending into the right basal ganglia. No intracranial hemorrhage. Mild-to-moderate chronic white matter microvascular ischemic changes. No hydrocephalus or extra-axial fluid collection. Midline maintained. Basal cisterns patent. Small volume high left frontal encephalomalacia in keeping with sequela of prior infarct. Chronic appearing lacunar infarcts in the left basal ganglia. ORBITS: The visualized portion of the orbits demonstrate no acute abnormality. SINUSES:  The visualized paranasal sinuses and mastoid air cells demonstrate no acute abnormality. SOFT TISSUES/SKULL: No acute abnormality of the visualized skull or soft tissues. CTA NECK: AORTIC ARCH/ARCH VESSELS: No dissection or arterial injury. No significant stenosis of the brachiocephalic or subclavian arteries. CAROTID ARTERIES: Right: The right common carotid artery is normal in caliber. There is calcified atherosclerotic plaque involving the right carotid bifurcation and bulb causing 40% stenosis of the proximal right internal carotid artery. The right external carotid artery is patent. No dissection. Left: The left common carotid artery is normal in caliber. There is calcified atherosclerotic plaque and kinking of the left carotid bulb causing 75% focal stenosis 1.6 cm distal to the origin of the left internal carotid artery. The left external carotid artery is patent. No dissection. VERTEBRAL ARTERIES: No dissection, arterial injury, or significant stenosis. SOFT TISSUES: The lung apices are clear. No cervical or superior mediastinal lymphadenopathy. The larynx and pharynx are unremarkable. No acute abnormality of the salivary and thyroid glands. BONES: No acute osseous abnormality. Mild-to-moderate degenerative changes in the cervical spine.  CTA HEAD: ANTERIOR CIRCULATION: Moderate to severe calcific atherosclerotic stenosis of the cavernous internal carotid arteries. Near complete occlusion of the distal M1 and proximal M2 segments right middle cerebral artery with paucity of contrast in more distal branches. No focal stenosis of the right anterior cerebral artery. Severe stenosis of the proximal A1 segment left anterior cerebral artery. Patent anterior communicating artery. Severe stenosis of the M1 segment left middle cerebral artery. No aneurysm. POSTERIOR CIRCULATION: Moderate stenosis of the proximal intracranial left vertebral artery. No right vertebral, basilar, or left posterior cerebral artery stenosis. Moderate stenosis of the P2 segment right posterior cerebral artery. OTHER: No dural venous sinus thrombosis on this non-dedicated study. CT PERFUSION: There is increased mean transit time in the visualized right middle cerebral artery territory somewhat greater in extent than the underlying hypodensity on head CT. Moderate volume underlying core infarct is noted. 1. Moderate volume acute ischemic infarct in the right middle cerebral artery territory. No intracranial hemorrhage or mass effect. 2. Moderate volume penumbra in the peripheral right middle cerebral artery territory based on perfusion images. 3. Acute nearly occlusive thrombosis of the M1 and M2 segments right middle cerebral artery. 4. Severe stenosis of the M1 segment left middle cerebral artery. 5. Severe stenosis of the A1 segment left anterior cerebral artery. 6. 75% stenosis of the proximal left internal carotid secondary to vessel kinking and superimposed atherosclerotic plaque. 7. 40% stenosis of the proximal right internal carotid artery. 8. Moderate stenosis of the V4 segment left vertebral artery. Critical findings were discussed with Dr. Delisa Dumont at 6:25 p.m. on 12/15/2020.      Xr Chest Portable    Result Date: 12/21/2020 EXAMINATION: ONE XRAY VIEW OF THE CHEST 12/21/2020 10:40 am COMPARISON: 12/18/2020. HISTORY: ORDERING SYSTEM PROVIDED HISTORY: concern for microaspiration, crackles on auscultation TECHNOLOGIST PROVIDED HISTORY: concern for microaspiration, crackles on auscultation FINDINGS: The cardiac silhouette is enlarged and stable. Aortic vascular calcification. Stable prominence of the central pulmonary vascularity. No acute infiltrate, pleural fluid or evidence of overt failure. Enteric catheter remains in place. No acute cardiopulmonary disease. Cardiomegaly with central vascular congestion with no overt failure or focal infiltrate. Xr Chest Portable    Result Date: 12/18/2020  EXAMINATION: ONE XRAY VIEW OF THE CHEST 12/18/2020 10:03 am COMPARISON: 11/15/2019 HISTORY: ORDERING SYSTEM PROVIDED HISTORY: eval for acute process, febrile TECHNOLOGIST PROVIDED HISTORY: eval for acute process, febrile Reason for Exam: eval for acute process, febrile Acuity: Unknown FINDINGS: An enteric tube is present with its tip and side hole projected over the left upper abdomen, likely overlying the stomach. The cardiac silhouette is borderline prominent. Subtle atherosclerotic calcifications of the aortic arch. No definite focal airspace consolidation, sizeable pleural effusion, or pneumothorax. Advanced arthrosis of the left glenohumeral joint. Borderline cardiomegaly. No convincing evidence for acute cardiopulmonary pathology. Ct Brain Perfusion    Addendum Date: 12/15/2020    ADDENDUM: The full set of diagnostic perfusion images were sent for review at 7:15 p.m. on 12/15/2020 and demonstrate large volume elevated M TT in the visualized right middle cerebral artery territory with elevated relative cerebral blood volume and decreased relative cerebral blood flow.      Result Date: 12/15/2020 EXAMINATION: ONE SUPINE XRAY VIEW(S) OF THE ABDOMEN 12/18/2020 5:38 am COMPARISON: Abdomen for NG/OG placement December 16, 2020 HISTORY: ORDERING SYSTEM PROVIDED HISTORY: Confirmation of course of NG/OG/NE tube and location of tip of tube TECHNOLOGIST PROVIDED HISTORY: Confirmation of course of NG/OG/NE tube and location of tip of tube Portable? ->Yes Reason for Exam: portable supine/ NG/OG placement Acuity: Acute Type of Exam: Ongoing FINDINGS: Nasogastric tube is noted with distal tip projecting in the region of the lumen of the stomach. The bowel gas pattern is unremarkable without evidence of bowel obstruction. No evidence of stomach distention is seen. IVC filter is noted in place without evidence of definitive complication seen. Abdominal organs are grossly unremarkable. No evidence of radiodensity is present to suggest presence of renal calculi or gallstones. Bones and soft tissues are unremarkable. Appropriate radiographic positioning of nasogastric tube. Recommend clinical correlation prior to use. Xr Abdomen For Ng/og/ne Tube Placement    Result Date: 12/16/2020  EXAMINATION: ONE SUPINE XRAY VIEW(S) OF THE ABDOMEN 12/16/2020 5:50 pm COMPARISON: None. HISTORY: ORDERING SYSTEM PROVIDED HISTORY: Confirmation of course of NG/OG/NE tube and location of tip of tube TECHNOLOGIST PROVIDED HISTORY: Confirmation of course of NG/OG/NE tube and location of tip of tube Portable? ->Yes FINDINGS: Bowel gas pattern is nonobstructive. No definite nephrolithiasis. The right hemiabdomen is not completely included in the field of view. IVC filter is noted. Tip and side port of the enteric tube are below the GE junction with tip over the distal stomach. Enteric tube in expected position.      Cta Head Neck W Contrast    Addendum Date: 12/15/2020 carotid arteries. Near complete occlusion of the distal M1 and proximal M2 segments right middle cerebral artery with paucity of contrast in more distal branches. No focal stenosis of the right anterior cerebral artery. Severe stenosis of the proximal A1 segment left anterior cerebral artery. Patent anterior communicating artery. Severe stenosis of the M1 segment left middle cerebral artery. No aneurysm. POSTERIOR CIRCULATION: Moderate stenosis of the proximal intracranial left vertebral artery. No right vertebral, basilar, or left posterior cerebral artery stenosis. Moderate stenosis of the P2 segment right posterior cerebral artery. OTHER: No dural venous sinus thrombosis on this non-dedicated study. CT PERFUSION: There is increased mean transit time in the visualized right middle cerebral artery territory somewhat greater in extent than the underlying hypodensity on head CT. Moderate volume underlying core infarct is noted. 1. Moderate volume acute ischemic infarct in the right middle cerebral artery territory. No intracranial hemorrhage or mass effect. 2. Moderate volume penumbra in the peripheral right middle cerebral artery territory based on perfusion images. 3. Acute nearly occlusive thrombosis of the M1 and M2 segments right middle cerebral artery. 4. Severe stenosis of the M1 segment left middle cerebral artery. 5. Severe stenosis of the A1 segment left anterior cerebral artery. 6. 75% stenosis of the proximal left internal carotid secondary to vessel kinking and superimposed atherosclerotic plaque. 7. 40% stenosis of the proximal right internal carotid artery. 8. Moderate stenosis of the V4 segment left vertebral artery. Critical findings were discussed with Dr. Rina Morelos at 6:25 p.m. on 12/15/2020.      Mri Brain Wo Contrast    Result Date: 12/16/2020 EXAMINATION: MRI OF THE BRAIN WITHOUT CONTRAST  12/16/2020 9:49 am TECHNIQUE: Multiplanar multisequence MRI of the brain was performed without the administration of intravenous contrast. COMPARISON: CT brain performed 12/15/2020. HISTORY: ORDERING SYSTEM PROVIDED HISTORY: R MCA stroke, evaluate stroke burden and hemorrhagic conversion TECHNOLOGIST PROVIDED HISTORY: R MCA stroke, evaluate stroke burden and hemorrhagic conversion FINDINGS: INTRACRANIAL STRUCTURES/VENTRICLES: The sellar and suprasellar structures, optic chiasm, corpus callosum, pineal gland, tectum, and midline brainstem structures are unremarkable. The craniocervical junction is unremarkable. There is no acute hemorrhage or midline shift. There is restricted diffusion and associated FLAIR signal abnormality throughout the right MCA distribution consistent with acute/subacute ischemia. There is associated mild edema and mass effect upon the right lateral ventricle. There is underlying chronic microvascular disease. The ventricular structures are otherwise unremarkable. The infratentorial structures including the cerebellopontine angles and internal auditory canals are unremarkable. There is no abnormal blooming artifact on susceptibility weighted imaging. ORBITS: The visualized portion of the orbits demonstrate no acute abnormality. SINUSES: The visualized paranasal sinuses and mastoid air cells are well aerated. BONES/SOFT TISSUES: The bone marrow signal intensity appears normal. The soft tissues demonstrate no acute abnormality. Acute/subacute right MCA distribution infarct with mild associated edema and subsequent mass effect upon the right lateral ventricle.        Assessment & Differential Dx:      Primary Problem  Cerebrovascular accident (CVA) due to embolic occlusion of right middle cerebral artery [facility])    Active Hospital Problems    Diagnosis Date Noted  Ischemic cerebral stroke due to intracranial large artery atherosclerosis (HCC) [I63.59]     [facility] [I[facility].9]     Right middle cerebral artery stroke [facility] [I63.511]     Non-traumatic rhabdomyolysis [M62.82]     Cerebrovascular accident (CVA) due to embolic occlusion of right middle cerebral artery ([facility] [I63.411] 12/15/2020    S/P coronary artery stent placement - [facility] 10/12/16 - Dr. Haider Henderson [Z95.5] 10/12/2016    Mor[facility] [E66.01] 04/03/2014    YUDITH (obstructive sleep apnea) [G47.33] 04/02/2014    DM (diabetes mellitus) ([facility] [E11.9] 02/21/2012       78year-old with right MCA infarct. PEG tube placement pending. Patient is awake and able to follow commands. She does have left-sided plegia, Plavix is currently being held as patient is scheduled for a PEG tube placement at the end of the week. We will continue to monitor patient's neurological status. Patient is currently tolerating NG tube feeds with blood pressure monitoring ongoing. Neurologicalischemic stroke. Continue to monitor for any neurological changes. Continue aspirin as directed. Continue high-dose statin therapy. Continue on telemetry as directed. Labetalol/hydralazine as needed if BP out of parameters. Cleviprex drip if BP persistently elevated. PT/OTeval and treat. Continue NG tube feeds. PEG tube pending. Plavix currently on hold due to pending PEG tube placement. Continue monitor fingersticks. Sliding scale as directed. Continue ceftriaxone for UTI. Lasix mg daily.       Owen Avila MD, 12/24/2020 3:26 PM

## 2020-12-25 LAB
ABSOLUTE EOS #: 0.18 K/UL (ref 0–0.44)
ABSOLUTE IMMATURE GRANULOCYTE: 0.06 K/UL (ref 0–0.3)
ABSOLUTE LYMPH #: 1.54 K/UL (ref 1.1–3.7)
ABSOLUTE MONO #: 0.83 K/UL (ref 0.1–1.2)
ANION GAP SERPL CALCULATED.3IONS-SCNC: 9 MMOL/L (ref 9–17)
BASOPHILS # BLD: 1 % (ref 0–2)
BASOPHILS ABSOLUTE: 0.04 K/UL (ref 0–0.2)
BUN BLDV-MCNC: 25 MG/DL (ref 8–23)
BUN/CREAT BLD: ABNORMAL (ref 9–20)
CALCIUM SERPL-MCNC: 9.7 MG/DL (ref 8.6–10.4)
CHLORIDE BLD-SCNC: 98 MMOL/L (ref 98–107)
CO2: 31 MMOL/L (ref 20–31)
CREAT SERPL-MCNC: 0.52 MG/DL (ref 0.5–0.9)
DIFFERENTIAL TYPE: ABNORMAL
EOSINOPHILS RELATIVE PERCENT: 2 % (ref 1–4)
GFR AFRICAN AMERICAN: >60 ML/MIN
GFR NON-AFRICAN AMERICAN: >60 ML/MIN
GFR SERPL CREATININE-BSD FRML MDRD: ABNORMAL ML/MIN/{1.73_M2}
GFR SERPL CREATININE-BSD FRML MDRD: ABNORMAL ML/MIN/{1.73_M2}
GLUCOSE BLD-MCNC: 219 MG/DL (ref 65–105)
GLUCOSE BLD-MCNC: 232 MG/DL (ref 65–105)
GLUCOSE BLD-MCNC: 246 MG/DL (ref 65–105)
GLUCOSE BLD-MCNC: 250 MG/DL (ref 65–105)
GLUCOSE BLD-MCNC: 258 MG/DL (ref 70–99)
HCT VFR BLD CALC: 37.2 % (ref 36.3–47.1)
HEMOGLOBIN: 11.3 G/DL (ref 11.9–15.1)
IMMATURE GRANULOCYTES: 1 %
LYMPHOCYTES # BLD: 17 % (ref 24–43)
MAGNESIUM: 1.8 MG/DL (ref 1.6–2.6)
MCH RBC QN AUTO: 28 PG (ref 25.2–33.5)
MCHC RBC AUTO-ENTMCNC: 30.4 G/DL (ref 28.4–34.8)
MCV RBC AUTO: 92.3 FL (ref 82.6–102.9)
MONOCYTES # BLD: 9 % (ref 3–12)
NRBC AUTOMATED: 0 PER 100 WBC
PDW BLD-RTO: 12.6 % (ref 11.8–14.4)
PLATELET # BLD: 302 K/UL (ref 138–453)
PLATELET ESTIMATE: ABNORMAL
PMV BLD AUTO: 10.2 FL (ref 8.1–13.5)
POTASSIUM SERPL-SCNC: 4.3 MMOL/L (ref 3.7–5.3)
RBC # BLD: 4.03 M/UL (ref 3.95–5.11)
RBC # BLD: ABNORMAL 10*6/UL
SEG NEUTROPHILS: 70 % (ref 36–65)
SEGMENTED NEUTROPHILS ABSOLUTE COUNT: 6.22 K/UL (ref 1.5–8.1)
SODIUM BLD-SCNC: 138 MMOL/L (ref 135–144)
WBC # BLD: 8.9 K/UL (ref 3.5–11.3)
WBC # BLD: ABNORMAL 10*3/UL

## 2020-12-25 PROCEDURE — 80048 BASIC METABOLIC PNL TOTAL CA: CPT

## 2020-12-25 PROCEDURE — 6370000000 HC RX 637 (ALT 250 FOR IP): Performed by: STUDENT IN AN ORGANIZED HEALTH CARE EDUCATION/TRAINING PROGRAM

## 2020-12-25 PROCEDURE — 99232 SBSQ HOSP IP/OBS MODERATE 35: CPT | Performed by: INTERNAL MEDICINE

## 2020-12-25 PROCEDURE — 94640 AIRWAY INHALATION TREATMENT: CPT

## 2020-12-25 PROCEDURE — 97535 SELF CARE MNGMENT TRAINING: CPT

## 2020-12-25 PROCEDURE — 6370000000 HC RX 637 (ALT 250 FOR IP): Performed by: NURSE PRACTITIONER

## 2020-12-25 PROCEDURE — 6360000002 HC RX W HCPCS: Performed by: STUDENT IN AN ORGANIZED HEALTH CARE EDUCATION/TRAINING PROGRAM

## 2020-12-25 PROCEDURE — 36415 COLL VENOUS BLD VENIPUNCTURE: CPT

## 2020-12-25 PROCEDURE — 2060000000 HC ICU INTERMEDIATE R&B

## 2020-12-25 PROCEDURE — 99233 SBSQ HOSP IP/OBS HIGH 50: CPT | Performed by: STUDENT IN AN ORGANIZED HEALTH CARE EDUCATION/TRAINING PROGRAM

## 2020-12-25 PROCEDURE — 85025 COMPLETE CBC W/AUTO DIFF WBC: CPT

## 2020-12-25 PROCEDURE — 94761 N-INVAS EAR/PLS OXIMETRY MLT: CPT

## 2020-12-25 PROCEDURE — 2700000000 HC OXYGEN THERAPY PER DAY

## 2020-12-25 PROCEDURE — 83735 ASSAY OF MAGNESIUM: CPT

## 2020-12-25 PROCEDURE — 2580000003 HC RX 258: Performed by: STUDENT IN AN ORGANIZED HEALTH CARE EDUCATION/TRAINING PROGRAM

## 2020-12-25 PROCEDURE — 2500000003 HC RX 250 WO HCPCS: Performed by: STUDENT IN AN ORGANIZED HEALTH CARE EDUCATION/TRAINING PROGRAM

## 2020-12-25 PROCEDURE — 82947 ASSAY GLUCOSE BLOOD QUANT: CPT

## 2020-12-25 PROCEDURE — 6370000000 HC RX 637 (ALT 250 FOR IP): Performed by: PSYCHIATRY & NEUROLOGY

## 2020-12-25 RX ORDER — RIVASTIGMINE 9.5 MG/24H
1 PATCH, EXTENDED RELEASE TRANSDERMAL DAILY
Status: DISCONTINUED | OUTPATIENT
Start: 2020-12-25 | End: 2020-12-30 | Stop reason: HOSPADM

## 2020-12-25 RX ORDER — CLONIDINE HYDROCHLORIDE 0.1 MG/1
0.1 TABLET ORAL DAILY
Status: DISCONTINUED | OUTPATIENT
Start: 2020-12-25 | End: 2020-12-30 | Stop reason: HOSPADM

## 2020-12-25 RX ORDER — ASPIRIN 81 MG/1
81 TABLET ORAL DAILY
Status: DISCONTINUED | OUTPATIENT
Start: 2020-12-25 | End: 2020-12-25

## 2020-12-25 RX ORDER — SPIRONOLACTONE 25 MG/1
50 TABLET ORAL DAILY
Status: DISCONTINUED | OUTPATIENT
Start: 2020-12-25 | End: 2020-12-30 | Stop reason: HOSPADM

## 2020-12-25 RX ORDER — INSULIN GLARGINE 100 [IU]/ML
15 INJECTION, SOLUTION SUBCUTANEOUS NIGHTLY
Status: DISCONTINUED | OUTPATIENT
Start: 2020-12-25 | End: 2020-12-26

## 2020-12-25 RX ORDER — AMLODIPINE BESYLATE 10 MG/1
10 TABLET ORAL DAILY
Status: DISCONTINUED | OUTPATIENT
Start: 2020-12-25 | End: 2020-12-30 | Stop reason: HOSPADM

## 2020-12-25 RX ADMIN — METOPROLOL TARTRATE 75 MG: 25 TABLET ORAL at 21:16

## 2020-12-25 RX ADMIN — FAMOTIDINE 20 MG: 10 INJECTION INTRAVENOUS at 09:51

## 2020-12-25 RX ADMIN — DOCUSATE SODIUM 100 MG: 50 LIQUID ORAL at 09:51

## 2020-12-25 RX ADMIN — INSULIN LISPRO 4 UNITS: 100 INJECTION, SOLUTION INTRAVENOUS; SUBCUTANEOUS at 21:17

## 2020-12-25 RX ADMIN — SERTRALINE 100 MG: 50 TABLET, FILM COATED ORAL at 09:51

## 2020-12-25 RX ADMIN — ENOXAPARIN SODIUM 30 MG: 100 INJECTION SUBCUTANEOUS at 09:51

## 2020-12-25 RX ADMIN — SODIUM CHLORIDE, PRESERVATIVE FREE 10 ML: 5 INJECTION INTRAVENOUS at 21:16

## 2020-12-25 RX ADMIN — ACETAMINOPHEN 650 MG: 325 TABLET ORAL at 21:30

## 2020-12-25 RX ADMIN — GLYCOPYRROLATE 1 MG: 1 TABLET ORAL at 01:37

## 2020-12-25 RX ADMIN — INSULIN LISPRO 6 UNITS: 100 INJECTION, SOLUTION INTRAVENOUS; SUBCUTANEOUS at 17:47

## 2020-12-25 RX ADMIN — Medication 81 MG: at 09:52

## 2020-12-25 RX ADMIN — ATORVASTATIN CALCIUM 80 MG: 80 TABLET, FILM COATED ORAL at 21:16

## 2020-12-25 RX ADMIN — FAMOTIDINE 20 MG: 10 INJECTION INTRAVENOUS at 21:16

## 2020-12-25 RX ADMIN — AMLODIPINE BESYLATE 10 MG: 10 TABLET ORAL at 09:52

## 2020-12-25 RX ADMIN — QUETIAPINE FUMARATE 25 MG: 25 TABLET ORAL at 21:16

## 2020-12-25 RX ADMIN — CLONIDINE HYDROCHLORIDE 0.1 MG: 0.1 TABLET ORAL at 09:52

## 2020-12-25 RX ADMIN — METOPROLOL TARTRATE 75 MG: 25 TABLET ORAL at 09:52

## 2020-12-25 RX ADMIN — SPIRONOLACTONE 50 MG: 25 TABLET ORAL at 09:51

## 2020-12-25 RX ADMIN — IPRATROPIUM BROMIDE AND ALBUTEROL SULFATE 1 AMPULE: .5; 3 SOLUTION RESPIRATORY (INHALATION) at 20:51

## 2020-12-25 RX ADMIN — IPRATROPIUM BROMIDE AND ALBUTEROL SULFATE 1 AMPULE: .5; 3 SOLUTION RESPIRATORY (INHALATION) at 11:31

## 2020-12-25 RX ADMIN — SODIUM CHLORIDE, PRESERVATIVE FREE 10 ML: 5 INJECTION INTRAVENOUS at 10:04

## 2020-12-25 RX ADMIN — IPRATROPIUM BROMIDE AND ALBUTEROL SULFATE 1 AMPULE: .5; 3 SOLUTION RESPIRATORY (INHALATION) at 15:38

## 2020-12-25 RX ADMIN — INSULIN LISPRO 4 UNITS: 100 INJECTION, SOLUTION INTRAVENOUS; SUBCUTANEOUS at 09:52

## 2020-12-25 RX ADMIN — IPRATROPIUM BROMIDE AND ALBUTEROL SULFATE 1 AMPULE: .5; 3 SOLUTION RESPIRATORY (INHALATION) at 08:05

## 2020-12-25 RX ADMIN — FUROSEMIDE 40 MG: 40 TABLET ORAL at 09:52

## 2020-12-25 RX ADMIN — SENNOSIDES 8.8 MG: 8.8 LIQUID ORAL at 21:16

## 2020-12-25 ASSESSMENT — PAIN DESCRIPTION - PROGRESSION

## 2020-12-25 ASSESSMENT — PAIN SCALES - GENERAL
PAINLEVEL_OUTOF10: 5
PAINLEVEL_OUTOF10: 0
PAINLEVEL_OUTOF10: 0

## 2020-12-25 NOTE — PLAN OF CARE
Problem: Respiratory  Intervention: Administer medication as ordered  Note: BRONCHOSPASM/BRONCHOCONSTRICTION     [x]         IMPROVE AERATION/BREATH SOUNDS  [x]   ADMINISTER BRONCHODILATOR THERAPY AS APPROPRIATE  [x]   ASSESS BREATH SOUNDS  []   IMPLEMENT AEROSOL/MDI PROTOCOL  [x]   PATIENT EDUCATION AS NEEDED

## 2020-12-25 NOTE — PROGRESS NOTES
Neurology Resident Progress Note      SUBJECTIVE:  This is a 78 y.o.  female admitted 12/15/2020 for Cerebrovascular accident (CVA) due to embolic occlusion of right middle cerebral artery ([facility] [address] [I63.411]  This is a follow-up neurology progress note. The patient was seen and examined and the chart was reviewed. There were no acute events overnight. ROS  Constitutional: no fever, chills, fatigue  HENT: No change in vision or hearing   Respiratory: No cough, SOB, wheezing. Cardiovascular:  No chest pain, palpitations, leg swelling. Gastrointestinal: No nausea, vomiting, diarrhea. Genitourinary: No increased frequency, urgency. Musculoskeletal: No myalgia or arthralgia. Skin: No rashes or scarring or bruises. Neurological: No headache, paresthesia, or focal weakness. Endo/Heme/Allergies: Negative for itchy eyes or runny nose. Psychiatric/Behavioral: No anxiety or depressed mood. ELIE Guerra is a  78 y.o. female admitted on 12/15/2020 with . The patient was seen and examined and the chart was reviewed. Patient has a past medical history of coronary artery disease hyperlipidemia's and CHF who initially presented for altered mental status. Presented with an NIH stroke scale of 19 with left hemiparesis and right gaze preference. Patient's blood pressure was 210/115 heart rate 97. Patient underwent a CT perfusion which showed a large volume infarct with no penumbra. Patient was not a candidate for thrombectomy as he was outside the window. Hypertonic and hyperosmolar treatment for cerebral edema was initiated. Patient was treated for UTI with Rocephin. Repeat CT did not show any hemorrhagic conversion. Patient's blood sugars were elevated and Lantus was added and subsequently increased. Plavix had to be held as patient was unable to pass swallow and required a PEG tube. Patient is scheduled for PEG tube placement at the end of this week. Neurologically, patient does display a left facial droop with right gaze preference and left-sided plegia. Speech is incoherent at times.        rivastigmine  1 patch Transdermal Daily    spironolactone  50 mg Oral Daily    amLODIPine  10 mg Oral Daily    cloNIDine  0.1 mg Oral Daily    insulin glargine  15 Units Subcutaneous Nightly    insulin lispro  0-12 Units Subcutaneous Q6H    QUEtiapine  25 mg Oral Nightly    senna  5 mL Oral Nightly    docusate  100 mg Oral Daily    [Held by provider] clopidogrel  75 mg Per NG tube Daily    famotidine (PEPCID) injection  20 mg Intravenous BID    metoprolol tartrate  75 mg Oral BID    furosemide  40 mg Oral Daily    sertraline  100 mg Oral Daily    enoxaparin  30 mg Subcutaneous Daily    ipratropium-albuterol  1 ampule Inhalation Q4H WA    sodium chloride flush  10 mL Intravenous 2 times per day    aspirin  81 mg Oral Daily    Or    aspirin  300 mg Rectal Daily    atorvastatin  80 mg Oral Nightly       Past Medical History:   Diagnosis Date    Allergic rhinitis     Anxiety 10/25/2016    Asthma     CAD (coronary artery disease)     s/p stents RCA and LAD 2009,    Chronic back pain     Congestive heart failure ([address]     Depression     Headache(784.0)     Hiatal hernia     Hypercholesteremia 2/21/2012    Hypertension     Kidney stones     years ago    Obesity     Osteoarthritis     Postlaminectomy syndrome 6/6/2012    Type II or unspecified type diabetes mellitus without mention of complication, not stated as uncontrolled     Unspecified sleep apnea     Urinary incontinence        Past Surgical History:   Procedure Laterality Date    CARDIAC CATHETERIZATION  01/2013    patent stents    COLONOSCOPY  09/2015    normal    CORONARY ANGIOPLASTY WITH STENT PLACEMENT  1012-16    stents x 3    JOINT REPLACEMENT      left knee    KNEE SURGERY  10/21/2013    rt knee synvisc injection     KNEE SURGERY  12/02/2013    knee synvisc injection rt #2  KNEE SURGERY  12/09/2013    rt knee synvisc inj    LUMBAR SPINE SURGERY  2007    NERVE BLOCK  4/23/2012    Right MBNB L3, L4, L5    NERVE BLOCK  5/22/2012    Right MBNB L3, L4, and L5     NERVE BLOCK  01/14/13    Right knee injection #1 - Synvisc    NERVE BLOCK  01/21/13    Right knee injection #2 - Synvisc    NERVE BLOCK  1/28/2013     Rigth knee synvisc injection #3    NERVE BLOCK Right 04/17/2017    Rt genicular nerve block. no steroid used    OTHER SURGICAL HISTORY Right 7/14/2014    synvisc one knee injection    OTHER SURGICAL HISTORY Right 3/16/2015    synvisc one knee injection    OTHER SURGICAL HISTORY Right 06-13-16    synvisc right knee injection    SPINE SURGERY      TONSILLECTOMY      UPPER GASTROINTESTINAL ENDOSCOPY      VENA CAVA FILTER PLACEMENT  2007    PE and B/L LE emboli       PHYSICAL EXAM:      Blood pressure (!) 146/72, pulse 66, temperature 98 °F (36.7 °C), temperature source Axillary, resp. rate 19, height 5' 4\" (1.626 m), weight 231 lb 9.6 oz (105.1 kg), SpO2 94 %, not currently breastfeeding. General examination:    HEENT: Normocephalic, atraumatic, neck supple (-)nuchal rigidity  Lungs: Respirations unlabored, chest wall no deformity  Heart: Regular rate rhythm  Abdomen: Soft, non distended, non tender  Extremities: No cyanosis, clubbing or edema, 2+ pulses  Skin: Intact, normal skin color, no ecchymosis     Neurological examination:  Somnolent but arousable intermittently following commands. Incoherent speech. Pupils equal and reactive, right gaze preference. Left facial droop, left tongue deviation. Spontaneously moving right upper and lower extremity. Plegic on the left side  Withdraws left lower extremity.     Investigations:      Laboratory Testing:  Recent Results (from the past 24 hour(s))   POC Glucose Fingerstick    Collection Time: 12/24/20 12:50 PM   Result Value Ref Range    POC Glucose 195 (H) 65 - 105 mg/dL   POC Glucose Fingerstick Collection Time: 12/24/20  5:24 PM   Result Value Ref Range    POC Glucose 196 (H) 65 - 105 mg/dL   POC Glucose Fingerstick    Collection Time: 12/24/20  8:26 PM   Result Value Ref Range    POC Glucose 170 (H) 65 - 105 mg/dL   CBC WITH AUTO DIFFERENTIAL    Collection Time: 12/25/20  4:51 AM   Result Value Ref Range    WBC 8.9 3.5 - 11.3 k/uL    RBC 4.03 3.95 - 5.11 m/uL    Hemoglobin 11.3 (L) 11.9 - 15.1 g/dL    Hematocrit 37.2 36.3 - 47.1 %    MCV 92.3 82.6 - 102.9 fL    MCH 28.0 25.2 - 33.5 pg    MCHC 30.4 28.4 - 34.8 g/dL    RDW 12.6 11.8 - 14.4 %    Platelets 633 972 - 411 k/uL    MPV 10.2 8.1 - 13.5 fL    NRBC Automated 0.0 0.0 per 100 WBC    Differential Type NOT REPORTED     Seg Neutrophils 70 (H) 36 - 65 %    Lymphocytes 17 (L) 24 - 43 %    Monocytes 9 3 - 12 %    Eosinophils % 2 1 - 4 %    Basophils 1 0 - 2 %    Immature Granulocytes 1 (H) 0 %    Segs Absolute 6.22 1.50 - 8.10 k/uL    Absolute Lymph # 1.54 1.10 - 3.70 k/uL    Absolute Mono # 0.83 0.10 - 1.20 k/uL    Absolute Eos # 0.18 0.00 - 0.44 k/uL    Basophils Absolute 0.04 0.00 - 0.20 k/uL    Absolute Immature Granulocyte 0.06 0.00 - 0.30 k/uL    WBC Morphology NOT REPORTED     RBC Morphology NOT REPORTED     Platelet Estimate NOT REPORTED    BASIC METABOLIC PANEL    Collection Time: 12/25/20  4:51 AM   Result Value Ref Range    Glucose 258 (H) 70 - 99 mg/dL    BUN 25 (H) 8 - 23 mg/dL    CREATININE 0.52 0.50 - 0.90 mg/dL    Bun/Cre Ratio NOT REPORTED 9 - 20    Calcium 9.7 8.6 - 10.4 mg/dL    Sodium 138 135 - 144 mmol/L    Potassium 4.3 3.7 - 5.3 mmol/L    Chloride 98 98 - 107 mmol/L    CO2 31 20 - 31 mmol/L    Anion Gap 9 9 - 17 mmol/L    GFR Non-African American >60 >60 mL/min    GFR African American >60 >60 mL/min    GFR Comment          GFR Staging NOT REPORTED    MAGNESIUM    Collection Time: 12/25/20  4:51 AM   Result Value Ref Range    Magnesium 1.8 1.6 - 2.6 mg/dL   POC Glucose Fingerstick    Collection Time: 12/25/20  8:41 AM Result Value Ref Range    POC Glucose 232 (H) 65 - 105 mg/dL   POC Glucose Fingerstick    Collection Time: 12/25/20 12:20 PM   Result Value Ref Range    POC Glucose 246 (H) 65 - 105 mg/dL       Imaging/Diagnostics:  Echo Complete 2d W Doppler W Color    Result Date: 12/16/2020 [id number] Transthoracic Echocardiography Report (TTE)  Patient Name Piper García    Date of Study               12/16/2020               SANDER   Date of      1950  Gender                      Female  Birth   Age          78 year(s)  Race                        Black   Room Number  0680        Height:                     62 inch, 157.48 cm   Corporate ID [id number]    Weight:                     250 pounds, 113.4 kg  #   Patient Acct [de-identified]   BSA:          2.1 m^2       BMI:      45.73  #                                                              kg/m^2   MR #         [id number]     [facility]   Accession #  [id number]  Interpreting Physician      [address] [place] [address]   Fellow                   Referring Nurse                           Practitioner   Interpreting             Referring Physician         Martha Damon MD  Fellow  Type of Study   TTE procedure:2D Echocardiogram, M-Mode, Doppler, Color Doppler, Bubble  Study. Procedure Date Date: 12/16/2020 Start: 10:27 AM Study Location: [facility] Technical Quality: Fair visualization Indications:CVA. History / Tech. Comments: Procedure explained to patient. Study done bedside in Neuro ICU. H/O DM, YUDITH, morbid obesity Patient Status: Inpatient Height: 62 inches Weight: 250.01 pounds BSA: 2.1 m^2 BMI: 45.73 kg/m^2 Allergies   - Other:(Drug Allergies - bactrim, tylenol). CONCLUSIONS Summary Technically difficult, not all walls visualized. Left ventricle is normal in size. Global left ventricular systolic function is low normal. Estimated ejection fraction is 50 % . Moderate to severe left ventricular hypertrophy. Increased septal thickness is noted. Grade I (mild) left ventricular diastolic dysfunction. Left atrium is moderately dilated. Negative bubble study, no shunt noted via injection of agitated saline. Thickened mitral valve leaflets. Mitral annular calcification is seen. At least mild tricuspid regurgitation. Estimated right ventricular systolic pressure is 30 mmHg. Signature ----------------------------------------------------------------------------  Electronically signed by Coleen Nolan(Sonographer) on 12/16/2020  01:15 PM ---------------------------------------------------------------------------- ----------------------------------------------------------------------------  Electronically signed by Justin Ndiaye(Interpreting physician) on 12/16/2020  01:22 PM ---------------------------------------------------------------------------- FINDINGS Left Atrium Left atrium is moderately dilated. Inter-atrial septum appears so be intact. No shunt seen by color Doppler. Negative bubble study, no shunt noted via injection of agitated saline. Left Ventricle Technically difficult, not all walls visualized. Left ventricle is normal in size. Global left ventricular systolic function is low normal. Estimated ejection fraction is 50 % . Moderate to severe left ventricular hypertrophy. Increased septal thickness is noted. Grade I (mild) left ventricular diastolic dysfunction. Right Atrium Right atrium is normal size . Right Ventricle Normal right ventricular size and function. Mitral Valve Thickened mitral valve leaflets. Mitral annular calcification is seen. No mitral regurgitation. No mitral stenosis. Aortic Valve Aortic valve structure and function normal. Aortic valve is trileaflet. No aortic insufficiency. Tricuspid Valve Normal tricuspid valve leaflets. At least mild tricuspid regurgitation. No tricuspid stenosis. Estimated right ventricular systolic pressure is 30 mmHg. Pulmonic Valve Pulmonic valve is normal in structure and function. No pulmonic insufficiency. No evidence of pulmonic stenosis. Pericardial Effusion No significant pericardial effusion is seen. Miscellaneous Normal aortic root dimension. E/E' average = 19.4. IVC not well visualized.  M-mode / 2D Measurements & Calculations:   LVIDd:4.9 cm(3.7 - 5.6 cm) Diastolic KYOJSA:228 ml  KNPSV:5.5 cm(2.2 - 4.0 cm)       Aortic Root:2.8 cm(2.0 - 3.7 cm)  IVSd:1.72 cm(0.6 - 1.1 cm)       LA Dimension: 3.8 cm(1.9 - 4.0 cm)  LVPWd:1.39 cm(0.6 - 1.1 cm)      LA volume/Index: 85.5 ml /41m^2  Fractional Shortenin.53 %    LVOT:2.2 cm                                   RVDd:3.1 cm   Mitral:                                 Aortic   Valve Area (P1/2-Time): 2.34 cm^2       Peak Velocity: 2.25 m/s  Peak E-Wave: 0.84 m/s                   Mean Velocity: 1.34 m/s  Peak A-Wave: 1.30 m/s                   Peak Gradient: 20.25 mmHg  E/A Ratio: 0.64                         Mean Gradient: 9 mmHg  Peak Gradient: 2.8 mmHg  Mean Gradient: 2 mmHg  Deceleration Time: 401 msec             Area (continuity): 2.52 cm^2  P1/2t: 94 msec                          AV VTI: 45.2 cm   Area (continuity): 3.61 cm^2  Mean Velocity: 0.70 m/s   Tricuspid:                              Pulmonic:   Estimated RVSP: 30 mmHg                 Peak Velocity: 1.32 m/s  Peak TR Velocity: 2.50 m/s              Peak Gradient: 6.97 mmHg  Peak TR Gradient: 25 mmHg  Estimated RA Pressure: 5 mmHg                                           Estimated PASP: 30 mmHg  Diastology / Tissue Doppler Septal Wall E' velocity:0.03 m/s Septal Wall E/E':24.8 Lateral Wall E' velocity:0.06 m/s Lateral Wall E/E':14    Ct Head Wo Contrast    Result Date: 2020 EXAMINATION: CT OF THE HEAD WITHOUT CONTRAST  12/17/2020 5:20 am TECHNIQUE: CT of the head was performed without the administration of intravenous contrast. Dose modulation, iterative reconstruction, and/or weight based adjustment of the mA/kV was utilized to reduce the radiation dose to as low as reasonably achievable. COMPARISON: CT head without contrast December 15, 2020. CT brain perfusion December 15, 2020. HISTORY: ORDERING SYSTEM PROVIDED HISTORY: R MCA infarct, eval for swelling/hemorrhagic conversion TECHNOLOGIST PROVIDED HISTORY: R MCA infarct, eval for swelling/hemorrhagic conversion Reason for Exam: Right MCA infarct, Evaluate for swelling/hemorrhagic conversion Acuity: Acute Type of Exam: Subsequent/Follow-up FINDINGS: BRAIN/VENTRICLES: There is no acute intracranial hemorrhage, mass effect or midline shift. No abnormal extra-axial fluid collection. Interval evolution and increased extent of right MCA distribution right cerebral acute ischemia is noted with geographic hypoattenuation consistent with associated edema noted involving the right frontal, right temporal, right insular, right parietal lobes. .  Partial effacement of the right lateral ventricle is evident. Minimal leftward midline shift is seen secondary to the mass effect which measures 2 mm. .  There is no evidence of hydrocephalus. Ill-defined hypoattenuation is present within cerebral white matter consistent with moderate microvascular ischemic change. ORBITS: The visualized portion of the orbits demonstrate no acute abnormality. SINUSES: The visualized paranasal sinuses and mastoid air cells demonstrate no acute abnormality. SOFT TISSUES/SKULL:  No acute abnormality of the visualized skull or soft tissues. 1. Interval increased extent and interval evolution of large right MCA acute infarction with associated cerebral edema and partial effacement of the right lateral ventricle. 2. Interval associated development of minimal leftward midline shift measuring 2 mm at the level of the septum pellucidum. 3. No evidence of acute intracranial hemorrhage. 4. Moderate cerebral white matter chronic microvascular ischemic disease. Ct Head Wo Contrast    Addendum Date: 12/15/2020    ADDENDUM: The full set of diagnostic perfusion images were sent for review at 7:15 p.m. on 12/15/2020 and demonstrate large volume elevated M TT in the visualized right middle cerebral artery territory with elevated relative cerebral blood volume and decreased relative cerebral blood flow.      Result Date: 12/15/2020 EXAMINATION: CT OF THE HEAD WITHOUT CONTRAST; CTA OF THE HEAD AND NECK WITH CONTRAST; CTA OF THE HEAD WITH CONTRAST WITH PERFUSION 12/15/2020 5:53 pm; 12/15/2020 6:19 pm; 12/15/2020 6:18 pm: TECHNIQUE: CT of the head was performed without the administration of intravenous contrast. Dose modulation, iterative reconstruction, and/or weight based adjustment of the mA/kV was utilized to reduce the radiation dose to as low as reasonably achievable.; CTA of the head and neck was performed with the administration of intravenous contrast. Multiplanar reformatted images are provided for review. MIP images are provided for review. Stenosis of the internal carotid arteries measured using NASCET criteria. Dose modulation, iterative reconstruction, and/or weight based adjustment of the mA/kV was utilized to reduce the radiation dose to as low as reasonably achievable.; CTA of the head/brain was performed with the administration of intravenous contrast. Multiplanar reformatted images are provided for review. MIP images are provided for review. Dose modulation, iterative reconstruction, and/or weight based adjustment of the mA/kV was utilized to reduce the radiation dose to as low as reasonably achievable. Noncontrast CT of the head with reconstructed 2-D images are also provided for review. COMPARISON: None.  HISTORY: ORDERING SYSTEM PROVIDED HISTORY: left sided weakness and right gaze TECHNOLOGIST PROVIDED HISTORY: left sided weakness and right gaze Reason for Exam: left sided weakness and right gaze; ORDERING SYSTEM PROVIDED HISTORY: left sided weakness and right gaze TECHNOLOGIST PROVIDED HISTORY: left sided weakness and right gaze Reason for Exam: stroke  left sided weakness and right gaze Acuity: Acute Type of Exam: Initial; ORDERING SYSTEM PROVIDED HISTORY: Left sided weakness right gaze TECHNOLOGIST PROVIDED HISTORY: Left sided weakness right gaze FINDINGS: CT HEAD: BRAIN/VENTRICLES:  Confluent loss of gray-white differentiation involving the right insula and surrounding right frontal lobe extending into the right basal ganglia. No intracranial hemorrhage. Mild-to-moderate chronic white matter microvascular ischemic changes. No hydrocephalus or extra-axial fluid collection. Midline maintained. Basal cisterns patent. Small volume high left frontal encephalomalacia in keeping with sequela of prior infarct. Chronic appearing lacunar infarcts in the left basal ganglia. ORBITS: The visualized portion of the orbits demonstrate no acute abnormality. SINUSES:  The visualized paranasal sinuses and mastoid air cells demonstrate no acute abnormality. SOFT TISSUES/SKULL: No acute abnormality of the visualized skull or soft tissues. CTA NECK: AORTIC ARCH/ARCH VESSELS: No dissection or arterial injury. No significant stenosis of the brachiocephalic or subclavian arteries. CAROTID ARTERIES: Right: The right common carotid artery is normal in caliber. There is calcified atherosclerotic plaque involving the right carotid bifurcation and bulb causing 40% stenosis of the proximal right internal carotid artery. The right external carotid artery is patent. No dissection. Left: The left common carotid artery is normal in caliber. There is calcified atherosclerotic plaque and kinking of the left carotid bulb causing 75% focal stenosis 1.6 cm distal to the origin of the left internal carotid artery. The left external carotid artery is patent. No dissection. VERTEBRAL ARTERIES: No dissection, arterial injury, or significant stenosis. SOFT TISSUES: The lung apices are clear. No cervical or superior mediastinal lymphadenopathy. The larynx and pharynx are unremarkable. No acute abnormality of the salivary and thyroid glands. BONES: No acute osseous abnormality. Mild-to-moderate degenerative changes in the cervical spine.  CTA HEAD: ANTERIOR CIRCULATION: Moderate to severe calcific atherosclerotic stenosis of the cavernous internal carotid arteries. Near complete occlusion of the distal M1 and proximal M2 segments right middle cerebral artery with paucity of contrast in more distal branches. No focal stenosis of the right anterior cerebral artery. Severe stenosis of the proximal A1 segment left anterior cerebral artery. Patent anterior communicating artery. Severe stenosis of the M1 segment left middle cerebral artery. No aneurysm. POSTERIOR CIRCULATION: Moderate stenosis of the proximal intracranial left vertebral artery. No right vertebral, basilar, or left posterior cerebral artery stenosis. Moderate stenosis of the P2 segment right posterior cerebral artery. OTHER: No dural venous sinus thrombosis on this non-dedicated study. CT PERFUSION: There is increased mean transit time in the visualized right middle cerebral artery territory somewhat greater in extent than the underlying hypodensity on head CT. Moderate volume underlying core infarct is noted. 1. Moderate volume acute ischemic infarct in the right middle cerebral artery territory. No intracranial hemorrhage or mass effect. 2. Moderate volume penumbra in the peripheral right middle cerebral artery territory based on perfusion images. 3. Acute nearly occlusive thrombosis of the M1 and M2 segments right middle cerebral artery. 4. Severe stenosis of the M1 segment left middle cerebral artery. 5. Severe stenosis of the A1 segment left anterior cerebral artery. 6. 75% stenosis of the proximal left internal carotid secondary to vessel kinking and superimposed atherosclerotic plaque. 7. 40% stenosis of the proximal right internal carotid artery. 8. Moderate stenosis of the V4 segment left vertebral artery. Critical findings were discussed with Dr. Lacy Rivera at 6:25 p.m. on 12/15/2020.      Xr Abdomen For Ng/og/ne Tube Placement    Result Date: 12/18/2020 ADDENDUM: The full set of diagnostic perfusion images were sent for review at 7:15 p.m. on 12/15/2020 and demonstrate large volume elevated M TT in the visualized right middle cerebral artery territory with elevated relative cerebral blood volume and decreased relative cerebral blood flow.      Result Date: 12/15/2020 carotid arteries. Near complete occlusion of the distal M1 and proximal M2 segments right middle cerebral artery with paucity of contrast in more distal branches. No focal stenosis of the right anterior cerebral artery. Severe stenosis of the proximal A1 segment left anterior cerebral artery. Patent anterior communicating artery. Severe stenosis of the M1 segment left middle cerebral artery. No aneurysm. POSTERIOR CIRCULATION: Moderate stenosis of the proximal intracranial left vertebral artery. No right vertebral, basilar, or left posterior cerebral artery stenosis. Moderate stenosis of the P2 segment right posterior cerebral artery. OTHER: No dural venous sinus thrombosis on this non-dedicated study. CT PERFUSION: There is increased mean transit time in the visualized right middle cerebral artery territory somewhat greater in extent than the underlying hypodensity on head CT. Moderate volume underlying core infarct is noted. 1. Moderate volume acute ischemic infarct in the right middle cerebral artery territory. No intracranial hemorrhage or mass effect. 2. Moderate volume penumbra in the peripheral right middle cerebral artery territory based on perfusion images. 3. Acute nearly occlusive thrombosis of the M1 and M2 segments right middle cerebral artery. 4. Severe stenosis of the M1 segment left middle cerebral artery. 5. Severe stenosis of the A1 segment left anterior cerebral artery. 6. 75% stenosis of the proximal left internal carotid secondary to vessel kinking and superimposed atherosclerotic plaque. 7. 40% stenosis of the proximal right internal carotid artery. 8. Moderate stenosis of the V4 segment left vertebral artery. Critical findings were discussed with Dr. Erickson Narayanan at 6:25 p.m. on 12/15/2020.      Mri Brain Wo Contrast    Result Date: 12/16/2020 EXAMINATION: MRI OF THE BRAIN WITHOUT CONTRAST  12/16/2020 9:49 am TECHNIQUE: Multiplanar multisequence MRI of the brain was performed without the administration of intravenous contrast. COMPARISON: CT brain performed 12/15/2020. HISTORY: ORDERING SYSTEM PROVIDED HISTORY: R MCA stroke, evaluate stroke burden and hemorrhagic conversion TECHNOLOGIST PROVIDED HISTORY: R MCA stroke, evaluate stroke burden and hemorrhagic conversion FINDINGS: INTRACRANIAL STRUCTURES/VENTRICLES: The sellar and suprasellar structures, optic chiasm, corpus callosum, pineal gland, tectum, and midline brainstem structures are unremarkable. The craniocervical junction is unremarkable. There is no acute hemorrhage or midline shift. There is restricted diffusion and associated FLAIR signal abnormality throughout the right MCA distribution consistent with acute/subacute ischemia. There is associated mild edema and mass effect upon the right lateral ventricle. There is underlying chronic microvascular disease. The ventricular structures are otherwise unremarkable. The infratentorial structures including the cerebellopontine angles and internal auditory canals are unremarkable. There is no abnormal blooming artifact on susceptibility weighted imaging. ORBITS: The visualized portion of the orbits demonstrate no acute abnormality. SINUSES: The visualized paranasal sinuses and mastoid air cells are well aerated. BONES/SOFT TISSUES: The bone marrow signal intensity appears normal. The soft tissues demonstrate no acute abnormality. Acute/subacute right MCA distribution infarct with mild associated edema and subsequent mass effect upon the right lateral ventricle.        Assessment & Differential Dx:      Primary Problem  Cerebrovascular accident (CVA) due to embolic occlusion of right middle cerebral artery [facility])    Active Hospital Problems    Diagnosis Date Noted negative... PEG tube pending: Will stay in the hospital until  PEG tube placement  Plavix currently on hold due to pending PEG tube placement. Continue monitor fingersticks. Sliding scale as directed. Continue ceftriaxone for UTI. Lasix mg daily.       Reginald Peterson MD, 12/25/2020 12:34 PM

## 2020-12-25 NOTE — PROGRESS NOTES
[facility]  Internal Medicine Teaching Residency Program  Inpatient Daily Progress Note  ______________________________________________________________________________    Patient: Sen Ferris  YOB: 1950   RTK:[id number]    Acct: [de-identified]     Room: [facility]7947-14  Admit date: 12/15/2020  Today's date: 20  Number of days in the hospital: 10    SUBJECTIVE   Admitting Diagnosis: Cerebrovascular accident (CVA) due to embolic occlusion of right middle cerebral artery ([address]  CC: Altered mental status  Pt examined at bedside. Chart & results reviewed. Patient seen and examined at bedside. Sleeping comfortably. NG tube in place. Blood pressure running high. Patient not started on home medicines yet. Discussed with the nurse. Awaiting PEG tube placement on . ROS:  Could not be obtained. BRIEF HISTORY     68-year-old female came with past medical history of diabetes, CAD, hyperlipidemia and CHF presented to the ED with altered mental status. Patient was found to have right MCA infarct and UTI. Patient failed bedside swallow twice and NG tube placed. Plan is for PEG placement as per general surgery on . Plavix is held for PEG placement.     OBJECTIVE     Vital Signs:  BP (!) 161/121   Pulse 66   Temp 98.7 °F (37.1 °C)   Resp 19   Ht 5' 4\" (1.626 m)   Wt 231 lb 9.6 oz (105.1 kg)   LMP  (LMP Unknown)   SpO2 94%   BMI 39.75 kg/m²     Temp (24hrs), Av.3 °F (36.8 °C), Min:97.7 °F (36.5 °C), Max:98.7 °F (37.1 °C)    In: 150   Out: 650 [Urine:650]    Physical Exam:  General appearance - Drowsy, sleeping comfortably, NG tube in place  Chest - clear to auscultation, no wheezes, rales or rhonchi, symmetric air entry  Heart - normal rate, regular rhythm, normal S1, S2, no murmurs, rubs, clicks or gallops  Abdomen - soft, nontender, nondistended, no masses or organomegaly  Musculoskeletal - no joint tenderness, deformity or swelling Extremities - peripheral pulses normal, no pedal edema, no clubbing or cyanosis  Skin - normal coloration and turgor, no rashes, no suspicious skin lesions noted    Medications:  Scheduled Medications:    rivastigmine  1 patch Transdermal Daily    spironolactone  50 mg Oral Daily    amLODIPine  10 mg Oral Daily    cloNIDine  0.1 mg Oral Daily    insulin glargine  15 Units Subcutaneous Nightly    insulin lispro  0-12 Units Subcutaneous TID WC    insulin lispro  0-6 Units Subcutaneous Nightly    QUEtiapine  25 mg Oral Nightly    senna  5 mL Oral Nightly    docusate  100 mg Oral Daily    [Held by provider] clopidogrel  75 mg Per NG tube Daily    famotidine (PEPCID) injection  20 mg Intravenous BID    metoprolol tartrate  75 mg Oral BID    furosemide  40 mg Oral Daily    sertraline  100 mg Oral Daily    enoxaparin  30 mg Subcutaneous Daily    ipratropium-albuterol  1 ampule Inhalation Q4H WA    sodium chloride flush  10 mL Intravenous 2 times per day    aspirin  81 mg Oral Daily    Or    aspirin  300 mg Rectal Daily    atorvastatin  80 mg Oral Nightly     Continuous Infusions:    dextrose       PRN Medications    labetalol, 10 mg, Q1H PRN      glucose, 15 g, PRN      dextrose, 12.5 g, PRN      glucagon (rDNA), 1 mg, PRN      dextrose, 100 mL/hr, PRN      acetaminophen, 650 mg, Q4H PRN      sodium chloride flush, 10 mL, PRN      promethazine, 12.5 mg, Q6H PRN    Or      ondansetron, 4 mg, Q6H PRN        Diagnostic Labs:  CBC:   Recent Labs     12/23/20  1015 12/24/20  0424 12/25/20  0451   WBC 7.8 7.6 8.9   RBC 4.05 4.29 4.03   HGB 11.2* 11.9 11.3*   HCT 36.6 39.4 37.2   MCV 90.4 91.8 92.3   RDW 12.9 12.8 12.6    See Reflexed IPF Result 302     BMP:   Recent Labs     12/23/20  1015 12/24/20  0424 12/25/20  0451    139 138   K 4.6 4.6 4.3   CL 97* 99 98   CO2 30 30 31   BUN 22 24* 25*   CREATININE 0.50 0.49* 0.52     BNP: No results for input(s): BNP in the last 72 hours. PT/INR: No results for input(s): PROTIME, INR in the last 72 hours. APTT: No results for input(s): APTT in the last 72 hours. CARDIAC ENZYMES: No results for input(s): CKMB, CKMBINDEX, TROPONINI in the last 72 hours. Invalid input(s): CKTOTAL;3  FASTING LIPID PANEL:  Lab Results   Component Value Date    CHOL 188 04/05/2019    HDL 48 12/16/2020    TRIG 177 (H) 04/05/2019     LIVER PROFILE: No results for input(s): AST, ALT, ALB, BILIDIR, BILITOT, ALKPHOS in the last 72 hours. MICROBIOLOGY:   Lab Results   Component Value Date/Time    CULTURE ESCHERICHIA COLI >412521 CFU/ML (A) 12/18/2020 09:31 PM       Imaging:    Xr Chest Portable    Result Date: 12/21/2020  No acute cardiopulmonary disease. Cardiomegaly with central vascular congestion with no overt failure or focal infiltrate. Xr Chest Portable    Result Date: 12/18/2020  Borderline cardiomegaly. No convincing evidence for acute cardiopulmonary pathology. ASSESSMENT & PLAN     ASSESSMENT / PLAN:     Principal Problem:    Cerebrovascular accident (CVA) due to embolic occlusion of right middle cerebral artery (HCC)  Active Problems:    DM (diabetes mellitus) ([facility] [address]    HTN (hypertension)    Asthma    YUDITH (obstructive sleep apnea)    Morbid obesity (HCC)    Congestive heart failure (HCC)    S/P coronary artery stent placement - RCA 10/12/16 - Dr. Morley March    Acute cerebrovascular accident (CVA) [facility])    Right middle cerebral artery stroke ([facility] [address]    Non-traumatic rhabdomyolysis    Ischemic cerebral stroke due to intracranial large artery atherosclerosis ([facility] [address]  Resolved Problems:    * No resolved hospital problems.  *    Large right MCA territory infarction likely secondary to intracranial atherosclerosis  -Continue aspirin 81 mg.  Plavix held in preparation for PEG  -Continue early Lipitor 80 mg for secondary stroke prevention -Goal SBP<180.  Continue Norvasc, Lasix, Lopressor. Patient resumed on home dose of Norvasc and started on aldactone home dose. -Continue telemetry and neurochecks per protocol     Hyperlipidemia  -We will continue Lipitor 80 mg daily with a goal of LDL less than 70     Dysphagia secondary to stroke  -Failed speech bedside swallow x2  -General surgery consulted for PEG placement on 12/28, will hold Plavix     Type 2 diabetes mellitus  -Patient continued with Lantus 15 units with medium dose SS  -Hypoglycemia protocol     UTI  -Patient completed rocephin course. Discharge planning: As per the , no place can accept the patient with an NG tube. Patient will have to stay in the hospital until 18th to get a PEG tube and then get discharged. Rosario Valdivia MD  PGY-3, Department of Internal Medicine  [address] [place] [address]  12/25/2020 8:24 AM      Attending Physician Statement  I have discussed the care of Swetha Agudelo, including pertinent history and exam findings with the resident. I have reviewed the key elements of all parts of the encounter with the resident. I have seen and examined the patient with the resident. I agree with the assessment and plan and status of the problem list as documented. I seen the patient during the round today, I reviewed the chart, labs and medications reviewed. When I saw her she was somnolent not arousable to follow commands she does not have any airway secretion at that time and was protecting her airways. She does have an NG tube in place and she is getting tube feeding. Labs shows blood sugar is in 200s BUN 25 creatinine 0.52 WBC 8.9 hemoglobin 11.3  PEG placement likely on 12/28/2020.   Continue with aspiration precaution keep head of bed up all the time and continue with tube feedings Please note that this chart was generated using voice recognition Dragon dictation software. Although every effort was made to ensure the accuracy of this automated transcription, some errors in transcription may have occurred.     Mynor Pham MD  12/25/2020 1:29 PM

## 2020-12-26 ENCOUNTER — APPOINTMENT (OUTPATIENT)
Dept: CT IMAGING | Age: 70
DRG: 064 | End: 2020-12-26
Payer: COMMERCIAL

## 2020-12-26 LAB
ANION GAP SERPL CALCULATED.3IONS-SCNC: 9 MMOL/L (ref 9–17)
BUN BLDV-MCNC: 27 MG/DL (ref 8–23)
BUN/CREAT BLD: ABNORMAL (ref 9–20)
CALCIUM SERPL-MCNC: 9.8 MG/DL (ref 8.6–10.4)
CHLORIDE BLD-SCNC: 99 MMOL/L (ref 98–107)
CO2: 32 MMOL/L (ref 20–31)
CREAT SERPL-MCNC: 0.5 MG/DL (ref 0.5–0.9)
GFR AFRICAN AMERICAN: >60 ML/MIN
GFR NON-AFRICAN AMERICAN: >60 ML/MIN
GFR SERPL CREATININE-BSD FRML MDRD: ABNORMAL ML/MIN/{1.73_M2}
GFR SERPL CREATININE-BSD FRML MDRD: ABNORMAL ML/MIN/{1.73_M2}
GLUCOSE BLD-MCNC: 222 MG/DL (ref 70–99)
GLUCOSE BLD-MCNC: 227 MG/DL (ref 65–105)
GLUCOSE BLD-MCNC: 235 MG/DL (ref 65–105)
GLUCOSE BLD-MCNC: 255 MG/DL (ref 65–105)
GLUCOSE BLD-MCNC: 261 MG/DL (ref 65–105)
MAGNESIUM: 1.7 MG/DL (ref 1.6–2.6)
POTASSIUM SERPL-SCNC: 4.4 MMOL/L (ref 3.7–5.3)
SODIUM BLD-SCNC: 140 MMOL/L (ref 135–144)

## 2020-12-26 PROCEDURE — 94761 N-INVAS EAR/PLS OXIMETRY MLT: CPT

## 2020-12-26 PROCEDURE — 6370000000 HC RX 637 (ALT 250 FOR IP): Performed by: STUDENT IN AN ORGANIZED HEALTH CARE EDUCATION/TRAINING PROGRAM

## 2020-12-26 PROCEDURE — 82947 ASSAY GLUCOSE BLOOD QUANT: CPT

## 2020-12-26 PROCEDURE — 36415 COLL VENOUS BLD VENIPUNCTURE: CPT

## 2020-12-26 PROCEDURE — 97110 THERAPEUTIC EXERCISES: CPT

## 2020-12-26 PROCEDURE — 94640 AIRWAY INHALATION TREATMENT: CPT

## 2020-12-26 PROCEDURE — 70450 CT HEAD/BRAIN W/O DYE: CPT

## 2020-12-26 PROCEDURE — 2060000000 HC ICU INTERMEDIATE R&B

## 2020-12-26 PROCEDURE — 2700000000 HC OXYGEN THERAPY PER DAY

## 2020-12-26 PROCEDURE — 6370000000 HC RX 637 (ALT 250 FOR IP): Performed by: PSYCHIATRY & NEUROLOGY

## 2020-12-26 PROCEDURE — 2580000003 HC RX 258: Performed by: STUDENT IN AN ORGANIZED HEALTH CARE EDUCATION/TRAINING PROGRAM

## 2020-12-26 PROCEDURE — 80048 BASIC METABOLIC PNL TOTAL CA: CPT

## 2020-12-26 PROCEDURE — 6370000000 HC RX 637 (ALT 250 FOR IP): Performed by: NURSE PRACTITIONER

## 2020-12-26 PROCEDURE — 83735 ASSAY OF MAGNESIUM: CPT

## 2020-12-26 PROCEDURE — 99232 SBSQ HOSP IP/OBS MODERATE 35: CPT | Performed by: STUDENT IN AN ORGANIZED HEALTH CARE EDUCATION/TRAINING PROGRAM

## 2020-12-26 PROCEDURE — 6360000002 HC RX W HCPCS: Performed by: STUDENT IN AN ORGANIZED HEALTH CARE EDUCATION/TRAINING PROGRAM

## 2020-12-26 RX ORDER — FAMOTIDINE 20 MG/1
20 TABLET, FILM COATED ORAL 2 TIMES DAILY
Status: DISCONTINUED | OUTPATIENT
Start: 2020-12-26 | End: 2020-12-30 | Stop reason: HOSPADM

## 2020-12-26 RX ORDER — INSULIN GLARGINE 100 [IU]/ML
20 INJECTION, SOLUTION SUBCUTANEOUS NIGHTLY
Status: DISCONTINUED | OUTPATIENT
Start: 2020-12-26 | End: 2020-12-27

## 2020-12-26 RX ADMIN — SENNOSIDES 8.8 MG: 8.8 LIQUID ORAL at 20:30

## 2020-12-26 RX ADMIN — DOCUSATE SODIUM 100 MG: 50 LIQUID ORAL at 09:18

## 2020-12-26 RX ADMIN — IPRATROPIUM BROMIDE AND ALBUTEROL SULFATE 1 AMPULE: .5; 3 SOLUTION RESPIRATORY (INHALATION) at 15:13

## 2020-12-26 RX ADMIN — SODIUM CHLORIDE, PRESERVATIVE FREE 10 ML: 5 INJECTION INTRAVENOUS at 09:16

## 2020-12-26 RX ADMIN — METOPROLOL TARTRATE 75 MG: 25 TABLET ORAL at 20:29

## 2020-12-26 RX ADMIN — SPIRONOLACTONE 50 MG: 25 TABLET ORAL at 09:18

## 2020-12-26 RX ADMIN — Medication 81 MG: at 09:18

## 2020-12-26 RX ADMIN — ENOXAPARIN SODIUM 30 MG: 100 INJECTION SUBCUTANEOUS at 09:17

## 2020-12-26 RX ADMIN — INSULIN LISPRO 4 UNITS: 100 INJECTION, SOLUTION INTRAVENOUS; SUBCUTANEOUS at 04:18

## 2020-12-26 RX ADMIN — CLONIDINE HYDROCHLORIDE 0.1 MG: 0.1 TABLET ORAL at 09:18

## 2020-12-26 RX ADMIN — ATORVASTATIN CALCIUM 80 MG: 80 TABLET, FILM COATED ORAL at 20:29

## 2020-12-26 RX ADMIN — QUETIAPINE FUMARATE 25 MG: 25 TABLET ORAL at 20:29

## 2020-12-26 RX ADMIN — FAMOTIDINE 20 MG: 20 TABLET, FILM COATED ORAL at 20:29

## 2020-12-26 RX ADMIN — AMLODIPINE BESYLATE 10 MG: 10 TABLET ORAL at 09:18

## 2020-12-26 RX ADMIN — METOPROLOL TARTRATE 75 MG: 25 TABLET ORAL at 09:19

## 2020-12-26 RX ADMIN — FUROSEMIDE 40 MG: 40 TABLET ORAL at 09:18

## 2020-12-26 RX ADMIN — INSULIN GLARGINE 20 UNITS: 100 INJECTION, SOLUTION SUBCUTANEOUS at 20:41

## 2020-12-26 RX ADMIN — INSULIN LISPRO 4 UNITS: 100 INJECTION, SOLUTION INTRAVENOUS; SUBCUTANEOUS at 20:40

## 2020-12-26 RX ADMIN — INSULIN LISPRO 4 UNITS: 100 INJECTION, SOLUTION INTRAVENOUS; SUBCUTANEOUS at 09:32

## 2020-12-26 RX ADMIN — FAMOTIDINE 20 MG: 20 TABLET, FILM COATED ORAL at 09:26

## 2020-12-26 RX ADMIN — SODIUM CHLORIDE, PRESERVATIVE FREE 10 ML: 5 INJECTION INTRAVENOUS at 20:29

## 2020-12-26 RX ADMIN — IPRATROPIUM BROMIDE AND ALBUTEROL SULFATE 1 AMPULE: .5; 3 SOLUTION RESPIRATORY (INHALATION) at 07:46

## 2020-12-26 RX ADMIN — IPRATROPIUM BROMIDE AND ALBUTEROL SULFATE 1 AMPULE: .5; 3 SOLUTION RESPIRATORY (INHALATION) at 11:54

## 2020-12-26 RX ADMIN — SERTRALINE 100 MG: 50 TABLET, FILM COATED ORAL at 09:19

## 2020-12-26 RX ADMIN — IPRATROPIUM BROMIDE AND ALBUTEROL SULFATE 1 AMPULE: .5; 3 SOLUTION RESPIRATORY (INHALATION) at 21:18

## 2020-12-26 RX ADMIN — INSULIN LISPRO 4 UNITS: 100 INJECTION, SOLUTION INTRAVENOUS; SUBCUTANEOUS at 16:44

## 2020-12-26 ASSESSMENT — PAIN SCALES - GENERAL
PAINLEVEL_OUTOF10: 0

## 2020-12-26 ASSESSMENT — PAIN DESCRIPTION - PROGRESSION

## 2020-12-26 NOTE — PROGRESS NOTES
Physical Therapy  Facility/Department: [facility] NEURO  Daily Treatment Note  NAME: Mateo Moise  : 1950  MRN: [id number]    Date of Service: 2020    Discharge Recommendations:  Patient would benefit from continued therapy after discharge   PT Equipment Recommendations  Equipment Needed: No    Assessment   Body structures, Functions, Activity limitations: Decreased functional mobility ; Decreased strength;Decreased safe awareness;Decreased cognition;Decreased endurance;Decreased balance;Decreased fine motor control;Decreased coordination;Decreased posture  Assessment: Pt very lethargic this date, having a hard time keeping eyes open. Pt performed BLE/BUE exercises, unable to sit pt EOB today d/t needing additional assistance. Recommend continued PT after discharge to address deficits. Prognosis: Fair  Decision Making: High Complexity  PT Education: Goals;PT Role;Plan of Care  Barriers to Learning: cognition  REQUIRES PT FOLLOW UP: Yes  Activity Tolerance  Activity Tolerance: Patient limited by cognitive status; Patient limited by endurance; Patient Tolerated treatment well  Activity Tolerance: pt cooperative, not following commands consistently this date. Patient Diagnosis(es): The encounter diagnosis was Acute cerebrovascular accident (CVA) ([address] has a past medical history of Allergic rhinitis, Anxiety, Asthma, CAD (coronary artery disease), Chronic back pain, Congestive heart failure ([address] Depression, Headache(784.0), Hiatal hernia, Hypercholesteremia, Hypertension, Kidney stones, Obesity, Osteoarthritis, Postlaminectomy syndrome, Type II or unspecified type diabetes mellitus without mention of complication, not stated as uncontrolled, Unspecified sleep apnea, and Urinary incontinence. has a past surgical history that includes Spine surgery; Nerve Block (4/23/2012); Nerve Block (5/22/2012); Nerve Block (01/14/13); Nerve Block (01/21/13); Nerve Block (1/28/2013 ); Cardiac catheterization (01/2013); Lumbar spine surgery (2007); Vena Cava Filter Placement (2007); Tonsillectomy; joint replacement; knee surgery (10/21/2013); knee surgery (12/02/2013); knee surgery (12/09/2013); other surgical history (Right, 7/14/2014); other surgical history (Right, 3/16/2015); Upper gastrointestinal endoscopy; Colonoscopy (09/2015); other surgical history (Right, 06-13-16); Coronary angioplasty with stent (1583-13); and Nerve Block (Right, 04/17/2017). Restrictions  Restrictions/Precautions  Restrictions/Precautions: General Precautions, Fall Risk, Up as Tolerated  Required Braces or Orthoses?: No  Position Activity Restriction  Other position/activity restrictions: SBP goal <180  Subjective   General  Chart Reviewed: Yes  Response To Previous Treatment: Patient with no complaints from previous session. Family / Caregiver Present: No  Subjective  Subjective: Pt and RN agreeable to PT this morning. Pt supine in bed very lethargic with no c/o pain. Pain Screening  Patient Currently in Pain: Denies  Vital Signs  Patient Currently in Pain: Denies       Orientation  Orientation  Overall Orientation Status: Impaired  Orientation Level: Oriented to time;Oriented to person;Oriented to place; Disoriented to situation  Cognition   Cognition  Overall Cognitive Status: Exceptions  Arousal/Alertness: Delayed responses to stimuli  Following Commands: Follows one step commands with repetition; Follows one step commands with increased time  Attention Span: Difficulty attending to directions; Difficulty dividing attention  Memory: Decreased recall of precautions;Decreased recall of recent events  Safety Judgement: Decreased awareness of need for assistance;Decreased awareness of need for safety Problem Solving: Assistance required to generate solutions;Assistance required to identify errors made;Assistance required to correct errors made;Assistance required to implement solutions  Insights: Decreased awareness of deficits  Initiation: Requires cues for all  Sequencing: Requires cues for all  Objective   Bed mobility  Comment: BRITNI  Transfers  Comment: BRITNI  Ambulation  Ambulation?: No  Stairs/Curb  Stairs?: No     LE Exercises:  Supine Exercises: Ankle Pumps, Heel Slides, Hip ABD/ADD. Reps: 15x each LE  Comments: Pt requires PROM on LLE    UE Exercises:  Upper extremity exercises: Bicep curl, shoulder flexion/extension, punches, tricep curl. Reps: 15x each UE  Comments: Pt requires AAROM with LUE exercises.     Goals  Short term goals  Time Frame for Short term goals: 12 visits  Short term goal 1: bed mobility with mod A+1  Short term goal 2: transfers mod A+2 in [facility]  Short term goal 3: WC mobility x 25' with min A+1  Short term goal 4: progress to gait with appropriate device x 15' with max A+2 as appropriate  Patient Goals   Patient goals : pt didn't verbalize goal    Plan    Plan  Times per week: 5-6 visits weekly  Times per day: Daily  Current Treatment Recommendations: Strengthening, ROM, Balance Training, Functional Mobility Training, Transfer Training, Endurance Training, Wheelchair Mobility Training, Gait Training, Neuromuscular Re-education, Cognitive Reorientation, Pain Management, Home Exercise Program, Safety Education & Training, Patient/Caregiver Education & Training, Positioning  Safety Devices  Type of devices: Patient at risk for falls, Left in bed, Nurse notified  Restraints  Initially in place: No     Therapy Time   Individual Concurrent Group Co-treatment   Time In 1135         Time Out 1149         Minutes 14         Timed Code Treatment Minutes: [facility]

## 2020-12-26 NOTE — PLAN OF CARE
Problem: OXYGENATION/RESPIRATORY FUNCTION  Goal: Patient will achieve/maintain normal respiratory rate/effort  Description: Respiratory rate and effort will be within normal limits for the patient  Outcome: Ongoing     Problem: Respiratory  Intervention: Administer medication as ordered  12/26/2020 0749 by Kimberlyn Nguyen RCP  Note: BRONCHOSPASM/BRONCHOCONSTRICTION     [x]         IMPROVE AERATION/BREATH SOUNDS  [x]   ADMINISTER BRONCHODILATOR THERAPY AS APPROPRIATE  [x]   ASSESS BREATH SOUNDS  []   IMPLEMENT AEROSOL/MDI PROTOCOL  [x]   PATIENT EDUCATION AS NEEDED

## 2020-12-26 NOTE — PLAN OF CARE
Problem: Respiratory  Intervention: Administer medication as ordered  Note: BRONCHOSPASM/BRONCHOCONSTRICTION     [x]         IMPROVE AERATION/BREATH SOUNDS  [x]   ADMINISTER BRONCHODILATOR THERAPY AS APPROPRIATE  [x]   ASSESS BREATH SOUNDS  []   IMPLEMENT AEROSOL/MDI PROTOCOL  [x]   PATIENT EDUCATION AS NEEDED   PROVIDE ADEQUATE OXYGENATION WITH ACCEPTABLE SP02/ABG'S    [x]  IDENTIFY APPROPRIATE OXYGEN THERAPY  [x]   MONITOR SP02/ABG'S AS NEEDED   [x]   PATIENT EDUCATION AS NEEDED

## 2020-12-26 NOTE — PROGRESS NOTES
Higinio Kam  Neurology Resident Progress Note      SUBJECTIVE:  This is a 78 y.o.  female admitted 12/15/2020 for Cerebrovascular accident (CVA) due to embolic occlusion of right middle cerebral artery ([facility] [address] [I63.411]  This is a follow-up neurology progress note. The patient was seen and examined and the chart was reviewed. There were no acute events overnight. Patient looking much better today, she is more communicating, we will sign off the case      ROS  Constitutional: no fever, chills, fatigue  HENT: No change in vision or hearing   Respiratory: No cough, SOB, wheezing. Cardiovascular:  No chest pain, palpitations, leg swelling. Gastrointestinal: No nausea, vomiting, diarrhea. Genitourinary: No increased frequency, urgency. Musculoskeletal: No myalgia or arthralgia. Skin: No rashes or scarring or bruises. Neurological: No headache, paresthesia, or focal weakness. Endo/Heme/Allergies: Negative for itchy eyes or runny nose. Psychiatric/Behavioral: No anxiety or depressed mood.      HPI Shannan Moreno is a  78 y.o. female admitted on 12/15/2020 with . The patient was seen and examined and the chart was reviewed. Patient has a past medical history of coronary artery disease hyperlipidemia's and CHF who initially presented for altered mental status. Presented with an NIH stroke scale of 19 with left hemiparesis and right gaze preference. Patient's blood pressure was 210/115 heart rate 97. Patient underwent a CT perfusion which showed a large volume infarct with no penumbra. Patient was not a candidate for thrombectomy as he was outside the window. Hypertonic and hyperosmolar treatment for cerebral edema was initiated. Patient was treated for UTI with Rocephin. Repeat CT did not show any hemorrhagic conversion. Patient's blood sugars were elevated and Lantus was added and subsequently increased. Plavix had to be held as patient was unable to pass swallow and required a PEG tube. Patient is scheduled for PEG tube placement at the end of this week. Neurologically, patient does display a left facial droop with right gaze preference and left-sided plegia. Speech is incoherent at times.        famotidine  20 mg Per NG tube BID    rivastigmine  1 patch Transdermal Daily    spironolactone  50 mg Oral Daily    amLODIPine  10 mg Oral Daily    cloNIDine  0.1 mg Oral Daily    insulin glargine  15 Units Subcutaneous Nightly    insulin lispro  0-12 Units Subcutaneous Q6H    QUEtiapine  25 mg Oral Nightly    senna  5 mL Oral Nightly    docusate  100 mg Oral Daily    [Held by provider] clopidogrel  75 mg Per NG tube Daily    metoprolol tartrate  75 mg Oral BID    furosemide  40 mg Oral Daily    sertraline  100 mg Oral Daily    enoxaparin  30 mg Subcutaneous Daily    ipratropium-albuterol  1 ampule Inhalation Q4H WA    sodium chloride flush  10 mL Intravenous 2 times per day    aspirin  81 mg Oral Daily    Or    aspirin  300 mg Rectal Daily    atorvastatin  80 mg Oral Nightly Past Medical History:   Diagnosis Date    Allergic rhinitis     Anxiety 10/25/2016    Asthma     CAD (coronary artery disease)     s/p stents RCA and LAD 2009,    Chronic back pain     Congestive heart failure ([address]     Depression     Headache(784.0)     Hiatal hernia     Hypercholesteremia 2/21/2012    Hypertension     Kidney stones     years ago    Obesity     Osteoarthritis     Postlaminectomy syndrome 6/6/2012    Type II or unspecified type diabetes mellitus without mention of complication, not stated as uncontrolled     Unspecified sleep apnea     Urinary incontinence        Past Surgical History:   Procedure Laterality Date    CARDIAC CATHETERIZATION  01/2013    patent stents    COLONOSCOPY  09/2015    normal    CORONARY ANGIOPLASTY WITH STENT PLACEMENT  1012-16    stents x 3    JOINT REPLACEMENT      left knee    KNEE SURGERY  10/21/2013    rt knee synvisc injection     KNEE SURGERY  12/02/2013    knee synvisc injection rt #2    KNEE SURGERY  12/09/2013    rt knee synvisc inj    LUMBAR SPINE SURGERY  2007    NERVE BLOCK  4/23/2012    Right MBNB L3, L4, L5    NERVE BLOCK  5/22/2012    Right MBNB L3, L4, and L5     NERVE BLOCK  01/14/13    Right knee injection #1 - Synvisc    NERVE BLOCK  01/21/13    Right knee injection #2 - Synvisc    NERVE BLOCK  1/28/2013     Rigth knee synvisc injection #3    NERVE BLOCK Right 04/17/2017    Rt genicular nerve block.  no steroid used    OTHER SURGICAL HISTORY Right 7/14/2014    synvisc one knee injection    OTHER SURGICAL HISTORY Right 3/16/2015    synvisc one knee injection    OTHER SURGICAL HISTORY Right 06-13-16    synvisc right knee injection    SPINE SURGERY      TONSILLECTOMY      UPPER GASTROINTESTINAL ENDOSCOPY      VENA CAVA FILTER PLACEMENT  2007    PE and B/L LE emboli       PHYSICAL EXAM: Blood pressure (!) 148/84, pulse 66, temperature 97.2 °F (36.2 °C), temperature source Oral, resp. rate 17, height 5' 4\" (1.626 m), weight 231 lb 9.6 oz (105.1 kg), SpO2 99 %, not currently breastfeeding. General examination:    HEENT: Normocephalic, atraumatic, neck supple (-)nuchal rigidity  Lungs: Respirations unlabored, chest wall no deformity  Heart: Regular rate rhythm  Abdomen: Soft, non distended, non tender  Extremities: No cyanosis, clubbing or edema, 2+ pulses  Skin: Intact, normal skin color, no ecchymosis     Neurological examination:  Somnolent but arousable intermittently following commands. Incoherent speech. Pupils equal and reactive, right gaze preference. Left facial droop,. Spontaneously moving right upper and lower extremity. Plegic on the left side  Follow simple commands on the right side,  Withdraws left lower extremity.     Investigations:      Laboratory Testing:  Recent Results (from the past 24 hour(s))   POC Glucose Fingerstick    Collection Time: 12/25/20 12:20 PM   Result Value Ref Range    POC Glucose 246 (H) 65 - 105 mg/dL   POC Glucose Fingerstick    Collection Time: 12/25/20  5:08 PM   Result Value Ref Range    POC Glucose 250 (H) 65 - 105 mg/dL   POC Glucose Fingerstick    Collection Time: 12/25/20  9:14 PM   Result Value Ref Range    POC Glucose 219 (H) 65 - 105 mg/dL   BASIC METABOLIC PANEL    Collection Time: 12/26/20  5:08 AM   Result Value Ref Range    Glucose 222 (H) 70 - 99 mg/dL    BUN 27 (H) 8 - 23 mg/dL    CREATININE 0.50 0.50 - 0.90 mg/dL    Bun/Cre Ratio NOT REPORTED 9 - 20    Calcium 9.8 8.6 - 10.4 mg/dL    Sodium 140 135 - 144 mmol/L    Potassium 4.4 3.7 - 5.3 mmol/L    Chloride 99 98 - 107 mmol/L    CO2 32 (H) 20 - 31 mmol/L    Anion Gap 9 9 - 17 mmol/L    GFR Non-African American >60 >60 mL/min    GFR African American >60 >60 mL/min    GFR Comment          GFR Staging NOT REPORTED    MAGNESIUM    Collection Time: 12/26/20  5:08 AM   Result Value Ref Range Magnesium 1.7 1.6 - 2.6 mg/dL   POC Glucose Fingerstick    Collection Time: 12/26/20  7:32 AM   Result Value Ref Range    POC Glucose 255 (H) 65 - 105 mg/dL       Imaging/Diagnostics:  Echo Complete 2d W Doppler W Color    Result Date: 12/16/2020 [id number] Transthoracic Echocardiography Report (TTE)  Patient Name Antonio Tenorio    Date of Study               12/16/2020               SANDER   Date of      1950  Gender                      Female  Birth   Age          78 year(s)  Race                        Black   Room Number  4701        Height:                     62 inch, 157.48 cm   Corporate ID [id number]    Weight:                     250 pounds, 113.4 kg  #   Patient Acct [de-identified]   BSA:          2.1 m^2       BMI:      45.73  #                                                              kg/m^2   MR #         [id number]     Dora Lugoer   Accession #  [id number]  Interpreting Physician      [id number] [facility]   Fellow                   Referring Nurse                           Practitioner   Interpreting             Referring Physician         Betty Verma MD  Fellow  Type of Study   TTE procedure:2D Echocardiogram, M-Mode, Doppler, Color Doppler, Bubble  Study. Procedure Date Date: 12/16/2020 Start: 10:27 AM Study Location: [facility] Technical Quality: Fair visualization Indications:CVA. History / Tech. Comments: Procedure explained to patient. Study done bedside in Neuro ICU. H/O DM, YUDITH, morbid obesity Patient Status: Inpatient Height: 62 inches Weight: 250.01 pounds BSA: 2.1 m^2 BMI: 45.73 kg/m^2 Allergies   - Other:(Drug Allergies - bactrim, tylenol). CONCLUSIONS Summary Technically difficult, not all walls visualized. Left ventricle is normal in size. Global left ventricular systolic function is low normal. Estimated ejection fraction is 50 % . Moderate to severe left ventricular hypertrophy. Increased septal thickness is noted. Grade I (mild) left ventricular diastolic dysfunction. Left atrium is moderately dilated. Negative bubble study, no shunt noted via injection of agitated saline. Thickened mitral valve leaflets. Mitral annular calcification is seen. At least mild tricuspid regurgitation. Estimated right ventricular systolic pressure is 30 mmHg. Signature ----------------------------------------------------------------------------  Electronically signed by Coleen Nolan(Sonographer) on 12/16/2020  01:15 PM ---------------------------------------------------------------------------- ----------------------------------------------------------------------------  Electronically signed by Justin Ndiaye(Interpreting physician) on 12/16/2020  01:22 PM ---------------------------------------------------------------------------- FINDINGS Left Atrium Left atrium is moderately dilated. Inter-atrial septum appears so be intact. No shunt seen by color Doppler. Negative bubble study, no shunt noted via injection of agitated saline. Left Ventricle Technically difficult, not all walls visualized. Left ventricle is normal in size. Global left ventricular systolic function is low normal. Estimated ejection fraction is 50 % . Moderate to severe left ventricular hypertrophy. Increased septal thickness is noted. Grade I (mild) left ventricular diastolic dysfunction. Right Atrium Right atrium is normal size . Right Ventricle Normal right ventricular size and function. Mitral Valve Thickened mitral valve leaflets. Mitral annular calcification is seen. No mitral regurgitation. No mitral stenosis. Aortic Valve Aortic valve structure and function normal. Aortic valve is trileaflet. No aortic insufficiency. Tricuspid Valve Normal tricuspid valve leaflets. At least mild tricuspid regurgitation. No tricuspid stenosis. Estimated right ventricular systolic pressure is 30 mmHg. Pulmonic Valve Pulmonic valve is normal in structure and function. No pulmonic insufficiency. No evidence of pulmonic stenosis. Pericardial Effusion No significant pericardial effusion is seen. Miscellaneous Normal aortic root dimension. E/E' average = 19.4. IVC not well visualized.  M-mode / 2D Measurements & Calculations:   LVIDd:4.9 cm(3.7 - 5.6 cm) Diastolic MDDRWI:309 ml  JXOBC:3.1 cm(2.2 - 4.0 cm)       Aortic Root:2.8 cm(2.0 - 3.7 cm)  IVSd:1.72 cm(0.6 - 1.1 cm)       LA Dimension: 3.8 cm(1.9 - 4.0 cm)  LVPWd:1.39 cm(0.6 - 1.1 cm)      LA volume/Index: 85.5 ml /41m^2  Fractional Shortenin.53 %    LVOT:2.2 cm                                   RVDd:3.1 cm   Mitral:                                 Aortic   Valve Area (P1/2-Time): 2.34 cm^2       Peak Velocity: 2.25 m/s  Peak E-Wave: 0.84 m/s                   Mean Velocity: 1.34 m/s  Peak A-Wave: 1.30 m/s                   Peak Gradient: 20.25 mmHg  E/A Ratio: 0.64                         Mean Gradient: 9 mmHg  Peak Gradient: 2.8 mmHg  Mean Gradient: 2 mmHg  Deceleration Time: 401 msec             Area (continuity): 2.52 cm^2  P1/2t: 94 msec                          AV VTI: 45.2 cm   Area (continuity): 3.61 cm^2  Mean Velocity: 0.70 m/s   Tricuspid:                              Pulmonic:   Estimated RVSP: 30 mmHg                 Peak Velocity: 1.32 m/s  Peak TR Velocity: 2.50 m/s              Peak Gradient: 6.97 mmHg  Peak TR Gradient: 25 mmHg  Estimated RA Pressure: 5 mmHg                                           Estimated PASP: 30 mmHg  Diastology / Tissue Doppler Septal Wall E' velocity:0.03 m/s Septal Wall E/E':24.8 Lateral Wall E' velocity:0.06 m/s Lateral Wall E/E':14    Ct Head Wo Contrast    Result Date: 2020 EXAMINATION: CT OF THE HEAD WITHOUT CONTRAST  12/17/2020 5:20 am TECHNIQUE: CT of the head was performed without the administration of intravenous contrast. Dose modulation, iterative reconstruction, and/or weight based adjustment of the mA/kV was utilized to reduce the radiation dose to as low as reasonably achievable. COMPARISON: CT head without contrast December 15, 2020. CT brain perfusion December 15, 2020. HISTORY: ORDERING SYSTEM PROVIDED HISTORY: R MCA infarct, eval for swelling/hemorrhagic conversion TECHNOLOGIST PROVIDED HISTORY: R MCA infarct, eval for swelling/hemorrhagic conversion Reason for Exam: Right MCA infarct, Evaluate for swelling/hemorrhagic conversion Acuity: Acute Type of Exam: Subsequent/Follow-up FINDINGS: BRAIN/VENTRICLES: There is no acute intracranial hemorrhage, mass effect or midline shift. No abnormal extra-axial fluid collection. Interval evolution and increased extent of right MCA distribution right cerebral acute ischemia is noted with geographic hypoattenuation consistent with associated edema noted involving the right frontal, right temporal, right insular, right parietal lobes. .  Partial effacement of the right lateral ventricle is evident. Minimal leftward midline shift is seen secondary to the mass effect which measures 2 mm. .  There is no evidence of hydrocephalus. Ill-defined hypoattenuation is present within cerebral white matter consistent with moderate microvascular ischemic change. ORBITS: The visualized portion of the orbits demonstrate no acute abnormality. SINUSES: The visualized paranasal sinuses and mastoid air cells demonstrate no acute abnormality. SOFT TISSUES/SKULL:  No acute abnormality of the visualized skull or soft tissues. differentiation involving the right insula and surrounding right frontal lobe extending into the right basal ganglia. No intracranial hemorrhage. Mild-to-moderate chronic white matter microvascular ischemic changes. No hydrocephalus or extra-axial fluid collection. Midline maintained. Basal cisterns patent. Small volume high left frontal encephalomalacia in keeping with sequela of prior infarct. Chronic appearing lacunar infarcts in the left basal ganglia. ORBITS: The visualized portion of the orbits demonstrate no acute abnormality. SINUSES:  The visualized paranasal sinuses and mastoid air cells demonstrate no acute abnormality. SOFT TISSUES/SKULL: No acute abnormality of the visualized skull or soft tissues. CTA NECK: AORTIC ARCH/ARCH VESSELS: No dissection or arterial injury. No significant stenosis of the brachiocephalic or subclavian arteries. CAROTID ARTERIES: Right: The right common carotid artery is normal in caliber. There is calcified atherosclerotic plaque involving the right carotid bifurcation and bulb causing 40% stenosis of the proximal right internal carotid artery. The right external carotid artery is patent. No dissection. Left: The left common carotid artery is normal in caliber. There is calcified atherosclerotic plaque and kinking of the left carotid bulb causing 75% focal stenosis 1.6 cm distal to the origin of the left internal carotid artery. The left external carotid artery is patent. No dissection. VERTEBRAL ARTERIES: No dissection, arterial injury, or significant stenosis. SOFT TISSUES: The lung apices are clear. No cervical or superior mediastinal lymphadenopathy. The larynx and pharynx are unremarkable. No acute abnormality of the salivary and thyroid glands. BONES: No acute osseous abnormality. Mild-to-moderate degenerative changes in the cervical spine.  CTA HEAD: ANTERIOR CIRCULATION: Moderate to severe calcific atherosclerotic stenosis of the cavernous internal carotid arteries. Near complete occlusion of the distal M1 and proximal M2 segments right middle cerebral artery with paucity of contrast in more distal branches. No focal stenosis of the right anterior cerebral artery. Severe stenosis of the proximal A1 segment left anterior cerebral artery. Patent anterior communicating artery. Severe stenosis of the M1 segment left middle cerebral artery. No aneurysm. POSTERIOR CIRCULATION: Moderate stenosis of the proximal intracranial left vertebral artery. No right vertebral, basilar, or left posterior cerebral artery stenosis. Moderate stenosis of the P2 segment right posterior cerebral artery. OTHER: No dural venous sinus thrombosis on this non-dedicated study. CT PERFUSION: There is increased mean transit time in the visualized right middle cerebral artery territory somewhat greater in extent than the underlying hypodensity on head CT. Moderate volume underlying core infarct is noted. 1. Moderate volume acute ischemic infarct in the right middle cerebral artery territory. No intracranial hemorrhage or mass effect. 2. Moderate volume penumbra in the peripheral right middle cerebral artery territory based on perfusion images. 3. Acute nearly occlusive thrombosis of the M1 and M2 segments right middle cerebral artery. 4. Severe stenosis of the M1 segment left middle cerebral artery. 5. Severe stenosis of the A1 segment left anterior cerebral artery. 6. 75% stenosis of the proximal left internal carotid secondary to vessel kinking and superimposed atherosclerotic plaque. 7. 40% stenosis of the proximal right internal carotid artery. 8. Moderate stenosis of the V4 segment left vertebral artery. Critical findings were discussed with Dr. Sarah Baez at 6:25 p.m. on 12/15/2020.      Xr Chest Portable    Result Date: 12/21/2020 EXAMINATION: ONE XRAY VIEW OF THE CHEST 12/21/2020 10:40 am COMPARISON: 12/18/2020. HISTORY: ORDERING SYSTEM PROVIDED HISTORY: concern for microaspiration, crackles on auscultation TECHNOLOGIST PROVIDED HISTORY: concern for microaspiration, crackles on auscultation FINDINGS: The cardiac silhouette is enlarged and stable. Aortic vascular calcification. Stable prominence of the central pulmonary vascularity. No acute infiltrate, pleural fluid or evidence of overt failure. Enteric catheter remains in place. No acute cardiopulmonary disease. Cardiomegaly with central vascular congestion with no overt failure or focal infiltrate. Xr Chest Portable    Result Date: 12/18/2020  EXAMINATION: ONE XRAY VIEW OF THE CHEST 12/18/2020 10:03 am COMPARISON: 11/15/2019 HISTORY: ORDERING SYSTEM PROVIDED HISTORY: eval for acute process, febrile TECHNOLOGIST PROVIDED HISTORY: eval for acute process, febrile Reason for Exam: eval for acute process, febrile Acuity: Unknown FINDINGS: An enteric tube is present with its tip and side hole projected over the left upper abdomen, likely overlying the stomach. The cardiac silhouette is borderline prominent. Subtle atherosclerotic calcifications of the aortic arch. No definite focal airspace consolidation, sizeable pleural effusion, or pneumothorax. Advanced arthrosis of the left glenohumeral joint. Borderline cardiomegaly. No convincing evidence for acute cardiopulmonary pathology. Ct Brain Perfusion    Addendum Date: 12/15/2020    ADDENDUM: The full set of diagnostic perfusion images were sent for review at 7:15 p.m. on 12/15/2020 and demonstrate large volume elevated M TT in the visualized right middle cerebral artery territory with elevated relative cerebral blood volume and decreased relative cerebral blood flow.      Result Date: 12/15/2020 carotid arteries. Near complete occlusion of the distal M1 and proximal M2 segments right middle cerebral artery with paucity of contrast in more distal branches. No focal stenosis of the right anterior cerebral artery. Severe stenosis of the proximal A1 segment left anterior cerebral artery. Patent anterior communicating artery. Severe stenosis of the M1 segment left middle cerebral artery. No aneurysm. POSTERIOR CIRCULATION: Moderate stenosis of the proximal intracranial left vertebral artery. No right vertebral, basilar, or left posterior cerebral artery stenosis. Moderate stenosis of the P2 segment right posterior cerebral artery. OTHER: No dural venous sinus thrombosis on this non-dedicated study. CT PERFUSION: There is increased mean transit time in the visualized right middle cerebral artery territory somewhat greater in extent than the underlying hypodensity on head CT. Moderate volume underlying core infarct is noted. 1. Moderate volume acute ischemic infarct in the right middle cerebral artery territory. No intracranial hemorrhage or mass effect. 2. Moderate volume penumbra in the peripheral right middle cerebral artery territory based on perfusion images. 3. Acute nearly occlusive thrombosis of the M1 and M2 segments right middle cerebral artery. 4. Severe stenosis of the M1 segment left middle cerebral artery. 5. Severe stenosis of the A1 segment left anterior cerebral artery. 6. 75% stenosis of the proximal left internal carotid secondary to vessel kinking and superimposed atherosclerotic plaque. 7. 40% stenosis of the proximal right internal carotid artery. 8. Moderate stenosis of the V4 segment left vertebral artery. Critical findings were discussed with Dr. Valentino Colorado at 6:25 p.m. on 12/15/2020.      Xr Abdomen For Ng/og/ne Tube Placement    Result Date: 12/18/2020 EXAMINATION: ONE SUPINE XRAY VIEW(S) OF THE ABDOMEN 12/18/2020 5:38 am COMPARISON: Abdomen for NG/OG placement December 16, 2020 HISTORY: ORDERING SYSTEM PROVIDED HISTORY: Confirmation of course of NG/OG/NE tube and location of tip of tube TECHNOLOGIST PROVIDED HISTORY: Confirmation of course of NG/OG/NE tube and location of tip of tube Portable? ->Yes Reason for Exam: portable supine/ NG/OG placement Acuity: Acute Type of Exam: Ongoing FINDINGS: Nasogastric tube is noted with distal tip projecting in the region of the lumen of the stomach. The bowel gas pattern is unremarkable without evidence of bowel obstruction. No evidence of stomach distention is seen. IVC filter is noted in place without evidence of definitive complication seen. Abdominal organs are grossly unremarkable. No evidence of radiodensity is present to suggest presence of renal calculi or gallstones. Bones and soft tissues are unremarkable. Appropriate radiographic positioning of nasogastric tube. Recommend clinical correlation prior to use. Xr Abdomen For Ng/og/ne Tube Placement    Result Date: 12/16/2020  EXAMINATION: ONE SUPINE XRAY VIEW(S) OF THE ABDOMEN 12/16/2020 5:50 pm COMPARISON: None. HISTORY: ORDERING SYSTEM PROVIDED HISTORY: Confirmation of course of NG/OG/NE tube and location of tip of tube TECHNOLOGIST PROVIDED HISTORY: Confirmation of course of NG/OG/NE tube and location of tip of tube Portable? ->Yes FINDINGS: Bowel gas pattern is nonobstructive. No definite nephrolithiasis. The right hemiabdomen is not completely included in the field of view. IVC filter is noted. Tip and side port of the enteric tube are below the GE junction with tip over the distal stomach. Enteric tube in expected position.      Cta Head Neck W Contrast    Addendum Date: 12/15/2020 ADDENDUM: The full set of diagnostic perfusion images were sent for review at 7:15 p.m. on 12/15/2020 and demonstrate large volume elevated M TT in the visualized right middle cerebral artery territory with elevated relative cerebral blood volume and decreased relative cerebral blood flow.      Result Date: 12/15/2020 carotid arteries. Near complete occlusion of the distal M1 and proximal M2 segments right middle cerebral artery with paucity of contrast in more distal branches. No focal stenosis of the right anterior cerebral artery. Severe stenosis of the proximal A1 segment left anterior cerebral artery. Patent anterior communicating artery. Severe stenosis of the M1 segment left middle cerebral artery. No aneurysm. POSTERIOR CIRCULATION: Moderate stenosis of the proximal intracranial left vertebral artery. No right vertebral, basilar, or left posterior cerebral artery stenosis. Moderate stenosis of the P2 segment right posterior cerebral artery. OTHER: No dural venous sinus thrombosis on this non-dedicated study. CT PERFUSION: There is increased mean transit time in the visualized right middle cerebral artery territory somewhat greater in extent than the underlying hypodensity on head CT. Moderate volume underlying core infarct is noted. 1. Moderate volume acute ischemic infarct in the right middle cerebral artery territory. No intracranial hemorrhage or mass effect. 2. Moderate volume penumbra in the peripheral right middle cerebral artery territory based on perfusion images. 3. Acute nearly occlusive thrombosis of the M1 and M2 segments right middle cerebral artery. 4. Severe stenosis of the M1 segment left middle cerebral artery. 5. Severe stenosis of the A1 segment left anterior cerebral artery. 6. 75% stenosis of the proximal left internal carotid secondary to vessel kinking and superimposed atherosclerotic plaque. 7. 40% stenosis of the proximal right internal carotid artery. 8. Moderate stenosis of the V4 segment left vertebral artery. Critical findings were discussed with Dr. Glenys Guy at 6:25 p.m. on 12/15/2020.      Mri Brain Wo Contrast    Result Date: 12/16/2020 EXAMINATION: MRI OF THE BRAIN WITHOUT CONTRAST  12/16/2020 9:49 am TECHNIQUE: Multiplanar multisequence MRI of the brain was performed without the administration of intravenous contrast. COMPARISON: CT brain performed 12/15/2020. HISTORY: ORDERING SYSTEM PROVIDED HISTORY: R MCA stroke, evaluate stroke burden and hemorrhagic conversion TECHNOLOGIST PROVIDED HISTORY: R MCA stroke, evaluate stroke burden and hemorrhagic conversion FINDINGS: INTRACRANIAL STRUCTURES/VENTRICLES: The sellar and suprasellar structures, optic chiasm, corpus callosum, pineal gland, tectum, and midline brainstem structures are unremarkable. The craniocervical junction is unremarkable. There is no acute hemorrhage or midline shift. There is restricted diffusion and associated FLAIR signal abnormality throughout the right MCA distribution consistent with acute/subacute ischemia. There is associated mild edema and mass effect upon the right lateral ventricle. There is underlying chronic microvascular disease. The ventricular structures are otherwise unremarkable. The infratentorial structures including the cerebellopontine angles and internal auditory canals are unremarkable. There is no abnormal blooming artifact on susceptibility weighted imaging. ORBITS: The visualized portion of the orbits demonstrate no acute abnormality. SINUSES: The visualized paranasal sinuses and mastoid air cells are well aerated. BONES/SOFT TISSUES: The bone marrow signal intensity appears normal. The soft tissues demonstrate no acute abnormality. Acute/subacute right MCA distribution infarct with mild associated edema and subsequent mass effect upon the right lateral ventricle.        Assessment & Differential Dx:      Primary Problem  Cerebrovascular accident (CVA) due to embolic occlusion of right middle cerebral artery [facility])    Active Hospital Problems    Diagnosis Date Noted  Ischemic cerebral stroke due to intracranial large artery atherosclerosis (HCC) [I63.59]     Acute cerebrovascular accident ([facility]A) [facility] [address] [I63.9]     [facility] [I63.511]     Non-traumatic rhabdomyolysis [M62.82]     Cerebrovascular accident (CVA) due to embolic occlusion of right middle cerebral artery ([facility] [address] [I63.411] 12/15/2020    Anxiety [F41.9] 10/25/2016    S/P coronary artery stent placement - RCA 10/12/16 - Dr. Monet Herrera [Z95.5] 10/12/2016    [facility]est[facility] [I50.9]     Morbid obesity ([facility] [E66.01] 04/03/2014    YUDITH (obstructive sleep apnea) [G47.33] 04/02/2014    DM (diabetes mellitus) ([facility] [E11.9] 02/21/2012    Asthma [J45.909] 02/21/2012    HTN (hypertension) [I10] 02/21/2012       78year-old with right MCA infarct. PEG tube placement pending. Patient is awake and able to follow commands. She does have left-sided plegia, Plavix is currently being held as patient is scheduled for a PEG tube placement at the end of the week. We will continue to monitor patient's neurological status. Patient is currently tolerating NG tube feeds with blood pressure monitoring ongoing. Impression:  -Acute/subacute right MCA distribution infarct with associated edema, patient presented with altered mentation, left hemiparesis and right gaze preference  -Dysphagia PEG tube scheduled for 12/28/2020  -Hyperlipidemia  -Hypertension  -Diabetes  -CAD status post stent  -CHF  -Kidney stones  -Chronic back pain  -UTI    Plan:  Repeat CT due to patient mentation  Continue to monitor for any neurological changes. Continue aspirin as directed. Continue high-dose statin therapy: Lipitor 80 mg  Continue on telemetry  Labetalol/hydralazine as needed if BP out of parameters. Cleviprex drip if BP persistently elevated. PT/OTeval and treat. Continue NG tube feeds. PEG tube pending. Plavix currently on hold due to pending PEG tube placement. -2D echo: EF of 50% moderate to severe LVH, increased thickness, grade 1 LV diastolic dysfunction, left atrium moderately dilated, negative bubble study,  Continue monitor fingersticks. Sliding scale as directed. Continue ceftriaxone for UTI.   Lasix mg daily.  -We will sign off the case please contact for further concerns    Sugey Hager MD, 12/26/2020 11:16 AM

## 2020-12-27 ENCOUNTER — APPOINTMENT (OUTPATIENT)
Dept: GENERAL RADIOLOGY | Age: 70
DRG: 064 | End: 2020-12-27
Payer: COMMERCIAL

## 2020-12-27 ENCOUNTER — ANESTHESIA EVENT (OUTPATIENT)
Dept: OPERATING ROOM | Age: 70
DRG: 064 | End: 2020-12-27
Payer: COMMERCIAL

## 2020-12-27 LAB
ABSOLUTE EOS #: 0.21 K/UL (ref 0–0.44)
ABSOLUTE IMMATURE GRANULOCYTE: 0.07 K/UL (ref 0–0.3)
ABSOLUTE LYMPH #: 1.41 K/UL (ref 1.1–3.7)
ABSOLUTE MONO #: 0.8 K/UL (ref 0.1–1.2)
ANION GAP SERPL CALCULATED.3IONS-SCNC: 12 MMOL/L (ref 9–17)
BASOPHILS # BLD: 0 % (ref 0–2)
BASOPHILS ABSOLUTE: 0.04 K/UL (ref 0–0.2)
BUN BLDV-MCNC: 25 MG/DL (ref 8–23)
BUN/CREAT BLD: ABNORMAL (ref 9–20)
CALCIUM SERPL-MCNC: 10 MG/DL (ref 8.6–10.4)
CHLORIDE BLD-SCNC: 99 MMOL/L (ref 98–107)
CO2: 29 MMOL/L (ref 20–31)
CREAT SERPL-MCNC: 0.47 MG/DL (ref 0.5–0.9)
DIFFERENTIAL TYPE: ABNORMAL
EOSINOPHILS RELATIVE PERCENT: 2 % (ref 1–4)
GFR AFRICAN AMERICAN: >60 ML/MIN
GFR NON-AFRICAN AMERICAN: >60 ML/MIN
GFR SERPL CREATININE-BSD FRML MDRD: ABNORMAL ML/MIN/{1.73_M2}
GFR SERPL CREATININE-BSD FRML MDRD: ABNORMAL ML/MIN/{1.73_M2}
GLUCOSE BLD-MCNC: 173 MG/DL (ref 65–105)
GLUCOSE BLD-MCNC: 179 MG/DL (ref 65–105)
GLUCOSE BLD-MCNC: 209 MG/DL (ref 65–105)
GLUCOSE BLD-MCNC: 239 MG/DL (ref 65–105)
GLUCOSE BLD-MCNC: 259 MG/DL (ref 70–99)
GLUCOSE BLD-MCNC: 269 MG/DL (ref 65–105)
HCT VFR BLD CALC: 36.1 % (ref 36.3–47.1)
HEMOGLOBIN: 11.8 G/DL (ref 11.9–15.1)
IMMATURE GRANULOCYTES: 1 %
LYMPHOCYTES # BLD: 15 % (ref 24–43)
MAGNESIUM: 1.8 MG/DL (ref 1.6–2.6)
MCH RBC QN AUTO: 29.5 PG (ref 25.2–33.5)
MCHC RBC AUTO-ENTMCNC: 32.7 G/DL (ref 28.4–34.8)
MCV RBC AUTO: 90.3 FL (ref 82.6–102.9)
MONOCYTES # BLD: 9 % (ref 3–12)
NRBC AUTOMATED: 0 PER 100 WBC
PDW BLD-RTO: 12.5 % (ref 11.8–14.4)
PLATELET # BLD: 336 K/UL (ref 138–453)
PLATELET ESTIMATE: ABNORMAL
PMV BLD AUTO: 10.2 FL (ref 8.1–13.5)
POTASSIUM SERPL-SCNC: 4.3 MMOL/L (ref 3.7–5.3)
RBC # BLD: 4 M/UL (ref 3.95–5.11)
RBC # BLD: ABNORMAL 10*6/UL
SEG NEUTROPHILS: 73 % (ref 36–65)
SEGMENTED NEUTROPHILS ABSOLUTE COUNT: 6.77 K/UL (ref 1.5–8.1)
SODIUM BLD-SCNC: 140 MMOL/L (ref 135–144)
WBC # BLD: 9.3 K/UL (ref 3.5–11.3)
WBC # BLD: ABNORMAL 10*3/UL

## 2020-12-27 PROCEDURE — 6370000000 HC RX 637 (ALT 250 FOR IP): Performed by: STUDENT IN AN ORGANIZED HEALTH CARE EDUCATION/TRAINING PROGRAM

## 2020-12-27 PROCEDURE — 2580000003 HC RX 258: Performed by: STUDENT IN AN ORGANIZED HEALTH CARE EDUCATION/TRAINING PROGRAM

## 2020-12-27 PROCEDURE — 80048 BASIC METABOLIC PNL TOTAL CA: CPT

## 2020-12-27 PROCEDURE — 94640 AIRWAY INHALATION TREATMENT: CPT

## 2020-12-27 PROCEDURE — 74018 RADEX ABDOMEN 1 VIEW: CPT

## 2020-12-27 PROCEDURE — 83735 ASSAY OF MAGNESIUM: CPT

## 2020-12-27 PROCEDURE — 2060000000 HC ICU INTERMEDIATE R&B

## 2020-12-27 PROCEDURE — 6370000000 HC RX 637 (ALT 250 FOR IP): Performed by: PSYCHIATRY & NEUROLOGY

## 2020-12-27 PROCEDURE — 6370000000 HC RX 637 (ALT 250 FOR IP): Performed by: NURSE PRACTITIONER

## 2020-12-27 PROCEDURE — 6360000002 HC RX W HCPCS: Performed by: STUDENT IN AN ORGANIZED HEALTH CARE EDUCATION/TRAINING PROGRAM

## 2020-12-27 PROCEDURE — 36415 COLL VENOUS BLD VENIPUNCTURE: CPT

## 2020-12-27 PROCEDURE — 85025 COMPLETE CBC W/AUTO DIFF WBC: CPT

## 2020-12-27 PROCEDURE — 82947 ASSAY GLUCOSE BLOOD QUANT: CPT

## 2020-12-27 RX ORDER — INSULIN GLARGINE 100 [IU]/ML
25 INJECTION, SOLUTION SUBCUTANEOUS NIGHTLY
Status: DISCONTINUED | OUTPATIENT
Start: 2020-12-27 | End: 2020-12-28

## 2020-12-27 RX ORDER — LISINOPRIL 10 MG/1
10 TABLET ORAL DAILY
Status: DISCONTINUED | OUTPATIENT
Start: 2020-12-27 | End: 2020-12-30 | Stop reason: HOSPADM

## 2020-12-27 RX ADMIN — FUROSEMIDE 40 MG: 40 TABLET ORAL at 08:31

## 2020-12-27 RX ADMIN — IPRATROPIUM BROMIDE AND ALBUTEROL SULFATE 1 AMPULE: .5; 3 SOLUTION RESPIRATORY (INHALATION) at 20:47

## 2020-12-27 RX ADMIN — FAMOTIDINE 20 MG: 20 TABLET, FILM COATED ORAL at 08:31

## 2020-12-27 RX ADMIN — IPRATROPIUM BROMIDE AND ALBUTEROL SULFATE 1 AMPULE: .5; 3 SOLUTION RESPIRATORY (INHALATION) at 15:36

## 2020-12-27 RX ADMIN — AMLODIPINE BESYLATE 10 MG: 10 TABLET ORAL at 08:32

## 2020-12-27 RX ADMIN — METOPROLOL TARTRATE 75 MG: 25 TABLET ORAL at 21:18

## 2020-12-27 RX ADMIN — SODIUM CHLORIDE, PRESERVATIVE FREE 10 ML: 5 INJECTION INTRAVENOUS at 21:00

## 2020-12-27 RX ADMIN — CLONIDINE HYDROCHLORIDE 0.1 MG: 0.1 TABLET ORAL at 08:32

## 2020-12-27 RX ADMIN — ACETAMINOPHEN 650 MG: 325 TABLET ORAL at 21:22

## 2020-12-27 RX ADMIN — IPRATROPIUM BROMIDE AND ALBUTEROL SULFATE 1 AMPULE: .5; 3 SOLUTION RESPIRATORY (INHALATION) at 07:56

## 2020-12-27 RX ADMIN — DOCUSATE SODIUM 100 MG: 50 LIQUID ORAL at 08:33

## 2020-12-27 RX ADMIN — FAMOTIDINE 20 MG: 20 TABLET, FILM COATED ORAL at 21:18

## 2020-12-27 RX ADMIN — ASPIRIN 300 MG: 300 SUPPOSITORY RECTAL at 08:55

## 2020-12-27 RX ADMIN — INSULIN GLARGINE 25 UNITS: 100 INJECTION, SOLUTION SUBCUTANEOUS at 21:19

## 2020-12-27 RX ADMIN — METOPROLOL TARTRATE 75 MG: 25 TABLET ORAL at 08:34

## 2020-12-27 RX ADMIN — ENOXAPARIN SODIUM 30 MG: 100 INJECTION SUBCUTANEOUS at 08:31

## 2020-12-27 RX ADMIN — INSULIN LISPRO 4 UNITS: 100 INJECTION, SOLUTION INTRAVENOUS; SUBCUTANEOUS at 04:21

## 2020-12-27 RX ADMIN — LISINOPRIL 10 MG: 10 TABLET ORAL at 17:21

## 2020-12-27 RX ADMIN — SODIUM CHLORIDE, PRESERVATIVE FREE 10 ML: 5 INJECTION INTRAVENOUS at 08:33

## 2020-12-27 RX ADMIN — SERTRALINE 100 MG: 50 TABLET, FILM COATED ORAL at 08:32

## 2020-12-27 RX ADMIN — INSULIN LISPRO 3 UNITS: 100 INJECTION, SOLUTION INTRAVENOUS; SUBCUTANEOUS at 17:57

## 2020-12-27 RX ADMIN — INSULIN LISPRO 6 UNITS: 100 INJECTION, SOLUTION INTRAVENOUS; SUBCUTANEOUS at 09:05

## 2020-12-27 RX ADMIN — INSULIN LISPRO 3 UNITS: 100 INJECTION, SOLUTION INTRAVENOUS; SUBCUTANEOUS at 21:19

## 2020-12-27 RX ADMIN — QUETIAPINE FUMARATE 25 MG: 25 TABLET ORAL at 21:18

## 2020-12-27 RX ADMIN — IPRATROPIUM BROMIDE AND ALBUTEROL SULFATE 1 AMPULE: .5; 3 SOLUTION RESPIRATORY (INHALATION) at 11:36

## 2020-12-27 RX ADMIN — SENNOSIDES 8.8 MG: 8.8 LIQUID ORAL at 21:19

## 2020-12-27 RX ADMIN — ATORVASTATIN CALCIUM 80 MG: 80 TABLET, FILM COATED ORAL at 21:18

## 2020-12-27 RX ADMIN — SPIRONOLACTONE 50 MG: 25 TABLET ORAL at 08:31

## 2020-12-27 ASSESSMENT — PAIN DESCRIPTION - PROGRESSION

## 2020-12-27 ASSESSMENT — LIFESTYLE VARIABLES: SMOKING_STATUS: 1

## 2020-12-27 ASSESSMENT — PAIN SCALES - GENERAL: PAINLEVEL_OUTOF10: 6

## 2020-12-27 NOTE — PROGRESS NOTES
 cloNIDine  0.1 mg Oral Daily    insulin lispro  0-12 Units Subcutaneous Q6H    QUEtiapine  25 mg Oral Nightly    senna  5 mL Oral Nightly    docusate  100 mg Oral Daily    [Held by provider] clopidogrel  75 mg Per NG tube Daily    metoprolol tartrate  75 mg Oral BID    furosemide  40 mg Oral Daily    sertraline  100 mg Oral Daily    enoxaparin  30 mg Subcutaneous Daily    ipratropium-albuterol  1 ampule Inhalation Q4H WA    sodium chloride flush  10 mL Intravenous 2 times per day    aspirin  81 mg Oral Daily    Or    aspirin  300 mg Rectal Daily    atorvastatin  80 mg Oral Nightly     Continuous Infusions:    dextrose       PRN Medications    labetalol, 10 mg, Q1H PRN      glucose, 15 g, PRN      dextrose, 12.5 g, PRN      glucagon (rDNA), 1 mg, PRN      dextrose, 100 mL/hr, PRN      acetaminophen, 650 mg, Q4H PRN      sodium chloride flush, 10 mL, PRN      promethazine, 12.5 mg, Q6H PRN    Or      ondansetron, 4 mg, Q6H PRN      Diagnostic Labs:  CBC:   Recent Labs     12/25/20  0451   WBC 8.9   RBC 4.03   HGB 11.3*   HCT 37.2   MCV 92.3   RDW 12.6        BMP:   Recent Labs     12/25/20  0451 12/26/20  0508    140   K 4.3 4.4   CL 98 99   CO2 31 32*   BUN 25* 27*   CREATININE 0.52 0.50     Imaging:  Ct Head Wo Contrast    Result Date: 12/26/2020  Redemonstration of a subacute right MCA territory infarction with improved vasogenic edema compared with 12/17/2020 and resolution of the midline shift. No intracranial hemorrhage. Xr Chest Portable    Result Date: 12/21/2020  No acute cardiopulmonary disease. Cardiomegaly with central vascular congestion with no overt failure or focal infiltrate.      ASSESSMENT & PLAN   Principal Problem:    Cerebrovascular accident (CVA) due to embolic occlusion of right middle cerebral artery (HCC)  Active Problems:    DM (diabetes mellitus) ([facility] [address]    HTN (hypertension)    Asthma    YUDITH (obstructive sleep apnea)    [facility] Congestive heart failure (HCC)    S/P coronary artery stent placement - RCA 10/12/16 - Dr. Dayan Evans    Acute cerebrovascular accident (CVA) [facility])    Right middle cerebral artery stroke ([facility]    Non-traumatic rhabdomyolysis    Ischemic cerebral stroke due to intracranial large artery atherosclerosis ([facility] [facility]  Resolved Problems:    * No resolved hospital problems. *    Large right MCA territory infarction likely secondary to intracranial atherosclerosis  -Continue aspirin 81 mg.  Plavix held in preparation for PEG  -Continue early Lipitor 80 mg for secondary stroke prevention  -Mentation improved.     Hyperlipidemia - Continue Lipitor 80     Dysphagia secondary to stroke  -Failed speech bedside swallow x2  -General surgery consulted for PEG placement on 12/28, will hold Plavix     Type 2 diabetes mellitus  -Lantus increased to 20U. Blood glucose checks changed to 4 times daily. Patient on tube feeds.     UTI -Resolved. DVT ppx: Lovenox  Diet: tube feed  Discharge planning: As per the , no place can accept the patient with an NG tube. Patient will have to stay in the hospital until 18th to get a PEG tube and then get discharged.     Kathryn Dickson MD  PGY-1, Internal Medicine Resident  [facility] [address] 12/27/2020, 7:30 AM

## 2020-12-27 NOTE — ANESTHESIA PRE PROCEDURE
losartan (COZAAR) 100 MG tablet Take 1 tablet by mouth daily 9/13/18   Alissa Shanks MD   furosemide (LASIX) 40 MG tablet Take 1 tablet by mouth daily 9/13/18   Alissa Shanks MD   atorvastatin (LIPITOR) 80 MG tablet TAKE 1 TABLET BY MOUTH DAILY 9/5/18   Alissa Shanks MD   sertraline (ZOLOFT) 100 MG tablet Take 1 tablet by mouth daily 9/5/18   Alissa Shanks MD   clopidogrel (PLAVIX) 75 MG tablet Take 1 tablet by mouth daily 8/13/18   Aileen Kern MD   aspirin (RA ASPIRIN EC) 81 MG EC tablet Take 1 tablet by mouth daily 8/13/18   Aileen Kern MD   Incontinence Supply Disposable (INCONTINENCE BRIEF LARGE) MISC Use 4-5/day as needed 6/12/18   Alissa Shanks MD   glucose blood VI test strips (ASCENSIA AUTODISC VI;ONE TOUCH ULTRA TEST VI) strip 1 each by In Vitro route 3 times daily As needed.  8/1/17   Willi Williamson MD   albuterol-ipratropium (COMBIVENT RESPIMAT)  MCG/ACT AERS inhaler Inhale 1 puff into the lungs every 6 hours as needed for Wheezing 7/17/17   Alissa Shanks MD   Lancets MISC Use 1 -2 times daily Insulin dependent diabetes mellitus 7/17/17   Alissa Shanks MD       Current medications:    Current Facility-Administered Medications   Medication Dose Route Frequency Provider Last Rate Last Admin    insulin glargine (LANTUS) injection vial 30 Units  30 Units Subcutaneous Nightly Payal Horn MD        insulin lispro (HUMALOG) injection vial 0-18 Units  0-18 Units Subcutaneous Q6H Samanta Nunez MD   9 Units at 12/28/20 0625    insulin lispro (HUMALOG) injection vial 0-9 Units  0-9 Units Subcutaneous Nightly Samanta Nunez MD   3 Units at 12/27/20 2529    [Held by provider] lisinopril (PRINIVIL;ZESTRIL) tablet 10 mg  10 mg Oral Daily Samanta Nunez MD   Stopped at 12/28/20 0900    famotidine (PEPCID) tablet 20 mg  20 mg Per NG tube BID Tee Yancey MD   20 mg at 12/28/20 0203  rivastigmine (EXELON) 9.5 MG/24HR 1 patch  1 patch Transdermal Daily Marley Amador MD   1 patch at 12/28/20 0833    [Held by provider] spironolactone (ALDACTONE) tablet 50 mg  50 mg Oral Daily Marley Amador MD   50 mg at 12/28/20 4325    [Held by provider] amLODIPine (NORVASC) tablet 10 mg  10 mg Oral Daily Marley Amador MD   10 mg at 12/27/20 8716    cloNIDine (CATAPRES) tablet 0.1 mg  0.1 mg Oral Daily Marley Amador MD   0.1 mg at 12/27/20 7656    QUEtiapine (SEROQUEL) tablet 25 mg  25 mg Oral Nightly Thelda Cabot, APRN - CNP   25 mg at 12/27/20 2118    senna (SENOKOT) 8.8 MG/5ML syrup 8.8 mg  5 mL Oral Nightly Thelda Cabot, APRN - CNP   8.8 mg at 12/27/20 2119    docusate (COLACE) 50 MG/5ML liquid 100 mg  100 mg Oral Daily Thelda Cabot, APRN - CNP   100 mg at 12/28/20 0820    [Held by provider] clopidogrel (PLAVIX) tablet 75 mg  75 mg Per NG tube Daily Raphael Tate MD   Stopped at 12/27/20 0900    metoprolol tartrate (LOPRESSOR) tablet 75 mg  75 mg Oral BID Raphael Tate MD   75 mg at 12/27/20 2118    labetalol (NORMODYNE;TRANDATE) injection 10 mg  10 mg Intravenous Q1H PRN Blake Pina DO   10 mg at 12/21/20 1714    [Held by provider] furosemide (LASIX) tablet 40 mg  40 mg Oral Daily Patsie Gails, APRN - CNP   40 mg at 12/28/20 8708    glucose (GLUTOSE) 40 % oral gel 15 g  15 g Oral PRN Patsie Gails, APRN - CNP        dextrose 50 % IV solution  12.5 g Intravenous PRN Patsie Gails, APRN - CNP        glucagon (rDNA) injection 1 mg  1 mg Intramuscular PRN Patsie Gails, APRN - CNP        dextrose 5 % solution  100 mL/hr Intravenous PRN Patsie Gails, APRN - CNP        acetaminophen (TYLENOL) tablet 650 mg  650 mg Oral Q4H PRN Blake Pina DO   650 mg at 12/27/20 2122    sertraline (ZOLOFT) tablet 100 mg  100 mg Oral Daily CEE Meza CNP   100 mg at 12/28/20 5646  enoxaparin (LOVENOX) injection 30 mg  30 mg Subcutaneous Daily James Altamirano MD   30 mg at 12/28/20 3440    ipratropium-albuterol (DUONEB) nebulizer solution 1 ampule  1 ampule Inhalation Q4H [place] [facility], DO   1 ampule at 12/28/20 0846    sodium chloride flush 0.9 % injection 10 mL  10 mL Intravenous 2 times per day [facility], DO   10 mL at 12/28/20 5150    sodium chloride flush 0.9 % injection 10 mL  10 mL Intravenous PRN Platte Fan, DO        promethazine (PHENERGAN) tablet 12.5 mg  12.5 mg Oral Q6H PRN [facility], DO        Or    [facility] injection 4 mg  4 mg Intravenous Q6H PRN [facility], DO        aspirin EC tablet 81 mg  81 mg Oral Daily [facility], DO   81 mg at 12/26/20 5703    Or    aspirin suppository 300 mg  300 mg Rectal Daily [facility], DO   300 mg at 12/28/20 0898    atorvastatin (LIPITOR) tablet 80 mg  80 mg Oral Nightly [facility], DO   80 mg at 12/27/20 2118       Allergies:     Allergies   Allergen Reactions    Bactrim     Penicillins     Tylenol [Acetaminophen] Other (See Comments)     constipation       Problem List:    Patient Active Problem List   Diagnosis Code    DM (diabetes mellitus) ([facility] [address] E11.9    HTN (hypertension) I10    Smoker F17.200    Asthma J45.909    Knee osteoarthritis M17.10    Edema R60.9    Spinal stenosis in cervical region M48.02    Chest pain R07.9    YUDITH (obstructive sleep apnea) G47.33    Morbid obesity (HCC) E66.01    Knee pain, bilateral M25.561, M25.562    S/P TKR (total knee replacement) Z96.659    Urge incontinence of urine N39.41    Lumbar spondylosis M47.816    Cervical spondylosis M47.812    Chronic use of opiate drugs therapeutic purposes Z79.891    Chronic knee pain M25.569, G89.29    Hypertensive urgency I16.0    Acute coronary syndrome (HCC) I24.9    Congestive heart failure (HCC) I50.9    Lymphadenopathy R59.1    Chronic pain G89.29  KNEE SURGERY  10/21/2013    rt knee synvisc injection     KNEE SURGERY  2013    knee synvisc injection rt #2    KNEE SURGERY  2013    rt knee synvisc inj    LUMBAR SPINE SURGERY  2007    NERVE BLOCK  2012    Right MBNB L3, L4, L5    NERVE BLOCK  2012    Right MBNB L3, L4, and L5     NERVE BLOCK  13    Right knee injection #1 - Synvisc    NERVE BLOCK  13    Right knee injection #2 - Synvisc    NERVE BLOCK  2013     Rigth knee synvisc injection #3    NERVE BLOCK Right 2017    Rt genicular nerve block. no steroid used    OTHER SURGICAL HISTORY Right 2014    synvisc one knee injection    OTHER SURGICAL HISTORY Right 3/16/2015    synvisc one knee injection    OTHER SURGICAL HISTORY Right 16    synvisc right knee injection    SPINE SURGERY      TONSILLECTOMY      UPPER GASTROINTESTINAL ENDOSCOPY      VENA CAVA FILTER PLACEMENT      PE and B/L LE emboli       Social History:    Social History     Tobacco Use    Smoking status: Former Smoker     Packs/day: 0.50     Years: 0.00     Pack years: 0.00     Quit date: 3/28/2013     Years since quittin.7    Smokeless tobacco: Never Used   Substance Use Topics    Alcohol use:  Yes     Alcohol/week: 1.0 standard drinks     Types: 1 Cans of beer per week     Comment: occasionally                                Counseling given: Not Answered      Vital Signs (Current):   Vitals:    20 0802 20 0843 20 1100 20 1154   BP: (!) 94/51  139/69 (!) 147/66   Pulse:   70 65   Resp: 15 14 13 16   Temp: 98.2 °F (36.8 °C)   98.1 °F (36.7 °C)   TempSrc: Axillary   Axillary   SpO2:  97%  95%   Weight:       Height:                                                  BP Readings from Last 3 Encounters:   20 (!) 147/66   11/15/19 (!) 139/90   10/03/19 (!) 154/74       NPO Status:                                                                                 BMI: Wt Readings from Last 3 Encounters:   12/15/20 231 lb 9.6 oz (105.1 kg)   11/15/19 250 lb (113.4 kg)   10/03/19 250 lb (113.4 kg)     Body mass index is 39.75 kg/m².     CBC:   Lab Results   Component Value Date    WBC 9.3 12/27/2020    RBC 4.00 12/27/2020    RBC 4.21 02/24/2012    HGB 11.8 12/27/2020    HCT 36.1 12/27/2020    MCV 90.3 12/27/2020    RDW 12.5 12/27/2020     12/27/2020     02/24/2012       CMP:   Lab Results   Component Value Date     12/28/2020    K 4.9 12/28/2020     12/28/2020    CO2 31 12/28/2020    BUN 41 12/28/2020    CREATININE 0.86 12/28/2020    GFRAA >60 12/28/2020    LABGLOM >60 12/28/2020    GLUCOSE 270 12/28/2020    GLUCOSE 105 02/24/2012    PROT 7.4 04/05/2019    CALCIUM 9.7 12/28/2020    BILITOT 0.29 04/05/2019    ALKPHOS 82 04/05/2019    AST 15 04/05/2019    ALT 11 04/05/2019       POC Tests:   Recent Labs     12/28/20  1153   POCGLU 191*       Coags:   Lab Results   Component Value Date    PROTIME 10.3 12/15/2020    INR 1.0 12/15/2020    APTT 31.1 12/16/2020       HCG (If Applicable): No results found for: PREGTESTUR, PREGSERUM, HCG, HCGQUANT     ABGs: No results found for: PHART, PO2ART, BET3JYR, BXH6YCE, BEART, Y1UGGRES     Type & Screen (If Applicable):  No results found for: LABABO, LABRH    Drug/Infectious Status (If Applicable):  Lab Results   Component Value Date    HEPCAB NONREACTIVE 02/24/2012       COVID-19 Screening (If Applicable):   Lab Results   Component Value Date    COVID19 Not Detected 12/15/2020         Anesthesia Evaluation  Patient summary reviewed and Nursing notes reviewed  Airway: Mallampati: III        Dental:          Pulmonary:   (+) sleep apnea:  decreased breath sounds,  asthma: current smoker                           Cardiovascular:    (+) hypertension:, CAD:, CHF:,         Rhythm: regular  Rate: normal                    Neuro/Psych:   (+) CVA:, neuromuscular disease:, headaches:, psychiatric history: GI/Hepatic/Renal:   (+) hiatal hernia,           Endo/Other:    (+) DiabetesType II DM, using insulin, . Abdominal:           Vascular:                                      Anesthesia Plan      MAC     ASA 4       Induction: intravenous. Anesthetic plan and risks discussed with patient. Plan discussed with CRNA. Summary  Technically difficult, not all walls visualized. Left ventricle is normal in size. Global left ventricular systolic function  is low normal. Estimated ejection fraction is 50 % . Moderate to severe left ventricular hypertrophy. Increased septal thickness  is noted. Grade I (mild) left ventricular diastolic dysfunction. Left atrium is moderately dilated. Negative bubble study, no shunt noted via injection of agitated saline. Thickened mitral valve leaflets. Mitral annular calcification is seen. At least mild tricuspid regurgitation. Estimated right ventricular systolic  pressure is 30 mmHg.     Consent per daughter      Adrien Jefferson MD   12/28/2020

## 2020-12-27 NOTE — PROGRESS NOTES
[facility]   Department of Internal Medicine - Staff Internal Medicine Teaching Service        Inpatient Daily Progress Note    Date: 2020  Patient Name: Joslyn Feldman  MRN: [id number]  [place]: [de-identified]   Date of Admission: 12/15/2020  5:39 PM  YOB: 1950  Primary Care Physician: Suellen Olvera MD  Attending Physician: Clay Rivers MD   Room: [address] [place] [address]  Number of days in the hospital: 11    Subjective   Admitting Diagnosis: Cerebrovascular accident (CVA) due to embolic occlusion of right middle cerebral artery [facility])  Chief Complaint:   Chief Complaint   Patient presents with    Cerebrovascular Accident     Pt was seen and examined at bedside. Resting comfortably in the bed. Vitals stable. Labs reviewed. Blood sugars elevated. No acute events overnight. Patient mentation improved. Review Of Systems:  Unable to obtain    Objective   Vital Signs:  BP (!) 145/72   Pulse 65   Temp 97.6 °F (36.4 °C) (Axillary)   Resp 14   Ht 5' 4\" (1.626 m)   Wt 231 lb 9.6 oz (105.1 kg)   LMP  (LMP Unknown)   SpO2 98%   BMI 39.75 kg/m²     Temp (24hrs), Av.6 °F (36.4 °C), Min:97.2 °F (36.2 °C), Max:97.9 °F (36.6 °C)    In: 1406   Out: 1200 [Urine:1200]  Physical Exam -    Physical Exam -  Constitutional:  Drowsy but alertx2,  cooperative and no distress. Mental Status:  Oriented to person, place but no time. Delayed mentation. Lungs:  Clear to auscultation bilaterally, normal effort. Heart:  Regular rate and rhythm, no murmur. Abdomen:  Soft, nontender, nondistended, normal bowel sounds. Extremities:  No edema, redness, tenderness in the calves. Skin:  Warm, dry, no gross lesions or rashes.     Medications:  Scheduled Medications:    famotidine  20 mg Per NG tube BID    rivastigmine  1 patch Transdermal Daily    spironolactone  50 mg Oral Daily    amLODIPine  10 mg Oral Daily    cloNIDine  0.1 mg Oral Daily  insulin glargine  15 Units Subcutaneous Nightly    insulin lispro  0-12 Units Subcutaneous Q6H    QUEtiapine  25 mg Oral Nightly    senna  5 mL Oral Nightly    docusate  100 mg Oral Daily    [Held by provider] clopidogrel  75 mg Per NG tube Daily    metoprolol tartrate  75 mg Oral BID    furosemide  40 mg Oral Daily    sertraline  100 mg Oral Daily    enoxaparin  30 mg Subcutaneous Daily    ipratropium-albuterol  1 ampule Inhalation Q4H WA    sodium chloride flush  10 mL Intravenous 2 times per day    aspirin  81 mg Oral Daily    Or    aspirin  300 mg Rectal Daily    atorvastatin  80 mg Oral Nightly     Continuous Infusions:    dextrose       PRN Medications    labetalol, 10 mg, Q1H PRN      glucose, 15 g, PRN      dextrose, 12.5 g, PRN      glucagon (rDNA), 1 mg, PRN      dextrose, 100 mL/hr, PRN      acetaminophen, 650 mg, Q4H PRN      sodium chloride flush, 10 mL, PRN      promethazine, 12.5 mg, Q6H PRN    Or      ondansetron, 4 mg, Q6H PRN      Diagnostic Labs:  CBC:   Recent Labs     12/24/20  0424 12/25/20  0451   WBC 7.6 8.9   RBC 4.29 4.03   HGB 11.9 11.3*   HCT 39.4 37.2   MCV 91.8 92.3   RDW 12.8 12.6   PLT See Reflexed IPF Result 302     BMP:   Recent Labs     12/24/20  0424 12/25/20  0451 12/26/20  0508    138 140   K 4.6 4.3 4.4   CL 99 98 99   CO2 30 31 32*   BUN 24* 25* 27*   CREATININE 0.49* 0.52 0.50     Imaging:  Ct Head Wo Contrast    Result Date: 12/26/2020  Redemonstration of a subacute right MCA territory infarction with improved vasogenic edema compared with 12/17/2020 and resolution of the midline shift. No intracranial hemorrhage. Xr Chest Portable    Result Date: 12/21/2020  No acute cardiopulmonary disease. Cardiomegaly with central vascular congestion with no overt failure or focal infiltrate.      ASSESSMENT & PLAN   Principal Problem:    Cerebrovascular accident (CVA) due to embolic occlusion of right middle cerebral artery ([address]  Active Problems: DM (diabetes mellitus) (HCC)    HTN (hypertension)    Asthma    YUDITH (obstructive sleep apnea)    Morbid obesity (HCC)    Congestive heart failure (HCC)    S/P coronary artery stent placement - RCA 10/12/16 - Dr. Jt Farrell    Acute cerebrovascular accident (CVA) [facility])    Right middle cerebral artery stroke ([facility]    Non-traumatic rhabdomyolysis    Ischemic cerebral stroke due to intracranial large artery atherosclerosis ([facility]  Resolved Problems:    * No resolved hospital problems. *    Large right MCA territory infarction likely secondary to intracranial atherosclerosis  -Continue aspirin 81 mg.  Plavix held in preparation for PEG  -Continue early Lipitor 80 mg for secondary stroke prevention  -Mentation improved.     Hyperlipidemia - Continue Lipitor 80     Dysphagia secondary to stroke  -Failed speech bedside swallow x2  -General surgery consulted for PEG placement on 12/28, will hold Plavix     Type 2 diabetes mellitus  -Lantus increased to 20U. Blood glucose checks changed to 4 times daily. Patient on tube feeds.     UTI -Resolved. DVT ppx: Lovenox  Diet: tube feed  Discharge planning: As per the , no place can accept the patient with an NG tube. Patient will have to stay in the hospital until 18th to get a PEG tube and then get discharged.     Dariusz De León MD  PGY-1, Internal Medicine Resident  [address]         12/26/2020, 7:25 PM

## 2020-12-27 NOTE — PROGRESS NOTES
Patient seen and examined at bedside. Patient has been off of Plavix since 12/22/20 in anticipation of PEG placement. Tolerating tube feeds well. No acute issues. Will plan for PEG tomorrow with Dr. Lynn Kennedy on trauma service - timing TBD. Continue to hold Plavix. Stop TF at midnight tonight - OK for meds through NGT. Discussed with RN. Consent in chart.     Mazin Joy D.O. PGY-3  Department of Surgery  12/27/2020, 9:44 AM

## 2020-12-28 ENCOUNTER — ANESTHESIA (OUTPATIENT)
Dept: OPERATING ROOM | Age: 70
DRG: 064 | End: 2020-12-28
Payer: COMMERCIAL

## 2020-12-28 VITALS — OXYGEN SATURATION: 96 % | SYSTOLIC BLOOD PRESSURE: 77 MMHG | DIASTOLIC BLOOD PRESSURE: 44 MMHG

## 2020-12-28 LAB
ANION GAP SERPL CALCULATED.3IONS-SCNC: 9 MMOL/L (ref 9–17)
BUN BLDV-MCNC: 41 MG/DL (ref 8–23)
BUN/CREAT BLD: ABNORMAL (ref 9–20)
CALCIUM SERPL-MCNC: 9.7 MG/DL (ref 8.6–10.4)
CHLORIDE BLD-SCNC: 100 MMOL/L (ref 98–107)
CO2: 31 MMOL/L (ref 20–31)
CREAT SERPL-MCNC: 0.86 MG/DL (ref 0.5–0.9)
GFR AFRICAN AMERICAN: >60 ML/MIN
GFR NON-AFRICAN AMERICAN: >60 ML/MIN
GFR SERPL CREATININE-BSD FRML MDRD: ABNORMAL ML/MIN/{1.73_M2}
GFR SERPL CREATININE-BSD FRML MDRD: ABNORMAL ML/MIN/{1.73_M2}
GLUCOSE BLD-MCNC: 179 MG/DL (ref 65–105)
GLUCOSE BLD-MCNC: 191 MG/DL (ref 65–105)
GLUCOSE BLD-MCNC: 194 MG/DL (ref 65–105)
GLUCOSE BLD-MCNC: 231 MG/DL (ref 65–105)
GLUCOSE BLD-MCNC: 241 MG/DL (ref 65–105)
GLUCOSE BLD-MCNC: 270 MG/DL (ref 70–99)
MAGNESIUM: 1.9 MG/DL (ref 1.6–2.6)
POTASSIUM SERPL-SCNC: 4.9 MMOL/L (ref 3.7–5.3)
SODIUM BLD-SCNC: 140 MMOL/L (ref 135–144)

## 2020-12-28 PROCEDURE — 2580000003 HC RX 258: Performed by: STUDENT IN AN ORGANIZED HEALTH CARE EDUCATION/TRAINING PROGRAM

## 2020-12-28 PROCEDURE — 36415 COLL VENOUS BLD VENIPUNCTURE: CPT

## 2020-12-28 PROCEDURE — 2500000003 HC RX 250 WO HCPCS: Performed by: NURSE ANESTHETIST, CERTIFIED REGISTERED

## 2020-12-28 PROCEDURE — 99232 SBSQ HOSP IP/OBS MODERATE 35: CPT | Performed by: INTERNAL MEDICINE

## 2020-12-28 PROCEDURE — 2580000003 HC RX 258: Performed by: NURSE ANESTHETIST, CERTIFIED REGISTERED

## 2020-12-28 PROCEDURE — 6370000000 HC RX 637 (ALT 250 FOR IP): Performed by: STUDENT IN AN ORGANIZED HEALTH CARE EDUCATION/TRAINING PROGRAM

## 2020-12-28 PROCEDURE — 83735 ASSAY OF MAGNESIUM: CPT

## 2020-12-28 PROCEDURE — 6360000002 HC RX W HCPCS: Performed by: NURSE ANESTHETIST, CERTIFIED REGISTERED

## 2020-12-28 PROCEDURE — 82947 ASSAY GLUCOSE BLOOD QUANT: CPT

## 2020-12-28 PROCEDURE — 2709999900 HC NON-CHARGEABLE SUPPLY: Performed by: SURGERY

## 2020-12-28 PROCEDURE — 1200000000 HC SEMI PRIVATE

## 2020-12-28 PROCEDURE — 6370000000 HC RX 637 (ALT 250 FOR IP): Performed by: NURSE PRACTITIONER

## 2020-12-28 PROCEDURE — 0DH63UZ INSERTION OF FEEDING DEVICE INTO STOMACH, PERCUTANEOUS APPROACH: ICD-10-PCS | Performed by: SURGERY

## 2020-12-28 PROCEDURE — 80048 BASIC METABOLIC PNL TOTAL CA: CPT

## 2020-12-28 PROCEDURE — 97129 THER IVNTJ 1ST 15 MIN: CPT

## 2020-12-28 PROCEDURE — 7100000001 HC PACU RECOVERY - ADDTL 15 MIN: Performed by: SURGERY

## 2020-12-28 PROCEDURE — 94640 AIRWAY INHALATION TREATMENT: CPT

## 2020-12-28 PROCEDURE — 2700000000 HC OXYGEN THERAPY PER DAY

## 2020-12-28 PROCEDURE — 6360000002 HC RX W HCPCS: Performed by: STUDENT IN AN ORGANIZED HEALTH CARE EDUCATION/TRAINING PROGRAM

## 2020-12-28 PROCEDURE — 3609013300 HC EGD TUBE PLACEMENT: Performed by: SURGERY

## 2020-12-28 PROCEDURE — 3700000001 HC ADD 15 MINUTES (ANESTHESIA): Performed by: SURGERY

## 2020-12-28 PROCEDURE — 3700000000 HC ANESTHESIA ATTENDED CARE: Performed by: SURGERY

## 2020-12-28 PROCEDURE — 7100000000 HC PACU RECOVERY - FIRST 15 MIN: Performed by: SURGERY

## 2020-12-28 PROCEDURE — 2060000000 HC ICU INTERMEDIATE R&B

## 2020-12-28 RX ORDER — 0.9 % SODIUM CHLORIDE 0.9 %
1000 INTRAVENOUS SOLUTION INTRAVENOUS ONCE
Status: COMPLETED | OUTPATIENT
Start: 2020-12-28 | End: 2020-12-28

## 2020-12-28 RX ORDER — LABETALOL HYDROCHLORIDE 5 MG/ML
INJECTION, SOLUTION INTRAVENOUS PRN
Status: DISCONTINUED | OUTPATIENT
Start: 2020-12-28 | End: 2020-12-28 | Stop reason: SDUPTHER

## 2020-12-28 RX ORDER — SODIUM CHLORIDE, SODIUM LACTATE, POTASSIUM CHLORIDE, CALCIUM CHLORIDE 600; 310; 30; 20 MG/100ML; MG/100ML; MG/100ML; MG/100ML
INJECTION, SOLUTION INTRAVENOUS CONTINUOUS PRN
Status: DISCONTINUED | OUTPATIENT
Start: 2020-12-28 | End: 2020-12-28 | Stop reason: SDUPTHER

## 2020-12-28 RX ORDER — INSULIN GLARGINE 100 [IU]/ML
30 INJECTION, SOLUTION SUBCUTANEOUS NIGHTLY
Status: DISCONTINUED | OUTPATIENT
Start: 2020-12-28 | End: 2020-12-30 | Stop reason: HOSPADM

## 2020-12-28 RX ORDER — PROPOFOL 10 MG/ML
INJECTION, EMULSION INTRAVENOUS PRN
Status: DISCONTINUED | OUTPATIENT
Start: 2020-12-28 | End: 2020-12-28 | Stop reason: SDUPTHER

## 2020-12-28 RX ORDER — LIDOCAINE HYDROCHLORIDE 10 MG/ML
INJECTION, SOLUTION EPIDURAL; INFILTRATION; INTRACAUDAL; PERINEURAL PRN
Status: DISCONTINUED | OUTPATIENT
Start: 2020-12-28 | End: 2020-12-28 | Stop reason: SDUPTHER

## 2020-12-28 RX ADMIN — IPRATROPIUM BROMIDE AND ALBUTEROL SULFATE 1 AMPULE: .5; 3 SOLUTION RESPIRATORY (INHALATION) at 16:50

## 2020-12-28 RX ADMIN — LIDOCAINE HYDROCHLORIDE 50 MG: 10 INJECTION, SOLUTION EPIDURAL; INFILTRATION; INTRACAUDAL; PERINEURAL at 13:17

## 2020-12-28 RX ADMIN — METOPROLOL TARTRATE 75 MG: 25 TABLET ORAL at 19:49

## 2020-12-28 RX ADMIN — DOCUSATE SODIUM 100 MG: 50 LIQUID ORAL at 08:20

## 2020-12-28 RX ADMIN — SENNOSIDES 8.8 MG: 8.8 LIQUID ORAL at 19:48

## 2020-12-28 RX ADMIN — INSULIN LISPRO 9 UNITS: 100 INJECTION, SOLUTION INTRAVENOUS; SUBCUTANEOUS at 06:25

## 2020-12-28 RX ADMIN — ASPIRIN 300 MG: 300 SUPPOSITORY RECTAL at 08:22

## 2020-12-28 RX ADMIN — SODIUM CHLORIDE 1000 ML: 9 INJECTION, SOLUTION INTRAVENOUS at 09:08

## 2020-12-28 RX ADMIN — SODIUM CHLORIDE, PRESERVATIVE FREE 10 ML: 5 INJECTION INTRAVENOUS at 08:26

## 2020-12-28 RX ADMIN — INSULIN LISPRO 3 UNITS: 100 INJECTION, SOLUTION INTRAVENOUS; SUBCUTANEOUS at 18:25

## 2020-12-28 RX ADMIN — FUROSEMIDE 40 MG: 40 TABLET ORAL at 08:22

## 2020-12-28 RX ADMIN — SPIRONOLACTONE 50 MG: 25 TABLET ORAL at 08:22

## 2020-12-28 RX ADMIN — ENOXAPARIN SODIUM 30 MG: 100 INJECTION SUBCUTANEOUS at 08:21

## 2020-12-28 RX ADMIN — INSULIN GLARGINE 30 UNITS: 100 INJECTION, SOLUTION SUBCUTANEOUS at 19:54

## 2020-12-28 RX ADMIN — SERTRALINE 100 MG: 50 TABLET, FILM COATED ORAL at 08:20

## 2020-12-28 RX ADMIN — ATORVASTATIN CALCIUM 80 MG: 80 TABLET, FILM COATED ORAL at 19:49

## 2020-12-28 RX ADMIN — IPRATROPIUM BROMIDE AND ALBUTEROL SULFATE 1 AMPULE: .5; 3 SOLUTION RESPIRATORY (INHALATION) at 12:10

## 2020-12-28 RX ADMIN — FAMOTIDINE 20 MG: 20 TABLET, FILM COATED ORAL at 08:21

## 2020-12-28 RX ADMIN — PROPOFOL 250 MG: 10 INJECTION, EMULSION INTRAVENOUS at 13:17

## 2020-12-28 RX ADMIN — IPRATROPIUM BROMIDE AND ALBUTEROL SULFATE 1 AMPULE: .5; 3 SOLUTION RESPIRATORY (INHALATION) at 08:46

## 2020-12-28 RX ADMIN — Medication 10 MG: at 13:29

## 2020-12-28 RX ADMIN — IPRATROPIUM BROMIDE AND ALBUTEROL SULFATE 1 AMPULE: .5; 3 SOLUTION RESPIRATORY (INHALATION) at 20:02

## 2020-12-28 RX ADMIN — SODIUM CHLORIDE, POTASSIUM CHLORIDE, SODIUM LACTATE AND CALCIUM CHLORIDE: 600; 310; 30; 20 INJECTION, SOLUTION INTRAVENOUS at 12:47

## 2020-12-28 RX ADMIN — QUETIAPINE FUMARATE 25 MG: 25 TABLET ORAL at 19:48

## 2020-12-28 RX ADMIN — FAMOTIDINE 20 MG: 20 TABLET, FILM COATED ORAL at 19:48

## 2020-12-28 ASSESSMENT — PULMONARY FUNCTION TESTS
PIF_VALUE: 1
PIF_VALUE: 1
PIF_VALUE: 0
PIF_VALUE: 1
PIF_VALUE: 0
PIF_VALUE: 1

## 2020-12-28 ASSESSMENT — PAIN DESCRIPTION - PROGRESSION
CLINICAL_PROGRESSION: NOT CHANGED

## 2020-12-28 ASSESSMENT — PAIN - FUNCTIONAL ASSESSMENT: PAIN_FUNCTIONAL_ASSESSMENT: 0-10

## 2020-12-28 NOTE — PROGRESS NOTES
Physical Therapy  DATE: 2020    NAME: Kimani Ansari  MRN: [id number]   : 1950    Patient not seen this date for Physical Therapy due to:  [] Blood transfusion in progress  [] Hemodialysis  [] Patient Declined  [] Spine Precautions   [] Strict Bedrest  [x] Surgery/ Procedure  Off of floor for peg placement. [] Testing      [] Other        [] PT is being discontinued at this time. Patient independent. No further needs. [] PT is being discontinued at this time due to declining physical/ medical status. Therapy is not appropriate at this time.     Genette Shoulder, PTA

## 2020-12-28 NOTE — PROGRESS NOTES
PROGRESS NOTE          PATIENT NAME: Tia Horn  MEDICAL RECORD NO. [id number]  DATE: 12/28/2020  SURGEON: Dr. Diaz Guest: Jeff Barrios MD    HD: # 15    ASSESSMENT    Patient Active Problem List   Diagnosis    DM (diabetes mellitus) ([address]    HTN (hypertension)    Smoker    Asthma    Knee osteoarthritis    Edema    Spinal stenosis in cervical region    Chest pain    YUDITH (obstructive sleep apnea)    Morbid obesity (HCC)    Knee pain, bilateral    S/P TKR (total knee replacement)    Urge incontinence of urine    Lumbar spondylosis    Cervical spondylosis    Chronic use of opiate drugs therapeutic purposes    Chronic knee pain    Hypertensive urgency    Acute coronary syndrome (HCC)    Congestive heart failure ([address]    Lymphadenopathy    Chronic pain    Coronary artery disease involving native coronary artery without angina pectoris    Chronic prescription opiate use    Type 2 diabetes mellitus with complication, without long-term current use of insulin ([address]    S/P coronary artery stent placement - RCA 10/12/16 - Dr. Tyrell Maynard    Anxiety    Depression (emotion)    Postlaminectomy syndrome, lumbar    Medication monitoring encounter    Postlaminectomy syndrome    Postlaminectomy syndrome    Primary osteoarthritis of right knee    Long term (current) use of antithrombotics/antiplatelets    Influenza B    Reactive airway disease with acute exacerbation    Cerebrovascular accident (CVA) due to embolic occlusion of right middle cerebral artery ([address]    Acute cerebrovascular accident (CVA) ([address]    Right middle cerebral artery stroke ([address]    Non-traumatic rhabdomyolysis    Ischemic cerebral stroke due to intracranial large artery atherosclerosis (HCC)       MEDICAL DECISION MAKING AND PLAN    1. Plan for PEG tube placement today  2. Written consent on chart. 3. Continue to hold tube feeds for procedure.       Chief Complaint: None    SUBJECTIVE Debbie Griffin was seen and examined. No acute events overnight. Some hypotension overnight with systolic in 21J, but otherwise hemodynamically stable and afebrile. Patient sleeping upon examination, in no acute distress. Tube feeds were still running this morning despite n.p.o. order. They were turned off during rounds. Plavix continues to be held. OBJECTIVE  VITALS: Temp: Temp: 98.6 °F (37 °C)Temp  Av.5 °F (36.9 °C)  Min: 97.7 °F (36.5 °C)  Max: 99.1 °F (19.2 °C) BP Systolic (27XBT), QQ , Min:93 , JTE:881   Diastolic (06GHF), QVE:28, Min:51, Max:89   Pulse Pulse  Av.8  Min: 60  Max: 85 Resp Resp  Av.2  Min: 13  Max: 20 Pulse ox SpO2  Av.3 %  Min: 92 %  Max: 96 %  GENERAL: Sleeping, alert, no distress  NEURO: Left facial droop and left hemiplegia  HEENT: Supple, symmetrical, trachea midline  LUNGS: No acute respiratory distress or accessory muscle use  HEART: Regular rate  ABDOMEN: Soft, nontender, nondistended. No rebound, guarding, or rigidity. EXTREMITY: No cyanosis or edema    I/O last 3 completed shifts: In: 90 [NG/GT:90]  Out: -     Drain/tube output: In: 90 [NG/GT:90]  Out: -     LAB:  CBC:   Recent Labs     20  0802   WBC 9.3   HGB 11.8*   HCT 36.1*   MCV 90.3        BMP:   Recent Labs     20  0508 20  0802 20  0524    140 140   K 4.4 4.3 4.9   CL 99 99 100   CO2 32* 29 31   BUN 27* 25* 41*   CREATININE 0.50 0.47* 0.86   GLUCOSE 222* 259* 270*     COAGS: No results for input(s): APTT, PROT, INR in the last 72 hours.     RADIOLOGY:  Ct Head Wo Contrast    Result Date: 2020 EXAMINATION: CT OF THE HEAD WITHOUT CONTRAST  12/26/2020 12:38 pm TECHNIQUE: CT of the head was performed without the administration of intravenous contrast. Dose modulation, iterative reconstruction, and/or weight based adjustment of the mA/kV was utilized to reduce the radiation dose to as low as reasonably achievable. COMPARISON: CT brain 12/17/2020. HISTORY: ORDERING SYSTEM PROVIDED HISTORY: follow up on the stroke TECHNOLOGIST PROVIDED HISTORY: follow up on the stroke FINDINGS: BRAIN/VENTRICLES: Redemonstration of vasogenic edema associated with a right MCA territory infarction overall improved compared with 12/17/2020. Mild mass effect on the right lateral ventricle. Resolution of the previously identified midline shift. No hydrocephalus. No evidence of acute intracranial hemorrhage. ORBITS: The visualized portion of the orbits demonstrate no acute abnormality. SINUSES: The visualized paranasal sinuses and mastoid air cells demonstrate no acute abnormality. SOFT TISSUES/SKULL:  No acute abnormality of the visualized skull or soft tissues. Redemonstration of a subacute right MCA territory infarction with improved vasogenic edema compared with 12/17/2020 and resolution of the midline shift. No intracranial hemorrhage.      Xr Abdomen For Ng/og/ne Tube Placement    Result Date: 12/27/2020 EXAMINATION: ONE SUPINE XRAY VIEW(S) OF THE ABDOMEN 12/27/2020 4:25 pm ONE SUPINE XRAY VIEW(S) OF THE ABDOMEN 12/27/2020 12:22 pm COMPARISON: 12/18/2020 HISTORY: ORDERING SYSTEM PROVIDED HISTORY: Confirmation of course of NG/OG/NE tube and location of tip of tube FINDINGS: Initial radiograph time stamped at 12:22 p.m. is suboptimal due to significant patient rotation. Enteric tube side appears to course through the esophagus and into the stomach. Tip and side hole however extend into the left lower quadrant. Findings may relate to dilated stomach. Subsequent examination was performed after retraction of the enteric tube. Side hole now projects more proximally gastric lumen. Tip is likely in distal stomach though project in the left lower quadrant again suggesting gastric distension. Enteric tube placement as described with most recent examination appearing to demonstrate satisfactory position but with suggestion of gastric distension. Xr Abdomen For Ng/og/ne Tube Placement    Result Date: 12/27/2020  EXAMINATION: ONE SUPINE XRAY VIEW(S) OF THE ABDOMEN 12/27/2020 4:25 pm ONE SUPINE XRAY VIEW(S) OF THE ABDOMEN 12/27/2020 12:22 pm COMPARISON: 12/18/2020 HISTORY: ORDERING SYSTEM PROVIDED HISTORY: Confirmation of course of NG/OG/NE tube and location of tip of tube FINDINGS: Initial radiograph time stamped at 12:22 p.m. is suboptimal due to significant patient rotation. Enteric tube side appears to course through the esophagus and into the stomach. Tip and side hole however extend into the left lower quadrant. Findings may relate to dilated stomach. Subsequent examination was performed after retraction of the enteric tube. Side hole now projects more proximally gastric lumen. Tip is likely in distal stomach though project in the left lower quadrant again suggesting gastric distension. Enteric tube placement as described with most recent examination appearing to demonstrate satisfactory position but with suggestion of gastric distension. Juvencio Frye,   12/28/20, 6:27 AM       Attending Note      I have reviewed the above [facility] note(s) and I either performed the key elements of the medical history and physical exam or was present with the surgery resident when the key elements of the medical history and physical exam were performed. I have discussed the findings, established the care plan and recommendations with the surgical team.  Seen and examined. Will pursue PEG later today.     Sea Sue MD  12/28/2020  10:58 AM

## 2020-12-28 NOTE — PROGRESS NOTES
[facility]   Department of Internal Medicine - Staff Internal Medicine Teaching Service        Inpatient Daily Progress Note    Date: 2020  Patient Name: Joslyn Feldman  MRN: [id number]  [place]: [de-identified]   Date of Admission: 12/15/2020  5:39 PM  YOB: 1950  Primary Care Physician: Suellen Olvera MD  Attending Physician: Clay Rivers MD   Room: [address] [place] [address]  Number of days in the hospital: 13    Subjective   Admitting Diagnosis: Cerebrovascular accident (CVA) due to embolic occlusion of right middle cerebral artery [facility])  Chief Complaint:   Chief Complaint   Patient presents with    Cerebrovascular Accident     Pt was seen and examined at bedside. Resting comfortably in the bed. Labs reviewed. Blood sugars elevated. No acute events overnight. Patient going for PEG placement by general surgery this morning. Objective   Vital Signs:  BP (!) 94/51   Pulse 60   Temp 98.2 °F (36.8 °C) (Axillary)   Resp 14   Ht 5' 4\" (1.626 m)   Wt 231 lb 9.6 oz (105.1 kg)   LMP  (LMP Unknown)   SpO2 97%   BMI 39.75 kg/m²     Temp (24hrs), Av.3 °F (36.8 °C), Min:97.7 °F (36.5 °C), Max:98.7 °F (37.1 °C)    In: 726   Out: -   Physical Exam -    Physical Exam -  Constitutional:  Drowsy but alertx2,  cooperative and no distress. Mental Status:  Oriented to person, place but no time. Delayed mentation. Lungs:  Clear to auscultation bilaterally, normal effort. Heart:  Regular rate and rhythm, no murmur. Abdomen:  Soft, nontender, nondistended, normal bowel sounds. Extremities:  No edema, redness, tenderness in the calves. Skin:  Warm, dry, no gross lesions or rashes.     Medications:  Scheduled Medications:    insulin glargine  30 Units Subcutaneous Nightly    sodium chloride  1,000 mL Intravenous Once    insulin lispro  0-18 Units Subcutaneous Q6H    insulin lispro  0-9 Units Subcutaneous Nightly    [Held by provider] lisinopril  10 mg Oral Daily  famotidine  20 mg Per NG tube BID    rivastigmine  1 patch Transdermal Daily    [Held by provider] spironolactone  50 mg Oral Daily    [Held by provider] amLODIPine  10 mg Oral Daily    cloNIDine  0.1 mg Oral Daily    QUEtiapine  25 mg Oral Nightly    senna  5 mL Oral Nightly    docusate  100 mg Oral Daily    [Held by provider] clopidogrel  75 mg Per NG tube Daily    metoprolol tartrate  75 mg Oral BID    [Held by provider] furosemide  40 mg Oral Daily    sertraline  100 mg Oral Daily    enoxaparin  30 mg Subcutaneous Daily    ipratropium-albuterol  1 ampule Inhalation Q4H WA    sodium chloride flush  10 mL Intravenous 2 times per day    aspirin  81 mg Oral Daily    Or    aspirin  300 mg Rectal Daily    atorvastatin  80 mg Oral Nightly     Continuous Infusions:    dextrose       PRN Medications    labetalol, 10 mg, Q1H PRN      glucose, 15 g, PRN      dextrose, 12.5 g, PRN      glucagon (rDNA), 1 mg, PRN      dextrose, 100 mL/hr, PRN      acetaminophen, 650 mg, Q4H PRN      sodium chloride flush, 10 mL, PRN      promethazine, 12.5 mg, Q6H PRN    Or      ondansetron, 4 mg, Q6H PRN      Diagnostic Labs:  CBC:   Recent Labs     12/27/20  0802   WBC 9.3   RBC 4.00   HGB 11.8*   HCT 36.1*   MCV 90.3   RDW 12.5        BMP:   Recent Labs     12/26/20  0508 12/27/20  0802 12/28/20  0524    140 140   K 4.4 4.3 4.9   CL 99 99 100   CO2 32* 29 31   BUN 27* 25* 41*   CREATININE 0.50 0.47* 0.86     Imaging:  Ct Head Wo Contrast    Result Date: 12/26/2020  Redemonstration of a subacute right MCA territory infarction with improved vasogenic edema compared with 12/17/2020 and resolution of the midline shift. No intracranial hemorrhage. Xr Chest Portable    Result Date: 12/21/2020  No acute cardiopulmonary disease. Cardiomegaly with central vascular congestion with no overt failure or focal infiltrate.      ASSESSMENT & PLAN   Principal Problem: Cerebrovascular accident (CVA) due to embolic occlusion of right middle cerebral artery (HCC)  Active Problems:    DM (diabetes mellitus) ([facility]    HTN (hypertension)    Asthma    YUDITH (obstructive sleep apnea)    Morbid obesity (HCC)    Congestive heart failure (HCC)    S/P coronary artery stent placement - RCA 10/12/16 - Dr. Dixon Linder    Acute cerebrovascular accident (CVA) [facility])    Right middle cerebral artery stroke ([facility]    Non-traumatic rhabdomyolysis    Ischemic cerebral stroke due to intracranial large artery atherosclerosis ([facility]  Resolved Problems:    * No resolved hospital problems. *    Large right MCA territory infarction likely secondary to intracranial atherosclerosis  -Continue aspirin 81 mg.  Plavix held in preparation for PEG  -Continue early Lipitor 80 mg for secondary stroke prevention  -Mentation improved.     Hyperlipidemia - Continue Lipitor 80    Hypertension  -Patient's blood pressure low with SBP in 90s with worsening renal function. Will hold lasix, norvasc, aldactone and lisinopril and give patient a bolus of normal saline.     Dysphagia secondary to stroke  -Failed speech bedside swallow x2  -General surgery consulted for PEG placement today, will hold Plavix     Type 2 diabetes mellitus  -Lantus increased to 30U. Blood glucose checks changed to 4 times daily. Patient on tube feeds.     UTI -Resolved. DVT ppx: Lovenox  Diet: tube feed    Discharge planning: As per the     Mike Kaplan MD  PGY-1, Internal Medicine Resident  [address]         12/28/2020, 10:42 AM   Attending Physician Statement  I have discussed the care of Florine Lombard, including pertinent history and exam findings,  with the resident. I have seen and examined the patient and the key elements of all parts of the encounter have been performed by me. I agree with the assessment, plan and orders as documented by the resident with additions . Treatment plan Discussed with nursing staff in detail , all questions answered . Electronically signed by Mulu Cosme MD on   12/28/20 at 5:39 PM EST    Please note that this chart was generated using voice recognition Dragon dictation software. Although every effort was made to ensure the accuracy of this automated transcription, some errors in transcription may have occurred.

## 2020-12-28 NOTE — PROGRESS NOTES
 Non-traumatic rhabdomyolysis M62.82    Ischemic cerebral stroke due to intracranial large artery atherosclerosis (HCC) I63.59       Pain: denies    Cognitive Treatment    Treatment time: 935-946    Subjective: [] Alert [x] Cooperative     [x] Confused     [] Agitated    [x] Lethargic    Objective/Assessment:  Attention: pt continues to be  lethargic, requires mild-mod verbal cues to remain attentive to task    Orientation: pt oriented to self, place, and time independently    Recall:   Delayed Recall, 3 Related Words: 1/3 independently following 5 and 7 minute delays; no increase despite max verbal cues & model    Immediate Memory, 3 words: 2/3, increased to 2/3 with x1 verbal repetitions. Immediate Memory, 3 Numbers: 2/2 independently      Problem Solving/Reasoning:   Antonyms: 4/8 independently, increased to 6/8 with max verbal cues. Plan:  [x] Continue ST services    [] Discharge from ST:      Discharge recommendations: Further therapy recommended at discharge. Treatment completed by:  GERMAN Gillising

## 2020-12-28 NOTE — OP NOTE
Operative Note      Patient: Myriam Juárez  YOB: 1950  MRN: [id number]    Date of Procedure: 12/28/2020     Pre-Op Diagnosis:   1. Dysphagia  2. Acute CVA      Post-Op Diagnosis:   1. Same as above  2. Mild gastritis   3. Bile reflux   4. Mild NG trauma to distal esophagus and stomach  5. Mild duodenitis       Procedure:  1. Esophagogastroduodenoscopy  2. Insertion of PEG tube (20 Western Bren)     Surgeon: Anyi Robledo MD     Assistant:   DO Mariela Dao DO     Anesthesia: Monitor Anesthesia Care     Estimated Blood Loss (mL): <8TZ     Complications: None     Specimens: None     Findings: See details below. Tolerated well. Mild NG trauma noted in the distal esophagus and stomach. Mild duodenitis in the bulb. Bile reflux noted through the pylorus. Mild gastritis. No significant ulceration. 20 Fr PEG inserted without complication.        Indications:     This is a 78year-old female recently admitted for acute CVA with subsequent deficits resulting in dysphagia and need for long-term enteral access as the patient will not be able to safely swallow nutrition orally. We have been requested for insertion of a PEG tube which we agreed. The risk, benefits, and alternatives were discussed with the patient's power of  and all questions were answered. Informed consent was obtained and with an RN witness.     Detailed Description of Procedure:      The patient was taken to the operating room placed in supine position. The patient was placed on a cardiac monitor with pulse oximeter for monitored anesthesia care. A timeout was performed confirming all personnel present, the patient ID, and the procedure to be performed.  Once the patient was adequately sedated we then inserted the bite block into the patient's mouth in preparation for the upper endoscopy portion of the procedure.    The endoscope was passed through the patient's mouth, and down the esophagus into the stomach without complication. The patient's previous nasogastric tube was then removed at this time. We then distended the stomach and after insufflation the endoscope was guided down and through the pylorus into the duodenum. The duodenum was without any ulceration or masses/polyps but there was some mild duodenitis in the duodenal bulb. The scope was then withdrawn from the duodenum after decompressing it simultaneously, and back into the stomach at which time it was retroflexed and a small hiatal hernia was noted at this time. The remainder of the stomach was then evaluated and there was some mild gastritis in the gastric antrum as well as bile reflux noted through the pylorus.      The endoscope was then positioned in the midportion of the stomach and after it was completely insufflated the room was darkened and the intensity was turned upon the endoscope which demonstrated a great light reflex noted on the skin of the abdominal wall in the left upper quadrant. Finger pressure was applied at the light reflex with adequate indentation on the stomach wall on endoscopy. A polypectomy snare was then passed into the stomach through the endoscope and it was opened fully and positioned so that the loop encircled the point of demonstrated finger indentation.     The overlying skin was anesthetized with lidocaine and a 5 mm incision was made at the chosen site. The introducer needle with the overlying catheter was passed through the incision and into the stomach under visualization with the endoscope. The needle and catheter were then gently captured by the endoscopic snare. The looped end of the guidewire was passed and snared.   The needle and the catheter were then removed.    The endoscope, snare, and guidewire were then withdrawn and pulled back out through the mouth. The looped end of the braided suture bonded to the gastrostomy tube was threaded through the loop of the guidewire and the entire apparatus was pulled back through the esophagus and into the stomach and ~4cm rudi of the gastrostomy tube was noted at the skin level. Should be noted that this was a 21 Western Bren PEG tube and the tube was then cut to length and the clamping appendage was placed.     The patient was then awoken from anesthesia and taken to the PACU in stable condition. All the sponge instrument counts were correct and the procedure. The patient tolerated the procedure well without any complications. Wound care instructions were provided including medications to begin through the tube and 6 hours postoperatively and tube feeds to begin the following morning. Dr. Mulu Parham was present throughout the procedure.          Electronically signed by Darryn Bautista DO on 12/28/2020 at 1:55 PM  Present throughout case.

## 2020-12-28 NOTE — PROGRESS NOTES
Comprehensive Nutrition Assessment    Type and Reason for Visit:  Reassess    Nutrition Recommendations/Plan: As able, continue TF of Glucerna (diabetic) at goal of 55 mL/hr = 1584 kcals, 79 gm pro/day. Monitor labs and plan of care. Nutrition Assessment:  Pt NPO currently. S/p PEG placement. Blood glucose remains elevated. Discussed with RN. Malnutrition Assessment:  Malnutrition Status:  Insufficient data      Estimated Daily Nutrient Needs:  Energy (kcal):  6052-7104 kcal (15-18kcal/kg); Weight Used for Energy Requirements:  Current     Protein (g):  60-80 gm (1.2-1.4 gm/kg); Weight Used for Protein Requirements:  Ideal          Nutrition Related Findings:  Meds/Lba sreviewed. BM 12/21.       Wounds:  Multiple, Skin Tears       Current Nutrition Therapies:    Diet NPO, After Midnight Exceptions are: Sips with Meds  Current Tube Feeding (TF) Orders:  · Feeding Route: Nasogastric  · Formula: Diabetic  · Schedule: Continuous  · Current TF & Flush Orders Provides: 0 kcals, 0 gm protein  · Goal TF & Flush Orders Provides: Glucerna 1.2 at 55 mL/hr = 1584 kcals, 79 gm protein    Anthropometric Measures:  · Height: 5' 4\" (162.6 cm)  · Current Body Weight: 231 lb (104.8 kg)   · Ideal Body Weight: 120 lbs; % Ideal Body Weight 193.1 %   · BMI: 39.6  · BMI Categories: Obese Class 2 (BMI 35.0 -39.9)       Nutrition Diagnosis:   · Inadequate oral intake related to swallowing difficulty as evidenced by NPO or clear liquid status due to medical condition(for PEG placement; need for nutrition support - enteral nutrition)    Nutrition Interventions:   Food and/or Nutrient Delivery:  Continue NPO(As able, restart TF of Glucerna (diabetic) goal 55 mL/hr.)  Nutrition Education/Counseling:  No recommendation at this time   Coordination of Nutrition Care:  Continue to monitor while inpatient    Goals:  Meet 100% of estimated nutrition needs       Nutrition Monitoring and Evaluation: Food/Nutrient Intake Outcomes:  Enteral Nutrition Intake/Tolerance  Physical Signs/Symptoms Outcomes:  Biochemical Data, GI Status, Weight, Skin, Nutrition Focused Physical Findings     Electronically signed by Ronni Perez RD, MARICHUY on 12/28/20 at 2:46 PM EST    Contact: [phone]

## 2020-12-28 NOTE — CONSULTS
General Surgery Consultation    PATIENT NAME: Florine Lombard   YOB: 1950    ADMISSION DATE: 12/15/2020  5:39 PM      ATTENDING: Dr. Medel Mt DATE: 12/27/2020    CHIEF COMPLAINT:  dysphagia      HISTORY OF PRESENT ILLNESS:    The patient is a 78 y.o. female with history of CAD, HTN, DM2, CHF EF 55%, and HLD ho was admitted on 12/15 after having an acute R MCA distribution infarction w/ significant associated edema and subsequent deficits including altered mentation, left hemiparesis, right gaze preference and dysphagia. The patient has been receiving tube feeds/nutrition through an NG tube at this time and we have been consulted for PEG insertion. Of note, the patient was started on aspirin and Plavix and her Plavix was held >5days ago for insertion of the PEG tube. She is tolerating tube feeds at her goal rate; however did fail a bedside swallow eval and is unable to tolerate feeds without aspiration at this point. PEG insertion has been discussed with her family/POA and they do wish to proceed.      Past Medical History:        Diagnosis Date    Allergic rhinitis     Anxiety 10/25/2016    Asthma     CAD (coronary artery disease)     s/p stents RCA and LAD 2009,    Chronic back pain     Congestive heart failure ([address]     Depression     Headache(784.0)     Hiatal hernia     Hypercholesteremia 2/21/2012    Hypertension     Kidney stones     years ago    Obesity     Osteoarthritis     Postlaminectomy syndrome 6/6/2012    Type II or unspecified type diabetes mellitus without mention of complication, not stated as uncontrolled     Unspecified sleep apnea     Urinary incontinence        Past Surgical History:        Procedure Laterality Date    CARDIAC CATHETERIZATION  01/2013    patent stents    COLONOSCOPY  09/2015    normal    CORONARY ANGIOPLASTY WITH STENT PLACEMENT  1012-16    stents x 3    JOINT REPLACEMENT      left knee    KNEE SURGERY  10/21/2013 rt knee synvisc injection     KNEE SURGERY  12/02/2013    knee synvisc injection rt #2    KNEE SURGERY  12/09/2013    rt knee synvisc inj    LUMBAR SPINE SURGERY  2007    NERVE BLOCK  4/23/2012    Right MBNB L3, L4, L5    NERVE BLOCK  5/22/2012    Right MBNB L3, L4, and L5     NERVE BLOCK  01/14/13    Right knee injection #1 - Synvisc    NERVE BLOCK  01/21/13    Right knee injection #2 - Synvisc    NERVE BLOCK  1/28/2013     Rigth knee synvisc injection #3    NERVE BLOCK Right 04/17/2017    Rt genicular nerve block. no steroid used    OTHER SURGICAL HISTORY Right 7/14/2014    synvisc one knee injection    OTHER SURGICAL HISTORY Right 3/16/2015    synvisc one knee injection    OTHER SURGICAL HISTORY Right 06-13-16    synvisc right knee injection    SPINE SURGERY      TONSILLECTOMY      UPPER GASTROINTESTINAL ENDOSCOPY      VENA CAVA FILTER PLACEMENT  2007    PE and B/L LE emboli       Medications Prior to Admission:   Medications Prior to Admission: rivastigmine (EXELON) 9.5 MG/24HR, Place 1 patch onto the skin daily  QUEtiapine (SEROQUEL) 25 MG tablet, Take 25 mg by mouth 2 times daily  [DISCONTINUED] magnesium oxide (MAG-OX) 400 MG tablet, Take 400 mg by mouth 2 times daily  fluticasone (FLONASE) 50 MCG/ACT nasal spray, 1 spray by Each Nostril route daily  [DISCONTINUED] pantoprazole (PROTONIX) 20 MG tablet, Take 20 mg by mouth daily  diclofenac sodium (VOLTAREN) 1 % GEL, Apply 2 g topically 4 times daily  cephALEXin (KEFLEX) 500 MG capsule, Take 1 capsule by mouth 2 times daily  Calcium Carbonate-Vitamin D (OYSTER SHELL CALCIUM/D) 500-200 MG-UNIT TABS, TAKE ONE TABLET BY MOUTH TWO TIMES A DAY  [DISCONTINUED] omeprazole (PRILOSEC) 20 MG delayed release capsule, TAKE 1 CAPSULE BY MOUTH ONCE DAILY.   glimepiride (AMARYL) 2 MG tablet, Take 1 tablet by mouth every morning  metoprolol (LOPRESSOR) 100 MG tablet, TAKE 1 TABLET BY MOUTH 2 TIMES DAILY spironolactone (ALDACTONE) 50 MG tablet, Take 1 tablet by mouth daily  linagliptin (TRADJENTA) 5 MG tablet, Take 1 tablet by mouth daily  isosorbide mononitrate (IMDUR) 30 MG extended release tablet, TAKE 1 TABLET BY MOUTH DAILY (Patient taking differently: 2 times daily TAKE 1 TABLET BY MOUTH DAILY)  cloNIDine (CATAPRES) 0.1 MG tablet, Take 1 tablet by mouth daily  [DISCONTINUED] meloxicam (MOBIC) 15 MG tablet, Take 1 tablet by mouth daily  amLODIPine (NORVASC) 10 MG tablet, Take 1 tablet by mouth daily  losartan (COZAAR) 100 MG tablet, Take 1 tablet by mouth daily  furosemide (LASIX) 40 MG tablet, Take 1 tablet by mouth daily  atorvastatin (LIPITOR) 80 MG tablet, TAKE 1 TABLET BY MOUTH DAILY  sertraline (ZOLOFT) 100 MG tablet, Take 1 tablet by mouth daily  clopidogrel (PLAVIX) 75 MG tablet, Take 1 tablet by mouth daily  aspirin (RA ASPIRIN EC) 81 MG EC tablet, Take 1 tablet by mouth daily  Incontinence Supply Disposable (INCONTINENCE BRIEF LARGE) MISC, Use 4-5/day as needed  glucose blood VI test strips (ASCENSIA AUTODISC VI;ONE TOUCH ULTRA TEST VI) strip, 1 each by In Vitro route 3 times daily As needed.   albuterol-ipratropium (COMBIVENT RESPIMAT)  MCG/ACT AERS inhaler, Inhale 1 puff into the lungs every 6 hours as needed for Wheezing  Lancets MISC, Use 1 -2 times daily Insulin dependent diabetes mellitus    Allergies:  Bactrim, Penicillins, and Tylenol [acetaminophen]    Social History:   Social History     Socioeconomic History    Marital status: Legally      Spouse name: Not on file    Number of children: Not on file    Years of education: Not on file    Highest education level: Not on file   Occupational History    Not on file   Social Needs    Financial resource strain: Not on file    Food insecurity     Worry: Not on file     Inability: Not on file    Transportation needs     Medical: Not on file     Non-medical: Not on file   Tobacco Use    Smoking status: Former Smoker Packs/day: 0.50     Years: 0.00     Pack years: 0.00     Quit date: 3/28/2013     Years since quittin.7    Smokeless tobacco: Never Used   Substance and Sexual Activity    Alcohol use:  Yes     Alcohol/week: 1.0 standard drinks     Types: 1 Cans of beer per week     Comment: occasionally    Drug use: No    Sexual activity: Not on file   Lifestyle    Physical activity     Days per week: Not on file     Minutes per session: Not on file    Stress: Not on file   Relationships    Social connections     Talks on phone: Not on file     Gets together: Not on file     Attends L[employer]-day service: Not on file     Active member of club or organization: Not on file     Attends meetings of clubs or organizations: Not on file     Relationship status: Not on file    Intimate partner violence     Fear of current or ex partner: Not on file     Emotionally abused: Not on file     Physically abused: Not on file     Forced sexual activity: Not on file   Other Topics Concern    Not on file   Social History Narrative    Not on file       Family History:       Problem Relation Age of Onset    Diabetes Mother     Cancer Father     High Blood Pressure Sister     High Blood Pressure Brother        REVIEW OF SYSTEMS:      Unable to obtain due to mental status    PHYSICAL EXAM:    VITALS:  BP (!) 93/53   Pulse 60   Temp 98.6 °F (37 °C) (Axillary)   Resp 16   Ht 5' 4\" (1.626 m)   Wt 231 lb 9.6 oz (105.1 kg)   LMP  (LMP Unknown)   SpO2 95%   BMI 39.75 kg/m²   INTAKE/OUTPUT:     Intake/Output Summary (Last 24 hours) at 2020 6838  Last data filed at 2020 0860  Gross per 24 hour   Intake 726 ml   Output    Net 726 ml       CONSTITUTIONAL: awake, eyes open, NAD  HEAD: atraumatic, normocephalic  EYES: sclera clear, no conjunctivitis present  ENT: ears are symmetric, nares patent with NG intact  NECK: Supple, symmetrical, trachea midline  LUNGS: no respiratory distress, no audible wheezing  CARDIOVASCULAR: +S1/S2 ABDOMEN: soft, NT, ND, no surgical scars present in the LUQ/upper midline  MUSCULOSKELETAL: no gross abnormalities noted   NEUROLOGIC: somnolent, incoherent speech, right gaze preference noted, left facial droop, left hemiplegia noted  EXTREMITIES: no cyanosis or edema   SKIN: normal coloration and turgor      CBC:   Lab Results   Component Value Date    WBC 9.3 12/27/2020    RBC 4.00 12/27/2020    RBC 4.21 02/24/2012    HGB 11.8 12/27/2020    HCT 36.1 12/27/2020    MCV 90.3 12/27/2020    MCH 29.5 12/27/2020    MCHC 32.7 12/27/2020    RDW 12.5 12/27/2020     12/27/2020     02/24/2012    MPV 10.2 12/27/2020     BMP:    Lab Results   Component Value Date     12/28/2020    K 4.9 12/28/2020     12/28/2020    CO2 31 12/28/2020    BUN 41 12/28/2020    LABALBU 4.1 04/05/2019    LABALBU 4.2 02/24/2012    CREATININE 0.86 12/28/2020    CALCIUM 9.7 12/28/2020    GFRAA >60 12/28/2020    LABGLOM >60 12/28/2020    GLUCOSE 270 12/28/2020    GLUCOSE 105 02/24/2012       Pertinent Radiology:     MRI Brain 12/16/20:  Acute/subacute right MCA distribution infarct with mild associated edema and   subsequent mass effect upon the right lateral ventricle. ASSESSMENT     Dsyphagia, s/p acute R. MCA distribution infarct w/ altered mentation, left hemiparesis, and dysphagia. PLAN    1. Pt with significant residual deficits from R. MCA distribution infarct resulting in dsyphagia and the need for longer term enteral access for nutrition. Will plan for PEG tube 12/28/20, timing TBD. 2. NPO at midnight  3. Consent has been obtained on the phone with family by another member of our team.  4. Medical mgmt per primary.        -----------------------------  Aminah Evans DO  12/27/2020            Trauma Attending Attestation I have reviewed the above [facility]CS note(s) and confirmed the key elements of the medical history and physical exam. I have seen and examined the pt. I have discussed the findings, established the care plan and recommendations with Resident, [facility]CS RN, bedside nurse.         Yaw Gill DO  12/28/2020  7:08 AM

## 2020-12-28 NOTE — ANESTHESIA POSTPROCEDURE EVALUATION
Department of Anesthesiology  Postprocedure Note    Patient: Stephanie Cox  MRN: [id number]  YOB: 1950  Date of evaluation: 12/28/2020  Time:  3:21 PM     Procedure Summary     Date: 12/28/20 Room / Location: [address]    Anesthesia Start: 3669 Anesthesia Stop: 9393    Procedure: EGD PEG TUBE PLACEMENT (N/A ) Diagnosis: (DYSPHAGIA)    Surgeons: Natalya Garcia MD Responsible Provider: Carlos Posadas MD    Anesthesia Type: general ASA Status: 4          Anesthesia Type: general    Codey Phase I: Codey Score: 8    Codey Phase II:      Last vitals: Reviewed and per EMR flowsheets.        Anesthesia Post Evaluation    Patient location during evaluation: PACU  Patient participation: complete - patient participated  Level of consciousness: awake  Pain score: 1  Airway patency: patent  Nausea & Vomiting: no nausea and no vomiting  Complications: no  Cardiovascular status: blood pressure returned to baseline and hemodynamically stable  Respiratory status: acceptable  Hydration status: euvolemic

## 2020-12-29 LAB
ABSOLUTE EOS #: 0.13 K/UL (ref 0–0.44)
ABSOLUTE IMMATURE GRANULOCYTE: 0.09 K/UL (ref 0–0.3)
ABSOLUTE LYMPH #: 1.6 K/UL (ref 1.1–3.7)
ABSOLUTE MONO #: 0.72 K/UL (ref 0.1–1.2)
ANION GAP SERPL CALCULATED.3IONS-SCNC: 6 MMOL/L (ref 9–17)
BASOPHILS # BLD: 0 % (ref 0–2)
BASOPHILS ABSOLUTE: 0.04 K/UL (ref 0–0.2)
BUN BLDV-MCNC: 37 MG/DL (ref 8–23)
BUN/CREAT BLD: ABNORMAL (ref 9–20)
CALCIUM SERPL-MCNC: 9.8 MG/DL (ref 8.6–10.4)
CHLORIDE BLD-SCNC: 105 MMOL/L (ref 98–107)
CO2: 31 MMOL/L (ref 20–31)
CREAT SERPL-MCNC: 0.55 MG/DL (ref 0.5–0.9)
DIFFERENTIAL TYPE: ABNORMAL
EOSINOPHILS RELATIVE PERCENT: 1 % (ref 1–4)
GFR AFRICAN AMERICAN: >60 ML/MIN
GFR NON-AFRICAN AMERICAN: >60 ML/MIN
GFR SERPL CREATININE-BSD FRML MDRD: ABNORMAL ML/MIN/{1.73_M2}
GFR SERPL CREATININE-BSD FRML MDRD: ABNORMAL ML/MIN/{1.73_M2}
GLUCOSE BLD-MCNC: 135 MG/DL (ref 65–105)
GLUCOSE BLD-MCNC: 156 MG/DL (ref 70–99)
GLUCOSE BLD-MCNC: 160 MG/DL (ref 65–105)
GLUCOSE BLD-MCNC: 170 MG/DL (ref 65–105)
GLUCOSE BLD-MCNC: 180 MG/DL (ref 65–105)
GLUCOSE BLD-MCNC: 216 MG/DL (ref 65–105)
GLUCOSE BLD-MCNC: 227 MG/DL (ref 65–105)
HCT VFR BLD CALC: 37.9 % (ref 36.3–47.1)
HEMOGLOBIN: 11.5 G/DL (ref 11.9–15.1)
IMMATURE GRANULOCYTES: 1 %
LYMPHOCYTES # BLD: 18 % (ref 24–43)
MAGNESIUM: 2.2 MG/DL (ref 1.6–2.6)
MCH RBC QN AUTO: 28 PG (ref 25.2–33.5)
MCHC RBC AUTO-ENTMCNC: 30.3 G/DL (ref 28.4–34.8)
MCV RBC AUTO: 92.2 FL (ref 82.6–102.9)
MONOCYTES # BLD: 8 % (ref 3–12)
NRBC AUTOMATED: 0 PER 100 WBC
PDW BLD-RTO: 12.6 % (ref 11.8–14.4)
PLATELET # BLD: 359 K/UL (ref 138–453)
PLATELET ESTIMATE: ABNORMAL
PMV BLD AUTO: 10 FL (ref 8.1–13.5)
POTASSIUM SERPL-SCNC: 4.5 MMOL/L (ref 3.7–5.3)
RBC # BLD: 4.11 M/UL (ref 3.95–5.11)
RBC # BLD: ABNORMAL 10*6/UL
SEG NEUTROPHILS: 71 % (ref 36–65)
SEGMENTED NEUTROPHILS ABSOLUTE COUNT: 6.41 K/UL (ref 1.5–8.1)
SODIUM BLD-SCNC: 142 MMOL/L (ref 135–144)
WBC # BLD: 9 K/UL (ref 3.5–11.3)
WBC # BLD: ABNORMAL 10*3/UL

## 2020-12-29 PROCEDURE — 97530 THERAPEUTIC ACTIVITIES: CPT

## 2020-12-29 PROCEDURE — 97535 SELF CARE MNGMENT TRAINING: CPT

## 2020-12-29 PROCEDURE — 6370000000 HC RX 637 (ALT 250 FOR IP): Performed by: STUDENT IN AN ORGANIZED HEALTH CARE EDUCATION/TRAINING PROGRAM

## 2020-12-29 PROCEDURE — 99232 SBSQ HOSP IP/OBS MODERATE 35: CPT | Performed by: INTERNAL MEDICINE

## 2020-12-29 PROCEDURE — 83735 ASSAY OF MAGNESIUM: CPT

## 2020-12-29 PROCEDURE — 97129 THER IVNTJ 1ST 15 MIN: CPT

## 2020-12-29 PROCEDURE — 80048 BASIC METABOLIC PNL TOTAL CA: CPT

## 2020-12-29 PROCEDURE — 94640 AIRWAY INHALATION TREATMENT: CPT

## 2020-12-29 PROCEDURE — U0003 INFECTIOUS AGENT DETECTION BY NUCLEIC ACID (DNA OR RNA); SEVERE ACUTE RESPIRATORY SYNDROME CORONAVIRUS 2 (SARS-COV-2) (CORONAVIRUS DISEASE [COVID-19]), AMPLIFIED PROBE TECHNIQUE, MAKING USE OF HIGH THROUGHPUT TECHNOLOGIES AS DESCRIBED BY CMS-2020-01-R: HCPCS

## 2020-12-29 PROCEDURE — 36415 COLL VENOUS BLD VENIPUNCTURE: CPT

## 2020-12-29 PROCEDURE — 2700000000 HC OXYGEN THERAPY PER DAY

## 2020-12-29 PROCEDURE — 82947 ASSAY GLUCOSE BLOOD QUANT: CPT

## 2020-12-29 PROCEDURE — 94761 N-INVAS EAR/PLS OXIMETRY MLT: CPT

## 2020-12-29 PROCEDURE — 85025 COMPLETE CBC W/AUTO DIFF WBC: CPT

## 2020-12-29 PROCEDURE — 6360000002 HC RX W HCPCS: Performed by: STUDENT IN AN ORGANIZED HEALTH CARE EDUCATION/TRAINING PROGRAM

## 2020-12-29 PROCEDURE — 1200000000 HC SEMI PRIVATE

## 2020-12-29 PROCEDURE — 2060000000 HC ICU INTERMEDIATE R&B

## 2020-12-29 PROCEDURE — 2580000003 HC RX 258: Performed by: STUDENT IN AN ORGANIZED HEALTH CARE EDUCATION/TRAINING PROGRAM

## 2020-12-29 RX ORDER — LOSARTAN POTASSIUM 25 MG/1
25 TABLET ORAL DAILY
Qty: 60 TABLET | Refills: 3 | Status: SHIPPED | OUTPATIENT
Start: 2020-12-29

## 2020-12-29 RX ORDER — METOPROLOL TARTRATE 75 MG/1
75 TABLET, FILM COATED ORAL 2 TIMES DAILY
Qty: 60 TABLET | Refills: 3 | Status: ON HOLD | OUTPATIENT
Start: 2020-12-29 | End: 2021-01-20 | Stop reason: HOSPADM

## 2020-12-29 RX ADMIN — DOCUSATE SODIUM 100 MG: 50 LIQUID ORAL at 08:03

## 2020-12-29 RX ADMIN — METOPROLOL TARTRATE 75 MG: 25 TABLET ORAL at 08:02

## 2020-12-29 RX ADMIN — ATORVASTATIN CALCIUM 80 MG: 80 TABLET, FILM COATED ORAL at 20:47

## 2020-12-29 RX ADMIN — QUETIAPINE FUMARATE 25 MG: 25 TABLET ORAL at 20:47

## 2020-12-29 RX ADMIN — IPRATROPIUM BROMIDE AND ALBUTEROL SULFATE 1 AMPULE: .5; 3 SOLUTION RESPIRATORY (INHALATION) at 09:02

## 2020-12-29 RX ADMIN — SENNOSIDES 8.8 MG: 8.8 LIQUID ORAL at 20:53

## 2020-12-29 RX ADMIN — INSULIN LISPRO 6 UNITS: 100 INJECTION, SOLUTION INTRAVENOUS; SUBCUTANEOUS at 18:34

## 2020-12-29 RX ADMIN — ASPIRIN 300 MG: 300 SUPPOSITORY RECTAL at 08:02

## 2020-12-29 RX ADMIN — INSULIN GLARGINE 30 UNITS: 100 INJECTION, SOLUTION SUBCUTANEOUS at 20:47

## 2020-12-29 RX ADMIN — CLONIDINE HYDROCHLORIDE 0.1 MG: 0.1 TABLET ORAL at 08:03

## 2020-12-29 RX ADMIN — SODIUM CHLORIDE, PRESERVATIVE FREE 10 ML: 5 INJECTION INTRAVENOUS at 08:19

## 2020-12-29 RX ADMIN — IPRATROPIUM BROMIDE AND ALBUTEROL SULFATE 1 AMPULE: .5; 3 SOLUTION RESPIRATORY (INHALATION) at 13:35

## 2020-12-29 RX ADMIN — INSULIN LISPRO 3 UNITS: 100 INJECTION, SOLUTION INTRAVENOUS; SUBCUTANEOUS at 20:48

## 2020-12-29 RX ADMIN — ENOXAPARIN SODIUM 30 MG: 100 INJECTION SUBCUTANEOUS at 08:03

## 2020-12-29 RX ADMIN — INSULIN LISPRO 3 UNITS: 100 INJECTION, SOLUTION INTRAVENOUS; SUBCUTANEOUS at 12:21

## 2020-12-29 RX ADMIN — FAMOTIDINE 20 MG: 20 TABLET, FILM COATED ORAL at 20:47

## 2020-12-29 RX ADMIN — METOPROLOL TARTRATE 75 MG: 25 TABLET ORAL at 20:47

## 2020-12-29 RX ADMIN — IPRATROPIUM BROMIDE AND ALBUTEROL SULFATE 1 AMPULE: .5; 3 SOLUTION RESPIRATORY (INHALATION) at 21:11

## 2020-12-29 RX ADMIN — SERTRALINE 100 MG: 50 TABLET, FILM COATED ORAL at 08:03

## 2020-12-29 RX ADMIN — FAMOTIDINE 20 MG: 20 TABLET, FILM COATED ORAL at 08:03

## 2020-12-29 ASSESSMENT — PAIN DESCRIPTION - PROGRESSION

## 2020-12-29 ASSESSMENT — PAIN SCALES - WONG BAKER: WONGBAKER_NUMERICALRESPONSE: 2

## 2020-12-29 NOTE — PROGRESS NOTES
Cerebrovascular accident (CVA) due to embolic occlusion of right middle cerebral artery (HCC)  Active Problems:    DM (diabetes mellitus) ([facility] [address]    HTN (hypertension)    Asthma    YUDITH (obstructive sleep apnea)    Morbid obesity (HCC)    Congestive heart failure (HCC)    S/P coronary artery stent placement - RCA 10/12/16 - Dr. Sujata Neil    Acute cerebrovascular accident (CVA) [facility])    Right middle cerebral artery stroke ([facility] [address]    Non-traumatic rhabdomyolysis    Ischemic cerebral stroke due to intracranial large artery atherosclerosis ([facility]  Resolved Problems:    * No resolved hospital problems. *    Large right MCA territory infarction likely secondary to intracranial atherosclerosis  -Continue aspirin 81 mg.  Plavix resumed.  -Continue early Lipitor 80 mg for secondary stroke prevention  -Mentation improved.     Hyperlipidemia - Continue Lipitor 80    Hypertension  -Blood pressure medications still held.     Dysphagia secondary to stroke  -Failed speech bedside swallow x2  - Plavix resumed. PEG placement done.     Type 2 diabetes mellitus  -Lantus increased to 30U. Blood glucose checks changed to 4 times daily. Patient on tube feeds.     UTI -Resolved. DVT ppx: Lovenox  Diet: tube feed    Discharge planning: Waiting for SNF placement    Bonny Smith MD  PGY-1, Internal Medicine Resident  Shruthi Armstrong         12/29/2020, 7:40 AM   Attending Physician Statement  I have discussed the care of Imer Bronson, including pertinent history and exam findings,  with the resident. I have seen and examined the patient and the key elements of all parts of the encounter have been performed by me. I agree with the assessment, plan and orders as documented by the resident with additions . Treatment plan Discussed with nursing staff in detail , all questions answered .    Electronically signed by Gene Eduardo MD on   12/29/20 at 5:10 PM EST Please note that this chart was generated using voice recognition Dragon dictation software. Although every effort was made to ensure the accuracy of this automated transcription, some errors in transcription may have occurred.

## 2020-12-29 NOTE — CARE COORDINATION
Spoke to [place] she will be looking over the patient's clinicals this AM and she will contact me with her decision

## 2020-12-29 NOTE — PLAN OF CARE
Problem: OXYGENATION/RESPIRATORY FUNCTION  Goal: Patient will achieve/maintain normal respiratory rate/effort  Description: Respiratory rate and effort will be within normal limits for the patient  Outcome: Ongoing     Problem: Respiratory  Intervention: Administer medication as ordered  Note: BRONCHOSPASM/BRONCHOCONSTRICTION     [x]         IMPROVE AERATION/BREATH SOUNDS  [x]   ADMINISTER BRONCHODILATOR THERAPY AS APPROPRIATE  [x]   ASSESS BREATH SOUNDS  []   IMPLEMENT AEROSOL/MDI PROTOCOL  [x]   PATIENT EDUCATION AS NEEDED

## 2020-12-29 NOTE — PROGRESS NOTES
Speech Language Pathology  Speech Language Pathology  [facility]    Cognitive Treatment Note    Date: 12/29/2020  Patients Name: Negrito Luque  MRN: [id number]  Diagnosis:   Patient Active Problem List   Diagnosis Code    DM (diabetes mellitus) ([facility] [address] E11.9    HTN (hypertension) I10    Smoker F17.200    Asthma J45.909    Knee osteoarthritis M17.10    Edema R60.9    Spinal stenosis in cervical region M48.02    Chest pain R07.9    YUDITH (obstructive sleep apnea) G47.33    Morbid obesity ([facility] [address] E66.01    Knee pain, bilateral M25.561, M25.562    S/P TKR (total knee replacement) Z96.659    Urge incontinence of urine N39.41    Lumbar spondylosis M47.816    Cervical spondylosis M47.812    Chronic use of opiate drugs therapeutic purposes Z79.891    Chronic knee pain M25.569, G89.29    Hypertensive urgency I16.0    Acute coronary syndrome (HCC) I24.9    Congestive heart failure (HCC) I50.9    Lymphadenopathy R59.1    Chronic pain G89.29    Coronary artery disease involving native coronary artery without angina pectoris I25.10    Chronic prescription opiate use Z79.891    Type 2 diabetes mellitus with complication, without long-term current use of insulin (HCC) E11.8    S/P coronary artery stent placement - RCA 10/12/16 - Dr. Fatou Bellamy Z95.5    Anxiety F41.9    Depression (emotion) F32.9    Postlaminectomy syndrome, lumbar M96.1    Medication monitoring encounter Z51.81    Postlaminectomy syndrome M96.1    Postlaminectomy syndrome M96.1    Primary osteoarthritis of right knee M17.11    Long term (current) use of antithrombotics/antiplatelets D08.25    Influenza B J10.1    Reactive airway disease with acute exacerbation J45. 0    Cerebrovascular accident (CVA) due to embolic occlusion of right middle cerebral artery (HCC) I63.411    Acute cerebrovascular accident (CVA) ([facility] [address] [facility] I63.9    Right middle cerebral artery stroke ([facility] [address] O24.459  Non-traumatic rhabdomyolysis M62.82    Ischemic cerebral stroke due to intracranial large artery atherosclerosis (HCC) I63.59       Pain: denies    Cognitive Treatment    Treatment time: 6530-6099    Subjective: [] Alert [x] Cooperative     [x] Confused     [] Agitated    [x] Lethargic    Objective/Assessment:  Attention: pt continues to be  lethargic, requires mild-mod verbal cues to remain attentive to task    Orientation: pt oriented to self, place, and time independently    Recall:   Delayed Recall, 3 Related Words: 1/3 independently following 3 and 5 minute delays; no increase despite max verbal cues & model    Immediate Memory, 3 words: 3/3 independently  Mental manipulation 3 units, 1/4 with mod cues      Problem Solving/Reasoning:   Antonyms: 5/8 independently, increased to 7/8 with max verbal cues. Plan:  [x] Continue ST services    [] Discharge from ST:      Discharge recommendations: Further therapy recommended at discharge. Treatment completed by:  Gabriel Kapadia M.S. [id number] [facility]

## 2020-12-29 NOTE — PLAN OF CARE
Problem: HEMODYNAMIC STATUS  Goal: Patient has stable vital signs and fluid balance  Outcome: Ongoing     Problem: ACTIVITY INTOLERANCE/IMPAIRED MOBILITY  Goal: Mobility/activity is maintained at optimum level for patient  Outcome: Ongoing     Problem: COMMUNICATION IMPAIRMENT  Goal: Ability to express needs and understand communication  Outcome: Ongoing     Problem: OXYGENATION/RESPIRATORY FUNCTION  Goal: Patient will achieve/maintain normal respiratory rate/effort  Description: Respiratory rate and effort will be within normal limits for the patient  Outcome: Ongoing     Problem: Nutrition  Goal: Optimal nutrition therapy  Description: Nutrition Problem #1: Inadequate oral intake  Intervention: Food and/or Nutrient Delivery: Start Tube Feeding  Nutritional Goals: Meet 100% of estimated nutrition needs     Outcome: Ongoing     Problem: Restraint Use - Nonviolent/Non-Self-Destructive Behavior:  Goal: Absence of restraint indications  Description: Absence of restraint indications  Outcome: Ongoing  Goal: Absence of restraint-related injury  Description: Absence of restraint-related injury  Outcome: Ongoing     Problem: Skin Integrity:  Goal: Will show no infection signs and symptoms  Description: Will show no infection signs and symptoms  Outcome: Ongoing  Goal: Absence of new skin breakdown  Description: Absence of new skin breakdown  Outcome: Ongoing     Problem: Falls - Risk of:  Goal: Will remain free from falls  Description: Will remain free from falls  Outcome: Ongoing  Goal: Absence of physical injury  Description: Absence of physical injury  Outcome: Ongoing     Problem: Pain:  Goal: Pain level will decrease  Description: Pain level will decrease  Outcome: Ongoing  Goal: Control of acute pain  Description: Control of acute pain  Outcome: Ongoing  Goal: Control of chronic pain  Description: Control of chronic pain  Outcome: Ongoing

## 2020-12-29 NOTE — CARE COORDINATION
Elizabeth Massey from [facility] called to inform me that they will be able to accept patient once a new PTOT note is done to show participation. They will also need a new COVID test done prior to admission.      [address] Scott Massey from [place] and updated her on new notes being in the chart,  Covid test to be ordered by Oswald Taylor

## 2020-12-29 NOTE — PROGRESS NOTES
POST OP NOTE    SUBJECTIVE  Pt s/p EGD and PEG    OBJECTIVE  VITALS:  /71   Pulse 68   Temp 98.1 °F (36.7 °C) (Axillary)   Resp 17   Ht 5' 4\" (1.626 m)   Wt 231 lb (104.8 kg)   LMP  (LMP Unknown)   SpO2 95%   BMI 39.65 kg/m²         GENERAL:  awake and alert. No acute distress  CARDIOVASCULAR:  regular rate and rhythm   LUNGS:  CTA Bilaterally  ABDOMEN:   Abdomen soft, non-tender, non-distended  INCISION: PEG in place w/ surrounding bleeding, erythema, edema or induration. Dressing C/D/O    ASSESSMENT  1. POD# 0 s/p EGD and PEG placement    PLAN  1. May use PEG for medications tonight. 2. Start tube feeds in AM    General surgery will sign off at this time. Please call with any questions. Thank you for involving us in the care of this patient.       [facility]  Trauma/Surgery Service  12/28/2020 at 8:13 PM

## 2020-12-29 NOTE — PROGRESS NOTES
Physical Therapy  Facility/Department: [facility] NEURO  Daily Treatment Note  NAME: Amara Caicedo  : 1950  MRN: [id number]    Date of Service: 2020    Discharge Recommendations:  Patient would benefit from continued therapy after discharge        Assessment   Body structures, Functions, Activity limitations: Decreased functional mobility ; Decreased strength;Decreased safe awareness;Decreased cognition;Decreased endurance;Decreased balance;Decreased fine motor control;Decreased coordination;Decreased posture  Assessment: More alert today, left side neglect. Poor sitting balance. Unable to attempt standing. Will continue for functional mobility. PT Education: Goals;PT Role;Plan of Care;Precautions;Weight-bearing Education; Functional Mobility Training;General Safety  REQUIRES PT FOLLOW UP: Yes  Activity Tolerance  Activity Tolerance: Patient limited by cognitive status; Patient limited by endurance     Patient Diagnosis(es): The encounter diagnosis was Acute cerebrovascular accident (CVA) ([address] has a past medical history of Allergic rhinitis, Anxiety, Asthma, CAD (coronary artery disease), Chronic back pain, Congestive heart failure ([address] Depression, Headache(784.0), Hiatal hernia, Hypercholesteremia, Hypertension, Kidney stones, Obesity, Osteoarthritis, Postlaminectomy syndrome, Type II or unspecified type diabetes mellitus without mention of complication, not stated as uncontrolled, Unspecified sleep apnea, and Urinary incontinence. has a past surgical history that includes Spine surgery; Nerve Block (04/23/2012); Nerve Block (05/22/2012); Nerve Block (01/14/2013); Nerve Block (01/21/2013); Nerve Block (01/28/2013); Cardiac catheterization (01/2013); Lumbar spine surgery (2007); Vena Cava Filter Placement (2007); Tonsillectomy; joint replacement; knee surgery (10/21/2013); knee surgery (12/02/2013); knee surgery (12/09/2013); other surgical history (Right, 07/14/2014); other surgical history (Right, 03/16/2015); Upper gastrointestinal endoscopy; Colonoscopy (09/2015); other surgical history (Right, 06/13/2016); Coronary angioplasty with stent (1012-16); Nerve Block (Right, 04/17/2017); and Gastrostomy tube placement (12/28/2020). Restrictions  Restrictions/Precautions  Restrictions/Precautions: Fall Risk, Up as Tolerated  Required Braces or Orthoses?: No  Position Activity Restriction  Other position/activity restrictions: SBP goal <180  Subjective   General  Chart Reviewed: Yes  Family / Caregiver Present: No  Subjective  Subjective: Pt and RN agreeable to PT this morning. Pt supine in bed very lethargic with no c/o pain. Pain Screening  Patient Currently in Pain: Denies  Vital Signs  Patient Currently in Pain: Denies            Cognition   Cognition  Overall Cognitive Status: Exceptions  Arousal/Alertness: Delayed responses to stimuli  Following Commands: Follows one step commands with repetition; Follows one step commands with increased time  Attention Span: Difficulty attending to directions; Difficulty dividing attention  Objective   Bed mobility  Rolling to Left: Maximum assistance  Rolling to Right: Maximum assistance  Supine to Sit: Maximum assistance;2 Person assistance  Sit to Supine: Maximum assistance;2 Person assistance  Transfers  Sit to Stand: Unable to assess        Balance  Sitting - Static: Poor  Sitting - Dynamic: Poor;- Comments: Sat edge of bed 15 minutes. Assistance fluctuates from min A to max A. Pushing herself to the left with her extended right UE. Exercises  Comments: Passive ROM completed BLEs x10-15 reps for all.                   Goals  Short term goals  Time Frame for Short term goals: 12 visits  Short term goal 1: bed mobility with mod A+1  Short term goal 2: transfers mod A+2 in mandie suggs  Short term goal 3: WC mobility x 25' with min A+1  Short term goal 4: progress to gait with appropriate device x 15' with max A+2 as appropriate  Patient Goals   Patient goals : pt didn't verbalize goal    Plan    Plan  Times per week: 5-6 visits weekly  Times per day: Daily  Current Treatment Recommendations: Strengthening, ROM, Balance Training, Functional Mobility Training, Transfer Training, Endurance Training, Wheelchair Mobility Training, Gait Training, Neuromuscular Re-education, Cognitive Reorientation, Pain Management, Home Exercise Program, Safety Education & Training, Patient/Caregiver Education & Training, Positioning  Safety Devices  Type of devices: Patient at risk for falls, Left in bed, Nurse notified, Gait belt, Bed alarm in place, Call light within reach  Restraints  Initially in place: No     Therapy Time   Individual Concurrent Group Co-treatment   Time In 0930         Time Out 0958         Minutes 28         Timed Code Treatment Minutes: [address], PT

## 2020-12-29 NOTE — DISCHARGE INSTR - COC
Continuity of Care Form    Patient Name: Nicky Butler   :  1950  MRN:  [id number]    Admit date:  12/15/2020  Discharge date:  ***    Code Status Order: Full Code   Advance Directives:   [facility] Documentation       Date/Time Healthcare Directive Type of Healthcare Directive Copy in [facility] [address] Agent's Name Healthcare Agent's Phone Number    20 1228  Yes, patient has an advance directive for healthcare treatment --  No, copy requested from family -- -- --            Admitting Physician:  Bhavin Pritchard MD  PCP: Elizabeth Orr MD    Discharging Nurse: [facility] Unit/Room#: 0876/2759-80  Discharging Unit Phone Number: ***    Emergency Contact:   Extended Emergency Contact Information  Primary Emergency Contact: [address] Phone: [phone]  Mobile Phone: [phone]  Relation: Child  Preferred language: English   needed?  No  Secondary Emergency Contact: richardsue  Home Phone: [phone]  Relation: Child    Past Surgical History:  Past Surgical History:   Procedure Laterality Date    CARDIAC CATHETERIZATION  2013    patent stents    COLONOSCOPY  2015    normal    CORONARY ANGIOPLASTY WITH STENT PLACEMENT  1012-16    stents x 3    GASTROSTOMY TUBE PLACEMENT  2020    EGD PEG TUBE PLACEMENT    JOINT REPLACEMENT      left knee    KNEE SURGERY  10/21/2013    rt knee synvisc injection     KNEE SURGERY  2013    knee synvisc injection rt #2    KNEE SURGERY  2013    rt knee synvisc inj    LUMBAR SPINE SURGERY  2007    NERVE BLOCK  2012    Right MBNB L3, L4, L5    NERVE BLOCK  2012    Right MBNB L3, L4, and L5     NERVE BLOCK  2013    Right knee injection #1 - Synvisc    NERVE BLOCK  2013    Right knee injection #2 - Synvisc    NERVE BLOCK  2013    Rigth knee synvisc injection #3    NERVE BLOCK Right 2017 Rt genicular nerve block.  no steroid used    OTHER SURGICAL HISTORY Right 07/14/2014    synvisc one knee injection    OTHER SURGICAL HISTORY Right 03/16/2015    synvisc one knee injection    OTHER SURGICAL HISTORY Right 06/13/2016    synvisc right knee injection    SPINE SURGERY      TONSILLECTOMY      UPPER GASTROINTESTINAL ENDOSCOPY      VENA CAVA FILTER PLACEMENT  2007    PE and B/L LE emboli       Immunization History:   Immunization History   Administered Date(s) Administered    Influenza 10/29/2012, 09/27/2013    Influenza Virus Vaccine 12/09/2014, 09/23/2015    Pneumococcal Conjugate 13-valent ([place]) 06/12/2018       Active Problems:  Patient Active Problem List   Diagnosis Code    DM (diabetes mellitus) ([facility] E11.9    HTN (hypertension) I10    Smoker F17.200    Asthma J45.909    Knee osteoarthritis M17.10    Edema R60.9    Spinal stenosis in cervical region M48.02    Chest pain R07.9    YUDITH (obstructive sleep apnea) G47.33    Morbid obesity (HCC) E66.01    Knee pain, bilateral M25.561, M25.562    S/P TKR (total knee replacement) Z96.659    Urge incontinence of urine N39.41    Lumbar spondylosis M47.816    Cervical spondylosis M47.812    Chronic use of opiate drugs therapeutic purposes Z79.891    Chronic knee pain M25.569, G89.29    Hypertensive urgency I16.0    Acute coronary syndrome (HCC) I24.9    Congestive heart failure (HCC) I50.9    Lymphadenopathy R59.1    Chronic pain G89.29    Coronary artery disease involving native coronary artery without angina pectoris I25.10    Chronic prescription opiate use Z79.891    Type 2 diabetes mellitus with complication, without long-term current use of insulin (HCC) E11.8    S/P coronary artery stent placement - RCA 10/12/16 - Dr. Fatou Bellamy Z95.5    Anxiety F41.9    Depression (emotion) F32.9    Postlaminectomy syndrome, lumbar M96.1    Medication monitoring encounter Z51.81    Postlaminectomy syndrome M96.1  Postlaminectomy syndrome M96.1    Primary osteoarthritis of right knee M17.11    Long term (current) use of antithrombotics/antiplatelets Q04.96    Influenza B J10.1    Reactive airway disease with acute exacerbation J45. 0    Cerebrovascular accident (CVA) due to embolic occlusion of right middle cerebral artery (HCC) I63.411    Acute cerebrovascular accident (CVA) ([address] I63.9    Right middle cerebral artery stroke (HCC) I63.511    Non-traumatic rhabdomyolysis M62.82    Ischemic cerebral stroke due to intracranial large artery atherosclerosis (HCC) I63.59       Isolation/Infection:   Isolation            No Isolation          Patient Infection Status       Infection Onset Added Last Indicated Last Indicated By Review Planned Expiration Resolved Resolved By    None active    Resolved    COVID-19 Rule Out 12/15/20 12/15/20 12/15/20 COVID-19 (Ordered)   12/15/20 Rule-Out Test Resulted            Nurse Assessment:  Last Vital Signs: /73   Pulse 70   Temp 98.4 °F (36.9 °C)   Resp 19   Ht 5' 4\" (1.626 m)   Wt 231 lb (104.8 kg)   LMP  (LMP Unknown)   SpO2 93%   BMI 39.65 kg/m²     Last documented pain score (0-10 scale): Pain Level: 0  Last Weight:   Wt Readings from Last 1 Encounters:   12/28/20 231 lb (104.8 kg)     Mental Status:  oriented and to herself    IV Access:  - None    Nursing Mobility/ADLs:  Walking   Dependent  Transfer  Dependent  Bathing  Dependent  Dressing  Dependent  Toileting  Dependent  Feeding  Dependent  Med Admin  Dependent  Med Delivery    PEG tube    Wound Care Documentation and Therapy:        Elimination:  Continence:   · Bowel: No  · Bladder: No  Urinary Catheter: None   Colostomy/Ileostomy/Ileal Conduit: No       Date of Last BM: ***    Intake/Output Summary (Last 24 hours) at 12/29/2020 1239  Last data filed at 12/29/2020 0500  Gross per 24 hour   Intake 230 ml   Output 600 ml   Net -370 ml     I/O last 3 completed shifts:   In: 230 [I.V.:200; NG/GT:30] Out: 600 [Urine:600]    Safety Concerns: At Risk for Falls and Aspiration Risk    Impairments/Disabilities:      Speech and Paralysis - left side    Nutrition Therapy:  Current Nutrition Therapy:   - Tube Feedings:  Diabetic    Routes of Feeding: Gastrostomy Tube  Liquids: {Slp liquid thickness:24204}  Daily Fluid Restriction: no  Last Modified Barium Swallow with Video (Video Swallowing Test): not done    Treatments at the Time of Hospital Discharge:   Respiratory Treatments: ***  Oxygen Therapy:  is on oxygen at 2 L/min per nasal cannula. Ventilator:    - No ventilator support  Rehab Therapies: Physical Therapy, Occupational Therapy and Speech/Language Therapy  Weight Bearing Status/Restrictions: No weight bearing restirctions  Other Medical Equipment (for information only, NOT a DME order):  wheelchair  Other Treatments: ***    Patient's personal belongings (please select all that are sent with patient):  Gabriel ESPINOZA SIGNATURE:  Electronically signed by Shawn Serra RN on 12/29/20 at 12:46 PM EST    CASE MANAGEMENT/SOCIAL WORK SECTION    Inpatient Status Date: ***    Readmission Risk Assessment Score:  Readmission Risk              Risk of Unplanned Readmission:        26           Discharging to [address] [place] [address] Details  FAX            [facility] [address]       Phone: [phone]       Fax: [phone]        ·     Dialysis Facility (if applicable)   · Name:  · Address:  · Dialysis Schedule:  · Phone:  · Fax:    / signature: Electronically signed by Bhatt RN on 12/30/20 at 8:47 AM EST    PHYSICIAN SECTION    Prognosis: Fair    Condition at Discharge: Stable    Rehab Potential (if transferring to Rehab): Fair    Recommended Labs or Other Treatments After Discharge: Continue tube feeds as tolerated. Follow up with PCP as OP. Physician Certification: I certify the above information and transfer of Debbie Griffin  is necessary for the continuing treatment of the diagnosis listed and that she requires [facility] for greater 30 days.      Update Admission H&P: No change in H&P    PHYSICIAN SIGNATURE:  Electronically signed by Salvador Burr MD on 12/29/20 at 4:01 PM EST

## 2020-12-29 NOTE — PROGRESS NOTES
Occupational Therapy  Facility/Department: [facility] NEURO  Daily Treatment Note  NAME: Joslyn Feldman  : 1950  MRN: [id number]    Date of Service: 2020    Discharge Recommendations:  Patient would benefit from continued therapy after discharge       Assessment   Performance deficits / Impairments: Decreased functional mobility ; Decreased ADL status; Decreased ROM; Decreased strength;Decreased endurance;Decreased balance;Decreased high-level IADLs;Decreased fine motor control;Decreased coordination;Decreased posture;Decreased safe awareness;Decreased cognition  Treatment Diagnosis: CVA Embolic occlusion of R MCA  Prognosis: Fair  REQUIRES OT FOLLOW UP: Yes  Activity Tolerance  Activity Tolerance: Patient limited by fatigue;Treatment limited secondary to decreased cognition  Activity Tolerance: L sided weakness  Safety Devices  Safety Devices in place: Yes  Type of devices: All fall risk precautions in place; Bed alarm in place;Call light within reach; Patient at risk for falls; Left in bed;Nurse notified         Patient Diagnosis(es): The encounter diagnosis was Acute cerebrovascular accident (CVA) ([facility] has a past medical history of Allergic rhinitis, Anxiety, Asthma, CAD (coronary artery disease), Chronic back pain, Congestive heart failure ([address] Depression, Headache(784.0), Hiatal hernia, Hypercholesteremia, Hypertension, Kidney stones, Obesity, Osteoarthritis, Postlaminectomy syndrome, Type II or unspecified type diabetes mellitus without mention of complication, not stated as uncontrolled, Unspecified sleep apnea, and Urinary incontinence. has a past surgical history that includes Spine surgery; Nerve Block (2012); Nerve Block (2012); Nerve Block (2013); Nerve Block (2013); Nerve Block (2013); Cardiac catheterization (2013); Lumbar spine surgery (); Vena Cava Filter Placement (); Tonsillectomy; joint replacement; knee surgery (10/21/2013); knee surgery (2013); knee surgery (2013); other surgical history (Right, 2014); other surgical history (Right, 2015); Upper gastrointestinal endoscopy; Colonoscopy (2015); other surgical history (Right, 2016); Coronary angioplasty with stent (2-16); Nerve Block (Right, 2017); and Gastrostomy tube placement (2020). Restrictions  Restrictions/Precautions  Restrictions/Precautions: General Precautions, Fall Risk, Up as Tolerated  Required Braces or Orthoses?: No  Position Activity Restriction  Other position/activity restrictions: PEG Tube, IV, O2 NC, Condom cath w/brief, SBP goal <180  Subjective   General  Chart Reviewed: Yes  Patient assessed for rehabilitation services?: Yes  Family / Caregiver Present: No  Diagnosis: L weakness, R MCA infarct, found down, AMS  Pain Assessment  Hawley-Baker Pain Ratin (no facial grimacing noted throughout tx)  Patient Currently in Pain: BRITNI   Orientation  Orientation  Overall Orientation Status: Within Functional Limits  Orientation Level: Oriented to place;Oriented to time;Oriented to person(pt slow to respond to orientation questions d/t drowsiness)  Objective    ADL  Feeding: NPO(PEG Tube)  Grooming: Maximum assistance;Setup;Verbal cueing; Increased time to complete(hand over hand assist to wash face)  Toileting: Dependent/Total(purewick, brief, pedro care)

## 2020-12-30 VITALS
RESPIRATION RATE: 19 BRPM | OXYGEN SATURATION: 95 % | HEART RATE: 76 BPM | WEIGHT: 231 LBS | DIASTOLIC BLOOD PRESSURE: 72 MMHG | TEMPERATURE: 98.8 F | SYSTOLIC BLOOD PRESSURE: 142 MMHG | HEIGHT: 64 IN | BODY MASS INDEX: 39.44 KG/M2

## 2020-12-30 LAB
ANION GAP SERPL CALCULATED.3IONS-SCNC: 13 MMOL/L (ref 9–17)
BUN BLDV-MCNC: 34 MG/DL (ref 8–23)
BUN/CREAT BLD: ABNORMAL (ref 9–20)
CALCIUM SERPL-MCNC: 10.3 MG/DL (ref 8.6–10.4)
CHLORIDE BLD-SCNC: 105 MMOL/L (ref 98–107)
CO2: 28 MMOL/L (ref 20–31)
CREAT SERPL-MCNC: 0.54 MG/DL (ref 0.5–0.9)
GFR AFRICAN AMERICAN: >60 ML/MIN
GFR NON-AFRICAN AMERICAN: >60 ML/MIN
GFR SERPL CREATININE-BSD FRML MDRD: ABNORMAL ML/MIN/{1.73_M2}
GFR SERPL CREATININE-BSD FRML MDRD: ABNORMAL ML/MIN/{1.73_M2}
GLUCOSE BLD-MCNC: 205 MG/DL (ref 65–105)
GLUCOSE BLD-MCNC: 207 MG/DL (ref 65–105)
GLUCOSE BLD-MCNC: 233 MG/DL (ref 65–105)
GLUCOSE BLD-MCNC: 241 MG/DL (ref 70–99)
MAGNESIUM: 2.1 MG/DL (ref 1.6–2.6)
POTASSIUM SERPL-SCNC: 4.3 MMOL/L (ref 3.7–5.3)
SARS-COV-2, RAPID: NORMAL
SARS-COV-2: NORMAL
SARS-COV-2: NOT DETECTED
SODIUM BLD-SCNC: 146 MMOL/L (ref 135–144)
SOURCE: NORMAL

## 2020-12-30 PROCEDURE — 99232 SBSQ HOSP IP/OBS MODERATE 35: CPT | Performed by: INTERNAL MEDICINE

## 2020-12-30 PROCEDURE — 92507 TX SP LANG VOICE COMM INDIV: CPT

## 2020-12-30 PROCEDURE — 97530 THERAPEUTIC ACTIVITIES: CPT

## 2020-12-30 PROCEDURE — 97535 SELF CARE MNGMENT TRAINING: CPT

## 2020-12-30 PROCEDURE — 6370000000 HC RX 637 (ALT 250 FOR IP): Performed by: STUDENT IN AN ORGANIZED HEALTH CARE EDUCATION/TRAINING PROGRAM

## 2020-12-30 PROCEDURE — 82947 ASSAY GLUCOSE BLOOD QUANT: CPT

## 2020-12-30 PROCEDURE — 94640 AIRWAY INHALATION TREATMENT: CPT

## 2020-12-30 PROCEDURE — 83735 ASSAY OF MAGNESIUM: CPT

## 2020-12-30 PROCEDURE — 97129 THER IVNTJ 1ST 15 MIN: CPT

## 2020-12-30 PROCEDURE — 2700000000 HC OXYGEN THERAPY PER DAY

## 2020-12-30 PROCEDURE — 36415 COLL VENOUS BLD VENIPUNCTURE: CPT

## 2020-12-30 PROCEDURE — 6360000002 HC RX W HCPCS: Performed by: STUDENT IN AN ORGANIZED HEALTH CARE EDUCATION/TRAINING PROGRAM

## 2020-12-30 PROCEDURE — 80048 BASIC METABOLIC PNL TOTAL CA: CPT

## 2020-12-30 RX ADMIN — IPRATROPIUM BROMIDE AND ALBUTEROL SULFATE 1 AMPULE: .5; 3 SOLUTION RESPIRATORY (INHALATION) at 08:10

## 2020-12-30 RX ADMIN — DOCUSATE SODIUM 100 MG: 50 LIQUID ORAL at 09:43

## 2020-12-30 RX ADMIN — CLONIDINE HYDROCHLORIDE 0.1 MG: 0.1 TABLET ORAL at 09:42

## 2020-12-30 RX ADMIN — IPRATROPIUM BROMIDE AND ALBUTEROL SULFATE 1 AMPULE: .5; 3 SOLUTION RESPIRATORY (INHALATION) at 11:00

## 2020-12-30 RX ADMIN — ENOXAPARIN SODIUM 30 MG: 100 INJECTION SUBCUTANEOUS at 09:43

## 2020-12-30 RX ADMIN — INSULIN LISPRO 6 UNITS: 100 INJECTION, SOLUTION INTRAVENOUS; SUBCUTANEOUS at 06:20

## 2020-12-30 RX ADMIN — FAMOTIDINE 20 MG: 20 TABLET, FILM COATED ORAL at 09:42

## 2020-12-30 RX ADMIN — CLOPIDOGREL 75 MG: 75 TABLET, FILM COATED ORAL at 09:43

## 2020-12-30 RX ADMIN — Medication 81 MG: at 09:43

## 2020-12-30 RX ADMIN — METOPROLOL TARTRATE 75 MG: 25 TABLET ORAL at 09:43

## 2020-12-30 RX ADMIN — LISINOPRIL 10 MG: 10 TABLET ORAL at 11:55

## 2020-12-30 RX ADMIN — AMLODIPINE BESYLATE 10 MG: 10 TABLET ORAL at 11:56

## 2020-12-30 RX ADMIN — SERTRALINE 100 MG: 50 TABLET, FILM COATED ORAL at 09:43

## 2020-12-30 RX ADMIN — SPIRONOLACTONE 50 MG: 25 TABLET ORAL at 11:55

## 2020-12-30 RX ADMIN — INSULIN LISPRO 6 UNITS: 100 INJECTION, SOLUTION INTRAVENOUS; SUBCUTANEOUS at 15:09

## 2020-12-30 ASSESSMENT — PAIN DESCRIPTION - LOCATION: LOCATION: NECK;HEAD

## 2020-12-30 ASSESSMENT — PAIN DESCRIPTION - PROGRESSION
CLINICAL_PROGRESSION: NOT CHANGED

## 2020-12-30 ASSESSMENT — PAIN SCALES - GENERAL: PAINLEVEL_OUTOF10: 0

## 2020-12-30 NOTE — CARE COORDINATION
Called lab to inquire about Covid testing. I was informed that the swab has been received and they will be running it this AM, results she be done later today. Will update family and Brennen. Will set up transportation for later this afternoon  Spoke to daughter at bedside informed her of discharge plan and awaiting COVID test.    Called [facility] to update on covid test.  They can accept patient today, [facility] set up for transport at   9pm.      [facility] to [facility] and informed her that 9pm was the soonest time we could get for transport.   She tells me that that time is OK

## 2020-12-30 NOTE — PROGRESS NOTES
Physical Therapy  Facility/Department: [facility] NEURO  Daily Treatment Note  NAME: Sabra Tillman  : 1950  MRN: [id number]    Date of Service: 2020    Discharge Recommendations:  Patient would benefit from continued therapy after discharge   PT Equipment Recommendations  Equipment Needed: No    Assessment   Body structures, Functions, Activity limitations: Decreased functional mobility ; Decreased strength;Decreased safe awareness;Decreased cognition;Decreased endurance;Decreased balance;Decreased fine motor control;Decreased coordination;Decreased posture  Assessment: More alert today, left side neglect. Poor sitting balance. Unable to attempt standing. Will continue for functional mobility. Prognosis: Fair  REQUIRES PT FOLLOW UP: Yes  Activity Tolerance  Activity Tolerance: Patient limited by cognitive status; Patient limited by endurance     Patient Diagnosis(es): The primary encounter diagnosis was Acute cerebrovascular accident (CVA) ([address] Diagnoses of Uncontrolled hypertension and Ischemic cardiomyopathy were also pertinent to this visit. has a past medical history of Allergic rhinitis, Anxiety, Asthma, CAD (coronary artery disease), Chronic back pain, Congestive heart failure ([address] Depression, Headache(784.0), Hiatal hernia, Hypercholesteremia, Hypertension, Kidney stones, Obesity, Osteoarthritis, Postlaminectomy syndrome, Type II or unspecified type diabetes mellitus without mention of complication, not stated as uncontrolled, Unspecified sleep apnea, and Urinary incontinence. has a past surgical history that includes Spine surgery; Nerve Block (04/23/2012); Nerve Block (05/22/2012); Nerve Block (01/14/2013); Nerve Block (01/21/2013); Nerve Block (01/28/2013); Cardiac catheterization (01/2013); Lumbar spine surgery (2007); Vena Cava Filter Placement (2007); Tonsillectomy; joint replacement; knee surgery (10/21/2013); knee surgery (12/02/2013); knee surgery (12/09/2013); other surgical history (Right, 07/14/2014); other surgical history (Right, 03/16/2015); Upper gastrointestinal endoscopy; Colonoscopy (09/2015); other surgical history (Right, 06/13/2016); Coronary angioplasty with stent (1012-16); Nerve Block (Right, 04/17/2017); Gastrostomy tube placement (12/28/2020); and Gastrostomy tube placement (N/A, 12/28/2020). Restrictions  Restrictions/Precautions  Restrictions/Precautions: Fall Risk, Up as Tolerated  Required Braces or Orthoses?: No  Position Activity Restriction  Other position/activity restrictions: SBP goal <180  Subjective   General  Chart Reviewed: Yes  Response To Previous Treatment: Patient with no complaints from previous session. Family / Caregiver Present: No  Subjective  Subjective: Pt and RN agreeable to PT  Pt supine in bed very lethargic with no c/o pain.   General Comment  Comments: co treat  Pain Screening  Patient Currently in Pain: Denies  Vital Signs  Patient Currently in Pain: Denies       Orientation  Orientation  Overall Orientation Status: Impaired  Cognition      Objective   Bed mobility  Supine to Sit: Maximum assistance;2 Person assistance  Sit to Supine: Maximum assistance;2 Person assistance  Transfers  Sit to Stand: Unable to assess  Stand to sit: Unable to assess  Bed to Chair: Unable to assess  Stand Pivot Transfers: Unable to assess  Comment: BRITNI  Ambulation  Ambulation?: No     Balance  Posture: Fair  Sitting - Static: Poor  Sitting - Dynamic: Poor;- Comments: Sat edge of bed 15 minutes. Assistance fluctuates from min A to max A. Pushing herself to the left with her extended right UE.    Goals  Short term goals  Time Frame for Short term goals: 12 visits  Short term goal 1: bed mobility with mod A+1  Short term goal 2: transfers mod A+2 in mandie suggs  Short term goal 3: WC mobility x 25' with min A+1  Short term goal 4: progress to gait with appropriate device x 15' with max A+2 as appropriate  Patient Goals   Patient goals : pt didn't verbalize goal    Plan    Plan  Times per week: 5-6 visits weekly  Times per day: Daily  Current Treatment Recommendations: Strengthening, ROM, Balance Training, Functional Mobility Training, Transfer Training, Endurance Training, Wheelchair Mobility Training, Gait Training, Neuromuscular Re-education, Cognitive Reorientation, Pain Management, Home Exercise Program, Safety Education & Training, Patient/Caregiver Education & Training, Positioning  Safety Devices  Type of devices: Patient at risk for falls, Left in bed, Nurse notified, Gait belt, Bed alarm in place, Call light within reach  Restraints  Initially in place: No     Therapy Time   Individual Concurrent Group Co-treatment   Time In 1415         Time Out 1446         Minutes 31         Timed Code Treatment Minutes: [facility]

## 2020-12-30 NOTE — PROGRESS NOTES
[facility]   Department of Internal Medicine - Staff Internal Medicine Teaching Service        Inpatient Daily Progress Note    Date: 2020  Patient Name: Zaida Randle  MRN: [id number]  [place]: [de-identified]   Date of Admission: 12/15/2020  5:39 PM  YOB: 1950  Primary Care Physician: Jyoti Perez MD  Attending Physician: Summer Lemus MD   Room: [address] [place] [address]  Number of days in the hospital: 15    Subjective   Admitting Diagnosis: Cerebrovascular accident (CVA) due to embolic occlusion of right middle cerebral artery [facility])  Chief Complaint:   Chief Complaint   Patient presents with    Cerebrovascular Accident     Pt was seen and examined at bedside. Resting comfortably in the bed. Labs reviewed. No acute events overnight. Patient got PEG placement by surgery. Patient on Plavix. Objective   Vital Signs:  BP (!) 153/76   Pulse 85   Temp 98.4 °F (36.9 °C)   Resp 22   Ht 5' 4\" (1.626 m)   Wt 231 lb (104.8 kg)   LMP  (LMP Unknown)   SpO2 94%   BMI 39.65 kg/m²     Temp (24hrs), Av.4 °F (36.9 °C), Min:98 °F (36.7 °C), Max:98.8 °F (37.1 °C)    In: 1000   Out: -   Physical Exam -    Physical Exam -  Constitutional:  Drowsy but alertx2,  cooperative and no distress. Mental Status:  Oriented to person, place but no time. Delayed mentation. Lungs:  Clear to auscultation bilaterally, normal effort. Heart:  Regular rate and rhythm, no murmur. Abdomen:  Soft, nontender, nondistended, normal bowel sounds. Extremities:  No edema, redness, tenderness in the calves. Skin:  Warm, dry, no gross lesions or rashes.     Medications:  Scheduled Medications:    insulin glargine  30 Units Subcutaneous Nightly    insulin lispro  0-18 Units Subcutaneous Q6H    insulin lispro  0-9 Units Subcutaneous Nightly    [Held by provider] lisinopril  10 mg Oral Daily    famotidine  20 mg Per NG tube BID    rivastigmine  1 patch Transdermal Daily  [Held by provider] spironolactone  50 mg Oral Daily    amLODIPine  10 mg Oral Daily    cloNIDine  0.1 mg Oral Daily    QUEtiapine  25 mg Oral Nightly    senna  5 mL Oral Nightly    docusate  100 mg Oral Daily    clopidogrel  75 mg Per NG tube Daily    metoprolol tartrate  75 mg Oral BID    [Held by provider] furosemide  40 mg Oral Daily    sertraline  100 mg Oral Daily    enoxaparin  30 mg Subcutaneous Daily    ipratropium-albuterol  1 ampule Inhalation Q4H WA    sodium chloride flush  10 mL Intravenous 2 times per day    aspirin  81 mg Oral Daily    Or    aspirin  300 mg Rectal Daily    atorvastatin  80 mg Oral Nightly     Continuous Infusions:    dextrose       PRN Medications    labetalol, 10 mg, Q1H PRN      glucose, 15 g, PRN      dextrose, 12.5 g, PRN      glucagon (rDNA), 1 mg, PRN      dextrose, 100 mL/hr, PRN      acetaminophen, 650 mg, Q4H PRN      sodium chloride flush, 10 mL, PRN      promethazine, 12.5 mg, Q6H PRN    Or      ondansetron, 4 mg, Q6H PRN      Diagnostic Labs:  CBC:   Recent Labs     12/27/20  0802 12/29/20  0515   WBC 9.3 9.0   RBC 4.00 4.11   HGB 11.8* 11.5*   HCT 36.1* 37.9   MCV 90.3 92.2   RDW 12.5 12.6    359     BMP:   Recent Labs     12/27/20  0802 12/28/20  0524 12/29/20  0515    140 142   K 4.3 4.9 4.5   CL 99 100 105   CO2 29 31 31   BUN 25* 41* 37*   CREATININE 0.47* 0.86 0.55     Imaging:  Ct Head Wo Contrast    Result Date: 12/26/2020  Redemonstration of a subacute right MCA territory infarction with improved vasogenic edema compared with 12/17/2020 and resolution of the midline shift. No intracranial hemorrhage. Xr Chest Portable    Result Date: 12/21/2020  No acute cardiopulmonary disease. Cardiomegaly with central vascular congestion with no overt failure or focal infiltrate.      ASSESSMENT & PLAN   Principal Problem:    Cerebrovascular accident (CVA) due to embolic occlusion of right middle cerebral artery ([address] Active Problems:    DM (diabetes mellitus) (HCC)    HTN (hypertension)    Asthma    YUDITH (obstructive sleep apnea)    Morbid obesity (HCC)    Congestive heart failure (HCC)    S/P coronary artery stent placement - RCA 10/12/16 - Dr. Lima Phillips    Acute cerebrovascular accident (CVA) [facility])    Right middle cerebral artery stroke ([facility] [address]    Non-traumatic rhabdomyolysis    Ischemic cerebral stroke due to intracranial large artery atherosclerosis ([facility] [address]  Resolved Problems:    * No resolved hospital problems. *    Large right MCA territory infarction likely secondary to intracranial atherosclerosis  -Continue aspirin 81 mg.  Plavix resumed.  -Continue early Lipitor 80 mg for secondary stroke prevention  -Mentation improved.     Hyperlipidemia - Continue Lipitor 80    Hypertension  -Blood pressure medications resumed.     Dysphagia secondary to stroke  -Failed speech bedside swallow x2  - Plavix resumed. PEG placement done.     Type 2 diabetes mellitus  -Lantus increased to 30U. Blood glucose checks changed to 4 times daily. Patient on tube feeds.     UTI -Resolved. DVT ppx: Lovenox  Diet: tube feed    Discharge planning: Patient got SNF placement, waiting for Covid results before discharge      Roberto Carlos Ordonez MD  PGY-1, Internal Medicine Resident  [facility]         12/30/2020, 7:09 AM   Attending Physician Statement  I have discussed the care of Joslyn Feldman, including pertinent history and exam findings,  with the resident. I have seen and examined the patient and the key elements of all parts of the encounter have been performed by me. I agree with the assessment, plan and orders as documented by the resident with additions . Treatment plan Discussed with nursing staff in detail , all questions answered .    Electronically signed by Clay Rivers MD on   12/30/20 at 3:18 PM EST Please note that this chart was generated using voice recognition Dragon dictation software. Although every effort was made to ensure the accuracy of this automated transcription, some errors in transcription may have occurred.

## 2020-12-30 NOTE — PROGRESS NOTES
Speech Language Pathology  Speech Language Pathology  [facility]    Speech / Cognitive Treatment Note    Date: 12/30/2020  Patients Name: Vimal Raphael  MRN: [id number]  Diagnosis:   Patient Active Problem List   Diagnosis Code    DM (diabetes mellitus) ([facility] [address] E11.9    HTN (hypertension) I10    Smoker F17.200    Asthma J45.909    Knee osteoarthritis M17.10    Edema R60.9    Spinal stenosis in cervical region M48.02    Chest pain R07.9    YUDITH (obstructive sleep apnea) G47.33    Morbid obesity ([facility] [address] E66.01    Knee pain, bilateral M25.561, M25.562    S/P TKR (total knee replacement) Z96.659    Urge incontinence of urine N39.41    Lumbar spondylosis M47.816    Cervical spondylosis M47.812    Chronic use of opiate drugs therapeutic purposes Z79.891    Chronic knee pain M25.569, G89.29    Hypertensive urgency I16.0    Acute coronary syndrome (HCC) I24.9    Congestive heart failure (HCC) I50.9    Lymphadenopathy R59.1    Chronic pain G89.29    Coronary artery disease involving native coronary artery without angina pectoris I25.10    Chronic prescription opiate use Z79.891    Type 2 diabetes mellitus with complication, without long-term current use of insulin (HCC) E11.8    S/P coronary artery stent placement - RCA 10/12/16 - Dr. Hoa Brice Z95.5    Anxiety F41.9    Depression (emotion) F32.9    Postlaminectomy syndrome, lumbar M96.1    Medication monitoring encounter Z51.81    Postlaminectomy syndrome M96.1    Postlaminectomy syndrome M96.1    Primary osteoarthritis of right knee M17.11    Long term (current) use of antithrombotics/antiplatelets C68.84    Influenza B J10.1    Reactive airway disease with acute exacerbation J45. 0    Cerebrovascular accident (CVA) due to embolic occlusion of right middle cerebral artery (HCC) I63.411    Acute cerebrovascular accident (CVA) ([facility] [address] I63.9    Right middle cerebral artery stroke ([facility] [address] A37.768  Non-traumatic rhabdomyolysis M62.82    Ischemic cerebral stroke due to intracranial large artery atherosclerosis (HCC) I63.59       Pain: denies    Cognitive Treatment    Treatment time: 7554-4829      Subjective: [x] Alert [x] Cooperative     [] Confused     [] Agitated    [] Lethargic      Objective/Assessment:  Attention: maintained throughout independently; distractions minimized    Orientation: Pt stated name, , place, and situation independently. Pt required mod cues and choice of two to state age, month, and year. Recall: Reviewed with pt visualization compensatory strategy for recall, & pt verbalized understanding. Delayed Recall, 3 Related Words:   No Delay: 3/3 independently   5-Minute Delay: 2/3 independently, increased to 3/3 with min verbal cue   10-Minute Delay: 1/3 independently, increased to 3/3 with mod verbal cues   15-Minute Delay: 0/3 independently, increased to 2/3 with max verbal cues    Immediate Recall, 3 numbers: 100% (5/5) independently    Immediate Recall, 4 Numbers: 80% (4/5) independently, increased to 100% (5/5) with x1 verbal repetition    Memory & Mental Manipulation, Ranking 4 words: 2/3 independently, increased to 3/3 with x2 verbal repetitions    Problem Solving/Reasoning:  Word Deductions: 1/5 independently, increased to 4/5 with max verbal cues & x2 verbal repetitions    Speech: Introduced O/M exercise program for labial/ingual weakness. Pt completed exercises 5x 1 set with max verbal and visual cues. Pt provided education re: purpose & importance of exercises for facial and lingual weakness & pt needed reinforcement for learning. Pt observed with +L facial droop and reduced labial & lingual ROM and coordination. Plan:  [x] Continue ST services    [] Discharge from ST:      Discharge recommendations: Further therapy recommended at discharge. Treatment completed by:  Rocio Garcia M.S. [id number] [facility]

## 2020-12-30 NOTE — PROGRESS NOTES
Comprehensive Nutrition Assessment    Type and Reason for Visit:  Reassess    Nutrition Recommendations/Plan: Continue diabetic TF at 55 mL/hr. Suggest starting free water flushes of 120 mL q6 hours. Nutrition Assessment:  Pt tolerating TF at goal per RN. Working on d/c to SNF today (awaiting Covid testing). No free water flushes ordered at this time. Estimated Daily Nutrient Needs:  Energy (kcal):  1764-4131 kcal (15-18kcal/kg); Weight Used for Energy Requirements:  Current     Protein (g):  60-80 gm (1.2-1.4 gm/kg); Weight Used for Protein Requirements:  Ideal          Nutrition Related Findings:  Meds/labs reviewed.  Na+ 146 mmol/L      Wounds:  Multiple, Skin Tears       Current Nutrition Therapies:    Current Tube Feeding (TF) Orders:  · Feeding Route: PEG  · Formula: Diabetic  · Schedule: Continuous  · Water Flushes: none ordered  · Current TF & Flush Orders Provides: currently at goal (see below)  · Goal TF & Flush Orders Provides: Glucerna 1.2 at 55 mL/hr = 1584 kcals, 79 gm protein      Anthropometric Measures:  · Height: 5' 4\" (162.6 cm)  · Current Body Weight: 231 lb (104.8 kg)   · Ideal Body Weight: 120 lbs; % Ideal Body Weight 193.1 %   · BMI: 39.6  · BMI Categories: Obese Class 2 (BMI 35.0 -39.9)       Nutrition Diagnosis:   · Inadequate oral intake related to swallowing difficulty as evidenced by nutrition support - enteral nutrition      Nutrition Interventions:   Food and/or Nutrient Delivery:  Continue Current Tube Feeding  Nutrition Education/Counseling:  No recommendation at this time   Coordination of Nutrition Care:  Continue to monitor while inpatient    Goals:  Meet 100% of estimated nutrition needs       Nutrition Monitoring and Evaluation:   Behavioral-Environmental Outcomes:  None Identified   Food/Nutrient Intake Outcomes:  Enteral Nutrition Intake/Tolerance  Physical Signs/Symptoms Outcomes:  Weight, Biochemical Data, Nutrition Focused Physical Findings, Skin Discharge Planning:    Enteral Nutrition     Electronically signed by Renetta Verdin MS, RD, LD on 12/30/20 at 11:03 AM EST    Contact: [phone]

## 2020-12-30 NOTE — PROGRESS NOTES
Occupational Therapy  Facility/Department: [facility] NEURO  Daily Treatment Note  NAME: Vivek Simon  : 1950  MRN: [id number]  Date of Service: 2020  Discharge Recommendations:  Patient would benefit from continued therapy after discharge, Pt. Is unsafe to return to OF at this time d/t decreased functional ability. Assessment   Performance deficits / Impairments: Decreased functional mobility ; Decreased ADL status; Decreased ROM; Decreased strength;Decreased endurance;Decreased balance;Decreased high-level IADLs;Decreased fine motor control;Decreased coordination;Decreased posture;Decreased safe awareness;Decreased cognition  Treatment Diagnosis: CVA Embolic occlusion of R MCA  Prognosis: Fair  Decision Making: Medium Complexity  OT Education: OT Role;Transfer Training  Patient Education: purpose of therapy;  weight shifting to prevent LOB. Barriers to Learning: pt demo P carry over  REQUIRES OT FOLLOW UP: Yes  Activity Tolerance  Activity Tolerance: Patient limited by fatigue;Treatment limited secondary to decreased cognition  Activity Tolerance: L sided weakness  Safety Devices  Safety Devices in place: Yes  Type of devices: Bed alarm in place;Call light within reach; Patient at risk for falls; Left in bed;Nurse notified  Restraints  Initially in place: No     Patient Diagnosis(es): The primary encounter diagnosis was Acute cerebrovascular accident (CVA) ([facility] [address] Diagnoses of Uncontrolled hypertension and Ischemic cardiomyopathy were also pertinent to this visit. has a past medical history of Allergic rhinitis, Anxiety, Asthma, CAD (coronary artery disease), Chronic back pain, Congestive heart failure ([facility] [address] Depression, Headache(784.0), Hiatal hernia, Hypercholesteremia, Hypertension, Kidney stones, Obesity, Osteoarthritis, Postlaminectomy syndrome, Type II or unspecified type diabetes mellitus without mention of complication, not stated as uncontrolled, Unspecified sleep apnea, and Urinary incontinence. has a past surgical history that includes Spine surgery; Nerve Block (04/23/2012); Nerve Block (05/22/2012); Nerve Block (01/14/2013); Nerve Block (01/21/2013); Nerve Block (01/28/2013); Cardiac catheterization (01/2013); Lumbar spine surgery (2007); Vena Cava Filter Placement (2007); Tonsillectomy; joint replacement; knee surgery (10/21/2013); knee surgery (12/02/2013); knee surgery (12/09/2013); other surgical history (Right, 07/14/2014); other surgical history (Right, 03/16/2015); Upper gastrointestinal endoscopy; Colonoscopy (09/2015); other surgical history (Right, 06/13/2016); Coronary angioplasty with stent (1012-16); Nerve Block (Right, 04/17/2017); Gastrostomy tube placement (12/28/2020); and Gastrostomy tube placement (N/A, 12/28/2020). Restrictions  Restrictions/Precautions  Restrictions/Precautions: Fall Risk, Up as Tolerated  Required Braces or Orthoses?: No  Position Activity Restriction  Other position/activity restrictions: SBP goal <180  Subjective   General  Chart Reviewed: Yes  Patient assessed for rehabilitation services?: Yes  Family / Caregiver Present: No  Diagnosis: L weakness, R MCA infarct, found down, AMS  General Comment  Comments: Pt c/o pain however unable to rate  Pain Assessment  Pain Location: Neck;Head  Vital Signs  Patient Currently in Pain: Yes(Unable to rate pain)   Orientation  Orientation  Orientation Level: Disoriented X4  Objective    ADL  Grooming: Setup;Verbal cueing; Increased time to complete; Moderate assistance UE Dressing: Maximum assistance; Increased time to complete;Setup;Verbal cueing  Additional Comments: Pt. agreeable to sit EOB this date. Pt. participated in grooming (face washing) sitting EOB needing mod A using R UE, [place] with L UE. Needing max VC and tactile cues to stay on task, pt. becoming drowsy needing max cues to keep eyes open at end of sitting EOB (10 minutes). Also demo decreased ability to follow one step directions once fatigue sets in. Balance  Sitting Balance: Maximum assistance  Standing Balance  Time: ~10 minutes  Activity: static/dynamic sitting EOB max A x1-2 persons d/t multi directional LOB  Comment: BRITNI sec to pt P sitting balance  Bed mobility  Rolling to Left: Maximum assistance  Rolling to Right: Maximum assistance  Supine to Sit: Maximum assistance;2 Person assistance  Sit to Supine: Maximum assistance;2 Person assistance  Scooting: Maximal assistance  Transfers  Transfer Comments: BRITNI d/t poor sitting tolerance, major fall risk   Cognition  Overall Cognitive Status: Exceptions  Arousal/Alertness: Delayed responses to stimuli  Following Commands: Follows one step commands with repetition; Follows one step commands with increased time  Attention Span: Difficulty attending to directions; Difficulty dividing attention  Memory: Decreased recall of precautions;Decreased recall of recent events  Safety Judgement: Decreased awareness of need for assistance;Decreased awareness of need for safety  Problem Solving: Assistance required to generate solutions;Assistance required to identify errors made;Assistance required to correct errors made;Assistance required to implement solutions  Insights: Decreased awareness of deficits  Initiation: Requires cues for all  Sequencing: Requires cues for all  Cognition Comment: Pt pleasant and cooperative with therapy this date, however confused.      Plan   Plan  Times per week: 4-5x    Goals  Short term goals  Time Frame for Short term goals: Pt will by discharge

## 2020-12-31 NOTE — DISCHARGE SUMMARY
[address]     Department of Internal Medicine - Staff Internal Medicine Teaching Service    INPATIENT DISCHARGE SUMMARY      Patient Identification:  Imer Bronson is a 78 y.o. female. :  1950  MRN: [id number]     Acct: [de-identified]   PCP: Jose Francisco Matamoros MD  Admit Date:  12/15/2020  Discharge date and time: 2020  4:00 PM   Attending Provider: No att. providers found                                     [address] [facility] [address] Problem Lists:  Principal Problem:    Cerebrovascular accident (CVA) due to embolic occlusion of right middle cerebral artery ([address]  Active Problems:    DM (diabetes mellitus) ([address]    HTN (hypertension)    Asthma    YUDITH (obstructive sleep apnea)    Morbid obesity ([address]    Congestive heart failure ([address]    S/P coronary artery stent placement - RCA 10/12/16 - Dr. Sujata Neil    Acute cerebrovascular accident (CVA) [facility])    Right middle cerebral artery stroke ([facility]    Non-traumatic rhabdomyolysis    Ischemic cerebral stroke due to intracranial large artery atherosclerosis ([address]  Resolved Problems:    * No resolved hospital problems. *      HOSPITAL STAY     Brief Inpatient course:   82-year-old female came with past medical history of diabetes, CAD, hyperlipidemia and CHF presented to the ED with altered mental status. Patient was found to have right MCA infarct and UTI. Patient was treated for the MT infarct by neurology. Patient was started on antibiotics and completed course of antibiotics for UTI. Patient failed bedside swallow twice and NG tube placed. Plan is for PEG placement as per general surgery on . Plavix is held for PEG placement. Finally patient got SNF placement and is being discharged under stable conditions with outpatient follow-up with PCP and neurology.     Procedures/ Significant Interventions:    PEG placement    Consults:     Consults:     Final Specialist Recommendations/Findings: IP CONSULT TO NEUROCRITICAL CARE  IP CONSULT TO NEUROCRITICAL CARE  IP CONSULT TO IV TEAM  IP CONSULT TO DIETITIAN  IP CONSULT TO PHYSICAL MEDICINE REHAB  IP CONSULT TO GENERAL SURGERY  IP CONSULT TO NEUROLOGY      Any Hospital Acquired Infections: none    Discharge Functional Status:  stable    DISCHARGE PLAN     Disposition: [facility]    Patient Instructions:   Discharge Medication List as of 12/30/2020 12:58 PM      START taking these medications    Details   docusate (COLACE) 50 MG/5ML liquid Take 10 mLs by mouth daily, Disp-50 mL, R-3Normal         CONTINUE these medications which have CHANGED    Details   losartan (COZAAR) 25 MG tablet Take 1 tablet by mouth daily, Disp-60 tablet, R-3Normal      metoprolol tartrate 75 MG TABS Take 75 mg by mouth 2 times daily, Disp-60 tablet, R-3Normal         CONTINUE these medications which have NOT CHANGED    Details   rivastigmine (EXELON) 9.5 MG/24HR Place 1 patch onto the skin dailyHistorical Med      QUEtiapine (SEROQUEL) 25 MG tablet Take 25 mg by mouth 2 times dailyHistorical Med      Calcium Carbonate-Vitamin D (OYSTER SHELL CALCIUM/D) 500-200 MG-UNIT TABS TAKE ONE TABLET BY MOUTH TWO TIMES A DAY, Disp-60 tablet, R-3Normal      glimepiride (AMARYL) 2 MG tablet Take 1 tablet by mouth every morning, Disp-30 tablet, R-3Normal      linagliptin (TRADJENTA) 5 MG tablet Take 1 tablet by mouth daily, Disp-30 tablet, R-10Normal      cloNIDine (CATAPRES) 0.1 MG tablet Take 1 tablet by mouth daily, Disp-30 tablet, R-3Normal      amLODIPine (NORVASC) 10 MG tablet Take 1 tablet by mouth daily, Disp-30 tablet, R-5Normal      furosemide (LASIX) 40 MG tablet Take 1 tablet by mouth daily, Disp-30 tablet, R-10Normal      atorvastatin (LIPITOR) 80 MG tablet TAKE 1 TABLET BY MOUTH DAILY, Disp-30 tablet, R-5Normal      sertraline (ZOLOFT) 100 MG tablet Take 1 tablet by mouth daily, Disp-30 tablet, R-5Normal clopidogrel (PLAVIX) 75 MG tablet Take 1 tablet by mouth daily, Disp-30 tablet, R-11Normal      aspirin (RA ASPIRIN EC) 81 MG EC tablet Take 1 tablet by mouth daily, Disp-30 tablet, R-11Normal      Incontinence Supply Disposable (INCONTINENCE BRIEF LARGE) MISC Disp-150 each, R-11, PrintUse 4-5/day as needed      glucose blood VI test strips (ASCENSIA AUTODISC VI;ONE TOUCH ULTRA TEST VI) strip 3 TIMES DAILY Starting 8/1/2017, Until Discontinued, Disp-100 each, R-5, PrintAs needed.       albuterol-ipratropium (COMBIVENT RESPIMAT)  MCG/ACT AERS inhaler Inhale 1 puff into the lungs every 6 hours as needed for Wheezing, Disp-1 Inhaler, R-5Normal      Lancets MISC Disp-50 each, R-11, NormalUse 1 -2 times daily Insulin dependent diabetes mellitus         STOP taking these medications       magnesium oxide (MAG-OX) 400 MG tablet Comments:   Reason for Stopping:         fluticasone (FLONASE) 50 MCG/ACT nasal spray Comments:   Reason for Stopping:         pantoprazole (PROTONIX) 20 MG tablet Comments:   Reason for Stopping:         diclofenac sodium (VOLTAREN) 1 % GEL Comments:   Reason for Stopping:         cephALEXin (KEFLEX) 500 MG capsule Comments:   Reason for Stopping:         omeprazole (PRILOSEC) 20 MG delayed release capsule Comments:   Reason for Stopping:         spironolactone (ALDACTONE) 50 MG tablet Comments:   Reason for Stopping:         isosorbide mononitrate (IMDUR) 30 MG extended release tablet Comments:   Reason for Stopping:         meloxicam (MOBIC) 15 MG tablet Comments:   Reason for Stopping:               Activity: ambulate in house    Diet: diabetic diet    Follow-up:    Tootie Wilson MD  [address] [place] [address]  [phone]    In 1 week  As needed    [address] [place] [id number]  In 1 week  As needed      Patient Instructions:   Start taking Colace  Dosage of losartan and metoprolol changed

## 2021-01-13 ENCOUNTER — APPOINTMENT (OUTPATIENT)
Dept: GENERAL RADIOLOGY | Facility: CLINIC | Age: 71
End: 2021-01-13
Payer: COMMERCIAL

## 2021-01-13 ENCOUNTER — APPOINTMENT (OUTPATIENT)
Dept: CT IMAGING | Facility: CLINIC | Age: 71
End: 2021-01-13
Payer: COMMERCIAL

## 2021-01-13 ENCOUNTER — HOSPITAL ENCOUNTER (EMERGENCY)
Facility: CLINIC | Age: 71
Discharge: ANOTHER HOSPITAL | End: 2021-01-13
Attending: EMERGENCY MEDICINE
Payer: COMMERCIAL

## 2021-01-13 ENCOUNTER — HOSPITAL ENCOUNTER (INPATIENT)
Age: 71
LOS: 10 days | Discharge: NURSING, REHAB OR LONG-TERM CARE | DRG: 871 | End: 2021-01-23
Attending: INTERNAL MEDICINE | Admitting: INTERNAL MEDICINE
Payer: COMMERCIAL

## 2021-01-13 ENCOUNTER — APPOINTMENT (OUTPATIENT)
Dept: GENERAL RADIOLOGY | Age: 71
DRG: 871 | End: 2021-01-13
Attending: INTERNAL MEDICINE
Payer: COMMERCIAL

## 2021-01-13 VITALS
OXYGEN SATURATION: 98 % | BODY MASS INDEX: 37.76 KG/M2 | SYSTOLIC BLOOD PRESSURE: 100 MMHG | DIASTOLIC BLOOD PRESSURE: 48 MMHG | TEMPERATURE: 97.5 F | RESPIRATION RATE: 16 BRPM | WEIGHT: 220 LBS | HEART RATE: 86 BPM

## 2021-01-13 DIAGNOSIS — I25.10 CORONARY ARTERY DISEASE INVOLVING NATIVE CORONARY ARTERY OF NATIVE HEART WITHOUT ANGINA PECTORIS: ICD-10-CM

## 2021-01-13 DIAGNOSIS — E87.0 HYPERNATREMIA: Primary | ICD-10-CM

## 2021-01-13 DIAGNOSIS — I25.5 ISCHEMIC CARDIOMYOPATHY: ICD-10-CM

## 2021-01-13 DIAGNOSIS — I10 ESSENTIAL HYPERTENSION: ICD-10-CM

## 2021-01-13 DIAGNOSIS — R73.9 HYPERGLYCEMIA: ICD-10-CM

## 2021-01-13 PROBLEM — E86.0 DEHYDRATION: Status: ACTIVE | Noted: 2021-01-13

## 2021-01-13 LAB
-: ABNORMAL
ABSOLUTE EOS #: 0.29 K/UL (ref 0–0.4)
ABSOLUTE IMMATURE GRANULOCYTE: ABNORMAL K/UL (ref 0–0.3)
ABSOLUTE LYMPH #: 1.66 K/UL (ref 1–4.8)
ABSOLUTE MONO #: 0.29 K/UL (ref 0.1–0.8)
ALBUMIN SERPL-MCNC: 2.8 G/DL (ref 3.5–5.2)
ALBUMIN/GLOBULIN RATIO: 0.7 (ref 1–2.5)
ALP BLD-CCNC: 78 U/L (ref 35–104)
ALT SERPL-CCNC: 13 U/L (ref 5–33)
AMORPHOUS: ABNORMAL
ANION GAP SERPL CALCULATED.3IONS-SCNC: 10 MMOL/L (ref 9–17)
ANION GAP SERPL CALCULATED.3IONS-SCNC: 6 MMOL/L (ref 9–17)
AST SERPL-CCNC: 22 U/L
BACTERIA: ABNORMAL
BASOPHILS # BLD: 1 % (ref 0–2)
BASOPHILS ABSOLUTE: 0.07 K/UL (ref 0–0.2)
BETA-HYDROXYBUTYRATE: 0.15 MMOL/L (ref 0.02–0.27)
BILIRUB SERPL-MCNC: 0.3 MG/DL (ref 0.3–1.2)
BILIRUBIN DIRECT: 0.1 MG/DL
BILIRUBIN URINE: NEGATIVE
BILIRUBIN, INDIRECT: 0.2 MG/DL (ref 0–1)
BUN BLDV-MCNC: 78 MG/DL (ref 8–23)
BUN BLDV-MCNC: 79 MG/DL (ref 8–23)
BUN/CREAT BLD: ABNORMAL (ref 9–20)
BUN/CREAT BLD: ABNORMAL (ref 9–20)
CALCIUM SERPL-MCNC: 10.3 MG/DL (ref 8.6–10.4)
CALCIUM SERPL-MCNC: 9.9 MG/DL (ref 8.6–10.4)
CASTS UA: ABNORMAL /LPF (ref 0–8)
CHLORIDE BLD-SCNC: 113 MMOL/L (ref 98–107)
CHLORIDE BLD-SCNC: 116 MMOL/L (ref 98–107)
CK MB: 1.8 NG/ML
CO2: 40 MMOL/L (ref 20–31)
CO2: 43 MMOL/L (ref 20–31)
COLOR: YELLOW
COMMENT UA: ABNORMAL
CREAT SERPL-MCNC: 0.9 MG/DL (ref 0.5–0.9)
CREAT SERPL-MCNC: 0.9 MG/DL (ref 0.5–0.9)
CRYSTALS, UA: ABNORMAL /HPF
DIFFERENTIAL TYPE: ABNORMAL
EOSINOPHILS RELATIVE PERCENT: 4 % (ref 1–4)
EPITHELIAL CELLS UA: ABNORMAL /HPF (ref 0–5)
GFR AFRICAN AMERICAN: >60 ML/MIN
GFR AFRICAN AMERICAN: >60 ML/MIN
GFR NON-AFRICAN AMERICAN: >60 ML/MIN
GFR NON-AFRICAN AMERICAN: >60 ML/MIN
GFR SERPL CREATININE-BSD FRML MDRD: ABNORMAL ML/MIN/{1.73_M2}
GLOBULIN: ABNORMAL G/DL (ref 1.5–3.8)
GLUCOSE BLD-MCNC: 148 MG/DL (ref 65–105)
GLUCOSE BLD-MCNC: 177 MG/DL (ref 65–105)
GLUCOSE BLD-MCNC: 207 MG/DL (ref 65–105)
GLUCOSE BLD-MCNC: 234 MG/DL (ref 70–99)
GLUCOSE BLD-MCNC: 372 MG/DL (ref 70–99)
GLUCOSE URINE: ABNORMAL
HCT VFR BLD CALC: 41.2 % (ref 36–46)
HEMOGLOBIN: 12.5 G/DL (ref 12–16)
IMMATURE GRANULOCYTES: ABNORMAL %
KETONES, URINE: NEGATIVE
LACTIC ACID, SEPSIS WHOLE BLOOD: ABNORMAL MMOL/L (ref 0.5–1.9)
LACTIC ACID, SEPSIS: 2.5 MMOL/L (ref 0.5–1.9)
LEUKOCYTE ESTERASE, URINE: ABNORMAL
LYMPHOCYTES # BLD: 23 % (ref 24–44)
MCH RBC QN AUTO: 27.9 PG (ref 26–34)
MCHC RBC AUTO-ENTMCNC: 30.3 G/DL (ref 31–37)
MCV RBC AUTO: 92.2 FL (ref 80–100)
MONOCYTES # BLD: 4 % (ref 1–7)
MORPHOLOGY: ABNORMAL
MUCUS: ABNORMAL
MYOGLOBIN: 559 NG/ML (ref 25–58)
NITRITE, URINE: NEGATIVE
NRBC AUTOMATED: ABNORMAL PER 100 WBC
OSMOLALITY URINE: 591 MOSM/KG (ref 80–1300)
OTHER OBSERVATIONS UA: ABNORMAL
PDW BLD-RTO: 14.5 % (ref 12.5–15.4)
PH UA: 5.5 (ref 5–8)
PLATELET # BLD: 171 K/UL (ref 140–450)
PLATELET ESTIMATE: ABNORMAL
PMV BLD AUTO: 10.1 FL (ref 6–12)
POTASSIUM SERPL-SCNC: 3.6 MMOL/L (ref 3.7–5.3)
POTASSIUM SERPL-SCNC: 3.7 MMOL/L (ref 3.7–5.3)
PROTEIN UA: NEGATIVE
RBC # BLD: 4.47 M/UL (ref 4–5.2)
RBC # BLD: ABNORMAL 10*6/UL
RBC UA: ABNORMAL /HPF (ref 0–4)
RENAL EPITHELIAL, UA: ABNORMAL /HPF
SARS-COV-2, RAPID: NOT DETECTED
SARS-COV-2: NORMAL
SARS-COV-2: NORMAL
SEG NEUTROPHILS: 68 % (ref 36–66)
SEGMENTED NEUTROPHILS ABSOLUTE COUNT: 4.89 K/UL (ref 1.8–7.7)
SODIUM BLD-SCNC: 163 MMOL/L (ref 135–144)
SODIUM BLD-SCNC: 163 MMOL/L (ref 135–144)
SODIUM BLD-SCNC: 164 MMOL/L (ref 135–144)
SODIUM BLD-SCNC: 165 MMOL/L (ref 135–144)
SODIUM,UR: 22 MMOL/L
SOURCE: NORMAL
SPECIFIC GRAVITY UA: 1.02 (ref 1–1.03)
TOTAL CK: 161 U/L (ref 26–192)
TOTAL PROTEIN: 6.8 G/DL (ref 6.4–8.3)
TRICHOMONAS: ABNORMAL
TROPONIN INTERP: ABNORMAL
TROPONIN T: ABNORMAL NG/ML
TROPONIN, HIGH SENSITIVITY: 32 NG/L (ref 0–14)
TURBIDITY: ABNORMAL
URINE HGB: NEGATIVE
UROBILINOGEN, URINE: NORMAL
WBC # BLD: 7.2 K/UL (ref 3.5–11)
WBC # BLD: ABNORMAL 10*3/UL
WBC UA: ABNORMAL /HPF (ref 0–5)
YEAST: ABNORMAL

## 2021-01-13 PROCEDURE — 2000000000 HC ICU R&B

## 2021-01-13 PROCEDURE — 87186 SC STD MICRODIL/AGAR DIL: CPT

## 2021-01-13 PROCEDURE — U0002 COVID-19 LAB TEST NON-CDC: HCPCS

## 2021-01-13 PROCEDURE — 96360 HYDRATION IV INFUSION INIT: CPT

## 2021-01-13 PROCEDURE — 71045 X-RAY EXAM CHEST 1 VIEW: CPT

## 2021-01-13 PROCEDURE — 83935 ASSAY OF URINE OSMOLALITY: CPT

## 2021-01-13 PROCEDURE — 87088 URINE BACTERIA CULTURE: CPT

## 2021-01-13 PROCEDURE — 84300 ASSAY OF URINE SODIUM: CPT

## 2021-01-13 PROCEDURE — 6370000000 HC RX 637 (ALT 250 FOR IP): Performed by: EMERGENCY MEDICINE

## 2021-01-13 PROCEDURE — 87077 CULTURE AEROBIC IDENTIFY: CPT

## 2021-01-13 PROCEDURE — 80076 HEPATIC FUNCTION PANEL: CPT

## 2021-01-13 PROCEDURE — 84295 ASSAY OF SERUM SODIUM: CPT

## 2021-01-13 PROCEDURE — 70450 CT HEAD/BRAIN W/O DYE: CPT

## 2021-01-13 PROCEDURE — 85025 COMPLETE CBC W/AUTO DIFF WBC: CPT

## 2021-01-13 PROCEDURE — 82947 ASSAY GLUCOSE BLOOD QUANT: CPT

## 2021-01-13 PROCEDURE — 83874 ASSAY OF MYOGLOBIN: CPT

## 2021-01-13 PROCEDURE — 6370000000 HC RX 637 (ALT 250 FOR IP): Performed by: STUDENT IN AN ORGANIZED HEALTH CARE EDUCATION/TRAINING PROGRAM

## 2021-01-13 PROCEDURE — 2500000003 HC RX 250 WO HCPCS: Performed by: STUDENT IN AN ORGANIZED HEALTH CARE EDUCATION/TRAINING PROGRAM

## 2021-01-13 PROCEDURE — 87040 BLOOD CULTURE FOR BACTERIA: CPT

## 2021-01-13 PROCEDURE — 82010 KETONE BODYS QUAN: CPT

## 2021-01-13 PROCEDURE — 80048 BASIC METABOLIC PNL TOTAL CA: CPT

## 2021-01-13 PROCEDURE — 99285 EMERGENCY DEPT VISIT HI MDM: CPT

## 2021-01-13 PROCEDURE — 87086 URINE CULTURE/COLONY COUNT: CPT

## 2021-01-13 PROCEDURE — 82553 CREATINE MB FRACTION: CPT

## 2021-01-13 PROCEDURE — 96372 THER/PROPH/DIAG INJ SC/IM: CPT

## 2021-01-13 PROCEDURE — 2580000003 HC RX 258: Performed by: STUDENT IN AN ORGANIZED HEALTH CARE EDUCATION/TRAINING PROGRAM

## 2021-01-13 PROCEDURE — 87205 SMEAR GRAM STAIN: CPT

## 2021-01-13 PROCEDURE — 36415 COLL VENOUS BLD VENIPUNCTURE: CPT

## 2021-01-13 PROCEDURE — 83605 ASSAY OF LACTIC ACID: CPT

## 2021-01-13 PROCEDURE — 93005 ELECTROCARDIOGRAM TRACING: CPT | Performed by: EMERGENCY MEDICINE

## 2021-01-13 PROCEDURE — 82550 ASSAY OF CK (CPK): CPT

## 2021-01-13 PROCEDURE — 84484 ASSAY OF TROPONIN QUANT: CPT

## 2021-01-13 PROCEDURE — 87641 MR-STAPH DNA AMP PROBE: CPT

## 2021-01-13 PROCEDURE — 81001 URINALYSIS AUTO W/SCOPE: CPT

## 2021-01-13 PROCEDURE — 2580000003 HC RX 258: Performed by: EMERGENCY MEDICINE

## 2021-01-13 RX ORDER — DEXTROSE MONOHYDRATE 25 G/50ML
12.5 INJECTION, SOLUTION INTRAVENOUS PRN
Status: DISCONTINUED | OUTPATIENT
Start: 2021-01-13 | End: 2021-01-23 | Stop reason: HOSPADM

## 2021-01-13 RX ORDER — LIDOCAINE HYDROCHLORIDE 10 MG/ML
20 INJECTION, SOLUTION INFILTRATION; PERINEURAL ONCE
Status: COMPLETED | OUTPATIENT
Start: 2021-01-13 | End: 2021-01-13

## 2021-01-13 RX ORDER — SODIUM CHLORIDE 450 MG/100ML
INJECTION, SOLUTION INTRAVENOUS CONTINUOUS
Status: DISCONTINUED | OUTPATIENT
Start: 2021-01-13 | End: 2021-01-23 | Stop reason: ALTCHOICE

## 2021-01-13 RX ORDER — ACETAMINOPHEN 650 MG/1
650 SUPPOSITORY RECTAL EVERY 6 HOURS PRN
Status: DISCONTINUED | OUTPATIENT
Start: 2021-01-13 | End: 2021-01-23 | Stop reason: HOSPADM

## 2021-01-13 RX ORDER — PROMETHAZINE HYDROCHLORIDE 12.5 MG/1
12.5 TABLET ORAL EVERY 6 HOURS PRN
Status: DISCONTINUED | OUTPATIENT
Start: 2021-01-13 | End: 2021-01-23 | Stop reason: HOSPADM

## 2021-01-13 RX ORDER — SODIUM CHLORIDE 450 MG/100ML
INJECTION, SOLUTION INTRAVENOUS CONTINUOUS
Status: DISCONTINUED | OUTPATIENT
Start: 2021-01-13 | End: 2021-01-13 | Stop reason: HOSPADM

## 2021-01-13 RX ORDER — POTASSIUM CHLORIDE 20 MEQ/1
20 TABLET, EXTENDED RELEASE ORAL ONCE
Status: COMPLETED | OUTPATIENT
Start: 2021-01-13 | End: 2021-01-13

## 2021-01-13 RX ORDER — ONDANSETRON 2 MG/ML
4 INJECTION INTRAMUSCULAR; INTRAVENOUS EVERY 6 HOURS PRN
Status: DISCONTINUED | OUTPATIENT
Start: 2021-01-13 | End: 2021-01-23 | Stop reason: HOSPADM

## 2021-01-13 RX ORDER — POLYETHYLENE GLYCOL 3350 17 G/17G
17 POWDER, FOR SOLUTION ORAL DAILY PRN
Status: DISCONTINUED | OUTPATIENT
Start: 2021-01-13 | End: 2021-01-23 | Stop reason: HOSPADM

## 2021-01-13 RX ORDER — NICOTINE POLACRILEX 4 MG
15 LOZENGE BUCCAL PRN
Status: DISCONTINUED | OUTPATIENT
Start: 2021-01-13 | End: 2021-01-23 | Stop reason: HOSPADM

## 2021-01-13 RX ORDER — ACETAMINOPHEN 325 MG/1
650 TABLET ORAL EVERY 6 HOURS PRN
Status: DISCONTINUED | OUTPATIENT
Start: 2021-01-13 | End: 2021-01-23 | Stop reason: HOSPADM

## 2021-01-13 RX ORDER — 0.9 % SODIUM CHLORIDE 0.9 %
1000 INTRAVENOUS SOLUTION INTRAVENOUS ONCE
Status: COMPLETED | OUTPATIENT
Start: 2021-01-13 | End: 2021-01-13

## 2021-01-13 RX ORDER — SODIUM CHLORIDE 0.9 % (FLUSH) 0.9 %
10 SYRINGE (ML) INJECTION EVERY 12 HOURS SCHEDULED
Status: DISCONTINUED | OUTPATIENT
Start: 2021-01-13 | End: 2021-01-23 | Stop reason: HOSPADM

## 2021-01-13 RX ORDER — SODIUM CHLORIDE 0.9 % (FLUSH) 0.9 %
10 SYRINGE (ML) INJECTION PRN
Status: DISCONTINUED | OUTPATIENT
Start: 2021-01-13 | End: 2021-01-23 | Stop reason: HOSPADM

## 2021-01-13 RX ORDER — DEXTROSE MONOHYDRATE 50 MG/ML
100 INJECTION, SOLUTION INTRAVENOUS PRN
Status: DISCONTINUED | OUTPATIENT
Start: 2021-01-13 | End: 2021-01-23 | Stop reason: HOSPADM

## 2021-01-13 RX ADMIN — SODIUM CHLORIDE, PRESERVATIVE FREE 10 ML: 5 INJECTION INTRAVENOUS at 20:35

## 2021-01-13 RX ADMIN — SODIUM CHLORIDE: 4.5 INJECTION, SOLUTION INTRAVENOUS at 10:47

## 2021-01-13 RX ADMIN — INSULIN HUMAN 8 UNITS: 100 INJECTION, SOLUTION PARENTERAL at 10:45

## 2021-01-13 RX ADMIN — SODIUM CHLORIDE: 4.5 INJECTION, SOLUTION INTRAVENOUS at 18:34

## 2021-01-13 RX ADMIN — INSULIN LISPRO 2 UNITS: 100 INJECTION, SOLUTION INTRAVENOUS; SUBCUTANEOUS at 17:26

## 2021-01-13 RX ADMIN — SODIUM CHLORIDE 500 ML: 4.5 INJECTION, SOLUTION INTRAVENOUS at 20:35

## 2021-01-13 RX ADMIN — INSULIN LISPRO 1 UNITS: 100 INJECTION, SOLUTION INTRAVENOUS; SUBCUTANEOUS at 20:42

## 2021-01-13 RX ADMIN — LIDOCAINE HYDROCHLORIDE 20 ML: 10 INJECTION, SOLUTION INFILTRATION; PERINEURAL at 22:41

## 2021-01-13 RX ADMIN — SODIUM CHLORIDE 1000 ML: 9 INJECTION, SOLUTION INTRAVENOUS at 09:21

## 2021-01-13 RX ADMIN — POTASSIUM CHLORIDE 20 MEQ: 20 TABLET, EXTENDED RELEASE ORAL at 10:31

## 2021-01-13 NOTE — ED NOTES
Branden Bellamy states \" Crew is at [place] and after they are complete they should be there in 15 min\"      Severiano Knoll, OLGA  01/13/21 [id number]

## 2021-01-13 NOTE — ED PROVIDER NOTES
Urinary incontinence. SURGICAL HISTORY      has a past surgical history that includes Spine surgery; Nerve Block (04/23/2012); Nerve Block (05/22/2012); Nerve Block (01/14/2013); Nerve Block (01/21/2013); Nerve Block (01/28/2013); Cardiac catheterization (01/2013); Lumbar spine surgery (2007); Vena Cava Filter Placement (2007); Tonsillectomy; joint replacement; knee surgery (10/21/2013); knee surgery (12/02/2013); knee surgery (12/09/2013); other surgical history (Right, 07/14/2014); other surgical history (Right, 03/16/2015); Upper gastrointestinal endoscopy; Colonoscopy (09/2015); other surgical history (Right, 06/13/2016); Coronary angioplasty with stent (1012-16); Nerve Block (Right, 04/17/2017); Gastrostomy tube placement (12/28/2020); and Gastrostomy tube placement (N/A, 12/28/2020). CURRENTMEDICATIONS       Previous Medications    ALBUTEROL-IPRATROPIUM (COMBIVENT RESPIMAT)  MCG/ACT AERS INHALER    Inhale 1 puff into the lungs every 6 hours as needed for Wheezing    AMLODIPINE (NORVASC) 10 MG TABLET    Take 1 tablet by mouth daily    ASPIRIN (RA ASPIRIN EC) 81 MG EC TABLET    Take 1 tablet by mouth daily    ATORVASTATIN (LIPITOR) 80 MG TABLET    TAKE 1 TABLET BY MOUTH DAILY    CALCIUM CARBONATE-VITAMIN D (OYSTER SHELL CALCIUM/D) 500-200 MG-UNIT TABS    TAKE ONE TABLET BY MOUTH TWO TIMES A DAY    CLONIDINE (CATAPRES) 0.1 MG TABLET    Take 1 tablet by mouth daily    CLOPIDOGREL (PLAVIX) 75 MG TABLET    Take 1 tablet by mouth daily    DOCUSATE (COLACE) 50 MG/5ML LIQUID    Take 10 mLs by mouth daily    FUROSEMIDE (LASIX) 40 MG TABLET    Take 1 tablet by mouth daily    GLIMEPIRIDE (AMARYL) 2 MG TABLET    Take 1 tablet by mouth every morning    GLUCOSE BLOOD VI TEST STRIPS (ASCENSIA AUTODISC VI;ONE TOUCH ULTRA TEST VI) STRIP    1 each by In Vitro route 3 times daily As needed.     INCONTINENCE SUPPLY DISPOSABLE (INCONTINENCE BRIEF LARGE) MISC    Use 4-5/day as needed    LANCETS MISC    Use 1 -2 times daily Insulin dependent diabetes mellitus    LINAGLIPTIN (TRADJENTA) 5 MG TABLET    Take 1 tablet by mouth daily    LOSARTAN (COZAAR) 25 MG TABLET    Take 1 tablet by mouth daily    METOPROLOL TARTRATE 75 MG TABS    Take 75 mg by mouth 2 times daily    QUETIAPINE (SEROQUEL) 25 MG TABLET    Take 25 mg by mouth 2 times daily    RIVASTIGMINE (EXELON) 9.5 MG/24HR    Place 1 patch onto the skin daily    SERTRALINE (ZOLOFT) 100 MG TABLET    Take 1 tablet by mouth daily       ALLERGIES     is allergic to bactrim; penicillins; and tylenol [acetaminophen]. FAMILY HISTORY     She indicated that her mother is . She indicated that her father is . She indicated that the status of her sister is unknown. She indicated that the status of her brother is unknown.     family history includes Cancer in her father; Diabetes in her mother; High Blood Pressure in her brother and sister. SOCIAL HISTORY      reports that she quit smoking about 7 years ago. She smoked 0.50 packs per day for 0.00 years. She has never used smokeless tobacco. She reports current alcohol use of about 1.0 standard drinks of alcohol per week. She reports that she does not use drugs. PHYSICAL EXAM    (up to 7 for level 4, 8 or more for level 5)   INITIAL VITALS:  weight is 99.8 kg (220 lb). Her axillary temperature is 97.5 °F (36.4 °C). Her blood pressure is 118/64 and her pulse is 82. Her respiration is 16 and oxygen saturation is 96%. Physical Exam  Vitals signs and nursing note reviewed. Constitutional:       Comments: Patient's eyes are open she will follow simple commands purposefully with the right side of her body   HENT:      Head: Normocephalic and atraumatic. Mouth/Throat:      Comments: Mucous membranes do appear dry  Eyes:      Extraocular Movements: Extraocular movements intact. Conjunctiva/sclera: Conjunctivae normal.      Pupils: Pupils are equal, round, and reactive to light.    Neck:      Musculoskeletal: Normal range of motion and neck supple. No neck rigidity or muscular tenderness. Cardiovascular:      Rate and Rhythm: Normal rate and regular rhythm. Pulmonary:      Effort: Pulmonary effort is normal. No respiratory distress. Breath sounds: Normal breath sounds. No stridor. No wheezing, rhonchi or rales. Abdominal:      Comments: Protuberant abdomen that is otherwise soft nontender to palpation positive bowel sounds PEG tube in place without any signs of infection   Musculoskeletal:      Comments: Patient follows simple commands with the right side of her body no  strength or movement of the left arm no movement of the left lower extremity   Lymphadenopathy:      Cervical: No cervical adenopathy. Skin:     General: Skin is warm and dry. Neurological:      Comments: Patient's eyes are open she does communicate with this she follows simple commands with the right side of her body         DIFFERENTIAL DIAGNOSIS/ MDM:     In looking at the record it looks like the patient did have some confusion after her stroke with decreased mentation and not able to ascertain a baseline mental status so I will get a CT scan of the head EKG chest x-ray UA labs    DIAGNOSTIC RESULTS     EKG: All EKG's are interpreted by the Emergency Department Physician who either signs or Co-signs this chart in the [address] a cardiologist.      Interpreted by Shwetha Mondragon MD     Rhythm: normal sinus   Rate: 79  Axis: -2  Ectopy: none  Conduction: normal  ST Segments: no acute change  T Waves: no acute change  Q Waves: none    Clinical Impression: normal sinus rhythm with no acute changes/normal EKG. No acute infarction/ischemia noted.       RADIOLOGY:  Non-plain film images such as CT, Ultrasound and MRI are read by the radiologist. Plain radiographic images are visualized and the radiologist interpretations are reviewed as follows:         Interpretation per the Radiologist below, if available at the time of this note:    CT HEAD WO CONTRAST (Final result)  Result time 01/13/21 10:09:02  Final result by Pawan Pace MD (01/13/21 10:09:02)                Impression:    1. Redemonstration of extensive right MCA territory infarct.  No new infarct. 2. New or increasing hyperdense serpiginous gyral hyperdensities along   posterior aspect of the infarct in the parietal and to a less and extent   temporal lobe and also along the margins of the right caudate nucleus is   suggestive of some blood products.  This may be further assessed with MRI if   deemed clinically necessary. .             Narrative:    EXAMINATION:   CT OF THE HEAD WITHOUT CONTRAST  1/13/2021 9:27 am     TECHNIQUE:   CT of the head was performed without the administration of intravenous   contrast. Dose modulation, iterative reconstruction, and/or weight based   adjustment of the mA/kV was utilized to reduce the radiation dose to as low   as reasonably achievable. COMPARISON:   None. HISTORY:   ORDERING SYSTEM PROVIDED HISTORY: altered mental status   TECHNOLOGIST PROVIDED HISTORY:     altered mental status   Reason for Exam: AMS. Recent CVA with right side deficits. Acuity: Acute   Type of Exam: Initial     FINDINGS:   BRAIN/VENTRICLES: There is mild cerebral atrophy.  No midline shift.  Basal   cisterns normally outlined.  Redemonstration of right MCA territory infarct   with extensive edema/encephalomalacia in the right frontotemporal parietal   region.  Small areas of general serpiginous hypodensity along the posterior   aspect of the infarct in the parietal lobe and along side the right basal   ganglia suggestive of some blood products. ORBITS: The visualized portion of the orbits demonstrate no acute abnormality. SINUSES: The visualized paranasal sinuses and mastoid air cells demonstrate   no acute abnormality.      SOFT TISSUES/SKULL:  No acute abnormality of the visualized skull or soft   tissues.                     XR CHEST PORTABLE (Final result)  Result time 01/13/21 09:52:56  Final result by Myriam Regalado MD (01/13/21 09:52:56)                Impression:    No acute cardiopulmonary findings             Narrative:    EXAMINATION:   ONE XRAY VIEW OF THE CHEST     1/13/2021 9:27 am     COMPARISON:   December 18, 2020, chest exam     HISTORY:   ORDERING SYSTEM PROVIDED HISTORY: altered mental status   TECHNOLOGIST PROVIDED HISTORY:   altered mental status   Reason for Exam: AMS. Hx of CHF, DM, CAD. Acuity: Acute   Type of Exam: Initial   Relevant Medical/Surgical History: Hx of CHF, DM, CAD.      FINDINGS:   Stable nasogastric tube, cardiac silhouette     There are no significant pleural or parenchymal findings     Mild tortuosity of the thoracic aorta     Degenerative changes of the shoulders                   LABS:  Results for orders placed or performed during the hospital encounter of 01/13/21   CBC Auto Differential   Result Value Ref Range    WBC 7.2 3.5 - 11.0 k/uL    RBC 4.47 4.0 - 5.2 m/uL    Hemoglobin 12.5 12.0 - 16.0 g/dL    Hematocrit 41.2 36 - 46 %    MCV 92.2 80 - 100 fL    MCH 27.9 26 - 34 pg    MCHC 30.3 (L) 31 - 37 g/dL    RDW 14.5 12.5 - 15.4 %    Platelets 052 402 - 240 k/uL    MPV 10.1 6.0 - 12.0 fL    NRBC Automated NOT REPORTED per 100 WBC    Differential Type NOT REPORTED     Immature Granulocytes NOT REPORTED 0 %    Absolute Immature Granulocyte NOT REPORTED 0.00 - 0.30 k/uL    WBC Morphology NOT REPORTED     RBC Morphology NOT REPORTED     Platelet Estimate NOT REPORTED     Seg Neutrophils 68 (H) 36 - 66 %    Lymphocytes 23 (L) 24 - 44 %    Monocytes 4 1 - 7 %    Eosinophils % 4 1 - 4 %    Basophils 1 0 - 2 %    Segs Absolute 4.89 1.8 - 7.7 k/uL    Absolute Lymph # 1.66 1.0 - 4.8 k/uL    Absolute Mono # 0.29 0.1 - 0.8 k/uL    Absolute Eos # 0.29 0.0 - 0.4 k/uL    Basophils Absolute 0.07 0.0 - 0.2 k/uL    Morphology HYPOCHROMIA PRESENT    Basic Metabolic Panel   Result Value Ref Range    Glucose 372 (H) 70 - 99 mg/dL    BUN 78 (H) 8 - 23 mg/dL    CREATININE 0.90 0.50 - 0.90 mg/dL    Bun/Cre Ratio NOT REPORTED 9 - 20    Calcium 10.3 8.6 - 10.4 mg/dL    Sodium 163 (HH) 135 - 144 mmol/L    Potassium 3.6 (L) 3.7 - 5.3 mmol/L    Chloride 113 (H) 98 - 107 mmol/L    CO2 40 (H) 20 - 31 mmol/L    Anion Gap 10 9 - 17 mmol/L    GFR Non-African American >60 >60 mL/min    GFR African American >60 >60 mL/min    GFR Comment          GFR Staging NOT REPORTED    Troponin   Result Value Ref Range    Troponin, High Sensitivity 32 (H) 0 - 14 ng/L    Troponin T NOT REPORTED <0.03 ng/mL    Troponin Interp NOT REPORTED    Lactate, Sepsis   Result Value Ref Range    Lactic Acid, Sepsis 2.5 (H) 0.5 - 1.9 mmol/L    Lactic Acid, Sepsis, Whole Blood NOT REPORTED 0.5 - 1.9 mmol/L   Hepatic Function Panel   Result Value Ref Range    Alb 2.8 (L) 3.5 - 5.2 g/dL    Alkaline Phosphatase 78 35 - 104 U/L    ALT 13 5 - 33 U/L    AST 22 <32 U/L    Total Bilirubin 0.30 0.3 - 1.2 mg/dL    Bilirubin, Direct 0.10 <0.31 mg/dL    Bilirubin, Indirect 0.20 0.00 - 1.00 mg/dL    Total Protein 6.8 6.4 - 8.3 g/dL    Globulin NOT REPORTED 1.5 - 3.8 g/dL    Albumin/Globulin Ratio 0.7 (L) 1.0 - 2.5   Acetone   Result Value Ref Range    Beta-Hydroxybutyrate 0.15 0.02 - 0.27 mmol/L   Myoglobin, Serum   Result Value Ref Range    Myoglobin 559 (H) 25 - 58 ng/mL   CK   Result Value Ref Range    Total  26 - 192 U/L   CK MB   Result Value Ref Range    CK-MB 1.8 <5.4 ng/mL   COVID-19    Specimen: Other   Result Value Ref Range    SARS-CoV-2          SARS-CoV-2, Rapid Not Detected Not Detected    Source . NASOPHARYNGEAL SWAB     SARS-CoV-2                 EMERGENCY DEPARTMENT COURSE:   Vitals:    Vitals:    01/13/21 0915 01/13/21 1051 01/13/21 1111 01/13/21 1134   BP:  122/79 (!) 93/53 118/64   Pulse:  85 79 82   Resp:  20 17 16   Temp: 97.5 °F (36.4 °C)      TempSrc: Axillary      SpO2:  98% 96% 96%   Weight:         -------------------------  BP: 118/64, Temp: 97.5 °F (36.4 °C), Pulse: 82, Resp: 16      RE-EVALUATION:  Patient Is noted to have hypernatremia hyperglycemia and a slightly low potassium level we are switching her IV fluids from normal saline to one half normal saline to start treating the hyponatremia I did give her 20 mEq of potassium I will then give the patient some insulin to start treating her hyperglycemia given the degree of hypernatremia I do feel the patient warrants admission back to the hospital she was previously admitted at [address] [place] [address] so I will contact their hospitalist for admission    CONSULTS:  Discussed the patient with my hospitalist with the degree of hypernatremia they are requesting that we discussed the patient with critical care and discussing the patient with critical care Dr. Amara Cortes he is kind enough to accept the patient to his service    PROCEDURES:  None    FINAL IMPRESSION      1. Hypernatremia    2. Hyperglycemia          DISPOSITION/PLAN   DISPOSITION        CONDITION ON DISPOSITION:   Stable    PATIENT REFERRED TO:  No follow-up provider specified. DISCHARGE MEDICATIONS:  New Prescriptions    No medications on file       (Please note that portions of this note were completed with a voicerecognition program.  Efforts were made to edit the dictations but occasionally words are mis-transcribed.)    [place] MD, F.A.C.E.P.   Attending Emergency Medicine Physician       Igor Tovar MD  01/19/21 1949

## 2021-01-13 NOTE — H&P
Critical Care - History and Physical Examination    Patient's name:  Joyce Prather  Medical Record Number: [id number]  Patient's account/billing number: [de-identified]  Patient's YOB: 1950  Age: 78 y.o. Date of Admission: 1/13/2021  4:15 PM  Date of History and Physical Examination: 1/13/2021      Primary Care Physician: Alexys Crawford MD  Attending Physician: Dr. Delia Scott Status: Full Code    Chief complaint: Altered mental status , hyperglycemia     HISTORY OF PRESENT ILLNESS:   History was obtained from chart review. Joyce Prather is a 78 y.o. female who [facility] emergency department for concerns for altered mental status, hyperglycemia, and hypernatremia. Patient presented to the emergency department after she was found to be hyperglycemic on her daily glucose checks at her rehab facility. Patient is status post 2 weeks out from a right MCA infarct, had a extended course here at [facility]. [facility] neuro critical care unit, no thrombectomy or interventions were performed, patient was started on heparin and out of TPA window. Patient does have a past medical history of diabetes, hypertension, asthma, daily smoker. On examination patient has a obvious left-sided facial droop, left-sided weakness, however intermittently is following commands and unsure if complete participation in exam is occurring due to altered mentation from hyponatremia. Plan to consult nephrology, neuro critical care as patient does have new findings on CT head concerning for acute blood.     Past Medical History:        Diagnosis Date    Allergic rhinitis     Anxiety 10/25/2016    Asthma     CAD (coronary artery disease)     s/p stents RCA and LAD 2009,    Chronic back pain     Congestive heart failure ([address]     Depression     Headache(784.0)     Hiatal hernia     Hypercholesteremia 2/21/2012    Hypertension     Kidney stones     years ago    Obesity     Osteoarthritis     Postlaminectomy syndrome 6/6/2012    Type II or unspecified type diabetes mellitus without mention of complication, not stated as uncontrolled     Unspecified sleep apnea     Urinary incontinence        Past Surgical History:        Procedure Laterality Date    CARDIAC CATHETERIZATION  01/2013    patent stents    COLONOSCOPY  09/2015    normal    CORONARY ANGIOPLASTY WITH STENT PLACEMENT  1012-16    stents x 3    GASTROSTOMY TUBE PLACEMENT  12/28/2020    EGD PEG TUBE PLACEMENT    GASTROSTOMY TUBE PLACEMENT N/A 12/28/2020    EGD PEG TUBE PLACEMENT performed by Marni Johnson MD at [address]      left knee    KNEE SURGERY  10/21/2013    rt knee synvisc injection     KNEE SURGERY  12/02/2013    knee synvisc injection rt #2    KNEE SURGERY  12/09/2013    rt knee synvisc inj    LUMBAR SPINE SURGERY  2007    NERVE BLOCK  04/23/2012    Right MBNB L3, L4, L5    NERVE BLOCK  05/22/2012    Right MBNB L3, L4, and L5     NERVE BLOCK  01/14/2013    Right knee injection #1 - Synvisc    NERVE BLOCK  01/21/2013    Right knee injection #2 - Synvisc    NERVE BLOCK  01/28/2013    Rigth knee synvisc injection #3    NERVE BLOCK Right 04/17/2017    Rt genicular nerve block. no steroid used    OTHER SURGICAL HISTORY Right 07/14/2014    synvisc one knee injection    OTHER SURGICAL HISTORY Right 03/16/2015    synvisc one knee injection    OTHER SURGICAL HISTORY Right 06/13/2016    synvisc right knee injection    SPINE SURGERY      TONSILLECTOMY      UPPER GASTROINTESTINAL ENDOSCOPY      VENA CAVA FILTER PLACEMENT  2007    PE and B/L LE emboli       Allergies: Allergies   Allergen Reactions    Bactrim     Penicillins     Tylenol [Acetaminophen] Other (See Comments)     constipation         Home Meds:   Prior to Admission medications    Medication Sig Start Date End Date Taking?  Authorizing Provider   losartan (COZAAR) 25 MG tablet Take 1 tablet by mouth daily 12/29/20   Ajit Burns MD   metoprolol tartrate 75 MG TABS Take 75 mg by mouth 2 times daily 12/29/20   Jamarcus Faye MD   docusate (COLACE) 50 MG/5ML liquid Take 10 mLs by mouth daily 12/30/20   Jamarcus Faye MD   rivastigmine (EXELON) 9.5 MG/24HR Place 1 patch onto the skin daily    Historical Provider, MD   QUEtiapine (SEROQUEL) 25 MG tablet Take 25 mg by mouth 2 times daily    Historical Provider, MD   Calcium Carbonate-Vitamin D (OYSTER SHELL CALCIUM/D) 500-200 MG-UNIT TABS TAKE ONE TABLET BY MOUTH TWO TIMES A DAY 1/17/19   Kyra Pineda MD   glimepiride (AMARYL) 2 MG tablet Take 1 tablet by mouth every morning 12/19/18   Kyra Pineda MD   linagliptin (TRADJENTA) 5 MG tablet Take 1 tablet by mouth daily 12/19/18   Kyra Pineda MD   cloNIDine (CATAPRES) 0.1 MG tablet Take 1 tablet by mouth daily 12/19/18   Kyra Pineda MD   amLODIPine (NORVASC) 10 MG tablet Take 1 tablet by mouth daily 9/13/18   Kyra Pineda MD   furosemide (LASIX) 40 MG tablet Take 1 tablet by mouth daily 9/13/18   Kyra Pineda MD   atorvastatin (LIPITOR) 80 MG tablet TAKE 1 TABLET BY MOUTH DAILY 9/5/18   Krya Pineda MD   sertraline (ZOLOFT) 100 MG tablet Take 1 tablet by mouth daily 9/5/18   Kyra Pineda MD   clopidogrel (PLAVIX) 75 MG tablet Take 1 tablet by mouth daily 8/13/18   Brennen Chowdhury MD   aspirin (RA ASPIRIN EC) 81 MG EC tablet Take 1 tablet by mouth daily 8/13/18   Brennen Chowdhury MD   Incontinence Supply Disposable (INCONTINENCE BRIEF LARGE) MISC Use 4-5/day as needed 6/12/18   Kyra Pineda MD   glucose blood VI test strips (ASCENSIA AUTODISC VI;ONE TOUCH ULTRA TEST VI) strip 1 each by In Vitro route 3 times daily As needed.  8/1/17   Willi Pacheco MD   albuterol-ipratropium (COMBIVENT RESPIMAT)  MCG/ACT AERS inhaler Inhale 1 puff into the lungs every 6 hours as needed for Wheezing 7/17/17   Kyra Pineda MD   Lancets MISC Use 1 -2 times daily Insulin dependent diabetes mellitus 7/17/17   Kyra Pineda MD Social History:   TOBACCO:   reports that she quit smoking about 7 years ago. She smoked 0.50 packs per day for 0.00 years. She has never used smokeless tobacco.  ETOH:   reports current alcohol use of about 1.0 standard drinks of alcohol per week. DRUGS:  reports no history of drug use. Family History:       Problem Relation Age of Onset    Diabetes Mother     Cancer Father     High Blood Pressure Sister     High Blood Pressure Brother        REVIEW OF SYSTEMS (ROS):  Unable to participate 2/2 ams     Physical Exam:    Vitals: /61   Pulse 83   Temp 98.4 °F (36.9 °C) (Oral)   Resp 16   Wt 214 lb 4.6 oz (97.2 kg)   LMP  (LMP Unknown)   BMI 36.78 kg/m²     Last Body weight:   Wt Readings from Last 3 Encounters:   01/13/21 214 lb 4.6 oz (97.2 kg)   01/13/21 220 lb (99.8 kg)   12/28/20 231 lb (104.8 kg)       Body Mass Index : Body mass index is 36.78 kg/m². PHYSICAL EXAMINATION :  Physical Exam  Constitutional:       Comments: Somnolent but in no acute distress, breathing room air    HENT:      Mouth/Throat:      Comments: Dry oral mucous membranes, left sided facial droop   Eyes:      Pupils: Pupils are equal, round, and reactive to light. Comments: Not following commands to assess eom, no nystagmus present    Cardiovascular:      Rate and Rhythm: Normal rate. Pulmonary:      Effort: Pulmonary effort is normal.      Breath sounds: Normal breath sounds. Abdominal:      General: Bowel sounds are normal.      Palpations: Abdomen is soft. Musculoskeletal: Normal range of motion. General: No swelling or tenderness. Neurological:      Comments: Left sided residual deficits           Laboratory findings:-    CBC:   Recent Labs     01/13/21  0919   WBC 7.2   HGB 12.5        BMP:    Recent Labs     01/13/21  0919 01/13/21  1325   * 165*   K 3.6* 3.7   * 116*   CO2 40* 43*   BUN 78* 79*   CREATININE 0.90 0.90   GLUCOSE 372* 234*     S.  Calcium:  Recent Labs     01/13/21  1325   CALCIUM 9.9     S. Ionized Calcium:No results for input(s): IONCA in the last 72 hours. S. Magnesium:No results for input(s): MG in the last 72 hours. S. Phosphorus:No results for input(s): PHOS in the last 72 hours. S. Glucose:  Recent Labs     01/13/21  1224 01/13/21  1705   POCGLU 207* 177*     Glycosylated hemoglobin A1C: No results for input(s): LABA1C in the last 72 hours. INR: No results for input(s): INR in the last 72 hours. Hepatic functions:   Recent Labs     01/13/21  0919   ALKPHOS 78   ALT 13   AST 22   PROT 6.8   BILITOT 0.30   BILIDIR 0.10   LABALBU 2.8*     Pancreatic functions:No results for input(s): LACTA, AMYLASE in the last 72 hours. S. Lactic Acid: No results for input(s): LACTA in the last 72 hours. Cardiac enzymes:  Recent Labs     01/13/21  0919   CKTOTAL 161   CKMB 1.8     BNP:No results for input(s): BNP in the last 72 hours. Lipid profile: No results for input(s): CHOL, TRIG, HDL, LDLCALC in the last 72 hours. Invalid input(s): LDL  Blood Gases: No results found for: PH, PCO2, PO2, HCO3, O2SAT  Thyroid functions:   Lab Results   Component Value Date    TSH 1.38 12/19/2018            Microbiology: Cultures during this admission:     Blood cultures:                 [] None drawn      [x] Negative             []  Positive (Details:  )  Urine Culture:                   [] None drawn      [x] Negative             []  Positive (Details:  )  Sputum Culture:               [] None drawn       [x] Negative             []  Positive (Details:  )   Endotracheal aspirate:     [] None drawn       [x] Negative             []  Positive (Details:  )     -----------------------------------------------------------------  Radiological reports:   XR CHEST PORTABLE   Final Result   No acute finding or significant change.            \       Assessment and Plan       Patient Active Problem List   Diagnosis    DM (diabetes mellitus) ([facility]    HTN (hypertension)    Smoker    Asthma    Knee osteoarthritis    Edema    Spinal stenosis in cervical region    Chest pain    YUDITH (obstructive sleep apnea)    Morbid obesity (HCC)    Knee pain, bilateral    S/P TKR (total knee replacement)    Urge incontinence of urine    Lumbar spondylosis    Cervical spondylosis    Chronic use of opiate drugs therapeutic purposes    Chronic knee pain    Hypertensive urgency    Acute coronary syndrome (HCC)    Congestive heart failure (HCC)    Lymphadenopathy    Chronic pain    Coronary artery disease involving native coronary artery without angina pectoris    Chronic prescription opiate use    Type 2 diabetes mellitus with complication, without long-term current use of insulin (HCC)    S/P coronary artery stent placement - RCA 10/12/16 - Dr. Judith Galaviz Depression (emotion)    Postlaminectomy syndrome, lumbar    Medication monitoring encounter    Postlaminectomy syndrome    Postlaminectomy syndrome    Primary osteoarthritis of right knee    Long term (current) use of antithrombotics/antiplatelets    Influenza B    Reactive airway disease with acute exacerbation    Cerebrovascular accident (CVA) due to embolic occlusion of right middle cerebral artery (HCC)    Acute cerebrovascular accident (CVA) ([address]    Right middle cerebral artery stroke ([address]    Non-traumatic rhabdomyolysis    Ischemic cerebral stroke due to intracranial large artery atherosclerosis (HCC)    Dehydration         Additional assessment:    1. Neuro   1. AMS  - hypernatremia likely dehydration    2. CTH - re-demonstration right MCA territory infarct, new hyperdense serpiginous gyral hyperdensities along posterior aspect of the infarct in the parietal and to a less and extent temporal lobe and also along the margins of the right caudate nucleus is suggestive of some blood products     -   2. Cardiac   1. Normotensive , RRR   2. Holding lovenox until approval from [facility]   3.  Compression sleeves for DVT saline fluid and fluid being managed by nephrology. May start her on free water and continue to follow-up sodium every 4-6 hours. Avoid rapid correction of sodium. Replace potassium and will recheck BMP again. Aspirin Plavix on hold because of concern of hemorrhage into the large infarcted area. Follow-up urine culture continue with Rocephin at this time. Discussed with nursing staff, treatment and plan discussed. Discussed with respiratory therapist.    Total critical care time caring for this patient with life threatening, unstable organ failure, including direct patient contact, management of life support systems, review of data including imaging and labs, discussions with other team members and physicians at least 39  Min so far today, excluding procedures. Please note that this chart was generated using voice recognition Dragon dictation software. Although every effort was made to ensure the accuracy of this automated transcription, some errors in transcription may have occurred.      Ravi Haro MD  1/14/2021 9:21 AM

## 2021-01-13 NOTE — ED NOTES
Writer RN updated pt daughter Tashia Quinteros on pt plan of care, and plan to admit pt to [address] [place] [address].  Questions/comments/concerns answered.   Will update pt daughter when pt has a bed assignment     Mary Babinski, RN  01/13/21 1740

## 2021-01-13 NOTE — PROGRESS NOTES
[facility] Dehydration [E86.0] . Vitals:    01/13/21 1615   BP: 121/61   Pulse: 83   Resp: 16   Temp: 98.4 °F (36.9 °C)   . Patients home meds are   Prior to Admission medications    Medication Sig Start Date End Date Taking?  Authorizing Provider   losartan (COZAAR) 25 MG tablet Take 1 tablet by mouth daily 12/29/20   Kristal De Dios MD   metoprolol tartrate 75 MG TABS Take 75 mg by mouth 2 times daily 12/29/20   Kristal De Dios MD   docusate (COLACE) 50 MG/5ML liquid Take 10 mLs by mouth daily 12/30/20   Kristal De Dios MD   rivastigmine (EXELON) 9.5 MG/24HR Place 1 patch onto the skin daily    Historical Provider, MD   QUEtiapine (SEROQUEL) 25 MG tablet Take 25 mg by mouth 2 times daily    Historical Provider, MD   Calcium Carbonate-Vitamin D (OYSTER SHELL CALCIUM/D) 500-200 MG-UNIT TABS TAKE ONE TABLET BY MOUTH TWO TIMES A DAY 1/17/19   Vu Ferrer MD   glimepiride (AMARYL) 2 MG tablet Take 1 tablet by mouth every morning 12/19/18   Vu Ferrer MD   linagliptin (TRADJENTA) 5 MG tablet Take 1 tablet by mouth daily 12/19/18   Vu Ferrer MD   cloNIDine (CATAPRES) 0.1 MG tablet Take 1 tablet by mouth daily 12/19/18   Vu Ferrer MD   amLODIPine (NORVASC) 10 MG tablet Take 1 tablet by mouth daily 9/13/18   Vu Ferrer MD   furosemide (LASIX) 40 MG tablet Take 1 tablet by mouth daily 9/13/18   Vu Ferrer MD   atorvastatin (LIPITOR) 80 MG tablet TAKE 1 TABLET BY MOUTH DAILY 9/5/18   Vu Ferrer MD   sertraline (ZOLOFT) 100 MG tablet Take 1 tablet by mouth daily 9/5/18   Vu Ferrer MD   clopidogrel (PLAVIX) 75 MG tablet Take 1 tablet by mouth daily 8/13/18   Dusty Yancey MD   aspirin (RA ASPIRIN EC) 81 MG EC tablet Take 1 tablet by mouth daily 8/13/18   Dusty Yancey MD   Incontinence Supply Disposable (INCONTINENCE BRIEF LARGE) MISC Use 4-5/day as needed 6/12/18   Vu Ferrer MD   glucose blood VI test strips (ASCENSIA AUTODISC VI;ONE TOUCH ULTRA TEST VI) strip 1 each by In Vitro route 3 times daily As needed.  8/1/17   Willi Bowie MD   albuterol-ipratropium (COMBIVENT RESPIMAT)  MCG/ACT AERS inhaler Inhale 1 puff into the lungs every 6 hours as needed for Wheezing 7/17/17   [place] Veloz MD   Lancets MISC Use 1 -2 times daily Insulin dependent diabetes mellitus 7/17/17   [place] Veloz MD   .  Recent Surgical History: None = 0     Assessment     Peak Flow (asthma only)    Predicted: na  Personal Best: na  PEF na  % Predicted na  Peak Flow : not applicable = 0    THM4/QPL    FEV1 Predicted na      FEV1 na    FEV1 % Predicted na  FVC na  IS volume na  IBW na    RR 18  Breath Sounds: clear      Bronchodilator assessment at level  1  Hyperinflation assessment at level   Secretion Management assessment at level      []    Bronchodilator Assessment  BRONCHODILATOR ASSESSMENT SCORE  Score 0 1 2 3 4 5   Breath Sounds   []  Patient Baseline [x]  No Wheeze good aeration []  Faint, scattered wheezing, good aeration []  Expiratory Wheezing and or moderately diminished []  Insp/Exp wheeze and/or very diminished []  Insp/Exp and/ or marked distress   Respiratory Rate   []  Patient Baseline [x]  Less than 20 [x]  Less than 20 []  20-25 []  Greater than 25 []  Greater than 25   Peak flow % of Pred or PB [x]  NA   []  Greater than 90%  []  81-90% []  71-80% []  Less than or equal to 70%  or unable to perform []  Unable due to Respiratory Distress   Dyspnea re []  Patient Baseline [x]  No SOB [x]  No SOB []  SOB on exertion []  SOB min activity []  At rest/acute   e FEV% Predicted       [x]  NA []  Above 69%  []  Unable []  Above 60-69%  []  Unable []  Above 50-59%  []  Unable []  Above 35-49%  []  Unable []  Less than 35%  []  Unable                 []  Hyperinflation Assessment  Score 1 2 3   CXR and Breath Sounds   []  Clear []  No atelectasis  Basilar aeration []  Atelectasis or absent basilar breath sounds   Incentive Spirometry Volume  (Per IBW)   []  Greater than or equal to 15ml/Kg []  less than 15ml/Kg []  less than 15ml/Kg   Surgery within last 2 weeks []  None or general   []  Abdominal or thoracic surgery  []  Abdominal or thoracic   Chronic Pulmonary Historyre []  No []  Yes []  Yes     []  Secretion Management Assessment  Score 1 2 3   Bilateral Breath Sounds   []  Occasional Rhonchi []  Scattered Rhonchi []  Course Rhonchi and/or poor aeration   Sputum    []  Small amount of thin secretions []  Moderate amount of viscous secretions []  Copius, Viscious Yellow/ Secretions   CXR as reported by physician []  clear  []  Unavailable []  Infiltrates and/or consolidation  []  Unavailable []  Mucus Plugging and or lobar consolidation  []  Unavailable   Cough []  Strong, productive cough []  Weak productive cough []  No cough or weak non-productive cough   OBDULIO MENDOZA  5:40 PM                            FEMALE                                  MALE                            FEV1 Predicted Normal Values                        FEV1 Predicted Normal Values          Age                                     Height in Feet and Inches       Age                                     Height in Feet and Inches       4' 11\" 5' 1\" 5' 3\" 5' 5\" 5' 7\" 5' 9\" 5' 11\" 6' 1\"  4' 11\" 5' 1\" 5' 3\" 5' 5\" 5' 7\" 5' 9\" 5' 11\" 6' 1\"   42 - 45 2.49 2.66 2.84 3.03 3.22 3.42 3.62 3.83 42 - 45 2.82 3.03 3.26 3.49 3.72 3.96 4.22 4.47   46 - 49 2.40 2.57 2.76 2.94 3.14 3.33 3.54 3.75 46 - 49 2.70 2.92 3.14 3.37 3.61 3.85 4.10 4.36   50 - 53 2.31 2.48 2.66 2.85 3.04 3.24 3.45 3.66 50 - 53 2.58 2.80 3.02 3.25 3.49 3.73 3.98 4.24   54 - 57 2.21 2.38 2.57 2.75 2.95 3.14 3.35 3.56 54 - 57 2.46 2.67 2.89 3.12 3.36 3.60 3.85 4.11   58 - 61 2.10 2.28 2.46 2.65 2.84 3.04 3.24 3.45 58 - 61 2.32 2.54 2.76 2.99 3.23 3.47 3.72 3.98   62 - 65 1.99 2.17 2.35 2.54 2.73 2.93 3.13 3.34 62 - 65 2.19 2.40 2.62 2.85 3.09 3.33 3.58 3.84   66 - 69 1.88 2.05 2.23 2.42 2.61 2.81 3.02 3.23 66 - 69 2.04 2.26 2.48 2.71 2.95 3.19 3.44 3.70   70+ 1.82 1.99 2.17 2.36 2.55 2.75 2.95 3.16 70+ 1.97 2.19 2.41 2.64 2.87 3.12 3.37 3.62             Predicted Peak Expiratory Flow Rate                                       Height (in)  Female       Height (in) Male           Age 64 63 56 57 57 79 76 71 Age            21 344 357 372 387 402 417 432 446  60 62 64 66 68 70 72 74 76   25 337 352 366 381 396 411 426 441 25 447 476 505 533 [phone] 677   30 329 344 359 374 389 404 419 434 30 437 466 494 523 552 580 609 638 667   35 322 337 351 366 381 396 411 426 35 426 455 484 512 [phone] 657   40 314 329 344 359 374 389 404 419 40 416 445 473 502 531 559 588 617 647   45 307 322 336 351 366 381 396 411 45 405 434 463 491 520 549 577 606 636   50 299 314 329 344 359 374 389 404 50 395 424 452 481 510 538 567 596 625   55 292 307 321 336 351 366 381 396 55 384 413 442 470 499 528 556 585 615   60 284 299 314 329 344 359 374 389 60 374 403 431 460 489 517 546 575 605   65 277 292 306 321 336 351 366 381 65 363 392 421 449 478 507 535 564 594   70 269 284 299 314 329 344 359 374 70 353 382 410 439 468 496 525 554 583   75 261 274 289 305 319 334 348 364 75 344 372 400 429 458 487 515 544 573   80 253 266 282 296 312 327 342 356 80 335 362 390 419 448 476 [phone]

## 2021-01-13 NOTE — ED NOTES
Writer RN contacted pt facility nurse Nilton Davis @ [address] [place] [address] for update on patient plan to admit to [facility], Nilton Davis states that she will contact family to update on status.        Rasheed Polo, OLGA  01/13/21 4187

## 2021-01-13 NOTE — ED NOTES
Hospitalist paged at [facility] for admission      Evangelina Vasques RN  01/13/21 4002

## 2021-01-13 NOTE — ED NOTES
Cricket Ren RN given report for pt coming to room 101, given opportunity for questions/concerns.      Dedrick Pro RN  01/13/21 8597

## 2021-01-13 NOTE — ED NOTES
Writer RN phoned by James Velez with [facility], had concerns regarding pt transport - assured that [facility] staff has no cardiac concerns. Pt has 1/2 normal saline running at 125ml/hr. James Velez informed writer RN that a different crew will need to transport her to [facility]. Dr. Petra Harvey notified.       Cyndra Goldberg, RN  01/13/21 5651

## 2021-01-13 NOTE — ED NOTES
Pt daughter, Alex Morris, updated on pt status and room number at [facility] - questions/comments/concerns answered.        Dedrick Pro RN  01/13/21 9858

## 2021-01-14 LAB
ABSOLUTE EOS #: 0.26 K/UL (ref 0–0.44)
ABSOLUTE IMMATURE GRANULOCYTE: 0.09 K/UL (ref 0–0.3)
ABSOLUTE LYMPH #: 1.87 K/UL (ref 1.1–3.7)
ABSOLUTE MONO #: 0.94 K/UL (ref 0.1–1.2)
ALBUMIN SERPL-MCNC: 2.6 G/DL (ref 3.5–5.2)
ALBUMIN/GLOBULIN RATIO: 0.8 (ref 1–2.5)
ALP BLD-CCNC: 72 U/L (ref 35–104)
ALT SERPL-CCNC: 15 U/L (ref 5–33)
ANION GAP SERPL CALCULATED.3IONS-SCNC: 5 MMOL/L (ref 9–17)
ANION GAP SERPL CALCULATED.3IONS-SCNC: 6 MMOL/L (ref 9–17)
ANION GAP SERPL CALCULATED.3IONS-SCNC: 9 MMOL/L (ref 9–17)
AST SERPL-CCNC: 27 U/L
BASOPHILS # BLD: 0 % (ref 0–2)
BASOPHILS ABSOLUTE: 0 K/UL (ref 0–0.2)
BILIRUB SERPL-MCNC: 0.45 MG/DL (ref 0.3–1.2)
BILIRUBIN DIRECT: 0.15 MG/DL
BILIRUBIN, INDIRECT: 0.3 MG/DL (ref 0–1)
BUN BLDV-MCNC: 57 MG/DL (ref 8–23)
BUN/CREAT BLD: ABNORMAL (ref 9–20)
CALCIUM SERPL-MCNC: 9.2 MG/DL (ref 8.6–10.4)
CHLORIDE BLD-SCNC: 117 MMOL/L (ref 98–107)
CHLORIDE BLD-SCNC: 117 MMOL/L (ref 98–107)
CHLORIDE BLD-SCNC: 119 MMOL/L (ref 98–107)
CO2: 35 MMOL/L (ref 20–31)
CO2: 36 MMOL/L (ref 20–31)
CO2: 37 MMOL/L (ref 20–31)
CREAT SERPL-MCNC: 0.54 MG/DL (ref 0.5–0.9)
CULTURE: NORMAL
DIFFERENTIAL TYPE: ABNORMAL
EKG ATRIAL RATE: 79 BPM
EKG P AXIS: -11 DEGREES
EKG P-R INTERVAL: 140 MS
EKG Q-T INTERVAL: 460 MS
EKG QRS DURATION: 78 MS
EKG QTC CALCULATION (BAZETT): 527 MS
EKG R AXIS: -2 DEGREES
EKG T AXIS: 80 DEGREES
EKG VENTRICULAR RATE: 79 BPM
EOSINOPHILS RELATIVE PERCENT: 3 % (ref 1–4)
GFR AFRICAN AMERICAN: >60 ML/MIN
GFR NON-AFRICAN AMERICAN: >60 ML/MIN
GFR SERPL CREATININE-BSD FRML MDRD: ABNORMAL ML/MIN/{1.73_M2}
GFR SERPL CREATININE-BSD FRML MDRD: ABNORMAL ML/MIN/{1.73_M2}
GLOBULIN: ABNORMAL G/DL (ref 1.5–3.8)
GLUCOSE BLD-MCNC: 170 MG/DL (ref 65–105)
GLUCOSE BLD-MCNC: 174 MG/DL (ref 65–105)
GLUCOSE BLD-MCNC: 184 MG/DL (ref 65–105)
GLUCOSE BLD-MCNC: 192 MG/DL (ref 65–105)
GLUCOSE BLD-MCNC: 196 MG/DL (ref 70–99)
HCT VFR BLD CALC: 38.1 % (ref 36.3–47.1)
HEMOGLOBIN: 10.8 G/DL (ref 11.9–15.1)
IMMATURE GRANULOCYTES: 1 %
LACTIC ACID, WHOLE BLOOD: 0.7 MMOL/L (ref 0.7–2.1)
LACTIC ACID: NORMAL MMOL/L
LYMPHOCYTES # BLD: 22 % (ref 24–43)
Lab: NORMAL
MAGNESIUM: 2.2 MG/DL (ref 1.6–2.6)
MCH RBC QN AUTO: 27.7 PG (ref 25.2–33.5)
MCHC RBC AUTO-ENTMCNC: 28.3 G/DL (ref 28.4–34.8)
MCV RBC AUTO: 97.7 FL (ref 82.6–102.9)
MONOCYTES # BLD: 11 % (ref 3–12)
MORPHOLOGY: NORMAL
MRSA, DNA, NASAL: NORMAL
NRBC AUTOMATED: 0 PER 100 WBC
PDW BLD-RTO: 13.8 % (ref 11.8–14.4)
PHOSPHORUS: 3.5 MG/DL (ref 2.6–4.5)
PLATELET # BLD: 153 K/UL (ref 138–453)
PLATELET ESTIMATE: ABNORMAL
PMV BLD AUTO: 12.4 FL (ref 8.1–13.5)
POTASSIUM SERPL-SCNC: 3.5 MMOL/L (ref 3.7–5.3)
POTASSIUM SERPL-SCNC: 4.2 MMOL/L (ref 3.7–5.3)
POTASSIUM SERPL-SCNC: 4.3 MMOL/L (ref 3.7–5.3)
RBC # BLD: 3.9 M/UL (ref 3.95–5.11)
RBC # BLD: ABNORMAL 10*6/UL
SEG NEUTROPHILS: 63 % (ref 36–65)
SEGMENTED NEUTROPHILS ABSOLUTE COUNT: 5.34 K/UL (ref 1.5–8.1)
SODIUM BLD-SCNC: 158 MMOL/L (ref 135–144)
SODIUM BLD-SCNC: 159 MMOL/L (ref 135–144)
SODIUM BLD-SCNC: 161 MMOL/L (ref 135–144)
SODIUM BLD-SCNC: 162 MMOL/L (ref 135–144)
SODIUM BLD-SCNC: 164 MMOL/L (ref 135–144)
SPECIMEN DESCRIPTION: NORMAL
SPECIMEN DESCRIPTION: NORMAL
TOTAL PROTEIN: 6 G/DL (ref 6.4–8.3)
WBC # BLD: 8.5 K/UL (ref 3.5–11.3)
WBC # BLD: ABNORMAL 10*3/UL

## 2021-01-14 PROCEDURE — 2580000003 HC RX 258: Performed by: STUDENT IN AN ORGANIZED HEALTH CARE EDUCATION/TRAINING PROGRAM

## 2021-01-14 PROCEDURE — 6370000000 HC RX 637 (ALT 250 FOR IP): Performed by: STUDENT IN AN ORGANIZED HEALTH CARE EDUCATION/TRAINING PROGRAM

## 2021-01-14 PROCEDURE — 80076 HEPATIC FUNCTION PANEL: CPT

## 2021-01-14 PROCEDURE — 84100 ASSAY OF PHOSPHORUS: CPT

## 2021-01-14 PROCEDURE — 99221 1ST HOSP IP/OBS SF/LOW 40: CPT | Performed by: INTERNAL MEDICINE

## 2021-01-14 PROCEDURE — 36620 INSERTION CATHETER ARTERY: CPT

## 2021-01-14 PROCEDURE — 85025 COMPLETE CBC W/AUTO DIFF WBC: CPT

## 2021-01-14 PROCEDURE — 84295 ASSAY OF SERUM SODIUM: CPT

## 2021-01-14 PROCEDURE — 83735 ASSAY OF MAGNESIUM: CPT

## 2021-01-14 PROCEDURE — 82947 ASSAY GLUCOSE BLOOD QUANT: CPT

## 2021-01-14 PROCEDURE — 2000000000 HC ICU R&B

## 2021-01-14 PROCEDURE — 99213 OFFICE O/P EST LOW 20 MIN: CPT

## 2021-01-14 PROCEDURE — 80048 BASIC METABOLIC PNL TOTAL CA: CPT

## 2021-01-14 PROCEDURE — 99291 CRITICAL CARE FIRST HOUR: CPT | Performed by: INTERNAL MEDICINE

## 2021-01-14 PROCEDURE — 83605 ASSAY OF LACTIC ACID: CPT

## 2021-01-14 PROCEDURE — 80051 ELECTROLYTE PANEL: CPT

## 2021-01-14 PROCEDURE — 6360000002 HC RX W HCPCS: Performed by: STUDENT IN AN ORGANIZED HEALTH CARE EDUCATION/TRAINING PROGRAM

## 2021-01-14 RX ORDER — DOCUSATE SODIUM 100 MG/1
100 CAPSULE, LIQUID FILLED ORAL DAILY
Status: DISCONTINUED | OUTPATIENT
Start: 2021-01-14 | End: 2021-01-15

## 2021-01-14 RX ORDER — ATORVASTATIN CALCIUM 80 MG/1
80 TABLET, FILM COATED ORAL DAILY
Status: DISCONTINUED | OUTPATIENT
Start: 2021-01-14 | End: 2021-01-23 | Stop reason: HOSPADM

## 2021-01-14 RX ADMIN — ATORVASTATIN CALCIUM 80 MG: 80 TABLET, FILM COATED ORAL at 20:30

## 2021-01-14 RX ADMIN — SODIUM CHLORIDE: 4.5 INJECTION, SOLUTION INTRAVENOUS at 00:06

## 2021-01-14 RX ADMIN — INSULIN LISPRO 1 UNITS: 100 INJECTION, SOLUTION INTRAVENOUS; SUBCUTANEOUS at 20:18

## 2021-01-14 RX ADMIN — INSULIN LISPRO 2 UNITS: 100 INJECTION, SOLUTION INTRAVENOUS; SUBCUTANEOUS at 08:34

## 2021-01-14 RX ADMIN — METOPROLOL TARTRATE 25 MG: 25 TABLET ORAL at 20:30

## 2021-01-14 RX ADMIN — INSULIN LISPRO 2 UNITS: 100 INJECTION, SOLUTION INTRAVENOUS; SUBCUTANEOUS at 17:17

## 2021-01-14 RX ADMIN — POTASSIUM BICARBONATE 40 MEQ: 782 TABLET, EFFERVESCENT ORAL at 11:19

## 2021-01-14 RX ADMIN — METOPROLOL TARTRATE 25 MG: 25 TABLET ORAL at 11:20

## 2021-01-14 RX ADMIN — CEFTRIAXONE SODIUM 1 G: 1 INJECTION, POWDER, FOR SOLUTION INTRAMUSCULAR; INTRAVENOUS at 00:04

## 2021-01-14 RX ADMIN — SERTRALINE 100 MG: 50 TABLET, FILM COATED ORAL at 11:20

## 2021-01-14 RX ADMIN — CEFTRIAXONE SODIUM 1 G: 1 INJECTION, POWDER, FOR SOLUTION INTRAMUSCULAR; INTRAVENOUS at 23:32

## 2021-01-14 RX ADMIN — INSULIN LISPRO 2 UNITS: 100 INJECTION, SOLUTION INTRAVENOUS; SUBCUTANEOUS at 11:41

## 2021-01-14 RX ADMIN — SODIUM CHLORIDE, PRESERVATIVE FREE 10 ML: 5 INJECTION INTRAVENOUS at 08:40

## 2021-01-14 RX ADMIN — SODIUM CHLORIDE 500 ML: 4.5 INJECTION, SOLUTION INTRAVENOUS at 07:04

## 2021-01-14 RX ADMIN — SODIUM CHLORIDE, PRESERVATIVE FREE 10 ML: 5 INJECTION INTRAVENOUS at 20:30

## 2021-01-14 ASSESSMENT — PAIN SCALES - GENERAL: PAINLEVEL_OUTOF10: 0

## 2021-01-14 NOTE — PROGRESS NOTES
[facility] Wound Ostomy Contin[facility] Nurse  Consult Note       NAME:  Madelyn Gordon  MEDICAL RECORD NUMBER:  [id number]  AGE: 78 y.o. GENDER: female  : 1950  TODAY'S DATE:  2021    Subjective:     Reason for WOCN Evaluation and Assessment: \"open coccyx wound\"      Madelyn Gordon is a 78 y.o. female referred by:   [x] Physician  [] Nursing  [] Other:     Wound Identification:  Wound Type: pressure  Contributing Factors: chronic pressure, decreased mobility, shear force, obesity, decreased tissue oxygenation and incontinence of stool            Current Wound Management: Silicone bordered foam, routine incontinence care, turn and position, heels floated off of bed. Dry pillow cases to the pannus fold. Patient with fecal incontinence upon exam  Pannus fold moist, superficial skin peel. Coccygeal prominence and right buttock Stage 3 injuries present on admission. Both show signs of healing.              Objective:      /70   Pulse 85   Temp 97.9 °F (36.6 °C) (Oral)   Resp 21   Wt 222 lb 7.1 oz (100.9 kg)   LMP  (LMP Unknown)   SpO2 100%   BMI 38.18 kg/m²   Scott Risk Score: Scott Scale Score: 13    LABS    CBC:   Lab Results   Component Value Date    WBC 8.5 2021    RBC 3.90 2021    RBC 4.21 2012    HGB 10.8 2021     CMP:  Albumin:    Lab Results   Component Value Date    LABALBU 2.6 2021    LABALBU 4.2 2012     PT/INR:    Lab Results   Component Value Date    PROTIME 10.3 12/15/2020    INR 1.0 12/15/2020     HgBA1c:    Lab Results   Component Value Date    LABA1C 6.8 2020     PTT: No components found for: LABPTT      Assessment:       Measurements:     21 1055   Wound 21 Coccyx Medial open    Date First Assessed/Time First Assessed: 21 1615   Present on Hospital Admission: Yes  Primary Wound Type: Pressure Injury  Location: Coccyx  Wound Location Orientation: Medial  Wound Description (Comments): open    Wound Image Wound Etiology Pressure Stage  3   Dressing Status New dressing applied   Wound Cleansed Irrigated with saline   Dressing/Treatment Alginate with Ag; Foam   Dressing Change Due 01/16/21   Wound Length (cm) 4.2 cm   Wound Width (cm) 2.8 cm   Wound Depth (cm) 0.3 cm   Wound Surface Area (cm^2) 11.76 cm^2   Wound Volume (cm^3) 3.53 cm^3   Wound Assessment Subcutaneous;Granulation tissue;Slough   Drainage Amount Moderate   Drainage Description Serosanguinous   Odor None   Pedro-wound Assessment Intact   Wound 01/14/21 Buttocks Right   Date First Assessed/Time First Assessed: 01/14/21 1055   Present on Hospital Admission: Yes  Primary Wound Type: Pressure Injury  Location: Buttocks  Wound Location Orientation: Right   Wound Image    Wound Etiology Pressure Stage  3  (healing)   Dressing Status New dressing applied   Wound Cleansed Irrigated with saline   Dressing/Treatment Barrier film   Dressing Change Due 01/14/21   Wound Length (cm) 0.5 cm   Wound Width (cm) 0.5 cm   Wound Depth (cm) 0.1 cm   Wound Surface Area (cm^2) 0.25 cm^2   Wound Volume (cm^3) 0.02 cm^3   Wound Assessment Pink/red;Granulation tissue   Drainage Amount Scant   Drainage Description Serosanguinous   Odor None   Pedro-wound Assessment Intact; Hypopigmented  (scars)            Response to treatment:  Well tolerated by patient. Plan:     Plan of Care: Turn every 2 hours  Float heels off of bed with pillows under calves    Use lift sling to reposition patient to minimize potential for shear injury. Coccyx: irrigate/cleanse with saline, apply [employer] Ag gelling fiber to cover the wound, secure this with a 4K2\" silicone bordered foam. Change every other day. Routine incontinence care with incontinence barrier cloths and zinc oxide cream.   Apply zinc oxide cream BID and prn incontinence to the buttocks and pedro-anal skin.    Moisture wicking under pads vs cloth backed briefs      Specialty Bed Required : Yes Isoflex in use [] Low Air Loss   [x] Pressure Redistribution  [] Fluid Immersion  [] Bariatric  [] Total Pressure Relief  [] Other:     Discharge Plan:  Placement for patient upon discharge: skilled nursing   Hospice Care: No   Patient appropriate for Outpatient [address] [place] [address] Road: No    Patient/Caregiver Teaching:    [] Indicates understanding       [x] Needs reinforcement  [] Unsuccessful      [] Verbal Understanding  [] Demonstrated understanding       [] No evidence of learning  [] Refused teaching         [] N/A       Electronically signed by Barbara Cole RN, CWON on 1/14/2021 at 11:45 AM

## 2021-01-14 NOTE — PLAN OF CARE
Problem: Discharge Planning:  Goal: Participates in care planning  Description: Participates in care planning  Outcome: Ongoing  Goal: Discharged to appropriate level of care  Description: Discharged to appropriate level of care  Outcome: Ongoing     Problem: Fluid Volume - Imbalance:  Goal: Absence of imbalanced fluid volume signs and symptoms  Description: Absence of imbalanced fluid volume signs and symptoms  Outcome: Ongoing     Problem: Mental Status - Impaired:  Goal: Mental status will be restored to baseline  Description: Mental status will be restored to baseline  Outcome: Ongoing     Problem: Pain:  Goal: Pain level will decrease  Description: Pain level will decrease  Outcome: Ongoing  Goal: Recognizes and communicates pain  Description: Recognizes and communicates pain  Outcome: Ongoing  Goal: Control of acute pain  Description: Control of acute pain  Outcome: Ongoing  Goal: Control of chronic pain  Description: Control of chronic pain  Outcome: Ongoing     Problem: Skin Integrity - Impaired:  Goal: Will show no infection signs and symptoms  Description: Will show no infection signs and symptoms  Outcome: Ongoing  Goal: Absence of new skin breakdown  Description: Absence of new skin breakdown  Outcome: Ongoing     Problem: Falls - Risk of:  Goal: Will remain free from falls  Description: Will remain free from falls  Outcome: Ongoing  Goal: Absence of physical injury  Description: Absence of physical injury  Outcome: Ongoing     Problem: Skin Integrity:  Goal: Will show no infection signs and symptoms  Description: Will show no infection signs and symptoms  Outcome: Ongoing  Goal: Absence of new skin breakdown  Description: Absence of new skin breakdown  Outcome: Ongoing     Problem: Pain:  Goal: Pain level will decrease  Description: Pain level will decrease  Outcome: Ongoing  Goal: Control of acute pain  Description: Control of acute pain  Outcome: Ongoing  Goal: Control of chronic pain Description: Control of chronic pain  Outcome: Ongoing     Problem: Nutrition  Goal: Optimal nutrition therapy  Description: Nutrition Problem #1: Inadequate oral intake  Intervention: Food and/or Nutrient Delivery: Start Tube Feeding(Suggest resume Diabetic formula with goal rate of 55 mL/hr which will provide 1584 kcal and 80 g pro/day.)  Nutritional Goals: meet % of estimated nutrient needs     Outcome: Ongoing

## 2021-01-14 NOTE — PROGRESS NOTES
Patient examined at bedside with attending physician. Small hypodensity/hemorrhage likely secondary to the large territory of the infarct. Recommending holding aspirin and Plavix and repeating CT head in 3 to 5 days. Okay for DVT prophylaxis. If repeat head CT is stable, can resume aspirin. 7 to 10 days after resuming aspirin, can restart Plavix. Sasha Escudero Neuro critical care to sign off at this time, please do not hesitate to message if there are any questions or concerns.

## 2021-01-14 NOTE — CONSULTS
glimepiride (AMARYL) 2 MG tablet, Take 1 tablet by mouth every morning  linagliptin (TRADJENTA) 5 MG tablet, Take 1 tablet by mouth daily  cloNIDine (CATAPRES) 0.1 MG tablet, Take 1 tablet by mouth daily  amLODIPine (NORVASC) 10 MG tablet, Take 1 tablet by mouth daily  furosemide (LASIX) 40 MG tablet, Take 1 tablet by mouth daily  atorvastatin (LIPITOR) 80 MG tablet, TAKE 1 TABLET BY MOUTH DAILY  sertraline (ZOLOFT) 100 MG tablet, Take 1 tablet by mouth daily  clopidogrel (PLAVIX) 75 MG tablet, Take 1 tablet by mouth daily  aspirin (RA ASPIRIN EC) 81 MG EC tablet, Take 1 tablet by mouth daily  Incontinence Supply Disposable (INCONTINENCE BRIEF LARGE) MISC, Use 4-5/day as needed  glucose blood VI test strips (ASCENSIA AUTODISC VI;ONE TOUCH ULTRA TEST VI) strip, 1 each by In Vitro route 3 times daily As needed.   albuterol-ipratropium (COMBIVENT RESPIMAT)  MCG/ACT AERS inhaler, Inhale 1 puff into the lungs every 6 hours as needed for Wheezing  Lancets MISC, Use 1 -2 times daily Insulin dependent diabetes mellitus  Scheduled Meds:    atorvastatin  80 mg Oral Daily    metoprolol tartrate  25 mg Oral BID    sertraline  100 mg Oral Daily    docusate sodium  100 mg Oral Daily    sodium chloride flush  10 mL Intravenous 2 times per day    [Held by provider] enoxaparin  40 mg Subcutaneous Daily    insulin lispro  0-12 Units Subcutaneous TID WC    insulin lispro  0-6 Units Subcutaneous Nightly    cefTRIAXone (ROCEPHIN) IV  1 g Intravenous Q24H     Continuous Infusions:    dextrose      sodium chloride 140 mL/hr at 01/14/21 0006     PRN Meds:  sodium chloride flush, promethazine **OR** ondansetron, polyethylene glycol, acetaminophen **OR** acetaminophen, glucose, dextrose, glucagon (rDNA), dextrose    ALLERGY     Bactrim, Penicillins, and Tylenol [acetaminophen]       SOCIAL HISTORY     Social History     Socioeconomic History    Marital status: Legally      Spouse name: Not on file  Number of children: Not on file    Years of education: Not on file    Highest education level: Not on file   Occupational History    Not on file   Social Needs    Financial resource strain: Not on file    Food insecurity     Worry: Not on file     Inability: Not on file    Transportation needs     Medical: Not on file     Non-medical: Not on file   Tobacco Use    Smoking status: Former Smoker     Packs/day: 0.50     Years: 0.00     Pack years: 0.00     Quit date: 3/28/2013     Years since quittin.8    Smokeless tobacco: Never Used   Substance and Sexual Activity    Alcohol use:  Yes     Alcohol/week: 1.0 standard drinks     Types: 1 Cans of beer per week     Comment: occasionally    Drug use: No    Sexual activity: Not on file   Lifestyle    Physical activity     Days per week: Not on file     Minutes per session: Not on file    Stress: Not on file   Relationships    Social connections     Talks on phone: Not on file     Gets together: Not on file     Attends Orthodoxy service: Not on file     Active member of club or organization: Not on file     Attends meetings of clubs or organizations: Not on file     Relationship status: Not on file    Intimate partner violence     Fear of current or ex partner: Not on file     Emotionally abused: Not on file     Physically abused: Not on file     Forced sexual activity: Not on file   Other Topics Concern    Not on file   Social History Narrative    Not on file       FAMILY HISTORY     Family History   Problem Relation Age of Onset    Diabetes Mother     Cancer Father     High Blood Pressure Sister     High Blood Pressure Brother           REVIEW OF SYSTEM     Unable to obtain is lethargic      PHYSICAL EXAM     Vitals:    21 0300 21 0400 21 0500 21 0600   BP: (!) 107/48 127/68 (!) 107/55 127/70   Pulse: 86 88 84 85   Resp:    Temp:  97.9 °F (36.6 °C)     TempSrc:  Oral     SpO2: 97% 100% 99% 100% Weight:    222 lb 7.1 oz (100.9 kg)     24HR INTAKE/OUTPUT:      Intake/Output Summary (Last 24 hours) at 1/14/2021 1136  Last data filed at 1/14/2021 1129  Gross per 24 hour   Intake 2483 ml   Output 1399 ml   Net 1084 ml       General appearance: Residual neurologic deficit  Skin: warm and dry, no rash or erythema  Eyes: conjunctivae normal and sclera anicteric  ENT: no thrush no pharyngeal congestion  orodental hygiene   Neck: No JVD, Lymphadenopathty or thyromegaly  Respiratory: vesicular breath sounds,no wheeze/crackles  Cardiovascular: S1 S2 normal,no gallop or organic murmur. No carotid bruit  Abdomen:Non tender/non distended. Bowel sounds present  Extremities: No Cyanosis or Clubbing,Lower extremity edema  Neurological: Residual neurologic deficit    INVESTIGATIONS      CBC:   Recent Labs     01/13/21 0919 01/14/21  0511   WBC 7.2 8.5   RBC 4.47 3.90*   HGB 12.5 10.8*   HCT 41.2 38.1   MCV 92.2 97.7   MCH 27.9 27.7   MCHC 30.3* 28.3*   RDW 14.5 13.8    153   MPV 10.1 12.4      BMP:   Recent Labs     01/13/21  0919 01/13/21  1325 01/13/21  1325 01/14/21  0327 01/14/21  0511 01/14/21  0703   * 165*   < > 162* 164* 161*   K 3.6* 3.7  --   --  3.5*  --    * 116*  --   --  119*  --    CO2 40* 43*  --   --  36*  --    BUN 78* 79*  --   --  57*  --    CREATININE 0.90 0.90  --   --  0.54  --    GLUCOSE 372* 234*  --   --  196*  --    CALCIUM 10.3 9.9  --   --  9.2  --     < > = values in this interval not displayed. Phosphorus:    Recent Labs     01/14/21 0511   PHOS 3.5     Magnesium:   Recent Labs     01/14/21  0511   MG 2.2     Albumin:   Recent Labs     01/13/21  0919 01/14/21  0511   LABALBU 2.8* 2.6*       Radiology:  Reviewed as available. ASSESSMENT     1. Hypernatremia secondary to free water deficit/impaired thirst mechanism. Presented with sodium of 165. Urine osmolality 591  2.   Metabolic alkalosis likely chloride responsive from volume depletion improving 3.  Altered mental sensorium likely related to dehydration repeat CT scan shows right MCA territory infarct old with a new hemorrhagic component  4. Type 2 diabetes  5. Essential hypertension  6. 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 Bronchial asthma     PLAN:     1. Continue hypotonic fluids. Recheck electrolytes every 6. Will switch over to D5 water if sodium continues to be elevated  2. Plan to correct sodium around one 150-1 52 till tomorrow morning  3. We will follow    Thank you for the consultation. Please do not hesitate to call with questions. This note is created with the assistance of a speech-recognition program. While intending to generate a document that actually reflects the content of the visit, no guarantees can be provided that every mistake has been identified and corrected by editing.     Antonio Markham MD, MRCP Joselyn Sánchez, FACP   1/14/2021 11:36 AM    [facility]

## 2021-01-14 NOTE — CARE COORDINATION
Call placed to Sarah Greenberg (daughter) [phone]. Sarah Greenberg relates that the family is not happy with the care their mother has received at [facility]. She plans to speak with her sisters tonight, regarding other facilities. and plans to visit her mother 4-12. Sarah Yari is aware that the provider list for SNF will be placed at her mother's bedside.

## 2021-01-14 NOTE — PLAN OF CARE
Nutrition Problem #1: Inadequate oral intake  Intervention: Food and/or Nutrient Delivery: Start Tube Feeding-Suggest resume Diabetic formula with goal rate of 55 mL/hr which will provide 1584 kcal and 80 g pro/day.   Nutritional Goals: meet % of estimated nutrient needs

## 2021-01-14 NOTE — PROGRESS NOTES
Comprehensive Nutrition Assessment    Type and Reason for Visit:  Initial    Nutrition Recommendations/Plan: Start Tube Feeding-Suggest resume Diabetic formula with goal rate of 55 mL/hr which will provide 1584 kcal and 80 g pro/day. Will monitor TF tolerance/adequacy. Nutrition Assessment:  Pt admitted with altered mental status, hyperglycemia, and hypernatremia. PMH includes:CAD, CHF, High Chol, DM, HTN, right MCA infarct, PEG tube. Noted pt was on Diabetic TF at 55 mL/hr at time of discharge 12/30/20. Order to restart TF today- Standard with Fiber formula ordered. Labs reviewed: Na 161 mmol/L, Cl 119 mmol/L, Glu 196,192 mg/dL. Meds reviewed/include: Colace, Humalog SS. IVF: 0.45% NaCl at 140 mL/hr.     Malnutrition Assessment:  Malnutrition Status:  Insufficient data    Context:  Chronic Illness     Findings of the 6 clinical characteristics of malnutrition:  Energy Intake:  Unable to assess  Weight Loss:  Unable to assess     Body Fat Loss:  No significant body fat loss     Muscle Mass Loss:  No significant muscle mass loss    Fluid Accumulation:  (moderate fluid accumulation) Extremities, Generalized   Strength:  Not Performed    Estimated Daily Nutrient Needs:  Energy (kcal):  3249-3602 kcal/day; Weight Used for Energy Requirements:  Current     Protein (g):   g pro/day; Weight Used for Protein Requirements:  Ideal(1.5-2)          Current Nutrition Therapies:    DIET TUBE FEED CONTINUOUS/CYCLIC NPO; STANDARD WITH FIBER -ordered to start today     Anthropometric Measures:  · Height: 5' 4\" (162.6 cm)  · Current Body Weight: 222 lb 7.1 oz (100.9 kg)   · Usual Body Weight: 231 lb (104.8 kg)(stated weight on 12/15/2020)     · Ideal Body Weight: 120 lbs; % Ideal Body Weight 185.4 %   · BMI: 38.2  · BMI Categories: Obese Class 2 (BMI 35.0 -39.9)       Nutrition Diagnosis: · Inadequate oral intake related to cognitive or neurological impairment, swallowing difficulty as evidenced by NPO or clear liquid status due to medical condition, nutrition support - enteral nutrition    Nutrition Interventions:   Food and/or Nutrient Delivery:  Start Tube Feeding-Suggest resume Diabetic formula with goal rate of 55 mL/hr which will provide 1584 kcal and 80 g pro/day. Nutrition Education/Counseling:  Education not indicated   Coordination of Nutrition Care:  Continue to monitor while inpatient    Goals:  meet % of estimated nutrient needs       Nutrition Monitoring and Evaluation:   Behavioral-Environmental Outcomes:  None Identified   Food/Nutrient Intake Outcomes:  Enteral Nutrition Intake/Tolerance  Physical Signs/Symptoms Outcomes:  Biochemical Data, Nutrition Focused Physical Findings, Skin, Weight, Fluid Status or Edema     Discharge Planning:     Too soon to determine     Electronically signed by Celia Jacobs RD, LD on 1/14/21 at 12:17 PM EST    Contact: [phone]

## 2021-01-14 NOTE — PROGRESS NOTES
Physical Therapy  DATE: 2021    NAME: Steve Leal  MRN: [id number]   : 1950    Patient not seen this date for Physical Therapy due to:  [] Blood transfusion in progress  [] Hemodialysis  [] Patient Declined  [] Spine Precautions   [x] Strict Bedrest  [] Surgery/ Procedure  [] Testing      [] Other        [] PT is being discontinued at this time. Patient independent. No further needs. [] PT is being discontinued at this time due to declining physical/ medical status. Therapy is not appropriate at this time.     Pamela Beauchamp, PT

## 2021-01-14 NOTE — PROCEDURES
PROCEDURE NOTE - ARTERIAL LINE PLACEMENT    PATIENT NAME: Dharmesh Koehler  MEDICAL RECORD NO. [id number]  DATE: 1/13/2021  ATTENDING PHYSICIAN:  Dr. Lisa Parekh DIAGNOSIS:  Need for blood pressure monitoring  POSTOPERATIVE DIAGNOSIS:  Same  PROCEDURE PERFORMED: Left Radial Arterial Line Insertion  PERFORMING PHYSICIAN:  Gabo Millan DO  ESTIMATED BLOOD LOSS:  Less than 25 ml  COMPLICATIONS:  None immediately appreciated. DISCUSSION:  Dharmesh Koehler is a 78 y.o. female who requires invasive pressure monitoring. The history and physical examination were reviewed and confirmed. CONSENT: The patient and her family provided verbal consent for this procedure. PROCEDURE:  A timeout was initiated by the bedside nurse and was confirmed by those present. The patient was placed in a supine position. The skin overlying the Left Radial was prepped with chlorhexadine. Through this region, the introducer needle through catheter was inserted into left radial until pulsatile bright blood was seen in collection tubing. Guidewire was advanced with no resistance. Catheter was advanced into the artery, wire was pulled with brisk bleeding noted. Pressure monitoring setup was connected to the catheter, it aspirated and flushed easily. The catheter was secured to the wrist with 3-0 silk. No immediate complication was evident. All sponge, instrument and needle counts were correct at the completion of the procedure.       Gabo Millan DO  10:38 PM, 1/13/21

## 2021-01-14 NOTE — CONSULTS
Neuro Critical Care Consult Note    Reason for Consult:  Prior Rt MCA Infarct, transfer for hypernatremia, CT Head shows new blood products  Requesting Physician:  Dr. Bailee Sheikh  Attending Physician: Dr. Teresita Cm    History Obtained From:  patient, electronic medical record, medical staff    CHIEF COMPLAINT:       AMS, hyperglycemia    HISTORY OF PRESENT ILLNESS:       The patient is a 78 y.o. female with recent history of large right MCA stroke with residual left-sided deficit as well as diabetes, hypertension, CAD, hyperlipidemia who presented to [facility] ED from her skilled nursing facility with concern of uncontrolled blood sugars. After patient's recent stroke in December, she did get a PEG tube placed. During the previous admission, patient was not a candidate for IV TPA as well as mechanical thrombectomy. She does have residual deficit of left-sided facial droop, left-sided weakness. At [facility] ED patient was also noted to be hypernatremic and subsequently transferred to medical ICU for further evaluation. Patient had a CT head without contrast which revealed redemonstration of extensive right MCA territory infarct with no new infarct noted. There was new or increased hyperdense along the posterior aspect of the infarct in the parietal and temporal lobe and also margins in the right caudate nucleus which were suggestive of some blood products. Neuro critical care was consulted for further evaluation.        PAST MEDICAL HISTORY :       Past Medical History:        Diagnosis Date    Allergic rhinitis     Anxiety 10/25/2016    Asthma     CAD (coronary artery disease)     s/p stents RCA and LAD 2009,    Chronic back pain     Congestive heart failure ([facility] [address]     Depression     Headache(784.0)     Hiatal hernia     Hypercholesteremia 2/21/2012    Hypertension     Kidney stones     years ago    Obesity     Osteoarthritis  Postlaminectomy syndrome 6/6/2012    Type II or unspecified type diabetes mellitus without mention of complication, not stated as uncontrolled     Unspecified sleep apnea     Urinary incontinence        Past Surgical History:        Procedure Laterality Date    CARDIAC CATHETERIZATION  01/2013    patent stents    COLONOSCOPY  09/2015    normal    CORONARY ANGIOPLASTY WITH STENT PLACEMENT  1012-16    stents x 3    GASTROSTOMY TUBE PLACEMENT  12/28/2020    EGD PEG TUBE PLACEMENT    GASTROSTOMY TUBE PLACEMENT N/A 12/28/2020    EGD PEG TUBE PLACEMENT performed by Win Claire MD at [address]      left knee    KNEE SURGERY  10/21/2013    rt knee synvisc injection     KNEE SURGERY  12/02/2013    knee synvisc injection rt #2    KNEE SURGERY  12/09/2013    rt knee synvisc inj    LUMBAR SPINE SURGERY  2007    NERVE BLOCK  04/23/2012    Right MBNB L3, L4, L5    NERVE BLOCK  05/22/2012    Right MBNB L3, L4, and L5     NERVE BLOCK  01/14/2013    Right knee injection #1 - Synvisc    NERVE BLOCK  01/21/2013    Right knee injection #2 - Synvisc    NERVE BLOCK  01/28/2013    Rigth knee synvisc injection #3    NERVE BLOCK Right 04/17/2017    Rt genicular nerve block.  no steroid used    OTHER SURGICAL HISTORY Right 07/14/2014    synvisc one knee injection    OTHER SURGICAL HISTORY Right 03/16/2015    synvisc one knee injection    OTHER SURGICAL HISTORY Right 06/13/2016    synvisc right knee injection    SPINE SURGERY      TONSILLECTOMY      UPPER GASTROINTESTINAL ENDOSCOPY      VENA CAVA FILTER PLACEMENT  2007    PE and B/L LE emboli       Social History:   Social History     Socioeconomic History    Marital status: Legally      Spouse name: Not on file    Number of children: Not on file    Years of education: Not on file    Highest education level: Not on file   Occupational History    Not on file   Social Needs    Financial resource strain: Not on file  Food insecurity     Worry: Not on file     Inability: Not on file    Transportation needs     Medical: Not on file     Non-medical: Not on file   Tobacco Use    Smoking status: Former Smoker     Packs/day: 0.50     Years: 0.00     Pack years: 0.00     Quit date: 3/28/2013     Years since quittin.8    Smokeless tobacco: Never Used   Substance and Sexual Activity    Alcohol use: Yes     Alcohol/week: 1.0 standard drinks     Types: 1 Cans of beer per week     Comment: occasionally    Drug use: No    Sexual activity: Not on file   Lifestyle    Physical activity     Days per week: Not on file     Minutes per session: Not on file    Stress: Not on file   Relationships    Social connections     Talks on phone: Not on file     Gets together: Not on file     Attends [employer] service: Not on file     Active member of club or organization: Not on file     Attends meetings of clubs or organizations: Not on file     Relationship status: Not on file    Intimate partner violence     Fear of current or ex partner: Not on file     Emotionally abused: Not on file     Physically abused: Not on file     Forced sexual activity: Not on file   Other Topics Concern    Not on file   Social History Narrative    Not on file       Family History:       Problem Relation Age of Onset    Diabetes Mother     Cancer Father     High Blood Pressure Sister     High Blood Pressure Brother        Allergies:  Bactrim, Penicillins, and Tylenol [acetaminophen]    Home Medications:  Prior to Admission medications    Medication Sig Start Date End Date Taking?  Authorizing Provider   losartan (COZAAR) 25 MG tablet Take 1 tablet by mouth daily 20   Eleanor Momin MD   metoprolol tartrate 75 MG TABS Take 75 mg by mouth 2 times daily 20   Eleanor Momin MD   docusate (COLACE) 50 MG/5ML liquid Take 10 mLs by mouth daily 20   Eleanor Momin MD sodium chloride flush 0.9 % injection 10 mL, 10 mL, Intravenous, PRN  [Held by provider] enoxaparin (LOVENOX) injection 40 mg, 40 mg, Subcutaneous, Daily  promethazine (PHENERGAN) tablet 12.5 mg, 12.5 mg, Oral, Q6H PRN **OR** ondansetron (ZOFRAN) injection 4 mg, 4 mg, Intravenous, Q6H PRN  polyethylene glycol (GLYCOLAX) packet 17 g, 17 g, Oral, Daily PRN  acetaminophen (TYLENOL) tablet 650 mg, 650 mg, Oral, Q6H PRN **OR** acetaminophen (TYLENOL) suppository 650 mg, 650 mg, Rectal, Q6H PRN  insulin lispro (HUMALOG) injection vial 0-12 Units, 0-12 Units, Subcutaneous, TID WC  insulin lispro (HUMALOG) injection vial 0-6 Units, 0-6 Units, Subcutaneous, Nightly  glucose (GLUTOSE) 40 % oral gel 15 g, 15 g, Oral, PRN  dextrose 50 % IV solution, 12.5 g, Intravenous, PRN  glucagon (rDNA) injection 1 mg, 1 mg, Intramuscular, PRN  dextrose 5 % solution, 100 mL/hr, Intravenous, PRN  0.45 % sodium chloride infusion, , Intravenous, Continuous  cefTRIAXone (ROCEPHIN) 1 g IVPB in 50 mL D5W minibag, 1 g, Intravenous, Q24H    REVIEW OF SYSTEMS:       Unable to assess due to condition    PHYSICAL EXAM:       BP (!) 104/50   Pulse 84   Temp 98.2 °F (36.8 °C) (Oral)   Resp 15   Wt 214 lb 4.6 oz (97.2 kg)   LMP  (LMP Unknown)   SpO2 98%   BMI 36.78 kg/m²       CONSTITUTIONAL:  Well developed, well nourished, lethargic and oriented to self (unable to answer place/time), in no acute distress. GCS 15. Nontoxic. Mild dysarthria.  Mild aphasia   HEAD:  normocephalic, atraumatic    EYES:  PERRLA, EOMI.   ENT:  moist mucous membranes   NECK:  supple, symmetric, no midline tenderness to palpation    BACK:  without midline tenderness, step-offs or deformities    LUNGS:  Equal air entry bilaterally   CARDIOVASCULAR:  normal s1 / s2   ABDOMEN:  Soft, no rigidity   NEUROLOGIC:  Mental Status:  Not oriented to date and Not oriented to place,lethargic             Cranial Nerves:    III: Pupils:  equal, round, reactive to light III,IV,VI: Extra Ocular Movements: abnormal right  VII: Facial strength: abnormal Left facial droop    Motor Exam:      Motor exam is 5 out of 5 right upper and 4 out of 5 right lower extremities  Motor exam is 0 out of 5 left upper and 1 out of 5 to left lower extremities    Sensory:    Touch:    Right Upper Extremity:  normal  Left Upper Extremity:  abnormal - dec pinprick  Right Lower Extremity:  normal  Left Lower Extremity:  abnormal - dec pinprick    Gait:  Not tested   SKIN:  no rash        LABS AND IMAGING:     CBC with Differential:    Lab Results   Component Value Date    WBC 7.2 01/13/2021    RBC 4.47 01/13/2021    RBC 4.21 02/24/2012    HGB 12.5 01/13/2021    HCT 41.2 01/13/2021     01/13/2021     02/24/2012    MCV 92.2 01/13/2021    MCH 27.9 01/13/2021    MCHC 30.3 01/13/2021    RDW 14.5 01/13/2021    LYMPHOPCT 23 01/13/2021    MONOPCT 4 01/13/2021    BASOPCT 1 01/13/2021    MONOSABS 0.29 01/13/2021    LYMPHSABS 1.66 01/13/2021    EOSABS 0.29 01/13/2021    BASOSABS 0.07 01/13/2021    DIFFTYPE NOT REPORTED 01/13/2021     BMP:    Lab Results   Component Value Date     01/13/2021    K 3.7 01/13/2021     01/13/2021    CO2 43 01/13/2021    BUN 79 01/13/2021    LABALBU 2.8 01/13/2021    LABALBU 4.2 02/24/2012    CREATININE 0.90 01/13/2021    CALCIUM 9.9 01/13/2021    GFRAA >60 01/13/2021    LABGLOM >60 01/13/2021    GLUCOSE 234 01/13/2021    GLUCOSE 105 02/24/2012       Radiology Review:  CT Head WO Contrast 1/13/2021  Impression   1. Redemonstration of extensive right MCA territory infarct.  No new infarct. 2. New or increasing hyperdense serpiginous gyral hyperdensities along   posterior aspect of the infarct in the parietal and to a less and extent   temporal lobe and also along the margins of the right caudate nucleus is   suggestive of some blood products.  This may be further assessed with MRI if   deemed clinically necessary. .             ASSESSMENT AND PLAN: 78 y.o. 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    - Imaging: CT Head reviewed. Likely incidental finding of bleed secondary to a large stroke. Recommend repeating head CT in 3-5 days  - Goal SBP <160  - Neuro checks per protocol  - Hold ASA and Plavix  - Ok for DVT prophylaxis    - If repeat head CT is stable, can resume ASA. Followed by Plavix in 7-10 days after. Thank you for your consult. For any changes in exam or patient status please contact Neuro Critical Care.       Yue Garza MD   Neurology PGY-2  Neuro Critical Care  1/14/2021     12:01 AM

## 2021-01-15 LAB
ABSOLUTE EOS #: 0.21 K/UL (ref 0–0.44)
ABSOLUTE IMMATURE GRANULOCYTE: 0.06 K/UL (ref 0–0.3)
ABSOLUTE LYMPH #: 1.48 K/UL (ref 1.1–3.7)
ABSOLUTE MONO #: 0.66 K/UL (ref 0.1–1.2)
ALBUMIN SERPL-MCNC: 2.5 G/DL (ref 3.5–5.2)
ALBUMIN/GLOBULIN RATIO: 0.8 (ref 1–2.5)
ALLEN TEST: ABNORMAL
ALP BLD-CCNC: 74 U/L (ref 35–104)
ALT SERPL-CCNC: 18 U/L (ref 5–33)
ANION GAP SERPL CALCULATED.3IONS-SCNC: 6 MMOL/L (ref 9–17)
ANION GAP SERPL CALCULATED.3IONS-SCNC: 7 MMOL/L (ref 9–17)
ANION GAP SERPL CALCULATED.3IONS-SCNC: 7 MMOL/L (ref 9–17)
ANION GAP SERPL CALCULATED.3IONS-SCNC: 8 MMOL/L (ref 9–17)
AST SERPL-CCNC: 29 U/L
BASOPHILS # BLD: 0 % (ref 0–2)
BASOPHILS ABSOLUTE: 0.03 K/UL (ref 0–0.2)
BILIRUB SERPL-MCNC: 0.4 MG/DL (ref 0.3–1.2)
BILIRUBIN DIRECT: 0.13 MG/DL
BILIRUBIN, INDIRECT: 0.27 MG/DL (ref 0–1)
BUN BLDV-MCNC: 35 MG/DL (ref 8–23)
BUN/CREAT BLD: ABNORMAL (ref 9–20)
CALCIUM SERPL-MCNC: 9 MG/DL (ref 8.6–10.4)
CHLORIDE BLD-SCNC: 114 MMOL/L (ref 98–107)
CHLORIDE BLD-SCNC: 115 MMOL/L (ref 98–107)
CO2: 30 MMOL/L (ref 20–31)
CO2: 32 MMOL/L (ref 20–31)
CO2: 33 MMOL/L (ref 20–31)
CO2: 33 MMOL/L (ref 20–31)
CREAT SERPL-MCNC: 0.43 MG/DL (ref 0.5–0.9)
CULTURE: ABNORMAL
DIFFERENTIAL TYPE: ABNORMAL
EOSINOPHILS RELATIVE PERCENT: 3 % (ref 1–4)
FIO2: 28
GFR AFRICAN AMERICAN: >60 ML/MIN
GFR NON-AFRICAN AMERICAN: >60 ML/MIN
GFR SERPL CREATININE-BSD FRML MDRD: ABNORMAL ML/MIN/{1.73_M2}
GFR SERPL CREATININE-BSD FRML MDRD: ABNORMAL ML/MIN/{1.73_M2}
GLOBULIN: ABNORMAL G/DL (ref 1.5–3.8)
GLUCOSE BLD-MCNC: 233 MG/DL (ref 65–105)
GLUCOSE BLD-MCNC: 235 MG/DL (ref 65–105)
GLUCOSE BLD-MCNC: 244 MG/DL (ref 65–105)
GLUCOSE BLD-MCNC: 266 MG/DL (ref 70–99)
GLUCOSE BLD-MCNC: 272 MG/DL (ref 65–105)
HCT VFR BLD CALC: 36.2 % (ref 36.3–47.1)
HEMOGLOBIN: 10.4 G/DL (ref 11.9–15.1)
IMMATURE GRANULOCYTES: 1 %
LACTIC ACID, WHOLE BLOOD: 0.9 MMOL/L (ref 0.7–2.1)
LACTIC ACID: NORMAL MMOL/L
LYMPHOCYTES # BLD: 20 % (ref 24–43)
Lab: ABNORMAL
Lab: ABNORMAL
MAGNESIUM: 2 MG/DL (ref 1.6–2.6)
MCH RBC QN AUTO: 27.7 PG (ref 25.2–33.5)
MCHC RBC AUTO-ENTMCNC: 28.7 G/DL (ref 28.4–34.8)
MCV RBC AUTO: 96.5 FL (ref 82.6–102.9)
MODE: ABNORMAL
MONOCYTES # BLD: 9 % (ref 3–12)
NEGATIVE BASE EXCESS, ART: ABNORMAL (ref 0–2)
NRBC AUTOMATED: 0 PER 100 WBC
O2 DEVICE/FLOW/%: ABNORMAL
PATIENT TEMP: ABNORMAL
PDW BLD-RTO: 13.4 % (ref 11.8–14.4)
PHOSPHORUS: 2.6 MG/DL (ref 2.6–4.5)
PLATELET # BLD: 139 K/UL (ref 138–453)
PLATELET ESTIMATE: ABNORMAL
PMV BLD AUTO: 12.1 FL (ref 8.1–13.5)
POC HCO3: 34.4 MMOL/L (ref 21–28)
POC O2 SATURATION: 98 % (ref 94–98)
POC PCO2 TEMP: ABNORMAL MM HG
POC PCO2: 53.9 MM HG (ref 35–48)
POC PH TEMP: ABNORMAL
POC PH: 7.41 (ref 7.35–7.45)
POC PO2 TEMP: ABNORMAL MM HG
POC PO2: 101.3 MM HG (ref 83–108)
POSITIVE BASE EXCESS, ART: 8 (ref 0–3)
POTASSIUM SERPL-SCNC: 3.6 MMOL/L (ref 3.7–5.3)
POTASSIUM SERPL-SCNC: 3.8 MMOL/L (ref 3.7–5.3)
POTASSIUM SERPL-SCNC: 4 MMOL/L (ref 3.7–5.3)
POTASSIUM SERPL-SCNC: 4 MMOL/L (ref 3.7–5.3)
RBC # BLD: 3.75 M/UL (ref 3.95–5.11)
RBC # BLD: ABNORMAL 10*6/UL
SAMPLE SITE: ABNORMAL
SEG NEUTROPHILS: 67 % (ref 36–65)
SEGMENTED NEUTROPHILS ABSOLUTE COUNT: 5.06 K/UL (ref 1.5–8.1)
SODIUM BLD-SCNC: 152 MMOL/L (ref 135–144)
SODIUM BLD-SCNC: 153 MMOL/L (ref 135–144)
SODIUM BLD-SCNC: 154 MMOL/L (ref 135–144)
SODIUM BLD-SCNC: 154 MMOL/L (ref 135–144)
SPECIMEN DESCRIPTION: ABNORMAL
SPECIMEN DESCRIPTION: ABNORMAL
TCO2 (CALC), ART: 36 MMOL/L (ref 22–29)
TOTAL PROTEIN: 5.7 G/DL (ref 6.4–8.3)
WBC # BLD: 7.5 K/UL (ref 3.5–11.3)
WBC # BLD: ABNORMAL 10*3/UL

## 2021-01-15 PROCEDURE — 2000000000 HC ICU R&B

## 2021-01-15 PROCEDURE — 6370000000 HC RX 637 (ALT 250 FOR IP): Performed by: STUDENT IN AN ORGANIZED HEALTH CARE EDUCATION/TRAINING PROGRAM

## 2021-01-15 PROCEDURE — 80051 ELECTROLYTE PANEL: CPT

## 2021-01-15 PROCEDURE — 2700000000 HC OXYGEN THERAPY PER DAY

## 2021-01-15 PROCEDURE — 99222 1ST HOSP IP/OBS MODERATE 55: CPT | Performed by: INTERNAL MEDICINE

## 2021-01-15 PROCEDURE — 2580000003 HC RX 258: Performed by: INTERNAL MEDICINE

## 2021-01-15 PROCEDURE — 83605 ASSAY OF LACTIC ACID: CPT

## 2021-01-15 PROCEDURE — 36415 COLL VENOUS BLD VENIPUNCTURE: CPT

## 2021-01-15 PROCEDURE — 6360000002 HC RX W HCPCS: Performed by: STUDENT IN AN ORGANIZED HEALTH CARE EDUCATION/TRAINING PROGRAM

## 2021-01-15 PROCEDURE — 99291 CRITICAL CARE FIRST HOUR: CPT | Performed by: INTERNAL MEDICINE

## 2021-01-15 PROCEDURE — 2580000003 HC RX 258: Performed by: STUDENT IN AN ORGANIZED HEALTH CARE EDUCATION/TRAINING PROGRAM

## 2021-01-15 PROCEDURE — 85025 COMPLETE CBC W/AUTO DIFF WBC: CPT

## 2021-01-15 PROCEDURE — 82947 ASSAY GLUCOSE BLOOD QUANT: CPT

## 2021-01-15 PROCEDURE — 82803 BLOOD GASES ANY COMBINATION: CPT

## 2021-01-15 PROCEDURE — 80076 HEPATIC FUNCTION PANEL: CPT

## 2021-01-15 PROCEDURE — 94761 N-INVAS EAR/PLS OXIMETRY MLT: CPT

## 2021-01-15 PROCEDURE — 83735 ASSAY OF MAGNESIUM: CPT

## 2021-01-15 PROCEDURE — 84100 ASSAY OF PHOSPHORUS: CPT

## 2021-01-15 PROCEDURE — 80048 BASIC METABOLIC PNL TOTAL CA: CPT

## 2021-01-15 PROCEDURE — 99232 SBSQ HOSP IP/OBS MODERATE 35: CPT | Performed by: INTERNAL MEDICINE

## 2021-01-15 RX ORDER — AMLODIPINE BESYLATE 5 MG/1
5 TABLET ORAL DAILY
Status: DISCONTINUED | OUTPATIENT
Start: 2021-01-15 | End: 2021-01-16

## 2021-01-15 RX ORDER — INSULIN GLARGINE 100 [IU]/ML
10 INJECTION, SOLUTION SUBCUTANEOUS ONCE
Status: COMPLETED | OUTPATIENT
Start: 2021-01-15 | End: 2021-01-15

## 2021-01-15 RX ADMIN — AMLODIPINE BESYLATE 5 MG: 5 TABLET ORAL at 12:34

## 2021-01-15 RX ADMIN — INSULIN LISPRO 2 UNITS: 100 INJECTION, SOLUTION INTRAVENOUS; SUBCUTANEOUS at 20:22

## 2021-01-15 RX ADMIN — METOPROLOL TARTRATE 25 MG: 25 TABLET ORAL at 07:00

## 2021-01-15 RX ADMIN — Medication 1500 MG: at 12:35

## 2021-01-15 RX ADMIN — INSULIN GLARGINE 10 UNITS: 100 INJECTION, SOLUTION SUBCUTANEOUS at 12:34

## 2021-01-15 RX ADMIN — SODIUM CHLORIDE: 4.5 INJECTION, SOLUTION INTRAVENOUS at 11:02

## 2021-01-15 RX ADMIN — ATORVASTATIN CALCIUM 80 MG: 80 TABLET, FILM COATED ORAL at 20:22

## 2021-01-15 RX ADMIN — SODIUM CHLORIDE: 4.5 INJECTION, SOLUTION INTRAVENOUS at 04:35

## 2021-01-15 RX ADMIN — SODIUM CHLORIDE: 4.5 INJECTION, SOLUTION INTRAVENOUS at 21:59

## 2021-01-15 RX ADMIN — INSULIN LISPRO 6 UNITS: 100 INJECTION, SOLUTION INTRAVENOUS; SUBCUTANEOUS at 16:38

## 2021-01-15 RX ADMIN — DOCUSATE SODIUM 100 MG: 50 LIQUID ORAL at 09:18

## 2021-01-15 RX ADMIN — ENOXAPARIN SODIUM 40 MG: 40 INJECTION SUBCUTANEOUS at 08:00

## 2021-01-15 RX ADMIN — INSULIN LISPRO 4 UNITS: 100 INJECTION, SOLUTION INTRAVENOUS; SUBCUTANEOUS at 12:17

## 2021-01-15 RX ADMIN — INSULIN LISPRO 4 UNITS: 100 INJECTION, SOLUTION INTRAVENOUS; SUBCUTANEOUS at 08:01

## 2021-01-15 RX ADMIN — SERTRALINE 100 MG: 50 TABLET, FILM COATED ORAL at 08:00

## 2021-01-15 RX ADMIN — METOPROLOL TARTRATE 25 MG: 25 TABLET ORAL at 20:22

## 2021-01-15 RX ADMIN — SODIUM CHLORIDE, PRESERVATIVE FREE 10 ML: 5 INJECTION INTRAVENOUS at 20:22

## 2021-01-15 RX ADMIN — SODIUM CHLORIDE, PRESERVATIVE FREE 10 ML: 5 INJECTION INTRAVENOUS at 08:01

## 2021-01-15 ASSESSMENT — PAIN SCALES - GENERAL: PAINLEVEL_OUTOF10: 0

## 2021-01-15 NOTE — CONSULTS
[facility] - Initial Consult Note  Today's Date and Time: 1/15/2021, 4:19 PM    Impression :   · Extensive right MCA stroke   · Acte cystitis without hematuria   · Enterococcus septicemia 1-13-21  · Hypernatremia  · Dehydration   · Allergy to Penicillin, sulfa  · Rt Gluteal decubitus    Recommendations:   · Vancomycin 1,250 mg IV q12h for 10 days; Stop date: 01/25/21  · Wound Care    Medical Decision Making/Summary/Discussion:1/15/2021     ·   Infection Control Recommendations   · [place] Precautions    Antimicrobial Stewardship Recommendations     · Simplification of therapy  · Targeted therapy  · PK dosing    Coordination of Outpatient Care:   · Estimated Length of IV antimicrobials: 1-25-21  · Patient will need Midline Catheter Insertion:  TBD   · Patient will need PICC line Insertion: TBD   · Patient will need: Home IV , [place],  SNF,  LTAC: [facility]  · Patient will need outpatient wound care: TBD. Chief complaint/reason for consultation:   · Enterococcus in blood     History of Present Illness:     INITIAL HISTORY:    Gucci Ruiz is a 78y.o.-year-old  female with history of CAD, HTN, DM, hyperlipidemia, right MCA stroke with residual left-sided deficits who was initially admitted on 1/13/2021. Patient seen at the request of Dr. Angelic Quintana. Patient was initially seen at [facility] [address] ER because of uncontrolled hyperglycemia, hypernatremia and AMS. Of note, patient suffered a stroke in December 2020 and had a PEG placed at that time. CT of the head showed redemonstration of extensive right MCA territory infarct with no new infarct noted. Patient was started on heparin, but was not a candidate for TPA due to being out of window period. Blood cultures obtained on 1-13-21 show the growth of Enterococcus faecalis. Susceptible to ampicillin but patient is allergic to penicillins. Started on Vancomycin.   Plan 10 days of Rx.    CURRENT EVALUATION: 01/15/21    Patient evaluated and examined in the ICU. Patient is extremely lethargic and unable to follow command at this time. She reports feeling \"dry and thirsty. \"     On examination, patient has dry mucous membranes. Skin tenting noted. Stage 3 wound noted to the right buttock with serosanguinous drainage present. No fever present. Labs, X rays reviewed: 1/15/2021    BUN: 35  Cr: 0.43    WBC: 7.5   Hb: 10.4   Plat: 139    Cultures:  Urine:  · 1/13/21: +ve E.coli   Blood:  · 1/13/21: +ve enterococcus faecalis (gram +ve cocci in chains and pairs)  Sputum :  ·   Wound:  ·     COVID: negative   MRSA: negative     CT Head WO Contrast:   · 1/15/21: Pending   · 1/13/21:   1. Redemonstration of extensive right MCA territory infarct.  No new infarct. 2. New or increasing hyperdense serpiginous gyral hyperdensities along   posterior aspect of the infarct in the parietal and to a less and extent   temporal lobe and also along the margins of the right caudate nucleus is   suggestive of some blood products.  This may be further assessed with MRI if   deemed clinically necessary. .               CXR:   1/13/21: No acute cardiopulmonary findings. Discussed with patient, RN, family. I have personally reviewed the past medical history, past surgical history, medications, social history, and family history, and I have updated the database accordingly.   Past Medical History:     Past Medical History:   Diagnosis Date    Allergic rhinitis     Anxiety 10/25/2016    Asthma     CAD (coronary artery disease)     s/p stents RCA and LAD 2009,    Chronic back pain     Congestive heart failure ([address]     Depression     Headache(784.0)     Hiatal hernia     Hypercholesteremia 2/21/2012    Hypertension     Kidney stones     years ago    Obesity     Osteoarthritis     Postlaminectomy syndrome 6/6/2012    Type II or unspecified type diabetes mellitus without mention of complication, not stated as uncontrolled     Unspecified sleep apnea     Urinary incontinence        Past Surgical  History:     Past Surgical History:   Procedure Laterality Date    CARDIAC CATHETERIZATION  01/2013    patent stents    COLONOSCOPY  09/2015    normal    CORONARY ANGIOPLASTY WITH STENT PLACEMENT  1012-16    stents x 3    GASTROSTOMY TUBE PLACEMENT  12/28/2020    EGD PEG TUBE PLACEMENT    GASTROSTOMY TUBE PLACEMENT N/A 12/28/2020    EGD PEG TUBE PLACEMENT performed by Luis Barajas MD at [address]      left knee    KNEE SURGERY  10/21/2013    rt knee synvisc injection     KNEE SURGERY  12/02/2013    knee synvisc injection rt #2    KNEE SURGERY  12/09/2013    rt knee synvisc inj    LUMBAR SPINE SURGERY  2007    NERVE BLOCK  04/23/2012    Right MBNB L3, L4, L5    NERVE BLOCK  05/22/2012    Right MBNB L3, L4, and L5     NERVE BLOCK  01/14/2013    Right knee injection #1 - Synvisc    NERVE BLOCK  01/21/2013    Right knee injection #2 - Synvisc    NERVE BLOCK  01/28/2013    Rigth knee synvisc injection #3    NERVE BLOCK Right 04/17/2017    Rt genicular nerve block.  no steroid used    OTHER SURGICAL HISTORY Right 07/14/2014    synvisc one knee injection    OTHER SURGICAL HISTORY Right 03/16/2015    synvisc one knee injection    OTHER SURGICAL HISTORY Right 06/13/2016    synvisc right knee injection    SPINE SURGERY      TONSILLECTOMY      UPPER GASTROINTESTINAL ENDOSCOPY      VENA CAVA FILTER PLACEMENT  2007    PE and B/L LE emboli       Medications:      docusate  100 mg Oral Daily    amLODIPine  5 mg Oral Daily    vancomycin (VANCOCIN) intermittent dosing (placeholder)   Other RX Placeholder    vancomycin  1,500 mg Intravenous Q12H    atorvastatin  80 mg Oral Daily    metoprolol tartrate  25 mg Oral BID    sertraline  100 mg Oral Daily    sodium chloride flush  10 mL Intravenous 2 times per day    enoxaparin  40 mg Subcutaneous Daily    insulin lispro  0-12 Units Subcutaneous TID WC    insulin lispro  0-6 Units Subcutaneous Nightly       Social History:     Social History     Socioeconomic History    Marital status: Legally      Spouse name: Not on file    Number of children: Not on file    Years of education: Not on file    Highest education level: Not on file   Occupational History    Not on file   Social Needs    Financial resource strain: Not on file    Food insecurity     Worry: Not on file     Inability: Not on file    Transportation needs     Medical: Not on file     Non-medical: Not on file   Tobacco Use    Smoking status: Former Smoker     Packs/day: 0.50     Years: 0.00     Pack years: 0.00     Quit date: 3/28/2013     Years since quittin.8    Smokeless tobacco: Never Used   Substance and Sexual Activity    Alcohol use:  Yes     Alcohol/week: 1.0 standard drinks     Types: 1 Cans of beer per week     Comment: occasionally    Drug use: No    Sexual activity: Not on file   Lifestyle    Physical activity     Days per week: Not on file     Minutes per session: Not on file    Stress: Not on file   Relationships    Social connections     Talks on phone: Not on file     Gets together: Not on file     Attends [employer] service: Not on file     Active member of club or organization: Not on file     Attends meetings of clubs or organizations: Not on file     Relationship status: Not on file    Intimate partner violence     Fear of current or ex partner: Not on file     Emotionally abused: Not on file     Physically abused: Not on file     Forced sexual activity: Not on file   Other Topics Concern    Not on file   Social History Narrative    Not on file       Family History:     Family History   Problem Relation Age of Onset    Diabetes Mother     Cancer Father     High Blood Pressure Sister     High Blood Pressure Brother         Allergies:   Bactrim, Penicillins, and Tylenol [acetaminophen]     Review of Systems:   Constitutional: No fevers or chills. No systemic complaints  Head: No headaches  Eyes: No double vision or blurry vision. No conjunctival inflammation. ENT: No sore throat or runny nose. . No hearing loss, tinnitus or vertigo. Cardiovascular: No chest pain or palpitations. No shortness of breath. No JONES  Lung: No shortness of breath or cough. No sputum production  Abdomen: No nausea, vomiting, diarrhea, or abdominal pain. Didi Caridad No cramps. Genitourinary: No increased urinary frequency, or dysuria. No hematuria. No suprapubic or CVA pain  Musculoskeletal: No muscle aches or pains. No joint effusions, swelling or deformities  Hematologic: No bleeding or bruising. Neurologic: No headache, weakness, numbness, or tingling. Integument: No rash, no ulcers. Psychiatric: No depression. Endocrine: No polyuria, no polydipsia, no polyphagia. Physical Examination :     Patient Vitals for the past 8 hrs:   Temp Temp [facility]   01/15/21 1219 97 °F (36.1 °C) Axillary     General Appearance: Awake, alert, and in no apparent distress  Head:  Normocephalic, no trauma  Eyes: Pupils equal, round, reactive to light and accommodation; extraocular movements intact; sclera anicteric; conjunctivae pink. No embolic phenomena. ENT: Oropharynx clear, without erythema, exudate, or thrush. No tenderness of sinuses. Mouth/throat: mucosa pink and moist. No lesions. Dentition in good repair. Neck:Supple, without lymphadenopathy. Thyroid normal, No bruits. Pulmonary/Chest: Clear to auscultation, without wheezes, rales, or rhonchi. No dullness to percussion. Cardiovascular: Regular rate and rhythm without murmurs, rubs, or gallops. Abdomen: Soft, non tender. Bowel sounds normal. No organomegaly  All four Extremities: No cyanosis, clubbing, edema, or effusions. Neurologic: No gross sensory or motor deficits. Skin: Warm and dry with good turgor. No signs of peripheral arterial or venous insufficiency. No ulcerations. No open wounds.     Medical Decision Making -Laboratory:   I have independently reviewed/ordered the following labs:    CBC with Differential:   Recent Labs     01/14/21  0511 01/15/21  0541   WBC 8.5 7.5   HGB 10.8* 10.4*   HCT 38.1 36.2*    139   LYMPHOPCT 22* 20*   MONOPCT 11 9     BMP:   Recent Labs     01/14/21  0511 01/14/21  0511 01/15/21  0541 01/15/21  1359   *   < > 154* 153*   K 3.5*   < > 3.8 3.6*   *   < > 115* 114*   CO2 36*   < > 32* 33*   BUN 57*  --  35*  --    CREATININE 0.54  --  0.43*  --    MG 2.2  --  2.0  --     < > = values in this interval not displayed. Hepatic Function Panel:   Recent Labs     01/14/21  0511 01/15/21  0541   PROT 6.0* 5.7*   LABALBU 2.6* 2.5*   BILIDIR 0.15 0.13   IBILI 0.30 0.27   BILITOT 0.45 0.40   ALKPHOS 72 74   ALT 15 18   AST 27 29     No results for input(s): RPR in the last 72 hours. No results for input(s): HIV in the last 72 hours. No results for input(s): BC in the last 72 hours. Lab Results   Component Value Date    MUCUS NOT REPORTED 01/13/2021    RBC 3.75 01/15/2021    RBC 4.21 02/24/2012    TRICHOMONAS NOT REPORTED 01/13/2021    WBC 7.5 01/15/2021    YEAST NOT REPORTED 01/13/2021    TURBIDITY CLOUDY 01/13/2021     Lab Results   Component Value Date    CREATININE 0.43 01/15/2021    GLUCOSE 266 01/15/2021    GLUCOSE 105 02/24/2012       Medical Decision Making-Imaging:       Medical Decision Fwyyrg-[id number]-Rwhip:       Medical Decision Making-Other:     Note:  · Labs, medications, radiologic studies were reviewed with personal review of films  · Moderate Large amounts of data were reviewed  · Discussed with nursing Staff, Discharge planner  · Infection Control and Prevention measures reviewed  · All prior entries were reviewed  · Administer medications as ordered  · Prognosis: Fair   · Discharge planning reviewed  · Follow up as outpatient. Thank you for allowing us to participate in the care of this patient. Please call with questions.     Gonzales Downey, [facility] in the evaluation of this patient under my direct supervision. Amarilys Brooks MD,     ATTESTATION:    I have discussed the case, including pertinent history and exam findings with the residents and students. I have seen and examined the patient and the key elements of the encounter have been performed by me. I was present when the student obtained his information or examined the patient. I have reviewed the laboratory data, other diagnostic studies and discussed them with the residents. I have updated the medical record where necessary. I agree with the assessment, plan and orders as documented by the resident/ student.     Michele Martinez MD.    Pager: [phone] - Office: [phone]

## 2021-01-15 NOTE — PLAN OF CARE
Problem: Fluid Volume - Imbalance:  Goal: Absence of imbalanced fluid volume signs and symptoms  Description: Absence of imbalanced fluid volume signs and symptoms  Outcome: Met This Shift     Problem: Pain:  Goal: Recognizes and communicates pain  Description: Recognizes and communicates pain  Outcome: Met This Shift     Problem: Skin Integrity - Impaired:  Goal: Will show no infection signs and symptoms  Description: Will show no infection signs and symptoms  Outcome: Met This Shift  Goal: Absence of new skin breakdown  Description: Absence of new skin breakdown  Outcome: Met This Shift     Problem: Falls - Risk of:  Goal: Will remain free from falls  Description: Will remain free from falls  Outcome: Met This Shift  Goal: Absence of physical injury  Description: Absence of physical injury  Outcome: Met This Shift     Problem: Skin Integrity:  Goal: Will show no infection signs and symptoms  Description: Will show no infection signs and symptoms  Outcome: Met This Shift  Goal: Absence of new skin breakdown  Description: Absence of new skin breakdown  Outcome: Met This Shift     Problem: Nutrition  Goal: Optimal nutrition therapy  Description: Nutrition Problem #1: Inadequate oral intake  Intervention: Food and/or Nutrient Delivery: Start Tube Feeding(Suggest resume Diabetic formula with goal rate of 55 mL/hr which will provide 1584 kcal and 80 g pro/day.)  Nutritional Goals: meet % of estimated nutrient needs     Outcome: Met This Shift     Problem: Pain:  Goal: Pain level will decrease  Description: Pain level will decrease  Outcome: Ongoing  Goal: Control of acute pain  Description: Control of acute pain  Outcome: Ongoing  Goal: Control of chronic pain  Description: Control of chronic pain  Outcome: Ongoing     Problem: Pain:  Goal: Pain level will decrease  Description: Pain level will decrease  Outcome: Ongoing  Goal: Control of acute pain  Description: Control of acute pain  Outcome: Ongoing Goal: Control of chronic pain  Description: Control of chronic pain  Outcome: Ongoing     Problem: Discharge Planning:  Goal: Participates in care planning  Description: Participates in care planning  Outcome: Not Met This Shift  Goal: Discharged to appropriate level of care  Description: Discharged to appropriate level of care  Outcome: Not Met This Shift     Problem: Mental Status - Impaired:  Goal: Mental status will be restored to baseline  Description: Mental status will be restored to baseline  Outcome: Not Met This Shift

## 2021-01-15 NOTE — PROGRESS NOTES
INTENSIVE CARE UNIT  Resident Physician Progress Note    Patient - Sherrie Has  Date of Admission -  1/13/2021  4:15 PM  Date of Evaluation -  1/15/2021  Room and Bed Number -  0101/0101-01   Hospital Day - 2      SUBJECTIVE:     OVERNIGHT EVENTS:    Urine culture grew ecoli, on rocephin, sodium downtrending    TMAX 98.4  Non-intubated, non-sedated   HR normal sinuse  BP: spiked up to 180, given AM lopressor early     TODAY: Leatha Muir admitted 1/13/2020 is a 74-year-old female who presents after being recently discharged on December 30/2020 after a 2  week stay in which she presented with a right MCA ischemic infarct as well as a urinary tract infection, patient was sent to a skilled nursing facility at that time, presented to outside ER with altered mental status and hyperglycemia. Patient did have a sinus CT scan to have acute intracranial hemorrhage with hyperdense findings on the posterior aspect of the department of parietal and temporal lobe as well as the right caudate nucleus with concerns for new blood products and with redemonstration of extensive right MCA territory infarct, there critical care was consulted. Nephrology was consulted with concerns for hypernatremia, started patient on half-normal saline with a sodium yesterday morning at 154 down from 158 the previous day. 154 again today     Patient's vitals morning respiratory 16, pulse 87, blood pressure 162/71, saturating 100% on 2 L nasal cannula.     Morning bp systolic above 264, given lopressor early, tylenol for pain     PLAN: Continue rocephin for e.coli, repeat CT head tomorrow, started on lovenox today, restart aspirin if ct head stable     AWAKE & FOLLOWING COMMANDS:  [] No   [x] Yes    SECRETIONS Amount:  [] Small [] Moderate  [] Large  [x] None  Color:     [] White [] Colored  [] Bloody    SEDATION:  RAAS Score:  [] Propofol gtt  [] Versed gtt  [] Ativan gtt   [x] No Sedation    PARALYZED:  [x] No    [] Yes    VASOPRESSORS:  [x] No    [] Yes  [] Levophed [] Dopamine [] Vasopressin  [] Dobutamine [] Phenylephrine [] Epinephrine      OBJECTIVE:     VITAL SIGNS:  /66   Pulse 87   Temp 98.4 °F (36.9 °C) (Oral)   Resp 16   Ht 5' 4\" (1.626 m)   Wt 226 lb 6.6 oz (102.7 kg)   LMP  (LMP Unknown)   SpO2 100%   BMI 38.86 kg/m²   Tmax over 24 hours:  Temp (24hrs), Av.9 °F (36.6 °C), Min:97.5 °F (36.4 °C), Max:98.4 °F (36.9 °C)      Patient Vitals for the past 8 hrs:   BP Temp Temp src Pulse Resp SpO2 Weight   01/15/21 0600 132/66 -- -- 87 16 100 % 226 lb 6.6 oz (102.7 kg)   01/15/21 0500 132/65 -- -- 80 17 99 % --   01/15/21 0400 117/60 98.4 °F (36.9 °C) Oral 85 13 98 % --   01/15/21 0300 (!) 125/54 -- -- 85 17 99 % --   01/15/21 0200 (!) 100/56 -- -- 82 20 98 % --   01/15/21 0100 -- -- -- 84 16 98 % --   01/15/21 0000 (!) 116/57 98.1 °F (36.7 °C) Oral 78 18 98 % --         Intake/Output Summary (Last 24 hours) at 1/15/2021 0701  Last data filed at 1/15/2021 0600  Gross per 24 hour   Intake 4005 ml   Output 1285 ml   Net 2720 ml     Date 01/15/21 0000 - 01/15/21 2359   Shift 2255-9505 4047-9371 6595-9955 24 Hour Total   INTAKE   I.V.(mL/kg) 1130(11)   1130(11)   NG/GT(mL/kg) 282(2.7)   282(2.7)   Shift Total(mL/kg) 1412(13.7)   1412(13.7)   OUTPUT   Urine(mL/kg/hr) 350   350   Shift Total(mL/kg) 350(3.4)   350(3.4)   Weight (kg) 102.7 102.7 102.7 102.7     Wt Readings from Last 3 Encounters:   01/15/21 226 lb 6.6 oz (102.7 kg)   21 220 lb (99.8 kg)   20 231 lb (104.8 kg)     Body mass index is 38.86 kg/m².         PHYSICAL EXAM:  GEN:  awake, alert, cooperative, no apparent distress, and appears stated age  EYES:  Lids and lashes normal, pupils equal, round and reactive to light, extra ocular muscles intact, sclera clear, conjunctiva normal  HEENT:  Normocephalic, without obvious abnormality, atramatic, sinuses nontender on palpation, external ears without lesions, oral pharynx with moist mucus membranes, tonsils without erythema or exudates, gums normal and good dentition. LUNGS:  No increased work of breathing, good air exchange, clear to auscultation bilaterally, no crackles or wheezing  CV:    regular rate and rhythm  ABDOMEN:   non-distended, non-tender and no masses palpated  MSK:    There is no redness, warmth, or swelling of the joints. Full range of motion noted. Motor strength is 5 out of 5 all extremities bilaterally.   Tone is normal.  NEURO[de-identified]   Awake, residual deficits noted on left upper   EXTREMITIES:  No pedal or leg edema, no calf tenderness/swelling, no erythema, distal pulses intact       MEDICATIONS:  Scheduled Meds:   atorvastatin  80 mg Oral Daily    metoprolol tartrate  25 mg Oral BID    sertraline  100 mg Oral Daily    docusate sodium  100 mg Oral Daily    sodium chloride flush  10 mL Intravenous 2 times per day    enoxaparin  40 mg Subcutaneous Daily    insulin lispro  0-12 Units Subcutaneous TID WC    insulin lispro  0-6 Units Subcutaneous Nightly    cefTRIAXone (ROCEPHIN) IV  1 g Intravenous Q24H     Continuous Infusions:   dextrose      sodium chloride 140 mL/hr at 01/15/21 0435     PRN Meds:       sodium chloride flush, 10 mL, PRN      promethazine, 12.5 mg, Q6H PRN    Or      ondansetron, 4 mg, Q6H PRN      polyethylene glycol, 17 g, Daily PRN      acetaminophen, 650 mg, Q6H PRN    Or      acetaminophen, 650 mg, Q6H PRN      glucose, 15 g, PRN      dextrose, 12.5 g, PRN      glucagon (rDNA), 1 mg, PRN      dextrose, 100 mL/hr, PRN        SUPPORT DEVICES: [] Ventilator [] BIPAP  [x] Nasal Cannula [] Room Air        Lab Results   Component Value Date    CRA5ILS 36 01/15/2021    FIO2 28.0 01/15/2021         DATA:  Complete Blood Count:   Recent Labs     01/13/21  0919 01/14/21  0511 01/15/21  0541   WBC 7.2 8.5 7.5   RBC 4.47 3.90* 3.75*   HGB 12.5 10.8* 10.4*   HCT 41.2 38.1 36.2*   MCV 92.2 97.7 96.5   MCH 27.9 27.7 27.7   MCHC 30.3* 28.3* 28.7   RDW 14.5 13.8 13.4    153 139   MPV 10.1 12.4 12.1        Last 3 Blood Glucose:   Recent Labs     01/13/21  0919 01/13/21  1325 01/14/21  0511 01/15/21  0541   GLUCOSE 372* 234* 196* 266*        PT/INR:    Lab Results   Component Value Date    PROTIME 10.3 12/15/2020    INR 1.0 12/15/2020     PTT:    Lab Results   Component Value Date    APTT 31.1 12/16/2020       Comprehensive Metabolic Profile:   Recent Labs     01/13/21  0919 01/13/21  1325 01/13/21  1325 01/14/21  0511 01/14/21  0511 01/14/21  1946 01/15/21  0110 01/15/21  0541   * 165*   < > 164*   < > 158* 154* 154*   K 3.6* 3.7  --  3.5*   < > 4.2 4.0 3.8   * 116*  --  119*   < > 117* 114* 115*   CO2 40* 43*  --  36*   < > 35* 33* 32*   BUN 78* 79*  --  57*  --   --   --  35*   CREATININE 0.90 0.90  --  0.54  --   --   --  0.43*   GLUCOSE 372* 234*  --  196*  --   --   --  266*   CALCIUM 10.3 9.9  --  9.2  --   --   --  9.0   PROT 6.8  --   --  6.0*  --   --   --  5.7*   LABALBU 2.8*  --   --  2.6*  --   --   --  2.5*   BILITOT 0.30  --   --  0.45  --   --   --  0.40   ALKPHOS 78  --   --  72  --   --   --  74   AST 22  --   --  27  --   --   --  29   ALT 13  --   --  15  --   --   --  18    < > = values in this interval not displayed.       Magnesium:   Lab Results   Component Value Date    MG 2.0 01/15/2021    MG 2.2 01/14/2021    MG 2.1 12/30/2020     Phosphorus:   Lab Results   Component Value Date    PHOS 2.6 01/15/2021    PHOS 3.5 01/14/2021    PHOS 3.2 12/16/2020     Ionized Calcium:   Lab Results   Component Value Date    CAION 1.23 12/22/2020    CAION 1.22 12/21/2020    CAION 1.21 12/20/2020        Urinalysis:   Lab Results   Component Value Date    NITRU NEGATIVE 01/13/2021    COLORU YELLOW 01/13/2021    PHUR 5.5 01/13/2021    WBCUA TOO NUMEROUS TO COUNT 01/13/2021    RBCUA 2 TO 5 01/13/2021    MUCUS NOT REPORTED 01/13/2021    TRICHOMONAS NOT REPORTED 01/13/2021    YEAST NOT REPORTED 01/13/2021    BACTERIA MANY 01/13/2021    SPECGRAV 1.020 01/13/2021 LEUKOCYTESUR LARGE 01/13/2021    UROBILINOGEN Normal 01/13/2021    BILIRUBINUR NEGATIVE 01/13/2021    BILIRUBINUR NEGATIVE 02/24/2012    GLUCOSEU TRACE 01/13/2021    GLUCOSEU NEGATIVE 02/24/2012    KETUA NEGATIVE 01/13/2021    AMORPHOUS NOT REPORTED 01/13/2021       HgBA1c:    Lab Results   Component Value Date    LABA1C 6.8 12/16/2020     TSH:    Lab Results   Component Value Date    TSH 1.38 12/19/2018     Lactic Acid:   Lab Results   Component Value Date    LACTA NOT REPORTED 01/15/2021    LACTA NOT REPORTED 01/14/2021      Troponin: No results for input(s): TROPONINI in the last 72 hours.     Microbiology:  Urine Culture:  No components found for: LAILA      Other Labs:  CBC:   Lab Results   Component Value Date    WBC 7.5 01/15/2021    RBC 3.75 01/15/2021    RBC 4.21 02/24/2012    HGB 10.4 01/15/2021    HCT 36.2 01/15/2021    MCV 96.5 01/15/2021    MCH 27.7 01/15/2021    MCHC 28.7 01/15/2021    RDW 13.4 01/15/2021     01/15/2021     02/24/2012    MPV 12.1 01/15/2021     BMP:    Lab Results   Component Value Date     01/15/2021    K 3.8 01/15/2021     01/15/2021    CO2 32 01/15/2021    BUN 35 01/15/2021    LABALBU 2.5 01/15/2021    LABALBU 4.2 02/24/2012    CREATININE 0.43 01/15/2021    CALCIUM 9.0 01/15/2021    GFRAA >60 01/15/2021    LABGLOM >60 01/15/2021    GLUCOSE 266 01/15/2021    GLUCOSE 105 02/24/2012           ASSESSMENT:     Patient Active Problem List    Diagnosis Date Noted    Bacteremia     Acute cystitis without hematuria     Hypernatremia     Cerebrovascular accident (CVA) due to thrombosis of right middle cerebral artery ([address]     Oropharyngeal dysphagia     Dehydration 01/13/2021    Ischemic cerebral stroke due to intracranial large artery atherosclerosis (HCC)     Acute cerebrovascular accident (CVA) ([address]     Right middle cerebral artery stroke ([address]     Non-traumatic rhabdomyolysis     Cerebrovascular accident (CVA) due to embolic occlusion of right middle cerebral artery ([facility] 12/15/2020    Influenza B 02/24/2018    Reactive airway disease with acute exacerbation 02/24/2018    Long term (current) use of antithrombotics/antiplatelets 68/02/4036    Medication monitoring encounter 10/09/2017    Postlaminectomy syndrome     Postlaminectomy syndrome     Primary osteoarthritis of right knee     Anxiety 10/25/2016    Depression (emotion) 10/25/2016    Postlaminectomy syndrome, lumbar 10/25/2016    S/P coronary artery stent placement - RCA 10/12/16 - Dr. Ronnie Degroot 10/12/2016    Type 2 diabetes mellitus with complication, without long-term current use of insulin ([facility] 06/16/2016    Chronic prescription opiate use 02/10/2016    Chronic pain     Coronary artery disease involving native coronary artery without angina pectoris     Hypertensive urgency     Acute coronary syndrome (HCC)     Congestive heart failure ([address]     Lymphadenopathy     Chronic knee pain 02/24/2015    Chronic use of opiate drugs therapeutic purposes 11/25/2014    Lumbar spondylosis 08/27/2014    Cervical spondylosis 08/27/2014    Urge incontinence of urine 07/31/2014    Knee pain, bilateral 06/25/2014    S/P TKR (total knee replacement) 06/25/2014    Morbid obesity ([facility] 04/03/2014    YUDITH (obstructive sleep apnea) 04/02/2014    Chest pain 03/31/2014    Spinal stenosis in cervical region 02/06/2013    Edema 08/07/2012    Knee osteoarthritis 06/06/2012    DM (diabetes mellitus) ([facility] 02/21/2012    HTN (hypertension) 02/21/2012    Smoker 02/21/2012    Asthma 02/21/2012          PLAN:     WEAN PER PROTOCOL:  [] No   [] Yes  [] N/A    ICU PROPHYLAXIS:  Lovenox    NUTRITION:  [] NPO [x] Tube Feeding (Specify: ) [] TPN  [] PO    HOME MEDS RECONCILED: [] No  [x] Yes    CONSULTATION NEEDED:  [x] No  [] Yes    FAMILY UPDATED:    [x] No  [] Yes    TRANSFER OUT OF ICU:   [x] No  [] Yes        Additional Assessment:  Neuro   -Altered mental status, hypernatremia, dehydration   -CT head is continued on half saline at 140 mL an hour her sodium is 154 this morning her urine output is 1285 in last 24 hours. Her blood sugar is elevated after she was started on tube feeding her renal function is PERRL, conjunctiva normal.  Her blood culture is growing Enterococcus in the blood urine culture is positive for E. coli but only 10-50,000 colonies. We will add Norvasc 5 mg once daily and continue with Lopressor and adjust Lopressor. On home medication reviewed she is on 0.1 of clonidine once daily Norvasc 10 mg and Lopressor 75 mg twice daily. We will add Lantus and will increase the dose of Lantus. Continue to follow-up sodium and fluid being managed by nephrology would suggest to start free water through the PEG tube and cut down the IV fluid currently she is on half saline. We will discontinue Rocephin and start ampicillin for Enterococcus. We will get ID evaluation and will determine if there is any need for abdominal CT. Abdomen is soft and nontender nondistended no guarding or rigidity. She does have a coccygeal stage III decub ulcer and it is possible that Enterococcus is secondary to the coccygeal wound. Discussed with nursing staff, treatment and plan discussed. Discussed with respiratory therapist.    Total critical care time caring for this patient with life threatening, unstable organ failure, including direct patient contact, management of life support systems, review of data including imaging and labs, discussions with other team members and physicians at least 27  Min so far today, excluding procedures. Please note that this chart was generated using voice recognition Dragon dictation software. Although every effort was made to ensure the accuracy of this automated transcription, some errors in transcription may have occurred.      Alejandra Cook MD  1/15/2021 11:27 AM

## 2021-01-15 NOTE — PROGRESS NOTES
Pharmacy Note  Vancomycin Consult    Rosa Tillman is a 78 y.o. female started on Vancomycin for bacteremia; consult received from Dr. Aaron Delaney to manage therapy.      Patient Active Problem List   Diagnosis    DM (diabetes mellitus) ([address]    HTN (hypertension)    Smoker    Asthma    Knee osteoarthritis    Edema    Spinal stenosis in cervical region    Chest pain    YUDITH (obstructive sleep apnea)    Morbid obesity (HCC)    Knee pain, bilateral    S/P TKR (total knee replacement)    Urge incontinence of urine    Lumbar spondylosis    Cervical spondylosis    Chronic use of opiate drugs therapeutic purposes    Chronic knee pain    Hypertensive urgency    Acute coronary syndrome (HCC)    Congestive heart failure (HCC)    Lymphadenopathy    Chronic pain    Coronary artery disease involving native coronary artery without angina pectoris    Chronic prescription opiate use    Type 2 diabetes mellitus with complication, without long-term current use of insulin (HCC)    S/P coronary artery stent placement - RCA 10/12/16 - Dr. Vivek Hummel    Depression (emotion)    Postlaminectomy syndrome, lumbar    Medication monitoring encounter    Postlaminectomy syndrome    Postlaminectomy syndrome    Primary osteoarthritis of right knee    Long term (current) use of antithrombotics/antiplatelets    Influenza B    Reactive airway disease with acute exacerbation    Cerebrovascular accident (CVA) due to embolic occlusion of right middle cerebral artery ([address]    Acute cerebrovascular accident (CVA) ([address]    Right middle cerebral artery stroke ([address]    Non-traumatic rhabdomyolysis    Ischemic cerebral stroke due to intracranial large artery atherosclerosis ([address]    Dehydration    Bacteremia    Acute cystitis without hematuria    Hypernatremia    Cerebrovascular accident (CVA) due to thrombosis of right middle cerebral artery (HCC)    Oropharyngeal dysphagia       Allergies:  Bactrim, Penicillins, and Tylenol [acetaminophen] Temp max: afebrile    Recent Labs     01/14/21  0511 01/15/21  0541   BUN 57* 35*       Recent Labs     01/14/21  0511 01/15/21  0541   CREATININE 0.54 0.43*       Recent Labs     01/14/21  0511 01/15/21  0541   WBC 8.5 7.5         Intake/Output Summary (Last 24 hours) at 1/15/2021 1137  Last data filed at 1/15/2021 8404  Gross per 24 hour   Intake 3572 ml   Output 1200 ml   Net 2372 ml       Culture Date      Source                       Results  01.13                   MRSA Swab              Negative  01.13                   Urine                          E. Coli  01.13                   Blood x2                     E. Faecalis in 1/2    Ht Readings from Last 1 Encounters:   01/14/21 5' 4\" (1.626 m)        Wt Readings from Last 1 Encounters:   01/15/21 226 lb 6.6 oz (102.7 kg)         Body mass index is 38.86 kg/m². Estimated Creatinine Clearance: 146 mL/min (A) (based on SCr of 0.43 mg/dL (L)). Goal Trough Level: 15-20 mcg/mL    Assessment/Plan:  Will initiate Vancomycin 1500 mg IV every 12 hours. Timing of trough level will be determined based on culture results, renal function, and clinical response. Thank you for the consult. Will continue to follow.   Dalia Almeida, PatrickD BCPS [facility] Center[facility]  1/15/2021 11:38 AM

## 2021-01-15 NOTE — PROGRESS NOTES
Physical Therapy  DATE: 1/15/2021    NAME: Roro Charlton  MRN: [id number]   : 1950    Patient not seen this date for Physical Therapy due to:  [] Blood transfusion in progress  [] Hemodialysis  [] Patient Declined  [] Spine Precautions   [x] Strict Bedrest  [] Surgery/ Procedure  [] Testing      [x] Other Per RN patient total care at SNF. Will check with Case Management to see if PT eval is required for placement. [] PT is being discontinued at this time. Patient independent. No further needs. [] PT is being discontinued at this time due to declining physical/ medical status. Therapy is not appropriate at this time.     Ana Espino, PT

## 2021-01-15 NOTE — PLAN OF CARE
Problem: Discharge Planning:  Goal: Participates in care planning  Description: Participates in care planning  1/15/2021 0829 by Hamilton Palumbo RN  Outcome: Ongoing  1/15/2021 0603 by Hillary Mendoza RN  Outcome: Not Met This Shift  Goal: Discharged to appropriate level of care  Description: Discharged to appropriate level of care  1/15/2021 0829 by Hamilton Palumbo RN  Outcome: Ongoing  1/15/2021 0603 by Hillary Mendoza RN  Outcome: Not Met This Shift     Problem: Fluid Volume - Imbalance:  Goal: Absence of imbalanced fluid volume signs and symptoms  Description: Absence of imbalanced fluid volume signs and symptoms  1/15/2021 0829 by Hamilton Palumbo RN  Outcome: Ongoing  1/15/2021 0603 by Hillary Mendoza RN  Outcome: Met This Shift     Problem: Mental Status - Impaired:  Goal: Mental status will be restored to baseline  Description: Mental status will be restored to baseline  1/15/2021 0829 by Hamilton Palumbo RN  Outcome: Ongoing  1/15/2021 0603 by Hillary Mendoza RN  Outcome: Not Met This Shift     Problem: Pain:  Goal: Pain level will decrease  Description: Pain level will decrease  1/15/2021 0829 by Hamilton Palumbo RN  Outcome: Ongoing  1/15/2021 0603 by Hillary Mendoza RN  Outcome: Ongoing  Goal: Recognizes and communicates pain  Description: Recognizes and communicates pain  1/15/2021 0829 by Hamilton Palumbo RN  Outcome: Ongoing  1/15/2021 0603 by Hillary Mendoza RN  Outcome: Met This Shift  Goal: Control of acute pain  Description: Control of acute pain  1/15/2021 0829 by Hamilton Palumbo RN  Outcome: Ongoing  1/15/2021 0603 by Hillary Mendoza RN  Outcome: Ongoing  Goal: Control of chronic pain  Description: Control of chronic pain  1/15/2021 0829 by Hamilton Palumbo RN  Outcome: Ongoing  1/15/2021 0603 by Hillary Mendoza RN  Outcome: Ongoing     Problem: Skin Integrity - Impaired:  Goal: Will show no infection signs and symptoms  Description: Will show no infection signs and symptoms  1/15/2021 0829 by Hamilton Palumbo RN Outcome: Ongoing  1/15/2021 0603 by Harvey Brice RN  Outcome: Met This Shift  Goal: Absence of new skin breakdown  Description: Absence of new skin breakdown  1/15/2021 0829 by Todd Oh RN  Outcome: Ongoing  1/15/2021 0603 by Harvey Brice RN  Outcome: Met This Shift     Problem: Falls - Risk of:  Goal: Will remain free from falls  Description: Will remain free from falls  1/15/2021 0829 by Todd Oh RN  Outcome: Ongoing  1/15/2021 0603 by Harvey Brice RN  Outcome: Met This Shift  Goal: Absence of physical injury  Description: Absence of physical injury  1/15/2021 0829 by Todd Oh RN  Outcome: Ongoing  1/15/2021 0603 by Harvey Brice RN  Outcome: Met This Shift     Problem: Skin Integrity:  Goal: Will show no infection signs and symptoms  Description: Will show no infection signs and symptoms  1/15/2021 0829 by Todd Oh RN  Outcome: Ongoing  1/15/2021 0603 by Harvey Brice RN  Outcome: Met This Shift  Goal: Absence of new skin breakdown  Description: Absence of new skin breakdown  1/15/2021 0829 by Todd Oh RN  Outcome: Ongoing  1/15/2021 0603 by Harvey Brice RN  Outcome: Met This Shift     Problem: Pain:  Goal: Pain level will decrease  Description: Pain level will decrease  1/15/2021 0829 by Todd Oh RN  Outcome: Ongoing  1/15/2021 0603 by Harvey Brice RN  Outcome: Ongoing  Goal: Control of acute pain  Description: Control of acute pain  1/15/2021 0829 by Todd Oh RN  Outcome: Ongoing  1/15/2021 0603 by Harvey Brice RN  Outcome: Ongoing  Goal: Control of chronic pain  Description: Control of chronic pain  1/15/2021 0829 by Todd Oh RN  Outcome: Ongoing  1/15/2021 0603 by Harvey Brice RN  Outcome: Ongoing     Problem: Nutrition  Goal: Optimal nutrition therapy  Description: Nutrition Problem #1: Inadequate oral intake Intervention: Food and/or Nutrient Delivery: Start Tube Feeding(Suggest resume Diabetic formula with goal rate of 55 mL/hr which will provide 1584 kcal and 80 g pro/day.)  Nutritional Goals: meet % of estimated nutrient needs     1/15/2021 0829 by Hamilton Palumbo RN  Outcome: Ongoing  1/15/2021 0603 by Hillary Mendoza RN  Outcome: Met This Shift

## 2021-01-15 NOTE — PROGRESS NOTES
Renal Progress Note    Patient :  Suzy Wilkins; 78 y.o. MRN# [id number]  Location:  0101/0101-01  Attending:  Nu Bishop MD  Admit Date:  1/13/2021   Hospital Day: 2      Subjective:     Patient was seen and examined. No new issues reported overnight. Member 1.2 L a urine in the last 24 hours. Serum creatinine stable at 0.43 mg/DL today. Sodium did improve to 154, chloride improved to 115. Potassium 3.8, bicarb 32. Not started on tube feeds. Currently running on half-normal saline at 140 cc an hour. Blood culture 1/13/2021 +ve for Enterococcus faecalis. Urine culture 1/13/2021 +ve for E. coli.     Outpatient Medications:     Medications Prior to Admission: losartan (COZAAR) 25 MG tablet, Take 1 tablet by mouth daily  metoprolol tartrate 75 MG TABS, Take 75 mg by mouth 2 times daily  docusate (COLACE) 50 MG/5ML liquid, Take 10 mLs by mouth daily  rivastigmine (EXELON) 9.5 MG/24HR, Place 1 patch onto the skin daily  QUEtiapine (SEROQUEL) 25 MG tablet, Take 25 mg by mouth 2 times daily  Calcium Carbonate-Vitamin D (OYSTER SHELL CALCIUM/D) 500-200 MG-UNIT TABS, TAKE ONE TABLET BY MOUTH TWO TIMES A DAY  glimepiride (AMARYL) 2 MG tablet, Take 1 tablet by mouth every morning  linagliptin (TRADJENTA) 5 MG tablet, Take 1 tablet by mouth daily  cloNIDine (CATAPRES) 0.1 MG tablet, Take 1 tablet by mouth daily  amLODIPine (NORVASC) 10 MG tablet, Take 1 tablet by mouth daily  furosemide (LASIX) 40 MG tablet, Take 1 tablet by mouth daily  atorvastatin (LIPITOR) 80 MG tablet, TAKE 1 TABLET BY MOUTH DAILY  sertraline (ZOLOFT) 100 MG tablet, Take 1 tablet by mouth daily  clopidogrel (PLAVIX) 75 MG tablet, Take 1 tablet by mouth daily  aspirin (RA ASPIRIN EC) 81 MG EC tablet, Take 1 tablet by mouth daily  Incontinence Supply Disposable (INCONTINENCE BRIEF LARGE) MISC, Use 4-5/day as needed  glucose blood VI test strips (ASCENSIA AUTODISC VI;ONE TOUCH ULTRA TEST VI) strip, 1 each by In Vitro route 3 times daily As needed. albuterol-ipratropium (COMBIVENT RESPIMAT)  MCG/ACT AERS inhaler, Inhale 1 puff into the lungs every 6 hours as needed for Wheezing  Lancets MISC, Use 1 -2 times daily Insulin dependent diabetes mellitus    Current Medications:     Scheduled Meds:    docusate  100 mg Oral Daily    atorvastatin  80 mg Oral Daily    metoprolol tartrate  25 mg Oral BID    sertraline  100 mg Oral Daily    sodium chloride flush  10 mL Intravenous 2 times per day    enoxaparin  40 mg Subcutaneous Daily    insulin lispro  0-12 Units Subcutaneous TID WC    insulin lispro  0-6 Units Subcutaneous Nightly    cefTRIAXone (ROCEPHIN) IV  1 g Intravenous Q24H     Continuous Infusions:    dextrose      sodium chloride 140 mL/hr at 01/15/21 0435     PRN Meds:  sodium chloride flush, promethazine **OR** ondansetron, polyethylene glycol, acetaminophen **OR** acetaminophen, glucose, dextrose, glucagon (rDNA), dextrose    Input/Output:       I/O last 3 completed shifts: In: 7176 [I.V.:3603; NG/GT:402]  Out: 1285 [Urine:1285]. Patient Vitals for the past 96 hrs (Last 3 readings):   Weight   01/15/21 0600 226 lb 6.6 oz (102.7 kg)   21 0600 222 lb 7.1 oz (100.9 kg)   21 1615 214 lb 4.6 oz (97.2 kg)       Vital Signs:   Temperature:  Temp: 98.1 °F (36.7 °C)  TMax:   Temp (24hrs), Av °F (36.7 °C), Min:97.5 °F (36.4 °C), Max:98.4 °F (36.9 °C)    Respirations:  Resp: 18  Pulse:   Pulse: 87  BP:    BP: 132/66  BP Range: Systolic (62WKG), ZXR:060 , Min:100 , XRQ:749       Diastolic (46CEM), CYRUS:79, Min:54, Max:82      Physical Examination:     General:  AAO x 2, no accessory muscle use. HEENT: Atraumatic, normocephalic, no throat congestion, moist mucosa. Eyes:   Pupils equal, round and reactive to light, EOMI. Neck:   Supple  Chest:   Bilateral vesicular breath sounds, no rales or wheezes. Cardiac:  S1 S2 RR, no murmurs, gallops or rubs. Abdomen: Soft, non-tender, no masses or organomegaly, BS audible.   : No suprapubic or flank tenderness. Neuro:  AAO x 2, No FND. SKIN:  No rashes, good skin turgor. Extremities:  No edema. Positive left-sided residual defects. Labs:       Recent Labs     01/13/21  0919 01/14/21  0511 01/15/21  0541   WBC 7.2 8.5 7.5   RBC 4.47 3.90* 3.75*   HGB 12.5 10.8* 10.4*   HCT 41.2 38.1 36.2*   MCV 92.2 97.7 96.5   MCH 27.9 27.7 27.7   MCHC 30.3* 28.3* 28.7   RDW 14.5 13.8 13.4    153 139   MPV 10.1 12.4 12.1      BMP:   Recent Labs     01/13/21  1325 01/13/21  1325 01/14/21  0511 01/14/21  0511 01/14/21  1946 01/15/21  0110 01/15/21  0541   *   < > 164*   < > 158* 154* 154*   K 3.7  --  3.5*   < > 4.2 4.0 3.8   *  --  119*   < > 117* 114* 115*   CO2 43*  --  36*   < > 35* 33* 32*   BUN 79*  --  57*  --   --   --  35*   CREATININE 0.90  --  0.54  --   --   --  0.43*   GLUCOSE 234*  --  196*  --   --   --  266*   CALCIUM 9.9  --  9.2  --   --   --  9.0    < > = values in this interval not displayed.       Phosphorus:     Recent Labs     01/14/21  0511 01/15/21  0541   PHOS 3.5 2.6     Magnesium:    Recent Labs     01/14/21  0511 01/15/21  0541   MG 2.2 2.0     Albumin:    Recent Labs     01/13/21  0919 01/14/21  0511 01/15/21  0541   LABALBU 2.8* 2.6* 2.5*     SANJIV:      Lab Results   Component Value Date    SANJIV NEGATIVE 02/24/2012     SPEP:  Lab Results   Component Value Date    PROT 5.7 01/15/2021     Hep BsAg:         Lab Results   Component Value Date    HEPBSAG NONREACTIVE 02/24/2012     Hep C AB:          Lab Results   Component Value Date    HEPCAB NONREACTIVE 02/24/2012       Urinalysis/Chemistries:      Lab Results   Component Value Date    NITRU NEGATIVE 01/13/2021    COLORU YELLOW 01/13/2021    PHUR 5.5 01/13/2021    WBCUA TOO NUMEROUS TO COUNT 01/13/2021    RBCUA 2 TO 5 01/13/2021    MUCUS NOT REPORTED 01/13/2021    TRICHOMONAS NOT REPORTED 01/13/2021    YEAST NOT REPORTED 01/13/2021    BACTERIA MANY 01/13/2021    SPECGRAV 1.020 01/13/2021    LEUKOCYTESUR LARGE 01/13/2021    UROBILINOGEN Normal 01/13/2021    BILIRUBINUR NEGATIVE 01/13/2021    BILIRUBINUR NEGATIVE 02/24/2012    GLUCOSEU TRACE 01/13/2021    GLUCOSEU NEGATIVE 02/24/2012    KETUA NEGATIVE 01/13/2021    AMORPHOUS NOT REPORTED 01/13/2021     Urine Sodium:     Lab Results   Component Value Date    DALI 22 01/13/2021     Urine Osmolarity:   Lab Results   Component Value Date    OSMOU 591 01/13/2021      Urine Creatinine:     Lab Results   Component Value Date    LABCREA 160.7 12/19/2018     Radiology:     Reviewed. Assessment:     1. Hypernatremia likely secondary to free water deficit and impaired thirst mechanism. Presented with sodium of 165 and urine osmolarity 591. Now improving. 2.  Metabolic alkalosis likely due to volume depletion-improving. 3.  Altered mental status. 4.  Hemorrhagic transformation of right MCA infarct. 5.  UTI with urine culture positive for E. Coli 1/13/2021.  6.  Bacteremia with Blood culture 1/13/2021 +ve for Enterococcus faecalis. 7.  Hyperchloremia. 8.  Diabetes type 2.  9.  Essential hypertension. 10.  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 11.  Asthma. Plan:   1. Change IV fluids to half-normal saline at 100 cc/hr. 2.  Start free water 250 every 6 hours. 3.  Continue monitor strict I's and O's and renal function. 4.  Antibiotics as per primary. 5.  Recheck electrolytes today evening. 6.  BMP in AM.  7.  Will follow. Nutrition   Please ensure that patient is on a renal diet/TF. Avoid nephrotoxic drugs/contrast exposure. We will continue to follow along with you. Florentino Yung MD  [facility]     This note is created with the assistance of a speech-recognition program. While intending to generate a document that actually reflects the content of the visit, no guarantees can be provided that every mistake has been identified and corrected by editing.

## 2021-01-15 NOTE — CONSULTS
[facility] - Initial Consult Note  Today's Date and Time: 1/15/2021, 2:24 PM    Impression :   · Extensive right MCA stroke   · Bacteremia   · Acte cystitis without hematuria   · Enterococcus in blood culture   · Hypernatremia  · Dehydration     Recommendations:   · Vancomycin 1,500 mg IV q12h for 7 days; Stop date: 01/21/21    Medical Decision Making/Summary/Discussion:1/15/2021     ·   Infection Control Recommendations   · [place] Precautions    Antimicrobial Stewardship Recommendations     · Simplification of therapy  · Targeted therapy    Coordination of Outpatient Care:   · Estimated Length of IV antimicrobials: TBD   · Patient will need Midline Catheter Insertion:  TBD   · Patient will need PICC line Insertion: TBD   · Patient will need: Home IV , [place],  SNF,  LTAC: [facility] from [facility]  · Patient will need outpatient wound care: TBD. Chief complaint/reason for consultation:   · Enterococcus in blood     History of Present Illness:   Brittney Mcdermott is a 78y.o.-year-old  female with history of CAD, HTN, DM, hyperlipidemia, right MCA stroke with residual left-sided deficits who was initially admitted on 1/13/2021. Patient seen at the request of Dr. Cecilio Olivier. Patient was initially seen at [facility] emergeneyc department for uncontrolled hyperglycemia, hypernatremia and AMS. Of note, patient suffered a stroke in December 2020 and had a PEG placed at that time. CT of the head at the time noted redemonstration of extensive right MCA territory infarct with no new infarct noted. Umesh was started on heparin, but was not a candidate for TPA due to being out of window period. CURRENT EVALUATION: 01/15/21    Patient is extremely lethargic and unable to follow command at this time. She reports feeling \"dry and thirsty. \" On examination, patient has dry mucous membranes. Skin tenting noted. Stage 3 wound noted to the right buttock with serosanguinous drainage present. No fever present. Labs, X rays reviewed: 1/15/2021    BUN: 35  Cr: 0.43    WBC: 7.5   Hb: 10.4   Plat: 139    Cultures:  Urine:  · 1/13/21: +ve E.coli   Blood:  · 1/13/21: +ve enterococcus faecalis (gram +ve cocci in chains and pairs)  Sputum :  ·   Wound:  ·     COVID: negative   MRSA: negative     CT Head WO Contrast:   · 1/15/21: Pending   · 1/13/21:   1. Redemonstration of extensive right MCA territory infarct.  No new infarct. 2. New or increasing hyperdense serpiginous gyral hyperdensities along   posterior aspect of the infarct in the parietal and to a less and extent   temporal lobe and also along the margins of the right caudate nucleus is   suggestive of some blood products.  This may be further assessed with MRI if   deemed clinically necessary. .               CXR:   1/13/21: No acute cardiopulmonary findings. Discussed with patient, RN, family. I have personally reviewed the past medical history, past surgical history, medications, social history, and family history, and I have updated the database accordingly.   Past Medical History:     Past Medical History:   Diagnosis Date    Allergic rhinitis     Anxiety 10/25/2016    Asthma     CAD (coronary artery disease)     s/p stents RCA and LAD 2009,    Chronic back pain     Congestive heart failure ([facility] [address]     Depression     Headache(784.0)     Hiatal hernia     Hypercholesteremia 2/21/2012    Hypertension     Kidney stones     years ago    Obesity     Osteoarthritis     Postlaminectomy syndrome 6/6/2012    Type II or unspecified type diabetes mellitus without mention of complication, not stated as uncontrolled     Unspecified sleep apnea     Urinary incontinence        Past Surgical  History:     Past Surgical History:   Procedure Laterality Date  CARDIAC CATHETERIZATION  01/2013    patent stents    COLONOSCOPY  09/2015    normal    CORONARY ANGIOPLASTY WITH STENT PLACEMENT  1012-16    stents x 3    GASTROSTOMY TUBE PLACEMENT  12/28/2020    EGD PEG TUBE PLACEMENT    GASTROSTOMY TUBE PLACEMENT N/A 12/28/2020    EGD PEG TUBE PLACEMENT performed by Mickey Pierre MD at [address]      left knee    KNEE SURGERY  10/21/2013    rt knee synvisc injection     KNEE SURGERY  12/02/2013    knee synvisc injection rt #2    KNEE SURGERY  12/09/2013    rt knee synvisc inj    LUMBAR SPINE SURGERY  2007    NERVE BLOCK  04/23/2012    Right MBNB L3, L4, L5    NERVE BLOCK  05/22/2012    Right MBNB L3, L4, and L5     NERVE BLOCK  01/14/2013    Right knee injection #1 - Synvisc    NERVE BLOCK  01/21/2013    Right knee injection #2 - Synvisc    NERVE BLOCK  01/28/2013    Rigth knee synvisc injection #3    NERVE BLOCK Right 04/17/2017    Rt genicular nerve block.  no steroid used    OTHER SURGICAL HISTORY Right 07/14/2014    synvisc one knee injection    OTHER SURGICAL HISTORY Right 03/16/2015    synvisc one knee injection    OTHER SURGICAL HISTORY Right 06/13/2016    synvisc right knee injection    SPINE SURGERY      TONSILLECTOMY      UPPER GASTROINTESTINAL ENDOSCOPY      VENA CAVA FILTER PLACEMENT  2007    PE and B/L LE emboli       Medications:      docusate  100 mg Oral Daily    amLODIPine  5 mg Oral Daily    vancomycin (VANCOCIN) intermittent dosing (placeholder)   Other RX Placeholder    vancomycin  1,500 mg Intravenous Q12H    atorvastatin  80 mg Oral Daily    metoprolol tartrate  25 mg Oral BID    sertraline  100 mg Oral Daily    sodium chloride flush  10 mL Intravenous 2 times per day    enoxaparin  40 mg Subcutaneous Daily    insulin lispro  0-12 Units Subcutaneous TID WC    insulin lispro  0-6 Units Subcutaneous Nightly       Social History:     Social History     Socioeconomic History  Marital status: Legally      Spouse name: Not on file    Number of children: Not on file    Years of education: Not on file    Highest education level: Not on file   Occupational History    Not on file   Social Needs    Financial resource strain: Not on file    Food insecurity     Worry: Not on file     Inability: Not on file    Transportation needs     Medical: Not on file     Non-medical: Not on file   Tobacco Use    Smoking status: Former Smoker     Packs/day: 0.50     Years: 0.00     Pack years: 0.00     Quit date: 3/28/2013     Years since quittin.8    Smokeless tobacco: Never Used   Substance and Sexual Activity    Alcohol use: Yes     Alcohol/week: 1.0 standard drinks     Types: 1 Cans of beer per week     Comment: occasionally    Drug use: No    Sexual activity: Not on file   Lifestyle    Physical activity     Days per week: Not on file     Minutes per session: Not on file    Stress: Not on file   Relationships    Social connections     Talks on phone: Not on file     Gets together: Not on file     Attends Mandaeism service: Not on file     Active member of club or organization: Not on file     Attends meetings of clubs or organizations: Not on file     Relationship status: Not on file    Intimate partner violence     Fear of current or ex partner: Not on file     Emotionally abused: Not on file     Physically abused: Not on file     Forced sexual activity: Not on file   Other Topics Concern    Not on file   Social History Narrative    Not on file       Family History:     Family History   Problem Relation Age of Onset    Diabetes Mother     Cancer Father     High Blood Pressure Sister     High Blood Pressure Brother         Allergies:   Bactrim, Penicillins, and Tylenol [acetaminophen]     Review of Systems:   Constitutional: No fevers or chills. No systemic complaints  Head: No headaches  Eyes: No double vision or blurry vision. No conjunctival inflammation. ENT: No sore throat or runny nose. . No hearing loss, tinnitus or vertigo. Cardiovascular: No chest pain or palpitations. No shortness of breath. No JONES  Lung: No shortness of breath or cough. No sputum production  Abdomen: No nausea, vomiting, diarrhea, or abdominal pain. Joel Dirk No cramps. Genitourinary: No increased urinary frequency, or dysuria. No hematuria. No suprapubic or CVA pain  Musculoskeletal: No muscle aches or pains. No joint effusions, swelling or deformities  Hematologic: No bleeding or bruising. Neurologic: No headache, weakness, numbness, or tingling. Integument: No rash, no ulcers. Psychiatric: No depression. Endocrine: No polyuria, no polydipsia, no polyphagia. Physical Examination :     Patient Vitals for the past 8 hrs:   Temp Temp src Resp SpO2   01/15/21 1219 97 °F (36.1 °C) Axillary     01/15/21 0800 98.1 °F (36.7 °C) Axillary     01/15/21 0738   18 99 %     General Appearance: Awake, alert, and in no apparent distress  Head:  Normocephalic, no trauma  Eyes: Pupils equal, round, reactive to light and accommodation; extraocular movements intact; sclera anicteric; conjunctivae pink. No embolic phenomena. ENT: Oropharynx clear, without erythema, exudate, or thrush. No tenderness of sinuses. Mouth/throat: mucosa pink and moist. No lesions. Dentition in good repair. Neck:Supple, without lymphadenopathy. Thyroid normal, No bruits. Pulmonary/Chest: Clear to auscultation, without wheezes, rales, or rhonchi. No dullness to percussion. Cardiovascular: Regular rate and rhythm without murmurs, rubs, or gallops. Abdomen: Soft, non tender. Bowel sounds normal. No organomegaly  All four Extremities: No cyanosis, clubbing, edema, or effusions. Neurologic: No gross sensory or motor deficits. Skin: Warm and dry with good turgor. No signs of peripheral arterial or venous insufficiency. No ulcerations. No open wounds.     Medical Decision Making -Laboratory:

## 2021-01-16 ENCOUNTER — APPOINTMENT (OUTPATIENT)
Dept: CT IMAGING | Age: 71
DRG: 871 | End: 2021-01-16
Attending: INTERNAL MEDICINE
Payer: COMMERCIAL

## 2021-01-16 LAB
ABSOLUTE EOS #: 0.21 K/UL (ref 0–0.44)
ABSOLUTE IMMATURE GRANULOCYTE: 0.09 K/UL (ref 0–0.3)
ABSOLUTE LYMPH #: 1.54 K/UL (ref 1.1–3.7)
ABSOLUTE MONO #: 0.59 K/UL (ref 0.1–1.2)
ALBUMIN SERPL-MCNC: 2.3 G/DL (ref 3.5–5.2)
ALBUMIN/GLOBULIN RATIO: 0.8 (ref 1–2.5)
ALP BLD-CCNC: 65 U/L (ref 35–104)
ALT SERPL-CCNC: 18 U/L (ref 5–33)
ANION GAP SERPL CALCULATED.3IONS-SCNC: 4 MMOL/L (ref 9–17)
ANION GAP SERPL CALCULATED.3IONS-SCNC: 6 MMOL/L (ref 9–17)
AST SERPL-CCNC: 25 U/L
BASOPHILS # BLD: 0 % (ref 0–2)
BASOPHILS ABSOLUTE: <0.03 K/UL (ref 0–0.2)
BILIRUB SERPL-MCNC: 0.32 MG/DL (ref 0.3–1.2)
BILIRUBIN DIRECT: 0.11 MG/DL
BILIRUBIN, INDIRECT: 0.21 MG/DL (ref 0–1)
BUN BLDV-MCNC: 21 MG/DL (ref 8–23)
BUN/CREAT BLD: ABNORMAL (ref 9–20)
CALCIUM SERPL-MCNC: 8.6 MG/DL (ref 8.6–10.4)
CHLORIDE BLD-SCNC: 112 MMOL/L (ref 98–107)
CHLORIDE BLD-SCNC: 112 MMOL/L (ref 98–107)
CO2: 30 MMOL/L (ref 20–31)
CO2: 31 MMOL/L (ref 20–31)
CREAT SERPL-MCNC: 0.38 MG/DL (ref 0.5–0.9)
DIFFERENTIAL TYPE: ABNORMAL
EOSINOPHILS RELATIVE PERCENT: 3 % (ref 1–4)
GFR AFRICAN AMERICAN: >60 ML/MIN
GFR NON-AFRICAN AMERICAN: >60 ML/MIN
GFR SERPL CREATININE-BSD FRML MDRD: ABNORMAL ML/MIN/{1.73_M2}
GFR SERPL CREATININE-BSD FRML MDRD: ABNORMAL ML/MIN/{1.73_M2}
GLOBULIN: ABNORMAL G/DL (ref 1.5–3.8)
GLUCOSE BLD-MCNC: 261 MG/DL (ref 65–105)
GLUCOSE BLD-MCNC: 280 MG/DL (ref 65–105)
GLUCOSE BLD-MCNC: 289 MG/DL (ref 65–105)
GLUCOSE BLD-MCNC: 313 MG/DL (ref 70–99)
HCT VFR BLD CALC: 31.4 % (ref 36.3–47.1)
HEMOGLOBIN: 9.2 G/DL (ref 11.9–15.1)
IMMATURE GRANULOCYTES: 1 %
LACTIC ACID, WHOLE BLOOD: 0.7 MMOL/L (ref 0.7–2.1)
LACTIC ACID: NORMAL MMOL/L
LYMPHOCYTES # BLD: 23 % (ref 24–43)
MAGNESIUM: 1.7 MG/DL (ref 1.6–2.6)
MCH RBC QN AUTO: 27.9 PG (ref 25.2–33.5)
MCHC RBC AUTO-ENTMCNC: 29.3 G/DL (ref 28.4–34.8)
MCV RBC AUTO: 95.2 FL (ref 82.6–102.9)
MONOCYTES # BLD: 9 % (ref 3–12)
NRBC AUTOMATED: 0 PER 100 WBC
PDW BLD-RTO: 13.2 % (ref 11.8–14.4)
PHOSPHORUS: 2.3 MG/DL (ref 2.6–4.5)
PLATELET # BLD: 116 K/UL (ref 138–453)
PLATELET ESTIMATE: ABNORMAL
PMV BLD AUTO: 12.3 FL (ref 8.1–13.5)
POTASSIUM SERPL-SCNC: 3.6 MMOL/L (ref 3.7–5.3)
POTASSIUM SERPL-SCNC: 3.7 MMOL/L (ref 3.7–5.3)
RBC # BLD: 3.3 M/UL (ref 3.95–5.11)
RBC # BLD: ABNORMAL 10*6/UL
SEG NEUTROPHILS: 64 % (ref 36–65)
SEGMENTED NEUTROPHILS ABSOLUTE COUNT: 4.2 K/UL (ref 1.5–8.1)
SODIUM BLD-SCNC: 147 MMOL/L (ref 135–144)
SODIUM BLD-SCNC: 148 MMOL/L (ref 135–144)
TOTAL PROTEIN: 5.1 G/DL (ref 6.4–8.3)
WBC # BLD: 6.7 K/UL (ref 3.5–11.3)
WBC # BLD: ABNORMAL 10*3/UL

## 2021-01-16 PROCEDURE — 6370000000 HC RX 637 (ALT 250 FOR IP): Performed by: STUDENT IN AN ORGANIZED HEALTH CARE EDUCATION/TRAINING PROGRAM

## 2021-01-16 PROCEDURE — 6370000000 HC RX 637 (ALT 250 FOR IP): Performed by: INTERNAL MEDICINE

## 2021-01-16 PROCEDURE — 83735 ASSAY OF MAGNESIUM: CPT

## 2021-01-16 PROCEDURE — 93005 ELECTROCARDIOGRAM TRACING: CPT | Performed by: INTERNAL MEDICINE

## 2021-01-16 PROCEDURE — 2580000003 HC RX 258: Performed by: INTERNAL MEDICINE

## 2021-01-16 PROCEDURE — 99291 CRITICAL CARE FIRST HOUR: CPT | Performed by: INTERNAL MEDICINE

## 2021-01-16 PROCEDURE — 2580000003 HC RX 258: Performed by: STUDENT IN AN ORGANIZED HEALTH CARE EDUCATION/TRAINING PROGRAM

## 2021-01-16 PROCEDURE — 84100 ASSAY OF PHOSPHORUS: CPT

## 2021-01-16 PROCEDURE — 83605 ASSAY OF LACTIC ACID: CPT

## 2021-01-16 PROCEDURE — 6360000002 HC RX W HCPCS: Performed by: STUDENT IN AN ORGANIZED HEALTH CARE EDUCATION/TRAINING PROGRAM

## 2021-01-16 PROCEDURE — 70450 CT HEAD/BRAIN W/O DYE: CPT

## 2021-01-16 PROCEDURE — 99233 SBSQ HOSP IP/OBS HIGH 50: CPT | Performed by: INTERNAL MEDICINE

## 2021-01-16 PROCEDURE — APPNB45 APP NON BILLABLE 31-45 MINUTES: Performed by: NURSE PRACTITIONER

## 2021-01-16 PROCEDURE — 6360000002 HC RX W HCPCS: Performed by: INTERNAL MEDICINE

## 2021-01-16 PROCEDURE — 97110 THERAPEUTIC EXERCISES: CPT

## 2021-01-16 PROCEDURE — 80048 BASIC METABOLIC PNL TOTAL CA: CPT

## 2021-01-16 PROCEDURE — 82947 ASSAY GLUCOSE BLOOD QUANT: CPT

## 2021-01-16 PROCEDURE — 97162 PT EVAL MOD COMPLEX 30 MIN: CPT

## 2021-01-16 PROCEDURE — 80076 HEPATIC FUNCTION PANEL: CPT

## 2021-01-16 PROCEDURE — 85025 COMPLETE CBC W/AUTO DIFF WBC: CPT

## 2021-01-16 PROCEDURE — 99232 SBSQ HOSP IP/OBS MODERATE 35: CPT | Performed by: INTERNAL MEDICINE

## 2021-01-16 PROCEDURE — 2060000000 HC ICU INTERMEDIATE R&B

## 2021-01-16 PROCEDURE — 80051 ELECTROLYTE PANEL: CPT

## 2021-01-16 RX ORDER — HYDRALAZINE HYDROCHLORIDE 20 MG/ML
5 INJECTION INTRAMUSCULAR; INTRAVENOUS EVERY 6 HOURS PRN
Status: DISCONTINUED | OUTPATIENT
Start: 2021-01-16 | End: 2021-01-23 | Stop reason: HOSPADM

## 2021-01-16 RX ORDER — AMLODIPINE BESYLATE 10 MG/1
10 TABLET ORAL DAILY
Status: DISCONTINUED | OUTPATIENT
Start: 2021-01-16 | End: 2021-01-23 | Stop reason: HOSPADM

## 2021-01-16 RX ORDER — CLONIDINE HYDROCHLORIDE 0.1 MG/1
0.1 TABLET ORAL DAILY
Status: DISCONTINUED | OUTPATIENT
Start: 2021-01-16 | End: 2021-01-19

## 2021-01-16 RX ORDER — ASPIRIN 81 MG/1
81 TABLET, CHEWABLE ORAL DAILY
Status: DISCONTINUED | OUTPATIENT
Start: 2021-01-16 | End: 2021-01-23 | Stop reason: HOSPADM

## 2021-01-16 RX ORDER — INSULIN GLARGINE 100 [IU]/ML
5 INJECTION, SOLUTION SUBCUTANEOUS ONCE
Status: COMPLETED | OUTPATIENT
Start: 2021-01-16 | End: 2021-01-16

## 2021-01-16 RX ORDER — INSULIN GLARGINE 100 [IU]/ML
15 INJECTION, SOLUTION SUBCUTANEOUS DAILY
Status: DISCONTINUED | OUTPATIENT
Start: 2021-01-16 | End: 2021-01-16

## 2021-01-16 RX ORDER — INSULIN GLARGINE 100 [IU]/ML
20 INJECTION, SOLUTION SUBCUTANEOUS DAILY
Status: DISCONTINUED | OUTPATIENT
Start: 2021-01-17 | End: 2021-01-17

## 2021-01-16 RX ORDER — LOSARTAN POTASSIUM 25 MG/1
25 TABLET ORAL DAILY
Status: DISCONTINUED | OUTPATIENT
Start: 2021-01-16 | End: 2021-01-23 | Stop reason: HOSPADM

## 2021-01-16 RX ADMIN — SERTRALINE 100 MG: 50 TABLET, FILM COATED ORAL at 10:01

## 2021-01-16 RX ADMIN — METOPROLOL TARTRATE 25 MG: 25 TABLET ORAL at 21:34

## 2021-01-16 RX ADMIN — INSULIN GLARGINE 15 UNITS: 100 INJECTION, SOLUTION SUBCUTANEOUS at 10:01

## 2021-01-16 RX ADMIN — CLONIDINE HYDROCHLORIDE 0.1 MG: 0.1 TABLET ORAL at 14:19

## 2021-01-16 RX ADMIN — Medication 1500 MG: at 00:34

## 2021-01-16 RX ADMIN — INSULIN LISPRO 6 UNITS: 100 INJECTION, SOLUTION INTRAVENOUS; SUBCUTANEOUS at 10:02

## 2021-01-16 RX ADMIN — Medication 1500 MG: at 11:45

## 2021-01-16 RX ADMIN — INSULIN LISPRO 6 UNITS: 100 INJECTION, SOLUTION INTRAVENOUS; SUBCUTANEOUS at 16:52

## 2021-01-16 RX ADMIN — DOCUSATE SODIUM 100 MG: 50 LIQUID ORAL at 10:01

## 2021-01-16 RX ADMIN — INSULIN LISPRO 6 UNITS: 100 INJECTION, SOLUTION INTRAVENOUS; SUBCUTANEOUS at 11:36

## 2021-01-16 RX ADMIN — SODIUM CHLORIDE: 4.5 INJECTION, SOLUTION INTRAVENOUS at 11:45

## 2021-01-16 RX ADMIN — ENOXAPARIN SODIUM 40 MG: 40 INJECTION SUBCUTANEOUS at 10:02

## 2021-01-16 RX ADMIN — METOPROLOL TARTRATE 25 MG: 25 TABLET ORAL at 10:00

## 2021-01-16 RX ADMIN — LOSARTAN POTASSIUM 25 MG: 25 TABLET, FILM COATED ORAL at 10:00

## 2021-01-16 RX ADMIN — AMLODIPINE BESYLATE 10 MG: 5 TABLET ORAL at 10:01

## 2021-01-16 RX ADMIN — ATORVASTATIN CALCIUM 80 MG: 80 TABLET, FILM COATED ORAL at 21:34

## 2021-01-16 RX ADMIN — INSULIN GLARGINE 5 UNITS: 100 INJECTION, SOLUTION SUBCUTANEOUS at 14:15

## 2021-01-16 RX ADMIN — SODIUM CHLORIDE, PRESERVATIVE FREE 10 ML: 5 INJECTION INTRAVENOUS at 10:09

## 2021-01-16 RX ADMIN — HYDRALAZINE HYDROCHLORIDE 5 MG: 20 INJECTION INTRAMUSCULAR; INTRAVENOUS at 05:50

## 2021-01-16 RX ADMIN — CEFTRIAXONE SODIUM 1 G: 1 INJECTION, POWDER, FOR SOLUTION INTRAMUSCULAR; INTRAVENOUS at 14:15

## 2021-01-16 ASSESSMENT — PAIN SCALES - WONG BAKER
WONGBAKER_NUMERICALRESPONSE: 0
WONGBAKER_NUMERICALRESPONSE: 0

## 2021-01-16 ASSESSMENT — PAIN SCALES - GENERAL: PAINLEVEL_OUTOF10: 0

## 2021-01-16 NOTE — PROGRESS NOTES
Nephrology was consulted and patient was started on 0.45 saline leading to some improvement of sodium level, later on free water boluses with 250 mL every 6 hours were added. Neurology was consulted due to recent right MCA stroke with left hemiplegia to decide about aspirin/Plavix as repeat CT head was concerning for some intracranial blood. Blood cultures grew Enterococcus faecium and patient started on vancomycin on 1/15 to complete 10 days. There is a right gluteal decubitus small wound but the source of Enterococcus bacteremia is unclear. May need to rule out GI source of Enterococcus bacteremia. Patient had repeat CT head 1/16 which still cannot totally exclude blood products in the brain. Hypernatremia and patient's mentation is improving. She is alert and oriented now. She continues to have left-sided body weakness from right MCA stroke. She is on tube feeding from PEG tube. Hemodynamically stable. Decision was made to transfer patient out of ICU. Procedures during patient's ICU stay:     None    Current Vitals:     BP (!) 126/59   Pulse 75   Temp 98.6 °F (37 °C) (Oral)   Resp 20   Ht 5' 4\" (1.626 m)   Wt 226 lb 6.6 oz (102.7 kg)   LMP  (LMP Unknown)   SpO2 99%   BMI 38.86 kg/m²       Cultures:     Blood cultures:                 [] None drawn      [] Negative             []  Positive (Details:  )  Urine Culture:                   [] None drawn      [] Negative             []  Positive (Details:  )  Sputum Culture:               [] None drawn       [] Negative             []  Positive (Details:  )   Endotracheal aspirate:     [] None drawn       [] Negative             []  Positive (Details:  )       Consults:     1. Infectious disease  2. Nephrology  3.   Neuro critical care    Assessment:     Patient Active Problem List    Diagnosis Date Noted    Bacteremia     Acute cystitis without hematuria     Hypernatremia  Cerebrovascular accident (CVA) due to thrombosis of right middle cerebral artery ([address]     Oropharyngeal dysphagia     Dehydration 01/13/2021    Ischemic cerebral stroke due to intracranial large artery atherosclerosis (HCC)     Acute cerebrovascular accident (CVA) ([address]     Right middle cerebral artery stroke ([address]     Non-traumatic rhabdomyolysis     Cerebrovascular accident (CVA) due to embolic occlusion of right middle cerebral artery ([address] 12/15/2020    Influenza B 02/24/2018    Reactive airway disease with acute exacerbation 02/24/2018    Long term (current) use of antithrombotics/antiplatelets 68/71/3962    Medication monitoring encounter 10/09/2017    Postlaminectomy syndrome     Postlaminectomy syndrome     Primary osteoarthritis of right knee     Anxiety 10/25/2016    Depression (emotion) 10/25/2016    Postlaminectomy syndrome, lumbar 10/25/2016    S/P coronary artery stent placement - RCA 10/12/16 - Dr. Negrita Stevens 10/12/2016    Type 2 diabetes mellitus with complication, without long-term current use of insulin ([address] 06/16/2016    Chronic prescription opiate use 02/10/2016    Chronic pain     Coronary artery disease involving native coronary artery without angina pectoris     Hypertensive urgency     Acute coronary syndrome (HCC)     Congestive heart failure ([address]     Lymphadenopathy     Chronic knee pain 02/24/2015    Chronic use of opiate drugs therapeutic purposes 11/25/2014    Lumbar spondylosis 08/27/2014    Cervical spondylosis 08/27/2014    Urge incontinence of urine 07/31/2014    Knee pain, bilateral 06/25/2014    S/P TKR (total knee replacement) 06/25/2014    Morbid obesity ([address] 04/03/2014    YUDITH (obstructive sleep apnea) 04/02/2014    Chest pain 03/31/2014    Spinal stenosis in cervical region 02/06/2013    Edema 08/07/2012    Knee osteoarthritis 06/06/2012    DM (diabetes mellitus) ([address] 02/21/2012    HTN (hypertension) 02/21/2012    Smoker 02/21/2012  Asthma 02/21/2012       Additional assessment:    Metabolic encephalopathy secondary to hypernatremia and recent large right MCA stroke  -CT head 1/13 redemonstration right MCA territory infarct, new hyperdense  hyperdensities along the posterior aspect of previous infarct parietal lobes at this time temporal lobe as well as aspect of the caudate nucleus  -Mentation and hyponatremia improving  -Continue 0.45 saline at 100 mL/h  -Free water bolus 250 mL every 6 hours  -Follow with further nephrology recommendations  -Repeat CT head today - if CT scan is stable then can resume aspirin, and 7 to 10 days after resuming aspirin can restart Plavix     Enterococcus faecalis bacteremia from unclear source  -Vancomycin for 10 days  -Continue to assess right gluteal decubitus wound  -Wound care consult  -May need to rule out GI source of bacteremia  -ID following     Hypertension  -Resume [id number] mg, Cozaar 25 mg and clonidine 0.1. Continue metoprolol at low-dose 25 mg twice daily due to bradycardia     Type 2 diabetes mellitus  -Last A1c 6.8  -Blood glucose uncontrolled after starting tube feeding  -started on Lantus 20 units daily  -Continue cover with sliding scale insulin     History of coronary artery disease with PCI in the past  -Continue statin, BB  -Holding aspirin for now due to concern of intracranial bleed although questionable. CT head today - if stable then will restart aspirin        DVT prophylaxis: Lovenox  Diet: PEG tube feeding    Recommended Follow-up:     1. Follow neurocritical recommendations for aspirin/Plavix for h/o CAD and recent MCA ischemic stroke as repeat CT head could not completely rule out intracranial bleed. As per neuro-critical resident, ok to resume aspirin now and plavix after 1/20  2. Follow further nephrology recommendations for management of hypernatremia  3.  Continue vancomycin for 10 days as per ID's recommendations 4. Readjust antihypertensives according to blood pressure  5. Continue to control blood glucose with insulin. 6. Continue to assess right gluteal decubitus wound      Above mentioned assessment and plan was discussed by me with the admitting medicine resident. The medicine team assigned to the patient by medicine admitting resident will be following up the patient from now onwards on the floor. Trey Lezama MD  Internal Medicine Resident, PGY-1  [facility];  [employer]  1/16/2021,12:36 PM

## 2021-01-16 NOTE — PLAN OF CARE
Problem: Pain:  Goal: Pain level will decrease  Description: Pain level will decrease  Outcome: Met This Shift     Problem: Falls - Risk of:  Goal: Will remain free from falls  Description: Will remain free from falls  Outcome: Met This Shift  Goal: Absence of physical injury  Description: Absence of physical injury  Outcome: Met This Shift     Problem: Skin Integrity:  Goal: Will show no infection signs and symptoms  Description: Will show no infection signs and symptoms  Outcome: Met This Shift  Goal: Absence of new skin breakdown  Description: Absence of new skin breakdown  Outcome: Met This Shift     Problem: Pain:  Goal: Pain level will decrease  Description: Pain level will decrease  Outcome: Met This Shift  Goal: Control of acute pain  Description: Control of acute pain  Outcome: Met This Shift

## 2021-01-16 NOTE — PROGRESS NOTES
Renal Progress Note    Patient :  Brittney Mcdermott; 78 y.o. MRN# [id number]  Location:  0465/4529-17  Attending:  Hector Araiza MD  Admit Date:  1/13/2021   Hospital Day: 3      Subjective:     Patient was seen and examined. No new issues reported overnight. Moved out of the ICU. Made about 1.1 L of urine in the last 24 hours. Serum creatinine stable at 0.38 mg/DL today. Sodium did improve to 147, chloride improved to 112. Potassium 3.7, bicarb 31. Currently running on half-normal saline at 100 cc an hour. Blood culture 1/13/2021 +ve for Enterococcus faecalis. Urine culture 1/13/2021 +ve for E. coli.     Outpatient Medications:     Medications Prior to Admission: losartan (COZAAR) 25 MG tablet, Take 1 tablet by mouth daily  metoprolol tartrate 75 MG TABS, Take 75 mg by mouth 2 times daily  docusate (COLACE) 50 MG/5ML liquid, Take 10 mLs by mouth daily  rivastigmine (EXELON) 9.5 MG/24HR, Place 1 patch onto the skin daily  QUEtiapine (SEROQUEL) 25 MG tablet, Take 25 mg by mouth 2 times daily  Calcium Carbonate-Vitamin D (OYSTER SHELL CALCIUM/D) 500-200 MG-UNIT TABS, TAKE ONE TABLET BY MOUTH TWO TIMES A DAY  glimepiride (AMARYL) 2 MG tablet, Take 1 tablet by mouth every morning  linagliptin (TRADJENTA) 5 MG tablet, Take 1 tablet by mouth daily  cloNIDine (CATAPRES) 0.1 MG tablet, Take 1 tablet by mouth daily  amLODIPine (NORVASC) 10 MG tablet, Take 1 tablet by mouth daily  furosemide (LASIX) 40 MG tablet, Take 1 tablet by mouth daily  atorvastatin (LIPITOR) 80 MG tablet, TAKE 1 TABLET BY MOUTH DAILY  sertraline (ZOLOFT) 100 MG tablet, Take 1 tablet by mouth daily  clopidogrel (PLAVIX) 75 MG tablet, Take 1 tablet by mouth daily  aspirin (RA ASPIRIN EC) 81 MG EC tablet, Take 1 tablet by mouth daily  Incontinence Supply Disposable (INCONTINENCE BRIEF LARGE) MISC, Use 4-5/day as needed glucose blood VI test strips (ASCENSIA AUTODISC VI;ONE TOUCH ULTRA TEST VI) strip, 1 each by In Vitro route 3 times daily As needed. albuterol-ipratropium (COMBIVENT RESPIMAT)  MCG/ACT AERS inhaler, Inhale 1 puff into the lungs every 6 hours as needed for Wheezing  Lancets MISC, Use 1 -2 times daily Insulin dependent diabetes mellitus    Current Medications:     Scheduled Meds:    losartan  25 mg Oral Daily    amLODIPine  10 mg Oral Daily    cloNIDine  0.1 mg Oral Daily    [START ON 2021] insulin glargine  20 Units Subcutaneous Daily    insulin lispro  0-12 Units Subcutaneous Q6H    cefTRIAXone (ROCEPHIN) IV  1 g Intravenous Q24H    docusate  100 mg Oral Daily    vancomycin (VANCOCIN) intermittent dosing (placeholder)   Other RX Placeholder    vancomycin  1,500 mg Intravenous Q12H    atorvastatin  80 mg Oral Daily    metoprolol tartrate  25 mg Oral BID    sertraline  100 mg Oral Daily    sodium chloride flush  10 mL Intravenous 2 times per day    enoxaparin  40 mg Subcutaneous Daily     Continuous Infusions:    dextrose      sodium chloride 100 mL/hr at 21 1145     PRN Meds:  hydrALAZINE, sodium chloride flush, promethazine **OR** ondansetron, polyethylene glycol, acetaminophen **OR** acetaminophen, glucose, dextrose, glucagon (rDNA), dextrose    Input/Output:       I/O last 3 completed shifts: In: 8859 [I.V.:1788; NG/GT:2041]  Out: 700 [Urine:700].       Patient Vitals for the past 96 hrs (Last 3 readings):   Weight   01/15/21 0600 226 lb 6.6 oz (102.7 kg)   21 0600 222 lb 7.1 oz (100.9 kg)   21 1615 214 lb 4.6 oz (97.2 kg)       Vital Signs:   Temperature:  Temp: 98.6 °F (37 °C)  TMax:   Temp (24hrs), Av.6 °F (37 °C), Min:98.4 °F (36.9 °C), Max:98.6 °F (37 °C)    Respirations:  Resp: 20  Pulse:   Pulse: 75  BP:    BP: (!) 126/59  BP Range: Systolic (24QLP), PRD:207 , Min:107 , GLI:917       Diastolic (89VEG), OIL:62, Min:50, Max:71      Physical Examination: General:  AAO x 2, no accessory muscle use. HEENT: Atraumatic, normocephalic, no throat congestion, moist mucosa. Eyes:   Pupils equal, round and reactive to light, EOMI. Neck:   Supple  Chest:   Bilateral vesicular breath sounds, no rales or wheezes. Cardiac:  S1 S2 RR, no murmurs, gallops or rubs. Abdomen: Soft, non-tender, no masses or organomegaly, BS audible. :   No suprapubic or flank tenderness. Neuro:  AAO x 2, No FND. SKIN:  No rashes, good skin turgor. Extremities:  No edema. Positive left-sided residual defects. Labs:       Recent Labs     01/14/21  0511 01/15/21  0541 01/16/21 0520   WBC 8.5 7.5 6.7   RBC 3.90* 3.75* 3.30*   HGB 10.8* 10.4* 9.2*   HCT 38.1 36.2* 31.4*   MCV 97.7 96.5 95.2   MCH 27.7 27.7 27.9   MCHC 28.3* 28.7 29.3   RDW 13.8 13.4 13.2    139 116*   MPV 12.4 12.1 12.3      BMP:   Recent Labs     01/14/21  0511 01/14/21  0511 01/15/21  0541 01/15/21  0541 01/15/21  1950 01/16/21  0059 01/16/21 0520   *   < > 154*   < > 152* 148* 147*   K 3.5*   < > 3.8   < > 4.0 3.6* 3.7   *   < > 115*   < > 114* 112* 112*   CO2 36*   < > 32*   < > 30 30 31   BUN 57*  --  35*  --   --   --  21   CREATININE 0.54  --  0.43*  --   --   --  0.38*   GLUCOSE 196*  --  266*  --   --   --  313*   CALCIUM 9.2  --  9.0  --   --   --  8.6    < > = values in this interval not displayed.       Phosphorus:     Recent Labs     01/14/21  0511 01/15/21  0541 01/16/21  0520   PHOS 3.5 2.6 2.3*     Magnesium:    Recent Labs     01/14/21  0511 01/15/21  0541 01/16/21  0520   MG 2.2 2.0 1.7     Albumin:    Recent Labs     01/14/21  0511 01/15/21  0541 01/16/21  0520   LABALBU 2.6* 2.5* 2.3*     SANJIV:      Lab Results   Component Value Date    SANJIV NEGATIVE 02/24/2012     SPEP:  Lab Results   Component Value Date    PROT 5.1 01/16/2021     Hep BsAg:         Lab Results   Component Value Date    HEPBSAG NONREACTIVE 02/24/2012     Hep C AB:          Lab Results   Component Value Date HEPCAB NONREACTIVE 02/24/2012       Urinalysis/Chemistries:      Lab Results   Component Value Date    NITRU NEGATIVE 01/13/2021    COLORU YELLOW 01/13/2021    PHUR 5.5 01/13/2021    WBCUA TOO NUMEROUS TO COUNT 01/13/2021    RBCUA 2 TO 5 01/13/2021    MUCUS NOT REPORTED 01/13/2021    TRICHOMONAS NOT REPORTED 01/13/2021    YEAST NOT REPORTED 01/13/2021    BACTERIA MANY 01/13/2021    SPECGRAV 1.020 01/13/2021    LEUKOCYTESUR LARGE 01/13/2021    UROBILINOGEN Normal 01/13/2021    BILIRUBINUR NEGATIVE 01/13/2021    BILIRUBINUR NEGATIVE 02/24/2012    GLUCOSEU TRACE 01/13/2021    GLUCOSEU NEGATIVE 02/24/2012    KETUA NEGATIVE 01/13/2021    AMORPHOUS NOT REPORTED 01/13/2021     Urine Sodium:     Lab Results   Component Value Date    DALI 22 01/13/2021     Urine Osmolarity:   Lab Results   Component Value Date    OSMOU 591 01/13/2021      Urine Creatinine:     Lab Results   Component Value Date    LABCREA 160.7 12/19/2018     Radiology:     Reviewed. Assessment:     1. Hypernatremia likely secondary to free water deficit and impaired thirst mechanism. Presented with sodium of 165 and urine osmolarity 591. Now improving. 2.  Metabolic alkalosis likely due to volume depletion-improving. 3.  Altered mental status. 4.  Hemorrhagic transformation of right MCA infarct. 5.  UTI with urine culture positive for E. Coli 1/13/2021.  6.  Bacteremia with Blood culture 1/13/2021 +ve for Enterococcus faecalis. 7.  Hyperchloremia. 8.  Diabetes type 2.  9.  Essential hypertension. 10.  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 11.  Asthma. Plan:   1. Decrease IV fluids to half-normal saline at 50 cc/hr. 2.  Increase free water 250 every 4 hours. 3.  Continue to monitor strict I's and O's and renal function. 4.  Antibiotics as per primary. 5. Since renal function is stable and electrolytes okay, nephrology will sign off. Please call with any questions.     Nutrition Please ensure that patient is on a renal diet/TF. Avoid nephrotoxic drugs/contrast exposure. We will continue to follow along with you. Florentino Jerome MD  [facility]     This note is created with the assistance of a speech-recognition program. While intending to generate a document that actually reflects the content of the visit, no guarantees can be provided that every mistake has been identified and corrected by editing.

## 2021-01-16 NOTE — FLOWSHEET NOTE
Assessment: The patient is coping ok with being in the hospital. The patient did share that she was very itchy and that was frustrating her. Intervention:  engaged in active listening.  informed the patient that chaplains are available 24/7. Outcome: The patient engaged in the conversation.         01/16/21 0900   Encounter Summary   Services provided to: Patient   Referral/Consult From: [facility]   Continue Visiting   (01/16/21)   Complexity of Encounter Moderate   Length of Encounter 15 minutes   Spiritual Assessment Completed Yes   Routine   Type Initial   Assessment Approachable   Intervention Active listening;Nurtured hope   Outcome Engaged in conversation

## 2021-01-16 NOTE — PROGRESS NOTES
[facility] - Initial Consult Note  Today's Date and Time: 1/16/2021, 1:20 PM   1/13/2021 admission date      Impression :   · Extensive subacute right MCA stroke w vasogenic edema -12/26  · Acte cystitis without hematuria , E. coli   · Enterococcus septicemia 1-13-21  · Hypernatremia  · Dehydration   · Allergy to Penicillin, sulfa  · Tolerated ceftriaxone  · Rt Gluteal decubitus    Recommendations:   · EF bacteremia: Vancomycin-pharmacy to dose  · Get 2 D echo look for vegetation  · Blood cultures look for resolution  · Right buttock wound care, wound might be the initial source of septicemia  · Would benefit from a CT scan of the abdomen pelvis  · E. coli UTI: Post ceftriaxone 1/14 and 1/15-continue 3 more doses till 1/18    Medical Decision Making/Summary/Discussion:1/16/2021     ·   Infection Control Recommendations   · [place] Precautions    Antimicrobial Stewardship Recommendations     · Simplification of therapy  · Targeted therapy  · PK dosing    Coordination of Outpatient Care:   · Estimated Length of IV antimicrobials: 1-25-21  · Patient will need Midline Catheter Insertion:  TBD   · Patient will need PICC line Insertion: TBD   · Patient will need: Home IV , [place],  SNF,  LTAC: Pt from [facility]  · Patient will need outpatient wound care: TBD. Chief complaint/reason for consultation:   · Enterococcus in blood     History of Present Illness:     INITIAL HISTORY:    Moraima Mir is a 78y.o.-year-old  female with history of CAD, HTN, DM, hyperlipidemia, right MCA stroke with residual left-sided deficits who was initially admitted on 1/13/2021. Patient seen at the request of Dr. Aung Carlin. Patient was initially seen at [facility] [address] ER because of uncontrolled hyperglycemia, hypernatremia and AMS. Of note, patient suffered a stroke in December 2020 and had a PEG placed at that time.  CAD (coronary artery disease)     s/p stents RCA and LAD 2009,    Chronic back pain     Congestive heart failure ([address]     Depression     Headache(784.0)     Hiatal hernia     Hypercholesteremia 2/21/2012    Hypertension     Kidney stones     years ago    Obesity     Osteoarthritis     Postlaminectomy syndrome 6/6/2012    Type II or unspecified type diabetes mellitus without mention of complication, not stated as uncontrolled     Unspecified sleep apnea     Urinary incontinence        Past Surgical  History:     Past Surgical History:   Procedure Laterality Date    CARDIAC CATHETERIZATION  01/2013    patent stents    COLONOSCOPY  09/2015    normal    CORONARY ANGIOPLASTY WITH STENT PLACEMENT  1012-16    stents x 3    GASTROSTOMY TUBE PLACEMENT  12/28/2020    EGD PEG TUBE PLACEMENT    GASTROSTOMY TUBE PLACEMENT N/A 12/28/2020    EGD PEG TUBE PLACEMENT performed by Celio [facility] at [facility]      left knee    KNEE SURGERY  10/21/2013    rt knee synvisc injection     KNEE SURGERY  12/02/2013    knee synvisc injection rt #2    KNEE SURGERY  12/09/2013    rt knee synvisc inj    LUMBAR SPINE SURGERY  2007    NERVE BLOCK  04/23/2012    Right MBNB L3, L4, L5    NERVE BLOCK  05/22/2012    Right MBNB L3, L4, and L5     NERVE BLOCK  01/14/2013    Right knee injection #1 - Synvisc    NERVE BLOCK  01/21/2013    Right knee injection #2 - Synvisc    NERVE BLOCK  01/28/2013    Rigth knee synvisc injection #3    NERVE BLOCK Right 04/17/2017    Rt genicular nerve block.  no steroid used    OTHER SURGICAL HISTORY Right 07/14/2014    synvisc one knee injection    OTHER SURGICAL HISTORY Right 03/16/2015    synvisc one knee injection    OTHER SURGICAL HISTORY Right 06/13/2016    synvisc right knee injection    SPINE SURGERY      TONSILLECTOMY      UPPER GASTROINTESTINAL ENDOSCOPY      VENA CAVA FILTER PLACEMENT  2007    PE and B/L LE emboli       Medications:  losartan  25 mg Oral Daily    amLODIPine  10 mg Oral Daily    cloNIDine  0.1 mg Oral Daily    insulin glargine  5 Units Subcutaneous Once    [START ON 2021] insulin glargine  20 Units Subcutaneous Daily    insulin lispro  0-12 Units Subcutaneous Q6H    cefTRIAXone (ROCEPHIN) IV  1 g Intravenous Q24H    docusate  100 mg Oral Daily    vancomycin (VANCOCIN) intermittent dosing (placeholder)   Other RX Placeholder    vancomycin  1,500 mg Intravenous Q12H    atorvastatin  80 mg Oral Daily    metoprolol tartrate  25 mg Oral BID    sertraline  100 mg Oral Daily    sodium chloride flush  10 mL Intravenous 2 times per day    enoxaparin  40 mg Subcutaneous Daily       Social History:     Social History     Socioeconomic History    Marital status: Legally      Spouse name: Not on file    Number of children: Not on file    Years of education: Not on file    Highest education level: Not on file   Occupational History    Not on file   Social Needs    Financial resource strain: Not on file    Food insecurity     Worry: Not on file     Inability: Not on file    Transportation needs     Medical: Not on file     Non-medical: Not on file   Tobacco Use    Smoking status: Former Smoker     Packs/day: 0.50     Years: 0.00     Pack years: 0.00     Quit date: 3/28/2013     Years since quittin.8    Smokeless tobacco: Never Used   Substance and Sexual Activity    Alcohol use:  Yes     Alcohol/week: 1.0 standard drinks     Types: 1 Cans of beer per week     Comment: occasionally    Drug use: No    Sexual activity: Not on file   Lifestyle    Physical activity     Days per week: Not on file     Minutes per session: Not on file    Stress: Not on file   Relationships    Social connections     Talks on phone: Not on file     Gets together: Not on file     Attends Sikhism service: Not on file     Active member of club or organization: Not on file Attends meetings of clubs or organizations: Not on file     Relationship status: Not on file    Intimate partner violence     Fear of current or ex partner: Not on file     Emotionally abused: Not on file     Physically abused: Not on file     Forced sexual activity: Not on file   Other Topics Concern    Not on file   Social History Narrative    Not on file       Family History:     Family History   Problem Relation Age of Onset    Diabetes Mother     Cancer Father     High Blood Pressure Sister     High Blood Pressure Brother         Allergies:   Bactrim, Penicillins, and Tylenol [acetaminophen]     Review of Systems:     Unable to give review of systems she is most lethargic    Physical Examination :     Patient Vitals for the past 8 hrs:   BP Pulse Resp SpO2   01/16/21 0700 (!) 126/59 75 20 99 %   01/16/21 0600 133/71 74 15 100 %   01/16/21 0530  58 14 97 %     General Appearance: Lethargic at this time  Head:  Normocephalic, no trauma  Eyes: Pupils equal, round, reactive to light and accommodation; extraocular movements intact; sclera anicteric; conjunctivae pink. No embolic phenomena. ENT: Oropharynx clear, without erythema, exudate, or thrush. No tenderness of sinuses. Mouth/throat: mucosa pink and moist. No lesions. Dentition in good repair. Neck: Supple  Pulmonary/Chest: Clear  Cardiovascular: Heart rate normal  Abdomen: Soft, non tender. Bowel sounds normal. No organomegaly  All four Extremities: No cyanosis, clubbing, edema, or effusions. Neurologic: No gross sensory or motor deficits. Skin: Warm and dry with good turgor. No signs of peripheral arterial or venous insufficiency. No ulcerations. No open wounds.     Medical Decision Making -Laboratory:   I have independently reviewed/ordered the following labs:    CBC with Differential:   Recent Labs     01/15/21  0541 01/16/21  0520   WBC 7.5 6.7   HGB 10.4* 9.2*   HCT 36.2* 31.4*    116*   LYMPHOPCT 20* 23*   MONOPCT 9 9     BMP: Recent Labs     01/15/21  0541 01/15/21  0541 01/16/21  0059 01/16/21  0520   *   < > 148* 147*   K 3.8   < > 3.6* 3.7   *   < > 112* 112*   CO2 32*   < > 30 31   BUN 35*  --   --  21   CREATININE 0.43*  --   --  0.38*   MG 2.0  --   --  1.7    < > = values in this interval not displayed. Hepatic Function Panel:   Recent Labs     01/15/21  0541 01/16/21  0520   PROT 5.7* 5.1*   LABALBU 2.5* 2.3*   BILIDIR 0.13 0.11   IBILI 0.27 0.21   BILITOT 0.40 0.32   ALKPHOS 74 65   ALT 18 18   AST 29 25     No results for input(s): RPR in the last 72 hours. No results for input(s): HIV in the last 72 hours. No results for input(s): BC in the last 72 hours.   Lab Results   Component Value Date    MUCUS NOT REPORTED 01/13/2021    RBC 3.30 01/16/2021    RBC 4.21 02/24/2012    TRICHOMONAS NOT REPORTED 01/13/2021    WBC 6.7 01/16/2021    YEAST NOT REPORTED 01/13/2021    TURBIDITY CLOUDY 01/13/2021     Lab Results   Component Value Date    CREATININE 0.38 01/16/2021    GLUCOSE 313 01/16/2021    GLUCOSE 105 02/24/2012             Dennard Skiff, MD,

## 2021-01-16 NOTE — PROGRESS NOTES
Physical Therapy    Facility/Department: [address] [facility]  Initial Assessment    NAME: Rochester Lesch  : 1950  MRN: [id number]    Date of Service: 2021  HISTORY OF PRESENT ILLNESS:   History was obtained from chart review. Rochester Lesch is a 78 y.o. female who presents from [facility] emergency department for concerns for altered mental status, hyperglycemia, and hypernatremia. Patient presented to the emergency department after she was found to be hyperglycemic on her daily glucose checks at her rehab facility. Patient is status post 2 weeks out from a right MCA infarct, had a extended course here at [facility]. [facility] neuro critical care unit, no thrombectomy or interventions were performed, patient was started on heparin and out of TPA window. Patient does have a past medical history of diabetes, hypertension, asthma, daily smoker. On examination patient has a obvious left-sided facial droop, left-sided weakness, however intermittently is following commands and unsure if complete participation in exam is occurring due to altered mentation from hyponatremia. Plan to consult nephrology, neuro critical care as patient does have new findings on CT head concerning for acute blood. Discharge Recommendations:  Patient would benefit from continued therapy after discharge        Assessment   Body structures, Functions, Activity limitations: Decreased functional mobility ; Decreased cognition;Decreased endurance;Decreased strength  Assessment: Pt with left hemiplegia from prior CVA. Dependent at this time for mobility. Prognosis: Fair  Decision Making: Medium Complexity  PT Education: Plan of Care;General Safety  Barriers to Learning: Cognition. REQUIRES PT FOLLOW UP: Yes  Activity Tolerance  Activity Tolerance: Patient limited by cognitive status       Patient Diagnosis(es): There were no encounter diagnoses. has a past medical history of Allergic rhinitis, Anxiety, Asthma, CAD (coronary artery disease), Chronic back pain, Congestive heart failure ([facility] [address] Depression, Headache(784.0), Hiatal hernia, Hypercholesteremia, Hypertension, Kidney stones, Obesity, Osteoarthritis, Postlaminectomy syndrome, Type II or unspecified type diabetes mellitus without mention of complication, not stated as uncontrolled, Unspecified sleep apnea, and Urinary incontinence. has a past surgical history that includes Spine surgery; Nerve Block (04/23/2012); Nerve Block (05/22/2012); Nerve Block (01/14/2013); Nerve Block (01/21/2013); Nerve Block (01/28/2013); Cardiac catheterization (01/2013); Lumbar spine surgery (2007); Vena Cava Filter Placement (2007); Tonsillectomy; joint replacement; knee surgery (10/21/2013); knee surgery (12/02/2013); knee surgery (12/09/2013); other surgical history (Right, 07/14/2014); other surgical history (Right, 03/16/2015); Upper gastrointestinal endoscopy; Colonoscopy (09/2015); other surgical history (Right, 06/13/2016); Coronary angioplasty with stent (1012-16); Nerve Block (Right, 04/17/2017); Gastrostomy tube placement (12/28/2020); and Gastrostomy tube placement (N/A, 12/28/2020). Restrictions  Restrictions/Precautions  Restrictions/Precautions: Fall Risk  Required Braces or Orthoses?: No  Position Activity Restriction  Other position/activity restrictions: left hemiplegia  Vision/Hearing  Vision: (BRITNI)  Hearing: Within functional limits     Subjective  General  Patient assessed for rehabilitation services?: Yes  Family / Caregiver Present: No  Follows Commands: Impaired  Other (Comment): Some commands followed with RUE/LE  General Comment  Comments: Pt difficult to understand. Garbled speech.   Pain Screening  Patient Currently in Pain: No  Pain Assessment  Pain Assessment: Faces  Hawley-Johnson Pain Rating: No hurt  Vital Signs  Patient Currently in Pain: No       Orientation  Orientation Overall Orientation Status: Impaired  Orientation Level: Oriented to person  Social/Functional History  Social/Functional History  Type of Home: Facility  Additional Comments: Unknown function. Cognition   Cognition  Overall Cognitive Status: Exceptions  Following Commands: Inconsistently follows commands  Attention Span: Difficulty attending to directions  Insights: Decreased awareness of deficits    Objective          AROM RLE (degrees)  RLE AROM: WFL  RLE General AROM: AAROM. PROM LLE (degrees)  LLE PROM: WFL  AROM RUE (degrees)  RUE AROM : WFL  PROM LUE (degrees)  LUE PROM: WFL  Strength Other  Other: Pt moves RUE/RLE antigravity. Inconsistent to command. No active movement noted LUE/LE     Sensation  Overall Sensation Status: Impaired  Light Touch: Severe deficits in the LUE;Severe deficits in the LLE  Bed mobility  Rolling to Left: Dependent/Total  Rolling to Right: Dependent/Total  Comment: Dependent transfer to chair with lift. PROM to LUE/LE x 10 reps   AAROM RUE/LE x 15 reps. Bilateral gastroc stretch. Plan   Plan  Times per week: 3-4x/week  Current Treatment Recommendations: Strengthening, ROM, Cognitive Reorientation, Functional Mobility Training  Safety Devices  Type of devices: Nurse notified, Left in chair, Chair alarm in place    AM-PAC Score     AM-PAC Inpatient Mobility without Stair Climbing Raw Score : 5 (01/16/21 1041)  AM-PAC Inpatient without Stair Climbing T-Scale Score : 23.59 (01/16/21 1041)  Mobility Inpatient CMS 0-100% Score: 100 (01/16/21 1041)  Mobility Inpatient without Stair CMS G-Code Modifier : CN (01/16/21 1041)       Goals  Short term goals  Time Frame for Short term goals: 14 visits  Short term goal 1: Prevent contractures of LUE/LE through ROM and stretching  Short term goal 2: Pt to follow most commands for active exercise RUE/LE  Short term goal 3: Pt to sit EOB MAX A +2  Patient Goals   Patient goals : Unable to state.        Therapy Time Individual Concurrent Group Co-treatment   Time In 1015         Time Out 1038         Minutes 23         Timed Code Treatment Minutes: One Lakhwinder GAYTAN, PT

## 2021-01-16 NOTE — PROGRESS NOTES
Interval Progress Note:    Writer asked to review CT head imaging from today for recommendations regarding antiplatelet therapy. CT head reviewed with  [facility] - Hyperdensity along the posterior margin of prior right MCA infarct is stable. OK to resume aspirin 81 mg today. Start Plavix 75 mg daily on 1/20. From a neurologic standpoint, patient will require DAPT x 90 days for intracranial atherosclerosis and then can transition to monotherapy. Please call with any additional questions or concerns.     Arvid Modest  1/16/2021   6:25 PM

## 2021-01-17 ENCOUNTER — APPOINTMENT (OUTPATIENT)
Dept: CT IMAGING | Age: 71
DRG: 871 | End: 2021-01-17
Attending: INTERNAL MEDICINE
Payer: COMMERCIAL

## 2021-01-17 LAB
ABSOLUTE EOS #: 0.17 K/UL (ref 0–0.44)
ABSOLUTE IMMATURE GRANULOCYTE: 0.13 K/UL (ref 0–0.3)
ABSOLUTE LYMPH #: 1.66 K/UL (ref 1.1–3.7)
ABSOLUTE MONO #: 0.62 K/UL (ref 0.1–1.2)
ALBUMIN SERPL-MCNC: 2.5 G/DL (ref 3.5–5.2)
ALBUMIN/GLOBULIN RATIO: 0.8 (ref 1–2.5)
ALP BLD-CCNC: 73 U/L (ref 35–104)
ALT SERPL-CCNC: 24 U/L (ref 5–33)
ANION GAP SERPL CALCULATED.3IONS-SCNC: 6 MMOL/L (ref 9–17)
AST SERPL-CCNC: 35 U/L
BASOPHILS # BLD: 0 % (ref 0–2)
BASOPHILS ABSOLUTE: <0.03 K/UL (ref 0–0.2)
BILIRUB SERPL-MCNC: 0.24 MG/DL (ref 0.3–1.2)
BILIRUBIN DIRECT: 0.1 MG/DL
BILIRUBIN, INDIRECT: 0.14 MG/DL (ref 0–1)
BUN BLDV-MCNC: 14 MG/DL (ref 8–23)
BUN/CREAT BLD: ABNORMAL (ref 9–20)
CALCIUM SERPL-MCNC: 9 MG/DL (ref 8.6–10.4)
CHLORIDE BLD-SCNC: 112 MMOL/L (ref 98–107)
CO2: 28 MMOL/L (ref 20–31)
CREAT SERPL-MCNC: 0.35 MG/DL (ref 0.5–0.9)
DIFFERENTIAL TYPE: ABNORMAL
EOSINOPHILS RELATIVE PERCENT: 2 % (ref 1–4)
GFR AFRICAN AMERICAN: >60 ML/MIN
GFR NON-AFRICAN AMERICAN: >60 ML/MIN
GFR SERPL CREATININE-BSD FRML MDRD: ABNORMAL ML/MIN/{1.73_M2}
GFR SERPL CREATININE-BSD FRML MDRD: ABNORMAL ML/MIN/{1.73_M2}
GLOBULIN: ABNORMAL G/DL (ref 1.5–3.8)
GLUCOSE BLD-MCNC: 224 MG/DL (ref 65–105)
GLUCOSE BLD-MCNC: 232 MG/DL (ref 65–105)
GLUCOSE BLD-MCNC: 239 MG/DL (ref 65–105)
GLUCOSE BLD-MCNC: 240 MG/DL (ref 65–105)
GLUCOSE BLD-MCNC: 256 MG/DL (ref 65–105)
GLUCOSE BLD-MCNC: 285 MG/DL (ref 70–99)
HCT VFR BLD CALC: 33.8 % (ref 36.3–47.1)
HEMOGLOBIN: 10.2 G/DL (ref 11.9–15.1)
IMMATURE GRANULOCYTES: 2 %
LYMPHOCYTES # BLD: 24 % (ref 24–43)
MAGNESIUM: 1.8 MG/DL (ref 1.6–2.6)
MCH RBC QN AUTO: 27.6 PG (ref 25.2–33.5)
MCHC RBC AUTO-ENTMCNC: 30.2 G/DL (ref 28.4–34.8)
MCV RBC AUTO: 91.4 FL (ref 82.6–102.9)
MONOCYTES # BLD: 9 % (ref 3–12)
NRBC AUTOMATED: 0 PER 100 WBC
PDW BLD-RTO: 13.2 % (ref 11.8–14.4)
PHOSPHORUS: 2.3 MG/DL (ref 2.6–4.5)
PLATELET # BLD: 147 K/UL (ref 138–453)
PLATELET ESTIMATE: ABNORMAL
PMV BLD AUTO: 11.9 FL (ref 8.1–13.5)
POTASSIUM SERPL-SCNC: 3.8 MMOL/L (ref 3.7–5.3)
RBC # BLD: 3.7 M/UL (ref 3.95–5.11)
RBC # BLD: ABNORMAL 10*6/UL
SEG NEUTROPHILS: 63 % (ref 36–65)
SEGMENTED NEUTROPHILS ABSOLUTE COUNT: 4.35 K/UL (ref 1.5–8.1)
SODIUM BLD-SCNC: 146 MMOL/L (ref 135–144)
TOTAL PROTEIN: 5.6 G/DL (ref 6.4–8.3)
VANCOMYCIN TROUGH DATE LAST DOSE: NORMAL
VANCOMYCIN TROUGH DOSE AMOUNT: NORMAL
VANCOMYCIN TROUGH TIME LAST DOSE: NORMAL
VANCOMYCIN TROUGH: 16.7 UG/ML (ref 10–20)
WBC # BLD: 6.9 K/UL (ref 3.5–11.3)
WBC # BLD: ABNORMAL 10*3/UL

## 2021-01-17 PROCEDURE — 6370000000 HC RX 637 (ALT 250 FOR IP): Performed by: STUDENT IN AN ORGANIZED HEALTH CARE EDUCATION/TRAINING PROGRAM

## 2021-01-17 PROCEDURE — 85025 COMPLETE CBC W/AUTO DIFF WBC: CPT

## 2021-01-17 PROCEDURE — 6360000004 HC RX CONTRAST MEDICATION: Performed by: INTERNAL MEDICINE

## 2021-01-17 PROCEDURE — 80202 ASSAY OF VANCOMYCIN: CPT

## 2021-01-17 PROCEDURE — 2060000000 HC ICU INTERMEDIATE R&B

## 2021-01-17 PROCEDURE — 99233 SBSQ HOSP IP/OBS HIGH 50: CPT | Performed by: INTERNAL MEDICINE

## 2021-01-17 PROCEDURE — 2580000003 HC RX 258: Performed by: INTERNAL MEDICINE

## 2021-01-17 PROCEDURE — 6370000000 HC RX 637 (ALT 250 FOR IP): Performed by: INTERNAL MEDICINE

## 2021-01-17 PROCEDURE — 84100 ASSAY OF PHOSPHORUS: CPT

## 2021-01-17 PROCEDURE — 80048 BASIC METABOLIC PNL TOTAL CA: CPT

## 2021-01-17 PROCEDURE — 74177 CT ABD & PELVIS W/CONTRAST: CPT

## 2021-01-17 PROCEDURE — 6360000002 HC RX W HCPCS: Performed by: STUDENT IN AN ORGANIZED HEALTH CARE EDUCATION/TRAINING PROGRAM

## 2021-01-17 PROCEDURE — 2580000003 HC RX 258: Performed by: STUDENT IN AN ORGANIZED HEALTH CARE EDUCATION/TRAINING PROGRAM

## 2021-01-17 PROCEDURE — 6360000002 HC RX W HCPCS: Performed by: INTERNAL MEDICINE

## 2021-01-17 PROCEDURE — 80076 HEPATIC FUNCTION PANEL: CPT

## 2021-01-17 PROCEDURE — 83735 ASSAY OF MAGNESIUM: CPT

## 2021-01-17 PROCEDURE — 36415 COLL VENOUS BLD VENIPUNCTURE: CPT

## 2021-01-17 PROCEDURE — 82947 ASSAY GLUCOSE BLOOD QUANT: CPT

## 2021-01-17 PROCEDURE — 99232 SBSQ HOSP IP/OBS MODERATE 35: CPT | Performed by: INTERNAL MEDICINE

## 2021-01-17 RX ORDER — INSULIN GLARGINE 100 [IU]/ML
30 INJECTION, SOLUTION SUBCUTANEOUS DAILY
Status: DISCONTINUED | OUTPATIENT
Start: 2021-01-18 | End: 2021-01-19

## 2021-01-17 RX ORDER — INSULIN GLARGINE 100 [IU]/ML
10 INJECTION, SOLUTION SUBCUTANEOUS ONCE
Status: COMPLETED | OUTPATIENT
Start: 2021-01-17 | End: 2021-01-17

## 2021-01-17 RX ADMIN — SODIUM CHLORIDE, PRESERVATIVE FREE 10 ML: 5 INJECTION INTRAVENOUS at 09:31

## 2021-01-17 RX ADMIN — INSULIN LISPRO 4 UNITS: 100 INJECTION, SOLUTION INTRAVENOUS; SUBCUTANEOUS at 00:35

## 2021-01-17 RX ADMIN — METOPROLOL TARTRATE 25 MG: 25 TABLET ORAL at 10:52

## 2021-01-17 RX ADMIN — AMLODIPINE BESYLATE 10 MG: 5 TABLET ORAL at 10:53

## 2021-01-17 RX ADMIN — INSULIN LISPRO 4 UNITS: 100 INJECTION, SOLUTION INTRAVENOUS; SUBCUTANEOUS at 06:20

## 2021-01-17 RX ADMIN — Medication 1500 MG: at 00:34

## 2021-01-17 RX ADMIN — ASPIRIN 81 MG: 81 TABLET, CHEWABLE ORAL at 00:35

## 2021-01-17 RX ADMIN — ASPIRIN 81 MG: 81 TABLET, CHEWABLE ORAL at 10:53

## 2021-01-17 RX ADMIN — IOHEXOL 50 ML: 240 INJECTION, SOLUTION INTRATHECAL; INTRAVASCULAR; INTRAVENOUS; ORAL at 09:30

## 2021-01-17 RX ADMIN — INSULIN LISPRO 4 UNITS: 100 INJECTION, SOLUTION INTRAVENOUS; SUBCUTANEOUS at 13:06

## 2021-01-17 RX ADMIN — Medication 1500 MG: at 11:55

## 2021-01-17 RX ADMIN — CEFTRIAXONE SODIUM 1 G: 1 INJECTION, POWDER, FOR SOLUTION INTRAMUSCULAR; INTRAVENOUS at 14:14

## 2021-01-17 RX ADMIN — LOSARTAN POTASSIUM 25 MG: 25 TABLET, FILM COATED ORAL at 10:53

## 2021-01-17 RX ADMIN — ENOXAPARIN SODIUM 40 MG: 40 INJECTION SUBCUTANEOUS at 10:53

## 2021-01-17 RX ADMIN — ATORVASTATIN CALCIUM 80 MG: 80 TABLET, FILM COATED ORAL at 22:09

## 2021-01-17 RX ADMIN — METOPROLOL TARTRATE 25 MG: 25 TABLET ORAL at 22:09

## 2021-01-17 RX ADMIN — INSULIN LISPRO 6 UNITS: 100 INJECTION, SOLUTION INTRAVENOUS; SUBCUTANEOUS at 17:19

## 2021-01-17 RX ADMIN — IOPAMIDOL 75 ML: 755 INJECTION, SOLUTION INTRAVENOUS at 11:31

## 2021-01-17 RX ADMIN — INSULIN GLARGINE 10 UNITS: 100 INJECTION, SOLUTION SUBCUTANEOUS at 11:53

## 2021-01-17 RX ADMIN — SODIUM CHLORIDE, PRESERVATIVE FREE 10 ML: 5 INJECTION INTRAVENOUS at 22:09

## 2021-01-17 RX ADMIN — SERTRALINE 100 MG: 50 TABLET, FILM COATED ORAL at 10:52

## 2021-01-17 ASSESSMENT — PAIN SCALES - WONG BAKER: WONGBAKER_NUMERICALRESPONSE: 0

## 2021-01-17 ASSESSMENT — PAIN SCALES - GENERAL
PAINLEVEL_OUTOF10: 0
PAINLEVEL_OUTOF10: 0

## 2021-01-17 NOTE — PROGRESS NOTES
[facility]  Internal Medicine Teaching Residency Program  Inpatient Daily Progress Note  ______________________________________________________________________________    Patient: Brittney Mcdermott  YOB: 1950   SAMANTHA:[id number]    Acct: [de-identified]     Room: [facility]8078-  Admit date: 1/13/2021  Today's date: 01/17/21  Number of days in the hospital: 4    SUBJECTIVE   Admitting Diagnosis: <principal problem not specified>  CC: Altered mental status, hyponatremia, hyperglycemia  Pt examined at bedside. Chart & results reviewed. Blood pressure 160s over 70s, afebrile  Sodium 146  Glucose 285, adjust insulin dose  Started on aspirin yesterday as per neurology recommendations. ROS:  Constitutional:  negative for chills, fevers, sweats  Respiratory:  negative for cough, dyspnea on exertion, hemoptysis, shortness of breath, wheezing  Cardiovascular:  negative for chest pain, chest pressure/discomfort, lower extremity edema, palpitations  Gastrointestinal:  negative for abdominal pain, constipation, diarrhea, nausea, vomiting  Neurological:  negative for dizziness, headache  BRIEF HISTORY     Everett [place] a 78 y. o. female who [facility] department for concerns for altered mental status, hyperglycemia, and hypernatremia. Patient presented to the emergency department after she was found to be hyperglycemic on her daily glucose checks at her rehab facility.  Patient was status post 2 weeks out from a right MCA infarct, had a extended course here at [facility]. [facility] neuro critical care unit, no thrombectomy or interventions were performed, patient was started on heparin and out of TPA window. Patient does have a past medical history of CAD, diabetes, hypertension, asthma, daily smoker.  On examination patient had a obvious left-sided facial droop, left-sided weakness, however intermittently was following commands and unsure if complete participation in exam occurring due to altered mentation from hypernatremia.        Nephrology was consulted and patient was started on 0.45 saline leading to some improvement of sodium level, later on free water boluses with 250 mL every 6 hours were added.  Neurology was consulted due to recent right MCA stroke with left hemiplegia to decide about aspirin/Plavix as repeat CT head was concerning for some intracranial bleed.  Blood cultures grew Enterococcus faecium and patient started on vancomycin on 1/15 to complete 10 days. Gustavo Maynard is a right gluteal decubitus small wound but the source of Enterococcus bacteremia is unclear.  May need to rule out GI source of Enterococcus bacteremia.  Patient had repeat CT head  which still cannot totally exclude bleed in the brain.     Hypernatremia and patient's mentation is improving.  She is alert and oriented now. Sandeep Abad continues to have left-sided body weakness from right MCA stroke. Sandeep Abad is on tube feeding from PEG tube.  Hemodynamically stable.  transferred out of ICU to floors for further management.     OBJECTIVE     Vital Signs:  BP (!) 165/75   Pulse 75   Temp 98.1 °F (36.7 °C) (Oral)   Resp 22   Ht 5' 4\" (1.626 m)   Wt 241 lb 2.9 oz (109.4 kg)   LMP  (LMP Unknown)   SpO2 94%   BMI 41.40 kg/m²     Temp (24hrs), Av.5 °F (36.9 °C), Min:98.1 °F (36.7 °C), Max:98.6 °F (37 °C)    In: 3256   Out: 900 [Urine:900]    General appearance - alert, in no distress  Mental status - alert, oriented to person, place, and time  Eyes - pupils equal and reactive, extraocular eye movements intact  Mouth - mucous membranes moist, pharynx normal without lesions  Neck - supple, no significant adenopathy Chest - clear to auscultation, no wheezes  Heart - normal rate, regular rhythm, normal S1, S2  Abdomen - soft, nontender, nondistended  Neurological - alert, oriented, normal speech, Positive left-sided residual defects.   Extremities - peripheral pulses normal, no pedal edema  Skin - normal coloration and turgor, no rashes          Medications:  Scheduled Medications:    losartan  25 mg Oral Daily    amLODIPine  10 mg Oral Daily    cloNIDine  0.1 mg Oral Daily    insulin glargine  20 Units Subcutaneous Daily    insulin lispro  0-12 Units Subcutaneous Q6H    cefTRIAXone (ROCEPHIN) IV  1 g Intravenous Q24H    aspirin  81 mg Oral Daily    docusate  100 mg Oral Daily    vancomycin (VANCOCIN) intermittent dosing (placeholder)   Other RX Placeholder    vancomycin  1,500 mg Intravenous Q12H    atorvastatin  80 mg Oral Daily    metoprolol tartrate  25 mg Oral BID    sertraline  100 mg Oral Daily    sodium chloride flush  10 mL Intravenous 2 times per day    enoxaparin  40 mg Subcutaneous Daily     Continuous Infusions:    dextrose      sodium chloride 50 mL/hr at 01/16/21 1843     PRN Medications    iohexol, 50 mL, ONCE PRN      hydrALAZINE, 5 mg, Q6H PRN      sodium chloride flush, 10 mL, PRN      promethazine, 12.5 mg, Q6H PRN    Or      ondansetron, 4 mg, Q6H PRN      polyethylene glycol, 17 g, Daily PRN      acetaminophen, 650 mg, Q6H PRN    Or      acetaminophen, 650 mg, Q6H PRN      glucose, 15 g, PRN      dextrose, 12.5 g, PRN      glucagon (rDNA), 1 mg, PRN      dextrose, 100 mL/hr, PRN        Diagnostic Labs:  CBC:   Recent Labs     01/15/21  0541 01/16/21  0520 01/17/21  0719   WBC 7.5 6.7 6.9   RBC 3.75* 3.30* 3.70*   HGB 10.4* 9.2* 10.2*   HCT 36.2* 31.4* 33.8*   MCV 96.5 95.2 91.4   RDW 13.4 13.2 13.2    116* 147     BMP:   Recent Labs     01/15/21  0541 01/15/21  0541 01/16/21  0059 01/16/21  0520 01/17/21  0719   *   < > 148* 147* 146*   K 3.8   < > 3.6* 3.7 3.8 *   < > 112* 112* 112*   CO2 32*   < > 30 31 28   PHOS 2.6  --   --  2.3* 2.3*   BUN 35*  --   --  21 14   CREATININE 0.43*  --   --  0.38* 0.35*    < > = values in this interval not displayed. BNP: No results for input(s): BNP in the last 72 hours. PT/INR: No results for input(s): PROTIME, INR in the last 72 hours. APTT: No results for input(s): APTT in the last 72 hours. CARDIAC ENZYMES: No results for input(s): CKMB, CKMBINDEX, TROPONINI in the last 72 hours. Invalid input(s): CKTOTAL;3  FASTING LIPID PANEL:  Lab Results   Component Value Date    CHOL 188 04/05/2019    HDL 48 12/16/2020    TRIG 177 (H) 04/05/2019     LIVER PROFILE:   Recent Labs     01/15/21  0541 01/16/21  0520 01/17/21  0719   AST 29 25 35*   ALT 18 18 24   BILIDIR 0.13 0.11 0.10   BILITOT 0.40 0.32 0.24*   ALKPHOS 74 65 73      MICROBIOLOGY:   Lab Results   Component Value Date/Time    CULTURE DUPLICATE ORDER 71/19/4563 11:00 PM       Imaging:    Ct Head Wo Contrast    Result Date: 1/16/2021  No acute intracranial abnormality. Redemonstration of linear areas of increased density along the posterior margin of prior right MCA infarct. .  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 Redemonstration of extensive right MCA territory infarct. No new infarct. 2. New or increasing hyperdense serpiginous gyral hyperdensities along posterior aspect of the infarct in the parietal and to a less and extent temporal lobe and also along the margins of the right caudate nucleus is suggestive of some blood products. This may be further assessed with MRI if deemed clinically necessary. Gu[facility] Obrien Chest Portable    Result Date: 1/13/2021  No acute finding or significant change.      Xr Chest Portable    Result Date: 1/13/2021  No acute cardiopulmonary findings ASSESSMENT & PLAN     Metabolic encephalopathy secondary to hypernatremia and recent large right MCA stroke  -CT head 1/13 redemonstration right MCA territory infarct, new hyperdense  hyperdensities along the posterior aspect of previous infarct parietal lobes at this time temporal lobe as well as aspect of the caudate nucleus  -Mentation and sodium levels improving  -Continue 0.45 saline at 100 mL/h  -Free water bolus 250 mL every 6 hours  -Follow with further nephrology recommendations. -Repeat CT head today -  Hyperdensity along the posterior margin of prior right MCA infarct is stable. OK to resume aspirin 81 mg as per neuro-critical care. Start Plavix 75 mg daily on 1/20. From a neurologic standpoint, patient will require DAPT x 90 days for intracranial atherosclerosis and then can transition to monotherapy.      Enterococcus faecalis bacteremia from unclear source  -Vancomycin for 10 days, first dose on 1/15/21  -Continue to assess right gluteal decubitus wound, wound care on board  -May need to rule out GI source of bacteremia  -ID following, recommend getting 2D echo to look for vegetations.     E. coli UTI  Continue 3 more doses of ceftriaxone till 1/18, ID on board     Hypertension  -Resumed [id number] mg, Cozaar 25 mg and clonidine 0.1mg.  Continue metoprolol at  25 mg twice daily due to bradycardia     Type 2 diabetes mellitus  -Last A1c 6.8  -Blood glucose uncontrolled after starting tube feeding.  -started on Lantus 20 units daily  -Continue cover with sliding scale insulin.     History of coronary artery disease with PCI in the past  -Continue statin, BB  -will restart aspirin.     DVT prophylaxis: Lovenox  Diet: PEG tube feeding     OT/PT- on board  Discharge planning- ongoing     Yaritza Sanchez MD  Internal Medicine Resident, PGY-1  [address], [place]  1/17/2021, 10:09 AM

## 2021-01-17 NOTE — PROGRESS NOTES
[address] [place] [address]     Department of Internal Medicine - Staff Internal Medicine Service   ICU PATIENT TRANSFER NOTE        Patient:  Domenic Bates  YOB: 1950  MRN: [id number]     Acct: [de-identified]     Admit date: 1/13/2021    Code Status:-  Full code     Reason for ICU Admission:-   Altered mental status, hypernatremia, hyperglycemia    SUPPORT DEVICES: [] Ventilator [] BIPAP  [x] Nasal Cannula [] Room Air    Consultations:- [] Cardiology [x] Nephrology  [] Hemo onco  [] GI                               [x] ID [] ENT  [] Rheum [] Endo   []Physiotherapy                                 Others:-Neuro critical care    NUTRITION:  [] NPO [x] Tube Feeding (Specify: ) [] TPN  [] PO    Central Lines:- [x] No   [] Yes           If yes - Days/Date of Insertion. Pt seen,examined and Chart reviewed. ICU COURSE:    Haig Epley a 78 y. o. female who presented from [facility] emergency department for concerns for altered mental status, hyperglycemia, and hypernatremia. Patient presented to the emergency department after she was found to be hyperglycemic on her daily glucose checks at her rehab facility.  Patient was status post 2 weeks out from a right MCA infarct, had a extended course here at [facility] [place]'s neuro critical care unit, no thrombectomy or interventions were performed, patient was started on heparin and out of TPA window.   Patient does have a past medical history of CAD, diabetes, hypertension, asthma, daily smoker.  On examination patient had a obvious left-sided facial droop, left-sided weakness, however intermittently was following commands and unsure if complete participation in exam occurring due to altered mentation from hypernatremia.      Nephrology was consulted and patient was started on 0.45 saline leading to some improvement of sodium level, later on free water boluses with 250 mL every 6 hours were added. Neurology was consulted due to recent right MCA stroke with left hemiplegia to decide about aspirin/Plavix as repeat CT head was concerning for some intracranial bleed. Blood cultures grew Enterococcus faecium and patient started on vancomycin on 1/15 to complete 10 days. There is a right gluteal decubitus small wound but the source of Enterococcus bacteremia is unclear. May need to rule out GI source of Enterococcus bacteremia. Patient had repeat CT head 1/16 which still cannot totally exclude bleed in the brain.     Hypernatremia and patient's mentation is improving. She is alert and oriented now. She continues to have left-sided body weakness from right MCA stroke. She is on tube feeding from PEG tube. Hemodynamically stable. transferred out of ICU to floors for further management. Physical Exam:   Vitals: BP (!) 126/59   Pulse 75   Temp 98.6 °F (37 °C) (Oral)   Resp 20   Ht 5' 4\" (1.626 m)   Wt 226 lb 6.6 oz (102.7 kg)   LMP  (LMP Unknown)   SpO2 99%   BMI 38.86 kg/m²   24 hour intake/output:    Intake/Output Summary (Last 24 hours) at 1/16/2021 2236  Last data filed at 1/16/2021 1736  Gross per 24 hour   Intake 2488 ml   Output 560 ml   Net 1928 ml     Last 3 weights:   Wt Readings from Last 3 Encounters:   01/15/21 226 lb 6.6 oz (102.7 kg)   01/13/21 220 lb (99.8 kg)   12/28/20 231 lb (104.8 kg)       General appearance - alert, in no distress  Mental status - alert, oriented to person, place, and time  Eyes - pupils equal and reactive, extraocular eye movements intact  Mouth - mucous membranes moist, pharynx normal without lesions  Neck - supple, no significant adenopathy  Chest - clear to auscultation, no wheezes  Heart - normal rate, regular rhythm, normal S1, S2  Abdomen - soft, nontender, nondistended Neurological - alert, oriented, normal speech, Positive left-sided residual defects. Extremities - peripheral pulses normal, no pedal edema  Skin - normal coloration and turgor, no rashes      Medications:Current Inpatient  Scheduled Meds:   losartan  25 mg Oral Daily    amLODIPine  10 mg Oral Daily    cloNIDine  0.1 mg Oral Daily    [START ON 1/17/2021] insulin glargine  20 Units Subcutaneous Daily    insulin lispro  0-12 Units Subcutaneous Q6H    cefTRIAXone (ROCEPHIN) IV  1 g Intravenous Q24H    docusate  100 mg Oral Daily    vancomycin (VANCOCIN) intermittent dosing (placeholder)   Other RX Placeholder    vancomycin  1,500 mg Intravenous Q12H    atorvastatin  80 mg Oral Daily    metoprolol tartrate  25 mg Oral BID    sertraline  100 mg Oral Daily    sodium chloride flush  10 mL Intravenous 2 times per day    enoxaparin  40 mg Subcutaneous Daily     Continuous Infusions:   dextrose      sodium chloride 50 mL/hr at 01/16/21 1843     PRN Meds:hydrALAZINE, sodium chloride flush, promethazine **OR** ondansetron, polyethylene glycol, acetaminophen **OR** acetaminophen, glucose, dextrose, glucagon (rDNA), dextrose    Objective:    CBC:   Recent Labs     01/14/21  0511 01/15/21  0541 01/16/21  0520   WBC 8.5 7.5 6.7   HGB 10.8* 10.4* 9.2*    139 116*     BMP:    Recent Labs     01/14/21  0511 01/14/21  0511 01/15/21  0541 01/15/21  0541 01/15/21  1950 01/16/21  0059 01/16/21  0520   *   < > 154*   < > 152* 148* 147*   K 3.5*   < > 3.8   < > 4.0 3.6* 3.7   *   < > 115*   < > 114* 112* 112*   CO2 36*   < > 32*   < > 30 30 31   BUN 57*  --  35*  --   --   --  21   CREATININE 0.54  --  0.43*  --   --   --  0.38*   GLUCOSE 196*  --  266*  --   --   --  313*    < > = values in this interval not displayed. Calcium:  Recent Labs     01/16/21  0520   CALCIUM 8.6     Ionized Calcium:No results for input(s): IONCA in the last 72 hours.   Magnesium:  Recent Labs     01/16/21  0520   MG 1.7 Phosphorus:  Recent Labs     01/16/21  0520   PHOS 2.3*     BNP:No results for input(s): BNP in the last 72 hours. Glucose:  Recent Labs     01/16/21  0801 01/16/21  1133 01/16/21  1634   POCGLU 261* 280* 289*     HgbA1C: No results for input(s): LABA1C in the last 72 hours. INR: No results for input(s): INR in the last 72 hours. Hepatic:   Recent Labs     01/16/21  0520   ALKPHOS 65   ALT 18   AST 25   PROT 5.1*   BILITOT 0.32   BILIDIR 0.11   LABALBU 2.3*     Amylase and Lipase:  Recent Labs     01/16/21  0520   LACTA NOT REPORTED     Lactic Acid:   Recent Labs     01/16/21 0520   LACTA NOT REPORTED     CARDIAC ENZYMES:No results for input(s): CKTOTAL, CKMB, CKMBINDEX, TROPONINI in the last 72 hours. BNP: No results for input(s): BNP in the last 72 hours. Lipids: No results for input(s): CHOL, TRIG, HDL, LDLCALC in the last 72 hours. Invalid input(s): LDL  ABGs: No results found for: PH, PCO2, PO2, HCO3, O2SAT  Thyroid:   Lab Results   Component Value Date    TSH 1.38 12/19/2018        Urinalysis: No results for input(s): BACTERIA, BLOODU, CLARITYU, COLORU, PHUR, PROTEINU, RBCUA, SPECGRAV, BILIRUBINUR, NITRU, WBCUA, LEUKOCYTESUR, GLUCOSEU in the last 72 hours. Assessment:  Active Problems:    Dehydration    Bacteremia    Acute cystitis without hematuria    Hypernatremia    Cerebrovascular accident (CVA) due to thrombosis of right middle cerebral artery ([facility] [address]    Oropharyngeal dysphagia    Septicemia due to enterococcus [facility])  Resolved Problems:    * No resolved hospital problems.  *          Plan:    Metabolic encephalopathy secondary to hypernatremia and recent large right MCA stroke  -CT head 1/13 redemonstration right MCA territory infarct, new hyperdense  hyperdensities along the posterior aspect of previous infarct parietal lobes at this time temporal lobe as well as aspect of the caudate nucleus  -Mentation and sodium levels improving  -Continue 0.45 saline at 100 mL/h -Free water bolus 250 mL every 6 hours  -Follow with further nephrology recommendations. -Repeat CT head today -  Hyperdensity along the posterior margin of prior right MCA infarct is stable. OK to resume aspirin 81 mg as per neuro-critical care. Start Plavix 75 mg daily on 1/20.    From a neurologic standpoint, patient will require DAPT x 90 days for intracranial atherosclerosis and then can transition to monotherapy.      Enterococcus faecalis bacteremia from unclear source  -Vancomycin for 10 days, first dose on 1/15/21  -Continue to assess right gluteal decubitus wound, wound care on board  -May need to rule out GI source of bacteremia  -ID following, recommend getting 2D echo to look for vegetations    E. coli UTI  Continue 3 more doses of ceftriaxone till 1/18, ID on board     Hypertension  -Resumed [id number] mg, Cozaar 25 mg and clonidine 0.1mg.  Continue metoprolol at  25 mg twice daily due to bradycardia     Type 2 diabetes mellitus  -Last A1c 6.8  -Blood glucose uncontrolled after starting tube feeding.  -started on Lantus 20 units daily  -Continue cover with sliding scale insulin.     History of coronary artery disease with PCI in the past  -Continue statin, BB  -will restart aspirin.     DVT prophylaxis: Lovenox  Diet: PEG tube feeding    OT/PT- on board  Discharge planning- ongoing    Nancy Bloom MD             Department of Internal Medicine  [facility]           1/16/2021, 10:36 PM

## 2021-01-17 NOTE — CARE COORDINATION
Met with patient and daughter Moisés Londono at bedside. We discussed transition plan. Moisés Londono tells me that her mother will not be going back to [facility] as she now has wounds on her bed her teeth are black with \"crud\" and she is dehydrated. She informs me that her and her family have discussed taking patient home but they are not able to provide 24 hour care that she requires. I provided Moisés Londono with a new list and informed her that I can see the patient being discharged in the next 48 hours. I asked that she make a decision quickly because the facility now are filling up d/t covid and I would like to secure [facility] facility for her mother.   She states that she will take the list and family will look it over tonight

## 2021-01-17 NOTE — PROGRESS NOTES
Pharmacy Vancomycin Consult     Vancomycin Day: 3  Current Dosinmg IV Q12H    Temp max:  afebrile    Recent Labs     21  0520 21  0719   BUN 21 14       Recent Labs     21  0520 21  0719   CREATININE 0.38* 0.35*       Recent Labs     21  0520 21  0719   WBC 6.7 6.9         Intake/Output Summary (Last 24 hours) at 2021 1302  Last data filed at 2021 0602  Gross per 24 hour   Intake 3256 ml   Output 900 ml   Net 2356 ml       Culture Date      Source                       Results                     MRSA Swab              Negative                     Urine                          E. Coli 10-50,000    Ht Readings from Last 1 Encounters:   21 5' 4\" (1.626 m)        Wt Readings from Last 1 Encounters:   21 241 lb 2.9 oz (109.4 kg)         Body mass index is 41.4 kg/m². Estimated Creatinine Clearance: 186 mL/min (A) (based on SCr of 0.35 mg/dL (L)). Trough: 16.7 mcg/mL    Assessment/Plan:  Level today within desired range. Will continue current regimen and order further levels as necessary.     Candy Diana PharmD BCPS BCCCP  2021 1:02 PM

## 2021-01-18 ENCOUNTER — APPOINTMENT (OUTPATIENT)
Dept: CT IMAGING | Age: 71
DRG: 871 | End: 2021-01-18
Attending: INTERNAL MEDICINE
Payer: COMMERCIAL

## 2021-01-18 LAB
ABSOLUTE EOS #: 0.25 K/UL (ref 0–0.44)
ABSOLUTE IMMATURE GRANULOCYTE: 0.23 K/UL (ref 0–0.3)
ABSOLUTE LYMPH #: 1.41 K/UL (ref 1.1–3.7)
ABSOLUTE MONO #: 0.59 K/UL (ref 0.1–1.2)
ALBUMIN SERPL-MCNC: 2.2 G/DL (ref 3.5–5.2)
ALBUMIN/GLOBULIN RATIO: 0.7 (ref 1–2.5)
ALP BLD-CCNC: 67 U/L (ref 35–104)
ALT SERPL-CCNC: 64 U/L (ref 5–33)
ANION GAP SERPL CALCULATED.3IONS-SCNC: 9 MMOL/L (ref 9–17)
AST SERPL-CCNC: 95 U/L
BASOPHILS # BLD: 1 % (ref 0–2)
BASOPHILS ABSOLUTE: 0.04 K/UL (ref 0–0.2)
BILIRUB SERPL-MCNC: 0.21 MG/DL (ref 0.3–1.2)
BILIRUBIN DIRECT: 0.09 MG/DL
BILIRUBIN, INDIRECT: 0.12 MG/DL (ref 0–1)
BUN BLDV-MCNC: 11 MG/DL (ref 8–23)
BUN/CREAT BLD: ABNORMAL (ref 9–20)
CALCIUM SERPL-MCNC: 9 MG/DL (ref 8.6–10.4)
CHLORIDE BLD-SCNC: 110 MMOL/L (ref 98–107)
CO2: 23 MMOL/L (ref 20–31)
CREAT SERPL-MCNC: 0.3 MG/DL (ref 0.5–0.9)
DIFFERENTIAL TYPE: ABNORMAL
EKG ATRIAL RATE: 55 BPM
EKG Q-T INTERVAL: 502 MS
EKG QRS DURATION: 98 MS
EKG QTC CALCULATION (BAZETT): 476 MS
EKG R AXIS: -175 DEGREES
EKG T AXIS: 145 DEGREES
EKG VENTRICULAR RATE: 54 BPM
EOSINOPHILS RELATIVE PERCENT: 3 % (ref 1–4)
GFR AFRICAN AMERICAN: >60 ML/MIN
GFR NON-AFRICAN AMERICAN: >60 ML/MIN
GFR SERPL CREATININE-BSD FRML MDRD: ABNORMAL ML/MIN/{1.73_M2}
GFR SERPL CREATININE-BSD FRML MDRD: ABNORMAL ML/MIN/{1.73_M2}
GLOBULIN: ABNORMAL G/DL (ref 1.5–3.8)
GLUCOSE BLD-MCNC: 230 MG/DL (ref 65–105)
GLUCOSE BLD-MCNC: 233 MG/DL (ref 65–105)
GLUCOSE BLD-MCNC: 242 MG/DL (ref 65–105)
GLUCOSE BLD-MCNC: 285 MG/DL (ref 65–105)
GLUCOSE BLD-MCNC: 292 MG/DL (ref 70–99)
GLUCOSE BLD-MCNC: 300 MG/DL (ref 65–105)
HCT VFR BLD CALC: 31.9 % (ref 36.3–47.1)
HEMOGLOBIN: 9.6 G/DL (ref 11.9–15.1)
IMMATURE GRANULOCYTES: 3 %
LYMPHOCYTES # BLD: 19 % (ref 24–43)
MAGNESIUM: 1.6 MG/DL (ref 1.6–2.6)
MCH RBC QN AUTO: 27.8 PG (ref 25.2–33.5)
MCHC RBC AUTO-ENTMCNC: 30.1 G/DL (ref 28.4–34.8)
MCV RBC AUTO: 92.5 FL (ref 82.6–102.9)
MONOCYTES # BLD: 8 % (ref 3–12)
NRBC AUTOMATED: 0 PER 100 WBC
PDW BLD-RTO: 12.9 % (ref 11.8–14.4)
PHOSPHORUS: 2.9 MG/DL (ref 2.6–4.5)
PLATELET # BLD: 126 K/UL (ref 138–453)
PLATELET ESTIMATE: ABNORMAL
PMV BLD AUTO: 11.9 FL (ref 8.1–13.5)
POTASSIUM SERPL-SCNC: 3.9 MMOL/L (ref 3.7–5.3)
RBC # BLD: 3.45 M/UL (ref 3.95–5.11)
RBC # BLD: ABNORMAL 10*6/UL
SEG NEUTROPHILS: 66 % (ref 36–65)
SEGMENTED NEUTROPHILS ABSOLUTE COUNT: 4.73 K/UL (ref 1.5–8.1)
SODIUM BLD-SCNC: 142 MMOL/L (ref 135–144)
TOTAL PROTEIN: 5.2 G/DL (ref 6.4–8.3)
WBC # BLD: 7.3 K/UL (ref 3.5–11.3)
WBC # BLD: ABNORMAL 10*3/UL

## 2021-01-18 PROCEDURE — 85025 COMPLETE CBC W/AUTO DIFF WBC: CPT

## 2021-01-18 PROCEDURE — 93010 ELECTROCARDIOGRAM REPORT: CPT | Performed by: INTERNAL MEDICINE

## 2021-01-18 PROCEDURE — 2580000003 HC RX 258: Performed by: STUDENT IN AN ORGANIZED HEALTH CARE EDUCATION/TRAINING PROGRAM

## 2021-01-18 PROCEDURE — 95816 EEG AWAKE AND DROWSY: CPT | Performed by: PSYCHIATRY & NEUROLOGY

## 2021-01-18 PROCEDURE — 6360000002 HC RX W HCPCS: Performed by: STUDENT IN AN ORGANIZED HEALTH CARE EDUCATION/TRAINING PROGRAM

## 2021-01-18 PROCEDURE — 82947 ASSAY GLUCOSE BLOOD QUANT: CPT

## 2021-01-18 PROCEDURE — 97535 SELF CARE MNGMENT TRAINING: CPT

## 2021-01-18 PROCEDURE — 6370000000 HC RX 637 (ALT 250 FOR IP): Performed by: STUDENT IN AN ORGANIZED HEALTH CARE EDUCATION/TRAINING PROGRAM

## 2021-01-18 PROCEDURE — 84100 ASSAY OF PHOSPHORUS: CPT

## 2021-01-18 PROCEDURE — 2580000003 HC RX 258: Performed by: INTERNAL MEDICINE

## 2021-01-18 PROCEDURE — 99232 SBSQ HOSP IP/OBS MODERATE 35: CPT | Performed by: NURSE PRACTITIONER

## 2021-01-18 PROCEDURE — 99233 SBSQ HOSP IP/OBS HIGH 50: CPT | Performed by: INTERNAL MEDICINE

## 2021-01-18 PROCEDURE — 95708 EEG WO VID EA 12-26HR UNMNTR: CPT

## 2021-01-18 PROCEDURE — 6370000000 HC RX 637 (ALT 250 FOR IP): Performed by: INTERNAL MEDICINE

## 2021-01-18 PROCEDURE — 80076 HEPATIC FUNCTION PANEL: CPT

## 2021-01-18 PROCEDURE — 97530 THERAPEUTIC ACTIVITIES: CPT

## 2021-01-18 PROCEDURE — 36415 COLL VENOUS BLD VENIPUNCTURE: CPT

## 2021-01-18 PROCEDURE — 97167 OT EVAL HIGH COMPLEX 60 MIN: CPT

## 2021-01-18 PROCEDURE — 93005 ELECTROCARDIOGRAM TRACING: CPT | Performed by: STUDENT IN AN ORGANIZED HEALTH CARE EDUCATION/TRAINING PROGRAM

## 2021-01-18 PROCEDURE — 95816 EEG AWAKE AND DROWSY: CPT

## 2021-01-18 PROCEDURE — 97110 THERAPEUTIC EXERCISES: CPT

## 2021-01-18 PROCEDURE — 99222 1ST HOSP IP/OBS MODERATE 55: CPT | Performed by: PSYCHIATRY & NEUROLOGY

## 2021-01-18 PROCEDURE — 80048 BASIC METABOLIC PNL TOTAL CA: CPT

## 2021-01-18 PROCEDURE — 70450 CT HEAD/BRAIN W/O DYE: CPT

## 2021-01-18 PROCEDURE — 6360000002 HC RX W HCPCS: Performed by: INTERNAL MEDICINE

## 2021-01-18 PROCEDURE — 83735 ASSAY OF MAGNESIUM: CPT

## 2021-01-18 PROCEDURE — 2060000000 HC ICU INTERMEDIATE R&B

## 2021-01-18 RX ORDER — MAGNESIUM SULFATE 1 G/100ML
1 INJECTION INTRAVENOUS ONCE
Status: COMPLETED | OUTPATIENT
Start: 2021-01-18 | End: 2021-01-18

## 2021-01-18 RX ADMIN — MAGNESIUM SULFATE HEPTAHYDRATE 1 G: 1 INJECTION, SOLUTION INTRAVENOUS at 10:09

## 2021-01-18 RX ADMIN — METOPROLOL TARTRATE 12.5 MG: 25 TABLET ORAL at 21:07

## 2021-01-18 RX ADMIN — INSULIN LISPRO 4 UNITS: 100 INJECTION, SOLUTION INTRAVENOUS; SUBCUTANEOUS at 06:24

## 2021-01-18 RX ADMIN — CEFTRIAXONE SODIUM 1 G: 1 INJECTION, POWDER, FOR SOLUTION INTRAMUSCULAR; INTRAVENOUS at 15:05

## 2021-01-18 RX ADMIN — INSULIN LISPRO 4 UNITS: 100 INJECTION, SOLUTION INTRAVENOUS; SUBCUTANEOUS at 00:22

## 2021-01-18 RX ADMIN — METOPROLOL TARTRATE 25 MG: 25 TABLET ORAL at 08:18

## 2021-01-18 RX ADMIN — Medication 1500 MG: at 00:37

## 2021-01-18 RX ADMIN — ACETAMINOPHEN 650 MG: 325 TABLET ORAL at 08:38

## 2021-01-18 RX ADMIN — SERTRALINE 100 MG: 50 TABLET, FILM COATED ORAL at 08:19

## 2021-01-18 RX ADMIN — AMLODIPINE BESYLATE 10 MG: 5 TABLET ORAL at 08:18

## 2021-01-18 RX ADMIN — SODIUM CHLORIDE, PRESERVATIVE FREE 10 ML: 5 INJECTION INTRAVENOUS at 08:20

## 2021-01-18 RX ADMIN — INSULIN LISPRO 6 UNITS: 100 INJECTION, SOLUTION INTRAVENOUS; SUBCUTANEOUS at 18:18

## 2021-01-18 RX ADMIN — Medication 1500 MG: at 12:40

## 2021-01-18 RX ADMIN — LOSARTAN POTASSIUM 25 MG: 25 TABLET, FILM COATED ORAL at 08:18

## 2021-01-18 RX ADMIN — ENOXAPARIN SODIUM 40 MG: 40 INJECTION SUBCUTANEOUS at 08:18

## 2021-01-18 RX ADMIN — SODIUM CHLORIDE: 4.5 INJECTION, SOLUTION INTRAVENOUS at 18:18

## 2021-01-18 RX ADMIN — CLONIDINE HYDROCHLORIDE 0.1 MG: 0.1 TABLET ORAL at 08:18

## 2021-01-18 RX ADMIN — INSULIN GLARGINE 30 UNITS: 100 INJECTION, SOLUTION SUBCUTANEOUS at 08:18

## 2021-01-18 RX ADMIN — SODIUM CHLORIDE, PRESERVATIVE FREE 10 ML: 5 INJECTION INTRAVENOUS at 21:07

## 2021-01-18 RX ADMIN — ATORVASTATIN CALCIUM 80 MG: 80 TABLET, FILM COATED ORAL at 21:06

## 2021-01-18 RX ADMIN — INSULIN LISPRO 4 UNITS: 100 INJECTION, SOLUTION INTRAVENOUS; SUBCUTANEOUS at 13:05

## 2021-01-18 RX ADMIN — ASPIRIN 81 MG: 81 TABLET, CHEWABLE ORAL at 08:18

## 2021-01-18 ASSESSMENT — PAIN SCALES - GENERAL
PAINLEVEL_OUTOF10: 7
PAINLEVEL_OUTOF10: 7
PAINLEVEL_OUTOF10: 0

## 2021-01-18 ASSESSMENT — PAIN DESCRIPTION - PROGRESSION: CLINICAL_PROGRESSION: RESOLVED

## 2021-01-18 ASSESSMENT — PAIN SCALES - WONG BAKER
WONGBAKER_NUMERICALRESPONSE: 0

## 2021-01-18 ASSESSMENT — PAIN DESCRIPTION - ONSET: ONSET: GRADUAL

## 2021-01-18 ASSESSMENT — PAIN DESCRIPTION - PAIN TYPE: TYPE: ACUTE PAIN

## 2021-01-18 ASSESSMENT — PAIN DESCRIPTION - LOCATION: LOCATION: NECK;SHOULDER

## 2021-01-18 NOTE — PROGRESS NOTES
Comprehensive Nutrition Assessment    Type and Reason for Visit:  Reassess    Nutrition Recommendations/Plan:    - Suggest increasing TF goal rate to 65 mL/hr to better meet estimated nutrient needs for healing. TF will provide 1872 kcals, 94 gm protein. - When able to d/c IVF, consider continuing current free water flushes (250 mL q4 hours). Nutrition Assessment:  Pt tolerating TF well at goal per RN. Receiving free water flushes (250 mL q4) and 1/2 NS running at 50 mL/hr. + 12.2 kg, 11 L since admission noted (dehydrated at admission). Na+ improving. Malnutrition Assessment:  Malnutrition Status:   At risk for malnutrition (Comment)      Estimated Daily Nutrient Needs:  Energy (kcal):  6185-5571 kcals/day; Weight Used for Energy Requirements:  Usual     Protein (g):  1.8-2 gm/kg = 100-110 gm/day; Weight Used for Protein Requirements:  Ideal        Fluid (ml/day):  30 mL/kg = 3150 mL/day; Method Used for Fluid Requirements:  (30)      Nutrition Related Findings:  Labs: Na+ 142, glucose 233-300    Wounds:  Open Wounds, Pressure Injury, Stage III(Open stage 3 pressure wound on coccyx, healing stage 3 pressure wound on R buttock per wound care note)       Current Nutrition Therapies:    Current Tube Feeding (TF) Orders:  · Feeding Route: PEG  · Formula: Diabetic  · Schedule: Continuous  · Water Flushes: 250 mL q4 hours  · Current TF & Flush Orders Provides: Glucerna at 55 mL/hr = 1584 kcals, 79 gm protein  · Goal TF & Flush Orders Provides: Glucerna at 65 mL/hr = 1872 kcals, 94 gm protein      Anthropometric Measures:  · Height: 5' 4\" (162.6 cm)  · Current Body Weight: 241 lb 2.9 oz (109.4 kg)   · Admission Body Weight: 214 lb 4.6 oz (97.2 kg)    · Usual Body Weight: 231 lb (104.8 kg)(stated weight on 12/15/2020)     · Ideal Body Weight: 120 lbs; % Ideal Body Weight 201 %   · BMI: 41.4  · BMI Categories: Obese Class 3 (BMI 40.0 or greater)       Nutrition Diagnosis: · Inadequate oral intake related to cognitive or neurological impairment, swallowing difficulty as evidenced by NPO or clear liquid status due to medical condition, nutrition support - enteral nutrition    · Increased nutrient needs related to (healing) as evidenced by (stage III pressure wounds)      Nutrition Interventions:   Food and/or Nutrient Delivery:  Modify Tube Feeding  Nutrition Education/Counseling:  No recommendation at this time   Coordination of Nutrition Care:  Continue to monitor while inpatient    Goals:  meet % of estimated nutrient needs       Nutrition Monitoring and Evaluation:   Behavioral-Environmental Outcomes:  None Identified   Food/Nutrient Intake Outcomes:  Enteral Nutrition Intake/Tolerance  Physical Signs/Symptoms Outcomes:  Skin, Weight, Biochemical Data, Nutrition Focused Physical Findings, Fluid Status or Edema     Discharge Planning:    Enteral Nutrition     Electronically signed by Nancy Martinez MS, RD, LD on 1/18/21 at 12:16 PM EST    Contact: [phone]

## 2021-01-18 NOTE — PROGRESS NOTES
Pulmonary Physicians in to see pt, and pt not responding to them, appears very sleepy  And unresponsive. , while in there pt woke up and returned to baseline. Alert and oriented, answering questions appropriately. Stat Head CT ordered. Will continue to monitor.

## 2021-01-18 NOTE — CONSULTS
NEUROLOGY INPATIENT CONSULT NOTE    1/18/2021         History is obtained mostly from the patient and the medical record and from the caregivers. Chart is reviewed and patient is examined. Chief Complaint:   Transient change in mentation      History of Present Illness: The patient is  78 y.o. 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 CT head was done and incidental hemorrhagic transformation of the right MCA infarct was found. Neuro ICU was consulted during that time and recommended to start aspirin and Plavix from 1/20 and given her intracranial arthro she would require dual antiplatelet therapy for 90 days. Stroke team was consulted because as per the RN patient was doing well at 8 AM in the morning but around 9:45 there was a transient change in mentation and she was briefly unresponsive but within seconds return to her usual baseline.              Date last known well: 1/18/2020  Time last known well: 8 am  NIHSS:16  Blood Glucose: 292  Blood Pressure: 121/64     Currently on aspirin 81 mg started on 1/16 and Lipitor 80 mg and Lovenox 40mg started on 1/13      Prior imaging:  CT head without contrast 12/17/2020    Impression   1. Interval increased extent and interval evolution of large right MCA acute   infarction with associated cerebral edema and partial effacement of the right   lateral ventricle. 2. Interval associated development of minimal leftward midline shift   measuring 2 mm at the level of the septum pellucidum. 3. No evidence of acute intracranial hemorrhage.    4. Moderate cerebral white matter chronic microvascular ischemic disease           CT head without contrast 12/26/2020  Impression   Redemonstration of a subacute right MCA territory infarction with improved vasogenic edema compared with 12/17/2020 and resolution of the midline shift. No intracranial hemorrhage. CT head without contrast on 1/13/2020  Impression   1. Redemonstration of extensive right MCA territory infarct.  No new infarct. 2. New or increasing hyperdense serpiginous gyral hyperdensities along   posterior aspect of the infarct in the parietal and to a less and extent   temporal lobe and also along the margins of the right caudate nucleus is   suggestive of some blood products.  This may be further assessed with MRI if   deemed clinically necessary. .         CT head without contrast on 1/16/2021    Impression   No acute intracranial abnormality.       Redemonstration of linear areas of increased density along the posterior   margin of prior right MCA infarct. .  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 Moderate volume acute ischemic infarct in the right middle cerebral artery   territory.  No intracranial hemorrhage or mass effect. 2. Moderate volume penumbra in the peripheral right middle cerebral artery   territory based on perfusion images. 3. Acute nearly occlusive thrombosis of the M1 and M2 segments right middle   cerebral artery. 4. Severe stenosis of the M1 segment left middle cerebral artery. 5. Severe stenosis of the A1 segment left anterior cerebral artery. 6. 75% stenosis of the proximal left internal carotid secondary to vessel   kinking and superimposed atherosclerotic plaque. 7. 40% stenosis of the proximal right internal carotid artery. 8. Moderate stenosis of the V4 segment left vertebral artery.          On examination patient vitals are: /71   Pulse 57   Temp 98.3 °F (36.8 °C) (Oral)   Resp 19   Ht 5' 4\" (1.626 m)   Wt 241 lb 2.9 oz (109.4 kg)   LMP  (LMP Unknown)   SpO2 98%   BMI 41.40 kg/m²     Blood pressure range: Systolic (41YCF), VZ , Min:114 , DIS:503   ; Diastolic (43XMS), PXT:14, Min:71, Max:87        No current facility-administered medications on file prior to encounter.       Current Outpatient Medications on File Prior to Encounter   Medication Sig Dispense Refill    losartan (COZAAR) 25 MG tablet Take 1 tablet by mouth daily 60 tablet 3    metoprolol tartrate 75 MG TABS Take 75 mg by mouth 2 times daily 60 tablet 3    docusate (COLACE) 50 MG/5ML liquid Take 10 mLs by mouth daily 50 mL 3    rivastigmine (EXELON) 9.5 MG/24HR Place 1 patch onto the skin daily      QUEtiapine (SEROQUEL) 25 MG tablet Take 25 mg by mouth 2 times daily      Calcium Carbonate-Vitamin D (OYSTER SHELL CALCIUM/D) 500-200 MG-UNIT TABS TAKE ONE TABLET BY MOUTH TWO TIMES A DAY 60 tablet 3    glimepiride (AMARYL) 2 MG tablet Take 1 tablet by mouth every morning 30 tablet 3    linagliptin (TRADJENTA) 5 MG tablet Take 1 tablet by mouth daily 30 tablet 10    cloNIDine (CATAPRES) 0.1 MG tablet Take 1 tablet by mouth daily 30 tablet 3    amLODIPine (NORVASC) 10 MG tablet Take 1 tablet by mouth daily 30 tablet 5    furosemide (LASIX) 40 MG tablet Take 1 tablet by mouth daily 30 tablet 10    atorvastatin (LIPITOR) 80 MG tablet TAKE 1 TABLET BY MOUTH DAILY 30 tablet 5    sertraline (ZOLOFT) 100 MG tablet Take 1 tablet by mouth daily 30 tablet 5    clopidogrel (PLAVIX) 75 MG tablet Take 1 tablet by mouth daily 30 tablet 11    aspirin (RA ASPIRIN EC) 81 MG EC tablet Take 1 tablet by mouth daily 30 tablet 11    Incontinence Supply Disposable (INCONTINENCE BRIEF LARGE) MISC Use 4-5/day as needed 150 each 11  glucose blood VI test strips (ASCENSIA AUTODISC VI;ONE TOUCH ULTRA TEST VI) strip 1 each by In Vitro route 3 times daily As needed. 100 each 5    albuterol-ipratropium (COMBIVENT RESPIMAT)  MCG/ACT AERS inhaler Inhale 1 puff into the lungs every 6 hours as needed for Wheezing 1 Inhaler 5    Lancets MISC Use 1 -2 times daily Insulin dependent diabetes mellitus 50 each 11     Allergies: Magdiel Rain is allergic to bactrim; penicillins; and tylenol [acetaminophen].     Past Medical History:   Diagnosis Date    Allergic rhinitis     Anxiety 10/25/2016    Asthma     CAD (coronary artery disease)     s/p stents RCA and LAD 2009,    Chronic back pain     Congestive heart failure ([address]     Depression     Headache(784.0)     Hiatal hernia     Hypercholesteremia 2/21/2012    Hypertension     Kidney stones     years ago    Obesity     Osteoarthritis     Postlaminectomy syndrome 6/6/2012    Type II or unspecified type diabetes mellitus without mention of complication, not stated as uncontrolled     Unspecified sleep apnea     Urinary incontinence        Past Surgical History:   Procedure Laterality Date    CARDIAC CATHETERIZATION  01/2013    patent stents    COLONOSCOPY  09/2015    normal    CORONARY ANGIOPLASTY WITH STENT PLACEMENT  1012-16    stents x 3    GASTROSTOMY TUBE PLACEMENT  12/28/2020    EGD PEG TUBE PLACEMENT    GASTROSTOMY TUBE PLACEMENT N/A 12/28/2020    EGD PEG TUBE PLACEMENT performed by Darrell Schwartz MD at [address]      left knee    KNEE SURGERY  10/21/2013    rt knee synvisc injection     KNEE SURGERY  12/02/2013    knee synvisc injection rt #2    KNEE SURGERY  12/09/2013    rt knee synvisc inj    LUMBAR SPINE SURGERY  2007    NERVE BLOCK  04/23/2012    Right MBNB L3, L4, L5    NERVE BLOCK  05/22/2012    Right MBNB L3, L4, and L5     NERVE BLOCK  01/14/2013    Right knee injection #1 - Synvisc    NERVE BLOCK  01/21/2013 CEREBELLAR FUNCTION:  Intact fine motor control over upper limbs and lower limbs   REFLEX FUNCTION:  Reflex 2+ symmetric b/l in upper and lower extremities, clonus-ve, plantar downgoing b/l   STATION and GAIT  deferred                  INITIAL NIH STROKE SCALE     Time Performed:  11 AM     Administer stroke scale items in the order listed. Record performance in each category after each subscale exam. Do not go back and change scores. Follow directions provided for each exam technique. Scores should reflect what the patient does, not what the clinician thinks the patient can do. The clinician should record answers while administering the exam and work quickly. Except where indicated, the patient should not be coached (i.e., repeated requests to patient to make a special effort).      NIH Stroke Scale Total (if not done complete detailed one below):    1a.  Level of consciousness:  0 - alert; keenly responsive  1b. Level of consciousness questions:  0 - answers both questions correctly  1c. Level of consciousness questions:  0 - performs both tasks correctly  2. Best Gaze:  2 - forced deviation, or total gaze paresis not overcome by oculocephalic maneuver  3. Visual:  2 - complete hemianopia  4. Facial Palsy:  2 - partial paralysis (total or near total paralysis of the lower face)  5a. Motor left arm:  4 - no movement  5b. Motor right arm:  0 - no drift, limb holds 90 (or 45) degrees for full 10 seconds  6a. Motor left le - no movement  6b. Motor right le - some effort against gravity; leg falls to bed by 5 seconds but has some effort against gravity  7. Limb Ataxia:  0 - absent  8.     Sensory:  0 - normal; no sensory loss 9.    Best Language:  1 - mild to moderate aphasia; some obvious loss of fluency or facility of comprehension without significant limitation on ideas expressed or form of expression. Reduction of speech and/or comprehension, however, makes conversation about provided materials difficult or impossible. For example, in conversation about provided materials, examiner can identify picture or naming card content from patient's response. 10.  Dysarthria:  1 - mild to moderate, patient slurs at least some words and at worst, can be understood with some difficulty  11. Extinction and Inattention:  2 - profound vanessa-inattention or vanessa- inattention to more than one modality.   Does not recognize own hand or orients only to one side of space      Total-20        Patient exam and NIHSS is consistent with prior exam.      Data:    Lab Results:     Lab Results   Component Value Date    CHOL 188 04/05/2019    LDLCHOLESTEROL 159 (H) 12/16/2020    HDL 48 12/16/2020    TRIG 177 (H) 04/05/2019    ALT 64 (H) 01/18/2021    AST 95 (H) 01/18/2021    TSH 1.38 12/19/2018    INR 1.0 12/15/2020    LABA1C 6.8 (H) 12/16/2020    LABMICR 39 (H) 12/19/2018     Hematology:  Recent Labs     01/16/21  0520 01/17/21  0719 01/18/21  0809   WBC 6.7 6.9 7.3   HGB 9.2* 10.2* 9.6*   HCT 31.4* 33.8* 31.9*   * 147 126*     Chemistry:  Recent Labs     01/16/21  0520 01/17/21  0719 01/18/21  0809   * 146* 142   K 3.7 3.8 3.9   * 112* 110*   CO2 31 28 23   GLUCOSE 313* 285* 292*   BUN 21 14 11   CREATININE 0.38* 0.35* 0.30*   MG 1.7 1.8 1.6   CALCIUM 8.6 9.0 9.0     Recent Labs     01/16/21  0520 01/17/21  0719 01/18/21  0809   PROT 5.1* 5.6* 5.2*   LABALBU 2.3* 2.5* 2.2*   AST 25 35* 95*   ALT 18 24 64*       No results found for: PHENYTOIN, PHENYTOIN, VALPROATE, CBMZ                Assessment Recommendations:  ASSESSMENT RECOMMENDATIONS:  Transient change in mentation waxing and waning lasting few seconds to a minute Other comorbidities includes recent right MCA stroke, hypertension, DM type II  differential includes seizure versus less likely TIA/stroke  Would recommend to obtain stat CT head without contrast as well as stat EEG to rule out any seizures  with AED recommendation if needed   Neurology will follow as consult          Erick Fortune MD  Neurology Resident PGY-4  1/18/2021 at 1:17 PM

## 2021-01-18 NOTE — PROGRESS NOTES
Pt return from CT scan, brief changed pedro care and rodrigues care complete. Pt alert and oriented, Daughter [employer] at bedside. Pt appropriately answering and asking appropriate questions. Denies any pain or discomfort at this time. Call light in reach. Will continue to monitor.

## 2021-01-18 NOTE — CONSULTS
 vancomycin (VANCOCIN) intermittent dosing (placeholder)   Other RX Placeholder    vancomycin  1,500 mg Intravenous Q12H    atorvastatin  80 mg Oral Daily    metoprolol tartrate  25 mg Oral BID    sertraline  100 mg Oral Daily    sodium chloride flush  10 mL Intravenous 2 times per day    enoxaparin  40 mg Subcutaneous Daily     Continuous Infusions:   dextrose      sodium chloride 50 mL/hr at 01/16/21 1843     PRN Meds:.hydrALAZINE, sodium chloride flush, promethazine **OR** ondansetron, polyethylene glycol, acetaminophen **OR** acetaminophen, glucose, dextrose, glucagon (rDNA), dextrose    Past Medical History   has a past medical history of Allergic rhinitis, Anxiety, Asthma, CAD (coronary artery disease), Chronic back pain, Congestive heart failure ([address] Depression, Headache(784.0), Hiatal hernia, Hypercholesteremia, Hypertension, Kidney stones, Obesity, Osteoarthritis, Postlaminectomy syndrome, Type II or unspecified type diabetes mellitus without mention of complication, not stated as uncontrolled, Unspecified sleep apnea, and Urinary incontinence.     Social History:  Social History     Tobacco History     Smoking Status  Former Smoker Quit date  3/28/2013 Smoking Frequency  0.5 packs/day for 0 years (0 pk yrs)    Smokeless Tobacco Use  Never Used          Alcohol History     Alcohol Use Status  Yes Drinks/Week  1 Cans of beer per week Amount  1.0 standard drinks of alcohol/wk Comment  occasionally          Drug Use     Drug Use Status  No          Sexual Activity     Sexually Active  Not Asked                  Family History:      Problem Relation Age of Onset    Diabetes Mother     Cancer Father     High Blood Pressure Sister     High Blood Pressure Brother          OBJECTIVE  BP (!) 168/81   Pulse 85   Temp 98.3 °F (36.8 °C) (Oral)   Resp 19   Ht 5' 4\" (1.626 m)   Wt 241 lb 2.9 oz (109.4 kg)   LMP  (LMP Unknown)   SpO2 100%   BMI 41.40 kg/m²     ROS CONSTITUTIONAL: negative for fatigue and malaise   EYES: negative for double vision and photophobia    HEENT: negative for tinnitus and sore throat   RESPIRATORY: negative for cough, shortness of breath   CARDIOVASCULAR: negative for chest pain, palpitations   GASTROINTESTINAL: negative for nausea, vomiting   GENITOURINARY: negative for incontinence   MUSCULOSKELETAL: negative for neck or back pain   NEUROLOGICAL: negative for seizures   PSYCHIATRIC: negative for agitated     Review of systems otherwise negative.               PHYSICAL EXAM    GENERAL  Appears comfortable and in no distress, dysarthric with left-sided neglect   HEENT  NC/ AT   HEART  S1 and S2 heard; palpation of pulses: radial pulse    NECK  Supple and no bruits heard   MENTAL STATUS:  Alert with eyes open, answering question appropriately and following commands, oriented to person and place   CRANIAL NERVES: II     -     pupils equally and reactive to light, right-sided gaze preference  Left visual field cut  Left-sided facial droop   MOTOR FUNCTION: RUE: Significant for good strength of grade 5/5 in proximal and distal muscle groups   LUE: Significant for good strength of grade 1/5 in proximal and distal muscle groups   RLE: Significant for good strength of grade 3/5 in proximal and distal muscle groups   LLE: Significant for good strength of grade 1/5 in proximal and distal muscle groups         SENSORY FUNCTION:  Normal touch, normal pin, normal vibration, normal proprioception   CEREBELLAR FUNCTION:  Intact fine motor control over upper limbs and lower limbs   REFLEX FUNCTION:  Reflex 2+ symmetric b/l in upper and lower extremities, clonus-ve, plantar downgoing b/l   STATION and GAIT  deferred             INITIAL NIH STROKE SCALE    Time Performed:  11 AM Administer stroke scale items in the order listed. Record performance in each category after each subscale exam. Do not go back and change scores. Follow directions provided for each exam technique. Scores should reflect what the patient does, not what the clinician thinks the patient can do. The clinician should record answers while administering the exam and work quickly. Except where indicated, the patient should not be coached (i.e., repeated requests to patient to make a special effort). NIH Stroke Scale Total (if not done complete detailed one below):    1a.  Level of consciousness:  0 - alert; keenly responsive  1b. Level of consciousness questions:  0 - answers both questions correctly  1c. Level of consciousness questions:  0 - performs both tasks correctly  2. Best Gaze:  2 - forced deviation, or total gaze paresis not overcome by oculocephalic maneuver  3. Visual:  2 - complete hemianopia  4. Facial Palsy:  2 - partial paralysis (total or near total paralysis of the lower face)  5a. Motor left arm:  4 - no movement  5b. Motor right arm:  0 - no drift, limb holds 90 (or 45) degrees for full 10 seconds  6a. Motor left le - no movement  6b. Motor right le - some effort against gravity; leg falls to bed by 5 seconds but has some effort against gravity  7. Limb Ataxia:  0 - absent  8. Sensory:  0 - normal; no sensory loss  9. Best Language:  1 - mild to moderate aphasia; some obvious loss of fluency or facility of comprehension without significant limitation on ideas expressed or form of expression. Reduction of speech and/or comprehension, however, makes conversation about provided materials difficult or impossible. For example, in conversation about provided materials, examiner can identify picture or naming card content from patient's response. 10.  Dysarthria:  1 - mild to moderate, patient slurs at least some words and at worst, can be understood with some difficulty  11. Extinction and Inattention:  2 - profound vanessa-inattention or vanessa- inattention to more than one modality.   Does not recognize own hand or orients only to one side of space     Total-20      Modified Leroy Score Scale:     [] Zero: No symptoms at all   [] 1: No significant disability despite symptoms; able to carry out all usual duties and activities   [] 2: Slight disability; unable to carry out all previous activities, but able to look after own affairs without assistance   [] 3:Moderate disability; requiring some help, but able to walk without assistance   [x] 4: Moderately severe disability; unable to walk and attend to bodily needs without assistance   [] 5:Severe disability; bedridden, incontinent and requiring constant nursing care and attention            ASSESSMENT RECOMMENDATIONS:  Transient change in mentation lasting few seconds to a minute  Other comorbidities includes recent right MCA stroke, hypertension, DM type II   Would recommend to obtain stat CT head without contrast as well as stat EEG to rule out any seizures and neurology to follow as consult        Discussed with Dr. Juan M Z[facility]as [facility]  Neurology Resident,PGY-4    Electronically signed 1/18/2021 at 11:00 AM

## 2021-01-18 NOTE — PROGRESS NOTES
[facility] - Initial Consult Note    Impression :   · Extensive subacute right MCA stroke w vasogenic edema -12/26  · Acte cystitis without hematuria , E. coli   · Enterococcus septicemia 1-13-21  · Hypernatremia  · Dehydration   · Allergy to Penicillin, sulfa  · Tolerated ceftriaxone  · Rt Gluteal decubitus    Recommendations:   · EF bacteremia: Vancomycin-pharmacy to dose  · Get 2 D echo look for vegetation  · Blood cultures look for resolution  · Right buttock wound care, wound might be the initial source of septicemia  · Would benefit from a CT scan of the abdomen pelvis  · E. coli UTI: Post ceftriaxone 1/14 and 1/15-continue 3 more doses till 1/18    Medical Decision Making/Summary/Discussion:1/18/2021     ·   Infection Control Recommendations   · [place] Precautions    Antimicrobial Stewardship Recommendations     · Simplification of therapy  · Targeted therapy  · PK dosing    Coordination of Outpatient Care:   · Estimated Length of IV antimicrobials: 1-25-21  · Patient will need Midline Catheter Insertion:  TBD   · Patient will need PICC line Insertion: TBD   · Patient will need: [facility] , [facility],  [facility],  LTAC: [facility]  · Patient will need outpatient wound care: TBD. Chief complaint/reason for consultation:   · Enterococcus in blood     History of Present Illness:     INITIAL HISTORY:    Sunday Alicea is a 78y.o.-year-old  female with history of CAD, HTN, DM, hyperlipidemia, right MCA stroke with residual left-sided deficits who was initially admitted on 1/13/2021. Patient seen at the request of Dr. Donna Larios. Patient was initially seen at [facility] [address] ER because of uncontrolled hyperglycemia, hypernatremia and AMS. Of note, patient suffered a stroke in December 2020 and had a PEG placed at that time. CT of the head showed redemonstration of extensive right MCA territory infarct with no new infarct noted. Patient was started on heparin, but was not a candidate for TPA due to being out of window period. Blood cultures obtained on 1-13-21 show the growth of Enterococcus faecalis. Susceptible to ampicillin but patient is allergic to penicillins. Started on Vancomycin. Plan 10 days of Rx. CURRENT EVALUATION: 01/18/21  Still lethargic - responsive  abd soft  No rash    White count is normal 6.9  Urine culture with E. Coli  Blood culture with Enterococcus    Stage III wound right buttock      Labs, X rays reviewed: 1/18/2021    BUN: 35  Cr: 0.43    WBC: 7.5   Hb: 10.4   Plat: 139    Cultures:  Urine:  · 1/13/21: +ve E.coli sensitive to aztreonam cephalosporins and Bactrim  Blood:  · 1/13/21: +ve enterococcus faecalis (gram +ve cocci in chains and pairs)  Sputum :  ·   Wound:  ·     COVID: negative   MRSA: negative     CT Head WO Contrast:   · 1/15/21: No new  intracranial abnormality-redemonstration of linear areas of increased density along the posterior margin of the prior sub acute right MCA infarct  · 1/13/21:   1. Redemonstration of extensive right MCA territory infarct.  No new infarct. 2. New or increasing hyperdense serpiginous gyral hyperdensities along   posterior aspect of the infarct in the parietal and to a less and extent   temporal lobe and also along the margins of the right caudate nucleus is   suggestive of some blood products.  This may be further assessed with MRI if   deemed clinically necessary. .               CXR:   1/13/21: No acute cardiopulmonary findings. I have personally reviewed the past medical history, past surgical history, medications, social history, and family history, and I have updated the database accordingly.   Past Medical History:     Past Medical History:   Diagnosis Date    Allergic rhinitis     Anxiety 10/25/2016    Asthma  CAD (coronary artery disease)     s/p stents RCA and LAD 2009,    Chronic back pain     Congestive heart failure ([address]     Depression     Headache(784.0)     Hiatal hernia     Hypercholesteremia 2/21/2012    Hypertension     Kidney stones     years ago    Obesity     Osteoarthritis     Postlaminectomy syndrome 6/6/2012    Type II or unspecified type diabetes mellitus without mention of complication, not stated as uncontrolled     Unspecified sleep apnea     Urinary incontinence        Past Surgical  History:     Past Surgical History:   Procedure Laterality Date    CARDIAC CATHETERIZATION  01/2013    patent stents    COLONOSCOPY  09/2015    normal    CORONARY ANGIOPLASTY WITH STENT PLACEMENT  1012-16    stents x 3    GASTROSTOMY TUBE PLACEMENT  12/28/2020    EGD PEG TUBE PLACEMENT    GASTROSTOMY TUBE PLACEMENT N/A 12/28/2020    EGD PEG TUBE PLACEMENT performed by Gabriel Fountain MD at [address]      left knee    KNEE SURGERY  10/21/2013    rt knee synvisc injection     KNEE SURGERY  12/02/2013    knee synvisc injection rt #2    KNEE SURGERY  12/09/2013    rt knee synvisc inj    LUMBAR SPINE SURGERY  2007    NERVE BLOCK  04/23/2012    Right MBNB L3, L4, L5    NERVE BLOCK  05/22/2012    Right MBNB L3, L4, and L5     NERVE BLOCK  01/14/2013    Right knee injection #1 - Synvisc    NERVE BLOCK  01/21/2013    Right knee injection #2 - Synvisc    NERVE BLOCK  01/28/2013    Rigth knee synvisc injection #3    NERVE BLOCK Right 04/17/2017    Rt genicular nerve block.  no steroid used    OTHER SURGICAL HISTORY Right 07/14/2014    synvisc one knee injection    OTHER SURGICAL HISTORY Right 03/16/2015    synvisc one knee injection    OTHER SURGICAL HISTORY Right 06/13/2016    synvisc right knee injection    SPINE SURGERY      TONSILLECTOMY      UPPER GASTROINTESTINAL ENDOSCOPY      VENA CAVA FILTER PLACEMENT  2007    PE and B/L LE emboli       Medications:  insulin glargine  30 Units Subcutaneous Daily    losartan  25 mg Oral Daily    amLODIPine  10 mg Oral Daily    cloNIDine  0.1 mg Oral Daily    insulin lispro  0-12 Units Subcutaneous Q6H    cefTRIAXone (ROCEPHIN) IV  1 g Intravenous Q24H    aspirin  81 mg Oral Daily    docusate  100 mg Oral Daily    vancomycin (VANCOCIN) intermittent dosing (placeholder)   Other RX Placeholder    vancomycin  1,500 mg Intravenous Q12H    atorvastatin  80 mg Oral Daily    metoprolol tartrate  25 mg Oral BID    sertraline  100 mg Oral Daily    sodium chloride flush  10 mL Intravenous 2 times per day    enoxaparin  40 mg Subcutaneous Daily       Social History:     Social History     Socioeconomic History    Marital status: Legally      Spouse name: Not on file    Number of children: Not on file    Years of education: Not on file    Highest education level: Not on file   Occupational History    Not on file   Social Needs    Financial resource strain: Not on file    Food insecurity     Worry: Not on file     Inability: Not on file    Transportation needs     Medical: Not on file     Non-medical: Not on file   Tobacco Use    Smoking status: Former Smoker     Packs/day: 0.50     Years: 0.00     Pack years: 0.00     Quit date: 3/28/2013     Years since quittin.8    Smokeless tobacco: Never Used   Substance and Sexual Activity    Alcohol use:  Yes     Alcohol/week: 1.0 standard drinks     Types: 1 Cans of beer per week     Comment: occasionally    Drug use: No    Sexual activity: Not on file   Lifestyle    Physical activity     Days per week: Not on file     Minutes per session: Not on file    Stress: Not on file   Relationships    Social connections     Talks on phone: Not on file     Gets together: Not on file     Attends Protestant service: Not on file     Active member of club or organization: Not on file     Attends meetings of clubs or organizations: Not on file

## 2021-01-18 NOTE — PROGRESS NOTES
Physical Therapy  Facility/Department: [facility] NEURO  Daily Treatment Note  NAME: Gucci Ruiz  : 1950  MRN: [id number]    Date of Service: 2021    Discharge Recommendations:  Patient would benefit from continued therapy after discharge        Assessment   Body structures, Functions, Activity limitations: Decreased functional mobility ; Decreased cognition;Decreased endurance;Decreased strength;Decreased coordination  Assessment: Improvement today with dangling edge of bed with two person assist.  Follows some commands, able to demonstrate some corrections to balance. PT Education: Goals;Transfer Training;Functional Mobility Training;Plan of Care;General Safety;Precautions; Home Exercise Program  REQUIRES PT FOLLOW UP: Yes  Activity Tolerance  Activity Tolerance: Patient limited by cognitive status  Activity Tolerance: Limited by joint stiffness, hemiplegia     Patient Diagnosis(es): There were no encounter diagnoses. has a past medical history of Allergic rhinitis, Anxiety, Asthma, CAD (coronary artery disease), Chronic back pain, Congestive heart failure ([facility] [address] Depression, Headache(784.0), Hiatal hernia, Hypercholesteremia, Hypertension, Kidney stones, Obesity, Osteoarthritis, Postlaminectomy syndrome, Type II or unspecified type diabetes mellitus without mention of complication, not stated as uncontrolled, Unspecified sleep apnea, and Urinary incontinence. has a past surgical history that includes Spine surgery; Nerve Block (04/23/2012); Nerve Block (05/22/2012); Nerve Block (01/14/2013); Nerve Block (01/21/2013); Nerve Block (01/28/2013); Cardiac catheterization (01/2013); Lumbar spine surgery (2007); Vena Cava Filter Placement (2007); Tonsillectomy; joint replacement; knee surgery (10/21/2013); knee surgery (12/02/2013); knee surgery (12/09/2013); other surgical history (Right, 07/14/2014); other surgical history (Right, 03/16/2015); Upper gastrointestinal endoscopy; Colonoscopy (09/2015); other surgical history (Right, 06/13/2016); Coronary angioplasty with stent (1012-16); Nerve Block (Right, 04/17/2017); Gastrostomy tube placement (12/28/2020); and Gastrostomy tube placement (N/A, 12/28/2020). Restrictions  Restrictions/Precautions  Restrictions/Precautions: Fall Risk  Required Braces or Orthoses?: No  Position Activity Restriction  Other position/activity restrictions: left hemiplegia, on LTME  Subjective   General  Chart Reviewed: Yes  Family / Caregiver Present: No  Subjective  Subjective: Able to say her last name. Made some conversation that was appropriate, some nonsense. Pain Screening  Patient Currently in Pain: Denies  Vital Signs  Patient Currently in Pain: Denies       Orientation  Orientation  Orientation Level: Oriented to person;Disoriented to place; Disoriented to time;Disoriented to situation  Cognition   Cognition  Overall Cognitive Status: Exceptions  Arousal/Alertness: Delayed responses to stimuli;Inconsistent responses to stimuli  Following Commands: Inconsistently follows commands  Attention Span: Difficulty attending to directions  Memory: Decreased recall of recent events  Safety Judgement: Decreased awareness of need for assistance  Insights: Decreased awareness of deficits  Initiation: Requires cues for all  Sequencing: Requires cues for some  Objective   Bed mobility  Supine to Sit: Maximum assistance;2 Person assistance Sit to Supine: Maximum assistance;2 Person assistance           Balance  Standing - Static: Poor  Standing - Dynamic: Poor;-  Comments: Sitting balance fluctuates after dependently positioned from best of fair - (close guarding with UE support) to poor due to needing mod A to right self and reposition back in midline  Exercises  Comments: PROM BLEs completed ankles, knees, hips. Stiff to hip flexion, knee flexion. Goals  Short term goals  Time Frame for Short term goals: 14 visits  Short term goal 1: Prevent contractures of LUE/LE through ROM and stretching  Short term goal 2: Pt to follow most commands for active exercise RUE/LE  Short term goal 3: Pt to sit EOB MAX A +2. STG3 met. UPdate STG 3 to Sit EOB min A. Short term goal 4: New STG 4: Sit to/from stand with mod A. Patient Goals   Patient goals : Unable to state.     Plan    Plan  Times per week: 3-4x/week  Current Treatment Recommendations: Strengthening, ROM, Cognitive Reorientation, Functional Mobility Training, Transfer Training, Safety Education & Training, Endurance Training, Home Exercise Program, Positioning, Patient/Caregiver Education & Training  Safety Devices  Type of devices: Nurse notified, Chair alarm in place, Patient at risk for falls, All fall risk precautions in place, Bed alarm in place, Left in bed, Call light within reach     Therapy Time   Individual Concurrent Group Co-treatment   Time In 1525         Time Out 1607         Minutes 42         Timed Code Treatment Minutes: Elysia, PT

## 2021-01-18 NOTE — PROCEDURES
EEG REPORT       Patient: Domenic Bates Age: 78 y.o. MRN: [id number]    Date: 1/13/2021  Referring Provider: Polly Myers MD    History: This routine 30 minute scalp EEG was recorded with video- monitoring for a 78 y.o. [place] female who presented with encephalopathy. This EEG was performed to evaluate for focal and epileptiform abnormalities.      Domenic Bates   Current Facility-Administered Medications   Medication Dose Route Frequency Provider Last Rate Last Admin    metoprolol tartrate (LOPRESSOR) tablet 12.5 mg  12.5 mg Oral BID Bryce Steward MD        insulin glargine (LANTUS) injection vial 30 Units  30 Units Subcutaneous Daily Socorro Moss MD   30 Units at 01/18/21 0818    hydrALAZINE (APRESOLINE) injection 5 mg  5 mg Intravenous Q6H PRN Tomas Borrego MD   5 mg at 01/16/21 0550    losartan (COZAAR) tablet 25 mg  25 mg Oral Daily Tomas Borrego MD   25 mg at 01/18/21 0818    amLODIPine (NORVASC) tablet 10 mg  10 mg Oral Daily Alan Gerard MD   10 mg at 01/18/21 0818    cloNIDine (CATAPRES) tablet 0.1 mg  0.1 mg Oral Daily Alan Gerard MD   0.1 mg at 01/18/21 0818    insulin lispro (HUMALOG) injection vial 0-12 Units  0-12 Units Subcutaneous Q6H Roberto Soto MD   4 Units at 01/18/21 1305    aspirin chewable tablet 81 mg  81 mg Oral Daily Mary Duncan MD   81 mg at 01/18/21 0818    docusate (COLACE) 50 MG/5ML liquid 100 mg  100 mg Oral Daily Cecily Kwong MD   100 mg at 01/16/21 1001    vancomycin (VANCOCIN) intermittent dosing (placeholder)   Other RX Placeholder Tomas Borrego MD        vancomycin (VANCOCIN) 1500 mg in dextrose 5 % 250 mL IVPB  1,500 mg Intravenous Q12H Tomas Borrego MD   Stopped at 01/18/21 1505    atorvastatin (LIPITOR) tablet 80 mg  80 mg Oral Daily Tomas Borrego MD   80 mg at 01/17/21 2209    sertraline (ZOLOFT) tablet 100 mg  100 mg Oral Daily Jeanie Garvey MD   100 mg at 01/18/21 6066  sodium chloride flush 0.9 % injection 10 mL  10 mL Intravenous 2 times per day Love WAITE Patrice, DO   10 mL at 01/18/21 0820    sodium chloride flush 0.9 % injection 10 mL  10 mL Intravenous PRN Kristel Ibrahim, DO        enoxaparin (LOVENOX) injection 40 mg  40 mg Subcutaneous Daily Kristel Ibrahim, DO   40 mg at 01/18/21 0818    promethazine (PHENERGAN) tablet 12.5 mg  12.5 mg Oral Q6H PRN Kristel Ibrahim, DO        Or    ondansetron (ZOFRAN) injection 4 mg  4 mg Intravenous Q6H PRN Kristel Ibrahim, DO        polyethylene glycol (GLYCOLAX) packet 17 g  17 g Oral Daily PRN Kristel Ibrahim, DO        acetaminophen (TYLENOL) tablet 650 mg  650 mg Oral Q6H PRN Kristel Ibrahim, DO   650 mg at 01/18/21 3869    Or    acetaminophen (TYLENOL) suppository 650 mg  650 mg Rectal Q6H PRN Kristel Ibrahim, DO        glucose (GLUTOSE) 40 % oral gel 15 g  15 g Oral PRN Kristel Ibrahim, DO        dextrose 50 % IV solution  12.5 g Intravenous PRN Kristel Ibrahim, DO        glucagon (rDNA) injection 1 mg  1 mg Intramuscular PRN Kristel Ibrahim, DO        dextrose 5 % solution  100 mL/hr Intravenous PRN Kristel Ibrahim, DO        0.45 % sodium chloride infusion   Intravenous Continuous Sal Navarrete MD 50 mL/hr at 01/16/21 1843 Rate Change at 01/16/21 1843       Technical Description: This is a 21 channel digital EEG recording with time-locked video. Electrodes were placed in accordance with the 10-20 International System of Electrode Placement. Single lead EKG monitoring as well as temporal electrodes were included. The patient was not sleep deprived. This recording was obtained during wakefulness. EEG Description: The background activity over the left hemisphere consisted of 6 to 7 Hz of theta frequency of 25 to 30 µV. The activity over the right hemisphere was attenuated and consisted of low amplitude 1 to 2 Hz of polymorphic delta slowing. This asymmetry between the 2 hemispheres persisted throughout the recording. Sleep architecture was not seen. Photic stimulation did not produce any driving response. The EKG channel demonstrated a normal sinus rhythm. Interpretation  This EEG was abnormal due to disorganized and slow background in polymorphic theta frequency. Continuous right hemispheric low amplitude polymorphic delta slowing was seen. Clinical correlation  This EEG was abnormal. Disorganized and slow background suggested mild encephalopathy of non specific etiology. Superimposed attenuated activity over the right hemisphere suggested underlying structural defect. No abnormal epileptiform activity was seen.     Cornelia Sweeney MD  Diplomate, American Board of Psychiatry and Neurology  Diplomate, American Board of Clinical Neurophysiology  Diplomate, American Board of Epilepsy

## 2021-01-18 NOTE — PROGRESS NOTES
Occupational Therapy    Occupational Therapy Not Seen Note    DATE: 2021  Name: Sherrie Sawyer  : 1950  MRN: [id number]    Patient not available for Occupational Therapy due to: Other: Pt inappropriate for OT eval at this time. Pt not responding appropriately to commands. Next Scheduled Treatment: Check back PM or 2021 as appropriate.     Electronically signed by Jf Loera OT/S on 2021 at 10:19 AM

## 2021-01-18 NOTE — PROGRESS NOTES
Lionel Brandon  Internal Medicine Teaching Residency Program  Inpatient Daily Progress Note  ______________________________________________________________________________    Patient: Lisa Calender  YOB: 1950   UUM:[id number]    Acct: [de-identified]     Room: [facility]7592-  Admit date: 1/13/2021  Today's date: 01/18/21  Number of days in the hospital: 5    SUBJECTIVE   Admitting Diagnosis: <principal problem not specified>  CC: Altered mental status, hyponatremia, hyperglycemia  Pt examined at bedside. Chart & results reviewed.    -Patient was alert and oriented to time, place and person in the  morning but she had altered mentation for brief period of time while  we were rounding  -Patient had normal vitals with blood glucose in 300s  -Neurology consulted at the same time and she CT brain which does not show any acute changes  -Neuro-critical team also informed      ROS:(ROS was done in the morning before patient had altered mental status)  Constitutional:  negative for chills, fevers, sweats  Respiratory:  negative for cough, dyspnea on exertion, hemoptysis, shortness of breath, wheezing  Cardiovascular:  negative for chest pain, chest pressure/discomfort, lower extremity edema, palpitations  Gastrointestinal:  negative for abdominal pain, constipation, diarrhea, nausea, vomiting  Neurological:  negative for dizziness, headache  BRIEF HISTORY Mercedes Hinds a 78 y. o. female who [facility] department for concerns for altered mental status, hyperglycemia, and hypernatremia. Patient presented to the emergency department after she was found to be hyperglycemic on her daily glucose checks at her rehab facility.  Patient was status post 2 weeks out from a right MCA infarct, had a extended course here at [facility] neuro critical care unit, no thrombectomy or interventions were performed, patient was started on heparin and out of TPA window. Patient does have a past medical history of CAD, diabetes, hypertension, asthma, daily smoker.  On examination patient had a obvious left-sided facial droop, left-sided weakness, however intermittently was following commands and unsure if complete participation in exam occurring due to altered mentation from hypernatremia.        Nephrology was consulted and patient was started on 0.45 saline leading to some improvement of sodium level, later on free water boluses with 250 mL every 6 hours were added.  Neurology was consulted due to recent right MCA stroke with left hemiplegia to decide about aspirin/Plavix as repeat CT head was concerning for some intracranial bleed.  Blood cultures grew Enterococcus faecium and patient started on vancomycin on 1/15 to complete 10 days. Jaydon Ro is a right gluteal decubitus small wound but the source of Enterococcus bacteremia is unclear.  May need to rule out GI source of Enterococcus bacteremia.  Patient had repeat CT head 1/16 which still cannot totally exclude bleed in the brain.     Hypernatremia and patient's mentation is improving.  She is alert and oriented now. Jocelin Loyd continues to have left-sided body weakness from right MCA stroke. Jocelin Loyd is on tube feeding from PEG tube.  Hemodynamically stable.  transferred out of ICU to floors for further management.     OBJECTIVE     Vital Signs:   hydrALAZINE, 5 mg, Q6H PRN      sodium chloride flush, 10 mL, PRN      promethazine, 12.5 mg, Q6H PRN    Or      ondansetron, 4 mg, Q6H PRN      polyethylene glycol, 17 g, Daily PRN      acetaminophen, 650 mg, Q6H PRN    Or      acetaminophen, 650 mg, Q6H PRN      glucose, 15 g, PRN      dextrose, 12.5 g, PRN      glucagon (rDNA), 1 mg, PRN      dextrose, 100 mL/hr, PRN        Diagnostic Labs:  CBC:   Recent Labs     01/16/21 0520 01/17/21 0719 01/18/21  0809   WBC 6.7 6.9 7.3   RBC 3.30* 3.70* 3.45*   HGB 9.2* 10.2* 9.6*   HCT 31.4* 33.8* 31.9*   MCV 95.2 91.4 92.5   RDW 13.2 13.2 12.9   * 147 126*     BMP:   Recent Labs     01/16/21 0520 01/17/21  0719 01/18/21  0809   * 146* 142   K 3.7 3.8 3.9   * 112* 110*   CO2 31 28 23   PHOS 2.3* 2.3* 2.9   BUN 21 14 11   CREATININE 0.38* 0.35* 0.30*     BNP: No results for input(s): BNP in the last 72 hours. PT/INR: No results for input(s): PROTIME, INR in the last 72 hours. APTT: No results for input(s): APTT in the last 72 hours. CARDIAC ENZYMES: No results for input(s): CKMB, CKMBINDEX, TROPONINI in the last 72 hours. Invalid input(s): CKTOTAL;3  FASTING LIPID PANEL:  Lab Results   Component Value Date    CHOL 188 04/05/2019    HDL 48 12/16/2020    TRIG 177 (H) 04/05/2019     LIVER PROFILE:   Recent Labs     01/16/21 0520 01/17/21  0719 01/18/21  0809   AST 25 35* 95*   ALT 18 24 64*   BILIDIR 0.11 0.10 0.09   BILITOT 0.32 0.24* 0.21*   ALKPHOS 65 73 67      MICROBIOLOGY:   Lab Results   Component Value Date/Time    CULTURE DUPLICATE ORDER 72/06/0269 11:00 PM       Imaging:    Ct Head Wo Contrast    Result Date: 1/16/2021  No acute intracranial abnormality. Redemonstration of linear areas of increased density along the posterior margin of prior right MCA infarct. .  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 Redemonstration of extensive right MCA territory infarct. No new infarct. 2. New or increasing hyperdense serpiginous gyral hyperdensities along posterior aspect of the infarct in the parietal and to a less and extent temporal lobe and also along the margins of the right caudate nucleus is suggestive of some blood products. This may be further assessed with MRI if deemed clinically necessary. Gucci Obrien Chest Portable    Result Date: 1/13/2021  No acute finding or significant change. Xr Chest Portable    Result Date: 1/13/2021  No acute cardiopulmonary findings       ASSESSMENT & PLAN     Metabolic encephalopathy secondary to hypernatremia and recent large right MCA stroke  -Mentation and sodium levels improved but had transient episode of  altered mentation at one point in the morning  -Continue 0.45 saline at 50 mL/h and free water bolus 250 mL every 4 hours. .  -Repeat CT brain showed nil acute  -Neurology consulted and they are planning to do LTME  -On aspirin now and the neuro-critical want to start Plavix on 01/20  -DAPT for 90 days in total     Enterococcus faecalis bacteremia from unclear source  -Vancomycin every 12 hours for 10 days 01/20 5/2021  -Ceftriaxone 1 g IV daily stop date 01/18/2021  -CT abdomen and pelvis is not showing any evidence of infection  -ID is following the patient    E. coli UTI  Continue 3 more doses of ceftriaxone till 1/18, ID on board     Hypertension  -Resumed [id number] mg, Cozaar 25 mg and clonidine 0.1mg 3 times daily  -Metoprolol decreased to 12.5 mg daily due to bradycardia     Type 2 diabetes mellitus  -Last A1c 6.8  -Blood glucose uncontrolled after starting tube feeding.  -started on Lantus 20 units daily  -Continue cover with sliding scale insulin.     History of coronary artery disease with PCI in the past  -Continue statin, BB  -On aspirin      DVT prophylaxis: Lovenox  Diet: PEG tube feeding     OT/PT- on board Discharge planning- ongoing     Anita Courtney MD  Internal Medicine Resident, PGY-1  [address], [place]  1/18/2021, 1:38 PM

## 2021-01-18 NOTE — SIGNIFICANT EVENT
Called to bedside to evaluate patient due to increased drowsiness. At the time of my arrival, patient is alert with eyes opened, answering questions appropriately and following commands. She is oriented to person and place. Right gaze preference, left facial droop and mild dysarthria noted. She has baseline left hemiparesis but is moving her right upper and lower extremity spontaneously and to command. Looking back at previous documentation it appears this is the patient's baseline from her recent large R MCA territory infarction. Medicine resident reports patient is now improved but previously was drowsy and not as responsive, lasting at least 5 minutes. No outward signs of seizures per nursing and medicine team.  Neurology team also at the bedside. Will follow stat CT Head and obtain EEG or LTME. Consider loading with AED. Discussed with Neurology team at bedside. They will see patient as a consult unless any acute concerns on CT Head.       CEE Reza - CNP  Neuro Critical Care  01/18/21 11:00 AM

## 2021-01-18 NOTE — PROGRESS NOTES
Occupational Therapy   Occupational Therapy Initial Assessment  Date: 2021   Patient Name: Luis M Hines  MRN: [id number]     : 1950    Date of Service: 2021    Discharge Recommendations: Further therapy recommended at discharge. OT Equipment Recommendations  Equipment Needed: (CTA)    Assessment   Performance deficits / Impairments: Decreased functional mobility ; Decreased endurance;Decreased posture;Decreased ADL status; Decreased ROM; Decreased strength;Decreased vision/visual deficit; Decreased safe awareness;Decreased cognition;Decreased sensation;Decreased fine motor control;Decreased high-level IADLs;Decreased balance;Decreased coordination  Assessment: Pt agreeable to OT eval this date. Pt requires Max A X2 for bed mobility this date; able to progress RLE and trunk minimally. Pt sat EOB with max x2 assist for posture. Pt used RUE to attempt weight shifting, WB through flaccid LUE for ~15 min with max assist from writer. Pt attempted to adjust socks when prompted. Pt used RUE to mid-lower leg for simulated LB dressing. Pt requires continued OT treatment to improve independence with ADLs and functional use of affected LUE. Prognosis: Fair  OT Education: OT Role;Orientation; ADL Adaptive Strategies;Transfer Training  Patient Education: Pt educated on OT role, oriented to time and place. Pt educated on WB through LUE, bed mobility, visual tracking. Fair return. REQUIRES OT FOLLOW UP: Yes  Activity Tolerance  Activity Tolerance: Patient Tolerated treatment well;Treatment limited secondary to decreased cognition;Treatment limited secondary to medical complications (free text)  Safety Devices  Safety Devices in place: Yes  Type of devices: Bed alarm in place; All fall risk precautions in place;Call light within reach;Nurse notified; Left in bed  Restraints  Initially in place: No           Patient Diagnosis(es): There were no encounter diagnoses. has a past medical history of Allergic rhinitis, Anxiety, Asthma, CAD (coronary artery disease), Chronic back pain, Congestive heart failure ([facility] [address] Depression, Headache(784.0), Hiatal hernia, Hypercholesteremia, Hypertension, Kidney stones, Obesity, Osteoarthritis, Postlaminectomy syndrome, Type II or unspecified type diabetes mellitus without mention of complication, not stated as uncontrolled, Unspecified sleep apnea, and Urinary incontinence. has a past surgical history that includes Spine surgery; Nerve Block (04/23/2012); Nerve Block (05/22/2012); Nerve Block (01/14/2013); Nerve Block (01/21/2013); Nerve Block (01/28/2013); Cardiac catheterization (01/2013); Lumbar spine surgery (2007); Vena Cava Filter Placement (2007); Tonsillectomy; joint replacement; knee surgery (10/21/2013); knee surgery (12/02/2013); knee surgery (12/09/2013); other surgical history (Right, 07/14/2014); other surgical history (Right, 03/16/2015); Upper gastrointestinal endoscopy; Colonoscopy (09/2015); other surgical history (Right, 06/13/2016); Coronary angioplasty with stent (1012-16); Nerve Block (Right, 04/17/2017); Gastrostomy tube placement (12/28/2020); and Gastrostomy tube placement (N/A, 12/28/2020). Restrictions  Restrictions/Precautions  Restrictions/Precautions: Fall Risk  Required Braces or Orthoses?: No  Position Activity Restriction  Other position/activity restrictions: left hemiplegia, on LTME    Subjective   General  Patient assessed for rehabilitation services?: Yes  Family / Caregiver Present: No  Diagnosis: AMS, decubitus ulcer, s/p CVA  Subjective  Subjective: RN mariama'edenilson sanchez this date. Pt responsive and agreeable throughout session.   Patient Currently in Pain: Denies  Pain Assessment  Pain Assessment: 0-10  Pain Level: 0  Hawley-Baker Pain Rating: No hurt  Pre Treatment Pain Screening  Intervention List: Patient able to continue with treatment;Nurse/Physician notified    Social/Functional History Social/Functional History  Type of Home: Facility  Additional Comments: Pt unable to report social functional performance d/t cognition       Objective   Vision: (BRITNI formally d/t cognition)   Comment: Pt able to visually track from R to midline; unable to track past midline. Hearing: Within functional limits    Orientation  Overall Orientation Status: Impaired  Orientation Level: Oriented to person;Oriented to time;Disoriented to situation;Disoriented to place     Balance  Sitting Balance: Maximum assistance(Pt sat EOB with max A x2 for postural support for ~15 min. writer facilitated L elbow extension and WB through LUE for duration of session. Pt weight shifted using RUE and bedrail to attempt to maintain upright posture while seated.)  ADL  Feeding: NPO;Maximum assistance;Setup; Adaptive utensils (comment); Bringing food to mouth assist;Increased time to complete;Verbal cueing  Grooming: Maximum assistance;Setup;Verbal cueing; Increased time to complete  UE Bathing: Setup;Verbal cueing; Increased time to complete;Maximum assistance  LE Bathing: Maximum assistance; Increased time to complete;Verbal cueing;Setup  UE Dressing: Setup;Verbal cueing; Increased time to complete;Maximum assistance  LE Dressing: Setup;Verbal cueing; Increased time to complete;Maximum assistance  Toileting: Dependent/Total  Additional Comments: Pt max assist x2 for postural stability while seated. Pt demo'd reaching to lower leg with RUE when prompted to adjust socks sitting EOB. Pt able to cross midline to reach LLE with RUE. Tone RUE  RUE Tone: Normotonic  Tone LUE  LUE Tone: Hypotonic  Tone Description: Flaccid LUE  Coordination  Movements Are Fluid And Coordinated: No  Coordination and Movement description: Fine motor impairments;Gross motor impairments;Decreased accuracy; Left UE;Decreased speed     Bed mobility  Rolling to Left: Maximum assistance  Rolling to Right: Maximum assistance Supine to Sit: Maximum assistance;2 Person assistance  Sit to Supine: Maximum assistance;2 Person assistance   Comment: Pt able to progress RLE and trunk minimally, requires Max A X2 for bed mobility this date  Transfers  Transfer Comments: BRITNI d/t poor sitting balance     Cognition  Overall Cognitive Status: Exceptions  Arousal/Alertness: Delayed responses to stimuli;Inconsistent responses to stimuli  Following Commands: Inconsistently follows commands  Attention Span: Difficulty attending to directions  Memory: Decreased recall of recent events  Safety Judgement: Decreased awareness of need for assistance  Insights: Decreased awareness of deficits  Initiation: Requires cues for all  Sequencing: Requires cues for some        Sensation  Overall Sensation Status: Impaired  Light Touch: Severe deficits in the LUE        LUE PROM (degrees)  LUE PROM: WFL  LUE AROM (degrees)  LUE AROM : Exceptions  LUE General AROM: No AROM in LUE  Left Hand PROM (degrees)  Left Hand PROM: WFL  Left Hand AROM (degrees)  Left Hand AROM: Exceptions  Left Hand General AROM: No AROM in LUE  RUE AROM (degrees)  RUE AROM : WFL  Right Hand AROM (degrees)  Right Hand AROM: WFL  LUE Strength  Gross LUE Strength: Exceptions to [facility]  LUE Strength Comment: LUE grossly 0/5  RUE Strength  Gross RUE Strength: WFL  R Hand General: 4/5  RUE Strength Comment: RUE grossly 4/5       Plan   Plan  Times per week: 3-5x weekly    AM-PAC Score        AM-[facility] Inpatient Daily Activity Raw Score: 11 (01/18/21 1627)  AM-PAC Inpatient ADL T-Scale Score : 29.04 (01/18/21 1627)  ADL Inpatient CMS 0-100% Score: 70.42 (01/18/21 1627)  ADL Inpatient CMS G-Code Modifier : CL (01/18/21 1627)    Goals  Short term goals  Time Frame for Short term goals: Pt will upon discharge  Short term goal 1: demo UB ADLs at Mod A using vanessa techniques PRN  Short term goal 2: demo LB ADLs at Mod A using vanessa techniques PRN Short term goal 3: demo min A x 2 bed mobility using bedrails to increase independence with ADLs  Short term goal 4: demo WB through LUE for 5+ min during functional activity <1 VC and 1 tactile cue PRN  Short term goal 5: Demo scanning to L visual field during functional activity with <2 VC  Short term goal 6: Notify OTR to update goals as pt progresses       Therapy Time   Individual Concurrent Group Co-treatment   Time In 1541         Time Out 1607         Minutes 26         Timed Code Treatment Minutes: 8 Minutes       Tyshawn Enrique, OT/S     Cosigned by Claudette Velasco OTR/L

## 2021-01-18 NOTE — PROGRESS NOTES
[facility] - Initial Consult Note  Today's Date and Time: 1/18/2021, 8:41 AM    Impression :   · Extensive right MCA stroke   · Acte cystitis without hematuria secondary to e coli  · Enterococcus septicemia 1-13-21  · Hypernatremia  · Dehydration   · Allergy to Penicillin, sulfa  · Rt Gluteal decubitus    Recommendations:   · Vancomycin 1,250 mg IV q12h for 10 days; Stop date: 01/25/21  · Ceftriaxone 1 G IV daily; stop date: 1/18/2021  · Wound Care    Medical Decision Making/Summary/Discussion:1/18/2021     ·   Infection Control Recommendations   · Burton Precautions    Antimicrobial Stewardship Recommendations     · Simplification of therapy  · Targeted therapy  · PK dosing    Coordination of Outpatient Care:   · Estimated Length of IV antimicrobials: 1-25-21  · Patient will need Midline Catheter Insertion:  TBD   · Patient will need PICC line Insertion: TBD   · Patient will need: Home IV , [place],  SNF,  LTAC: [facility] from [facility]  · Patient will need outpatient wound care: TBD. Chief complaint/reason for consultation:   · Enterococcus in blood     History of Present Illness:     INITIAL HISTORY:    Domenic Bates is a 78y.o.-year-old  female with history of CAD, HTN, DM, hyperlipidemia, right MCA stroke with residual left-sided deficits who was initially admitted on 1/13/2021. Patient seen at the request of Dr. Hortensia Degroot. Patient was initially seen at [facility] [address] ER because of uncontrolled hyperglycemia, hypernatremia and AMS. Of note, patient suffered a stroke in December 2020 and had a PEG placed at that time. CT of the head showed redemonstration of extensive right MCA territory infarct with no new infarct noted. Patient was started on heparin, but was not a candidate for TPA due to being out of window period. Blood cultures obtained on 1-13-21 show the growth of Enterococcus faecalis. Susceptible to ampicillin but patient is allergic to penicillins. Started on Vancomycin. Plan 10 days of Rx. CURRENT EVALUATION: 01/18/21    Patient I sleeping, I cannot wake her. RN tells me that the patient was just awake conversing with her without isses. She did pull her IV out last night  No elevated temperatures  They are working on discharge plans, likely to a SNF      Labs, X rays reviewed: 1/18/2021    BUN: 35  Cr: 0.43    WBC: 7.5   Hb: 10.4   Plat: 139    Cultures:  Urine:  · 1/13/21: +ve E.coli   Blood:  · 1/13/21: +ve enterococcus faecalis (gram +ve cocci in chains and pairs)  Sputum :  ·   Wound:  ·     COVID: negative   MRSA: negative     CT Abd/pelvis  Impression:        1. No acute findings within the abdomen or pelvis.  In particular, there is   no evidence of occult abdominal or pelvic abscess.  Mild presacral edema,   possibly third-spacing. 2. Stable focal cortical atrophy in the lower pole of the left kidney. Nonobstructive left nephrolithiasis. CT Head WO Contrast:   · 1/15/21: Pending   · 1/13/21:   1. Redemonstration of extensive right MCA territory infarct.  No new infarct. 2. New or increasing hyperdense serpiginous gyral hyperdensities along   posterior aspect of the infarct in the parietal and to a less and extent   temporal lobe and also along the margins of the right caudate nucleus is   suggestive of some blood products.  This may be further assessed with MRI if   deemed clinically necessary. .               CXR:   1/13/21: No acute cardiopulmonary findings. Discussed with patient, RN, family. I have personally reviewed the past medical history, past surgical history, medications, social history, and family history, and I have updated the database accordingly.   Past Medical History:     Past Medical History:   Diagnosis Date    Allergic rhinitis     Anxiety 10/25/2016    Asthma  CAD (coronary artery disease)     s/p stents RCA and LAD 2009,    Chronic back pain     Congestive heart failure ([address]     Depression     Headache(784.0)     Hiatal hernia     Hypercholesteremia 2/21/2012    Hypertension     Kidney stones     years ago    Obesity     Osteoarthritis     Postlaminectomy syndrome 6/6/2012    Type II or unspecified type diabetes mellitus without mention of complication, not stated as uncontrolled     Unspecified sleep apnea     Urinary incontinence        Past Surgical  History:     Past Surgical History:   Procedure Laterality Date    CARDIAC CATHETERIZATION  01/2013    patent stents    COLONOSCOPY  09/2015    normal    CORONARY ANGIOPLASTY WITH STENT PLACEMENT  1012-16    stents x 3    GASTROSTOMY TUBE PLACEMENT  12/28/2020    EGD PEG TUBE PLACEMENT    GASTROSTOMY TUBE PLACEMENT N/A 12/28/2020    EGD PEG TUBE PLACEMENT performed by Marni Johnson MD at [facility]      left knee    KNEE SURGERY  10/21/2013    rt knee synvisc injection     KNEE SURGERY  12/02/2013    knee synvisc injection rt #2    KNEE SURGERY  12/09/2013    rt knee synvisc inj    LUMBAR SPINE SURGERY  2007    NERVE BLOCK  04/23/2012    Right MBNB L3, L4, L5    NERVE BLOCK  05/22/2012    Right MBNB L3, L4, and L5     NERVE BLOCK  01/14/2013    Right knee injection #1 - Synvisc    NERVE BLOCK  01/21/2013    Right knee injection #2 - Synvisc    NERVE BLOCK  01/28/2013    Rigth knee synvisc injection #3    NERVE BLOCK Right 04/17/2017    Rt genicular nerve block.  no steroid used    OTHER SURGICAL HISTORY Right 07/14/2014    synvisc one knee injection    OTHER SURGICAL HISTORY Right 03/16/2015    synvisc one knee injection    OTHER SURGICAL HISTORY Right 06/13/2016    synvisc right knee injection    SPINE SURGERY      TONSILLECTOMY      UPPER GASTROINTESTINAL ENDOSCOPY      VENA CAVA FILTER PLACEMENT  2007    PE and B/L LE emboli       Medications:  insulin glargine  30 Units Subcutaneous Daily    losartan  25 mg Oral Daily    amLODIPine  10 mg Oral Daily    cloNIDine  0.1 mg Oral Daily    insulin lispro  0-12 Units Subcutaneous Q6H    cefTRIAXone (ROCEPHIN) IV  1 g Intravenous Q24H    aspirin  81 mg Oral Daily    docusate  100 mg Oral Daily    vancomycin (VANCOCIN) intermittent dosing (placeholder)   Other RX Placeholder    vancomycin  1,500 mg Intravenous Q12H    atorvastatin  80 mg Oral Daily    metoprolol tartrate  25 mg Oral BID    sertraline  100 mg Oral Daily    sodium chloride flush  10 mL Intravenous 2 times per day    enoxaparin  40 mg Subcutaneous Daily       Social History:     Social History     Socioeconomic History    Marital status: Legally      Spouse name: Not on file    Number of children: Not on file    Years of education: Not on file    Highest education level: Not on file   Occupational History    Not on file   Social Needs    Financial resource strain: Not on file    Food insecurity     Worry: Not on file     Inability: Not on file    Transportation needs     Medical: Not on file     Non-medical: Not on file   Tobacco Use    Smoking status: Former Smoker     Packs/day: 0.50     Years: 0.00     Pack years: 0.00     Quit date: 3/28/2013     Years since quittin.8    Smokeless tobacco: Never Used   Substance and Sexual Activity    Alcohol use:  Yes     Alcohol/week: 1.0 standard drinks     Types: 1 Cans of beer per week     Comment: occasionally    Drug use: No    Sexual activity: Not on file   Lifestyle    Physical activity     Days per week: Not on file     Minutes per session: Not on file    Stress: Not on file   Relationships    Social connections     Talks on phone: Not on file     Gets together: Not on file     Attends Gnosticist service: Not on file     Active member of club or organization: Not on file     Attends meetings of clubs or organizations: Not on file Relationship status: Not on file    Intimate partner violence     Fear of current or ex partner: Not on file     Emotionally abused: Not on file     Physically abused: Not on file     Forced sexual activity: Not on file   Other Topics Concern    Not on file   Social History Narrative    Not on file       Family History:     Family History   Problem Relation Age of Onset    Diabetes Mother     Cancer Father     High Blood Pressure Sister     High Blood Pressure Brother         Allergies:   Bactrim, Penicillins, and Tylenol [acetaminophen]     Review of Systems:     Unable to perform ROS today. She is sleeping. Physical Examination :     Patient Vitals for the past 8 hrs:   BP Temp Temp src Pulse Resp SpO2   01/18/21 0820 (!) 168/81 98.3 °F (36.8 °C) Oral 85 19 100 %   01/18/21 0405 (!) 149/87 98.4 °F (36.9 °C) Oral 78 19 95 %     General Appearance: sleeping and in no apparent distress  Head:  Normocephalic, no trauma  Pulmonary/Chest: Clear to auscultation, without wheezes, rales, or rhonchi. Cardiovascular: Regular rate and rhythm without murmurs. Abdomen: Soft. Bowel sounds normal  All four Extremities: No cyanosis, clubbing, edema, or effusions. Neurologic: No gross sensory or motor deficits. Skin: Warm and dry with good turgor. No signs of peripheral arterial or venous insufficiency.     Medical Decision Making -Laboratory:   I have independently reviewed/ordered the following labs:    CBC with Differential:   Recent Labs     01/17/21  0719 01/18/21  0809   WBC 6.9 7.3   HGB 10.2* 9.6*   HCT 33.8* 31.9*    126*   LYMPHOPCT 24 19*   MONOPCT 9 8     BMP:   Recent Labs     01/16/21  0520 01/17/21  0719   * 146*   K 3.7 3.8   * 112*   CO2 31 28   BUN 21 14   CREATININE 0.38* 0.35*   MG 1.7 1.8     Hepatic Function Panel:   Recent Labs     01/16/21  0520 01/17/21  0719   PROT 5.1* 5.6*   LABALBU 2.3* 2.5*   BILIDIR 0.11 0.10   IBILI 0.21 0.14   BILITOT 0.32 0.24*   ALKPHOS 65 73 ALT 18 24   AST 25 35*     No results for input(s): RPR in the last 72 hours. No results for input(s): HIV in the last 72 hours. No results for input(s): BC in the last 72 hours. Lab Results   Component Value Date    MUCUS NOT REPORTED 01/13/2021    RBC 3.45 01/18/2021    RBC 4.21 02/24/2012    TRICHOMONAS NOT REPORTED 01/13/2021    WBC 7.3 01/18/2021    YEAST NOT REPORTED 01/13/2021    TURBIDITY CLOUDY 01/13/2021     Lab Results   Component Value Date    CREATININE 0.35 01/17/2021    GLUCOSE 285 01/17/2021    GLUCOSE 105 02/24/2012       Medical Decision Making-Imaging:     CT abd/pelvie 1/17/2021  Impression:        1. No acute findings within the abdomen or pelvis.  In particular, there is   no evidence of occult abdominal or pelvic abscess.  Mild presacral edema,   possibly third-spacing. 2. Stable focal cortical atrophy in the lower pole of the left kidney. Nonobstructive left nephrolithiasis. Medical Decision [facility]:     1/15/2021 10:00 AM - Deyvi, Mhpn Incoming Lab Results From [employer] [facility]    Specimen Information: Urine, clean catch        Component Collected Lab   Specimen Description 01/13/2021  8:40 PM [employer]   . CLEAN CATCH URINE    Special Requests 01/13/2021  8:40 PM [employer]   NOT REPORTED    Culture Abnormal  01/13/2021  8:40 PM [facility] 10 to 50,000 CFU/ML    Testing Performed By    Lab - [address] Name Director Address Valid Date Range   [facility] [address] [facility] [address] 08/30/17 0801-Present   Susceptibility    Escherichia coli (1)    Antibiotic Interpretation DONNA Status    amikacin   Final     NOT REPORTED    ampicillin Resistant  Final     >=32   RESISTANT   ampicillin-sulbactam   Final     NOT REPORTED    aztreonam Sensitive  Final     <=1   SUSCEPTIBLE   ceFAZolin Sensitive  Final     <=4   SUSCEPTIBLE ceFAZolin Sensitive Cefazolin sensitivity results can be used to predict the effectiveness of oral cephalosporins (eg. Cephalexin) in uncomplicated Urinary Tract Infections due to E. coli, K. pneumoniae, and P. mirabilis Final    cefepime   Final     NOT REPORTED    cefTRIAXone Sensitive  Final     <=1   SUSCEPTIBLE   ciprofloxacin Resistant  Final     >=4   RESISTANT   ertapenem   Final     NOT REPORTED    Confirmatory Extended Spectrum Beta-Lactamase Negative NEGATIVE Final    gentamicin Sensitive  Final     <=1   SUSCEPTIBLE   meropenem   Final     NOT REPORTED    nitrofurantoin Sensitive  Final     <=16   SUSCEPTIBLE   tigecycline   Final     NOT REPORTED    tobramycin Sensitive  Final     <=1   SUSCEPTIBLE   trimethoprim-sulfamethoxazole Sensitive  Final     <=20   SUSCEPTIBLE   piperacillin-tazobactam Sensitive  Final     <=4   SUSCEPTIBLE   Lab and Collection    Culture, Urine - 1/13/2021  1/15/2021 10:41 AM - Ledy Carnes Incoming Lab Results From [employer]    Specimen Information: Blood        Component Collected Lab   Specimen Description 01/13/2021 10:15 AM [facility]   . BLOOD    Special Requests 01/13/2021 10:15 AM [facility]   LEFT HAND 20cc    Culture Abnormal  01/13/2021 10:15 AM [address] Blood Culture Results called to and read back by: OLGA Lorenzo V AT 0617 ON 1/14/21    Culture 01/13/2021 10:15 AM [facility] FROM BOTTLE: GRAM POSITIVE COCCI IN CHAINS AND PAIRS    Culture Abnormal  01/13/2021 10:15 AM [facility]    Testing Performed By    Umu Cardona Name Director Address Valid Date Range   [address] [facility] MD [address] [facility] [address] 08/30/17 0801-Present   [address] [place] [address] LAB Dorn Holstein,  [facility] [address] 11/17/17 1521-Present   Susceptibility Enterococcus  faecalis (3)    Antibiotic Interpretation DONNA Status    ampicillin Sensitive  Final     <=2   SUSCEPTIBLE   penicillin   Final     NOT REPORTED    ciprofloxacin   Final     NOT REPORTED    erythromycin   Final     NOT REPORTED    Gentamicin, High Level Sensitive SUSCEPTIBLE Final    levofloxacin   Final     NOT REPORTED    linezolid   Final     NOT REPORTED    nitrofurantoin   Final     NOT REPORTED    Synercid   Final     NOT REPORTED    Streptomycin, Hi Level Sensitive SUSCEPTIBLE Final    tetracycline   Final     NOT REPORTED    tigecycline   Final     NOT REPORTED    vancomycin Sensitive  Final     1   SUSCEPTIBLE   Lab and Collection    Culture, Blood 1 - 1/13/2021 1/18/2021  7:25 AM - Deyvi, Ledy Incoming Lab Results From [facility]    Specimen Information: Blood        Component Collected Lab   Specimen Description 01/13/2021  9:55 AM [facility]   . BLOOD    Special Requests 01/13/2021  9:55 AM [facility]   LEFT WRIST 20cc    Culture 01/13/2021  9:55  [address]   NO GROWTH 5 DAYS    Testing Performed By    Lab - Abbreviation Name Director Address Valid Date Range   [address] Chinchilla MD [address] [facility] [address] 08/30/17 [facility] Jennifer Almazan MD [address] [address] 11/17/17 1521-Present   Lab and Collection    Culture, Blood 1 - 1/13/2021    Medical Decision Making-Other:     Note:  · Labs, medications, radiologic studies were reviewed with personal review of films  · Moderate Large amounts of data were reviewed  · Discussed with nursing Staff, Discharge planner  · Infection Control and Prevention measures reviewed  · All prior entries were reviewed  · Administer medications as ordered  · Prognosis: Fair   · Discharge planning reviewed  · Follow up as outpatient. Thank you for allowing us to participate in the care of this patient. Please call with questions.         CEE Ware CNP,     ATTESTATION:   Office: [phone]

## 2021-01-19 LAB
-: ABNORMAL
-: NORMAL
ABSOLUTE EOS #: 0.19 K/UL (ref 0–0.44)
ABSOLUTE IMMATURE GRANULOCYTE: 0.24 K/UL (ref 0–0.3)
ABSOLUTE LYMPH #: 1.56 K/UL (ref 1.1–3.7)
ABSOLUTE MONO #: 0.51 K/UL (ref 0.1–1.2)
ALBUMIN SERPL-MCNC: 2.3 G/DL (ref 3.5–5.2)
ALBUMIN/GLOBULIN RATIO: 0.7 (ref 1–2.5)
ALP BLD-CCNC: 70 U/L (ref 35–104)
ALT SERPL-CCNC: 78 U/L (ref 5–33)
AMMONIA: 23 UMOL/L (ref 11–51)
AMORPHOUS: ABNORMAL
ANION GAP SERPL CALCULATED.3IONS-SCNC: 7 MMOL/L (ref 9–17)
AST SERPL-CCNC: 94 U/L
BACTERIA: ABNORMAL
BASOPHILS # BLD: 1 % (ref 0–2)
BASOPHILS ABSOLUTE: 0.04 K/UL (ref 0–0.2)
BILIRUB SERPL-MCNC: 0.22 MG/DL (ref 0.3–1.2)
BILIRUBIN DIRECT: <0.08 MG/DL
BILIRUBIN URINE: NEGATIVE
BILIRUBIN, INDIRECT: ABNORMAL MG/DL (ref 0–1)
BUN BLDV-MCNC: 9 MG/DL (ref 8–23)
BUN/CREAT BLD: ABNORMAL (ref 9–20)
CALCIUM SERPL-MCNC: 9 MG/DL (ref 8.6–10.4)
CASTS UA: ABNORMAL /LPF (ref 0–8)
CHLORIDE BLD-SCNC: 107 MMOL/L (ref 98–107)
CO2: 25 MMOL/L (ref 20–31)
COLOR: YELLOW
CREAT SERPL-MCNC: 0.37 MG/DL (ref 0.5–0.9)
CRYSTALS, UA: ABNORMAL /HPF
CULTURE: NORMAL
DIFFERENTIAL TYPE: ABNORMAL
EKG ATRIAL RATE: 75 BPM
EKG P AXIS: 32 DEGREES
EKG P-R INTERVAL: 170 MS
EKG Q-T INTERVAL: 448 MS
EKG QRS DURATION: 92 MS
EKG QTC CALCULATION (BAZETT): 500 MS
EKG R AXIS: 48 DEGREES
EKG T AXIS: 37 DEGREES
EKG VENTRICULAR RATE: 75 BPM
EOSINOPHILS RELATIVE PERCENT: 3 % (ref 1–4)
EPITHELIAL CELLS UA: ABNORMAL /HPF (ref 0–5)
FOLATE: 12.4 NG/ML
GFR AFRICAN AMERICAN: >60 ML/MIN
GFR NON-AFRICAN AMERICAN: >60 ML/MIN
GFR SERPL CREATININE-BSD FRML MDRD: ABNORMAL ML/MIN/{1.73_M2}
GFR SERPL CREATININE-BSD FRML MDRD: ABNORMAL ML/MIN/{1.73_M2}
GLOBULIN: ABNORMAL G/DL (ref 1.5–3.8)
GLUCOSE BLD-MCNC: 227 MG/DL (ref 65–105)
GLUCOSE BLD-MCNC: 255 MG/DL (ref 65–105)
GLUCOSE BLD-MCNC: 263 MG/DL (ref 70–99)
GLUCOSE BLD-MCNC: 286 MG/DL (ref 65–105)
GLUCOSE URINE: ABNORMAL
HCT VFR BLD CALC: 33.7 % (ref 36.3–47.1)
HEMOGLOBIN: 10.1 G/DL (ref 11.9–15.1)
IMMATURE GRANULOCYTES: 4 %
KETONES, URINE: NEGATIVE
LEUKOCYTE ESTERASE, URINE: NEGATIVE
LYMPHOCYTES # BLD: 23 % (ref 24–43)
Lab: NORMAL
MAGNESIUM: 1.7 MG/DL (ref 1.6–2.6)
MCH RBC QN AUTO: 27.7 PG (ref 25.2–33.5)
MCHC RBC AUTO-ENTMCNC: 30 G/DL (ref 28.4–34.8)
MCV RBC AUTO: 92.3 FL (ref 82.6–102.9)
MONOCYTES # BLD: 8 % (ref 3–12)
MUCUS: ABNORMAL
NITRITE, URINE: NEGATIVE
NRBC AUTOMATED: 0 PER 100 WBC
OTHER OBSERVATIONS UA: ABNORMAL
PDW BLD-RTO: 12.9 % (ref 11.8–14.4)
PH UA: 7.5 (ref 5–8)
PHOSPHORUS: 2.9 MG/DL (ref 2.6–4.5)
PLATELET # BLD: ABNORMAL K/UL (ref 138–453)
PLATELET ESTIMATE: ABNORMAL
PLATELET, FLUORESCENCE: 133 K/UL (ref 138–453)
PLATELET, IMMATURE FRACTION: 5.1 % (ref 1.1–10.3)
PMV BLD AUTO: ABNORMAL FL (ref 8.1–13.5)
POTASSIUM SERPL-SCNC: 4.1 MMOL/L (ref 3.7–5.3)
PROTEIN UA: NEGATIVE
RBC # BLD: 3.65 M/UL (ref 3.95–5.11)
RBC # BLD: ABNORMAL 10*6/UL
RBC UA: ABNORMAL /HPF (ref 0–4)
REASON FOR REJECTION: NORMAL
RENAL EPITHELIAL, UA: ABNORMAL /HPF
SEG NEUTROPHILS: 62 % (ref 36–65)
SEGMENTED NEUTROPHILS ABSOLUTE COUNT: 4.13 K/UL (ref 1.5–8.1)
SODIUM BLD-SCNC: 139 MMOL/L (ref 135–144)
SPECIFIC GRAVITY UA: 1.02 (ref 1–1.03)
SPECIMEN DESCRIPTION: NORMAL
TOTAL PROTEIN: 5.4 G/DL (ref 6.4–8.3)
TRICHOMONAS: ABNORMAL
TSH SERPL DL<=0.05 MIU/L-ACNC: 2.66 MIU/L (ref 0.3–5)
TURBIDITY: CLEAR
URINE HGB: NEGATIVE
UROBILINOGEN, URINE: NORMAL
VITAMIN B-12: 1262 PG/ML (ref 232–1245)
WBC # BLD: 6.7 K/UL (ref 3.5–11.3)
WBC # BLD: ABNORMAL 10*3/UL
WBC UA: ABNORMAL /HPF (ref 0–5)
YEAST: ABNORMAL
ZZ NTE CLEAN UP: ORDERED TEST: NORMAL
ZZ NTE WITH NAME CLEAN UP: SPECIMEN SOURCE: NORMAL

## 2021-01-19 PROCEDURE — 82140 ASSAY OF AMMONIA: CPT

## 2021-01-19 PROCEDURE — 6370000000 HC RX 637 (ALT 250 FOR IP): Performed by: STUDENT IN AN ORGANIZED HEALTH CARE EDUCATION/TRAINING PROGRAM

## 2021-01-19 PROCEDURE — 94660 CPAP INITIATION&MGMT: CPT

## 2021-01-19 PROCEDURE — 2580000003 HC RX 258: Performed by: INTERNAL MEDICINE

## 2021-01-19 PROCEDURE — 84100 ASSAY OF PHOSPHORUS: CPT

## 2021-01-19 PROCEDURE — 99232 SBSQ HOSP IP/OBS MODERATE 35: CPT | Performed by: INTERNAL MEDICINE

## 2021-01-19 PROCEDURE — 84443 ASSAY THYROID STIM HORMONE: CPT

## 2021-01-19 PROCEDURE — 80048 BASIC METABOLIC PNL TOTAL CA: CPT

## 2021-01-19 PROCEDURE — 6360000002 HC RX W HCPCS: Performed by: STUDENT IN AN ORGANIZED HEALTH CARE EDUCATION/TRAINING PROGRAM

## 2021-01-19 PROCEDURE — 99233 SBSQ HOSP IP/OBS HIGH 50: CPT | Performed by: INTERNAL MEDICINE

## 2021-01-19 PROCEDURE — 82607 VITAMIN B-12: CPT

## 2021-01-19 PROCEDURE — 85055 RETICULATED PLATELET ASSAY: CPT

## 2021-01-19 PROCEDURE — 2580000003 HC RX 258: Performed by: STUDENT IN AN ORGANIZED HEALTH CARE EDUCATION/TRAINING PROGRAM

## 2021-01-19 PROCEDURE — 36415 COLL VENOUS BLD VENIPUNCTURE: CPT

## 2021-01-19 PROCEDURE — 95711 VEEG 2-12 HR UNMONITORED: CPT

## 2021-01-19 PROCEDURE — 99233 SBSQ HOSP IP/OBS HIGH 50: CPT | Performed by: PSYCHIATRY & NEUROLOGY

## 2021-01-19 PROCEDURE — 2060000000 HC ICU INTERMEDIATE R&B

## 2021-01-19 PROCEDURE — 6370000000 HC RX 637 (ALT 250 FOR IP): Performed by: INTERNAL MEDICINE

## 2021-01-19 PROCEDURE — 82746 ASSAY OF FOLIC ACID SERUM: CPT

## 2021-01-19 PROCEDURE — 82947 ASSAY GLUCOSE BLOOD QUANT: CPT

## 2021-01-19 PROCEDURE — 83735 ASSAY OF MAGNESIUM: CPT

## 2021-01-19 PROCEDURE — 80076 HEPATIC FUNCTION PANEL: CPT

## 2021-01-19 PROCEDURE — 81001 URINALYSIS AUTO W/SCOPE: CPT

## 2021-01-19 PROCEDURE — 95720 EEG PHY/QHP EA INCR W/VEEG: CPT | Performed by: PSYCHIATRY & NEUROLOGY

## 2021-01-19 PROCEDURE — 85025 COMPLETE CBC W/AUTO DIFF WBC: CPT

## 2021-01-19 RX ORDER — INSULIN GLARGINE 100 [IU]/ML
38 INJECTION, SOLUTION SUBCUTANEOUS DAILY
Status: DISCONTINUED | OUTPATIENT
Start: 2021-01-20 | End: 2021-01-21

## 2021-01-19 RX ORDER — CLONIDINE HYDROCHLORIDE 0.2 MG/1
0.2 TABLET ORAL DAILY
Status: DISCONTINUED | OUTPATIENT
Start: 2021-01-20 | End: 2021-01-23 | Stop reason: HOSPADM

## 2021-01-19 RX ADMIN — SERTRALINE 100 MG: 50 TABLET, FILM COATED ORAL at 09:03

## 2021-01-19 RX ADMIN — METOPROLOL TARTRATE 12.5 MG: 25 TABLET ORAL at 09:07

## 2021-01-19 RX ADMIN — SODIUM CHLORIDE, PRESERVATIVE FREE 10 ML: 5 INJECTION INTRAVENOUS at 09:43

## 2021-01-19 RX ADMIN — INSULIN LISPRO 6 UNITS: 100 INJECTION, SOLUTION INTRAVENOUS; SUBCUTANEOUS at 17:42

## 2021-01-19 RX ADMIN — ENOXAPARIN SODIUM 40 MG: 40 INJECTION SUBCUTANEOUS at 09:04

## 2021-01-19 RX ADMIN — AMLODIPINE BESYLATE 10 MG: 5 TABLET ORAL at 09:03

## 2021-01-19 RX ADMIN — ASPIRIN 81 MG: 81 TABLET, CHEWABLE ORAL at 09:03

## 2021-01-19 RX ADMIN — INSULIN GLARGINE 30 UNITS: 100 INJECTION, SOLUTION SUBCUTANEOUS at 09:05

## 2021-01-19 RX ADMIN — ATORVASTATIN CALCIUM 80 MG: 80 TABLET, FILM COATED ORAL at 22:00

## 2021-01-19 RX ADMIN — INSULIN LISPRO 6 UNITS: 100 INJECTION, SOLUTION INTRAVENOUS; SUBCUTANEOUS at 11:49

## 2021-01-19 RX ADMIN — SODIUM CHLORIDE: 4.5 INJECTION, SOLUTION INTRAVENOUS at 22:00

## 2021-01-19 RX ADMIN — INSULIN LISPRO 4 UNITS: 100 INJECTION, SOLUTION INTRAVENOUS; SUBCUTANEOUS at 00:52

## 2021-01-19 RX ADMIN — Medication 1500 MG: at 11:49

## 2021-01-19 RX ADMIN — Medication 1500 MG: at 00:45

## 2021-01-19 RX ADMIN — LOSARTAN POTASSIUM 25 MG: 25 TABLET, FILM COATED ORAL at 09:04

## 2021-01-19 RX ADMIN — CLONIDINE HYDROCHLORIDE 0.1 MG: 0.1 TABLET ORAL at 09:04

## 2021-01-19 ASSESSMENT — PAIN SCALES - WONG BAKER
WONGBAKER_NUMERICALRESPONSE: 0

## 2021-01-19 ASSESSMENT — PAIN SCALES - GENERAL: PAINLEVEL_OUTOF10: 0

## 2021-01-19 NOTE — PROCEDURES
LONG-TERM EEG-VIDEO [address]    Patient: Jessi Fierro  Age: 78 y.o. MRN: [id number]    Referring Physician: Ross Delaney MD  History: The patient is a 78 y.o. female who presented breakthrough seizure/encephalopathy. This long-term video-EEG monitoring study was performed to determine the nature of the patient's clinical events. The patient is on neuroactive medications.    Jessi Fierro   Current Facility-Administered Medications   Medication Dose Route Frequency Provider Last Rate Last Admin    metoprolol tartrate (LOPRESSOR) tablet 12.5 mg  12.5 mg Oral BID Mauro Mckeon MD   12.5 mg at 01/18/21 2107    insulin glargine (LANTUS) injection vial 30 Units  30 Units Subcutaneous Daily Qiana Hansen MD   30 Units at 01/18/21 0818    hydrALAZINE (APRESOLINE) injection 5 mg  5 mg Intravenous Q6H PRN Ignacio Mckee MD   5 mg at 01/16/21 0550    losartan (COZAAR) tablet 25 mg  25 mg Oral Daily Ignacio Mckee MD   25 mg at 01/18/21 0818    amLODIPine (NORVASC) tablet 10 mg  10 mg Oral Daily Alan Chapman MD   10 mg at 01/18/21 0818    cloNIDine (CATAPRES) tablet 0.1 mg  0.1 mg Oral Daily Alan Chapman MD   0.1 mg at 01/18/21 0818    insulin lispro (HUMALOG) injection vial 0-12 Units  0-12 Units Subcutaneous Q6H Gerry Lemos MD   4 Units at 01/19/21 0052    aspirin chewable tablet 81 mg  81 mg Oral Daily Harmandeep Kay Hammond MD   81 mg at 01/18/21 0818    docusate (COLACE) 50 MG/5ML liquid 100 mg  100 mg Oral Daily Ileana Oseguera MD   100 mg at 01/16/21 1001    vancomycin (VANCOCIN) intermittent dosing (placeholder)   Other RX Placeholder Ignacio Mckee MD        vancomycin (VANCOCIN) 1500 mg in dextrose 5 % 250 mL IVPB  1,500 mg Intravenous Q12H Ignacio Mckee MD   Stopped at 01/19/21 0215    atorvastatin (LIPITOR) tablet 80 mg  80 mg Oral Daily Ignacio Mckee MD   80 mg at 01/18/21 2106    sertraline (ZOLOFT) tablet 100 mg  100 mg Oral Daily Monik Christianson MD   100 mg at 01/18/21 7147    sodium chloride flush 0.9 % injection 10 mL  10 mL Intravenous 2 times per day Wes Sharma Patrice, DO   10 mL at 01/18/21 2107    sodium chloride flush 0.9 % injection 10 mL  10 mL Intravenous PRN Kristel Ibrahim, DO        enoxaparin (LOVENOX) injection 40 mg  40 mg Subcutaneous Daily Kristel Ibrahim, DO   40 mg at 01/18/21 0818    promethazine (PHENERGAN) tablet 12.5 mg  12.5 mg Oral Q6H PRN Kristel Ibrahim, DO        Or    ondansetron (ZOFRAN) injection 4 mg  4 mg Intravenous Q6H PRN Kristel Ibrahim, DO        polyethylene glycol (GLYCOLAX) packet 17 g  17 g Oral Daily PRN Kristel Ibrahim, DO        acetaminophen (TYLENOL) tablet 650 mg  650 mg Oral Q6H PRN Kristel Ibrahim, DO   650 mg at 01/18/21 4006    Or    acetaminophen (TYLENOL) suppository 650 mg  650 mg Rectal Q6H PRN Kristel Ibrahim, DO        glucose (GLUTOSE) 40 % oral gel 15 g  15 g Oral PRN Kristel Ibrahim, DO        dextrose 50 % IV solution  12.5 g Intravenous PRN Kristel Ibrahim, DO        glucagon (rDNA) injection 1 mg  1 mg Intramuscular PRN Kristel Ibrahim, DO        dextrose 5 % solution  100 mL/hr Intravenous PRN Kristel Ibrahim, DO        0.45 % sodium chloride infusion   Intravenous Continuous Mack Landa MD 50 mL/hr at 01/18/21 1818 New Bag at 01/18/21 1818     Technical Description: This is a 21-channel digital EEG recording with time-locked video. Electrodes were placed in accordance with the 10-20 International System of Electrode Placement. Single lead EKG monitoring was included. Baseline EEG Recording:  A formal baseline EEG recording was not obtained. Day 1 - 1/18/21, starting at 15:21    Interictal EEG Samples: The background activity over the left hemisphere consisted of 6 to 7 Hz of theta frequency of 25 to 30 µV.   The activity over the right hemisphere was attenuated and consisted of low amplitude 1 to 2 Hz of polymorphic delta slowing. This asymmetry between the 2 hemispheres persisted throughout the recording. During stage II sleep symmetric V waves, K complexes, and sleep spindles were seen, better formed over right hemisphere. The appearance of diffuse delta activity was observed in slow wave sleep. The EKG channel revealed no abnormalities. Ictal EEG Recording / Patient Events: During this period the patient had no events or seizures. Summary: During this day of recording no events were recorded. The interictal EEG was abnormal due to continuous right hemispheric polymorphic subacute delta slowing and attenuated background with suggested underlying structural defect. No abnormal epileptiform activity was seen. Monitoring was continued in order to record the patient's typical events. The EKG channel revealed no abnormalities. Day 2 - 1/19/21, reviewed through 7:30 am    Interictal EEG Samples: Interictal EEG was unchanged from yesterday. Ictal EEG Recording / Patient Events: During this period the patient had no events or seizures. Summary: During this day of recording no events were recorded. The interictal EEG was abnormal due to continuous right hemispheric polymorphic subacute delta slowing and attenuated background with suggested underlying structural defect. No abnormal epileptiform activity was seen. Monitoring was continued in order to record the patient's typical events. The EKG channel revealed no abnormalities. Dixie Camacho MD  Diplomate, American Board of Psychiatry and Neurology  Diplomate, American Board of Clinical Neurophysiology  Diplomate, American Board of Epilepsy     Please note this is a preliminary report and updated daily. The final report will have a summary of behavior and electrographic findings with clinical correlation.

## 2021-01-19 NOTE — CARE COORDINATION
Voicemail left for daughter, Santiago Stringer, to inquire about SNF choices    1130 received call back from Santiago Stringer. She stated she would talk to siblings today and call back in a couple hours.  Requested choice asap

## 2021-01-19 NOTE — PROGRESS NOTES
[facility]  Internal Medicine Teaching Residency Program  Inpatient Daily Progress Note  ______________________________________________________________________________    Patient: Domenic Bates  YOB: 1950   UZP:[id number]    Acct: [de-identified]     Room: [facility]7262-  Admit date: 1/13/2021  Today's date: 01/19/21  Number of days in the hospital: 6    SUBJECTIVE   Admitting Diagnosis: <principal problem not specified>  CC: Altered mental status, hyponatremia, hyperglycemia  Pt examined at bedside. Chart & results reviewed. -vitals stable, afebrile. - pt poorly responsive again on my evaluation, as per nurse pt was alert, oriented, answering questions early morning.  -Neurology on board for waxing and waning mentation, negative workup till now, plan MRI  - will check ammonia levels,  regarding using CPAP because sleep apnea could be adding on to her sleepiness. - will check thyroid levels. - pt was bradycardic, held betablocker    ROS:(ROS was done in the morning before patient had altered mental status)  Constitutional:  negative for chills, fevers, sweats  Respiratory:  negative for cough, dyspnea on exertion, hemoptysis, shortness of breath, wheezing  Cardiovascular:  negative for chest pain, chest pressure/discomfort, lower extremity edema, palpitations  Gastrointestinal:  negative for abdominal pain, constipation, diarrhea, nausea, vomiting  Neurological:  negative for dizziness, headache  BRIEF HISTORY   Haig Epley a 78 y. o. female who presented from [facility] emergency department for concerns for altered mental status, hyperglycemia, and hypernatremia.  Patient presented to the emergency department after she was found to be hyperglycemic on her daily glucose checks at her rehab facility.  Patient was status post 2 weeks out from a right MCA infarct, had a extended course here at [facility]. [facility] neuro critical care unit, no thrombectomy or interventions were performed, patient was started on heparin and out of TPA window. Patient does have a past medical history of CAD, diabetes, hypertension, asthma, daily smoker.  On examination patient had a obvious left-sided facial droop, left-sided weakness, however intermittently was following commands and unsure if complete participation in exam occurring due to altered mentation from hypernatremia.        Nephrology was consulted and patient was started on 0.45 saline leading to some improvement of sodium level, later on free water boluses with 250 mL every 6 hours were added.  Neurology was consulted due to recent right MCA stroke with left hemiplegia to decide about aspirin/Plavix as repeat CT head was concerning for some intracranial bleed.  Blood cultures grew Enterococcus faecium and patient started on vancomycin on 1/15 to complete 10 days. Montserrat Olvera is a right gluteal decubitus small wound but the source of Enterococcus bacteremia is unclear.  May need to rule out GI source of Enterococcus bacteremia.  Patient had repeat CT head  which still cannot totally exclude bleed in the brain.     Hypernatremia and patient's mentation is improving.  She is alert and oriented now. Abel Fraser continues to have left-sided body weakness from right MCA stroke. Abel Fraser is on tube feeding from PEG tube.  Hemodynamically stable.  transferred out of ICU to floors for further management. OBJECTIVE     Vital Signs:  BP (!) 108/56   Pulse 57   Temp 97.9 °F (36.6 °C) (Oral)   Resp 21   Ht 5' 4\" (1.626 m)   Wt 241 lb 2.9 oz (109.4 kg)   LMP  (LMP Unknown)   SpO2 98%   BMI 41.40 kg/m²     Temp (24hrs), Av.8 °F (36.6 °C), Min:97.4 °F (36.3 °C), Max:97.9 °F (36.6 °C)    In: 1100   Out:  [Urine:0]  This examinations is from the morning before  The event.   General appearance - alert, in no distress in the morning but patient was drowsy during the attack  Mental status - alert, oriented to person, place, and time in the morning but was completely disoriented during the attack  Eyes - pupils equal and reactive, extraocular eye movements can not be assessed but her gaze was fixed to the right side at one point. Mouth - mucous membranes moist, pharynx normal without lesions  Neck - supple, no significant adenopathy  Chest - clear to auscultation, no wheezes  Heart - normal rate, regular rhythm, normal S1, S2  Abdomen - soft, nontender, nondistended  Neurological - alert, oriented, normal speech, Positive left-sided residual defects.   Extremities - peripheral pulses normal, no pedal edema  Skin - normal coloration and turgor, no rashes          Medications:  Scheduled Medications:    [START ON 1/20/2021] insulin glargine  38 Units Subcutaneous Daily    [START ON 1/20/2021] cloNIDine  0.2 mg Oral Daily    losartan  25 mg Oral Daily    amLODIPine  10 mg Oral Daily    insulin lispro  0-12 Units Subcutaneous Q6H    aspirin  81 mg Oral Daily    docusate  100 mg Oral Daily    vancomycin (VANCOCIN) intermittent dosing (placeholder)   Other RX Placeholder    vancomycin  1,500 mg Intravenous Q12H    atorvastatin  80 mg Oral Daily    sertraline  100 mg Oral Daily    sodium chloride flush  10 mL Intravenous 2 times per day    enoxaparin  40 mg Subcutaneous Daily     Continuous Infusions:    dextrose      sodium chloride 50 mL/hr at 01/19/21 0957     PRN Medications    hydrALAZINE, 5 mg, Q6H PRN      sodium chloride flush, 10 mL, PRN      promethazine, 12.5 mg, Q6H PRN    Or      ondansetron, 4 mg, Q6H PRN      polyethylene glycol, 17 g, Daily PRN      acetaminophen, 650 mg, Q6H PRN    Or      acetaminophen, 650 mg, Q6H PRN      glucose, 15 g, PRN      dextrose, 12.5 g, PRN      glucagon (rDNA), 1 mg, PRN      dextrose, 100 mL/hr, PRN        Diagnostic Labs:  CBC:   Recent Labs     01/17/21  0719 01/18/21  0809 01/19/21  0512   WBC 6.9 7.3 6.7   RBC 3.70* 3.45* 3.65*   HGB 10.2* 9.6* 10.1*   HCT 33.8* 31.9* 33.7*   MCV 91.4 92.5 92.3   RDW 13.2 12.9 12.9    126* See Reflexed IPF Result     BMP:   Recent Labs     01/17/21  0719 01/18/21  0809 01/19/21  0512   * 142 139   K 3.8 3.9 4.1   * 110* 107   CO2 28 23 25   PHOS 2.3* 2.9 2.9   BUN 14 11 9   CREATININE 0.35* 0.30* 0.37*     BNP: No results for input(s): BNP in the last 72 hours. PT/INR: No results for input(s): PROTIME, INR in the last 72 hours. APTT: No results for input(s): APTT in the last 72 hours. CARDIAC ENZYMES: No results for input(s): CKMB, CKMBINDEX, TROPONINI in the last 72 hours. Invalid input(s): CKTOTAL;3  FASTING LIPID PANEL:  Lab Results   Component Value Date    CHOL 188 04/05/2019    HDL 48 12/16/2020    TRIG 177 (H) 04/05/2019     LIVER PROFILE:   Recent Labs     01/17/21  0719 01/18/21  0809 01/19/21  0512   AST 35* 95* 94*   ALT 24 64* 78*   BILIDIR 0.10 0.09 <0.08   BILITOT 0.24* 0.21* 0.22*   ALKPHOS 73 67 70      MICROBIOLOGY:   Lab Results   Component Value Date/Time    CULTURE DUPLICATE ORDER 73/72/9955 11:00 PM       Imaging:    Ct Head Wo Contrast    Result Date: 1/16/2021  No acute intracranial abnormality. Redemonstration of linear areas of increased density along the posterior margin of prior right MCA infarct. .  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 Redemonstration of extensive right MCA territory infarct. No new infarct. 2. New or increasing hyperdense serpiginous gyral hyperdensities along posterior aspect of the infarct in the parietal and to a less and extent temporal lobe and also along the margins of the right caudate nucleus is suggestive of some blood products. This may be further assessed with MRI if deemed clinically necessary. Merwyn Climes Chest Portable    Result Date: 1/13/2021  No acute finding or significant change. Xr Chest Portable    Result Date: 1/13/2021  No acute cardiopulmonary findings       ASSESSMENT & PLAN     Acute encephalopathy secondary to hypernatremia and recent large right MCA stroke  -On aspirin now and  start Plavix on 01/20  -DAPT for 90 days in total  - sodium levels improved but still poorly responsive.  -Continue 0.45 saline at 50 mL/h and free water bolus 250 mL every 4 hours. -Repeat CT brain negative  -LTME consistent with continuous right hemispheric polymorphic subacute delta slowing and attenuated background with suggested underlying structural defect. No abnormal epileptiform activity was seen. - MRI planned by Neurology. - will check ammonia levels and TSH. - neuro checks per protocol. Enterococcus faecalis bacteremia from unclear source  -Vancomycin every 12 hours for 10 days   -CT abdomen and pelvis is not showing any evidence of infection  -ID is following the patient. E. coli UTI  Course of ceftriaxone completed.     Hypertension  -Resumed [id number] mg, Cozaar 25 mg and clonidine 0.2 mg daily  -Metoprolol discontinued due to bradycardia     Type 2 diabetes mellitus  -Last A1c 6.8  -Blood glucose uncontrolled after starting tube feeding.  -started on Lantus 38 units daily  -Continue cover with sliding scale insulin.     History of coronary artery disease with PCI in the past  -Continue statin, aspirin  - BB held due to bradycardia    History of sleep apnea  CPAP during sleep and day time naps.     DVT prophylaxis: Lovenox  Diet: PEG tube feeding     OT/PT- on board  Discharge planning- ongoing     Heaven Hernandez MD  Internal Medicine Resident, PGY-1  [facility];  [place], [place]  1/19/2021, 3:43 PM

## 2021-01-19 NOTE — PROGRESS NOTES
Physician Progress Note      PATIENT:               Maren Bustillo  CSN #:                  [id number]  :                       1950  ADMIT DATE:       2021 4:15 PM  [address] [facility] DATE:  RESPONDING  PROVIDER #:        Ye Payne MD          QUERY TEXT:    Pt admitted with AMS. Pt noted to have hypernatremia and hemorrhagic   conversion of previous CVA. If possible, please document in the progress notes   and discharge summary if you are evaluating and / or treating any of the   following: The medical record reflects the following:  Risk Factors: recent CVA, on ASA and Plavix  Clinical Indicators: CT scan with new hemorrhagic component from previous   admit,  on admit, dehydration  Treatment: stopped ASA, and Plavix, repeat CT scan ordered, monitoring labs,   IVF and Nephro consult,      Tracy ESPINOZA CCDS [phone]  Options provided:  -- Hypernatremia  -- Hemorrhagic conversion of previous CVA  -- Hypernatremia and Hemorrhagic conversion of previous CVA  -- Other - I will add my own diagnosis  -- Disagree - Not applicable / Not valid  -- Disagree - Clinically unable to determine / Unknown  -- Refer to Clinical Documentation Reviewer    PROVIDER RESPONSE TEXT:    This patient has Hypernatremia causing AMS.     Query created by: Rogers Rogers on 1/15/2021 12:30 PM      Electronically signed by:  Ye Payne MD 2021 1:14 PM

## 2021-01-19 NOTE — PROGRESS NOTES
NEUROLOGY INPATIENT PROGRESS NOTE    1/19/2021         Subjective: Gucci Ruiz is a  78 y.o. female admitted on 1/13/2021 with Dehydration [E86.0]    Briefly, this is a  78 y.o. female admitted on 1/13/2021 with Transient encephalopathy with waxing and waning mentation. likely post-ictal from unwitnessed seizure activity. She presented on 1/13/2021 from [facility] ER for concerns of altered mental status, hypoglycemia, hyponatremia. Patient had a recent right MCA ischemic stroke on 12/16/2020 secondary to iCAD. Presented with confusion secondary to hypernatremia and hypoglycemia. CT head was consistent with incidental hemorrhagic transformation of right MCA infarct. Neuro Critical care was consulted  Her aspirin and Plavix were held. Patient was resumed on DVT prophylaxis. Plan was to repeat CT head in 3 to 5 days and if no progression of hemorrhage resume aspirin. And restart Plavix in 7 to 10 days with a goal to resume plavix from 1/20/2021 aspirin was started on 1/16/2021. And continue DAPT for 90 days due to iCAD  . Goal blood pressure less than 160    Comorbidities include diabetes, hypertension, CAD, hyperlipidemia tube placement. On 1/18/2021 stroke alert was called and change in mentation. But after couple seconds patient returned to her baseline  SBP at that time 121  Blood glucose 292  Total NIH 16    CT Brain: 1/18/21 negative    LTME was started and consistent with Right hemispheric slowing,possibly related to recent R hemispheric stroke vs post-ictal state. Today patient was seen and examined. No issues overnight    No current facility-administered medications on file prior to encounter.       Current Outpatient Medications on File Prior to Encounter   Medication Sig Dispense Refill    losartan (COZAAR) 25 MG tablet Take 1 tablet by mouth daily 60 tablet 3    metoprolol tartrate 75 MG TABS Take 75 mg by mouth 2 times daily 60 tablet 3    docusate (COLACE) 50 MG/5ML liquid Take 10 mLs by mouth daily 50 mL 3    rivastigmine (EXELON) 9.5 MG/24HR Place 1 patch onto the skin daily      QUEtiapine (SEROQUEL) 25 MG tablet Take 25 mg by mouth 2 times daily      Calcium Carbonate-Vitamin D (OYSTER SHELL CALCIUM/D) 500-200 MG-UNIT TABS TAKE ONE TABLET BY MOUTH TWO TIMES A DAY 60 tablet 3    glimepiride (AMARYL) 2 MG tablet Take 1 tablet by mouth every morning 30 tablet 3    linagliptin (TRADJENTA) 5 MG tablet Take 1 tablet by mouth daily 30 tablet 10    cloNIDine (CATAPRES) 0.1 MG tablet Take 1 tablet by mouth daily 30 tablet 3    amLODIPine (NORVASC) 10 MG tablet Take 1 tablet by mouth daily 30 tablet 5    furosemide (LASIX) 40 MG tablet Take 1 tablet by mouth daily 30 tablet 10    atorvastatin (LIPITOR) 80 MG tablet TAKE 1 TABLET BY MOUTH DAILY 30 tablet 5    sertraline (ZOLOFT) 100 MG tablet Take 1 tablet by mouth daily 30 tablet 5    clopidogrel (PLAVIX) 75 MG tablet Take 1 tablet by mouth daily 30 tablet 11    aspirin (RA ASPIRIN EC) 81 MG EC tablet Take 1 tablet by mouth daily 30 tablet 11    Incontinence Supply Disposable (INCONTINENCE BRIEF LARGE) MISC Use 4-5/day as needed 150 each 11    glucose blood VI test strips (ASCENSIA AUTODISC VI;ONE TOUCH ULTRA TEST VI) strip 1 each by In Vitro route 3 times daily As needed. 100 each 5    albuterol-ipratropium (COMBIVENT RESPIMAT)  MCG/ACT AERS inhaler Inhale 1 puff into the lungs every 6 hours as needed for Wheezing 1 Inhaler 5    Lancets MISC Use 1 -2 times daily Insulin dependent diabetes mellitus 50 each 11       Allergies: Sirisha Blanco is allergic to bactrim; penicillins; and tylenol [acetaminophen].     Past Medical History:   Diagnosis Date    Allergic rhinitis     Anxiety 10/25/2016    Asthma     CAD (coronary artery disease)     s/p stents RCA and LAD 2009,    Chronic back pain     Congestive heart failure ([facility] [address]     Depression     Headache(784.0)     Hiatal hernia     Hypercholesteremia 2/21/2012    Hypertension     Kidney stones     years ago    Obesity     Osteoarthritis     Postlaminectomy syndrome 6/6/2012    Type II or unspecified type diabetes mellitus without mention of complication, not stated as uncontrolled     Unspecified sleep apnea     Urinary incontinence        Past Surgical History:   Procedure Laterality Date    CARDIAC CATHETERIZATION  01/2013    patent stents    COLONOSCOPY  09/2015    normal    CORONARY ANGIOPLASTY WITH STENT PLACEMENT  1012-16    stents x 3    GASTROSTOMY TUBE PLACEMENT  12/28/2020    EGD PEG TUBE PLACEMENT    GASTROSTOMY TUBE PLACEMENT N/A 12/28/2020    EGD PEG TUBE PLACEMENT performed by Elisabeth Gorman MD at [address]      left knee    KNEE SURGERY  10/21/2013    rt knee synvisc injection     KNEE SURGERY  12/02/2013    knee synvisc injection rt #2    KNEE SURGERY  12/09/2013    rt knee synvisc inj    LUMBAR SPINE SURGERY  2007    NERVE BLOCK  04/23/2012    Right MBNB L3, L4, L5    NERVE BLOCK  05/22/2012    Right MBNB L3, L4, and L5     NERVE BLOCK  01/14/2013    Right knee injection #1 - Synvisc    NERVE BLOCK  01/21/2013    Right knee injection #2 - Synvisc    NERVE BLOCK  01/28/2013    Rigth knee synvisc injection #3    NERVE BLOCK Right 04/17/2017    Rt genicular nerve block.  no steroid used    OTHER SURGICAL HISTORY Right 07/14/2014    synvisc one knee injection    OTHER SURGICAL HISTORY Right 03/16/2015    synvisc one knee injection    OTHER SURGICAL HISTORY Right 06/13/2016    synvisc right knee injection    SPINE SURGERY      TONSILLECTOMY      UPPER GASTROINTESTINAL ENDOSCOPY      VENA CAVA FILTER PLACEMENT  2007    PE and B/L LE emboli       Medications:     [START ON 1/20/2021] insulin glargine  38 Units Subcutaneous Daily    metoprolol tartrate  25 mg Oral BID    losartan  25 mg Oral Daily    amLODIPine  10 mg Oral Daily    cloNIDine  0.1 mg Oral Daily    insulin lispro  0-12 Units Subcutaneous Q6H  aspirin  81 mg Oral Daily    docusate  100 mg Oral Daily    vancomycin (VANCOCIN) intermittent dosing (placeholder)   Other RX Placeholder    vancomycin  1,500 mg Intravenous Q12H    atorvastatin  80 mg Oral Daily    sertraline  100 mg Oral Daily    sodium chloride flush  10 mL Intravenous 2 times per day    enoxaparin  40 mg Subcutaneous Daily     PRN Meds include: hydrALAZINE, sodium chloride flush, promethazine **OR** ondansetron, polyethylene glycol, acetaminophen **OR** acetaminophen, glucose, dextrose, glucagon (rDNA), dextrose    Objective:   BP (!) 153/83   Pulse 68   Temp 97.9 °F (36.6 °C) (Oral)   Resp (!) 34   Ht 5' 4\" (1.626 m)   Wt 241 lb 2.9 oz (109.4 kg)   LMP  (LMP Unknown)   SpO2 96%   BMI 41.40 kg/m²     Blood pressure range: Systolic (11CXA), TLM:158 , Min:114 , IDZ:001   ; Diastolic (73HYP), KWO:74, Min:71, Max:103      ROS:  CONSTITUTIONAL: negative for fatigue and malaise   EYES: negative for double vision and photophobia    HEENT: negative for tinnitus and sore throat   RESPIRATORY: negative for cough, shortness of breath   CARDIOVASCULAR: negative for chest pain, palpitations, or syncope   GASTROINTESTINAL: negative for abdominal pain, nausea, vomiting, diarrhea, or constipation    GENITOURINARY: negative for incontinence or retention    MUSCULOSKELETAL: negative for neck or back pain, negative for extremity pain   NEUROLOGICAL: Negative for seizures, headaches, weakness, numbness, confusion, aphasia, dysarthria   PSYCHIATRIC: negative for agitation, hallucination, SI/HI   SKIN Negative for spontaneous contusions, rashes, or lesions        NEUROLOGIC EXAMINATION    Mental status   Alert and oriented; intact memory with no confusion,Dysarthria, no hallucinations or delusions     Cranial nerves   II - visual fields intact to confrontation                                                III, IV, VI - extra-ocular muscles full: no pupillary defect; no BRIAN, no nystagmus, no ptosis,R gaze prefernce                                                                      V - normal facial sensation                                                               VII - normal facial symmetry                                                             VIII - intact hearing                                                                             IX, X - symmetrical palate                                                                  XI - symmetrical shoulder shrug                                                       XII - midline tongue without atrophy or fasciculation     Motor function  Normal muscle bulk and tone; normal power 5/5  RLE 3/5  LUE/LLE 0/5   Sensory function Intact to touch, pin, vibration, proprioception     Cerebellar Intact fine motor movement.  No involuntary movements or tremors     Reflex function Intact 2+ DTR and symmetric with no pathologic reflex or Babinski sign     Gait                  Not tested           Data:    Lab Results:   CBC:   Recent Labs     01/17/21  0719 01/18/21  0809 01/19/21  0512   WBC 6.9 7.3 6.7   HGB 10.2* 9.6* 10.1*    126* See Reflexed IPF Result     BMP:    Recent Labs     01/17/21  0719 01/18/21  0809 01/19/21  0512   * 142 139   K 3.8 3.9 4.1   * 110* 107   CO2 28 23 25   BUN 14 11 9   CREATININE 0.35* 0.30* 0.37*   GLUCOSE 285* 292* 263*         Lab Results   Component Value Date    CHOL 188 04/05/2019    LDLCHOLESTEROL 159 (H) 12/16/2020    HDL 48 12/16/2020    TRIG 177 (H) 04/05/2019    ALT 78 (H) 01/19/2021    AST 94 (H) 01/19/2021    TSH 1.38 12/19/2018    INR 1.0 12/15/2020    LABA1C 6.8 (H) 12/16/2020    LABMICR 39 (H) 12/19/2018       No results found for: PHENYTOIN, PHENYTOIN, VALPROATE, CBMZ    IMAGING  reviewed    Assessment and Plan  Transient encephalopathy  Waxing and waning mentation likely postictal from unwitnessed seizure vs less likely but cannot be excluded due to hyperglycemia  Elevated LFTs    Comorbidities include hypertension, diabetes, MCA infarct, iCAD     LTM E consistent with continuous right hemispheric polymorphic subacute delta slowing and attenuated background with suggested underlying structural defect. No abnormal epileptiform activity was seen. We will get MRI brain to rule out any new intracranial structural abnormality as an etiology of her symptoms  Aspirin 81 mg  Lipitor 80 mg  Plavix to be started from 1/20/2021  Continue DAPT for 90 days per [facility] trial for ICAD   12/16/20  A1c 6.8  Echo 12/15/20: EF 50%, severe left ventricular hypertrophy, increased septal thickness. Grade 1 left ventricular diastolic dysfunction. Negative bubble study. Left atrium is moderately dilated.   Seizures precautions  PT/OT/SLP  Neurochecks per protocol  Goal blood pressure normotension  DC planning  Medical management per primary  ThankYou for this interesting consult    Kelsey Paredes MD PGY-4 Neurology  1/19/2021  10:58 AM

## 2021-01-19 NOTE — PROGRESS NOTES
[facility] - Progress Note    Today's Date and Time: 1/19/2021, 5:54 PM    Impression :   · Extensive right MCA stroke   · Acte cystitis without hematuria secondary to e coli  · Enterococcus septicemia 1-13-21  · Hypernatremia  · Dehydration   · Allergy to Penicillin, sulfa  · Rt Gluteal decubitus    Recommendations:   · Vancomycin 1,250 mg IV q12h for 10 days; Stop date: 01/25/21  · Completed Ceftriaxone 1 G IV daily; stop date: 1/18/2021  · Wound Care    Medical Decision Making/Summary/Discussion:1/19/2021     ·   Infection Control Recommendations   · [place] Precautions    Antimicrobial Stewardship Recommendations     · Simplification of therapy  · Targeted therapy  · PK dosing    Coordination of Outpatient Care:   · Estimated Length of IV antimicrobials: 1-25-21  · Patient will need Midline Catheter Insertion:  TBD   · Patient will need PICC line Insertion: TBD   · Patient will need: Home IV , [place],  SNF,  [facility]  · Patient will need outpatient wound care: TBD. Chief complaint/reason for consultation:   · Enterococcus in blood     History of Present Illness:     INITIAL HISTORY:    Dionte Holly is a 78y.o.-year-old  female with history of CAD, HTN, DM, hyperlipidemia, right MCA stroke with residual left-sided deficits who was initially admitted on 1/13/2021. Patient seen at the request of Dr. Jenni Mccracken. Patient was initially seen at [address] [place] [address],[address] ER because of uncontrolled hyperglycemia, hypernatremia and AMS. Of note, patient suffered a stroke in December 2020 and had a PEG placed at that time. CT of the head showed redemonstration of extensive right MCA territory infarct with no new infarct noted. Patient was started on heparin, but was not a candidate for TPA due to being out of window period. Blood cultures obtained on 1-13-21 show the growth of Enterococcus faecalis. Susceptible to ampicillin but patient is  [START ON 2021] cloNIDine  0.2 mg Oral Daily    losartan  25 mg Oral Daily    amLODIPine  10 mg Oral Daily    insulin lispro  0-12 Units Subcutaneous Q6H    aspirin  81 mg Oral Daily    docusate  100 mg Oral Daily    vancomycin (VANCOCIN) intermittent dosing (placeholder)   Other RX Placeholder    vancomycin  1,500 mg Intravenous Q12H    atorvastatin  80 mg Oral Daily    sertraline  100 mg Oral Daily    sodium chloride flush  10 mL Intravenous 2 times per day    enoxaparin  40 mg Subcutaneous Daily       Social History:     Social History     Socioeconomic History    Marital status: Legally      Spouse name: Not on file    Number of children: Not on file    Years of education: Not on file    Highest education level: Not on file   Occupational History    Not on file   Social Needs    Financial resource strain: Not on file    Food insecurity     Worry: Not on file     Inability: Not on file    Transportation needs     Medical: Not on file     Non-medical: Not on file   Tobacco Use    Smoking status: Former Smoker     Packs/day: 0.50     Years: 0.00     Pack years: 0.00     Quit date: 3/28/2013     Years since quittin.8    Smokeless tobacco: Never Used   Substance and Sexual Activity    Alcohol use:  Yes     Alcohol/week: 1.0 standard drinks     Types: 1 Cans of beer per week     Comment: occasionally    Drug use: No    Sexual activity: Not on file   Lifestyle    Physical activity     Days per week: Not on file     Minutes per session: Not on file    Stress: Not on file   Relationships    Social connections     Talks on phone: Not on file     Gets together: Not on file     Attends Scientologist service: Not on file     Active member of club or organization: Not on file     Attends meetings of clubs or organizations: Not on file     Relationship status: Not on file    Intimate partner violence     Fear of current or ex partner: Not on file     Emotionally abused: Not on file Physically abused: Not on file     Forced sexual activity: Not on file   Other Topics Concern    Not on file   Social History Narrative    Not on file       Family History:     Family History   Problem Relation Age of Onset    Diabetes Mother     Cancer Father     High Blood Pressure Sister     High Blood Pressure Brother         Allergies:   Bactrim, Penicillins, and Tylenol [acetaminophen]     Review of Systems:     Unable to perform ROS today. She is sleeping. Physical Examination :     Patient Vitals for the past 8 hrs:   BP Temp Temp src Pulse Resp SpO2   01/19/21 1549 127/73 98.5 °F (36.9 °C) Oral 82 18 100 %   01/19/21 1538 -- -- -- -- 21 --   01/19/21 1201 -- -- -- -- 17 --   01/19/21 1141 (!) 108/56 97.9 °F (36.6 °C) Oral 57 17 98 %     General Appearance: sleeping and in no apparent distress  Head:  Normocephalic, no trauma  Pulmonary/Chest: Clear to auscultation, without wheezes, rales, or rhonchi. Cardiovascular: Regular rate and rhythm without murmurs. Abdomen: Soft. Bowel sounds normal  All four Extremities: No cyanosis, clubbing, edema, or effusions. Neurologic: No gross sensory or motor deficits. Skin: Warm and dry with good turgor. No signs of peripheral arterial or venous insufficiency.     Medical Decision Making -Laboratory:   I have independently reviewed/ordered the following labs:    CBC with Differential:   Recent Labs     01/18/21  0809 01/19/21  0512   WBC 7.3 6.7   HGB 9.6* 10.1*   HCT 31.9* 33.7*   * See Reflexed IPF Result   LYMPHOPCT 19* 23*   MONOPCT 8 8     BMP:   Recent Labs     01/18/21  0809 01/19/21  0512    139   K 3.9 4.1   * 107   CO2 23 25   BUN 11 9   CREATININE 0.30* 0.37*   MG 1.6 1.7     Hepatic Function Panel:   Recent Labs     01/18/21  0809 01/19/21  0512   PROT 5.2* 5.4*   LABALBU 2.2* 2.3*   BILIDIR 0.09 <0.08   IBILI 0.12 CANNOT BE CALCULATED   BILITOT 0.21* 0.22*   ALKPHOS 67 70   ALT 64* 78*   AST 95* 94*     No results for input(s): RPR in the last 72 hours. No results for input(s): HIV in the last 72 hours. No results for input(s): BC in the last 72 hours. Lab Results   Component Value Date    MUCUS NOT REPORTED 01/19/2021    RBC 3.65 01/19/2021    RBC 4.21 02/24/2012    TRICHOMONAS NOT REPORTED 01/19/2021    WBC 6.7 01/19/2021    YEAST NOT REPORTED 01/19/2021    TURBIDITY CLEAR 01/19/2021     Lab Results   Component Value Date    CREATININE 0.37 01/19/2021    GLUCOSE 263 01/19/2021    GLUCOSE 105 02/24/2012       Medical Decision Making-Imaging:     CT abd/pelvie 1/17/2021  Impression:        1. No acute findings within the abdomen or pelvis.  In particular, there is   no evidence of occult abdominal or pelvic abscess.  Mild presacral edema,   possibly third-spacing. 2. Stable focal cortical atrophy in the lower pole of the left kidney. Nonobstructive left nephrolithiasis. Medical Decision Khkgoi-[id number]-Nywnf:     1/15/2021 10:00 AM - DeyviLedy Incoming Lab Results From "[employer]    Specimen Information: Urine, clean catch        Component Collected Lab   Specimen Description 01/13/2021  8:40  [address]   . CLEAN CATCH URINE    Special Requests 01/13/2021  8:40  [address]   NOT REPORTED    Culture Abnormal  01/13/2021  8:40 PM [address] 10 to 50,000 CFU/ML    Testing Performed By    Lab - Abbreviation Name Director Address Valid Date Range   [address]-Favian Chinchilla, MD [address] [facility] [address] 08/30/17 0801-Present   Susceptibility    Escherichia coli (1)    Antibiotic Interpretation DONNA Status    amikacin   Final     NOT REPORTED    ampicillin Resistant  Final     >=32   RESISTANT   ampicillin-sulbactam   Final     NOT REPORTED    aztreonam Sensitive  Final     <=1   SUSCEPTIBLE   ceFAZolin Sensitive  Final     <=4   SUSCEPTIBLE   ceFAZolin Sensitive Cefazolin sensitivity results can be used to predict the effectiveness of oral cephalosporins (eg. Cephalexin) in uncomplicated Urinary Tract Infections due to E. coli, K. pneumoniae, and P. mirabilis Final    cefepime   Final     NOT REPORTED    cefTRIAXone Sensitive  Final     <=1   SUSCEPTIBLE   ciprofloxacin Resistant  Final     >=4   RESISTANT   ertapenem   Final     NOT REPORTED    Confirmatory Extended Spectrum Beta-Lactamase Negative NEGATIVE Final    gentamicin Sensitive  Final     <=1   SUSCEPTIBLE   meropenem   Final     NOT REPORTED    nitrofurantoin Sensitive  Final     <=16   SUSCEPTIBLE   tigecycline   Final     NOT REPORTED    tobramycin Sensitive  Final     <=1   SUSCEPTIBLE   trimethoprim-sulfamethoxazole Sensitive  Final     <=20   SUSCEPTIBLE   piperacillin-tazobactam Sensitive  Final     <=4   SUSCEPTIBLE   Lab and Collection    Culture, Urine - 1/13/2021  1/15/2021 10:41 AM - Ledy Carnes Incoming Lab Results From [email or web address]    Specimen Information: Blood        Component Collected Lab   Specimen Description 01/13/2021 10:15 AM [place] [facility]   . BLOOD    Special Requests 01/13/2021 10:15 AM [place] [facility]   LEFT HAND 20cc    Culture Abnormal  01/13/2021 10:15 AM [address] Blood Culture Results called to and read back by: OLGA Richter V AT 0617 ON 1/14/21    Culture 01/13/2021 10:15 AM [facility] FROM BOTTLE: GRAM POSITIVE COCCI IN CHAINS AND PAIRS    Culture Abnormal  01/13/2021 10:15 AM [address]    Testing Performed By    Umu Cardona Name Director Address Valid Date Range   [address] [address]Favian Chinchilla MD [address] [facility] [address] 08/30/17 [facility] [place] [address] 11/17/17 1521-Present   Susceptibility    Enterococcus  faecalis (3)    Antibiotic Interpretation DONNA Status    ampicillin [phone]

## 2021-01-20 ENCOUNTER — APPOINTMENT (OUTPATIENT)
Dept: MRI IMAGING | Age: 71
DRG: 871 | End: 2021-01-20
Attending: INTERNAL MEDICINE
Payer: COMMERCIAL

## 2021-01-20 LAB
ABSOLUTE EOS #: 0.18 K/UL (ref 0–0.44)
ABSOLUTE IMMATURE GRANULOCYTE: 0.29 K/UL (ref 0–0.3)
ABSOLUTE LYMPH #: 1.73 K/UL (ref 1.1–3.7)
ABSOLUTE MONO #: 0.53 K/UL (ref 0.1–1.2)
ALBUMIN SERPL-MCNC: 2.4 G/DL (ref 3.5–5.2)
ALBUMIN/GLOBULIN RATIO: 0.9 (ref 1–2.5)
ALP BLD-CCNC: 70 U/L (ref 35–104)
ALT SERPL-CCNC: 78 U/L (ref 5–33)
ANION GAP SERPL CALCULATED.3IONS-SCNC: 7 MMOL/L (ref 9–17)
AST SERPL-CCNC: 83 U/L
BASOPHILS # BLD: 1 % (ref 0–2)
BASOPHILS ABSOLUTE: 0.04 K/UL (ref 0–0.2)
BILIRUB SERPL-MCNC: 0.2 MG/DL (ref 0.3–1.2)
BILIRUBIN DIRECT: <0.08 MG/DL
BILIRUBIN, INDIRECT: ABNORMAL MG/DL (ref 0–1)
BUN BLDV-MCNC: 8 MG/DL (ref 8–23)
BUN/CREAT BLD: ABNORMAL (ref 9–20)
CALCIUM SERPL-MCNC: 9 MG/DL (ref 8.6–10.4)
CHLORIDE BLD-SCNC: 104 MMOL/L (ref 98–107)
CO2: 25 MMOL/L (ref 20–31)
CREAT SERPL-MCNC: 0.32 MG/DL (ref 0.5–0.9)
DIFFERENTIAL TYPE: ABNORMAL
EOSINOPHILS RELATIVE PERCENT: 3 % (ref 1–4)
GFR AFRICAN AMERICAN: >60 ML/MIN
GFR NON-AFRICAN AMERICAN: >60 ML/MIN
GFR SERPL CREATININE-BSD FRML MDRD: ABNORMAL ML/MIN/{1.73_M2}
GFR SERPL CREATININE-BSD FRML MDRD: ABNORMAL ML/MIN/{1.73_M2}
GLOBULIN: ABNORMAL G/DL (ref 1.5–3.8)
GLUCOSE BLD-MCNC: 193 MG/DL (ref 65–105)
GLUCOSE BLD-MCNC: 213 MG/DL (ref 65–105)
GLUCOSE BLD-MCNC: 239 MG/DL (ref 65–105)
GLUCOSE BLD-MCNC: 258 MG/DL (ref 70–99)
HCT VFR BLD CALC: 30.1 % (ref 36.3–47.1)
HEMOGLOBIN: 9.4 G/DL (ref 11.9–15.1)
IMMATURE GRANULOCYTES: 4 %
LYMPHOCYTES # BLD: 25 % (ref 24–43)
MAGNESIUM: 1.6 MG/DL (ref 1.6–2.6)
MCH RBC QN AUTO: 28.4 PG (ref 25.2–33.5)
MCHC RBC AUTO-ENTMCNC: 31.2 G/DL (ref 28.4–34.8)
MCV RBC AUTO: 90.9 FL (ref 82.6–102.9)
MONOCYTES # BLD: 8 % (ref 3–12)
NRBC AUTOMATED: 0 PER 100 WBC
PDW BLD-RTO: 13.2 % (ref 11.8–14.4)
PHOSPHORUS: 3.1 MG/DL (ref 2.6–4.5)
PLATELET # BLD: ABNORMAL K/UL (ref 138–453)
PLATELET ESTIMATE: ABNORMAL
PLATELET, FLUORESCENCE: NORMAL K/UL (ref 138–453)
PLATELET, IMMATURE FRACTION: NORMAL % (ref 1.1–10.3)
PMV BLD AUTO: ABNORMAL FL (ref 8.1–13.5)
POTASSIUM SERPL-SCNC: 4.3 MMOL/L (ref 3.7–5.3)
RBC # BLD: 3.31 M/UL (ref 3.95–5.11)
RBC # BLD: ABNORMAL 10*6/UL
SEG NEUTROPHILS: 60 % (ref 36–65)
SEGMENTED NEUTROPHILS ABSOLUTE COUNT: 4.07 K/UL (ref 1.5–8.1)
SODIUM BLD-SCNC: 136 MMOL/L (ref 135–144)
TOTAL PROTEIN: 5.2 G/DL (ref 6.4–8.3)
WBC # BLD: 6.8 K/UL (ref 3.5–11.3)
WBC # BLD: ABNORMAL 10*3/UL

## 2021-01-20 PROCEDURE — 6370000000 HC RX 637 (ALT 250 FOR IP): Performed by: STUDENT IN AN ORGANIZED HEALTH CARE EDUCATION/TRAINING PROGRAM

## 2021-01-20 PROCEDURE — 2060000000 HC ICU INTERMEDIATE R&B

## 2021-01-20 PROCEDURE — 6370000000 HC RX 637 (ALT 250 FOR IP): Performed by: INTERNAL MEDICINE

## 2021-01-20 PROCEDURE — 80076 HEPATIC FUNCTION PANEL: CPT

## 2021-01-20 PROCEDURE — 83735 ASSAY OF MAGNESIUM: CPT

## 2021-01-20 PROCEDURE — 99233 SBSQ HOSP IP/OBS HIGH 50: CPT | Performed by: INTERNAL MEDICINE

## 2021-01-20 PROCEDURE — 82947 ASSAY GLUCOSE BLOOD QUANT: CPT

## 2021-01-20 PROCEDURE — 2580000003 HC RX 258: Performed by: STUDENT IN AN ORGANIZED HEALTH CARE EDUCATION/TRAINING PROGRAM

## 2021-01-20 PROCEDURE — 85025 COMPLETE CBC W/AUTO DIFF WBC: CPT

## 2021-01-20 PROCEDURE — 80048 BASIC METABOLIC PNL TOTAL CA: CPT

## 2021-01-20 PROCEDURE — 6360000002 HC RX W HCPCS: Performed by: STUDENT IN AN ORGANIZED HEALTH CARE EDUCATION/TRAINING PROGRAM

## 2021-01-20 PROCEDURE — 85055 RETICULATED PLATELET ASSAY: CPT

## 2021-01-20 PROCEDURE — 36415 COLL VENOUS BLD VENIPUNCTURE: CPT

## 2021-01-20 PROCEDURE — 99232 SBSQ HOSP IP/OBS MODERATE 35: CPT | Performed by: PSYCHIATRY & NEUROLOGY

## 2021-01-20 PROCEDURE — 99232 SBSQ HOSP IP/OBS MODERATE 35: CPT | Performed by: INTERNAL MEDICINE

## 2021-01-20 PROCEDURE — 97530 THERAPEUTIC ACTIVITIES: CPT

## 2021-01-20 PROCEDURE — 84100 ASSAY OF PHOSPHORUS: CPT

## 2021-01-20 PROCEDURE — 6370000000 HC RX 637 (ALT 250 FOR IP): Performed by: FAMILY MEDICINE

## 2021-01-20 PROCEDURE — 97535 SELF CARE MNGMENT TRAINING: CPT

## 2021-01-20 PROCEDURE — 97110 THERAPEUTIC EXERCISES: CPT

## 2021-01-20 RX ORDER — CLOPIDOGREL BISULFATE 75 MG/1
75 TABLET ORAL DAILY
Status: DISCONTINUED | OUTPATIENT
Start: 2021-01-20 | End: 2021-01-23 | Stop reason: HOSPADM

## 2021-01-20 RX ORDER — SODIUM CHLORIDE 0.9 % (FLUSH) 0.9 %
10 SYRINGE (ML) INJECTION PRN
Status: DISCONTINUED | OUTPATIENT
Start: 2021-01-20 | End: 2021-01-23 | Stop reason: HOSPADM

## 2021-01-20 RX ORDER — LIDOCAINE HYDROCHLORIDE 10 MG/ML
5 INJECTION, SOLUTION INFILTRATION; PERINEURAL ONCE
Status: DISCONTINUED | OUTPATIENT
Start: 2021-01-20 | End: 2021-01-23 | Stop reason: HOSPADM

## 2021-01-20 RX ORDER — CLONIDINE HYDROCHLORIDE 0.1 MG/1
0.2 TABLET ORAL DAILY
Qty: 30 TABLET | Refills: 3 | Status: SHIPPED | OUTPATIENT
Start: 2021-01-20 | End: 2021-05-20 | Stop reason: SDUPTHER

## 2021-01-20 RX ORDER — SODIUM CHLORIDE 0.9 % (FLUSH) 0.9 %
10 SYRINGE (ML) INJECTION EVERY 12 HOURS SCHEDULED
Status: DISCONTINUED | OUTPATIENT
Start: 2021-01-20 | End: 2021-01-23 | Stop reason: HOSPADM

## 2021-01-20 RX ORDER — METOPROLOL SUCCINATE 25 MG/1
50 TABLET, EXTENDED RELEASE ORAL DAILY
Qty: 30 TABLET | Refills: 0 | Status: SHIPPED | OUTPATIENT
Start: 2021-01-20

## 2021-01-20 RX ADMIN — INSULIN LISPRO 4 UNITS: 100 INJECTION, SOLUTION INTRAVENOUS; SUBCUTANEOUS at 06:11

## 2021-01-20 RX ADMIN — Medication 1500 MG: at 01:50

## 2021-01-20 RX ADMIN — CLOPIDOGREL 75 MG: 75 TABLET, FILM COATED ORAL at 11:30

## 2021-01-20 RX ADMIN — INSULIN LISPRO 2 UNITS: 100 INJECTION, SOLUTION INTRAVENOUS; SUBCUTANEOUS at 18:30

## 2021-01-20 RX ADMIN — ENOXAPARIN SODIUM 40 MG: 40 INJECTION SUBCUTANEOUS at 09:26

## 2021-01-20 RX ADMIN — AMLODIPINE BESYLATE 10 MG: 5 TABLET ORAL at 09:26

## 2021-01-20 RX ADMIN — CLONIDINE HYDROCHLORIDE 0.2 MG: 0.2 TABLET ORAL at 09:26

## 2021-01-20 RX ADMIN — DOCUSATE SODIUM 100 MG: 50 LIQUID ORAL at 09:26

## 2021-01-20 RX ADMIN — ASPIRIN 81 MG: 81 TABLET, CHEWABLE ORAL at 09:26

## 2021-01-20 RX ADMIN — SODIUM CHLORIDE, PRESERVATIVE FREE 10 ML: 5 INJECTION INTRAVENOUS at 09:27

## 2021-01-20 RX ADMIN — SERTRALINE 100 MG: 50 TABLET, FILM COATED ORAL at 09:26

## 2021-01-20 RX ADMIN — ATORVASTATIN CALCIUM 80 MG: 80 TABLET, FILM COATED ORAL at 22:08

## 2021-01-20 RX ADMIN — Medication 1500 MG: at 12:30

## 2021-01-20 RX ADMIN — INSULIN LISPRO 4 UNITS: 100 INJECTION, SOLUTION INTRAVENOUS; SUBCUTANEOUS at 01:55

## 2021-01-20 RX ADMIN — LOSARTAN POTASSIUM 25 MG: 25 TABLET, FILM COATED ORAL at 09:26

## 2021-01-20 RX ADMIN — INSULIN GLARGINE 38 UNITS: 100 INJECTION, SOLUTION SUBCUTANEOUS at 09:28

## 2021-01-20 ASSESSMENT — PAIN SCALES - WONG BAKER
WONGBAKER_NUMERICALRESPONSE: 0

## 2021-01-20 ASSESSMENT — PAIN SCALES - GENERAL
PAINLEVEL_OUTOF10: 0
PAINLEVEL_OUTOF10: 0

## 2021-01-20 NOTE — PROGRESS NOTES
NEUROLOGY INPATIENT PROGRESS NOTE    1/20/2021         Subjective: Gustavo Hopper is a  78 y.o. female admitted on 1/13/2021 with Dehydration [E86.0]    Briefly, this is a  78 y.o. female admitted on 1/13/2021 with Transient encephalopathy with waxing and waning mentation. likely post-ictal from unwitnessed seizure activity. She presented on 1/13/2021 from [facility] ER for concerns of altered mental status, hypoglycemia, hyponatremia. Patient had a recent right MCA ischemic stroke on 12/16/2020 secondary to iCAD. Presented with confusion secondary to hypernatremia and hypoglycemia. CT head was consistent with incidental hemorrhagic transformation of right MCA infarct. Neuro Critical care was consulted  Her aspirin and Plavix were held. Patient was resumed on DVT prophylaxis. Plan was to repeat CT head in 3 to 5 days and if no progression of hemorrhage resume aspirin. And restart Plavix in 7 to 10 days with a goal to resume plavix from 1/20/2021 aspirin was started on 1/16/2021. And continue DAPT for 90 days due to iCAD  . Goal blood pressure less than 160    Comorbidities include diabetes, hypertension, CAD, hyperlipidemia tube placement. On 1/18/2021 stroke alert was called and change in mentation. But after couple seconds patient returned to her baseline  SBP at that time 121  Blood glucose 292  Total NIH 16    CT Brain: 1/18/21 negative    LTME was started and consistent with Right hemispheric slowing,possibly related to recent R hemispheric stroke vs post-ictal state. Today patient was seen and examined. No issues overnight,alert, awake and following commands,R gaze deviation. No current facility-administered medications on file prior to encounter.       Current Outpatient Medications on File Prior to Encounter   Medication Sig Dispense Refill    losartan (COZAAR) 25 MG tablet Take 1 tablet by mouth daily 60 tablet 3    metoprolol tartrate 75 MG TABS Take 75 mg by mouth 2 times daily 60 tablet 3    docusate (COLACE) 50 MG/5ML liquid Take 10 mLs by mouth daily 50 mL 3    rivastigmine (EXELON) 9.5 MG/24HR Place 1 patch onto the skin daily      QUEtiapine (SEROQUEL) 25 MG tablet Take 25 mg by mouth 2 times daily      Calcium Carbonate-Vitamin D (OYSTER SHELL CALCIUM/D) 500-200 MG-UNIT TABS TAKE ONE TABLET BY MOUTH TWO TIMES A DAY 60 tablet 3    glimepiride (AMARYL) 2 MG tablet Take 1 tablet by mouth every morning 30 tablet 3    linagliptin (TRADJENTA) 5 MG tablet Take 1 tablet by mouth daily 30 tablet 10    cloNIDine (CATAPRES) 0.1 MG tablet Take 1 tablet by mouth daily 30 tablet 3    amLODIPine (NORVASC) 10 MG tablet Take 1 tablet by mouth daily 30 tablet 5    furosemide (LASIX) 40 MG tablet Take 1 tablet by mouth daily 30 tablet 10    atorvastatin (LIPITOR) 80 MG tablet TAKE 1 TABLET BY MOUTH DAILY 30 tablet 5    sertraline (ZOLOFT) 100 MG tablet Take 1 tablet by mouth daily 30 tablet 5    clopidogrel (PLAVIX) 75 MG tablet Take 1 tablet by mouth daily 30 tablet 11    aspirin (RA ASPIRIN EC) 81 MG EC tablet Take 1 tablet by mouth daily 30 tablet 11    Incontinence Supply Disposable (INCONTINENCE BRIEF LARGE) MISC Use 4-5/day as needed 150 each 11    glucose blood VI test strips (ASCENSIA AUTODISC VI;ONE TOUCH ULTRA TEST VI) strip 1 each by In Vitro route 3 times daily As needed. 100 each 5    albuterol-ipratropium (COMBIVENT RESPIMAT)  MCG/ACT AERS inhaler Inhale 1 puff into the lungs every 6 hours as needed for Wheezing 1 Inhaler 5    Lancets MISC Use 1 -2 times daily Insulin dependent diabetes mellitus 50 each 11       Allergies: Gucci Ruiz is allergic to bactrim; penicillins; and tylenol [acetaminophen].     Past Medical History:   Diagnosis Date    Allergic rhinitis     Anxiety 10/25/2016    Asthma     CAD (coronary artery disease)     s/p stents RCA and LAD 2009,    Chronic back pain     Congestive heart failure ([facility] [address]     Depression     Headache(784.0)     Hiatal hernia     Hypercholesteremia 2/21/2012    Hypertension     Kidney stones     years ago    Obesity     Osteoarthritis     Postlaminectomy syndrome 6/6/2012    Type II or unspecified type diabetes mellitus without mention of complication, not stated as uncontrolled     Unspecified sleep apnea     Urinary incontinence        Past Surgical History:   Procedure Laterality Date    CARDIAC CATHETERIZATION  01/2013    patent stents    COLONOSCOPY  09/2015    normal    CORONARY ANGIOPLASTY WITH STENT PLACEMENT  1012-16    stents x 3    GASTROSTOMY TUBE PLACEMENT  12/28/2020    EGD PEG TUBE PLACEMENT    GASTROSTOMY TUBE PLACEMENT N/A 12/28/2020    EGD PEG TUBE PLACEMENT performed by Laureano Green MD at [facility]      left knee    KNEE SURGERY  10/21/2013    rt knee synvisc injection     KNEE SURGERY  12/02/2013    knee synvisc injection rt #2    KNEE SURGERY  12/09/2013    rt knee synvisc inj    LUMBAR SPINE SURGERY  2007    NERVE BLOCK  04/23/2012    Right MBNB L3, L4, L5    NERVE BLOCK  05/22/2012    Right MBNB L3, L4, and L5     NERVE BLOCK  01/14/2013    Right knee injection #1 - Synvisc    NERVE BLOCK  01/21/2013    Right knee injection #2 - Synvisc    NERVE BLOCK  01/28/2013    [facility] knee synvisc injection #3    NERVE BLOCK Right 04/17/2017    Rt genicular nerve block.  no steroid used    OTHER SURGICAL HISTORY Right 07/14/2014    synvisc one knee injection    OTHER SURGICAL HISTORY Right 03/16/2015    synvisc one knee injection    OTHER SURGICAL HISTORY Right 06/13/2016    synvisc right knee injection    SPINE SURGERY      TONSILLECTOMY      UPPER GASTROINTESTINAL ENDOSCOPY      VENA CAVA FILTER PLACEMENT  2007    PE and B/L LE emboli       Medications:     insulin glargine  38 Units Subcutaneous Daily    cloNIDine  0.2 mg Oral Daily    losartan  25 mg Oral Daily    amLODIPine  10 mg Oral Daily    insulin lispro  0-12 Units Subcutaneous Q6H    aspirin  81 mg Oral Daily    docusate  100 mg Oral Daily    vancomycin (VANCOCIN) intermittent dosing (placeholder)   Other RX Placeholder    vancomycin  1,500 mg Intravenous Q12H    atorvastatin  80 mg Oral Daily    sertraline  100 mg Oral Daily    sodium chloride flush  10 mL Intravenous 2 times per day    enoxaparin  40 mg Subcutaneous Daily     PRN Meds include: hydrALAZINE, sodium chloride flush, promethazine **OR** ondansetron, polyethylene glycol, acetaminophen **OR** acetaminophen, glucose, dextrose, glucagon (rDNA), dextrose    Objective:   BP (!) 142/86   Pulse 72   Temp 98.5 °F (36.9 °C) (Oral)   Resp 19   Ht 5' 4\" (1.626 m)   Wt 241 lb 2.9 oz (109.4 kg)   LMP  (LMP Unknown)   SpO2 100%   BMI 41.40 kg/m²     Blood pressure range: Systolic (77GRZ), LSR:048 , Min:108 , IIN:313   ; Diastolic (17AZB), SDF:84, Min:56, Max:86      ROS:  CONSTITUTIONAL: negative for fatigue and malaise   EYES: negative for double vision and photophobia    HEENT: negative for tinnitus and sore throat   RESPIRATORY: negative for cough, shortness of breath   CARDIOVASCULAR: negative for chest pain, palpitations, or syncope   GASTROINTESTINAL: negative for abdominal pain, nausea, vomiting, diarrhea, or constipation    GENITOURINARY: negative for incontinence or retention    MUSCULOSKELETAL: negative for neck or back pain, negative for extremity pain   NEUROLOGICAL: Negative for seizures, headaches, weakness, numbness, confusion, aphasia, dysarthria   PSYCHIATRIC: negative for agitation, hallucination, SI/HI   SKIN Negative for spontaneous contusions, rashes, or lesions        NEUROLOGIC EXAMINATION    Mental status   Alert and oriented; intact memory with no confusion,Dysarthria, no hallucinations or delusions     Cranial nerves   II - visual fields intact to confrontation                                                III, IV, VI - extra-ocular muscles full: no pupillary defect; no BRIAN, no nystagmus, no ptosis,R gaze deviation. V - normal facial sensation                                                               VII - normal facial symmetry                                                             VIII - intact hearing                                                                             IX, X - symmetrical palate                                                                  XI - symmetrical shoulder shrug                                                       XII - midline tongue without atrophy or fasciculation     Motor function  Normal muscle bulk and tone; normal power 5/5  RLE 3/5  LUE/LLE 0/5   Sensory function Intact to touch, pin, vibration, proprioception     Cerebellar Intact fine motor movement.  No involuntary movements or tremors     Reflex function Intact 2+ DTR and symmetric with no pathologic reflex or Babinski sign     Gait                  Not tested           Data:    Lab Results:   CBC:   Recent Labs     01/18/21  0809 01/19/21  0512 01/20/21  0536   WBC 7.3 6.7 6.8   HGB 9.6* 10.1* 9.4*   * See Reflexed IPF Result See Reflexed IPF Result     BMP:    Recent Labs     01/18/21  0809 01/19/21  0512 01/20/21  0536    139 136   K 3.9 4.1 4.3   * 107 104   CO2 23 25 25   BUN 11 9 8   CREATININE 0.30* 0.37* 0.32*   GLUCOSE 292* 263* 258*         Lab Results   Component Value Date    CHOL 188 04/05/2019    LDLCHOLESTEROL 159 (H) 12/16/2020    HDL 48 12/16/2020    TRIG 177 (H) 04/05/2019    ALT 78 (H) 01/20/2021    AST 83 (H) 01/20/2021    TSH 2.66 01/19/2021    INR 1.0 12/15/2020    LABA1C 6.8 (H) 12/16/2020    LABMICR 39 (H) 12/19/2018    [id number] 1262 (H) 01/19/2021       No results found for: PHENYTOIN, PHENYTOIN, VALPROATE, CBMZ    IMAGING  reviewed    Assessment and Plan  Transient encephalopathy  Waxing and waning mentation likely postictal from unwitnessed seizure vs less likely but cannot be excluded due to hyperglycemia  Elevated LFTs  Enterococcus septicemia 1/13/21    Comorbidities include hypertension, diabetes, MCA infarct, iCAD     LTM E consistent with continuous right hemispheric polymorphic subacute delta slowing and attenuated background with suggested underlying structural defect. No abnormal epileptiform activity was seen. ltme has been discontinued. We will get MRI brain to rule out any new intracranial structural abnormality as an etiology of her symptoms  Aspirin 81 mg  Lipitor 80 mg  Plavix to be started from 1/20/2021  Continue DAPT for 90 days per [facility] trial for ICAD   12/16/20  A1c 6.8  Echo 12/15/20: EF 50%, severe left ventricular hypertrophy, increased septal thickness. Grade 1 left ventricular diastolic dysfunction. Negative bubble study. Left atrium is moderately dilated.   Seizures precautions  PT/OT/SLP  Neurochecks per protocol  Goal blood pressure normotension  DC planning  Medical management per primary  ThankYou for this interesting consult    Braden Llamas MD PGY-4 Neurology  1/20/2021  9:13 AM

## 2021-01-20 NOTE — CARE COORDINATION
Called Yuri rojas's daughter to ask about her choices. Left voicemail. Called Latoya to inquire about choices, she states that she does not know which ones were chosen. Called An only received a busy signal multiple times    11:17 Patient will arie PICC/Midline for discharge d/t vanco q 12 for 5 days    [address] again to ask about referrals. Left voicemail stating that that I need a SNF choice and she will also need a PICC line before leaving to get IV antibiotics. I explained that if we do not have a choice prior to discharge that I will start the process to return to [facility]. Awaiting return call.

## 2021-01-20 NOTE — PROGRESS NOTES
[facility]  Internal Medicine Teaching Residency Program  Inpatient Daily Progress Note  ______________________________________________________________________________    Patient: Rupali Catalan  YOB: 1950   [id number]    Acct: [de-identified]     Room: [address] [place] [address]  Admit date: 1/13/2021  Today's date: 01/20/21  Number of days in the hospital: 7    SUBJECTIVE   Admitting Diagnosis: <principal problem not specified>  CC: Altered mental status, hyponatremia, hyperglycemia  Pt examined at bedside. Chart & results reviewed. -vitals stable, afebrile. - pt looks better after being put on BIPAP, although didn't sleep well.  -Neurology on board for waxing and waning mentation, negative workup till now, plan MRI  - ammonia and thyroid levels normal  -bradycardia improved. ROS:(ROS was done in the morning before patient had altered mental status)  Constitutional:  negative for chills, fevers, sweats  Respiratory:  negative for cough, dyspnea on exertion, hemoptysis, shortness of breath, wheezing  Cardiovascular:  negative for chest pain, chest pressure/discomfort, lower extremity edema, palpitations  Gastrointestinal:  negative for abdominal pain, constipation, diarrhea, nausea, vomiting  Neurological:  negative for dizziness, headache  BRIEF HISTORY   David Limber a 78 y. o. female who presented from [facility] emergency department for concerns for altered mental status, hyperglycemia, and hypernatremia. Patient presented to the emergency department after she was found to be hyperglycemic on her daily glucose checks at her rehab facility.  Patient was status post 2 weeks out from a right MCA infarct, had a extended course here at [facility]. [place] neuro critical care unit, no thrombectomy or interventions were performed, patient was started on heparin and out of TPA window.   Patient does have a past medical history of CAD, diabetes, hypertension, asthma, daily smoker.  On examination patient had a obvious left-sided facial droop, left-sided weakness, however intermittently was following commands and unsure if complete participation in exam occurring due to altered mentation from hypernatremia.        Nephrology was consulted and patient was started on 0.45 saline leading to some improvement of sodium level, later on free water boluses with 250 mL every 6 hours were added.  Neurology was consulted due to recent right MCA stroke with left hemiplegia to decide about aspirin/Plavix as repeat CT head was concerning for some intracranial bleed.  Blood cultures grew Enterococcus faecium and patient started on vancomycin on 1/15 to complete 10 days. Arlyn Lerma is a right gluteal decubitus small wound but the source of Enterococcus bacteremia is unclear.  May need to rule out GI source of Enterococcus bacteremia.  Patient had repeat CT head  which still cannot totally exclude bleed in the brain.     Hypernatremia and patient's mentation is improving.  She is alert and oriented now. Ziggy Siddiqi continues to have left-sided body weakness from right MCA stroke. Ziggy Siddiqi is on tube feeding from PEG tube.  Hemodynamically stable.  transferred out of ICU to floors for further management. OBJECTIVE     Vital Signs:  /75   Pulse 72   Temp 98.7 °F (37.1 °C) (Oral)   Resp 19   Ht 5' 4\" (1.626 m)   Wt 241 lb 2.9 oz (109.4 kg)   LMP  (LMP Unknown)   SpO2 100%   BMI 41.40 kg/m²     Temp (24hrs), Av.4 °F (36.9 °C), Min:97.9 °F (36.6 °C), Max:98.7 °F (37.1 °C)    No intake/output data recorded. This examinations is from the morning before  The event. General appearance - alert,awake, using BIPAP  Eyes - pupils equal and reactive, no icterus.   Mouth - mucous membranes moist, pharynx normal without lesions  Neck - supple, no significant adenopathy  Chest - clear to auscultation, no wheezes  Heart - normal rate, regular rhythm, normal S1, S2  Abdomen - soft, nontender, nondistended  Neurological - alert, oriented, normal speech, Positive left-sided residual defects. Extremities - peripheral pulses normal, no pedal edema  Skin - normal coloration and turgor, no rashes          Medications:  Scheduled Medications:    insulin glargine  38 Units Subcutaneous Daily    cloNIDine  0.2 mg Oral Daily    losartan  25 mg Oral Daily    amLODIPine  10 mg Oral Daily    insulin lispro  0-12 Units Subcutaneous Q6H    aspirin  81 mg Oral Daily    docusate  100 mg Oral Daily    vancomycin (VANCOCIN) intermittent dosing (placeholder)   Other RX Placeholder    vancomycin  1,500 mg Intravenous Q12H    atorvastatin  80 mg Oral Daily    sertraline  100 mg Oral Daily    sodium chloride flush  10 mL Intravenous 2 times per day    enoxaparin  40 mg Subcutaneous Daily     Continuous Infusions:    dextrose      sodium chloride 50 mL/hr at 01/19/21 2200     PRN Medications    hydrALAZINE, 5 mg, Q6H PRN      sodium chloride flush, 10 mL, PRN      promethazine, 12.5 mg, Q6H PRN    Or      ondansetron, 4 mg, Q6H PRN      polyethylene glycol, 17 g, Daily PRN      acetaminophen, 650 mg, Q6H PRN    Or      acetaminophen, 650 mg, Q6H PRN      glucose, 15 g, PRN      dextrose, 12.5 g, PRN      glucagon (rDNA), 1 mg, PRN      dextrose, 100 mL/hr, PRN        Diagnostic Labs:  CBC:   Recent Labs     01/18/21  0809 01/19/21  0512 01/20/21  0536   WBC 7.3 6.7 6.8   RBC 3.45* 3.65* 3.31*   HGB 9.6* 10.1* 9.4*   HCT 31.9* 33.7* 30.1*   MCV 92.5 92.3 90.9   RDW 12.9 12.9 13.2   * See Reflexed IPF Result See Reflexed IPF Result     BMP:   Recent Labs     01/18/21  0809 01/19/21  0512 01/20/21  0536    139 136   K 3.9 4.1 4.3   * 107 104   CO2 23 25 25   PHOS 2.9 2.9 3.1   BUN 11 9 8   CREATININE 0.30* 0.37* 0.32*     BNP: No results for input(s): BNP in the last 72 hours. PT/INR: No results for input(s): PROTIME, INR in the last 72 hours. APTT: No results for input(s):  APTT in the last 72 hours. CARDIAC ENZYMES: No results for input(s): CKMB, CKMBINDEX, TROPONINI in the last 72 hours. Invalid input(s): CKTOTAL;3  FASTING LIPID PANEL:  Lab Results   Component Value Date    CHOL 188 04/05/2019    HDL 48 12/16/2020    TRIG 177 (H) 04/05/2019     LIVER PROFILE:   Recent Labs     01/18/21  0809 01/19/21  0512 01/20/21  0536   AST 95* 94* 83*   ALT 64* 78* 78*   BILIDIR 0.09 <0.08 <0.08   BILITOT 0.21* 0.22* 0.20*   ALKPHOS 67 70 70      MICROBIOLOGY:   Lab Results   Component Value Date/Time    CULTURE DUPLICATE ORDER 87/71/8559 11:00 PM       Imaging:    Ct Head Wo Contrast    Result Date: 1/16/2021  No acute intracranial abnormality. Redemonstration of linear areas of increased density along the posterior margin of prior right MCA infarct. .  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 Redemonstration of extensive right MCA territory infarct. No new infarct. 2. New or increasing hyperdense serpiginous gyral hyperdensities along posterior aspect of the infarct in the parietal and to a less and extent temporal lobe and also along the margins of the right caudate nucleus is suggestive of some blood products. This may be further assessed with MRI if deemed clinically necessary. Patience Catherine Chest Portable    Result Date: 1/13/2021  No acute finding or significant change. Xr Chest Portable    Result Date: 1/13/2021  No acute cardiopulmonary findings       ASSESSMENT & PLAN     Acute encephalopathy secondary to hypernatremia and recent large right MCA stroke  -On aspirin now and  start Plavix on 01/20  -DAPT for 90 days in total  - sodium levels improved . -Continue 0.45 saline at 50 mL/h and free water bolus 250 mL every 4 hours.   -Repeat CT brain negative  -LTME consistent with continuous right hemispheric polymorphic subacute delta slowing and attenuated background with suggested underlying structural defect. No abnormal epileptiform activity was seen. - MRI planned by Neurology. - normal ammonia levels and TSH. - neuro checks per protocol. Enterococcus faecalis bacteremia from unclear source  -Vancomycin every 12 hours for 10 days   -ID is following the patient. E. coli UTI  Course of ceftriaxone completed.     Hypertension  -Resumed [id number] mg, Cozaar 25 mg and clonidine 0.2 mg daily  -Metoprolol discontinued due to bradycardia     Type 2 diabetes mellitus  -Last A1c 6.8  -Blood glucose uncontrolled after starting tube feeding.  -started on Lantus 38 units daily  -Continue cover with sliding scale insulin.     History of coronary artery disease with PCI in the past  -Continue statin, aspirin  - BB held due to bradycardia    History of sleep apnea  CPAP during sleep and day time naps.     DVT prophylaxis: Lovenox  Diet: PEG tube feeding     OT/PT- on board  Discharge planning- ongoing     Rhett Ely MD  Internal Medicine Resident, PGY-1  [address], [place]  1/20/2021, 9:39 AM

## 2021-01-20 NOTE — PROGRESS NOTES
Dr. David Gonzalez okay with MRI tomorrow after PICC placement. Several attempts at IVs have been made by different nurses, including via ultrasound.

## 2021-01-20 NOTE — PROGRESS NOTES
catheterization (01/2013); Lumbar spine surgery (2007); Vena Cava Filter Placement (2007); Tonsillectomy; joint replacement; knee surgery (10/21/2013); knee surgery (12/02/2013); knee surgery (12/09/2013); other surgical history (Right, 07/14/2014); other surgical history (Right, 03/16/2015); Upper gastrointestinal endoscopy; Colonoscopy (09/2015); other surgical history (Right, 06/13/2016); Coronary angioplasty with stent (1012-16); Nerve Block (Right, 04/17/2017); Gastrostomy tube placement (12/28/2020); and Gastrostomy tube placement (N/A, 12/28/2020). Restrictions  Restrictions/Precautions  Restrictions/Precautions: Fall Risk  Required Braces or Orthoses?: No  Position Activity Restriction  Other position/activity restrictions: left hemiplegia  Subjective   General  Chart Reviewed: Yes  Patient assessed for rehabilitation services?: Yes  Family / Caregiver Present: No  Diagnosis: AMS, decubitus ulcer, s/p CVA  Subjective  Subjective: RN and pt agreeable to therapy this day  Vital Signs  Patient Currently in Pain: Denies   Orientation  Orientation  Overall Orientation Status: Impaired  Orientation Level: Oriented to person;Oriented to place;Oriented to time;Disoriented to situation  Objective    ADL  Grooming: Setup;Verbal cueing; Increased time to complete;Contact guard assistance(face washing completed seated at EOB)  UE Dressing: Maximum assistance;Setup;Verbal cueing; Increased time to complete(to don gown)  LE Dressing: Dependent/Total(to doff/socks)  Additional Comments: Pt supine in bed at start of session req max redirection throughout session. L visual scanning completed during ADLs and static sitting pt req max verbal/visual/tactile cues to scan past midline to L.   Balance  Sitting Balance: Maximum assistance(Max A decreasing to intermitnet Max x2)  Standing Balance  Comment: BRITNI sec to pt P sitting balance  Bed mobility  Rolling to Left: Maximum assistance  Rolling to Right: Maximum assistance  Supine to Sit: Maximum assistance;2 Person assistance  Sit to Supine: Maximum assistance;2 Person assistance  Scooting: Maximal assistance  Comment: HOB elevated  Cognition  Overall Cognitive Status: Exceptions  Arousal/Alertness: Appropriate responses to stimuli  Following Commands: Follows one step commands with repetition; Follows one step commands with increased time; Inconsistently follows commands  Attention Span: Difficulty attending to directions; Difficulty dividing attention  Memory: Decreased recall of recent events  Safety Judgement: Decreased awareness of need for assistance;Decreased awareness of need for safety  Problem Solving: Decreased awareness of errors;Assistance required to identify errors made;Assistance required to generate solutions;Assistance required to implement solutions;Assistance required to correct errors made  Insights: Decreased awareness of deficits  Initiation: Requires cues for all  Sequencing: Requires cues for some  Cognition Comment: max redirection req throughout session pt req short simple commands     Pt pleasant and cooperative throughout session. ADL tasks of grooming and dressing completed see above for LOF. Pt sat for approx 7 min static/dynamic sitting req Max A for posterior lean increasing to Max A x2 for post and lateral leans. During sitting pt attempts to push through RUE req RUE to be removed from bed in order to increase pt seated balance. Co tx completed with PTA throughout session sec to pt req increased assist. At session end pt supine in bed L side lying with PTA present to complete session.    Plan   Plan  Times per week: 3-5x weekly   Cont POC    Goals  Short term goals  Time Frame for Short term goals: Pt will upon discharge  Short term goal 1: demo UB ADLs at Mod A using vanessa techniques PRN  Short term goal 2: demo LB ADLs at Mod A using vanessa techniques PRN  Short term goal 3: demo min A x 2 bed mobility using bedrails to increase independence with ADLs  Short term goal 4: demo WB through LUE for 5+ min during functional activity <1 VC and 1 tactile cue PRN  Short term goal 5: Demo scanning to L visual field during functional activity with <2 VC  Short term goal 6: Notify OTR to update goals as pt progresses       Therapy Time   Individual Concurrent Group Co-treatment   Time In 0830         Time Out 0859         Minutes 29         Timed Code Treatment Minutes: 9 Minutes(20 min to PTA for co tx)       AMBROCIO San/MARIANN

## 2021-01-20 NOTE — PROGRESS NOTES
[facility] - Progress Note    Today's Date and Time: 1/20/2021, 4:02 PM    Impression :   · Extensive right MCA stroke   · Acte cystitis without hematuria secondary to e coli  · Enterococcus septicemia 1-13-21  · Hypernatremia  · Dehydration   · Allergy to Penicillin, sulfa  · Rt Gluteal decubitus    Recommendations:   · Vancomycin 1,250 mg IV q12h for 10 days; Stop date: 01/25/21  · Completed Ceftriaxone 1 G IV daily; stop date: 1/18/2021  · Wound Care    Medical Decision Making/Summary/Discussion:1/20/2021     ·   Infection Control Recommendations   · [place] Precautions    Antimicrobial Stewardship Recommendations     · Simplification of therapy  · Targeted therapy  · PK dosing    Coordination of Outpatient Care:   · Estimated Length of IV antimicrobials: 1-25-21  · Patient will need Midline Catheter Insertion:  TBD   · Patient will need PICC line Insertion: TBD   · Patient will need: Home IV , [place],  SNF,  LTAC: [facility]  · Patient will need outpatient wound care: TBD. Chief complaint/reason for consultation:   · Enterococcus in blood     History of Present Illness:     INITIAL HISTORY:    Yulissa Simmons is a 78y.o.-year-old  female with history of CAD, HTN, DM, hyperlipidemia, right MCA stroke with residual left-sided deficits who was initially admitted on 1/13/2021. Patient seen at the request of Dr. Adrian Loja. Patient was initially seen at [facility] [address] ER because of uncontrolled hyperglycemia, hypernatremia and AMS. Of note, patient suffered a stroke in December 2020 and had a PEG placed at that time. CT of the head showed redemonstration of extensive right MCA territory infarct with no new infarct noted. Patient was started on heparin, but was not a candidate for TPA due to being out of window period. Blood cultures obtained on 1-13-21 show the growth of Enterococcus faecalis. Susceptible to ampicillin but patient is allergic to penicillins. Started on Vancomycin. Plan 10 days of Rx. CURRENT EVALUATION: 01/20/21    Afebrile  VS stable    The patient seen and evaluated at bedside. Patient is better with no new acute issues or concerns today. Patient does not have any fevers, chills, cough, shortness of breath, chest pains or palpitations. Mentation has improved  Patient is on CPAP. Her oxygenation has improved. Labs, X rays reviewed: 1/20/2021    BUN: 35-->8  Cr: 0.43-->0.32    WBC: 7.5 -->6.8  Hb: 10.4 -->9.4  Plat: 139-->133    Cultures:  Urine:  · 1/13/21: +ve E.coli   Blood:  · 1/13/21: +ve enterococcus faecalis (gram +ve cocci in chains and pairs)  Sputum :  ·   Wound:  ·     COVID: negative   MRSA: negative     CT Abd/pelvis  Impression:        1. No acute findings within the abdomen or pelvis.  In particular, there is   no evidence of occult abdominal or pelvic abscess.  Mild presacral edema,   possibly third-spacing. 2. Stable focal cortical atrophy in the lower pole of the left kidney. Nonobstructive left nephrolithiasis. CT Head WO Contrast:   · 1/15/21: Pending   · 1/13/21:   1. Redemonstration of extensive right MCA territory infarct.  No new infarct. 2. New or increasing hyperdense serpiginous gyral hyperdensities along   posterior aspect of the infarct in the parietal and to a less and extent   temporal lobe and also along the margins of the right caudate nucleus is   suggestive of some blood products.  This may be further assessed with MRI if   deemed clinically necessary. .               CXR:   1/13/21: No acute cardiopulmonary findings. Discussed with patient, RN, family. I have personally reviewed the past medical history, past surgical history, medications, social history, and family history, and I have updated the database accordingly.   Past Medical History:     Past Medical History:   Diagnosis Date    Allergic rhinitis     Anxiety 10/25/2016    Asthma     CAD (coronary artery disease)     s/p stents RCA and LAD 2009,    Chronic back pain     Congestive heart failure ([address]     Depression     Headache(784.0)     Hiatal hernia     Hypercholesteremia 2/21/2012    Hypertension     Kidney stones     years ago    Obesity     Osteoarthritis     Postlaminectomy syndrome 6/6/2012    Type II or unspecified type diabetes mellitus without mention of complication, not stated as uncontrolled     Unspecified sleep apnea     Urinary incontinence        Past Surgical  History:     Past Surgical History:   Procedure Laterality Date    CARDIAC CATHETERIZATION  01/2013    patent stents    COLONOSCOPY  09/2015    normal    CORONARY ANGIOPLASTY WITH STENT PLACEMENT  1012-16    stents x 3    GASTROSTOMY TUBE PLACEMENT  12/28/2020    EGD PEG TUBE PLACEMENT    GASTROSTOMY TUBE PLACEMENT N/A 12/28/2020    EGD PEG TUBE PLACEMENT performed by Sheridan Bello MD at [address]      left knee    KNEE SURGERY  10/21/2013    rt knee synvisc injection     KNEE SURGERY  12/02/2013    knee synvisc injection rt #2    KNEE SURGERY  12/09/2013    rt knee synvisc inj    LUMBAR SPINE SURGERY  2007    NERVE BLOCK  04/23/2012    Right MBNB L3, L4, L5    NERVE BLOCK  05/22/2012    Right MBNB L3, L4, and L5     NERVE BLOCK  01/14/2013    Right knee injection #1 - Synvisc    NERVE BLOCK  01/21/2013    Right knee injection #2 - Synvisc    NERVE BLOCK  01/28/2013    Rigth knee synvisc injection #3    NERVE BLOCK Right 04/17/2017    Rt genicular nerve block.  no steroid used    OTHER SURGICAL HISTORY Right 07/14/2014    synvisc one knee injection    OTHER SURGICAL HISTORY Right 03/16/2015    synvisc one knee injection    OTHER SURGICAL HISTORY Right 06/13/2016    synvisc right knee injection    SPINE SURGERY      TONSILLECTOMY      UPPER GASTROINTESTINAL ENDOSCOPY      VENA CAVA FILTER PLACEMENT  2007    PE and B/L LE emboli       Medications:      clopidogrel  75 mg Oral Daily    lidocaine 1 % injection  5 mL Intradermal Once    sodium chloride flush  10 mL Intravenous 2 times per day    insulin glargine  38 Units Subcutaneous Daily    cloNIDine  0.2 mg Oral Daily    losartan  25 mg Oral Daily    amLODIPine  10 mg Oral Daily    insulin lispro  0-12 Units Subcutaneous Q6H    aspirin  81 mg Oral Daily    docusate  100 mg Oral Daily    vancomycin (VANCOCIN) intermittent dosing (placeholder)   Other RX Placeholder    vancomycin  1,500 mg Intravenous Q12H    atorvastatin  80 mg Oral Daily    sertraline  100 mg Oral Daily    sodium chloride flush  10 mL Intravenous 2 times per day    enoxaparin  40 mg Subcutaneous Daily       Social History:     Social History     Socioeconomic History    Marital status: Legally      Spouse name: Not on file    Number of children: Not on file    Years of education: Not on file    Highest education level: Not on file   Occupational History    Not on file   Social Needs    Financial resource strain: Not on file    Food insecurity     Worry: Not on file     Inability: Not on file    Transportation needs     Medical: Not on file     Non-medical: Not on file   Tobacco Use    Smoking status: Former Smoker     Packs/day: 0.50     Years: 0.00     Pack years: 0.00     Quit date: 3/28/2013     Years since quittin.8    Smokeless tobacco: Never Used   Substance and Sexual Activity    Alcohol use:  Yes     Alcohol/week: 1.0 standard drinks     Types: 1 Cans of beer per week     Comment: occasionally    Drug use: No    Sexual activity: Not on file   Lifestyle    Physical activity     Days per week: Not on file     Minutes per session: Not on file    Stress: Not on file   Relationships    Social connections     Talks on phone: Not on file     Gets together: Not on file     Attends Restoration service: Not on file     Active member of club or organization: Not on file     Attends meetings of clubs or organizations: Not on file Relationship status: Not on file    Intimate partner violence     Fear of current or ex partner: Not on file     Emotionally abused: Not on file     Physically abused: Not on file     Forced sexual activity: Not on file   Other Topics Concern    Not on file   Social History Narrative    Not on file       Family History:     Family History   Problem Relation Age of Onset    Diabetes Mother     Cancer Father     High Blood Pressure Sister     High Blood Pressure Brother         Allergies:   Bactrim, Penicillins, and Tylenol [acetaminophen]     Review of Systems:     Unable to perform ROS today. She is sleeping. Physical Examination :     Patient Vitals for the past 8 hrs:   BP Temp Temp src Pulse Resp SpO2   01/20/21 1149 (!) 144/74 97.9 °F (36.6 °C) Axillary 59 17 98 %   01/20/21 0914 135/75 98.7 °F (37.1 °C) Oral -- -- --     General Appearance: sleeping and in no apparent distress  Head:  Normocephalic, no trauma  Pulmonary/Chest: Clear to auscultation, without wheezes, rales, or rhonchi. Cardiovascular: Regular rate and rhythm without murmurs. Abdomen: Soft. Bowel sounds normal  All four Extremities: No cyanosis, clubbing, edema, or effusions. Neurologic: No gross sensory or motor deficits. Skin: Warm and dry with good turgor. No signs of peripheral arterial or venous insufficiency.     Medical Decision Making -Laboratory:   I have independently reviewed/ordered the following labs:    CBC with Differential:   Recent Labs     01/19/21 0512 01/20/21  0536   WBC 6.7 6.8   HGB 10.1* 9.4*   HCT 33.7* 30.1*   PLT See Reflexed IPF Result See Reflexed IPF Result   LYMPHOPCT 23* 25   MONOPCT 8 8     BMP:   Recent Labs     01/19/21 0512 01/20/21  0536    136   K 4.1 4.3    104   CO2 25 25   BUN 9 8   CREATININE 0.37* 0.32*   MG 1.7 1.6     Hepatic Function Panel:   Recent Labs     01/19/21 0512 01/20/21  0536   PROT 5.4* 5.2*   LABALBU 2.3* 2.4*   BILIDIR <0.08 <0.08   IBILI CANNOT BE CALCULATED ceFAZolin Sensitive  Final     <=4   SUSCEPTIBLE   ceFAZolin Sensitive Cefazolin sensitivity results can be used to predict the effectiveness of oral cephalosporins (eg. Cephalexin) in uncomplicated Urinary Tract Infections due to E. coli, K. pneumoniae, and P. mirabilis Final    cefepime   Final     NOT REPORTED    cefTRIAXone Sensitive  Final     <=1   SUSCEPTIBLE   ciprofloxacin Resistant  Final     >=4   RESISTANT   ertapenem   Final     NOT REPORTED    Confirmatory Extended Spectrum Beta-Lactamase Negative NEGATIVE Final    gentamicin Sensitive  Final     <=1   SUSCEPTIBLE   meropenem   Final     NOT REPORTED    nitrofurantoin Sensitive  Final     <=16   SUSCEPTIBLE   tigecycline   Final     NOT REPORTED    tobramycin Sensitive  Final     <=1   SUSCEPTIBLE   trimethoprim-sulfamethoxazole Sensitive  Final     <=20   SUSCEPTIBLE   piperacillin-tazobactam Sensitive  Final     <=4   SUSCEPTIBLE   Lab and Collection    Culture, Urine - 1/13/2021  1/15/2021 10:41 AM - Ledy Carnes Incoming Lab Results From [facility]    Specimen Information: Blood        Component Collected Lab   Specimen Description 01/13/2021 10:15 AM [facility]   . BLOOD    Special Requests 01/13/2021 10:15 AM [facility]   LEFT HAND 20cc    Culture Abnormal  01/13/2021 10:15 AM [address]  Blood Culture Results called to and read back by: OLGA Fernandez V AT 0617 ON 1/14/21    Culture 01/13/2021 10:15 AM [facility] FROM BOTTLE: GRAM POSITIVE COCCI IN CHAINS AND PAIRS    Culture Abnormal  01/13/2021 10:15 AM [facility]    Testing Performed By    Umu Cardona Name Director Address Valid Date Range   [facility]Favian Chinchilla MD [address] [facility] [address] [address] 08/30/17 0801-[facility]   [address] LAB Dori Driver  [place] Yara Giordano [address] 11/17/17 1521-Present   Susceptibility    Enterococcus  faecalis (3)    Antibiotic Interpretation DONNA Status    ampicillin Sensitive  Final     <=2   SUSCEPTIBLE   penicillin   Final     NOT REPORTED    ciprofloxacin   Final     NOT REPORTED    erythromycin   Final     NOT REPORTED    Gentamicin, High Level Sensitive SUSCEPTIBLE Final    levofloxacin   Final     NOT REPORTED    linezolid   Final     NOT REPORTED    nitrofurantoin   Final     NOT REPORTED    Synercid   Final     NOT REPORTED    Streptomycin, Hi Level Sensitive SUSCEPTIBLE Final    tetracycline   Final     NOT REPORTED    tigecycline   Final     NOT REPORTED    vancomycin Sensitive  Final     1   SUSCEPTIBLE   Lab and Collection    Culture, Blood 1 - 1/13/2021 1/18/2021  7:25 AM - [facility] Incoming Lab Results From [facility]    Specimen Information: Blood        Component Collected Lab   Specimen Description 01/13/2021  9:55 AM [facility]   . BLOOD    Special Requests 01/13/2021  9:55 AM [facility]   LEFT WRIST 20cc    Culture 01/13/2021  9:55  [address]   NO GROWTH 5 DAYS    Testing Performed By    Lab - Abbreviation Name Director Address Valid Date Range   [facility] [address] [facility] [address] 08/30/17 [facility] Deonte Almazan MD [address] [address] 11/17/17 1521-Present   Lab and Collection    Culture, Blood 1 - 1/13/2021    Medical Decision Making-Other:     Note:  · Labs, medications, radiologic studies were reviewed with personal review of films  · Moderate Large amounts of data were reviewed  · Discussed with nursing Staff, Discharge planner  · Infection Control and Prevention measures reviewed  · All prior entries were reviewed  · Administer medications as ordered  · Prognosis: Fair   · Discharge planning reviewed  · Follow up as outpatient.     Thank you for allowing us to participate in the care of this patient. Please call with questions.         Amarilys Brooks MD,        Office: [phone]

## 2021-01-20 NOTE — PROGRESS NOTES
Physical Therapy  Facility/Department: [facility] NEURO  Daily Treatment Note  NAME: Virginia Luna  : 1950  MRN: [id number]    Date of Service: 2021    Discharge Recommendations:  Patient would benefit from continued therapy after discharge   PT Equipment Recommendations  Equipment Needed: No    Assessment   Body structures, Functions, Activity limitations: Decreased functional mobility ; Decreased cognition;Decreased endurance;Decreased strength;Decreased coordination  Assessment: Pt sat EOB 12 minutes requiring maxA for balance. Prognosis: Fair  REQUIRES PT FOLLOW UP: Yes  Activity Tolerance  Activity Tolerance: Patient limited by cognitive status     Patient Diagnosis(es): The encounter diagnosis was Essential hypertension. has a past medical history of Allergic rhinitis, Anxiety, Asthma, CAD (coronary artery disease), Chronic back pain, Congestive heart failure ([facility] [address] Depression, Headache(784.0), Hiatal hernia, Hypercholesteremia, Hypertension, Kidney stones, Obesity, Osteoarthritis, Postlaminectomy syndrome, Type II or unspecified type diabetes mellitus without mention of complication, not stated as uncontrolled, Unspecified sleep apnea, and Urinary incontinence. has a past surgical history that includes Spine surgery; Nerve Block (2012); Nerve Block (2012); Nerve Block (2013); Nerve Block (2013); Nerve Block (2013); Cardiac catheterization (2013); Lumbar spine surgery (); Vena Cava Filter Placement (); Tonsillectomy; joint replacement; knee surgery (10/21/2013); knee surgery (2013); knee surgery (2013); other surgical history (Right, 2014); other surgical history (Right, 2015); Upper gastrointestinal endoscopy; Colonoscopy (2015); other surgical history (Right, 2016); Coronary angioplasty with stent (1012-16); Nerve Block (Right, 2017);  Gastrostomy tube placement (2020); and Gastrostomy tube placement (N/A, 12/28/2020). Restrictions  Restrictions/Precautions  Restrictions/Precautions: Fall Risk  Required Braces or Orthoses?: No  Position Activity Restriction  Other position/activity restrictions: left hemiplegia  Subjective   General  Chart Reviewed: Yes  Family / Caregiver Present: No  Subjective  Subjective: in bed upon arrival agreeable to PT          Orientation  Orientation  Overall Orientation Status: Impaired  Cognition      Objective   Bed mobility  Rolling to Left: Maximum assistance  Rolling to Right: Maximum assistance  Supine to Sit: Maximum assistance;2 Person assistance  Sit to Supine: Maximum assistance;2 Person assistance  Scooting: Maximal assistance  Transfers  Comment: BRITNI  Ambulation  Ambulation?: No     Balance  Posture: Fair  Sitting - Static: Poor  Sitting - Dynamic: Poor;-  Comments: Sitting balance fluctuates after dependently positioned from best of fair - (close guarding with UE support) to poor due to needing mod A to right self and reposition back in midline  Exercises  Comments: PROM BLEs, LUE x10 reps   Goals  Short term goals  Time Frame for Short term goals: 14 visits  Short term goal 1: Prevent contractures of LUE/LE through ROM and stretching  Short term goal 2: Pt to follow most commands for active exercise RUE/LE  Short term goal 3: Pt to sit EOB MAX A +2. STG3 met. UPdate STG 3 to Sit EOB min A. Short term goal 4: New STG 4: Sit to/from stand with mod A. Patient Goals   Patient goals : Unable to state.     Plan    Plan  Times per week: 3-4x/week  Current Treatment Recommendations: Strengthening, ROM, Cognitive Reorientation, Functional Mobility Training, Transfer Training, Safety Education & Training, Endurance Training, Home Exercise Program, Positioning, Patient/Caregiver Education & Training  Safety Devices  Type of devices: Nurse notified, Chair alarm in place, Patient at risk for falls, All fall risk precautions in place, Bed alarm in place, Left in bed, Call light within reach     Therapy Time   Individual Concurrent Group Co-treatment   Time In 0830         Time Out 0901         Minutes 31         Timed Code Treatment Minutes: 23 Minutes(co treat)       Joceline Steel, PTA

## 2021-01-20 NOTE — PROGRESS NOTES
CLINICAL PHARMACY NOTE: MEDS TO [address] Select Patient?: No  Total # of Prescriptions Filled: 2   The following medications were delivered to the patient:  · Clonidine  · metoprolol  Total # of Interventions Completed: 0  Time Spent (min): 0    Additional Documentation:

## 2021-01-20 NOTE — CARE COORDINATION
Transitional Planning     Call from Yulan; she stated that she is currently at work and unable to make a decision. She stated that [address] [place] [address] are able to make the decision and to call them.

## 2021-01-21 LAB
ABSOLUTE EOS #: 0.18 K/UL (ref 0–0.44)
ABSOLUTE IMMATURE GRANULOCYTE: 0.27 K/UL (ref 0–0.3)
ABSOLUTE LYMPH #: 1.96 K/UL (ref 1.1–3.7)
ABSOLUTE MONO #: 0.68 K/UL (ref 0.1–1.2)
ALBUMIN SERPL-MCNC: 2.8 G/DL (ref 3.5–5.2)
ALBUMIN/GLOBULIN RATIO: 0.8 (ref 1–2.5)
ALP BLD-CCNC: 92 U/L (ref 35–104)
ALT SERPL-CCNC: 127 U/L (ref 5–33)
ANION GAP SERPL CALCULATED.3IONS-SCNC: 10 MMOL/L (ref 9–17)
AST SERPL-CCNC: 149 U/L
BASOPHILS # BLD: 1 % (ref 0–2)
BASOPHILS ABSOLUTE: 0.07 K/UL (ref 0–0.2)
BILIRUB SERPL-MCNC: 0.28 MG/DL (ref 0.3–1.2)
BILIRUBIN DIRECT: <0.08 MG/DL
BILIRUBIN, INDIRECT: ABNORMAL MG/DL (ref 0–1)
BUN BLDV-MCNC: 8 MG/DL (ref 8–23)
BUN/CREAT BLD: ABNORMAL (ref 9–20)
CALCIUM SERPL-MCNC: 9.6 MG/DL (ref 8.6–10.4)
CHLORIDE BLD-SCNC: 105 MMOL/L (ref 98–107)
CO2: 23 MMOL/L (ref 20–31)
CREAT SERPL-MCNC: 0.34 MG/DL (ref 0.5–0.9)
DIFFERENTIAL TYPE: ABNORMAL
EOSINOPHILS RELATIVE PERCENT: 2 % (ref 1–4)
GFR AFRICAN AMERICAN: >60 ML/MIN
GFR NON-AFRICAN AMERICAN: >60 ML/MIN
GFR SERPL CREATININE-BSD FRML MDRD: ABNORMAL ML/MIN/{1.73_M2}
GFR SERPL CREATININE-BSD FRML MDRD: ABNORMAL ML/MIN/{1.73_M2}
GLOBULIN: ABNORMAL G/DL (ref 1.5–3.8)
GLUCOSE BLD-MCNC: 202 MG/DL (ref 65–105)
GLUCOSE BLD-MCNC: 230 MG/DL (ref 70–99)
GLUCOSE BLD-MCNC: 236 MG/DL (ref 65–105)
GLUCOSE BLD-MCNC: 257 MG/DL (ref 65–105)
GLUCOSE BLD-MCNC: 286 MG/DL (ref 65–105)
HCT VFR BLD CALC: 37.5 % (ref 36.3–47.1)
HEMOGLOBIN: 11.4 G/DL (ref 11.9–15.1)
IMMATURE GRANULOCYTES: 3 %
LYMPHOCYTES # BLD: 24 % (ref 24–43)
MAGNESIUM: 1.6 MG/DL (ref 1.6–2.6)
MCH RBC QN AUTO: 27.6 PG (ref 25.2–33.5)
MCHC RBC AUTO-ENTMCNC: 30.4 G/DL (ref 28.4–34.8)
MCV RBC AUTO: 90.8 FL (ref 82.6–102.9)
MONOCYTES # BLD: 8 % (ref 3–12)
NRBC AUTOMATED: 0 PER 100 WBC
PDW BLD-RTO: 13.3 % (ref 11.8–14.4)
PHOSPHORUS: 3.4 MG/DL (ref 2.6–4.5)
PLATELET # BLD: 165 K/UL (ref 138–453)
PLATELET ESTIMATE: ABNORMAL
PMV BLD AUTO: 11.2 FL (ref 8.1–13.5)
POTASSIUM SERPL-SCNC: 4.2 MMOL/L (ref 3.7–5.3)
RBC # BLD: 4.13 M/UL (ref 3.95–5.11)
RBC # BLD: ABNORMAL 10*6/UL
SEG NEUTROPHILS: 62 % (ref 36–65)
SEGMENTED NEUTROPHILS ABSOLUTE COUNT: 5.07 K/UL (ref 1.5–8.1)
SODIUM BLD-SCNC: 138 MMOL/L (ref 135–144)
TOTAL PROTEIN: 6.3 G/DL (ref 6.4–8.3)
WBC # BLD: 8.2 K/UL (ref 3.5–11.3)
WBC # BLD: ABNORMAL 10*3/UL

## 2021-01-21 PROCEDURE — 84100 ASSAY OF PHOSPHORUS: CPT

## 2021-01-21 PROCEDURE — 6360000002 HC RX W HCPCS: Performed by: STUDENT IN AN ORGANIZED HEALTH CARE EDUCATION/TRAINING PROGRAM

## 2021-01-21 PROCEDURE — 82947 ASSAY GLUCOSE BLOOD QUANT: CPT

## 2021-01-21 PROCEDURE — 99233 SBSQ HOSP IP/OBS HIGH 50: CPT | Performed by: INTERNAL MEDICINE

## 2021-01-21 PROCEDURE — 6370000000 HC RX 637 (ALT 250 FOR IP): Performed by: FAMILY MEDICINE

## 2021-01-21 PROCEDURE — 6370000000 HC RX 637 (ALT 250 FOR IP): Performed by: STUDENT IN AN ORGANIZED HEALTH CARE EDUCATION/TRAINING PROGRAM

## 2021-01-21 PROCEDURE — 85025 COMPLETE CBC W/AUTO DIFF WBC: CPT

## 2021-01-21 PROCEDURE — 2580000003 HC RX 258: Performed by: STUDENT IN AN ORGANIZED HEALTH CARE EDUCATION/TRAINING PROGRAM

## 2021-01-21 PROCEDURE — 80076 HEPATIC FUNCTION PANEL: CPT

## 2021-01-21 PROCEDURE — 36415 COLL VENOUS BLD VENIPUNCTURE: CPT

## 2021-01-21 PROCEDURE — 97110 THERAPEUTIC EXERCISES: CPT

## 2021-01-21 PROCEDURE — 83735 ASSAY OF MAGNESIUM: CPT

## 2021-01-21 PROCEDURE — 80048 BASIC METABOLIC PNL TOTAL CA: CPT

## 2021-01-21 PROCEDURE — 99232 SBSQ HOSP IP/OBS MODERATE 35: CPT | Performed by: PSYCHIATRY & NEUROLOGY

## 2021-01-21 PROCEDURE — 99232 SBSQ HOSP IP/OBS MODERATE 35: CPT | Performed by: INTERNAL MEDICINE

## 2021-01-21 PROCEDURE — C1751 CATH, INF, PER/CENT/MIDLINE: HCPCS

## 2021-01-21 PROCEDURE — 36569 INSJ PICC 5 YR+ W/O IMAGING: CPT

## 2021-01-21 PROCEDURE — 76937 US GUIDE VASCULAR ACCESS: CPT

## 2021-01-21 PROCEDURE — 2060000000 HC ICU INTERMEDIATE R&B

## 2021-01-21 PROCEDURE — 6370000000 HC RX 637 (ALT 250 FOR IP): Performed by: INTERNAL MEDICINE

## 2021-01-21 PROCEDURE — 05HY33Z INSERTION OF INFUSION DEVICE INTO UPPER VEIN, PERCUTANEOUS APPROACH: ICD-10-PCS | Performed by: INTERNAL MEDICINE

## 2021-01-21 PROCEDURE — 97535 SELF CARE MNGMENT TRAINING: CPT

## 2021-01-21 PROCEDURE — 94660 CPAP INITIATION&MGMT: CPT

## 2021-01-21 RX ORDER — INSULIN GLARGINE 100 [IU]/ML
40 INJECTION, SOLUTION SUBCUTANEOUS DAILY
Status: DISCONTINUED | OUTPATIENT
Start: 2021-01-21 | End: 2021-01-23 | Stop reason: HOSPADM

## 2021-01-21 RX ADMIN — AMLODIPINE BESYLATE 10 MG: 5 TABLET ORAL at 09:56

## 2021-01-21 RX ADMIN — SODIUM CHLORIDE, PRESERVATIVE FREE 10 ML: 5 INJECTION INTRAVENOUS at 21:35

## 2021-01-21 RX ADMIN — SERTRALINE 100 MG: 50 TABLET, FILM COATED ORAL at 09:56

## 2021-01-21 RX ADMIN — CLONIDINE HYDROCHLORIDE 0.2 MG: 0.2 TABLET ORAL at 09:56

## 2021-01-21 RX ADMIN — INSULIN LISPRO 6 UNITS: 100 INJECTION, SOLUTION INTRAVENOUS; SUBCUTANEOUS at 18:29

## 2021-01-21 RX ADMIN — ENOXAPARIN SODIUM 40 MG: 40 INJECTION SUBCUTANEOUS at 09:56

## 2021-01-21 RX ADMIN — ATORVASTATIN CALCIUM 80 MG: 80 TABLET, FILM COATED ORAL at 21:35

## 2021-01-21 RX ADMIN — CLOPIDOGREL 75 MG: 75 TABLET, FILM COATED ORAL at 09:56

## 2021-01-21 RX ADMIN — INSULIN GLARGINE 40 UNITS: 100 INJECTION, SOLUTION SUBCUTANEOUS at 09:56

## 2021-01-21 RX ADMIN — INSULIN LISPRO 4 UNITS: 100 INJECTION, SOLUTION INTRAVENOUS; SUBCUTANEOUS at 05:58

## 2021-01-21 RX ADMIN — ASPIRIN 81 MG: 81 TABLET, CHEWABLE ORAL at 09:56

## 2021-01-21 RX ADMIN — INSULIN LISPRO 6 UNITS: 100 INJECTION, SOLUTION INTRAVENOUS; SUBCUTANEOUS at 13:49

## 2021-01-21 RX ADMIN — LOSARTAN POTASSIUM 25 MG: 25 TABLET, FILM COATED ORAL at 09:56

## 2021-01-21 ASSESSMENT — PAIN SCALES - WONG BAKER
WONGBAKER_NUMERICALRESPONSE: 0

## 2021-01-21 ASSESSMENT — PAIN DESCRIPTION - LOCATION
LOCATION: BUTTOCKS
LOCATION: BUTTOCKS

## 2021-01-21 ASSESSMENT — PAIN DESCRIPTION - PROGRESSION: CLINICAL_PROGRESSION: NOT CHANGED

## 2021-01-21 ASSESSMENT — PAIN DESCRIPTION - ORIENTATION: ORIENTATION: RIGHT;POSTERIOR

## 2021-01-21 ASSESSMENT — PAIN DESCRIPTION - PAIN TYPE: TYPE: CHRONIC PAIN

## 2021-01-21 NOTE — PROGRESS NOTES
allergic to penicillins. Started on Vancomycin. Plan 10 days of Rx. CURRENT EVALUATION: 01/21/21    Afebrile  VS stable    The patient seen and evaluated at bedside. Patient is better with no new acute issues or concerns today. Patient does not have any fevers, chills, cough, shortness of breath, chest pains or palpitations. Mentation has improved. Able to follow commands. Labs, X rays reviewed: 1/21/2021    BUN: 35-->8-->8  Cr: 0.43-->0.32-->0.34    WBC: 7.5 -->6.8-->8.2  Hb: 10.4 -->9.4-->11.4  Plat: 139-->133-->165    Cultures:  Urine:  · 1/13/21: +ve E.coli   Blood:  · 1/13/21: +ve enterococcus faecalis (gram +ve cocci in chains and pairs)  Sputum :  ·   Wound:  ·     COVID: negative   MRSA: negative       CT Abd/pelvis  01/17/21:  Impression:        1. No acute findings within the abdomen or pelvis.  In particular, there is   no evidence of occult abdominal or pelvic abscess.  Mild presacral edema,   possibly third-spacing. 2. Stable focal cortical atrophy in the lower pole of the left kidney. Nonobstructive left nephrolithiasis. CT Head WO Contrast:   1/15/21:   No acute intracranial abnormality.       No change from prior study           · 1/13/21:   1. Redemonstration of extensive right MCA territory infarct.  No new infarct. 2. New or increasing hyperdense serpiginous gyral hyperdensities along   posterior aspect of the infarct in the parietal and to a less and extent   temporal lobe and also along the margins of the right caudate nucleus is   suggestive of some blood products.  This may be further assessed with MRI if   deemed clinically necessary. .               CXR:   1/13/21: No acute cardiopulmonary findings. Discussed with patient, RN, family. I have personally reviewed the past medical history, past surgical history, medications, social history, and family history, and I have updated the database accordingly.   Past Medical History:     Past Medical History: Diagnosis Date    Allergic rhinitis     Anxiety 10/25/2016    Asthma     CAD (coronary artery disease)     s/p stents RCA and LAD 2009,    Chronic back pain     Congestive heart failure ([address]     Depression     Headache(784.0)     Hiatal hernia     Hypercholesteremia 2/21/2012    Hypertension     Kidney stones     years ago    Obesity     Osteoarthritis     Postlaminectomy syndrome 6/6/2012    Type II or unspecified type diabetes mellitus without mention of complication, not stated as uncontrolled     Unspecified sleep apnea     Urinary incontinence        Past Surgical  History:     Past Surgical History:   Procedure Laterality Date    CARDIAC CATHETERIZATION  01/2013    patent stents    COLONOSCOPY  09/2015    normal    CORONARY ANGIOPLASTY WITH STENT PLACEMENT  1012-16    stents x 3    GASTROSTOMY TUBE PLACEMENT  12/28/2020    EGD PEG TUBE PLACEMENT    GASTROSTOMY TUBE PLACEMENT N/A 12/28/2020    EGD PEG TUBE PLACEMENT performed by Sheridan Bello MD at [place] [address]  1/21/2021         JOINT REPLACEMENT      left knee    KNEE SURGERY  10/21/2013    rt knee synvisc injection     KNEE SURGERY  12/02/2013    knee synvisc injection rt #2    KNEE SURGERY  12/09/2013    rt knee synvisc inj    LUMBAR SPINE SURGERY  2007    NERVE BLOCK  04/23/2012    Right MBNB L3, L4, L5    NERVE BLOCK  05/22/2012    Right MBNB L3, L4, and L5     NERVE BLOCK  01/14/2013    Right knee injection #1 - Synvisc    NERVE BLOCK  01/21/2013    Right knee injection #2 - Synvisc    NERVE BLOCK  01/28/2013    Rigth knee synvisc injection #3    NERVE BLOCK Right 04/17/2017    Rt genicular nerve block.  no steroid used    OTHER SURGICAL HISTORY Right 07/14/2014    synvisc one knee injection    OTHER SURGICAL HISTORY Right 03/16/2015    synvisc one knee injection    OTHER SURGICAL HISTORY Right 06/13/2016    synvisc right knee injection    SPINE SURGERY      TONSILLECTOMY      UPPER MG 1.6 1.6     Hepatic Function Panel:   Recent Labs     01/20/21  0536 01/21/21  0511   PROT 5.2* 6.3*   LABALBU 2.4* 2.8*   BILIDIR <0.08 <0.08   IBILI CANNOT BE CALCULATED CANNOT BE CALCULATED   BILITOT 0.20* 0.28*   ALKPHOS 70 92   ALT 78* 127*   AST 83* 149*     No results for input(s): RPR in the last 72 hours. No results for input(s): HIV in the last 72 hours. No results for input(s): BC in the last 72 hours. Lab Results   Component Value Date    MUCUS NOT REPORTED 01/19/2021    RBC 4.13 01/21/2021    RBC 4.21 02/24/2012    TRICHOMONAS NOT REPORTED 01/19/2021    WBC 8.2 01/21/2021    YEAST NOT REPORTED 01/19/2021    TURBIDITY CLEAR 01/19/2021     Lab Results   Component Value Date    CREATININE 0.34 01/21/2021    GLUCOSE 230 01/21/2021    GLUCOSE 105 02/24/2012       Medical Decision Making-Imaging:     CT abd/pelvie 1/17/2021  Impression:        1. No acute findings within the abdomen or pelvis.  In particular, there is   no evidence of occult abdominal or pelvic abscess.  Mild presacral edema,   possibly third-spacing. 2. Stable focal cortical atrophy in the lower pole of the left kidney. Nonobstructive left nephrolithiasis. Medical Decision [id number]-[id number]:     1/15/2021 10:00 AM - Deyvi, pn Incoming Lab Results From [facility]    Specimen Information: Urine, clean catch        Component Collected Lab   Specimen Description 01/13/2021  8:40 PM [facility]   . CLEAN CATCH URINE    Special Requests 01/13/2021  8:40 PM [employer]   NOT REPORTED    Culture Abnormal  01/13/2021  8:40 PM [address] 10 to 50,000 CFU/ML    Testing Performed By    Lab - [address] Name Director Address Valid Date Range   [address] Chinchilla MD [address] [facility] [address] 08/30/17 0801-Present   Susceptibility    Escherichia coli (1)    Antibiotic Interpretation DONNA Status    amikacin   Final NOT REPORTED    ampicillin Resistant  Final     >=32   RESISTANT   ampicillin-sulbactam   Final     NOT REPORTED    aztreonam Sensitive  Final     <=1   SUSCEPTIBLE   ceFAZolin Sensitive  Final     <=4   SUSCEPTIBLE   ceFAZolin Sensitive Cefazolin sensitivity results can be used to predict the effectiveness of oral cephalosporins (eg. Cephalexin) in uncomplicated Urinary Tract Infections due to E. coli, K. pneumoniae, and P. mirabilis Final    cefepime   Final     NOT REPORTED    cefTRIAXone Sensitive  Final     <=1   SUSCEPTIBLE   ciprofloxacin Resistant  Final     >=4   RESISTANT   ertapenem   Final     NOT REPORTED    Confirmatory Extended Spectrum Beta-Lactamase Negative NEGATIVE Final    gentamicin Sensitive  Final     <=1   SUSCEPTIBLE   meropenem   Final     NOT REPORTED    nitrofurantoin Sensitive  Final     <=16   SUSCEPTIBLE   tigecycline   Final     NOT REPORTED    tobramycin Sensitive  Final     <=1   SUSCEPTIBLE   trimethoprim-sulfamethoxazole Sensitive  Final     <=20   SUSCEPTIBLE   piperacillin-tazobactam Sensitive  Final     <=4   SUSCEPTIBLE   Lab and Collection    Culture, Urine - 1/13/2021  1/15/2021 10:41 AM - Deyvi, Ledy Incoming Lab Results From [facility]    Specimen Information: Blood        Component Collected Lab   Specimen Description 01/13/2021 10:15 AM [facility]   . BLOOD    Special Requests 01/13/2021 10:15 AM [facility]   LEFT HAND 20cc    Culture Abnormal  01/13/2021 10:15 AM [address] [facility] Blood Culture Results called to and read back by: OLGA MEDEIROS V AT 0617 ON 1/14/21    Culture 01/13/2021 10:15 AM [facility] FROM BOTTLE: GRAM POSITIVE COCCI IN CHAINS AND PAIRS    Culture Abnormal  01/13/2021 10:15  [facility] Performed By    Umu Cardona Name Director Address Valid Date Range   [facility] [address] [facility] [address] [facility] [address] 08/30/17 0801-Present   [facility] [address] [facility] Yomaira Xavier  [address] [place] [id number] 11/17/17 1521-Present   Susceptibility    Enterococcus  faecalis (3)    Antibiotic Interpretation DONNA Status    ampicillin Sensitive  Final     <=2   SUSCEPTIBLE   penicillin   Final     NOT REPORTED    ciprofloxacin   Final     NOT REPORTED    erythromycin   Final     NOT REPORTED    Gentamicin, High Level Sensitive SUSCEPTIBLE Final    levofloxacin   Final     NOT REPORTED    linezolid   Final     NOT REPORTED    nitrofurantoin   Final     NOT REPORTED    Synercid   Final     NOT REPORTED    Streptomycin, Hi Level Sensitive SUSCEPTIBLE Final    tetracycline   Final     NOT REPORTED    tigecycline   Final     NOT REPORTED    vancomycin Sensitive  Final     1   SUSCEPTIBLE   Lab and Collection    Culture, Blood 1 - 1/13/2021 1/18/2021  7:25 AM - Deyvi, Mhpn Incoming Lab Results From [employer] [facility]    Specimen Information: Blood        Component Collected Lab   Specimen Description 01/13/2021  9:55 AM [facility]   . BLOOD    Special Requests 01/13/2021  9:55 AM [facility]   LEFT WRIST 20cc    Culture 01/13/2021  9:55  [address]   NO GROWTH 5 DAYS    Testing Performed By    Lab - Abbreviation Name Director Address Valid Date Range   [facility] [address] [facility] [address] 08/30/17 [facility] Yomaira Xavier MD [address] [address] 11/17/17 1521-Present   Lab and Collection    Culture, Blood 1 - 1/13/2021    Medical Decision Making-Other:     Note:  · Labs, medications, radiologic studies were reviewed with personal review of films  · Moderate Large amounts of data were reviewed  · Discussed with nursing Staff, Discharge planner  · Infection Control and Prevention measures reviewed  · All prior entries were reviewed  · Administer medications as ordered  · Prognosis: Fair   · Discharge planning reviewed  · Follow up as outpatient. Thank you for allowing us to participate in the care of this patient. Please call with questions. Ela Urbano participated in the evaluation of this patient under my direct supervision. Mimi Shell MD    ATTESTATION:    I have discussed the case, including pertinent history and exam findings with the residents and students. I have seen and examined the patient and the key elements of the encounter have been performed by me. I was present when the student obtained his information or examined the patient. I have reviewed the laboratory data, other diagnostic studies and discussed them with the residents. I have updated the medical record where necessary. I agree with the assessment, plan and orders as documented by the resident/ student.     Mimi Shell MD.         Office: [phone]

## 2021-01-21 NOTE — PROGRESS NOTES
Perfect serve received from Infectious Disease. Midline will be ok for 4 days of vancomycin. Continue to monitor.

## 2021-01-21 NOTE — PROGRESS NOTES
Occupational Therapy  Facility/Department: [facility] NEURO  Daily Treatment Note  NAME: Mack Love  : 1950  MRN: [id number]  Date of Service: 2021  Discharge Recommendations:   Pt. Would benefit from continued therapy upon discharge. Assessment   Performance deficits / Impairments: Decreased functional mobility ; Decreased endurance;Decreased posture;Decreased ADL status; Decreased ROM; Decreased strength;Decreased vision/visual deficit; Decreased safe awareness;Decreased cognition;Decreased sensation;Decreased fine motor control;Decreased high-level IADLs;Decreased balance;Decreased coordination    Assessment: Pt. agreeable to OT tx. this pm. Pt. supine in bed HOB elevated upon arrival. Pt. completed grooming (wash face, oral care, comb hair) with Mod A, requiring max v/c for initiation/sequencing. Pt. required total assist for use of LUE to wash face. Pt. required max A x2 on side rolling to during pedro care, pt. was dependent for pedro care. Prognosis: Fair    Decision Making: Low Complexity  OT Education: Orientation;Transfer Training;Precautions  Patient Education: Proper hand placement, education, hand over hand technique to LUE  Barriers to Learning: pt demo P carry over, requried Max v/c.  REQUIRES OT FOLLOW UP: Yes  Activity Tolerance  Activity Tolerance: Patient Tolerated treatment well;Treatment limited secondary to decreased cognition         Patient Diagnosis(es): The encounter diagnosis was Essential hypertension. has a past medical history of Allergic rhinitis, Anxiety, Asthma, CAD (coronary artery disease), Chronic back pain, Congestive heart failure ([facility] [address] Depression, Headache(784.0), Hiatal hernia, Hypercholesteremia, Hypertension, Kidney stones, Obesity, Osteoarthritis, Postlaminectomy syndrome, Type II or unspecified type diabetes mellitus without mention of complication, not stated as uncontrolled, Unspecified sleep apnea, and Urinary incontinence.    has a past surgical history that includes Spine surgery; Nerve Block (04/23/2012); Nerve Block (05/22/2012); Nerve Block (01/14/2013); Nerve Block (01/21/2013); Nerve Block (01/28/2013); Cardiac catheterization (01/2013); Lumbar spine surgery (2007); Vena Cava Filter Placement (2007); Tonsillectomy; joint replacement; knee surgery (10/21/2013); knee surgery (12/02/2013); knee surgery (12/09/2013); other surgical history (Right, 07/14/2014); other surgical history (Right, 03/16/2015); Upper gastrointestinal endoscopy; Colonoscopy (09/2015); other surgical history (Right, 06/13/2016); Coronary angioplasty with stent (1012-16); Nerve Block (Right, 04/17/2017); Gastrostomy tube placement (12/28/2020); and Gastrostomy tube placement (N/A, 12/28/2020). Restrictions  Restrictions/Precautions  Restrictions/Precautions: Fall Risk  Required Braces or Orthoses?: No  Position Activity Restriction  Other position/activity restrictions: left hemiplegia  Subjective   General  Chart Reviewed: Yes  Patient assessed for rehabilitation services?: Yes  Family / Caregiver Present: No  Diagnosis: AMS, decubitus ulcer, s/p CVA  Subjective  Subjective: RN okay'd OT tx this date. Pt pleasant and motivated during session. Pt. disoriented during session. Pain Assessment  Pain Level: 2  Pain Location: Buttocks  Non-Pharmaceutical Pain Intervention(s): Emotional support;Distraction  Vital Signs  Patient Currently in Pain: Yes   Orientation  Orientation  Overall Orientation Status: Impaired  Orientation Level: Disoriented to situation;Disoriented to time;Oriented to person;Disoriented to place  Objective    ADL  Grooming: Setup;Verbal cueing; Increased time to complete; Moderate assistance  LE Dressing: Dependent/Total  Toileting: Dependent/Total  Additional Comments: Pt supine in bed at start of session HOB elevated, O2 pulse-ox sensor only monitor connected. Pt. requried max v/c throughout session. L visual scanning completed during ADLs.  Pt. demo variant in A needed d/t disorientation. LE dressing (sock) Pt. required total A. Completed oral care with green oral swabs, mouthwash + wall suction d/t NPO. Pt. Left in bed with call light in reach and bed alarm activated at end of session. Balance  Sitting Balance: Unable to assess (Session completed in bed d/t pt. disoriented and not following simple commands consistantly). Pt. Demo R lean requiring wedge placed under R shoulder to improve UB posture. Standing Balance  Time: Sitting in bed= ~55 minutes  Activity: ADL/grooming activity  Bed mobility  Rolling to Left: Maximum assistance;2 Person assistance  Rolling to Right: 2 Person assistance;Maximum assistance  Comment: HOB elevated. Unable to get Pt. EOB d/t safety concerns. Rolling L/R during pedro care required Max A x2 on side pt. was rolling to. Transfers  Transfer Comments: BRITNI d/t safety concerns  Cognition  Overall Cognitive Status: Exceptions  Arousal/Alertness: Appropriate responses to stimuli  Following Commands: Follows one step commands with repetition; Follows one step commands with increased time; Inconsistently follows commands  Attention Span: Difficulty attending to directions; Difficulty dividing attention  Memory: Decreased recall of recent events;Decreased recall of biographical Information  Safety Judgement: Decreased awareness of need for assistance;Decreased awareness of need for safety  Problem Solving: Decreased awareness of errors;Assistance required to identify errors made;Assistance required to generate solutions;Assistance required to implement solutions;Assistance required to correct errors made  Insights: Decreased awareness of deficits  Initiation: Requires cues for all  Sequencing: Requires cues for all  Cognition Comment: Pt. demo decreased awareness of surrondings/individuals in room (talking to daughter during session, no family was present). Requried max v/c for initaion/sequencing during session.      Plan   Plan  Times per week: 3-5x weekly  Goals  Short term goals  Time Frame for Short term goals: Pt will upon discharge  Short term goal 1: demo UB ADLs at Mod A using vanessa techniques PRN  Short term goal 2: demo LB ADLs at Mod A using vanessa techniques PRN  Short term goal 3: demo min A x 2 bed mobility using bedrails to increase independence with ADLs  Short term goal 4: demo WB through LUE for 5+ min during functional activity <1 VC and 1 tactile cue PRN  Short term goal 5: Demo scanning to L visual field during functional activity with <2 VC  Short term goal 6: Notify OTR to update goals as pt progresses       Therapy Time   Individual Concurrent Group Co-treatment   Time In 1440         Time Out 1541         Minutes 61         Timed Code Treatment Minutes: [address], S/JONATHON  [address] [facility]

## 2021-01-21 NOTE — PROGRESS NOTES
Comprehensive Nutrition Assessment    Type and Reason for Visit:  Reassess    Nutrition Recommendations/Plan: Continue diabetic TF at 72 mL/hr. Continue free water flushes (250 mL q4). Nutrition Assessment:  Pt tolerating TF at 65 mL/hr. IVF currently off d/t no IV access at this time per RN (previously running at 50 mL/hr). Still receiving free water flushes. +BM 1/20 noted. Malnutrition Assessment:  Malnutrition Status:   At risk for malnutrition (Comment)    Context:  Chronic Illness     Findings of the 6 clinical characteristics of malnutrition:  Energy Intake:  No significant decrease in energy intake(with nutrition support)  Weight Loss:  Unable to assess     Body Fat Loss:  No significant body fat loss     Muscle Mass Loss:  No significant muscle mass loss    Fluid Accumulation:  1 - Mild Extremities   Strength:  Not Performed    Estimated Daily Nutrient Needs:  Energy (kcal):  9411-0581 kcals/day; Weight Used for Energy Requirements:  Usual     Protein (g):  1.8-2 gm/kg = 100-110 gm/day; Weight Used for Protein Requirements:  Ideal        Fluid (ml/day):  30 mL/kg = 3150 mL/day; Method Used for Fluid Requirements:  (30)      Nutrition Related Findings:         Wounds:  Open Wounds, Pressure Injury, Stage III(Open stage 3 pressure wound on coccyx, healing stage 3 pressure wound on R buttock per wound care note)       Current Nutrition Therapies:    Current Tube Feeding (TF) Orders:  · Feeding Route: PEG  · Formula: Diabetic  · Schedule: Continuous, 65 mL/hr  · Water Flushes: 250 mL q4 hours  · Current TF & Flush Orders Provides: Glucerna at 65 mL/hr = 1872 kcals, 94 gm protein, 1560 mL total fluid (3060 mL total fluid with ordered free water flushes)  · Goal TF & Flush Orders Provides: Glucerna at 65 mL/hr = 1872 kcals, 94 gm protein      Anthropometric Measures:  · Height: 5' 4\" (162.6 cm)  · Current Body Weight: 241 lb 2.9 oz (109.4 kg)   · Admission Body Weight: 214 lb 4.6 oz (97.2 kg) · Usual Body Weight: 231 lb (104.8 kg)(stated weight on 12/15/2020)     · Ideal Body Weight: 120 lbs; % Ideal Body Weight 201 %   · BMI: 41.4  · BMI Categories: Obese Class 3 (BMI 40.0 or greater)       Nutrition Diagnosis:   · Inadequate oral intake related to cognitive or neurological impairment, swallowing difficulty as evidenced by NPO or clear liquid status due to medical condition, nutrition support - enteral nutrition      Nutrition Interventions:   Food and/or Nutrient Delivery:  Continue Current Tube Feeding  Nutrition Education/Counseling:  No recommendation at this time   Coordination of Nutrition Care:  Continue to monitor while inpatient    Goals:  meet % of estimated nutrient needs       Nutrition Monitoring and Evaluation:   Behavioral-Environmental Outcomes:  None Identified   Food/Nutrient Intake Outcomes:  Enteral Nutrition Intake/Tolerance  Physical Signs/Symptoms Outcomes:  Weight, Skin, Biochemical Data, Nutrition Focused Physical Findings, Fluid Status or Edema     Discharge Planning:    Enteral Nutrition     Electronically signed by Author MS Nessa, RD, LD on 1/21/21 at 12:09 PM EST    Contact: [phone]

## 2021-01-21 NOTE — PROGRESS NOTES
[facility]  Internal Medicine Teaching Residency Program  Inpatient Daily Progress Note  ______________________________________________________________________________    Patient: Jessi Fierro  YOB: 1950   QQX:[id number]    Acct: [de-identified]     Room: [address] [place] [id number]  Admit date: 1/13/2021  Today's date: 01/21/21  Number of days in the hospital: 8    SUBJECTIVE   Admitting Diagnosis: <principal problem not specified>  CC: Altered mental status, hyponatremia, hyperglycemia  Pt examined at bedside. Chart & results reviewed. -vitals stable, afebrile. - pt looks much better today, using CPAP. -Neurology on board for waxing and waning mentation, negative workup till now, plan MRI  - ammonia and thyroid levels normal, LFTs on higher side. - adjusted the Lantus dose for uncontrolled blood sugars. ROS:(ROS was done in the morning before patient had altered mental status)  Constitutional:  negative for chills, fevers, sweats  Respiratory:  negative for cough, dyspnea on exertion, hemoptysis, shortness of breath, wheezing  Cardiovascular:  negative for chest pain, chest pressure/discomfort, lower extremity edema, palpitations  Gastrointestinal:  negative for abdominal pain, constipation, diarrhea, nausea, vomiting  Neurological:  negative for dizziness, headache  BRIEF HISTORY   August Woodward a 78 y. o. female who presented from [facility] emergency department for concerns for altered mental status, hyperglycemia, and hypernatremia. Patient presented to the emergency department after she was found to be hyperglycemic on her daily glucose checks at her rehab facility.  Patient was status post 2 weeks out from a right MCA infarct, had a extended course here at [facility] neuro critical care unit, no thrombectomy or interventions were performed, patient was started on heparin and out of TPA window.   Patient does have a past medical history of CAD, diabetes, hypertension, asthma, daily smoker.  On examination patient had a obvious left-sided facial droop, left-sided weakness, however intermittently was following commands and unsure if complete participation in exam occurring due to altered mentation from hypernatremia.        Nephrology was consulted and patient was started on 0.45 saline leading to some improvement of sodium level, later on free water boluses with 250 mL every 6 hours were added.  Neurology was consulted due to recent right MCA stroke with left hemiplegia to decide about aspirin/Plavix as repeat CT head was concerning for some intracranial bleed.  Blood cultures grew Enterococcus faecium and patient started on vancomycin on 1/15 to complete 10 days. Payton Ybarra is a right gluteal decubitus small wound but the source of Enterococcus bacteremia is unclear.  May need to rule out GI source of Enterococcus bacteremia.  Patient had repeat CT head  which still cannot totally exclude bleed in the brain.     Hypernatremia and patient's mentation is improving.  She is alert and oriented now. Kenrick Mclean continues to have left-sided body weakness from right MCA stroke. Kenrick Mclean is on tube feeding from PEG tube.  Hemodynamically stable.  transferred out of ICU to floors for further management. OBJECTIVE     Vital Signs:  BP (!) 159/96   Pulse 80   Temp 97.2 °F (36.2 °C) (Axillary)   Resp 18   Ht 5' 4\" (1.626 m)   Wt 241 lb 2.9 oz (109.4 kg)   LMP  (LMP Unknown)   SpO2 99%   BMI 41.40 kg/m²     Temp (24hrs), Av.9 °F (36.6 °C), Min:97.2 °F (36.2 °C), Max:98.4 °F (36.9 °C)    In: 250   Out: 1100 [Urine:1100]  This examinations is from the morning before  The event. General appearance - alert,awake, using BIPAP  Eyes - pupils equal and reactive, no icterus.   Mouth - mucous membranes moist, pharynx normal without lesions  Neck - supple, no significant adenopathy  Chest - clear to auscultation, no wheezes  Heart - normal rate, regular rhythm, normal S1, S2  Abdomen - soft, nontender, nondistended  Neurological - alert, oriented, normal speech, Positive left-sided residual defects.   Extremities - peripheral pulses normal, no pedal edema  Skin - normal coloration and turgor, no rashes          Medications:  Scheduled Medications:    insulin glargine  40 Units Subcutaneous Daily    clopidogrel  75 mg Oral Daily    lidocaine 1 % injection  5 mL Intradermal Once    sodium chloride flush  10 mL Intravenous 2 times per day    cloNIDine  0.2 mg Oral Daily    losartan  25 mg Oral Daily    amLODIPine  10 mg Oral Daily    insulin lispro  0-12 Units Subcutaneous Q6H    aspirin  81 mg Oral Daily    docusate  100 mg Oral Daily    vancomycin (VANCOCIN) intermittent dosing (placeholder)   Other RX Placeholder    vancomycin  1,500 mg Intravenous Q12H    atorvastatin  80 mg Oral Daily    sertraline  100 mg Oral Daily    sodium chloride flush  10 mL Intravenous 2 times per day    enoxaparin  40 mg Subcutaneous Daily     Continuous Infusions:    dextrose      sodium chloride 50 mL/hr at 01/19/21 2200     PRN Medications    sodium chloride flush, 10 mL, PRN      hydrALAZINE, 5 mg, Q6H PRN      sodium chloride flush, 10 mL, PRN      promethazine, 12.5 mg, Q6H PRN    Or      ondansetron, 4 mg, Q6H PRN      polyethylene glycol, 17 g, Daily PRN      acetaminophen, 650 mg, Q6H PRN    Or      acetaminophen, 650 mg, Q6H PRN      glucose, 15 g, PRN      dextrose, 12.5 g, PRN      glucagon (rDNA), 1 mg, PRN      dextrose, 100 mL/hr, PRN        Diagnostic Labs:  CBC:   Recent Labs     01/19/21  0512 01/20/21  0536 01/21/21  0511   WBC 6.7 6.8 8.2   RBC 3.65* 3.31* 4.13   HGB 10.1* 9.4* 11.4*   HCT 33.7* 30.1* 37.5   MCV 92.3 90.9 90.8   RDW 12.9 13.2 13.3   PLT See Reflexed IPF Result See Reflexed IPF Result 165     BMP:   Recent Labs     01/19/21  0512 01/20/21  0536 01/21/21  0511    136 138   K 4.1 4.3 4.2    104 105   CO2 25 25 23   PHOS 2.9 3.1 3. 4   BUN 9 8 8   CREATININE 0.37* 0.32* 0.34*     BNP: No results for input(s): BNP in the last 72 hours. PT/INR: No results for input(s): PROTIME, INR in the last 72 hours. APTT: No results for input(s): APTT in the last 72 hours. CARDIAC ENZYMES: No results for input(s): CKMB, CKMBINDEX, TROPONINI in the last 72 hours. Invalid input(s): CKTOTAL;3  FASTING LIPID PANEL:  Lab Results   Component Value Date    CHOL 188 04/05/2019    HDL 48 12/16/2020    TRIG 177 (H) 04/05/2019     LIVER PROFILE:   Recent Labs     01/19/21  0512 01/20/21  0536 01/21/21  0511   AST 94* 83* 149*   ALT 78* 78* 127*   BILIDIR <0.08 <0.08 <0.08   BILITOT 0.22* 0.20* 0.28*   ALKPHOS 70 70 92      MICROBIOLOGY:   Lab Results   Component Value Date/Time    CULTURE DUPLICATE ORDER 20/09/0128 11:00 PM       Imaging:    Ct Head Wo Contrast    Result Date: 1/16/2021  No acute intracranial abnormality. Redemonstration of linear areas of increased density along the posterior margin of prior right MCA infarct. .  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 Redemonstration of extensive right MCA territory infarct. No new infarct. 2. New or increasing hyperdense serpiginous gyral hyperdensities along posterior aspect of the infarct in the parietal and to a less and extent temporal lobe and also along the margins of the right caudate nucleus is suggestive of some blood products. This may be further assessed with MRI if deemed clinically necessary. Sal Eng Chest Portable    Result Date: 1/13/2021  No acute finding or significant change.      Xr Chest Portable    Result Date: 1/13/2021  No acute cardiopulmonary findings       ASSESSMENT & PLAN     Acute encephalopathy secondary to hypernatremia and recent large right MCA stroke  -On aspirin , and  Started on  Plavix on 01/20  -DAPT for 90 days in total  - sodium levels improved . -Continue 0.45 saline at 50 mL/h and free water bolus 250 mL every 4 hours. -Repeat CT brain negative  -LTME consistent with continuous right hemispheric polymorphic subacute delta slowing and attenuated background with suggested underlying structural defect. No abnormal epileptiform activity was seen. - MRI planned by Neurology. - normal ammonia levels and TSH. - neuro checks per protocol. Enterococcus faecalis bacteremia from unclear source  -Vancomycin every 12 hours till 1/25/21, will get PICC line today   -ID is following the patient. E. coli UTI  Course of ceftriaxone completed.     Hypertension  -Resumed [id number] mg, Cozaar 25 mg and clonidine 0.2 mg daily  -Metoprolol discontinued due to bradycardia     Type 2 diabetes mellitus  -Last A1c 6.8  -Blood glucose uncontrolled after starting tube feeding.  -started on Lantus 40 units daily  -Continue cover with sliding scale insulin.     History of coronary artery disease with PCI in the past  -Continue statin, aspirin  - BB held due to bradycardia    History of sleep apnea  CPAP during sleep and day time naps. Transaminitis  Continue to monitor.     DVT prophylaxis: Lovenox  Diet: PEG tube feeding     OT/PT- on board  Discharge planning- ongoing     Lindy Martin MD  Internal Medicine Resident, PGY-1  [address], [place]  1/21/2021, 12:24 PM

## 2021-01-21 NOTE — CARE COORDINATION
Called daughter Santiago Stringer to discuss transition plan. She tells me that they would like a referral sent to [facility]. I informed her that she will need to pick a few more places as they may not be able to accept her at this time.   Awaiting new referals

## 2021-01-21 NOTE — PLAN OF CARE
Problem: Mental Status - Impaired:  Goal: Mental status will be restored to baseline  Description: Mental status will be restored to baseline  Outcome: Ongoing

## 2021-01-21 NOTE — PROCEDURES
Product type Bard PICC trimmed to midline  History/Labs/Allergies Reviewed  Placed By Alejandra Jaeger RN  Assisted By N/A  Time out Performed using Two Identifiers  Lot # [id number]  Expiration date 9/30/2021  Catheter size 4  [place]ian  Trimmed at 15 cm  Total length 16 cm  External catheter length 0 cm  Location L CV  Number of attempts 1  Estimated blood loss 1 ml  Placement verified by- positive blood return & flushes easily  Special equipment used- ultrasound & micro-introducer technique   Catheter secured with statlock  Dressing applied- Tegaderm CHG  Lidocaine administered intradermally conc.1% 1 mL  Midline education:   [  ] Discussed with patient/Family or POA prior to procedure. Risks and Benefits along with reason for procedure were discussed and teaching was reinforced with an education handout on Midline insertion. [facility] FAQ Catheter Associated Blood Stream Infections and [address] [address] REV. 7/13 Nursing and Booklet left at bedside or in chart. Patient (Family or POA) acknowledged understanding of information taught and agreed to procedure. [ X ] Was not discussed with patient/family or POA due to pts medical status at time of procedure. pts family or POA not available to discuss Midline education.  [facility] FAQ Catheter Associated Blood Stream Infections and [address] [address] REV. 7/13 Nursing and Booklet left at bedside or in chart

## 2021-01-21 NOTE — PROGRESS NOTES
Physical Therapy  Facility/Department: [facility] NEURO  Daily Treatment Note  NAME: Luis M Hines  : 1950  MRN: [id number]    Date of Service: 2021    Discharge Recommendations:  Patient would benefit from continued therapy after discharge   PT Equipment Recommendations  Equipment Needed: No    Assessment   Body structures, Functions, Activity limitations: Decreased functional mobility ; Decreased cognition;Decreased endurance;Decreased strength;Decreased coordination  Assessment: pt woudl benefit from conitnued PT after d/c. Prognosis: Fair  REQUIRES PT FOLLOW UP: Yes  Activity Tolerance  Activity Tolerance: Patient limited by cognitive status     Patient Diagnosis(es): The encounter diagnosis was Essential hypertension. has a past medical history of Allergic rhinitis, Anxiety, Asthma, CAD (coronary artery disease), Chronic back pain, Congestive heart failure ([address] Depression, Headache(784.0), Hiatal hernia, Hypercholesteremia, Hypertension, Kidney stones, Obesity, Osteoarthritis, Postlaminectomy syndrome, Type II or unspecified type diabetes mellitus without mention of complication, not stated as uncontrolled, Unspecified sleep apnea, and Urinary incontinence. has a past surgical history that includes Spine surgery; Nerve Block (2012); Nerve Block (2012); Nerve Block (2013); Nerve Block (2013); Nerve Block (2013); Cardiac catheterization (2013); Lumbar spine surgery (); Vena Cava Filter Placement (); Tonsillectomy; joint replacement; knee surgery (10/21/2013); knee surgery (2013); knee surgery (2013); other surgical history (Right, 2014); other surgical history (Right, 2015); Upper gastrointestinal endoscopy; Colonoscopy (2015); other surgical history (Right, 2016); Coronary angioplasty with stent (1012-16); Nerve Block (Right, 2017);  Gastrostomy tube placement (2020); and Gastrostomy tube placement (N/A, 12/28/2020). Restrictions  Restrictions/Precautions  Restrictions/Precautions: Fall Risk  Required Braces or Orthoses?: No  Position Activity Restriction  Other position/activity restrictions: left hemiplegia  Subjective   General  Response To Previous Treatment: Patient unable to report, no changes reported from family or staff  Family / Caregiver Present: No  Subjective  Subjective: in bed upon arrival, follows no commands, appears comfortable, Occasional unintelligable speech throughout  Pain Screening  Patient Currently in Pain: Other (comment)(does not answer)  Vital Signs  Patient Currently in Pain: Other (comment)(does not answer)       Orientation  Orientation  Overall Orientation Status: Impaired  Orientation Level: Unable to assess(unable to fully assess due to aphasia)      Objective    Exercise  B LE PROM in all planes x 10  B gastroc stretch 3x20\"  L UE PROM in all planes x 10  R UE bicep curl x 5, pt heavily resisting all R UE mobility     Goals  Short term goals  Time Frame for Short term goals: 14 visits  Short term goal 1: Prevent contractures of LUE/LE through ROM and stretching  Short term goal 2: Pt to follow most commands for active exercise RUE/LE  Short term goal 3: Pt to sit EOB MAX A +2. STG3 met. UPdate STG 3 to Sit EOB min A. Short term goal 4: New STG 4: Sit to/from stand with mod A. Patient Goals   Patient goals : Unable to state.     Plan    Plan  Times per week: 3-4x/week  Current Treatment Recommendations: Strengthening, ROM, Cognitive Reorientation, Functional Mobility Training, Transfer Training, Safety Education & Training, Endurance Training, Home Exercise Program, Positioning, Patient/Caregiver Education & Training  Safety Devices  Type of devices: Nurse notified, Patient at risk for falls, All fall risk precautions in place, Bed alarm in place, Call light within reach, Left in bed  Restraints  Initially in place: No     Therapy Time   Individual Concurrent Group

## 2021-01-21 NOTE — DISCHARGE INSTR - COC
Continuity of Care Form    Patient Name: Domenic Bates   :  1950  MRN:  [id number]    Admit date:  2021  Discharge date:  21    Code Status Order: Full Code   Advance Directives:      Admitting Physician:  Tello Matamoros MD  PCP: Brendon Olivier MD    Discharging Nurse: Amarilys Mcguire [address] Unit/Room#: 2394/2726-03  Discharging Unit Phone Number: [phone]    Emergency Contact:   Extended Emergency Contact Information  Primary Emergency Contact: [address] Phone: [phone]  Mobile Phone: [phone]  Relation: Child  Preferred language: English   needed? No  Secondary Emergency Contact: [address] Phone: [phone]  Relation: Child    Past Surgical History:  Past Surgical History:   Procedure Laterality Date    CARDIAC CATHETERIZATION  2013    patent stents    COLONOSCOPY  2015    normal    CORONARY ANGIOPLASTY WITH STENT PLACEMENT  1012-16    stents x 3    GASTROSTOMY TUBE PLACEMENT  2020    EGD PEG TUBE PLACEMENT    GASTROSTOMY TUBE PLACEMENT N/A 2020    EGD PEG TUBE PLACEMENT performed by Cesar Andersen MD at [address] [place] [address]      left knee    KNEE SURGERY  10/21/2013    rt knee synvisc injection     KNEE SURGERY  2013    knee synvisc injection rt #2    KNEE SURGERY  2013    rt knee synvisc inj    LUMBAR SPINE SURGERY  2007    NERVE BLOCK  2012    Right MBNB L3, L4, L5    NERVE BLOCK  2012    Right MBNB L3, L4, and L5     NERVE BLOCK  2013    Right knee injection #1 - Synvisc    NERVE BLOCK  2013    Right knee injection #2 - Synvisc    NERVE BLOCK  2013    [facility] knee synvisc injection #3    NERVE BLOCK Right 2017    Rt genicular nerve block.  no steroid used    OTHER SURGICAL HISTORY Right 2014    synvisc one knee injection    OTHER SURGICAL HISTORY Right 2015    synvisc one knee injection  OTHER SURGICAL HISTORY Right 06/13/2016    synvisc right knee injection    SPINE SURGERY      TONSILLECTOMY      UPPER GASTROINTESTINAL ENDOSCOPY      VENA CAVA FILTER PLACEMENT  2007    PE and B/L LE emboli       Immunization History:   Immunization History   Administered Date(s) Administered    Influenza 10/29/2012, 09/27/2013    Influenza Virus Vaccine 12/09/2014, 09/23/2015    Pneumococcal Conjugate 13-valent (Kalani Alcantara) 06/12/2018       Active Problems:  Patient Active Problem List   Diagnosis Code    DM (diabetes mellitus) ([facility] [address] E11.9    HTN (hypertension) I10    Smoker F17.200    Asthma J45.909    Knee osteoarthritis M17.10    Edema R60.9    Spinal stenosis in cervical region M48.02    Chest pain R07.9    YUDITH (obstructive sleep apnea) G47.33    Morbid obesity (HCC) E66.01    Knee pain, bilateral M25.561, M25.562    S/P TKR (total knee replacement) Z96.659    Urge incontinence of urine N39.41    Lumbar spondylosis M47.816    Cervical spondylosis M47.812    Chronic use of opiate drugs therapeutic purposes Z79.891    Chronic knee pain M25.569, G89.29    Hypertensive urgency I16.0    Acute coronary syndrome (HCC) I24.9    Congestive heart failure (HCC) I50.9    Lymphadenopathy R59.1    Chronic pain G89.29    Coronary artery disease involving native coronary artery without angina pectoris I25.10    Chronic prescription opiate use Z79.891    Type 2 diabetes mellitus with complication, without long-term current use of insulin (HCC) E11.8    S/P coronary artery stent placement - RCA 10/12/16 - Dr. Jose M Arnett Z95.5    Anxiety F41.9    Depression (emotion) F32.9    Postlaminectomy syndrome, lumbar M96.1    Medication monitoring encounter Z51.81    Postlaminectomy syndrome M96.1    Postlaminectomy syndrome M96.1    Primary osteoarthritis of right knee M17.11    Long term (current) use of antithrombotics/antiplatelets H11.15    Influenza B J10.1  Reactive airway disease with acute exacerbation J45. 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    Non-traumatic rhabdomyolysis M62.82    Ischemic cerebral stroke due to intracranial large artery atherosclerosis (HCC) I63.59    Dehydration E86.0    Bacteremia R78.81    Acute cystitis without hematuria N30.00    Hypernatremia E87.0    Cerebrovascular accident (CVA) due to thrombosis of right middle cerebral artery ([facility] [address] I63.311    Oropharyngeal dysphagia R13.12    Septicemia due to enterococcus ([facility]) A41.81    Acute encephalopathy G93.40    History of CVA (cerebrovascular accident) Z86.73       Isolation/Infection:   Isolation            No Isolation          Patient Infection Status       Infection Onset Added Last Indicated Last Indicated By Review Planned Expiration Resolved Resolved By    None active    Resolved    COVID-19 Rule Out 12/15/20 12/15/20 12/15/20 COVID-19 (Ordered)   12/15/20 Rule-Out Test Resulted            Nurse Assessment:  Last Vital Signs: BP (!) 159/96   Pulse 80   Temp 97.2 °F (36.2 °C) (Axillary)   Resp 18   Ht 5' 4\" (1.626 m)   Wt 241 lb 2.9 oz (109.4 kg)   LMP  (LMP Unknown)   SpO2 99%   BMI 41.40 kg/m²     Last documented pain score (0-10 scale): Pain Level: 0  Last Weight:   Wt Readings from Last 1 Encounters:   01/17/21 241 lb 2.9 oz (109.4 kg)     Mental Status:  disoriented and alert    IV Access:  - Left upper arm midline    Nursing Mobility/ADLs:  Walking   Dependent  Transfer  Dependent  Bathing  Dependent  Dressing  Dependent  Toileting  Dependent  Feeding  Dependent  Med Admin  Dependent  Med Delivery   crushed    Wound Care Documentation and Therapy:  Wound 01/13/21 Coccyx Medial open  (Active)   Wound Image   01/14/21 1055   Wound Etiology Pressure Stage  1 01/21/21 0400 Dressing Status Reinforced dressing;New dressing applied 01/20/21 2000   Wound Cleansed Soap and water 01/20/21 2000   Dressing/Treatment Other (comment) 01/20/21 2000   Dressing Change Due 01/17/21 01/15/21 1600   Wound Length (cm) 4.2 cm 01/14/21 1055   Wound Width (cm) 2.8 cm 01/14/21 1055   Wound Depth (cm) 0.3 cm 01/14/21 1055   Wound Surface Area (cm^2) 11.76 cm^2 01/14/21 1055   Wound Volume (cm^3) 3.53 cm^3 01/14/21 1055   Wound Assessment Pink/red 01/21/21 0400   Drainage Amount None 01/21/21 0400   Drainage Description Serosanguinous 01/16/21 1200   Odor None 01/21/21 0000   Mary-wound Assessment Intact 01/21/21 0000   Number of days: 7       Wound 01/14/21 Buttocks Right (Active)   Wound Image   01/14/21 1055   Wound Etiology Pressure Stage  2 01/21/21 0400   Dressing Status Clean;Dry; Intact 01/20/21 2000   Wound Cleansed Soap and water 01/20/21 2000   Dressing/Treatment Zinc paste 01/21/21 0400   Dressing Change Due 01/14/21 01/15/21 1600   Wound Length (cm) 0.5 cm 01/14/21 1055   Wound Width (cm) 0.5 cm 01/14/21 1055   Wound Depth (cm) 0.1 cm 01/14/21 1055   Wound Surface Area (cm^2) 0.25 cm^2 01/14/21 1055   Wound Volume (cm^3) 0.02 cm^3 01/14/21 1055   Wound Assessment Pink/red 01/21/21 0000   Drainage Amount None 01/21/21 0000   Drainage Description Serosanguinous 01/16/21 1200   Odor None 01/21/21 0000   Mary-wound Assessment Intact 01/21/21 0000   Number of days: 7        Elimination:  Continence:   · Bowel: No  · Bladder: No  Urinary Catheter: None   Colostomy/Ileostomy/Ileal Conduit: No       Date of Last BM: 1/22/21    Intake/Output Summary (Last 24 hours) at 1/21/2021 1121  Last data filed at 1/21/2021 0636  Gross per 24 hour   Intake 250 ml   Output 1100 ml   Net -850 ml     I/O last 3 completed shifts: In: 250 [IV Piggyback:250]  Out: 1100 [Urine:1100]    Safety Concerns:      At Risk for Falls and Aspiration Risk    Impairments/Disabilities: 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   Ventilator:    - BiPAP    , CPAP/EPAP: 6 cmH2O only when sleeping    Rehab Therapies: Physical Therapy and Occupational Therapy  Weight Bearing Status/Restrictions: No weight bearing restirctions  Other Medical Equipment (for information only, NOT a DME order):  wheelchair and hospital bed  Other Treatments: Pt has stage 1 on coccyx & stage 2 on right buttock- apply zinc cream Q shift and PRN    Patient's personal belongings (please select all that are sent with patient):  None    RN SIGNATURE:  Electronically signed by Shubham Shore RN on 1/23/21 at 12:49 PM EST    CASE MANAGEMENT/SOCIAL WORK SECTION    Inpatient Status Date: ***    Readmission Risk Assessment Score:  Readmission Risk              Risk of Unplanned Readmission:        30           Discharging to Facility/ Agency    [facility] [employer] [facility] Details Fax [facility] [place]            [address] [address]       Phone: [phone]       Fax: [phone]        ·     Dialysis Facility (if applicable)   · Name:  ·     / signature: Electronically signed by Elisha Hart RN on 1/23/21 at 1:21 PM EST    PHYSICIAN SECTION    Prognosis: {Prognosis:0872805762:::0}    Condition at Discharge: [facility] Patient Condition:948686751:::0}    Rehab Potential (if transferring to Rehab): {Prognosis:8853277877:::0}    Recommended Labs or Other Treatments After Discharge: *** Physician Certification: I certify the above information and transfer of Scott Ospina  is necessary for the continuing treatment of the diagnosis listed and that she requires {Admit to Appropriate Level of Care:98379:::0} for {GREATER/LESS:730629622} 30 days.      Update Admission H&P: {CHP DME Changes in HandP:275545133:::0}    PHYSICIAN SIGNATURE:  Electronically signed by Alicia Gonzalez MD on 1/21/21 at 12:26 PM EST

## 2021-01-21 NOTE — PROGRESS NOTES
Blood cultures obtained on 1-13-21 show the growth of Enterococcus faecalis. Susceptible to ampicillin but patient is allergic to penicillins. Started on Vancomycin. Plan 10 days of Rx. CURRENT EVALUATION: 01/21/21    Afebrile  VS stable    The patient seen and evaluated at bedside. Patient is better with no new acute issues or concerns today. Patient does not have any fevers, chills, cough, shortness of breath, chest pains or palpitations. Mentation has improved. Able to follow commands. Labs, X rays reviewed: 1/21/2021    BUN: 35-->8-->8  Cr: 0.43-->0.32-->0.34    WBC: 7.5 -->6.8-->8.2  Hb: 10.4 -->9.4-->11.4  Plat: 139-->133-->165    Cultures:  Urine:  · 1/13/21: +ve E.coli   Blood:  · 1/13/21: +ve enterococcus faecalis (gram +ve cocci in chains and pairs)  Sputum :  ·   Wound:  ·     COVID: negative   MRSA: negative       CT Abd/pelvis  01/17/21:  Impression:        1. No acute findings within the abdomen or pelvis.  In particular, there is   no evidence of occult abdominal or pelvic abscess.  Mild presacral edema,   possibly third-spacing. 2. Stable focal cortical atrophy in the lower pole of the left kidney. Nonobstructive left nephrolithiasis. CT Head WO Contrast:   1/15/21:   No acute intracranial abnormality.       No change from prior study           · 1/13/21:   1. Redemonstration of extensive right MCA territory infarct.  No new infarct. 2. New or increasing hyperdense serpiginous gyral hyperdensities along   posterior aspect of the infarct in the parietal and to a less and extent   temporal lobe and also along the margins of the right caudate nucleus is   suggestive of some blood products.  This may be further assessed with MRI if   deemed clinically necessary. .               CXR:   1/13/21: No acute cardiopulmonary findings. Discussed with patient, RN, family. I have personally reviewed the past medical history, past surgical history, medications, social history, and family history, and I have updated the database accordingly. Past Medical History:     Past Medical History:   Diagnosis Date    Allergic rhinitis     Anxiety 10/25/2016    Asthma     CAD (coronary artery disease)     s/p stents RCA and LAD 2009,    Chronic back pain     Congestive heart failure ([facility] [address]     Depression     Headache(784.0)     Hiatal hernia     Hypercholesteremia 2/21/2012    Hypertension     Kidney stones     years ago    Obesity     Osteoarthritis     Postlaminectomy syndrome 6/6/2012    Type II or unspecified type diabetes mellitus without mention of complication, not stated as uncontrolled     Unspecified sleep apnea     Urinary incontinence        Past Surgical  History:     Past Surgical History:   Procedure Laterality Date    CARDIAC CATHETERIZATION  01/2013    patent stents    COLONOSCOPY  09/2015    normal    CORONARY ANGIOPLASTY WITH STENT PLACEMENT  1012-16    stents x 3    GASTROSTOMY TUBE PLACEMENT  12/28/2020    EGD PEG TUBE PLACEMENT    GASTROSTOMY TUBE PLACEMENT N/A 12/28/2020    EGD PEG TUBE PLACEMENT performed by Sarah Bahena MD at [address]      left knee    KNEE SURGERY  10/21/2013    rt knee synvisc injection     KNEE SURGERY  12/02/2013    knee synvisc injection rt #2    KNEE SURGERY  12/09/2013    rt knee synvisc inj    LUMBAR SPINE SURGERY  2007    NERVE BLOCK  04/23/2012    Right MBNB L3, L4, L5    NERVE BLOCK  05/22/2012    Right MBNB L3, L4, and L5     NERVE BLOCK  01/14/2013    Right knee injection #1 - Synvisc    NERVE BLOCK  01/21/2013    Right knee injection #2 - Synvisc    NERVE BLOCK  01/28/2013    Rig knee synvisc injection #3    NERVE BLOCK Right 04/17/2017    Rt genicular nerve block.  no steroid used    OTHER SURGICAL HISTORY Right 07/14/2014    synvisc one knee injection  OTHER SURGICAL HISTORY Right 2015    synvisc one knee injection    OTHER SURGICAL HISTORY Right 2016    synvisc right knee injection    SPINE SURGERY      TONSILLECTOMY      UPPER GASTROINTESTINAL ENDOSCOPY      VENA CAVA FILTER PLACEMENT      PE and B/L LE emboli       Medications:      insulin glargine  40 Units Subcutaneous Daily    clopidogrel  75 mg Oral Daily    lidocaine 1 % injection  5 mL Intradermal Once    sodium chloride flush  10 mL Intravenous 2 times per day    cloNIDine  0.2 mg Oral Daily    losartan  25 mg Oral Daily    amLODIPine  10 mg Oral Daily    insulin lispro  0-12 Units Subcutaneous Q6H    aspirin  81 mg Oral Daily    docusate  100 mg Oral Daily    vancomycin (VANCOCIN) intermittent dosing (placeholder)   Other RX Placeholder    vancomycin  1,500 mg Intravenous Q12H    atorvastatin  80 mg Oral Daily    sertraline  100 mg Oral Daily    sodium chloride flush  10 mL Intravenous 2 times per day    enoxaparin  40 mg Subcutaneous Daily       Social History:     Social History     Socioeconomic History    Marital status: Legally      Spouse name: Not on file    Number of children: Not on file    Years of education: Not on file    Highest education level: Not on file   Occupational History    Not on file   Social Needs    Financial resource strain: Not on file    Food insecurity     Worry: Not on file     Inability: Not on file    Transportation needs     Medical: Not on file     Non-medical: Not on file   Tobacco Use    Smoking status: Former Smoker     Packs/day: 0.50     Years: 0.00     Pack years: 0.00     Quit date: 3/28/2013     Years since quittin.8    Smokeless tobacco: Never Used   Substance and Sexual Activity    Alcohol use:  Yes     Alcohol/week: 1.0 standard drinks     Types: 1 Cans of beer per week     Comment: occasionally    Drug use: No    Sexual activity: Not on file   Lifestyle    Physical activity Days per week: Not on file     Minutes per session: Not on file    Stress: Not on file   Relationships    Social connections     Talks on phone: Not on file     Gets together: Not on file     Attends Congregation service: Not on file     Active member of club or organization: Not on file     Attends meetings of clubs or organizations: Not on file     Relationship status: Not on file    Intimate partner violence     Fear of current or ex partner: Not on file     Emotionally abused: Not on file     Physically abused: Not on file     Forced sexual activity: Not on file   Other Topics Concern    Not on file   Social History Narrative    Not on file       Family History:     Family History   Problem Relation Age of Onset    Diabetes Mother     Cancer Father     High Blood Pressure Sister     High Blood Pressure Brother         Allergies:   Bactrim, Penicillins, and Tylenol [acetaminophen]     Review of Systems:     Unable to perform ROS today. She is sleeping. Physical Examination :     Patient Vitals for the past 8 hrs:   BP Temp Temp src Pulse Resp SpO2   01/21/21 1234 105/84 96.9 °F (36.1 °C)  75 20 97 %   01/21/21 0905 (!) 159/96 97.2 °F (36.2 °C) Axillary 80 18 99 %     General Appearance: sleeping and in no apparent distress  Head:  Normocephalic, no trauma  Pulmonary/Chest: Clear to auscultation, without wheezes, rales, or rhonchi. Cardiovascular: Regular rate and rhythm without murmurs. Abdomen: Soft. Bowel sounds normal  All four Extremities: No cyanosis, clubbing, edema, or effusions. Neurologic: No gross sensory or motor deficits. Skin: Warm and dry with good turgor. No signs of peripheral arterial or venous insufficiency.     Medical Decision Making -Laboratory:   I have independently reviewed/ordered the following labs:    CBC with Differential:   Recent Labs     01/20/21  0536 01/21/21  0511   WBC 6.8 8.2   HGB 9.4* 11.4*   HCT 30.1* 37.5   PLT See Reflexed IPF Result 165   LYMPHOPCT 25 24 MONOPCT 8 8     BMP:   Recent Labs     01/20/21  0536 01/21/21  0511    138   K 4.3 4.2    105   CO2 25 23   BUN 8 8   CREATININE 0.32* 0.34*   MG 1.6 1.6     Hepatic Function Panel:   Recent Labs     01/20/21  0536 01/21/21  0511   PROT 5.2* 6.3*   LABALBU 2.4* 2.8*   BILIDIR <0.08 <0.08   IBILI CANNOT BE CALCULATED CANNOT BE CALCULATED   BILITOT 0.20* 0.28*   ALKPHOS 70 92   ALT 78* 127*   AST 83* 149*     No results for input(s): RPR in the last 72 hours. No results for input(s): HIV in the last 72 hours. No results for input(s): BC in the last 72 hours. Lab Results   Component Value Date    MUCUS NOT REPORTED 01/19/2021    RBC 4.13 01/21/2021    RBC 4.21 02/24/2012    TRICHOMONAS NOT REPORTED 01/19/2021    WBC 8.2 01/21/2021    YEAST NOT REPORTED 01/19/2021    TURBIDITY CLEAR 01/19/2021     Lab Results   Component Value Date    CREATININE 0.34 01/21/2021    GLUCOSE 230 01/21/2021    GLUCOSE 105 02/24/2012       Medical Decision Making-Imaging:     CT abd/pelvie 1/17/2021  Impression:        1. No acute findings within the abdomen or pelvis.  In particular, there is   no evidence of occult abdominal or pelvic abscess.  Mild presacral edema,   possibly third-spacing. 2. Stable focal cortical atrophy in the lower pole of the left kidney. Nonobstructive left nephrolithiasis. Medical Decision [id number]:     1/15/2021 10:00 AM - Deyvi, Luzpn Incoming Lab Results From [facility]    Specimen Information: Urine, clean catch        Component Collected Lab   Specimen Description 01/13/2021  8:40 PM [facility]   . CLEAN CATCH URINE    Special Requests 01/13/2021  8:40 PM [facility]   NOT REPORTED    Culture Abnormal  01/13/2021  8:40 PM [address] 10 to 50,000 CFU/ML    Testing Performed By    Lab - [address] Name Director Address Valid Date Range [address] DoeFavian Chinchilla MD [address] [facility] [address] 08/30/17 0801-Present   Susceptibility    Escherichia coli (1)    Antibiotic Interpretation DONNA Status    amikacin   Final     NOT REPORTED    ampicillin Resistant  Final     >=32   RESISTANT   ampicillin-sulbactam   Final     NOT REPORTED    aztreonam Sensitive  Final     <=1   SUSCEPTIBLE   ceFAZolin Sensitive  Final     <=4   SUSCEPTIBLE   ceFAZolin Sensitive Cefazolin sensitivity results can be used to predict the effectiveness of oral cephalosporins (eg. Cephalexin) in uncomplicated Urinary Tract Infections due to E. coli, K. pneumoniae, and P. mirabilis Final    cefepime   Final     NOT REPORTED    cefTRIAXone Sensitive  Final     <=1   SUSCEPTIBLE   ciprofloxacin Resistant  Final     >=4   RESISTANT   ertapenem   Final     NOT REPORTED    Confirmatory Extended Spectrum Beta-Lactamase Negative NEGATIVE Final    gentamicin Sensitive  Final     <=1   SUSCEPTIBLE   meropenem   Final     NOT REPORTED    nitrofurantoin Sensitive  Final     <=16   SUSCEPTIBLE   tigecycline   Final     NOT REPORTED    tobramycin Sensitive  Final     <=1   SUSCEPTIBLE   trimethoprim-sulfamethoxazole Sensitive  Final     <=20   SUSCEPTIBLE   piperacillin-tazobactam Sensitive  Final     <=4   SUSCEPTIBLE   Lab and Collection    Culture, Urine - 1/13/2021  1/15/2021 10:41 AM - Ledy Carnes Incoming Lab Results From [facility]    Specimen Information: Blood        Component Collected Lab   Specimen Description 01/13/2021 10:15 AM [facility]   . BLOOD    Special Requests 01/13/2021 10:15 AM [facility]   LEFT HAND 20cc    Culture Abnormal  01/13/2021 10:15 AM [address] Blood Culture Results called to and read back by: OLGA MEDEIROS V AT 0617 ON 1/14/21    Culture 01/13/2021 10:15 AM [facility] FROM BOTTLE: GRAM POSITIVE COCCI IN CHAINS AND PAIRS Culture Abnormal  01/13/2021 10:15 AM [address]    Testing Performed By    [address] Name Director Address Valid Date Range   [address], MD [address] [facility] [address] 08/30/17 0801-Present   [address] LAB Pam Hickey MD [address] 11/17/17 1521-Present   Susceptibility    Enterococcus  faecalis (3)    Antibiotic Interpretation DONNA Status    ampicillin Sensitive  Final     <=2   SUSCEPTIBLE   penicillin   Final     NOT REPORTED    ciprofloxacin   Final     NOT REPORTED    erythromycin   Final     NOT REPORTED    Gentamicin, High Level Sensitive SUSCEPTIBLE Final    levofloxacin   Final     NOT REPORTED    linezolid   Final     NOT REPORTED    nitrofurantoin   Final     NOT REPORTED    Synercid   Final     NOT REPORTED    Streptomycin, Hi Level Sensitive SUSCEPTIBLE Final    tetracycline   Final     NOT REPORTED    tigecycline   Final     NOT REPORTED    vancomycin Sensitive  Final     1   SUSCEPTIBLE   Lab and Collection    Culture, Blood 1 - 1/13/2021 1/18/2021  7:25 AM - Deyvi, Mhpn Incoming Lab Results From [facility]    Specimen Information: Blood        Component Collected Lab   Specimen Description 01/13/2021  9:55 AM [facility]   . BLOOD    Special Requests 01/13/2021  9:55 AM [facility]   LEFT WRIST 20cc    Culture 01/13/2021  9:55  [address]   NO GROWTH 5 DAYS    Testing Performed By    Lab - Abbreviation Name Director Address Valid Date Range   [address] Chinchilla  [address] [address] 08/30/17 0801-Present   [address] LAB Pam Hickey MD [address] [address] 11/17/17 1521-Present   Lab and Collection    Culture, Blood 1 - 1/13/2021    Medical Decision Making-Other:     Note: · Labs, medications, radiologic studies were reviewed with personal review of films  · Moderate Large amounts of data were reviewed  · Discussed with nursing Staff, Discharge planner  · Infection Control and Prevention measures reviewed  · All prior entries were reviewed  · Administer medications as ordered  · Prognosis: Fair   · Discharge planning reviewed  · Follow up as outpatient. Thank you for allowing us to participate in the care of this patient. Please call with questions. Ela Hackett participated in the evaluation of this patient under my direct supervision. ATTESTATION:    I have discussed the case, including pertinent history and exam findings with the residents and students. I have seen and examined the patient and the key elements of the encounter have been performed by me. I was present when the student obtained his information or examined the patient. I have reviewed the laboratory data, other diagnostic studies and discussed them with the residents. I have updated the medical record where necessary. I agree with the assessment, plan and orders as documented by the resident/ student.     Adam Ling MD.       Office: [phone]

## 2021-01-21 NOTE — PROGRESS NOTES
NEUROLOGY INPATIENT PROGRESS NOTE    1/21/2021         Subjective: Laron Vargas is a  78 y.o. female admitted on 1/13/2021 with Dehydration [E86.0]  Chart reviewed and discussed with caregivers. Patient has been more alert and awake and following commands. No recurrence of episodes of transient confusion spells noted. Briefly, this is a  78 y.o. female with hx of large Rt MCA infarct with residual left hemiplegia was admitted on 1/13/2021 with hyperglycemia and hyponatremia. CT head demonstrated incidental hemorrhagic transformation of right MCA infarct. Neuro ICU was consulted and recommended to start aspirin and Plavix from 1/20/2021 given her intracranial atherosclerotic disease along with questionable punctate hemorrhage. This hospitalization patient has had episode of transient encephalopathy for which neurology is consulted. Patient had continuous video EEG monitoring and it was negative for any electrographic seizure activity. Since EEG was unrevealing; MRI brain recommended to rule out any extension of infarction. No current facility-administered medications on file prior to encounter.       Current Outpatient Medications on File Prior to Encounter   Medication Sig Dispense Refill    losartan (COZAAR) 25 MG tablet Take 1 tablet by mouth daily 60 tablet 3    docusate (COLACE) 50 MG/5ML liquid Take 10 mLs by mouth daily 50 mL 3    rivastigmine (EXELON) 9.5 MG/24HR Place 1 patch onto the skin daily      QUEtiapine (SEROQUEL) 25 MG tablet Take 25 mg by mouth 2 times daily      Calcium Carbonate-Vitamin D (OYSTER SHELL CALCIUM/D) 500-200 MG-UNIT TABS TAKE ONE TABLET BY MOUTH TWO TIMES A DAY 60 tablet 3    glimepiride (AMARYL) 2 MG tablet Take 1 tablet by mouth every morning 30 tablet 3    linagliptin (TRADJENTA) 5 MG tablet Take 1 tablet by mouth daily 30 tablet 10    furosemide (LASIX) 40 MG tablet Take 1 tablet by mouth daily 30 tablet 10    atorvastatin (LIPITOR) 80 MG tablet TAKE 1 °C)   Resp 18   Ht 5' 4\" (1.626 m)   Wt 241 lb 2.9 oz (109.4 kg)   LMP  (LMP Unknown)   SpO2 97%   BMI 41.40 kg/m²     Blood pressure range: Systolic (64HXP), EVM:084 , Min:105 , TXA:297   ; Diastolic (94SWG), KNB:28, Min:68, Max:96        NEUROLOGIC EXAMINATION  GENERAL  Appears comfortable and in no distress   HEENT  NC/ AT   cardiovascular  S1 and S2 heard; palpation of pulses: radial pulse    NECK  Supple and no bruits heard   MENTAL STATUS:  Alert, oriented, intact memory, no confusion, normal speech, normal language, no hallucination or delusion   CRANIAL NERVES: II     -      PERRLA, optic discs; clear; posterior segments  Visual fields intact to confrontation  III,IV,VI -  EOMs full, no afferent defect, no BRIAN, no ptosis  V     -     Normal facial sensation   VII    -     Normal facial symmetry  VIII   -     Intact hearing   IX,X -     Symmetrical palate  XI    -     Symmetrical shoulder shrug  XII   -     Midline tongue, no atrophy    MOTOR FUNCTION:  significant for dense left hemiplegia with no abnormal involuntary movements. SENSORY FUNCTION:  Normal touch, normal pin, normal vibration, normal proprioception   CEREBELLAR FUNCTION:  Intact fine motor control over upper limbs   REFLEX FUNCTION:  Symmetric, no perverted reflex, no Babinski sign   STATION and GAIT  deferred.            Data:    Lab Results:   CBC:   Recent Labs     01/19/21  0512 01/20/21  0536 01/21/21  0511   WBC 6.7 6.8 8.2   HGB 10.1* 9.4* 11.4*   PLT See Reflexed IPF Result See Reflexed IPF Result 165     BMP:    Recent Labs     01/19/21  0512 01/20/21  0536 01/21/21  0511    136 138   K 4.1 4.3 4.2    104 105   CO2 25 25 23   BUN 9 8 8   CREATININE 0.37* 0.32* 0.34*   GLUCOSE 263* 258* 230*         Lab Results   Component Value Date    CHOL 188 04/05/2019    LDLCHOLESTEROL 159 (H) 12/16/2020    HDL 48 12/16/2020    TRIG 177 (H) 04/05/2019     (H) 01/21/2021     (H) 01/21/2021    TSH 2.66 01/19/2021

## 2021-01-22 ENCOUNTER — APPOINTMENT (OUTPATIENT)
Dept: MRI IMAGING | Age: 71
DRG: 871 | End: 2021-01-22
Attending: INTERNAL MEDICINE
Payer: COMMERCIAL

## 2021-01-22 LAB
ABSOLUTE EOS #: 0.15 K/UL (ref 0–0.44)
ABSOLUTE IMMATURE GRANULOCYTE: 0.17 K/UL (ref 0–0.3)
ABSOLUTE LYMPH #: 1.46 K/UL (ref 1.1–3.7)
ABSOLUTE MONO #: 0.73 K/UL (ref 0.1–1.2)
ALBUMIN SERPL-MCNC: 2.6 G/DL (ref 3.5–5.2)
ALBUMIN/GLOBULIN RATIO: 0.8 (ref 1–2.5)
ALP BLD-CCNC: 85 U/L (ref 35–104)
ALT SERPL-CCNC: 110 U/L (ref 5–33)
ANION GAP SERPL CALCULATED.3IONS-SCNC: 5 MMOL/L (ref 9–17)
AST SERPL-CCNC: 94 U/L
BASOPHILS # BLD: 1 % (ref 0–2)
BASOPHILS ABSOLUTE: 0.04 K/UL (ref 0–0.2)
BILIRUB SERPL-MCNC: 0.24 MG/DL (ref 0.3–1.2)
BILIRUBIN DIRECT: 0.11 MG/DL
BILIRUBIN, INDIRECT: 0.13 MG/DL (ref 0–1)
BUN BLDV-MCNC: 11 MG/DL (ref 8–23)
BUN/CREAT BLD: ABNORMAL (ref 9–20)
CALCIUM SERPL-MCNC: 9.3 MG/DL (ref 8.6–10.4)
CHLORIDE BLD-SCNC: 102 MMOL/L (ref 98–107)
CO2: 25 MMOL/L (ref 20–31)
CREAT SERPL-MCNC: 0.35 MG/DL (ref 0.5–0.9)
DIFFERENTIAL TYPE: ABNORMAL
EOSINOPHILS RELATIVE PERCENT: 2 % (ref 1–4)
GFR AFRICAN AMERICAN: >60 ML/MIN
GFR NON-AFRICAN AMERICAN: >60 ML/MIN
GFR SERPL CREATININE-BSD FRML MDRD: ABNORMAL ML/MIN/{1.73_M2}
GFR SERPL CREATININE-BSD FRML MDRD: ABNORMAL ML/MIN/{1.73_M2}
GLOBULIN: ABNORMAL G/DL (ref 1.5–3.8)
GLUCOSE BLD-MCNC: 175 MG/DL (ref 65–105)
GLUCOSE BLD-MCNC: 197 MG/DL (ref 70–99)
GLUCOSE BLD-MCNC: 215 MG/DL (ref 65–105)
GLUCOSE BLD-MCNC: 217 MG/DL (ref 65–105)
GLUCOSE BLD-MCNC: 232 MG/DL (ref 65–105)
HCT VFR BLD CALC: 35.9 % (ref 36.3–47.1)
HEMOGLOBIN: 10.6 G/DL (ref 11.9–15.1)
IMMATURE GRANULOCYTES: 2 %
LYMPHOCYTES # BLD: 19 % (ref 24–43)
MAGNESIUM: 1.6 MG/DL (ref 1.6–2.6)
MCH RBC QN AUTO: 27.9 PG (ref 25.2–33.5)
MCHC RBC AUTO-ENTMCNC: 29.5 G/DL (ref 28.4–34.8)
MCV RBC AUTO: 94.5 FL (ref 82.6–102.9)
MONOCYTES # BLD: 9 % (ref 3–12)
NRBC AUTOMATED: 0 PER 100 WBC
PDW BLD-RTO: 13.8 % (ref 11.8–14.4)
PHOSPHORUS: 3.4 MG/DL (ref 2.6–4.5)
PLATELET # BLD: 172 K/UL (ref 138–453)
PLATELET ESTIMATE: ABNORMAL
PMV BLD AUTO: 11 FL (ref 8.1–13.5)
POTASSIUM SERPL-SCNC: 4.2 MMOL/L (ref 3.7–5.3)
RBC # BLD: 3.8 M/UL (ref 3.95–5.11)
RBC # BLD: ABNORMAL 10*6/UL
SEG NEUTROPHILS: 67 % (ref 36–65)
SEGMENTED NEUTROPHILS ABSOLUTE COUNT: 5.27 K/UL (ref 1.5–8.1)
SODIUM BLD-SCNC: 132 MMOL/L (ref 135–144)
TOTAL PROTEIN: 6 G/DL (ref 6.4–8.3)
WBC # BLD: 7.8 K/UL (ref 3.5–11.3)
WBC # BLD: ABNORMAL 10*3/UL

## 2021-01-22 PROCEDURE — 2580000003 HC RX 258: Performed by: STUDENT IN AN ORGANIZED HEALTH CARE EDUCATION/TRAINING PROGRAM

## 2021-01-22 PROCEDURE — 6370000000 HC RX 637 (ALT 250 FOR IP): Performed by: STUDENT IN AN ORGANIZED HEALTH CARE EDUCATION/TRAINING PROGRAM

## 2021-01-22 PROCEDURE — 80048 BASIC METABOLIC PNL TOTAL CA: CPT

## 2021-01-22 PROCEDURE — 6370000000 HC RX 637 (ALT 250 FOR IP): Performed by: FAMILY MEDICINE

## 2021-01-22 PROCEDURE — 70553 MRI BRAIN STEM W/O & W/DYE: CPT

## 2021-01-22 PROCEDURE — A9576 INJ PROHANCE MULTIPACK: HCPCS | Performed by: INTERNAL MEDICINE

## 2021-01-22 PROCEDURE — 6370000000 HC RX 637 (ALT 250 FOR IP): Performed by: INTERNAL MEDICINE

## 2021-01-22 PROCEDURE — 84100 ASSAY OF PHOSPHORUS: CPT

## 2021-01-22 PROCEDURE — 80076 HEPATIC FUNCTION PANEL: CPT

## 2021-01-22 PROCEDURE — 6360000002 HC RX W HCPCS: Performed by: STUDENT IN AN ORGANIZED HEALTH CARE EDUCATION/TRAINING PROGRAM

## 2021-01-22 PROCEDURE — 99232 SBSQ HOSP IP/OBS MODERATE 35: CPT | Performed by: INTERNAL MEDICINE

## 2021-01-22 PROCEDURE — 82947 ASSAY GLUCOSE BLOOD QUANT: CPT

## 2021-01-22 PROCEDURE — 2580000003 HC RX 258: Performed by: INTERNAL MEDICINE

## 2021-01-22 PROCEDURE — 6360000004 HC RX CONTRAST MEDICATION: Performed by: INTERNAL MEDICINE

## 2021-01-22 PROCEDURE — 2060000000 HC ICU INTERMEDIATE R&B

## 2021-01-22 PROCEDURE — 94660 CPAP INITIATION&MGMT: CPT

## 2021-01-22 PROCEDURE — 36415 COLL VENOUS BLD VENIPUNCTURE: CPT

## 2021-01-22 PROCEDURE — 99232 SBSQ HOSP IP/OBS MODERATE 35: CPT | Performed by: PSYCHIATRY & NEUROLOGY

## 2021-01-22 PROCEDURE — 83735 ASSAY OF MAGNESIUM: CPT

## 2021-01-22 PROCEDURE — 99233 SBSQ HOSP IP/OBS HIGH 50: CPT | Performed by: INTERNAL MEDICINE

## 2021-01-22 PROCEDURE — 85025 COMPLETE CBC W/AUTO DIFF WBC: CPT

## 2021-01-22 RX ORDER — LORAZEPAM 2 MG/ML
0.5 INJECTION INTRAMUSCULAR ONCE
Status: COMPLETED | OUTPATIENT
Start: 2021-01-22 | End: 2021-01-22

## 2021-01-22 RX ORDER — CLOPIDOGREL BISULFATE 75 MG/1
75 TABLET ORAL DAILY
Qty: 90 TABLET | Refills: 0 | Status: SHIPPED | OUTPATIENT
Start: 2021-01-22 | End: 2021-04-22

## 2021-01-22 RX ORDER — SODIUM CHLORIDE 0.9 % (FLUSH) 0.9 %
10 SYRINGE (ML) INJECTION ONCE
Status: COMPLETED | OUTPATIENT
Start: 2021-01-22 | End: 2021-01-22

## 2021-01-22 RX ADMIN — AMLODIPINE BESYLATE 10 MG: 5 TABLET ORAL at 09:15

## 2021-01-22 RX ADMIN — ATORVASTATIN CALCIUM 80 MG: 80 TABLET, FILM COATED ORAL at 22:33

## 2021-01-22 RX ADMIN — LOSARTAN POTASSIUM 25 MG: 25 TABLET, FILM COATED ORAL at 09:15

## 2021-01-22 RX ADMIN — SODIUM CHLORIDE, PRESERVATIVE FREE 10 ML: 5 INJECTION INTRAVENOUS at 22:33

## 2021-01-22 RX ADMIN — INSULIN GLARGINE 40 UNITS: 100 INJECTION, SOLUTION SUBCUTANEOUS at 09:16

## 2021-01-22 RX ADMIN — INSULIN LISPRO 2 UNITS: 100 INJECTION, SOLUTION INTRAVENOUS; SUBCUTANEOUS at 06:16

## 2021-01-22 RX ADMIN — ENOXAPARIN SODIUM 40 MG: 40 INJECTION SUBCUTANEOUS at 09:15

## 2021-01-22 RX ADMIN — CLOPIDOGREL 75 MG: 75 TABLET, FILM COATED ORAL at 09:15

## 2021-01-22 RX ADMIN — SODIUM CHLORIDE, PRESERVATIVE FREE 10 ML: 5 INJECTION INTRAVENOUS at 09:25

## 2021-01-22 RX ADMIN — DOCUSATE SODIUM 100 MG: 50 LIQUID ORAL at 09:16

## 2021-01-22 RX ADMIN — CLONIDINE HYDROCHLORIDE 0.2 MG: 0.2 TABLET ORAL at 09:15

## 2021-01-22 RX ADMIN — GADOTERIDOL 20 ML: 279.3 INJECTION, SOLUTION INTRAVENOUS at 08:40

## 2021-01-22 RX ADMIN — Medication 1500 MG: at 00:21

## 2021-01-22 RX ADMIN — Medication 1500 MG: at 12:49

## 2021-01-22 RX ADMIN — ASPIRIN 81 MG: 81 TABLET, CHEWABLE ORAL at 09:15

## 2021-01-22 RX ADMIN — INSULIN LISPRO 4 UNITS: 100 INJECTION, SOLUTION INTRAVENOUS; SUBCUTANEOUS at 17:47

## 2021-01-22 RX ADMIN — INSULIN LISPRO 4 UNITS: 100 INJECTION, SOLUTION INTRAVENOUS; SUBCUTANEOUS at 12:44

## 2021-01-22 RX ADMIN — LORAZEPAM 0.5 MG: 2 INJECTION INTRAMUSCULAR at 08:26

## 2021-01-22 RX ADMIN — SERTRALINE 100 MG: 50 TABLET, FILM COATED ORAL at 09:15

## 2021-01-22 RX ADMIN — INSULIN LISPRO 4 UNITS: 100 INJECTION, SOLUTION INTRAVENOUS; SUBCUTANEOUS at 00:20

## 2021-01-22 ASSESSMENT — PAIN SCALES - WONG BAKER

## 2021-01-22 ASSESSMENT — PAIN SCALES - GENERAL: PAINLEVEL_OUTOF10: 0

## 2021-01-22 ASSESSMENT — PAIN DESCRIPTION - PROGRESSION

## 2021-01-22 NOTE — PROGRESS NOTES
NEUROLOGY INPATIENT PROGRESS NOTE    1/22/2021         Subjective: Mulu Dominguez is a  78 y.o. female admitted on 1/13/2021 with Dehydration [E86.0]    Briefly, this is a  78 y.o. female admitted on 1/13/2021 with Transient encephalopathy with waxing and waning mentation. likely post-ictal from unwitnessed seizure activity. She presented on 1/13/2021 from [facility] ER for concerns of altered mental status, hypoglycemia, hyponatremia. Patient had a recent right MCA ischemic stroke on 12/16/2020 secondary to iCAD. Presented with confusion secondary to hypernatremia and hypoglycemia. CT head was consistent with incidental hemorrhagic transformation of right MCA infarct. Neuro Critical care was consulted  Her aspirin and Plavix were held. Patient was resumed on DVT prophylaxis. Plan was to repeat CT head in 3 to 5 days and if no progression of hemorrhage resume aspirin. And restart Plavix in 7 to 10 days with a goal to resume plavix from 1/20/2021 aspirin was started on 1/16/2021. And continue DAPT for 90 days due to iCAD  . Goal blood pressure less than 160    Comorbidities include diabetes, hypertension, CAD, hyperlipidemia tube placement. On 1/18/2021 stroke alert was called and change in mentation. But after couple seconds patient returned to her baseline  SBP at that time 121  Blood glucose 292  Total NIH 16    CT Brain: 1/18/21 negative    LTME was started and consistent with Right hemispheric slowing,possibly related to recent R hemispheric stroke vs post-ictal state. No issues ON. Patient out of the room for testing. No current facility-administered medications on file prior to encounter.       Current Outpatient Medications on File Prior to Encounter   Medication Sig Dispense Refill    losartan (COZAAR) 25 MG tablet Take 1 tablet by mouth daily 60 tablet 3    docusate (COLACE) 50 MG/5ML liquid Take 10 mLs by mouth daily 50 mL 3    rivastigmine (EXELON) 9.5 MG/24HR Place 1 patch onto the skin daily      QUEtiapine (SEROQUEL) 25 MG tablet Take 25 mg by mouth 2 times daily      Calcium Carbonate-Vitamin D (OYSTER SHELL CALCIUM/D) 500-200 MG-UNIT TABS TAKE ONE TABLET BY MOUTH TWO TIMES A DAY 60 tablet 3    glimepiride (AMARYL) 2 MG tablet Take 1 tablet by mouth every morning 30 tablet 3    linagliptin (TRADJENTA) 5 MG tablet Take 1 tablet by mouth daily 30 tablet 10    furosemide (LASIX) 40 MG tablet Take 1 tablet by mouth daily 30 tablet 10    atorvastatin (LIPITOR) 80 MG tablet TAKE 1 TABLET BY MOUTH DAILY 30 tablet 5    sertraline (ZOLOFT) 100 MG tablet Take 1 tablet by mouth daily 30 tablet 5    clopidogrel (PLAVIX) 75 MG tablet Take 1 tablet by mouth daily 30 tablet 11    aspirin (RA ASPIRIN EC) 81 MG EC tablet Take 1 tablet by mouth daily 30 tablet 11    Incontinence Supply Disposable (INCONTINENCE BRIEF LARGE) MISC Use 4-5/day as needed 150 each 11    glucose blood VI test strips (ASCENSIA AUTODISC VI;ONE TOUCH ULTRA TEST VI) strip 1 each by In Vitro route 3 times daily As needed. 100 each 5    albuterol-ipratropium (COMBIVENT RESPIMAT)  MCG/ACT AERS inhaler Inhale 1 puff into the lungs every 6 hours as needed for Wheezing 1 Inhaler 5    Lancets MISC Use 1 -2 times daily Insulin dependent diabetes mellitus 50 each 11       Allergies: Luis M Hines is allergic to bactrim; penicillins; and tylenol [acetaminophen].     Past Medical History:   Diagnosis Date    Allergic rhinitis     Anxiety 10/25/2016    Asthma     CAD (coronary artery disease)     s/p stents RCA and LAD 2009,    Chronic back pain     Congestive heart failure ([facility] [address]     Depression     Headache(784.0)     Hiatal hernia     Hypercholesteremia 2/21/2012    Hypertension     Kidney stones     years ago    Obesity     Osteoarthritis     Postlaminectomy syndrome 6/6/2012    Type II or unspecified type diabetes mellitus without mention of complication, not stated as uncontrolled     Unspecified sleep apnea     Urinary incontinence        Past Surgical History:   Procedure Laterality Date    CARDIAC CATHETERIZATION  01/2013    patent stents    COLONOSCOPY  09/2015    normal    CORONARY ANGIOPLASTY WITH STENT PLACEMENT  1012-16    stents x 3    GASTROSTOMY TUBE PLACEMENT  12/28/2020    EGD PEG TUBE PLACEMENT    GASTROSTOMY TUBE PLACEMENT N/A 12/28/2020    EGD PEG TUBE PLACEMENT performed by Rolland Ormond, MD at [place] [address]  1/21/2021         JOINT REPLACEMENT      left knee    KNEE SURGERY  10/21/2013    rt knee synvisc injection     KNEE SURGERY  12/02/2013    knee synvisc injection rt #2    KNEE SURGERY  12/09/2013    rt knee synvisc inj    LUMBAR SPINE SURGERY  2007    NERVE BLOCK  04/23/2012    Right MBNB L3, L4, L5    NERVE BLOCK  05/22/2012    Right MBNB L3, L4, and L5     NERVE BLOCK  01/14/2013    Right knee injection #1 - Synvisc    NERVE BLOCK  01/21/2013    Right knee injection #2 - Synvisc    NERVE BLOCK  01/28/2013    Rigth knee synvisc injection #3    NERVE BLOCK Right 04/17/2017    Rt genicular nerve block.  no steroid used    OTHER SURGICAL HISTORY Right 07/14/2014    synvisc one knee injection    OTHER SURGICAL HISTORY Right 03/16/2015    synvisc one knee injection    OTHER SURGICAL HISTORY Right 06/13/2016    synvisc right knee injection    SPINE SURGERY      TONSILLECTOMY      UPPER GASTROINTESTINAL ENDOSCOPY      VENA CAVA FILTER PLACEMENT  2007    PE and B/L LE emboli       Medications:     insulin glargine  40 Units Subcutaneous Daily    clopidogrel  75 mg Oral Daily    lidocaine 1 % injection  5 mL Intradermal Once    sodium chloride flush  10 mL Intravenous 2 times per day    cloNIDine  0.2 mg Oral Daily    losartan  25 mg Oral Daily    amLODIPine  10 mg Oral Daily    insulin lispro  0-12 Units Subcutaneous Q6H    aspirin  81 mg Oral Daily    docusate  100 mg Oral Daily    vancomycin (VANCOCIN) intermittent dosing (placeholder) Other RX Placeholder    vancomycin  1,500 mg Intravenous Q12H    atorvastatin  80 mg Oral Daily    sertraline  100 mg Oral Daily    sodium chloride flush  10 mL Intravenous 2 times per day    enoxaparin  40 mg Subcutaneous Daily     PRN Meds include: sodium chloride flush, hydrALAZINE, sodium chloride flush, promethazine **OR** ondansetron, polyethylene glycol, acetaminophen **OR** acetaminophen, glucose, dextrose, glucagon (rDNA), dextrose    Objective:   BP (!) 148/76   Pulse 81   Temp 98.9 °F (37.2 °C) (Axillary)   Resp 16   Ht 5' 4\" (1.626 m)   Wt 241 lb 2.9 oz (109.4 kg)   LMP  (LMP Unknown)   SpO2 100%   BMI 41.40 kg/m²     Blood pressure range: Systolic (10FOD), AHM:657 , Min:105 , SZP:277   ; Diastolic (89GBO), JANICE:03, Min:65, Max:89      ROS:  Unable to be examined. Patient not in the room. NEUROLOGIC EXAMINATION    Not examined. Patient  Not in the room.       Data:    Lab Results:   CBC:   Recent Labs     01/20/21  0536 01/21/21  0511 01/22/21  0915   WBC 6.8 8.2 7.8   HGB 9.4* 11.4* 10.6*   PLT See Reflexed IPF Result 165 172     BMP:    Recent Labs     01/20/21  0536 01/21/21  0511 01/22/21  0915    138 132*   K 4.3 4.2 4.2    105 102   CO2 25 23 25   BUN 8 8 11   CREATININE 0.32* 0.34* 0.35*   GLUCOSE 258* 230* 197*         Lab Results   Component Value Date    CHOL 188 04/05/2019    LDLCHOLESTEROL 159 (H) 12/16/2020    HDL 48 12/16/2020    TRIG 177 (H) 04/05/2019     (H) 01/22/2021    AST 94 (H) 01/22/2021    TSH 2.66 01/19/2021    INR 1.0 12/15/2020    LABA1C 6.8 (H) 12/16/2020    LABMICR 39 (H) 12/19/2018    [id number] 1262 (H) 01/19/2021       No results found for: PHENYTOIN, PHENYTOIN, VALPROATE, CBMZ    IMAGING  reviewed    Assessment and Plan  Transient encephalopathy  Waxing and waning mentation likely postictal from unwitnessed seizure vs less likely but cannot be excluded due to hyperglycemia  Elevated LFTs  Enterococcus septicemia 1/13/21    Comorbidities include hypertension, diabetes, MCA infarct, iCAD     LTM E consistent with continuous right hemispheric polymorphic subacute delta slowing and attenuated background with suggested underlying structural defect. No abnormal epileptiform activity was seen. ltme has been discontinued. MRI brain: evoluation of large R MCA infarct,with underlying ch. Microvascular disease. Aspirin 81 mg  Lipitor 80 mg  Plavix started from 1/20/2021  Continue DAPT for 90 days per [facility] trial for ICAD   12/16/20  A1c 6.8  Echo 12/15/20: EF 50%, severe left ventricular hypertrophy, increased septal thickness. Grade 1 left ventricular diastolic dysfunction. Negative bubble study. Left atrium is moderately dilated.   Seizures precautions  PT/OT/SLP  Neurochecks per protocol  Goal blood pressure normotension  DC planning  Medical management per primary  ThankYou for this interesting consult    Maria Isabel Cedillo MD PGY-4 Neurology  1/22/2021  9:58 AM

## 2021-01-22 NOTE — CARE COORDINATION
[address] to inquire about referral.  They have received th referral and will be looking over it. I informed them the pateint is ready for discharge. [facility] to see about referral, facility asking for SS#. I informed her that the SS# is on the cover sheet that I had sent.   Gave number over phone

## 2021-01-22 NOTE — PLAN OF CARE
Problem: Fluid Volume - Imbalance:  Goal: Absence of imbalanced fluid volume signs and symptoms  Description: Absence of imbalanced fluid volume signs and symptoms  Outcome: Ongoing     Problem: Mental Status - Impaired:  Goal: Mental status will be restored to baseline  Description: Mental status will be restored to baseline  Outcome: Ongoing

## 2021-01-22 NOTE — PROGRESS NOTES
[facility] - Progress Note    Today's Date and Time: 1/22/2021, 1:48 PM    Impression :   · Extensive right MCA stroke   · Acte cystitis without hematuria secondary to e coli  · Enterococcus septicemia 1-13-21  · Hypernatremia  · Dehydration   · Allergy to Penicillin, sulfa  · Rt Gluteal decubitus    Recommendations:   · Vancomycin 1,250 mg IV q12h for 10 days; Stop date: 01/25/21  · Completed Ceftriaxone 1 G IV daily; stop date: 1/18/2021  · Wound Care    Medical Decision Making/Summary/Discussion:1/22/2021     ·   Infection Control Recommendations   · [place] Precautions    Antimicrobial Stewardship Recommendations     · Simplification of therapy  · Targeted therapy  · PK dosing    Coordination of Outpatient Care:   · Estimated Length of IV antimicrobials: 1-25-21  · Patient will need Midline Catheter Insertion:  TBD   · Patient will need PICC line Insertion: TBD   · Patient will need: Home IV , [place],  SNF,  LTAC: [facility]  · Patient will need outpatient wound care: TBD. Chief complaint/reason for consultation:   · Enterococcus in blood     History of Present Illness:     INITIAL HISTORY:    Ryan Khoury is a 78y.o.-year-old  female with history of CAD, HTN, DM, hyperlipidemia, right MCA stroke with residual left-sided deficits who was initially admitted on 1/13/2021. Patient seen at the request of Dr. Aracely Franks. Patient was initially seen at [address] [place] [address],[address] ER because of uncontrolled hyperglycemia, hypernatremia and AMS. Of note, patient suffered a stroke in December 2020 and had a PEG placed at that time. CT of the head showed redemonstration of extensive right MCA territory infarct with no new infarct noted. Patient was started on heparin, but was not a candidate for TPA due to being out of window period. 1/13/21: No acute cardiopulmonary findings. Discussed with patient, RN, family. I have personally reviewed the past medical history, past surgical history, medications, social history, and family history, and I have updated the database accordingly.   Past Medical History:     Past Medical History:   Diagnosis Date    Allergic rhinitis     Anxiety 10/25/2016    Asthma     CAD (coronary artery disease)     s/p stents RCA and LAD 2009,    Chronic back pain     Congestive heart failure ([address]     Depression     Headache(784.0)     Hiatal hernia     Hypercholesteremia 2/21/2012    Hypertension     Kidney stones     years ago    Obesity     Osteoarthritis     Postlaminectomy syndrome 6/6/2012    Type II or unspecified type diabetes mellitus without mention of complication, not stated as uncontrolled     Unspecified sleep apnea     Urinary incontinence        Past Surgical  History:     Past Surgical History:   Procedure Laterality Date    CARDIAC CATHETERIZATION  01/2013    patent stents    COLONOSCOPY  09/2015    normal    CORONARY ANGIOPLASTY WITH STENT PLACEMENT  1012-16    stents x 3    GASTROSTOMY TUBE PLACEMENT  12/28/2020    EGD PEG TUBE PLACEMENT    GASTROSTOMY TUBE PLACEMENT N/A 12/28/2020    EGD PEG TUBE PLACEMENT performed by Job Rushing MD at [place] [address]  1/21/2021         JOINT REPLACEMENT      left knee    KNEE SURGERY  10/21/2013    rt knee synvisc injection     KNEE SURGERY  12/02/2013    knee synvisc injection rt #2    KNEE SURGERY  12/09/2013    rt knee synvisc inj    LUMBAR SPINE SURGERY  2007    NERVE BLOCK  04/23/2012    Right MBNB L3, L4, L5    NERVE BLOCK  05/22/2012    Right MBNB L3, L4, and L5     NERVE BLOCK  01/14/2013    Right knee injection #1 - Synvisc    NERVE BLOCK  01/21/2013    Right knee injection #2 - Synvisc    NERVE BLOCK  01/28/2013    [facility] knee synvisc injection #3    NERVE BLOCK Right 04/17/2017 Rt genicular nerve block. no steroid used    OTHER SURGICAL HISTORY Right 2014    synvisc one knee injection    OTHER SURGICAL HISTORY Right 2015    synvisc one knee injection    OTHER SURGICAL HISTORY Right 2016    synvisc right knee injection    SPINE SURGERY      TONSILLECTOMY      UPPER GASTROINTESTINAL ENDOSCOPY      VENA CAVA FILTER PLACEMENT      PE and B/L LE emboli       Medications:      insulin glargine  40 Units Subcutaneous Daily    clopidogrel  75 mg Oral Daily    lidocaine 1 % injection  5 mL Intradermal Once    sodium chloride flush  10 mL Intravenous 2 times per day    cloNIDine  0.2 mg Oral Daily    losartan  25 mg Oral Daily    amLODIPine  10 mg Oral Daily    insulin lispro  0-12 Units Subcutaneous Q6H    aspirin  81 mg Oral Daily    docusate  100 mg Oral Daily    vancomycin (VANCOCIN) intermittent dosing (placeholder)   Other RX Placeholder    vancomycin  1,500 mg Intravenous Q12H    atorvastatin  80 mg Oral Daily    sertraline  100 mg Oral Daily    sodium chloride flush  10 mL Intravenous 2 times per day    enoxaparin  40 mg Subcutaneous Daily       Social History:     Social History     Socioeconomic History    Marital status: Legally      Spouse name: Not on file    Number of children: Not on file    Years of education: Not on file    Highest education level: Not on file   Occupational History    Not on file   Social Needs    Financial resource strain: Not on file    Food insecurity     Worry: Not on file     Inability: Not on file    Transportation needs     Medical: Not on file     Non-medical: Not on file   Tobacco Use    Smoking status: Former Smoker     Packs/day: 0.50     Years: 0.00     Pack years: 0.00     Quit date: 3/28/2013     Years since quittin.8    Smokeless tobacco: Never Used   Substance and Sexual Activity    Alcohol use:  Yes     Alcohol/week: 1.0 standard drinks     Types: 1 Cans of beer per week Comment: occasionally    Drug use: No    Sexual activity: Not on file   Lifestyle    Physical activity     Days per week: Not on file     Minutes per session: Not on file    Stress: Not on file   Relationships    Social connections     Talks on phone: Not on file     Gets together: Not on file     Attends Worship service: Not on file     Active member of club or organization: Not on file     Attends meetings of clubs or organizations: Not on file     Relationship status: Not on file    Intimate partner violence     Fear of current or ex partner: Not on file     Emotionally abused: Not on file     Physically abused: Not on file     Forced sexual activity: Not on file   Other Topics Concern    Not on file   Social History Narrative    Not on file       Family History:     Family History   Problem Relation Age of Onset    Diabetes Mother     Cancer Father     High Blood Pressure Sister     High Blood Pressure Brother         Allergies:   Bactrim, Penicillins, and Tylenol [acetaminophen]     Review of Systems:     Unable to perform ROS today. She is sleeping. Physical Examination :     Patient Vitals for the past 8 hrs:   BP Temp Temp src Pulse Resp SpO2   01/22/21 1110 109/62 98.6 °F (37 °C) Axillary 60 16 98 %   01/22/21 0901 (!) 148/76 98.9 °F (37.2 °C) Axillary 81 16 100 %     General Appearance: sleeping and in no apparent distress  Head:  Normocephalic, no trauma  Pulmonary/Chest: Clear to auscultation, without wheezes, rales, or rhonchi. Cardiovascular: Regular rate and rhythm without murmurs. Abdomen: Soft. Bowel sounds normal  All four Extremities: No cyanosis, clubbing, edema, or effusions. Neurologic: No gross sensory or motor deficits. Skin: Warm and dry with good turgor. No signs of peripheral arterial or venous insufficiency.     Medical Decision Making -Laboratory:   I have independently reviewed/ordered the following labs:    CBC with Differential:   Recent Labs     01/21/21 3248 01/22/21  0915   WBC 8.2 7.8   HGB 11.4* 10.6*   HCT 37.5 35.9*    172   LYMPHOPCT 24 19*   MONOPCT 8 9     BMP:   Recent Labs     01/21/21  0511 01/22/21  0915    132*   K 4.2 4.2    102   CO2 23 25   BUN 8 11   CREATININE 0.34* 0.35*   MG 1.6 1.6     Hepatic Function Panel:   Recent Labs     01/21/21  0511 01/22/21  0915   PROT 6.3* 6.0*   LABALBU 2.8* 2.6*   BILIDIR <0.08 0.11   IBILI CANNOT BE CALCULATED 0.13   BILITOT 0.28* 0.24*   ALKPHOS 92 85   * 110*   * 94*     No results for input(s): RPR in the last 72 hours. No results for input(s): HIV in the last 72 hours. No results for input(s): BC in the last 72 hours. Lab Results   Component Value Date    MUCUS NOT REPORTED 01/19/2021    RBC 3.80 01/22/2021    RBC 4.21 02/24/2012    TRICHOMONAS NOT REPORTED 01/19/2021    WBC 7.8 01/22/2021    YEAST NOT REPORTED 01/19/2021    TURBIDITY CLEAR 01/19/2021     Lab Results   Component Value Date    CREATININE 0.35 01/22/2021    GLUCOSE 197 01/22/2021    GLUCOSE 105 02/24/2012       Medical Decision Making-Imaging:     CT abd/pelvie 1/17/2021  Impression:        1. No acute findings within the abdomen or pelvis.  In particular, there is   no evidence of occult abdominal or pelvic abscess.  Mild presacral edema,   possibly third-spacing. 2. Stable focal cortical atrophy in the lower pole of the left kidney. Nonobstructive left nephrolithiasis. Medical Decision Ejtfcn-Wojhkw[facility]-Xmpna:     1/15/2021 10:00 AM - Deyvi, Ledy Incoming Lab Results From [facility]    Specimen Information: Urine, clean catch        Component Collected Lab   Specimen Description 01/13/2021  8:40 PM [employer]   . CLEAN CATCH URINE    Special Requests 01/13/2021  8:40 PM [employer]   NOT REPORTED    Culture Abnormal  01/13/2021  8:40 PM [address] 10 to 50,000 CFU/ML    Testing Performed By Lab - [address] Name Director Address Valid Date Range   [address] Chinchilla, MD [address] [facility] [address] 08/30/17 0801-Present   Susceptibility    Escherichia coli (1)    Antibiotic Interpretation DONNA Status    amikacin   Final     NOT REPORTED    ampicillin Resistant  Final     >=32   RESISTANT   ampicillin-sulbactam   Final     NOT REPORTED    aztreonam Sensitive  Final     <=1   SUSCEPTIBLE   ceFAZolin Sensitive  Final     <=4   SUSCEPTIBLE   ceFAZolin Sensitive Cefazolin sensitivity results can be used to predict the effectiveness of oral cephalosporins (eg. Cephalexin) in uncomplicated Urinary Tract Infections due to E. coli, K. pneumoniae, and P. mirabilis Final    cefepime   Final     NOT REPORTED    cefTRIAXone Sensitive  Final     <=1   SUSCEPTIBLE   ciprofloxacin Resistant  Final     >=4   RESISTANT   ertapenem   Final     NOT REPORTED    Confirmatory Extended Spectrum Beta-Lactamase Negative NEGATIVE Final    gentamicin Sensitive  Final     <=1   SUSCEPTIBLE   meropenem   Final     NOT REPORTED    nitrofurantoin Sensitive  Final     <=16   SUSCEPTIBLE   tigecycline   Final     NOT REPORTED    tobramycin Sensitive  Final     <=1   SUSCEPTIBLE   trimethoprim-sulfamethoxazole Sensitive  Final     <=20   SUSCEPTIBLE   piperacillin-tazobactam Sensitive  Final     <=4   SUSCEPTIBLE   Lab and Collection    Culture, Urine - 1/13/2021  1/15/2021 10:41 AM - DeyviLedy palm Incoming Lab Results From [facility]    Specimen Information: Blood        Component Collected Lab   Specimen Description 01/13/2021 10:15 AM [place] [facility]   . BLOOD    Special Requests 01/13/2021 10:15 AM [facility]   LEFT HAND 20cc    Culture Abnormal  01/13/2021 10:15 AM [address] Blood Culture Results called to and read back by: OLGA Keane ON 1/14/21    Culture 01/13/2021 10:15  [address] DIRECT GRAM STAIN FROM BOTTLE: GRAM POSITIVE COCCI IN CHAINS AND PAIRS    Culture Abnormal  01/13/2021 10:15 AM [address]    Testing Performed By    Umu Cardona Name Director Address Valid Date Range   [address], MD [address] [facility] [address] 08/30/17 0801-Present   [address] LAB Deonte Almazan  [address] [place] [address] 11/17/17 1521-Present   Susceptibility    Enterococcus  faecalis (3)    Antibiotic Interpretation DONNA Status    ampicillin Sensitive  Final     <=2   SUSCEPTIBLE   penicillin   Final     NOT REPORTED    ciprofloxacin   Final     NOT REPORTED    erythromycin   Final     NOT REPORTED    Gentamicin, High Level Sensitive SUSCEPTIBLE Final    levofloxacin   Final     NOT REPORTED    linezolid   Final     NOT REPORTED    nitrofurantoin   Final     NOT REPORTED    Synercid   Final     NOT REPORTED    Streptomycin, Hi Level Sensitive SUSCEPTIBLE Final    tetracycline   Final     NOT REPORTED    tigecycline   Final     NOT REPORTED    vancomycin Sensitive  Final     1   SUSCEPTIBLE   Lab and Collection    Culture, Blood 1 - 1/13/2021 1/18/2021  7:25 AM - Deyvi, Mhpn Incoming Lab Results From [facility]    Specimen Information: Blood        Component Collected Lab   Specimen Description 01/13/2021  9:55 AM [place] [facility]   . BLOOD    Special Requests 01/13/2021  9:55 AM [facility]   LEFT WRIST 20cc    Culture 01/13/2021  9:55  [address]   NO GROWTH 5 DAYS    Testing Performed By    Lab - Abbreviation Name Director Address Valid Date Range   [address], MD [address] [facility] [address] 08/30/17 [facility] Deonte Almazan  [address] [place] [address] 11/17/17 1521-Present   Lab and Collection Culture, Blood 1 - 1/13/2021    Medical Decision Making-Other:     Note:  · Labs, medications, radiologic studies were reviewed with personal review of films  · Moderate Large amounts of data were reviewed  · Discussed with nursing Staff, Discharge planner  · Infection Control and Prevention measures reviewed  · All prior entries were reviewed  · Administer medications as ordered  · Prognosis: Fair   · Discharge planning reviewed  · Follow up as outpatient. Thank you for allowing us to participate in the care of this patient. Please call with questions. Ela Nichols participated in the evaluation of this patient under my direct supervision. Jas Xavier MD    ATTESTATION:    I have discussed the case, including pertinent history and exam findings with the residents and students. I have seen and examined the patient and the key elements of the encounter have been performed by me. I was present when the student obtained his information or examined the patient. I have reviewed the laboratory data, other diagnostic studies and discussed them with the residents. I have updated the medical record where necessary. I agree with the assessment, plan and orders as documented by the resident/ student.     Jas Xavier MD.         Office: [phone]

## 2021-01-22 NOTE — PROGRESS NOTES
Physician Progress Note      PATIENT:               Makayla Bowie  [facility] #:                  [id number]  :                       1950  ADMIT DATE:       2021 4:15 PM  [address] [facility] DATE:  RESPONDING  PROVIDER #:        Kym Hunter MD          QUERY TEXT:    Patient admitted with metabolic encephalopathy. Noted documentation of    Sepsis on   and Bacteremia daily by IM and ID, ). If possible, please document in progress notes and discharge summary if you   are evaluating and /or treating any of the following: The medical record reflects the following:  Risk Factors: UTI, dehydration  Clinical Indicators: urine with enterococcus, 1 of 2 blood cultures with   enterococcus, metabolic encephalopathy, documented unknown source  Treatment: ID consult, urine and blood cx, PICC line and IV antibiotics    Tracy ESPINOZA CCDS [phone]  Options provided:  -- Sepsis due to enterococcus confirmed and Enterococcus bacteremia ruled out  -- Enterococcus bacteremia  confirmed and Sepsis due to enterococcus ruled out  -- Sepsis due to enterococcus and Enterococcus bacteremia confirmed  -- Sepsis due to enterococcus and Enterococcus bacteremia  ruled out  -- Other - I will add my own diagnosis  -- Disagree - Not applicable / Not valid  -- Disagree - Clinically unable to determine / Unknown  -- Refer to Clinical Documentation Reviewer    PROVIDER RESPONSE TEXT:    After study, Sepsis due to enterococcus and Enterococcus bacteremia confirmed.     Query created by: Syeda Barrios on 2021 3:41 PM      Electronically signed by:  Kym Hunter MD 2021 9:27 AM

## 2021-01-22 NOTE — PROGRESS NOTES
[facility] - Progress Note    Today's Date and Time: 1/22/2021, 4:46 PM    Impression :   · Extensive right MCA stroke   · Acte cystitis without hematuria secondary to e coli  · Enterococcus septicemia 1-13-21  · Hypernatremia  · Dehydration   · Allergy to Penicillin, sulfa  · Rt Gluteal decubitus    Recommendations:   · Vancomycin 1,250 mg IV q12h for 10 days; Stop date: 01/25/21  · Completed Ceftriaxone 1 G IV daily; stop date: 1/18/2021  · Wound Care    Medical Decision Making/Summary/Discussion:1/22/2021     ·   Infection Control Recommendations   · [place] Precautions    Antimicrobial Stewardship Recommendations     · Simplification of therapy  · Targeted therapy  · PK dosing    Coordination of Outpatient Care:   · Estimated Length of IV antimicrobials: 1-25-21  · Patient will need Midline Catheter Insertion:  TBD   · Patient will need PICC line Insertion: TBD   · Patient will need: Home IV , [place],  SNF,  LTAC: Pt from [facility]  · Patient will need outpatient wound care: TBD. Chief complaint/reason for consultation:   · Enterococcus in blood     History of Present Illness:     INITIAL HISTORY:    Tor Ortiz is a 78y.o.-year-old  female with history of CAD, HTN, DM, hyperlipidemia, right MCA stroke with residual left-sided deficits who was initially admitted on 1/13/2021. Patient seen at the request of Dr. Suzie Parmar. Patient was initially seen at [facility] [address] ER because of uncontrolled hyperglycemia, hypernatremia and AMS. Of note, patient suffered a stroke in December 2020 and had a PEG placed at that time. CT of the head showed redemonstration of extensive right MCA territory infarct with no new infarct noted. Patient was started on heparin, but was not a candidate for TPA due to being out of window period. Blood cultures obtained on 1-13-21 show the growth of Enterococcus faecalis. Susceptible to ampicillin but patient is medical history, past surgical history, medications, social history, and family history, and I have updated the database accordingly. Past Medical History:     Past Medical History:   Diagnosis Date    Allergic rhinitis     Anxiety 10/25/2016    Asthma     CAD (coronary artery disease)     s/p stents RCA and LAD 2009,    Chronic back pain     Congestive heart failure ([address]     Depression     Headache(784.0)     Hiatal hernia     Hypercholesteremia 2/21/2012    Hypertension     Kidney stones     years ago    Obesity     Osteoarthritis     Postlaminectomy syndrome 6/6/2012    Type II or unspecified type diabetes mellitus without mention of complication, not stated as uncontrolled     Unspecified sleep apnea     Urinary incontinence        Past Surgical  History:     Past Surgical History:   Procedure Laterality Date    CARDIAC CATHETERIZATION  01/2013    patent stents    COLONOSCOPY  09/2015    normal    CORONARY ANGIOPLASTY WITH STENT PLACEMENT  1012-16    stents x 3    GASTROSTOMY TUBE PLACEMENT  12/28/2020    EGD PEG TUBE PLACEMENT    GASTROSTOMY TUBE PLACEMENT N/A 12/28/2020    EGD PEG TUBE PLACEMENT performed by Deep Dixon MD at [place] [id number]  1/21/2021         JOINT REPLACEMENT      left knee    KNEE SURGERY  10/21/2013    rt knee synvisc injection     KNEE SURGERY  12/02/2013    knee synvisc injection rt #2    KNEE SURGERY  12/09/2013    rt knee synvisc inj    LUMBAR SPINE SURGERY  2007    NERVE BLOCK  04/23/2012    Right MBNB L3, L4, L5    NERVE BLOCK  05/22/2012    Right MBNB L3, L4, and L5     NERVE BLOCK  01/14/2013    Right knee injection #1 - Synvisc    NERVE BLOCK  01/21/2013    Right knee injection #2 - Synvisc    NERVE BLOCK  01/28/2013    Rigth knee synvisc injection #3    NERVE BLOCK Right 04/17/2017    Rt genicular nerve block.  no steroid used    OTHER SURGICAL HISTORY Right 07/14/2014    synvisc one knee injection    OTHER SURGICAL Minutes per session: Not on file    Stress: Not on file   Relationships    Social connections     Talks on phone: Not on file     Gets together: Not on file     Attends [employer] service: Not on file     Active member of club or organization: Not on file     Attends meetings of clubs or organizations: Not on file     Relationship status: Not on file    Intimate partner violence     Fear of current or ex partner: Not on file     Emotionally abused: Not on file     Physically abused: Not on file     Forced sexual activity: Not on file   Other Topics Concern    Not on file   Social History Narrative    Not on file       Family History:     Family History   Problem Relation Age of Onset    Diabetes Mother     Cancer Father     High Blood Pressure Sister     High Blood Pressure Brother         Allergies:   Bactrim, Penicillins, and Tylenol [acetaminophen]     Review of Systems:     Unable to perform ROS today. She is sleeping. Physical Examination :     Patient Vitals for the past 8 hrs:   BP Temp Temp src Pulse Resp SpO2   01/22/21 1110 109/62 98.6 °F (37 °C) Axillary 60 16 98 %   01/22/21 0901 (!) 148/76 98.9 °F (37.2 °C) Axillary 81 16 100 %     General Appearance: sleeping and in no apparent distress  Head:  Normocephalic, no trauma  Pulmonary/Chest: Clear to auscultation, without wheezes, rales, or rhonchi. Cardiovascular: Regular rate and rhythm without murmurs. Abdomen: Soft. Bowel sounds normal  All four Extremities: No cyanosis, clubbing, edema, or effusions. Neurologic: No gross sensory or motor deficits. Skin: Warm and dry with good turgor. No signs of peripheral arterial or venous insufficiency.     Medical Decision Making -Laboratory:   I have independently reviewed/ordered the following labs:    CBC with Differential:   Recent Labs     01/21/21  0511 01/22/21  0915   WBC 8.2 7.8   HGB 11.4* 10.6*   HCT 37.5 35.9*    172   LYMPHOPCT 24 19*   MONOPCT 8 9     BMP:   Recent Labs 01/21/21  0511 01/22/21  0915    132*   K 4.2 4.2    102   CO2 23 25   BUN 8 11   CREATININE 0.34* 0.35*   MG 1.6 1.6     Hepatic Function Panel:   Recent Labs     01/21/21  0511 01/22/21  0915   PROT 6.3* 6.0*   LABALBU 2.8* 2.6*   BILIDIR <0.08 0.11   IBILI CANNOT BE CALCULATED 0.13   BILITOT 0.28* 0.24*   ALKPHOS 92 85   * 110*   * 94*     No results for input(s): RPR in the last 72 hours. No results for input(s): HIV in the last 72 hours. No results for input(s): BC in the last 72 hours. Lab Results   Component Value Date    MUCUS NOT REPORTED 01/19/2021    RBC 3.80 01/22/2021    RBC 4.21 02/24/2012    TRICHOMONAS NOT REPORTED 01/19/2021    WBC 7.8 01/22/2021    YEAST NOT REPORTED 01/19/2021    TURBIDITY CLEAR 01/19/2021     Lab Results   Component Value Date    CREATININE 0.35 01/22/2021    GLUCOSE 197 01/22/2021    GLUCOSE 105 02/24/2012       Medical Decision Making-Imaging:     CT abd/pelvie 1/17/2021  Impression:        1. No acute findings within the abdomen or pelvis.  In particular, there is   no evidence of occult abdominal or pelvic abscess.  Mild presacral edema,   possibly third-spacing. 2. Stable focal cortical atrophy in the lower pole of the left kidney. Nonobstructive left nephrolithiasis. Medical Decision Mmvwul-Ztoaywug-Pxbdz:     1/15/2021 10:00 AM - Deyvi, [facility] Incoming Lab Results From [facility]    Specimen Information: Urine, clean catch        Component Collected Lab   Specimen Description 01/13/2021  8:40 PM [employer]   . CLEAN CATCH URINE    Special Requests 01/13/2021  8:40 PM [employer]   NOT REPORTED    Culture Abnormal  01/13/2021  8:40 PM [address] 10 to 50,000 CFU/ML    Testing Performed By    Lab - Abbreviation Name Director Address Valid Date Range   [address]-Favian Chinchilla MD [address] [facility] [address] 08/30/17 0801-Present Susceptibility    Escherichia coli (1)    Antibiotic Interpretation DONNA Status    amikacin   Final     NOT REPORTED    ampicillin Resistant  Final     >=32   RESISTANT   ampicillin-sulbactam   Final     NOT REPORTED    aztreonam Sensitive  Final     <=1   SUSCEPTIBLE   ceFAZolin Sensitive  Final     <=4   SUSCEPTIBLE   ceFAZolin Sensitive Cefazolin sensitivity results can be used to predict the effectiveness of oral cephalosporins (eg. Cephalexin) in uncomplicated Urinary Tract Infections due to E. coli, K. pneumoniae, and P. mirabilis Final    cefepime   Final     NOT REPORTED    cefTRIAXone Sensitive  Final     <=1   SUSCEPTIBLE   ciprofloxacin Resistant  Final     >=4   RESISTANT   ertapenem   Final     NOT REPORTED    Confirmatory Extended Spectrum Beta-Lactamase Negative NEGATIVE Final    gentamicin Sensitive  Final     <=1   SUSCEPTIBLE   meropenem   Final     NOT REPORTED    nitrofurantoin Sensitive  Final     <=16   SUSCEPTIBLE   tigecycline   Final     NOT REPORTED    tobramycin Sensitive  Final     <=1   SUSCEPTIBLE   trimethoprim-sulfamethoxazole Sensitive  Final     <=20   SUSCEPTIBLE   piperacillin-tazobactam Sensitive  Final     <=4   SUSCEPTIBLE   Lab and Collection    Culture, Urine - 1/13/2021  1/15/2021 10:41 AM - [facility]yviLedy [facility] Incoming Lab Results From [facility]    Specimen Information: Blood        Component Collected Lab   Specimen Description 01/13/2021 10:15 AM [facility]   . BLOOD    Special Requests 01/13/2021 10:15 AM [facility]   LEFT HAND 20cc    Culture Abnormal  01/13/2021 10:15 AM [address] [facility] Blood Culture Results called to and read back by: OLGA MEDEIROS V AT 0617 ON 1/14/21    Culture 01/13/2021 10:15 AM [facility] FROM BOTTLE: GRAM POSITIVE COCCI IN CHAINS AND PAIRS    Culture Abnormal  01/13/2021 10:15  [facility] Performed By    Umu Cardona Name Director Address Valid Date Range   [address], MD [address] [facility] [address] 08/30/17 0801-Present   [address] LAB Lizzy Rawls  [address] [place] [id number] 11/17/17 1521-Present   Susceptibility    Enterococcus  faecalis (3)    Antibiotic Interpretation DONNA Status    ampicillin Sensitive  Final     <=2   SUSCEPTIBLE   penicillin   Final     NOT REPORTED    ciprofloxacin   Final     NOT REPORTED    erythromycin   Final     NOT REPORTED    Gentamicin, High Level Sensitive SUSCEPTIBLE Final    levofloxacin   Final     NOT REPORTED    linezolid   Final     NOT REPORTED    nitrofurantoin   Final     NOT REPORTED    Synercid   Final     NOT REPORTED    Streptomycin, Hi Level Sensitive SUSCEPTIBLE Final    tetracycline   Final     NOT REPORTED    tigecycline   Final     NOT REPORTED    vancomycin Sensitive  Final     1   SUSCEPTIBLE   Lab and Collection    Culture, Blood 1 - 1/13/2021 1/18/2021  7:25 AM - Deyvi, [facility] Incoming Lab Results From [facility]    Specimen Information: Blood        Component Collected Lab   Specimen Description 01/13/2021  9:55 AM [facility]   . BLOOD    Special Requests 01/13/2021  9:55 AM [facility]   LEFT WRIST 20cc    Culture 01/13/2021  9:55  [address]   NO GROWTH 5 DAYS    Testing Performed By    Lab - Abbreviation Name Director Address Valid Date Range   [address], MD [address] [facility] [address] 08/30/17 [facility] LAB Lizzy Rawls MD [address] [address] 11/17/17 1521-Present   Lab and Collection    Culture, Blood 1 - 1/13/2021    Medical Decision Making-Other:     Note:  · Labs, medications, radiologic studies were reviewed with personal review of films  · Moderate Large amounts of data were reviewed  · Discussed with nursing Staff, Discharge planner  · Infection Control and Prevention measures reviewed  · All prior entries were reviewed  · Administer medications as ordered  · Prognosis: Fair   · Discharge planning reviewed  · Follow up as outpatient. Thank you for allowing us to participate in the care of this patient. Please call with questions. Ela Chan participated in the evaluation of this patient under my direct supervision. Darell Raya MD    ATTESTATION:    I have discussed the case, including pertinent history and exam findings with the residents and students. I have seen and examined the patient and the key elements of the encounter have been performed by me. I was present when the student obtained his information or examined the patient. I have reviewed the laboratory data, other diagnostic studies and discussed them with the residents. I have updated the medical record where necessary. I agree with the assessment, plan and orders as documented by the resident/ student.     Darell Raya MD.           Office: [phone]

## 2021-01-22 NOTE — PROGRESS NOTES
[facility]  Internal Medicine Teaching Residency Program  Inpatient Daily Progress Note  ______________________________________________________________________________    Patient: Gucci Ruiz  YOB: 1950   CFY:[id number]    Acct: [de-identified]     [facility]  Admit date: 1/13/2021  Today's date: 01/22/21  Number of days in the hospital: 9    SUBJECTIVE   Admitting Diagnosis: Acute encephalopathy  CC: Altered mental status, hyponatremia, hyperglycemia    Pt examined at bedside. Chart & results reviewed. Hemodynamically stable  Pt  more alert and oriented  Mid line placed to receive vancomycin for enterococcus bacteremia and sepsis for 3 more days. Continues on CPAP. Flaccid on left side and not ambulating. Sodium levels normalized and glucose levels controlled. Pt had MRI done to rule out extension of infarction. Since EEG is unrevealing. MRI negative. Discharge today. Pending placement decision from family. ROS: Unable to obtain as pt is encephalopathic. BRIEF HISTORY   Ny Echols a 78 y. o. female who presented from [facility] emergency department for concerns for altered mental status, hyperglycemia, and hypernatremia. Patient presented to the emergency department after she was found to be hyperglycemic on her daily glucose checks at her rehab facility.  Patient was status post 2 weeks out from a right MCA infarct, had a extended course here at [facility] neuro critical care unit, no thrombectomy or interventions were performed, patient was started on heparin and out of TPA window. Patient does have a past medical history of CAD, diabetes, hypertension, asthma, daily smoker.  On examination patient had a obvious left-sided facial droop, left-sided weakness, however intermittently was following commands and unsure if complete participation in exam occurring due to altered mentation from hypernatremia. Di     Nephrology was consulted and patient was started on 0.45 saline leading to some improvement of sodium level, later on free water boluses with 250 mL every 6 hours were added.  Neurology was consulted due to recent right MCA stroke with left hemiplegia to decide about aspirin/Plavix as repeat CT head was concerning for some intracranial bleed.  Blood cultures grew Enterococcus faecium and patient started on vancomycin on 1/15 to complete 10 days. Reji Young is a right gluteal decubitus small wound but the source of Enterococcus bacteremia is unclear.  May need to rule out GI source of Enterococcus bacteremia.  Patient had repeat CT head  which still cannot totally exclude bleed in the brain.     Hypernatremia and patient's mentation is improving.  She is alert and oriented now. Madhuri Urbina continues to have left-sided body weakness from right MCA stroke. Madhuri Urbina is on tube feeding from PEG tube.  Hemodynamically stable.  transferred out of ICU to floors for further management. OBJECTIVE     Vital Signs:  /89   Pulse 74   Temp 98.8 °F (37.1 °C) (Axillary)   Resp 16   Ht 5' 4\" (1.626 m)   Wt 241 lb 2.9 oz (109.4 kg)   LMP  (LMP Unknown)   SpO2 100%   BMI 41.40 kg/m²     Temp (24hrs), Av °F (36.7 °C), Min:96.9 °F (36.1 °C), Max:99.1 °F (37.3 °C)    In: 600   Out: 450 [Urine:450]     General appearance - Encephalopathic with waxing and waning mentation. Eyes - pupils equal and reactive, no icterus. Mouth - mucous membranes moist, pharynx normal without lesions  Neck - supple, no significant adenopathy  Chest - clear to auscultation, no wheezes  Heart - normal rate, regular rhythm, normal S1, S2  Abdomen - soft, nontender, nondistended  Neurological - Left sided paralysis, sensory and motor examination are grossly intact.   Extremities - peripheral pulses normal, no pedal edema  Skin - normal coloration and turgor, no rashes          Medications:  Scheduled Medications:    insulin glargine  40 Units Subcutaneous Daily    clopidogrel  75 mg Oral Daily    lidocaine 1 % injection  5 mL Intradermal Once    sodium chloride flush  10 mL Intravenous 2 times per day    cloNIDine  0.2 mg Oral Daily    losartan  25 mg Oral Daily    amLODIPine  10 mg Oral Daily    insulin lispro  0-12 Units Subcutaneous Q6H    aspirin  81 mg Oral Daily    docusate  100 mg Oral Daily    vancomycin (VANCOCIN) intermittent dosing (placeholder)   Other RX Placeholder    vancomycin  1,500 mg Intravenous Q12H    atorvastatin  80 mg Oral Daily    sertraline  100 mg Oral Daily    sodium chloride flush  10 mL Intravenous 2 times per day    enoxaparin  40 mg Subcutaneous Daily     Continuous Infusions:    dextrose      sodium chloride 50 mL/hr at 01/19/21 2200     PRN Medications    sodium chloride flush, 10 mL, PRN      hydrALAZINE, 5 mg, Q6H PRN      sodium chloride flush, 10 mL, PRN      promethazine, 12.5 mg, Q6H PRN    Or      ondansetron, 4 mg, Q6H PRN      polyethylene glycol, 17 g, Daily PRN      acetaminophen, 650 mg, Q6H PRN    Or      acetaminophen, 650 mg, Q6H PRN      glucose, 15 g, PRN      dextrose, 12.5 g, PRN      glucagon (rDNA), 1 mg, PRN      dextrose, 100 mL/hr, PRN        Diagnostic Labs:  CBC:   Recent Labs     01/20/21  0536 01/21/21  0511   WBC 6.8 8.2   RBC 3.31* 4.13   HGB 9.4* 11.4*   HCT 30.1* 37.5   MCV 90.9 90.8   RDW 13.2 13.3   PLT See Reflexed IPF Result 165     BMP:   Recent Labs     01/20/21  0536 01/21/21  0511    138   K 4.3 4.2    105   CO2 25 23   PHOS 3.1 3.4   BUN 8 8   CREATININE 0.32* 0.34*     BNP: No results for input(s): BNP in the last 72 hours. PT/INR: No results for input(s): PROTIME, INR in the last 72 hours. APTT: No results for input(s): APTT in the last 72 hours. CARDIAC ENZYMES: No results for input(s): CKMB, CKMBINDEX, TROPONINI in the last 72 hours.     Invalid input(s): CKTOTAL;3  FASTING LIPID PANEL:  Lab Results   Component Value Date    CHOL 188 04/05/2019    HDL 48 01/20. Continue on Neurovascular checks. Dual antiplatelet therapy to continue for 90 days. Enterococcus faecalis sepsis - Resolved. Continue on Vancomycin 1250 mg IV q 12 hr till 1/25/21. Pt has mid line placed to continue IV after discharge until stop date. Infectious disease on board will follow their recommendations. E. coli UTI- resolved , pt completed course of ceftriaxone. Essential Hypertension- controlled, Continue and discharge on  [id number] mg, Losartan 25 mg and clonidine 0.2 mg daily.     Type 2 diabetes mellitus Last A1c 6.8, Blood sugars 215, Continue on 40 lantus and sliding scale insulin.     History of coronary artery disease with PCI in the past Continue atorvastatin 80 mg, aspirin, metoprolol 25 mg  And on discharge. History of sleep apnea-CPAP during sleep and day time naps. Transaminitis- monitor      DVT prophylaxis: Lovenox. Diet: PEG tube feeding.     OT/PT- on board  Discharge planning- Discharge pending placement . Alena Licona MD  Internal Medicine Resident, PGY-1  [facility];  [place], [place]  1/22/2021, 7:58 AM

## 2021-01-23 VITALS
RESPIRATION RATE: 18 BRPM | WEIGHT: 241.18 LBS | DIASTOLIC BLOOD PRESSURE: 84 MMHG | HEIGHT: 64 IN | HEART RATE: 81 BPM | BODY MASS INDEX: 41.18 KG/M2 | OXYGEN SATURATION: 98 % | SYSTOLIC BLOOD PRESSURE: 148 MMHG | TEMPERATURE: 98.1 F

## 2021-01-23 LAB
GLUCOSE BLD-MCNC: 122 MG/DL (ref 65–105)
GLUCOSE BLD-MCNC: 123 MG/DL (ref 65–105)
GLUCOSE BLD-MCNC: 185 MG/DL (ref 65–105)
GLUCOSE BLD-MCNC: 71 MG/DL (ref 65–105)
GLUCOSE BLD-MCNC: 74 MG/DL (ref 65–105)
GLUCOSE BLD-MCNC: 80 MG/DL (ref 65–105)
GLUCOSE BLD-MCNC: 81 MG/DL (ref 65–105)
SARS-COV-2, RAPID: NOT DETECTED
SARS-COV-2: NORMAL
SARS-COV-2: NORMAL
SOURCE: NORMAL

## 2021-01-23 PROCEDURE — U0002 COVID-19 LAB TEST NON-CDC: HCPCS

## 2021-01-23 PROCEDURE — 2580000003 HC RX 258: Performed by: STUDENT IN AN ORGANIZED HEALTH CARE EDUCATION/TRAINING PROGRAM

## 2021-01-23 PROCEDURE — 6370000000 HC RX 637 (ALT 250 FOR IP): Performed by: STUDENT IN AN ORGANIZED HEALTH CARE EDUCATION/TRAINING PROGRAM

## 2021-01-23 PROCEDURE — 99232 SBSQ HOSP IP/OBS MODERATE 35: CPT | Performed by: INTERNAL MEDICINE

## 2021-01-23 PROCEDURE — 6360000002 HC RX W HCPCS: Performed by: STUDENT IN AN ORGANIZED HEALTH CARE EDUCATION/TRAINING PROGRAM

## 2021-01-23 PROCEDURE — 94660 CPAP INITIATION&MGMT: CPT

## 2021-01-23 PROCEDURE — 82947 ASSAY GLUCOSE BLOOD QUANT: CPT

## 2021-01-23 PROCEDURE — 6370000000 HC RX 637 (ALT 250 FOR IP): Performed by: FAMILY MEDICINE

## 2021-01-23 PROCEDURE — 99233 SBSQ HOSP IP/OBS HIGH 50: CPT | Performed by: INTERNAL MEDICINE

## 2021-01-23 RX ADMIN — SODIUM CHLORIDE, PRESERVATIVE FREE 10 ML: 5 INJECTION INTRAVENOUS at 12:09

## 2021-01-23 RX ADMIN — ENOXAPARIN SODIUM 40 MG: 40 INJECTION SUBCUTANEOUS at 09:15

## 2021-01-23 RX ADMIN — DOCUSATE SODIUM 100 MG: 50 LIQUID ORAL at 09:15

## 2021-01-23 RX ADMIN — Medication 1500 MG: at 00:00

## 2021-01-23 RX ADMIN — ASPIRIN 81 MG: 81 TABLET, CHEWABLE ORAL at 09:16

## 2021-01-23 RX ADMIN — LOSARTAN POTASSIUM 25 MG: 25 TABLET, FILM COATED ORAL at 09:15

## 2021-01-23 RX ADMIN — INSULIN GLARGINE 40 UNITS: 100 INJECTION, SOLUTION SUBCUTANEOUS at 09:15

## 2021-01-23 RX ADMIN — AMLODIPINE BESYLATE 10 MG: 5 TABLET ORAL at 09:16

## 2021-01-23 RX ADMIN — Medication 1500 MG: at 12:19

## 2021-01-23 RX ADMIN — DEXTROSE MONOHYDRATE 12.5 G: 25 INJECTION, SOLUTION INTRAVENOUS at 16:29

## 2021-01-23 RX ADMIN — CLOPIDOGREL 75 MG: 75 TABLET, FILM COATED ORAL at 09:16

## 2021-01-23 RX ADMIN — CLONIDINE HYDROCHLORIDE 0.2 MG: 0.2 TABLET ORAL at 09:16

## 2021-01-23 RX ADMIN — SERTRALINE 100 MG: 50 TABLET, FILM COATED ORAL at 09:15

## 2021-01-23 ASSESSMENT — PAIN SCALES - WONG BAKER
WONGBAKER_NUMERICALRESPONSE: 0

## 2021-01-23 NOTE — PROGRESS NOTES
Called Nurse Donnie @ [address] [place] [address] to update her on pt's blood sugar and that pt is en route to the SNF.

## 2021-01-23 NOTE — CARE COORDINATION
Transitional plannin [address] to inquire about acceptance. Transferred to nursing station, [facility] took NCM number and said Kenia Castro will call back, she arrives about 9 am.   6018 called Tia back. She does not know if accepted, will call NCM back. Informed she's discharged. [address] called back and received TF order of Glucerna and notified that Vancomycin IV started on 01/15/21. No discontinuation date noted. Kenia Castro will call back. 1110 Received call from [facility], accepts pt into room 207. Call [phone] for nurse report. Rashid Mcfarlane, OLGA, notified. SNF requires a rapid covid before discharge. 1240 Received call from Mushtaq, [facility] who states [address] [place] [address] does not have BIpap there. [address] who said she can order and accept pt this evening. Given bipap settings, given rate of 17, O2 at 30%. She states she will order. She was transferred to 5th floor to speak with Josse ESPINOZA about mask size. [address] with OLGA Loaiza, states Kenia Castro will call back when Gearl Luz can be delivered. LAC faxed demographic, medical necessity and transportation request for 6 pm transport. [address], transport time of 1845 hrs., will attempt to get here at 1800 hrs. [address], RN notified. [address] called back and said they could transport at 1600/1615, notified OLGA Landin.     1600 Faxed AVS and MAR report to [phone] per request of facility. Karla Serrano, daughter and notified her of discharge to [address] [place] [address]. She states she will notify the rest of the family.

## 2021-01-23 NOTE — PROGRESS NOTES
Phlebotomist informed me that the patient is refusing to have her blood drawn today. I offered to pull off of her midline, but she refused this as well.   Will continue to monitor

## 2021-01-23 NOTE — PROGRESS NOTES
[address] [facility] his here to transport pt to [facility]. Pt's FR=459. Pt d/c'ed with all her belongings.

## 2021-01-23 NOTE — PROGRESS NOTES
[facility] - Progress Note    Today's Date and Time: 1/23/2021, 10:17 AM    Impression :   · Extensive right MCA stroke   · Acte cystitis without hematuria secondary to e coli  · Enterococcus septicemia 1-13-21  · Hypernatremia  · Dehydration   · Allergy to Penicillin, sulfa  · Rt Gluteal decubitus    Recommendations:   · Vancomycin 1,250 mg IV q12h for 10 days; Stop date: 01/25/21  · Completed Ceftriaxone 1 G IV daily; stop date: 1/18/2021  · Wound Care    Medical Decision Making/Summary/Discussion:1/23/2021     ·   Infection Control Recommendations   · [place] Precautions    Antimicrobial Stewardship Recommendations     · Simplification of therapy  · Targeted therapy  · PK dosing    Coordination of Outpatient Care:   · Estimated Length of IV antimicrobials: 1-25-21  · Patient will need Midline Catheter Insertion:  TBD   · Patient will need PICC line Insertion: TBD   · Patient will need: Home IV , [place],  SNF,  LTAC: [facility]  · Patient will need outpatient wound care: TBD. Chief complaint/reason for consultation:   · Enterococcus in blood     History of Present Illness:     INITIAL HISTORY:    Domenic Bates is a 78y.o.-year-old  female with history of CAD, HTN, DM, hyperlipidemia, right MCA stroke with residual left-sided deficits who was initially admitted on 1/13/2021. Patient seen at the request of Dr. Hortensia Degroot. Patient was initially seen at [facility] [address] ER because of uncontrolled hyperglycemia, hypernatremia and AMS. Of note, patient suffered a stroke in December 2020 and had a PEG placed at that time. CT of the head showed redemonstration of extensive right MCA territory infarct with no new infarct noted. Patient was started on heparin, but was not a candidate for TPA due to being out of window period. Blood cultures obtained on 1-13-21 show the growth of Enterococcus faecalis. Susceptible to ampicillin but patient is allergic to penicillins. Started on Vancomycin. Plan 10 days of Rx. CURRENT EVALUATION: 01/23/21    Afebrile  VS stable    The patient seen and evaluated at bedside. Patient is better with no new acute issues or concerns today. Patient does not have any fevers, chills, cough, shortness of breath, chest pains or palpitations. Mentation has improved. Able to follow commands. MRI 1-22-21: Continued evolution of large Rt MCA infarct. Labs, X rays reviewed: 1/23/2021    BUN: 35-->8-->8-->11  Cr: 0.43-->0.32-->0.34-->0.35    WBC: 7.5 -->6.8-->8.2-->7.8  Hb: 10.4 -->9.4-->11.4-->10.6  Plat: 139-->133-->165-->172    Cultures:  Urine:  · 1/13/21: +ve E.coli   Blood:  · 1/13/21: +ve enterococcus faecalis (gram +ve cocci in chains and pairs)  Sputum :  ·   Wound:  ·     COVID: negative   MRSA: negative       CT Abd/pelvis  01/17/21:  Impression:        1. No acute findings within the abdomen or pelvis.  In particular, there is   no evidence of occult abdominal or pelvic abscess.  Mild presacral edema,   possibly third-spacing. 2. Stable focal cortical atrophy in the lower pole of the left kidney. Nonobstructive left nephrolithiasis. CT Head WO Contrast:   01/18/21:   Impression   No acute intracranial abnormality.       No change from prior study             1/15/21:   No acute intracranial abnormality.       No change from prior study           · 1/13/21:   1. Redemonstration of extensive right MCA territory infarct.  No new infarct. 2. New or increasing hyperdense serpiginous gyral hyperdensities along   posterior aspect of the infarct in the parietal and to a less and extent   temporal lobe and also along the margins of the right caudate nucleus is   suggestive of some blood products.  This may be further assessed with MRI if   deemed clinically necessary. .               CXR:   1/13/21: No acute cardiopulmonary findings. Discussed with patient, RN, family.     I have personally reviewed the past medical history, past surgical history, medications, social history, and family history, and I have updated the database accordingly. Past Medical History:     Past Medical History:   Diagnosis Date    Allergic rhinitis     Anxiety 10/25/2016    Asthma     CAD (coronary artery disease)     s/p stents RCA and LAD 2009,    Chronic back pain     Congestive heart failure ([facility] [address]     Depression     Headache(784.0)     Hiatal hernia     Hypercholesteremia 2/21/2012    Hypertension     Kidney stones     years ago    Obesity     Osteoarthritis     Postlaminectomy syndrome 6/6/2012    Type II or unspecified type diabetes mellitus without mention of complication, not stated as uncontrolled     Unspecified sleep apnea     Urinary incontinence        Past Surgical  History:     Past Surgical History:   Procedure Laterality Date    CARDIAC CATHETERIZATION  01/2013    patent stents    COLONOSCOPY  09/2015    normal    CORONARY ANGIOPLASTY WITH STENT PLACEMENT  1012-16    stents x 3    GASTROSTOMY TUBE PLACEMENT  12/28/2020    EGD PEG TUBE PLACEMENT    GASTROSTOMY TUBE PLACEMENT N/A 12/28/2020    EGD PEG TUBE PLACEMENT performed by Celio Thomas MD at [place] [address]  1/21/2021         JOINT REPLACEMENT      left knee    KNEE SURGERY  10/21/2013    rt knee synvisc injection     KNEE SURGERY  12/02/2013    knee synvisc injection rt #2    KNEE SURGERY  12/09/2013    rt knee synvisc inj    LUMBAR SPINE SURGERY  2007    NERVE BLOCK  04/23/2012    Right MBNB L3, L4, L5    NERVE BLOCK  05/22/2012    Right MBNB L3, L4, and L5     NERVE BLOCK  01/14/2013    Right knee injection #1 - Synvisc    NERVE BLOCK  01/21/2013    Right knee injection #2 - Synvisc    NERVE BLOCK  01/28/2013    Rig knee synvisc injection #3    NERVE BLOCK Right 04/17/2017    Rt genicular nerve block.  no steroid used    OTHER SURGICAL HISTORY Right 07/14/2014    synvisc one knee injection    OTHER SURGICAL HISTORY Right 2015    synvisc one knee injection    OTHER SURGICAL HISTORY Right 2016    synvisc right knee injection    SPINE SURGERY      TONSILLECTOMY      UPPER GASTROINTESTINAL ENDOSCOPY      VENA CAVA FILTER PLACEMENT      PE and B/L LE emboli       Medications:      insulin glargine  40 Units Subcutaneous Daily    clopidogrel  75 mg Oral Daily    lidocaine 1 % injection  5 mL Intradermal Once    sodium chloride flush  10 mL Intravenous 2 times per day    cloNIDine  0.2 mg Oral Daily    losartan  25 mg Oral Daily    amLODIPine  10 mg Oral Daily    insulin lispro  0-12 Units Subcutaneous Q6H    aspirin  81 mg Oral Daily    docusate  100 mg Oral Daily    vancomycin (VANCOCIN) intermittent dosing (placeholder)   Other RX Placeholder    vancomycin  1,500 mg Intravenous Q12H    atorvastatin  80 mg Oral Daily    sertraline  100 mg Oral Daily    sodium chloride flush  10 mL Intravenous 2 times per day    enoxaparin  40 mg Subcutaneous Daily       Social History:     Social History     Socioeconomic History    Marital status: Legally      Spouse name: Not on file    Number of children: Not on file    Years of education: Not on file    Highest education level: Not on file   Occupational History    Not on file   Social Needs    Financial resource strain: Not on file    Food insecurity     Worry: Not on file     Inability: Not on file    Transportation needs     Medical: Not on file     Non-medical: Not on file   Tobacco Use    Smoking status: Former Smoker     Packs/day: 0.50     Years: 0.00     Pack years: 0.00     Quit date: 3/28/2013     Years since quittin.8    Smokeless tobacco: Never Used   Substance and Sexual Activity    Alcohol use:  Yes     Alcohol/week: 1.0 standard drinks     Types: 1 Cans of beer per week     Comment: occasionally    Drug use: No    Sexual activity: Not on file   Lifestyle    Physical activity     Days per week: Not on file     Minutes per session: Not on file    Stress: Not on file   Relationships    Social connections     Talks on phone: Not on file     Gets together: Not on file     Attends [employer] service: Not on file     Active member of club or organization: Not on file     Attends meetings of clubs or organizations: Not on file     Relationship status: Not on file    Intimate partner violence     Fear of current or ex partner: Not on file     Emotionally abused: Not on file     Physically abused: Not on file     Forced sexual activity: Not on file   Other Topics Concern    Not on file   Social History Narrative    Not on file       Family History:     Family History   Problem Relation Age of Onset    Diabetes Mother     Cancer Father     High Blood Pressure Sister     High Blood Pressure Brother         Allergies:   Bactrim, Penicillins, and Tylenol [acetaminophen]     Review of Systems:     Unable to perform ROS today. She is sleeping. Physical Examination :     Patient Vitals for the past 8 hrs:   BP Temp Temp src Pulse Resp SpO2   01/23/21 0830 (!) 151/84 97.8 °F (36.6 °C) Axillary 86 19 100 %   01/23/21 0353 -- -- -- 79 13 100 %   01/23/21 0335 -- -- -- -- 22 --     General Appearance: sleeping and in no apparent distress  Head:  Normocephalic, no trauma  Pulmonary/Chest: Clear to auscultation, without wheezes, rales, or rhonchi. Cardiovascular: Regular rate and rhythm without murmurs. Abdomen: Soft. Bowel sounds normal  All four Extremities: No cyanosis, clubbing, edema, or effusions. Neurologic: No gross sensory or motor deficits. Skin: Warm and dry with good turgor. No signs of peripheral arterial or venous insufficiency.     Medical Decision Making -Laboratory:   I have independently reviewed/ordered the following labs:    CBC with Differential:   Recent Labs     01/21/21  0511 01/22/21  0915   WBC 8.2 7.8   HGB 11.4* 10.6*   HCT 37.5 35.9*    172   LYMPHOPCT 24 19*   MONOPCT 8 9     BMP: Recent Labs     01/21/21  0511 01/22/21  0915    132*   K 4.2 4.2    102   CO2 23 25   BUN 8 11   CREATININE 0.34* 0.35*   MG 1.6 1.6     Hepatic Function Panel:   Recent Labs     01/21/21  0511 01/22/21  0915   PROT 6.3* 6.0*   LABALBU 2.8* 2.6*   BILIDIR <0.08 0.11   IBILI CANNOT BE CALCULATED 0.13   BILITOT 0.28* 0.24*   ALKPHOS 92 85   * 110*   * 94*     No results for input(s): RPR in the last 72 hours. No results for input(s): HIV in the last 72 hours. No results for input(s): BC in the last 72 hours. Lab Results   Component Value Date    MUCUS NOT REPORTED 01/19/2021    RBC 3.80 01/22/2021    RBC 4.21 02/24/2012    TRICHOMONAS NOT REPORTED 01/19/2021    WBC 7.8 01/22/2021    YEAST NOT REPORTED 01/19/2021    TURBIDITY CLEAR 01/19/2021     Lab Results   Component Value Date    CREATININE 0.35 01/22/2021    GLUCOSE 197 01/22/2021    GLUCOSE 105 02/24/2012       Medical Decision Making-Imaging:     EXAMINATION:   MRI OF THE BRAIN WITHOUT AND WITH CONTRAST  1/22/2021 8:00 am       TECHNIQUE:   Multiplanar multisequence MRI of the head/brain was performed without and   with the administration of intravenous contrast.       COMPARISON:   CT brain 01/18/2021. MRI brain performed 12/16/2020.       HISTORY:   ORDERING SYSTEM PROVIDED HISTORY: epilepsy protocol   TECHNOLOGIST PROVIDED HISTORY:   epilepsy protocol   Reason for Exam: epilepsy protocol       FINDINGS:   INTRACRANIAL STRUCTURES/VENTRICLES:  The sellar and suprasellar structures,   optic chiasm, corpus callosum, pineal gland, tectum, and midline brainstem   structures are unremarkable.  The craniocervical junction is unremarkable.    There is no acute hemorrhage, mass effect, or midline shift. Balinda Calico is   satisfactory overall gray-white matter differentiation. Balinda Calico is chronic   microvascular disease. Balinda Calico is continued evolution of a right MCA   distribution infarct.  There are multiple remote lacunar infarcts.  The ventricular structures are symmetric and unremarkable.  The infratentorial   structures including the cerebellopontine angles and internal auditory canals   are unremarkable.  There is no abnormal restricted diffusion.  There is no   abnormal blooming artifact on susceptibility weighted imaging. Jose Motts is   scattered areas of cortical enhancement within the right MCA distribution   infarct.  There are areas of enhancement the right basal ganglia as well.       ORBITS: The visualized portion of the orbits demonstrate no acute abnormality.       SINUSES: The visualized paranasal sinuses and mastoid air cells are well   aerated.       BONES/SOFT TISSUES: The bone marrow signal intensity appears normal. The soft   tissues demonstrate no acute abnormality.           Impression   Continued evolution of a large right MCA distribution infarct with underlying   chronic microvascular disease. CT abd/pelvie 1/17/2021  Impression:        1. No acute findings within the abdomen or pelvis.  In particular, there is   no evidence of occult abdominal or pelvic abscess.  Mild presacral edema,   possibly third-spacing. 2. Stable focal cortical atrophy in the lower pole of the left kidney. Nonobstructive left nephrolithiasis. Medical Decision [facility]:     1/15/2021 10:00 AM - Ledy Carnes Incoming Lab Results From [facility]    Specimen Information: Urine, clean catch        Component Collected Lab   Specimen Description 01/13/2021  8:40 PM [employer]   . CLEAN CATCH URINE    Special Requests 01/13/2021  8:40 PM [employer]   NOT REPORTED    Culture Abnormal  01/13/2021  8:40 PM [facility] 10 to 50,000 CFU/ML    Testing Performed By    Lab - Abbreviation Name Director Address Valid Date Range   [address] Chinchilla MD [address] [facility] [address] 08/30/17 0801-Present Susceptibility    Escherichia coli (1)    Antibiotic Interpretation DONNA Status    amikacin   Final     NOT REPORTED    ampicillin Resistant  Final     >=32   RESISTANT   ampicillin-sulbactam   Final     NOT REPORTED    aztreonam Sensitive  Final     <=1   SUSCEPTIBLE   ceFAZolin Sensitive  Final     <=4   SUSCEPTIBLE   ceFAZolin Sensitive Cefazolin sensitivity results can be used to predict the effectiveness of oral cephalosporins (eg. Cephalexin) in uncomplicated Urinary Tract Infections due to E. coli, K. pneumoniae, and P. mirabilis Final    cefepime   Final     NOT REPORTED    cefTRIAXone Sensitive  Final     <=1   SUSCEPTIBLE   ciprofloxacin Resistant  Final     >=4   RESISTANT   ertapenem   Final     NOT REPORTED    Confirmatory Extended Spectrum Beta-Lactamase Negative NEGATIVE Final    gentamicin Sensitive  Final     <=1   SUSCEPTIBLE   meropenem   Final     NOT REPORTED    nitrofurantoin Sensitive  Final     <=16   SUSCEPTIBLE   tigecycline   Final     NOT REPORTED    tobramycin Sensitive  Final     <=1   SUSCEPTIBLE   trimethoprim-sulfamethoxazole Sensitive  Final     <=20   SUSCEPTIBLE   piperacillin-tazobactam Sensitive  Final     <=4   SUSCEPTIBLE   Lab and Collection    Culture, Urine - 1/13/2021  1/15/2021 10:41 AM - [facility] Incoming Lab Results From [facility]    Specimen Information: Blood        Component Collected Lab   Specimen Description 01/13/2021 10:15 AM [facility]   . BLOOD    Special Requests 01/13/2021 10:15 AM [facility]   LEFT HAND 20cc    Culture Abnormal  01/13/2021 10:15 AM [address] [facility] Blood Culture Results called to and read back by: OLGA MEDEIROS V AT 0617 ON 1/14/21    Culture 01/13/2021 10:15 AM 14[address] [facility] FROM BOTTLE: GRAM POSITIVE COCCI IN CHAINS AND PAIRS    Culture Abnormal  01/13/2021 10:15  [facility] Performed By    Umu Cardona Name Director Address Discharge planner  · Infection Control and Prevention measures reviewed  · All prior entries were reviewed  · Administer medications as ordered  · Prognosis: Fair   · Discharge planning reviewed  · Follow up as outpatient. Thank you for allowing us to participate in the care of this patient. Please call with questions.       Brittany Pal MD             Office: [phone]

## 2021-01-23 NOTE — PROGRESS NOTES
Spoke with Tiara from [address] [place] [address]. She states that they can accept pt's on CPAP/BIPAP, they just need to order the proper equipment. Writer updated Nurse Azra Beach on the pt's CPAP settings and mask size. She said, she is going to order the equipment the pt needs.

## 2021-01-23 NOTE — PLAN OF CARE
Problem: Pain:  Goal: Pain level will decrease  Description: Pain level will decrease  Outcome: Ongoing     Problem: Pain:  Goal: Pain level will decrease  Description: Pain level will decrease  Outcome: Ongoing

## 2021-01-23 NOTE — PROGRESS NOTES
[facility]  Internal Medicine Teaching Residency Program  Inpatient Daily Progress Note  ______________________________________________________________________________    Patient: Ashwin Range  YOB: 1950   MLL:[id number]    Acct: [de-identified]     Room: [address] [place] [address]  Admit date: 1/13/2021  Today's date: 01/23/21  Number of days in the hospital: 10    SUBJECTIVE   Admitting Diagnosis: Acute encephalopathy  CC: Altered mental status, hyponatremia, hyperglycemia    Pt examined at bedside. Chart & results reviewed. Hemodynamically stable,   Pt  more alert and oriented  Mid line placed to receive vancomycin for enterococcus bacteremia and sepsis for 3 more days until 01/25/21. Continues on BIPAP overnight. Flaccid on left side and not ambulating. Pt had MRI done to rule out extension of infarction 1/22/21 which is negative for new stroke. Patient cleared for discharge, pending placement.  helping with discharge. Appreciate their help. ROS: Unable to obtain as pt is encephalopathic. BRIEF HISTORY   Narda Manzo a 78 y. o. female who presented from [facility] emergency department for concerns for altered mental status, hyperglycemia, and hypernatremia. Patient presented to the emergency department after she was found to be hyperglycemic on her daily glucose checks at her rehab facility.  Patient was status post 2 weeks out from a right MCA infarct, had a extended course here at [facility]. [place]'s neuro critical care unit, no thrombectomy or interventions were performed, patient was started on heparin and out of TPA window.   Patient does have a past medical history of CAD, diabetes, hypertension, asthma, daily smoker.  On examination patient had a obvious left-sided facial droop, left-sided weakness, however intermittently was following commands and unsure if complete participation in exam occurring due to altered mentation from hypernatremia.        Nephrology was consulted and patient was started on 0.45 saline leading to some improvement of sodium level, later on free water boluses with 250 mL every 6 hours were added.  Neurology was consulted due to recent right MCA stroke with left hemiplegia to decide about aspirin/Plavix as repeat CT head was concerning for some intracranial bleed.  Blood cultures grew Enterococcus faecium and patient started on vancomycin on 1/15 to complete 10 days. Annalisa Gravely is a right gluteal decubitus small wound but the source of Enterococcus bacteremia is unclear.  May need to rule out GI source of Enterococcus bacteremia.  Patient had repeat CT head  which still cannot totally exclude bleed in the brain.     Hypernatremia and patient's mentation is improving.  She is alert and oriented now. Loraine Thakur continues to have left-sided body weakness from right MCA stroke. Loraine Thakur is on tube feeding from PEG tube.  Hemodynamically stable.  transferred out of ICU to floors for further management. OBJECTIVE     Vital Signs:  BP (!) 153/69   Pulse 79   Temp 98.2 °F (36.8 °C) (Oral)   Resp 13   Ht 5' 4\" (1.626 m)   Wt 241 lb 2.9 oz (109.4 kg)   LMP  (LMP Unknown)   SpO2 100%   BMI 41.40 kg/m²     Temp (24hrs), Av.4 °F (36.9 °C), Min:98 °F (36.7 °C), Max:98.9 °F (37.2 °C)    In: 1613   Out: 450 [Urine:450]     General appearance - Encephalopathic with waxing and waning mentation. Eyes - pupils equal and reactive, no icterus. Mouth - mucous membranes moist, pharynx normal without lesions  Neck - supple, no significant adenopathy  Chest - clear to auscultation, no wheezes  Heart - normal rate, regular rhythm, normal S1, S2  Abdomen - soft, nontender, nondistended  Neurological - Left sided paralysis, sensory and motor examination are grossly intact.   Extremities - peripheral pulses normal, no pedal edema  Skin - normal coloration and turgor, no rashes          Medications:  Scheduled Medications:    insulin glargine  40 Units Subcutaneous Daily    clopidogrel  75 mg Oral Daily    lidocaine 1 % injection  5 mL Intradermal Once    sodium chloride flush  10 mL Intravenous 2 times per day    cloNIDine  0.2 mg Oral Daily    losartan  25 mg Oral Daily    amLODIPine  10 mg Oral Daily    insulin lispro  0-12 Units Subcutaneous Q6H    aspirin  81 mg Oral Daily    docusate  100 mg Oral Daily    vancomycin (VANCOCIN) intermittent dosing (placeholder)   Other RX Placeholder    vancomycin  1,500 mg Intravenous Q12H    atorvastatin  80 mg Oral Daily    sertraline  100 mg Oral Daily    sodium chloride flush  10 mL Intravenous 2 times per day    enoxaparin  40 mg Subcutaneous Daily     Continuous Infusions:    dextrose      sodium chloride 50 mL/hr at 01/19/21 2200     PRN Medications    sodium chloride flush, 10 mL, PRN      hydrALAZINE, 5 mg, Q6H PRN      sodium chloride flush, 10 mL, PRN      promethazine, 12.5 mg, Q6H PRN    Or      ondansetron, 4 mg, Q6H PRN      polyethylene glycol, 17 g, Daily PRN      acetaminophen, 650 mg, Q6H PRN    Or      acetaminophen, 650 mg, Q6H PRN      glucose, 15 g, PRN      dextrose, 12.5 g, PRN      glucagon (rDNA), 1 mg, PRN      dextrose, 100 mL/hr, PRN        Diagnostic Labs:  CBC:   Recent Labs     01/20/21  0536 01/21/21  0511 01/22/21  0915   WBC 6.8 8.2 7.8   RBC 3.31* 4.13 3.80*   HGB 9.4* 11.4* 10.6*   HCT 30.1* 37.5 35.9*   MCV 90.9 90.8 94.5   RDW 13.2 13.3 13.8   PLT See Reflexed IPF Result 165 172     BMP:   Recent Labs     01/20/21  0536 01/21/21  0511 01/22/21  0915    138 132*   K 4.3 4.2 4.2    105 102   CO2 25 23 25   PHOS 3.1 3.4 3.4   BUN 8 8 11   CREATININE 0.32* 0.34* 0.35*     BNP: No results for input(s): BNP in the last 72 hours. PT/INR: No results for input(s): PROTIME, INR in the last 72 hours. APTT: No results for input(s): APTT in the last 72 hours.   CARDIAC ENZYMES: No results for input(s): CKMB, CKMBINDEX, TROPONINI in the last 72 hours. Invalid input(s): CKTOTAL;3  FASTING LIPID PANEL:  Lab Results   Component Value Date    CHOL 188 04/05/2019    HDL 48 12/16/2020    TRIG 177 (H) 04/05/2019     LIVER PROFILE:   Recent Labs     01/20/21  0536 01/21/21  0511 01/22/21  0915   AST 83* 149* 94*   ALT 78* 127* 110*   BILIDIR <0.08 <0.08 0.11   BILITOT 0.20* 0.28* 0.24*   ALKPHOS 70 92 85      MICROBIOLOGY:   Lab Results   Component Value Date/Time    CULTURE DUPLICATE ORDER 29/09/7150 11:00 PM       Imaging:    Ct Head Wo Contrast    Result Date: 1/16/2021  No acute intracranial abnormality. Redemonstration of linear areas of increased density along the posterior margin of prior right MCA infarct. .  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 Redemonstration of extensive right MCA territory infarct. No new infarct. 2. New or increasing hyperdense serpiginous gyral hyperdensities along posterior aspect of the infarct in the parietal and to a less and extent temporal lobe and also along the margins of the right caudate nucleus is suggestive of some blood products. This may be further assessed with MRI if deemed clinically necessary. Angela Span Chest Portable    Result Date: 1/13/2021  No acute finding or significant change.      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 *        Acute encephalopathy secondary to hypernatremia (resolved) and recent large right MCA stroke Follow up on repeat MRI to rule out any extension of stroke as LTME is negative for epileptic activity, continue on aspirin , Plavix. Continue on Neurovascular checks. Dual antiplatelet therapy to continue for 90 days on discharge. Enterococcus faecalis sepsis - Resolved. Continue on Vancomycin 1250 mg IV q 12 hr till 1/25/21. Pt has mid line placed to continue IV after discharge until stop date. Infectious disease on board will follow their recommendations. E. coli UTI- resolved , pt completed course of ceftriaxone. Essential Hypertension- controlled, Continue and discharge on  [id number] mg, Losartan 25 mg and clonidine 0.2 mg daily.     Type 2 diabetes mellitus Last A1c 6.8, Blood sugars 215, Continue on 40 lantus and sliding scale insulin.     History of coronary artery disease with PCI in the past Continue atorvastatin 80 mg, aspirin, metoprolol 25 mg  And on discharge. History of sleep apnea-CPAP during sleep and day time naps. Transaminitis- monitor    DVT prophylaxis: Lovenox. Diet: PEG tube feeding.     OT/PT- on board  Discharge planning- Pt is discharged pending placement.  helping with discharge. Appreciate their help. Daryl Segal MD  Internal Medicine Resident, PGY-1  [facility];  [facility]  1/23/2021, 4:22 AM

## 2021-01-26 NOTE — DISCHARGE SUMMARY
[address]     Department of Internal Medicine - Staff Internal Medicine Teaching Service    INPATIENT DISCHARGE SUMMARY      Patient Identification:  Dionte Holly is a 78 y.o. female. :  1950  MRN: [id number]     Acct: [de-identified]   PCP: Suyapa Chavez MD  Admit Date:  2021  Discharge date and time: 2021  5:27 PM   Attending Provider: No att. providers found                                     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 *      HOSPITAL STAY     Brief Inpatient course:   Dionte Holly is a 78 y.o. female who was admitted for the management of Acute encephalopathy, initially presented to the emergency department with  after she was found to be altered , hyperglycemic and hypernatremia. Patient had recent history of  right MCA infarct, had a extended course here at [facility]. [facility] neuro critical care unit, no thrombectomy or interventions were performed, patient was started on heparin and out of TPA window. Initially admitted in ICU later transferred to internal medicine care. She was treated for Metabolic encephalopathy secondary to hypernatremia and recent large right MCA stroke treated with 0.45 saline at 100 mL/h and later with Free water bolus 250 mL every 6 hours nephrology was on board, neurology was on board and work up for seizures was negative and pt was resumed on   aspirin 81 mg and Plavix 75 mg .  From a neurologic standpoint, patient will require DAPT x 90 days for intracranial atherosclerosis and then can transition to monotherapy.  Enterococcus faecalis bacteremia from unclear source treated with Vancomycin for 10 days, first dose on 1/15/21 and continued on discharge. E. coli UTI  Treated with ceftriaxone, Hypertension  managed with home mediations and Diabetes managed with lantus History of coronary artery disease with PCI in the past managed with aspirin, statin, BB  -      Procedures/ Significant Interventions:      EEG, midline     Consults:     Consults:     Final Specialist Recommendations/Findings:   IP CONSULT TO NEUROCRITICAL CARE  IP CONSULT TO NEPHROLOGY  IP CONSULT TO INFECTIOUS DISEASES  PHARMACY TO DOSE VANCOMYCIN  IP CONSULT TO CASE MANAGEMENT  IP CONSULT TO STROKE TEAM  IP CONSULT TO NEUROLOGY      Any Hospital Acquired Infections: none    Discharge Functional Status:  stable    DISCHARGE PLAN     Disposition: SNF    Patient Instructions:   Discharge Medication List as of 1/23/2021  1:21 PM      START taking these medications    Details   metoprolol succinate (TOPROL XL) 25 MG extended release tablet Take 2 tablets by mouth daily, Disp-30 tablet, R-0Normal         CONTINUE these medications which have CHANGED    Details   clopidogrel (PLAVIX) 75 MG tablet Take 1 tablet by mouth daily, Disp-90 tablet, R-0Normal      vancomycin (VANCOCIN) infusion Infuse 1,500 mg intravenously every 12 hours for 3 days Compound per protocol., Disp-9000 mg, R-0Print      cloNIDine (CATAPRES) 0.1 MG tablet Take 2 tablets by mouth daily, Disp-30 tablet, R-3Normal         CONTINUE these medications which have NOT CHANGED    Details   losartan (COZAAR) 25 MG tablet Take 1 tablet by mouth daily, Disp-60 tablet, R-3Normal      docusate (COLACE) 50 MG/5ML liquid Take 10 mLs by mouth daily, Disp-50 mL, R-3Normal      rivastigmine (EXELON) 9.5 MG/24HR Place 1 patch onto the skin dailyHistorical Med      QUEtiapine (SEROQUEL) 25 MG tablet Take 25 mg by mouth 2 times dailyHistorical Med      Calcium Carbonate-Vitamin D (OYSTER SHELL CALCIUM/D) 500-200 MG-UNIT TABS TAKE ONE TABLET BY MOUTH TWO TIMES A DAY, Disp-60 tablet, R-3Normal      glimepiride (AMARYL) 2 MG tablet Take 1 tablet by mouth every morning, Disp-30 tablet, R-3Normal      linagliptin (TRADJENTA) 5 MG tablet Take 1 tablet by mouth daily, Disp-30 tablet, R-10Normal      furosemide (LASIX) 40 MG tablet Take 1 tablet by mouth daily, Disp-30 tablet, R-10Normal      atorvastatin (LIPITOR) 80 MG tablet TAKE 1 TABLET BY MOUTH DAILY, Disp-30 tablet, R-5Normal      sertraline (ZOLOFT) 100 MG tablet Take 1 tablet by mouth daily, Disp-30 tablet, R-5Normal      aspirin (RA ASPIRIN EC) 81 MG EC tablet Take 1 tablet by mouth daily, Disp-30 tablet, R-11Normal      Incontinence Supply Disposable (INCONTINENCE BRIEF LARGE) MISC Disp-150 each, R-11, PrintUse 4-5/day as needed      glucose blood VI test strips (ASCENSIA AUTODISC VI;ONE TOUCH ULTRA TEST VI) strip 3 TIMES DAILY Starting 8/1/2017, Until Discontinued, Disp-100 each, R-5, PrintAs needed. albuterol-ipratropium (COMBIVENT RESPIMAT)  MCG/ACT AERS inhaler Inhale 1 puff into the lungs every 6 hours as needed for Wheezing, Disp-1 Inhaler, R-5Normal      Lancets MISC Disp-50 each, R-11, NormalUse 1 -2 times daily Insulin dependent diabetes mellitus         STOP taking these medications       metoprolol tartrate 75 MG TABS Comments:   Reason for Stopping:         amLODIPine (NORVASC) 10 MG tablet Comments:   Reason for Stopping:               Activity: activity as tolerated    Diet: cardiac diet    Follow-up:    Aba Jerry 2250 [phone]    In 2 weeks  [address] up     Bartolo Rojas MD  [facility], [address] [place], [place] [address]way [address] [place] [address]  [phone]    In 2 weeks  For right sided ischemic stroke, post hospitalization follow up.     Brittanie Llamas, [address] [place] [id number]  [phone]    In 2 weeks  enterococcus bacteremia, post hospitalization follow up. Patient Instructions:     Please follow up with PCP for post hospitalization follow up  Please follow up with neurology for your stroke instructions has been given  Please continue to take asirin, plavix and atorvastatin. Continue vancomycin for 3 more days after discharge. Please follow up with infectious disease for your enterooccus bacteremia after discharge. Start taking metoprolol 25 mg 2 tabs daily for your hypertension and follow up with PCP for any changes with your medications. Visit ER for any worsening symptoms. Follow up labs: none  Follow up imaging: none    Note that over 30 minutes was spent in preparing discharge papers, discussing discharge with patient, medication review, etc.      Gerber Henriquez MD,   Internal Medicine Resident, [facility];  [place] [place]  1/26/2021, 6:52 PM

## 2021-02-04 NOTE — PROGRESS NOTES
[facility] - Initial Office Consult Note  Today's Date and Time: 2/4/2021, 3:32 PM    Diagnostic Impression :   · Hx of Enterococcus septicemia  · S/P MCA infarct  · Hx of E. coli UTI  · Diabetes mellitus type 2  · Acute hemiparesis of left nondominant side  · Urinary incontinence  · CHF  · Right gluteal decubitus wound    Recommendations   · Blood cultures X 2  · Continue wound care at skilled nursing facility  Chief complaint/reason for consultation:     Chief Complaint   Patient presents with    Follow-Up from [facility] .V's      Enterococcus bacteremia-2-week follow-up post hospitalization    History of Present Illness:   Pebbles Blood is a 78y.o.-year-old  female with a past medical history of acute metabolic encephalopathy secondary to hypernatremia and CVA, acute cystitis without hematuria, septicemia due to Enterococcus, CAD, ischemic cardiomyopathy, CVA due to thrombosis of right MCA, flaccid hemiplegia of left nondominant side, oropharyngeal dysphagia, diabetes mellitus type 2, asthma, chronic back pain, CHF, postlaminectomy syndrome, incontinence of urine, hiatal hernia, and hypercholesteremia who was initially evaluated on 2/11/2021. Patient seen at the request of Dr. Alton Remy. INITIAL HISTORY:    This patient was initially admitted for management of acute encephalopathy secondary to hyperglycemia and hypernatremia. She has a history of a right MCA infarct for which she was treated at [address] [place] [address] neuro crit care unit and eventually transferred to management under internal medicine. On 1-13-21 she developed an Enterococcus faecalis septicemia from an unclear source for which she treated with IV vancomycin x10 days, with the first dose being on 1/15/2021. The patient also had an E. coli UTI during her admission which was treated with ceftriaxone. The patient is allergic to penicillin and sulfa but tolerated ceftriaxone.     She was discharged from [facility] on 1/23/2021 and transferred to the [facility]. CURRENT EVALUATION : 2/11/2021:    The patient was alert and talkative. She was brought in via stretcher from [facility] skilled nursing facility. Her daughter was present as well. On exam she has a residual left-sided weakness, status post CVA. She denied any fevers, chills, nausea, vomiting, diarrhea, although she did complain of some left arm pain without chest pain or discomfort. According to her daughter, patient's right gluteal decubitus wound looked larger and she was concerned it may be infected. Upon close inspection of the decubitus ulcer, the tissue appeared pink and healthy without any signs of necrosis or infection. The decubitus is fairly superficial.    The patient's PEG site also seems to have healed well and appears clean without any drainage or discoloration. Plan is for patient to have repeat blood cultures x2 to ensure that she cleared her prior Enterococcus septicemia. Right gluteal wound photo 2/11/2021        I have personally reviewed the past medical history, past surgical history, medications, social history, and family history, and I have updated the database accordingly.   Past Medical History:     Past Medical History:   Diagnosis Date    Allergic rhinitis     Anxiety 10/25/2016    Asthma     CAD (coronary artery disease)     s/p stents RCA and LAD 2009,    Chronic back pain     Congestive heart failure ([address]     Depression     Headache(784.0)     Hiatal hernia     Hypercholesteremia 2/21/2012    Hypertension     Kidney stones     years ago    Obesity     Osteoarthritis     Postlaminectomy syndrome 6/6/2012    Type II or unspecified type diabetes mellitus without mention of complication, not stated as uncontrolled     Unspecified sleep apnea     Urinary incontinence        Past Surgical  History:     Past Surgical History:   Procedure Laterality Date    CARDIAC CATHETERIZATION  01/2013    patent stents    COLONOSCOPY  09/2015    normal    CORONARY ANGIOPLASTY WITH STENT PLACEMENT  1012-16    stents x 3    GASTROSTOMY TUBE PLACEMENT  12/28/2020    EGD PEG TUBE PLACEMENT    GASTROSTOMY TUBE PLACEMENT N/A 12/28/2020    EGD PEG TUBE PLACEMENT performed by Kwasi Proctor MD at [address] [place] [address]  1/21/2021         JOINT REPLACEMENT      left knee    KNEE SURGERY  10/21/2013    rt knee synvisc injection     KNEE SURGERY  12/02/2013    knee synvisc injection rt #2    KNEE SURGERY  12/09/2013    rt knee synvisc inj    LUMBAR SPINE SURGERY  2007    NERVE BLOCK  04/23/2012    Right MBNB L3, L4, L5    NERVE BLOCK  05/22/2012    Right MBNB L3, L4, and L5     NERVE BLOCK  01/14/2013    Right knee injection #1 - Synvisc    NERVE BLOCK  01/21/2013    Right knee injection #2 - Synvisc    NERVE BLOCK  01/28/2013    Rigth knee synvisc injection #3    NERVE BLOCK Right 04/17/2017    Rt genicular nerve block.  no steroid used    OTHER SURGICAL HISTORY Right 07/14/2014    synvisc one knee injection    OTHER SURGICAL HISTORY Right 03/16/2015    synvisc one knee injection    OTHER SURGICAL HISTORY Right 06/13/2016    synvisc right knee injection    SPINE SURGERY      TONSILLECTOMY      UPPER GASTROINTESTINAL ENDOSCOPY      VENA CAVA FILTER PLACEMENT  2007    PE and B/L LE emboli       Medications:     Current Outpatient Medications:     clopidogrel (PLAVIX) 75 MG tablet, Take 1 tablet by mouth daily, Disp: 90 tablet, Rfl: 0    cloNIDine (CATAPRES) 0.1 MG tablet, Take 2 tablets by mouth daily, Disp: 30 tablet, Rfl: 3    metoprolol succinate (TOPROL XL) 25 MG extended release tablet, Take 2 tablets by mouth daily, Disp: 30 tablet, Rfl: 0    losartan (COZAAR) 25 MG tablet, Take 1 tablet by mouth daily, Disp: 60 tablet, Rfl: 3    docusate (COLACE) 50 MG/5ML liquid, Take 10 mLs by mouth daily, Disp: 50 mL, Rfl: 3    rivastigmine (EXELON) 9.5 MG/24HR, Place 1 patch onto the skin daily, Disp: , Rfl:     QUEtiapine (SEROQUEL) 25 MG tablet, Take 25 mg by mouth 2 times daily, Disp: , Rfl:     Calcium Carbonate-Vitamin D (OYSTER SHELL CALCIUM/D) 500-200 MG-UNIT TABS, TAKE ONE TABLET BY MOUTH TWO TIMES A DAY, Disp: 60 tablet, Rfl: 3    glimepiride (AMARYL) 2 MG tablet, Take 1 tablet by mouth every morning, Disp: 30 tablet, Rfl: 3    linagliptin (TRADJENTA) 5 MG tablet, Take 1 tablet by mouth daily, Disp: 30 tablet, Rfl: 10    furosemide (LASIX) 40 MG tablet, Take 1 tablet by mouth daily, Disp: 30 tablet, Rfl: 10    atorvastatin (LIPITOR) 80 MG tablet, TAKE 1 TABLET BY MOUTH DAILY, Disp: 30 tablet, Rfl: 5    sertraline (ZOLOFT) 100 MG tablet, Take 1 tablet by mouth daily, Disp: 30 tablet, Rfl: 5    aspirin (RA ASPIRIN EC) 81 MG EC tablet, Take 1 tablet by mouth daily, Disp: 30 tablet, Rfl: 11    Incontinence Supply Disposable (INCONTINENCE BRIEF LARGE) MISC, Use 4-5/day as needed, Disp: 150 each, Rfl: 11    glucose blood VI test strips (ASCENSIA AUTODISC VI;ONE TOUCH ULTRA TEST VI) strip, 1 each by In Vitro route 3 times daily As needed. , Disp: 100 each, Rfl: 5    albuterol-ipratropium (COMBIVENT RESPIMAT)  MCG/ACT AERS inhaler, Inhale 1 puff into the lungs every 6 hours as needed for Wheezing, Disp: 1 Inhaler, Rfl: 5    Lancets MISC, Use 1 -2 times daily Insulin dependent diabetes mellitus, Disp: 50 each, Rfl: 11     Social History:     Social History     Socioeconomic History    Marital status: Legally      Spouse name: Not on file    Number of children: Not on file    Years of education: Not on file    Highest education level: Not on file   Occupational History    Not on file   Social Needs    Financial resource strain: Not on file    Food insecurity     Worry: Not on file     Inability: Not on file    Transportation needs     Medical: Not on file     Non-medical: Not on file   Tobacco Use    Smoking status: Former Smoker Packs/day: 0.50     Years: 0.00     Pack years: 0.00     Quit date: 3/28/2013     Years since quittin.8    Smokeless tobacco: Never Used   Substance and Sexual Activity    Alcohol use: Yes     Alcohol/week: 1.0 standard drinks     Types: 1 Cans of beer per week     Comment: occasionally    Drug use: No    Sexual activity: Not on file   Lifestyle    Physical activity     Days per week: Not on file     Minutes per session: Not on file    Stress: Not on file   Relationships    Social connections     Talks on phone: Not on file     Gets together: Not on file     Attends [employer] service: Not on file     Active member of club or organization: Not on file     Attends meetings of clubs or organizations: Not on file     Relationship status: Not on file    Intimate partner violence     Fear of current or ex partner: Not on file     Emotionally abused: Not on file     Physically abused: Not on file     Forced sexual activity: Not on file   Other Topics Concern    Not on file   Social History Narrative    Not on file       Family History:     Family History   Problem Relation Age of Onset    Diabetes Mother     Cancer Father     High Blood Pressure Sister     High Blood Pressure Brother         Allergies:   Bactrim, Penicillins, and Tylenol [acetaminophen]     Review of Systems:     Patient assessed 2021  Constitutional: No fevers or chills. No systemic complaints  Head: No headaches  Eyes: No double vision or blurry vision. ENT: No sore throat or runny nose. . No hearing loss, tinnitus or vertigo. Cardiovascular: No chest pain or palpitations. No shortness of breath. No JONES  Lung: No shortness of breath or cough. No sputum production  Abdomen: No nausea, vomiting, diarrhea, or abdominal pain. .  Genitourinary: Urinary incontinence, no increased urinary frequency, or dysuria. No hematuria. No suprapubic or CVA pain  Musculoskeletal: Complaining of left arm pain.  No joint effusions, swelling or deformities. Left-sided weakness secondary to right-sided CVA  Hematologic: No bleeding or bruising. Neurologic: No headache, weakness, numbness, or tingling. Patient has residual left-sided weakness status post right-sided CVA    Physical Examination :   LMP  (LMP Unknown)    General Appearance: Awake, alert, and in no apparent distress  Head:  Normocephalic, no trauma  Eyes: Pupils equal, round, reactive, to light and accommodation; extraocular movements intact; sclera anicteric; conjunctivae pink. No embolic phenomena. ENT: Oropharynx clear, without erythema, exudate, or thrush. No tenderness of sinuses. Mouth/throat: mucosa pink and moist. No lesions. Dentition in good repair. Neck:Supple, without lymphadenopathy. Thyroid normal, No bruits. Pulmonary/Chest: Clear to auscultation, without wheezes, rales, or rhonchi. No dullness to percussion. Cardiovascular: Regular rate and rhythm without murmurs, rubs, or gallops. Abdomen: Soft, non tender. Bowel sounds normal. No organomegaly  All four Extremities: No cyanosis, clubbing, edema, or effusions. Left-sided residual weakness secondary to right-sided CVA  Neurologic:Left-sided hemiparesis s/p CVA  Skin: Warm and dry with good turgor. No signs of peripheral arterial or venous insufficiency.   Right gluteal decubitus with ulcer pink and healthy appearing tissue    Medical Decision Making:   I have independently reviewed/ordered the following labs:    CBC with Differential:  Lab Results   Component Value Date    WBC 7.8 01/22/2021    WBC 8.2 01/21/2021    HGB 10.6 01/22/2021    HGB 11.4 01/21/2021    HCT 35.9 01/22/2021    HCT 37.5 01/21/2021     01/22/2021     01/21/2021     02/24/2012    LYMPHOPCT 19 01/22/2021    LYMPHOPCT 24 01/21/2021    MONOPCT 9 01/22/2021    MONOPCT 8 01/21/2021     BMP:   Lab Results   Component Value Date     01/22/2021     01/21/2021    K 4.2 01/22/2021    K 4.2 01/21/2021     01/22/2021    CL

## 2021-02-11 ENCOUNTER — OFFICE VISIT (OUTPATIENT)
Dept: INFECTIOUS DISEASES | Age: 71
End: 2021-02-11
Payer: COMMERCIAL

## 2021-02-11 VITALS
HEART RATE: 72 BPM | HEIGHT: 64 IN | WEIGHT: 241 LBS | OXYGEN SATURATION: 96 % | RESPIRATION RATE: 20 BRPM | DIASTOLIC BLOOD PRESSURE: 80 MMHG | BODY MASS INDEX: 41.15 KG/M2 | SYSTOLIC BLOOD PRESSURE: 138 MMHG | TEMPERATURE: 98 F

## 2021-02-11 DIAGNOSIS — E11.69 DIABETES MELLITUS TYPE 2 IN OBESE (HCC): ICD-10-CM

## 2021-02-11 DIAGNOSIS — R32 URINARY INCONTINENCE IN FEMALE: ICD-10-CM

## 2021-02-11 DIAGNOSIS — I10 ESSENTIAL HYPERTENSION: ICD-10-CM

## 2021-02-11 DIAGNOSIS — A41.81 ENTEROCOCCAL SEPTICEMIA (HCC): ICD-10-CM

## 2021-02-11 DIAGNOSIS — I69.354 HEMIPARESIS OF LEFT NONDOMINANT SIDE AS LATE EFFECT OF CEREBRAL INFARCTION (HCC): ICD-10-CM

## 2021-02-11 DIAGNOSIS — E66.9 DIABETES MELLITUS TYPE 2 IN OBESE (HCC): ICD-10-CM

## 2021-02-11 DIAGNOSIS — I63.59 ISCHEMIC CEREBRAL STROKE DUE TO INTRACRANIAL LARGE ARTERY ATHEROSCLEROSIS (HCC): ICD-10-CM

## 2021-02-11 DIAGNOSIS — L89.312 PRESSURE INJURY OF RIGHT BUTTOCK, STAGE 2 (HCC): Primary | ICD-10-CM

## 2021-02-11 DIAGNOSIS — B96.20 E. COLI UTI (URINARY TRACT INFECTION): ICD-10-CM

## 2021-02-11 DIAGNOSIS — I63.511 ACUTE ISCHEMIC RIGHT MIDDLE CEREBRAL ARTERY (MCA) STROKE (HCC): ICD-10-CM

## 2021-02-11 DIAGNOSIS — I50.9 CHRONIC CONGESTIVE HEART FAILURE, UNSPECIFIED HEART FAILURE TYPE (HCC): ICD-10-CM

## 2021-02-11 DIAGNOSIS — N39.0 E. COLI UTI (URINARY TRACT INFECTION): ICD-10-CM

## 2021-02-11 PROCEDURE — 99214 OFFICE O/P EST MOD 30 MIN: CPT | Performed by: INTERNAL MEDICINE

## 2021-02-12 PROBLEM — E86.0 DEHYDRATION: Status: RESOLVED | Noted: 2021-01-13 | Resolved: 2021-02-12

## 2021-02-18 ENCOUNTER — OFFICE VISIT (OUTPATIENT)
Dept: NEUROLOGY | Age: 71
End: 2021-02-18
Payer: COMMERCIAL

## 2021-02-18 VITALS
WEIGHT: 241 LBS | BODY MASS INDEX: 41.15 KG/M2 | HEART RATE: 69 BPM | SYSTOLIC BLOOD PRESSURE: 187 MMHG | HEIGHT: 64 IN | OXYGEN SATURATION: 98 % | RESPIRATION RATE: 16 BRPM | DIASTOLIC BLOOD PRESSURE: 99 MMHG

## 2021-02-18 DIAGNOSIS — L89.152 SACRAL DECUBITUS ULCER, STAGE II (HCC): ICD-10-CM

## 2021-02-18 DIAGNOSIS — I69.30 CHRONIC ISCHEMIC RIGHT MCA STROKE: Primary | ICD-10-CM

## 2021-02-18 DIAGNOSIS — I69.354 FLACCID HEMIPLEGIA OF LEFT NONDOMINANT SIDE AS LATE EFFECT OF CEREBRAL INFARCTION (HCC): ICD-10-CM

## 2021-02-18 DIAGNOSIS — M79.2 NEUROPATHIC PAIN: ICD-10-CM

## 2021-02-18 PROCEDURE — 1036F TOBACCO NON-USER: CPT | Performed by: STUDENT IN AN ORGANIZED HEALTH CARE EDUCATION/TRAINING PROGRAM

## 2021-02-18 PROCEDURE — G8484 FLU IMMUNIZE NO ADMIN: HCPCS | Performed by: STUDENT IN AN ORGANIZED HEALTH CARE EDUCATION/TRAINING PROGRAM

## 2021-02-18 PROCEDURE — 1090F PRES/ABSN URINE INCON ASSESS: CPT | Performed by: STUDENT IN AN ORGANIZED HEALTH CARE EDUCATION/TRAINING PROGRAM

## 2021-02-18 PROCEDURE — 99214 OFFICE O/P EST MOD 30 MIN: CPT | Performed by: STUDENT IN AN ORGANIZED HEALTH CARE EDUCATION/TRAINING PROGRAM

## 2021-02-18 PROCEDURE — 4040F PNEUMOC VAC/ADMIN/RCVD: CPT | Performed by: STUDENT IN AN ORGANIZED HEALTH CARE EDUCATION/TRAINING PROGRAM

## 2021-02-18 PROCEDURE — 1111F DSCHRG MED/CURRENT MED MERGE: CPT | Performed by: STUDENT IN AN ORGANIZED HEALTH CARE EDUCATION/TRAINING PROGRAM

## 2021-02-18 PROCEDURE — G8427 DOCREV CUR MEDS BY ELIG CLIN: HCPCS | Performed by: STUDENT IN AN ORGANIZED HEALTH CARE EDUCATION/TRAINING PROGRAM

## 2021-02-18 PROCEDURE — 1123F ACP DISCUSS/DSCN MKR DOCD: CPT | Performed by: STUDENT IN AN ORGANIZED HEALTH CARE EDUCATION/TRAINING PROGRAM

## 2021-02-18 PROCEDURE — G8417 CALC BMI ABV UP PARAM F/U: HCPCS | Performed by: STUDENT IN AN ORGANIZED HEALTH CARE EDUCATION/TRAINING PROGRAM

## 2021-02-18 PROCEDURE — 3017F COLORECTAL CA SCREEN DOC REV: CPT | Performed by: STUDENT IN AN ORGANIZED HEALTH CARE EDUCATION/TRAINING PROGRAM

## 2021-02-18 PROCEDURE — G8399 PT W/DXA RESULTS DOCUMENT: HCPCS | Performed by: STUDENT IN AN ORGANIZED HEALTH CARE EDUCATION/TRAINING PROGRAM

## 2021-02-18 RX ORDER — GABAPENTIN 100 MG/1
100 CAPSULE ORAL 3 TIMES DAILY
Qty: 90 CAPSULE | Refills: 3 | Status: SHIPPED | OUTPATIENT
Start: 2021-02-18 | End: 2021-06-18

## 2021-02-18 ASSESSMENT — ENCOUNTER SYMPTOMS
CHEST TIGHTNESS: 0
ABDOMINAL DISTENTION: 0
COUGH: 0
SINUS PAIN: 0
EYE PAIN: 0
APNEA: 0
NAUSEA: 0
SHORTNESS OF BREATH: 0
ABDOMINAL PAIN: 0
VOMITING: 0

## 2021-02-18 ASSESSMENT — VISUAL ACUITY: VA_NORMAL: 1

## 2021-02-18 NOTE — PATIENT INSTRUCTIONS
1. Continue asa 81mg and plavix 75 mg until April 22, 2021 then aspirin 81 only  2. Continue lipitor 80mg nightly   3. Start taking Neurontin 100mg TID   4.  Follow up with wound care

## 2021-02-18 NOTE — PROGRESS NOTES
[address] [place] [address], [facility] [address], [place], [address] [facility], [address] [place] [address]  P: [phone]  F: [phone]    NEUROLOGY CLINIC NOTE     PATIENT NAME: Virginia Luna  PATIENT MRN: [id number]  PRIMARY CARE PHYSICIAN: Leatha Deutsch MD    HPI:      Virginia Luna is a 78 y.o.  female who presents to clinic today as follow up from her recent hospitalization 1/13/2021 for altered mental status while she was recovering in skilled nursing facility from recent right MCA stroke. Past medical history significant for YUDITH, morbid obesity, acute coronary syndrome, CHF, type 2 diabetes, hypertension, CAD status post stent to the right RCA, right MCA stroke. Patient has been neurologically stable since her recent admission when she had a acute episode of altered mental status. Likely acute metabolic encephalopathy at that time patient underwent 1 day of LTM EEG which was unremarkable for any acute seizure-like or epileptiform activity although did show diffuse right slowing consistent with her large right MCA stroke. Patient currently is on aspirin 81, Plavix 75 mg and Lipitor 80 mg.  Recommend continuing this as patient did have evidence of significant iCAD. Pertinent imaging reviewed  CTA head and neck 12/15/2020  Impression   1. Moderate volume acute ischemic infarct in the right middle cerebral artery   territory.  No intracranial hemorrhage or mass effect. 2. Moderate volume penumbra in the peripheral right middle cerebral artery   territory based on perfusion images. 3. Acute nearly occlusive thrombosis of the M1 and M2 segments right middle   cerebral artery. 4. Severe stenosis of the M1 segment left middle cerebral artery. 5. Severe stenosis of the A1 segment left anterior cerebral artery. 6. 75% stenosis of the proximal left internal carotid secondary to vessel   kinking and superimposed atherosclerotic plaque. Right knee injection #1 - Synvisc    NERVE BLOCK  2013    Right knee injection #2 - Synvisc    NERVE BLOCK  2013    Rigth knee synvisc injection #3    NERVE BLOCK Right 2017    Rt genicular nerve block. no steroid used    OTHER SURGICAL HISTORY Right 2014    synvisc one knee injection    OTHER SURGICAL HISTORY Right 2015    synvisc one knee injection    OTHER SURGICAL HISTORY Right 2016    synvisc right knee injection    SPINE SURGERY      TONSILLECTOMY      UPPER GASTROINTESTINAL ENDOSCOPY      VENA CAVA FILTER PLACEMENT      PE and B/L LE emboli        Social History     Socioeconomic History    Marital status: Legally      Spouse name: Not on file    Number of children: Not on file    Years of education: Not on file    Highest education level: Not on file   Occupational History    Not on file   Social Needs    Financial resource strain: Not on file    Food insecurity     Worry: Not on file     Inability: Not on file    Transportation needs     Medical: Not on file     Non-medical: Not on file   Tobacco Use    Smoking status: Former Smoker     Packs/day: 0.50     Years: 0.00     Pack years: 0.00     Quit date: 3/28/2013     Years since quittin.9    Smokeless tobacco: Never Used   Substance and Sexual Activity    Alcohol use:  Yes     Alcohol/week: 1.0 standard drinks     Types: 1 Cans of beer per week     Comment: occasionally    Drug use: No    Sexual activity: Not on file   Lifestyle    Physical activity     Days per week: Not on file     Minutes per session: Not on file    Stress: Not on file   Relationships    Social connections     Talks on phone: Not on file     Gets together: Not on file     Attends Catholic service: Not on file     Active member of club or organization: Not on file     Attends meetings of clubs or organizations: Not on file     Relationship status: Not on file    Intimate partner violence Fear of current or ex partner: Not on file     Emotionally abused: Not on file     Physically abused: Not on file     Forced sexual activity: Not on file   Other Topics Concern    Not on file   Social History Narrative    Not on file        Current Outpatient Medications   Medication Sig Dispense Refill    cloNIDine (CATAPRES) 0.1 MG tablet Take 2 tablets by mouth daily 30 tablet 3    metoprolol succinate (TOPROL XL) 25 MG extended release tablet Take 2 tablets by mouth daily 30 tablet 0    losartan (COZAAR) 25 MG tablet Take 1 tablet by mouth daily 60 tablet 3    docusate (COLACE) 50 MG/5ML liquid Take 10 mLs by mouth daily 50 mL 3    rivastigmine (EXELON) 9.5 MG/24HR Place 1 patch onto the skin daily      QUEtiapine (SEROQUEL) 25 MG tablet Take 25 mg by mouth 2 times daily      glimepiride (AMARYL) 2 MG tablet Take 1 tablet by mouth every morning 30 tablet 3    linagliptin (TRADJENTA) 5 MG tablet Take 1 tablet by mouth daily 30 tablet 10    furosemide (LASIX) 40 MG tablet Take 1 tablet by mouth daily 30 tablet 10    atorvastatin (LIPITOR) 80 MG tablet TAKE 1 TABLET BY MOUTH DAILY 30 tablet 5    sertraline (ZOLOFT) 100 MG tablet Take 1 tablet by mouth daily 30 tablet 5    aspirin (RA ASPIRIN EC) 81 MG EC tablet Take 1 tablet by mouth daily 30 tablet 11    albuterol-ipratropium (COMBIVENT RESPIMAT)  MCG/ACT AERS inhaler Inhale 1 puff into the lungs every 6 hours as needed for Wheezing 1 Inhaler 5    clopidogrel (PLAVIX) 75 MG tablet Take 1 tablet by mouth daily (Patient not taking: Reported on 2/18/2021) 90 tablet 0    Calcium Carbonate-Vitamin D (OYSTER SHELL CALCIUM/D) 500-200 MG-UNIT TABS TAKE ONE TABLET BY MOUTH TWO TIMES A DAY (Patient not taking: Reported on 2/18/2021) 60 tablet 3    Incontinence Supply Disposable (INCONTINENCE BRIEF LARGE) MISC Use 4-5/day as needed (Patient not taking: Reported on 2/18/2021) 150 each 11  glucose blood VI test strips (ASCENSIA AUTODISC VI;ONE TOUCH ULTRA TEST VI) strip 1 each by In Vitro route 3 times daily As needed. (Patient not taking: Reported on 2/18/2021) 100 each 5    Lancets MISC Use 1 -2 times daily Insulin dependent diabetes mellitus (Patient not taking: Reported on 2/18/2021) 50 each 11     No current facility-administered medications for this visit. Allergies   Allergen Reactions    Bactrim     Diphenhydramine     Penicillins      Tolerated ceftriaxone 1/2021    Tylenol [Acetaminophen] Other (See Comments)     constipation        REVIEW OF SYSTEMS:     Review of Systems   Constitutional: Negative for activity change, appetite change, chills and fatigue. HENT: Negative for ear pain and sinus pain. Eyes: Negative for pain and visual disturbance. Respiratory: Negative for apnea, cough, chest tightness and shortness of breath. Cardiovascular: Negative for chest pain, palpitations and leg swelling. Gastrointestinal: Negative for abdominal distention, abdominal pain, nausea and vomiting. Endocrine: Negative for polydipsia and polyuria. Genitourinary: Negative for difficulty urinating and dysuria. Musculoskeletal: Negative for arthralgias and myalgias. Skin: Negative for rash. Allergic/Immunologic: Negative for environmental allergies. Neurological: Negative for dizziness, facial asymmetry and headaches. Psychiatric/Behavioral: Negative for agitation and behavioral problems. VITALS  BP (!) 163/92   Pulse 75   Resp 16   Ht 5' 4\" (1.626 m)   Wt 241 lb (109.3 kg)   LMP  (LMP Unknown)   SpO2 98%   BMI 41.37 kg/m²        NEUROLOGICAL EXAMINATION:     Neurological Exam  Mental Status  Awake and alert. Oriented only to person. Speech is normal. Able to name objects and repeat. Follows one-step commands. Cranial Nerves  CN II: Visual acuity is normal. Visual fields full to confrontation. CN III, IV, VI: Extraocular movements intact bilaterally. Normal lids and orbits bilaterally. Pupils equal round and reactive to light bilaterally. CN V: Facial sensation is normal.  CN VII: Full and symmetric facial movement. CN VIII: Hearing is normal.  CN IX, X: Palate elevates symmetrically. Normal gag reflex. CN XI: Shoulder shrug strength is normal.  CN XII: Tongue midline without atrophy or fasciculations. Motor  Normal muscle bulk throughout. No fasciculations present. Normal muscle tone. No abnormal involuntary movements. LUE 0/5   LLE 0/5   RUE 4/5  RLE 4-/5 . Sensory  Sensation: Dense left side sensory loss. Reflexes  Deep tendon reflexes are 2+ and symmetric in all four extremities with downgoing toes bilaterally. ASSESSMENT / PLAN:     Kayley Acevedo is a 61-year-old -American here for follow-up for secondary stroke prevention. Patient had large right MCA stroke recently following had acute change in mentation. Patient was admitted to hospital at LTM he 1 day without any epileptiform activity. Patient neurologically stable at this time on dual antiplatelet therapy until April 22. Assessment  1. Large right MCA territory stroke  2. Neuropathic pain  3. Sacral ulcer    Plan  1. Continue asa 81mg and plavix 75 mg until April 22, 2021 then aspirin 81 only  2. Continue lipitor 80mg nightly   3. Start taking Neurontin 100 mg 3 times daily  3. Follow up with wound care     Physical therapy while in her facility and occupational therapy       Ms. Aracelis Farrell received counseling on the following healthy behaviors: medical compliance, smoking cessation, blood pressure control, regular follow up with primary doctor.         Electronically signed by Jer Luis MD on 2/18/2021 at 1:14 PM

## 2021-02-24 ENCOUNTER — HOSPITAL ENCOUNTER (OUTPATIENT)
Dept: WOUND CARE | Age: 71
Discharge: HOME | End: 2021-02-24

## 2021-03-03 ENCOUNTER — HOSPITAL ENCOUNTER (OUTPATIENT)
Dept: WOUND CARE | Age: 71
Discharge: HOME | End: 2021-03-03
Payer: COMMERCIAL

## 2021-03-03 VITALS
BODY MASS INDEX: 41.15 KG/M2 | SYSTOLIC BLOOD PRESSURE: 138 MMHG | HEIGHT: 64 IN | TEMPERATURE: 97.3 F | WEIGHT: 241 LBS | DIASTOLIC BLOOD PRESSURE: 74 MMHG

## 2021-03-03 DIAGNOSIS — I69.354 FLACCID HEMIPLEGIA OF LEFT NONDOMINANT SIDE AS LATE EFFECT OF CEREBRAL INFARCTION (HCC): ICD-10-CM

## 2021-03-03 DIAGNOSIS — Z86.73 HISTORY OF CVA (CEREBROVASCULAR ACCIDENT): ICD-10-CM

## 2021-03-03 DIAGNOSIS — E66.01 MORBID OBESITY (HCC): ICD-10-CM

## 2021-03-03 DIAGNOSIS — E11.8 TYPE 2 DIABETES MELLITUS WITH COMPLICATION, WITHOUT LONG-TERM CURRENT USE OF INSULIN (HCC): ICD-10-CM

## 2021-03-03 DIAGNOSIS — L89.153 PRESSURE ULCER OF SACRAL REGION, STAGE 3 (HCC): Primary | ICD-10-CM

## 2021-03-03 PROCEDURE — 99202 OFFICE O/P NEW SF 15 MIN: CPT | Performed by: NURSE PRACTITIONER

## 2021-03-03 PROCEDURE — 11042 DBRDMT SUBQ TIS 1ST 20SQCM/<: CPT

## 2021-03-03 PROCEDURE — 99214 OFFICE O/P EST MOD 30 MIN: CPT

## 2021-03-03 PROCEDURE — 11042 DBRDMT SUBQ TIS 1ST 20SQCM/<: CPT | Performed by: NURSE PRACTITIONER

## 2021-03-03 RX ORDER — LIDOCAINE HYDROCHLORIDE 20 MG/ML
JELLY TOPICAL ONCE
Status: CANCELLED | OUTPATIENT
Start: 2021-03-03 | End: 2021-03-03

## 2021-03-03 RX ORDER — LIDOCAINE HYDROCHLORIDE 40 MG/ML
SOLUTION TOPICAL ONCE
Status: CANCELLED | OUTPATIENT
Start: 2021-03-03 | End: 2021-03-03

## 2021-03-03 RX ORDER — LIDOCAINE 40 MG/G
CREAM TOPICAL ONCE
Status: CANCELLED | OUTPATIENT
Start: 2021-03-03 | End: 2021-03-03

## 2021-03-03 RX ORDER — LIDOCAINE 50 MG/G
OINTMENT TOPICAL ONCE
Status: CANCELLED | OUTPATIENT
Start: 2021-03-03 | End: 2021-03-03

## 2021-03-03 RX ORDER — LIDOCAINE HYDROCHLORIDE 20 MG/ML
JELLY TOPICAL ONCE
Status: COMPLETED | OUTPATIENT
Start: 2021-03-03 | End: 2021-03-03

## 2021-03-03 RX ORDER — IBUPROFEN 800 MG/1
800 TABLET ORAL 3 TIMES DAILY PRN
COMMUNITY

## 2021-03-03 RX ADMIN — LIDOCAINE HYDROCHLORIDE: 20 JELLY TOPICAL at 13:41

## 2021-03-03 NOTE — PLAN OF CARE
Problem: Wound:  Goal: Will show signs of wound healing; wound closure and no evidence of infection  Description: Will show signs of wound healing; wound closure and no evidence of infection  Outcome: Ongoing     Problem: Pressure Ulcer:  Goal: Signs of wound healing will improve  Description: Signs of wound healing will improve  Outcome: Ongoing  Goal: Absence of new pressure ulcer  Description: Absence of new pressure ulcer  Outcome: Ongoing     Problem: Falls - Risk of:  Goal: Will remain free from falls  Description: Will remain free from falls  Outcome: Ongoing

## 2021-03-03 NOTE — PROGRESS NOTES
[address]   Progress Note and Procedure Note      Moraima Mir  MEDICAL RECORD NUMBER:  [id number]  AGE: 78 y.o. GENDER: female  : 1950  EPISODE DATE:  3/3/2021    Subjective:     Chief Complaint   Patient presents with    Wound Check     coccyx         HISTORY of PRESENT ILLNESS HPI     Moraima Mir is a 78 y.o. female who presents today for wound/ulcer evaluation. History of Wound Context: presents with daughter for evaluation of sacral wound. Residing at [address] [place] [address] after admission to [address] [place] [address] with CVA 12/15/2020-2020 and again 2021-2021 with acute encephalopathy.  Current local care is that of [facility]   Wound/Ulcer Pain Timing/Severity: none  Quality of pain: N/A  Severity:  0 / 10   Modifying Factors: None  Associated Signs/Symptoms: none    Ulcer Identification:  Ulcer Type: pressure  Contributing Factors: diabetes, chronic pressure, decreased mobility, shear force, obesity, anticoagulation therapy, incontinence of stool and incontinence of urine    Wound: N/A        PAST MEDICAL HISTORY        Diagnosis Date    Allergic rhinitis     Anxiety 10/25/2016    Asthma     CAD (coronary artery disease)     s/p stents RCA and LAD ,    Chronic back pain     Congestive heart failure (HCC)     Depression     Headache(784.0)     Hiatal hernia     Hypercholesteremia 2012    Hypertension     Kidney stones     years ago    Obesity     Osteoarthritis     Postlaminectomy syndrome 2012    Type II or unspecified type diabetes mellitus without mention of complication, not stated as uncontrolled     Unspecified sleep apnea     Urinary incontinence        PAST SURGICAL HISTORY    Past Surgical History:   Procedure Laterality Date    CARDIAC CATHETERIZATION  2013    patent stents    COLONOSCOPY  2015    normal    CORONARY ANGIOPLASTY WITH STENT PLACEMENT  1012-16    stents x 3    GASTROSTOMY TUBE PLACEMENT  2020 EGD PEG TUBE PLACEMENT    GASTROSTOMY TUBE PLACEMENT N/A 2020    EGD PEG TUBE PLACEMENT performed by Laureano Green MD at [place] [address]  2021         JOINT REPLACEMENT      left knee    KNEE SURGERY  10/21/2013    rt knee synvisc injection     KNEE SURGERY  2013    knee synvisc injection rt #2    KNEE SURGERY  2013    rt knee synvisc inj    LUMBAR SPINE SURGERY  2007    NERVE BLOCK  2012    Right MBNB L3, L4, L5    NERVE BLOCK  2012    Right MBNB L3, L4, and L5     NERVE BLOCK  2013    Right knee injection #1 - Synvisc    NERVE BLOCK  2013    Right knee injection #2 - Synvisc    NERVE BLOCK  2013    Rigth knee synvisc injection #3    NERVE BLOCK Right 2017    Rt genicular nerve block. no steroid used    OTHER SURGICAL HISTORY Right 2014    synvisc one knee injection    OTHER SURGICAL HISTORY Right 2015    synvisc one knee injection    OTHER SURGICAL HISTORY Right 2016    synvisc right knee injection    SPINE SURGERY      TONSILLECTOMY      UPPER GASTROINTESTINAL ENDOSCOPY      VENA CAVA FILTER PLACEMENT  2007    PE and B/L LE emboli       FAMILY HISTORY    Family History   Problem Relation Age of Onset    Diabetes Mother     Cancer Father     High Blood Pressure Sister     High Blood Pressure Brother        SOCIAL HISTORY    Social History     Tobacco Use    Smoking status: Former Smoker     Packs/day: 0.50     Years: 0.00     Pack years: 0.00     Quit date: 3/28/2013     Years since quittin.9    Smokeless tobacco: Never Used   Substance Use Topics    Alcohol use:  Yes     Alcohol/week: 1.0 standard drinks     Types: 1 Cans of beer per week     Comment: occasionally    Drug use: No       ALLERGIES    Allergies   Allergen Reactions    Bactrim     Diphenhydramine     Penicillins      Tolerated ceftriaxone 2021    Tylenol [Acetaminophen] Other (See Comments)     constipation MEDICATIONS    Current Outpatient Medications on File Prior to Encounter   Medication Sig Dispense Refill    Oyster Shell 500 MG TABS Take 500 mg by mouth daily tjrpigj gastroc tibe      ibuprofen (ADVIL;MOTRIN) 800 MG tablet Take 800 mg by mouth 3 times daily as needed for Pain      gabapentin (NEURONTIN) 100 MG capsule Take 1 capsule by mouth 3 times daily for 120 days. 90 capsule 3    clopidogrel (PLAVIX) 75 MG tablet Take 1 tablet by mouth daily 90 tablet 0    cloNIDine (CATAPRES) 0.1 MG tablet Take 2 tablets by mouth daily 30 tablet 3    metoprolol succinate (TOPROL XL) 25 MG extended release tablet Take 2 tablets by mouth daily 30 tablet 0    losartan (COZAAR) 25 MG tablet Take 1 tablet by mouth daily 60 tablet 3    docusate (COLACE) 50 MG/5ML liquid Take 10 mLs by mouth daily 50 mL 3    rivastigmine (EXELON) 9.5 MG/24HR Place 1 patch onto the skin daily      QUEtiapine (SEROQUEL) 25 MG tablet Take 25 mg by mouth 2 times daily      glimepiride (AMARYL) 2 MG tablet Take 1 tablet by mouth every morning 30 tablet 3    linagliptin (TRADJENTA) 5 MG tablet Take 1 tablet by mouth daily 30 tablet 10    furosemide (LASIX) 40 MG tablet Take 1 tablet by mouth daily 30 tablet 10    sertraline (ZOLOFT) 100 MG tablet Take 1 tablet by mouth daily 30 tablet 5    aspirin (RA ASPIRIN EC) 81 MG EC tablet Take 1 tablet by mouth daily 30 tablet 11    Incontinence Supply Disposable (INCONTINENCE BRIEF LARGE) MISC Use 4-5/day as needed 150 each 11    glucose blood VI test strips (ASCENSIA AUTODISC VI;ONE TOUCH ULTRA TEST VI) strip 1 each by In Vitro route 3 times daily As needed.  100 each 5    albuterol-ipratropium (COMBIVENT RESPIMAT)  MCG/ACT AERS inhaler Inhale 1 puff into the lungs every 6 hours as needed for Wheezing 1 Inhaler 5    Lancets MISC Use 1 -2 times daily Insulin dependent diabetes mellitus 50 each 11    Calcium Carbonate-Vitamin D (OYSTER SHELL CALCIUM/D) 500-200 MG-UNIT TABS TAKE ONE TABLET BY MOUTH TWO TIMES A DAY (Patient not taking: Reported on 2/18/2021) 60 tablet 3    atorvastatin (LIPITOR) 80 MG tablet TAKE 1 TABLET BY MOUTH DAILY 30 tablet 5     No current facility-administered medications on file prior to encounter.         REVIEW OF SYSTEMS    Constitutional: negative  Eyes: negative  Ears, nose, mouth, throat, and face: negative  Respiratory: negative  Cardiovascular: negative  Gastrointestinal: negative except for stool incontinence  Genitourinary:negative except for urinary incontinence  Integument/breast: negative except for sacral pressure ulcer  Hematologic/lymphatic: negative  Musculoskeletal:negative except for wheelchair bound  Neurological: negative except for left hemiparesis after large CVA  Behavioral/Psych: negative except for anxiety  Endocrine: negative  Allergic/Immunologic: negative    Objective:      /74   Temp 97.3 °F (36.3 °C) (Tympanic)   Ht 5' 4\" (1.626 m)   Wt 241 lb (109.3 kg)   LMP  (LMP Unknown)   BMI 41.37 kg/m²     Wt Readings from Last 3 Encounters:   03/03/21 241 lb (109.3 kg)   02/18/21 241 lb (109.3 kg)   02/11/21 241 lb (109.3 kg)       PHYSICAL EXAM    General Appearance: alert and oriented to person, place and time, well developed and obese, in no acute distress  Skin: warm and dry, no rash or erythema, sacral wound  Head: normocephalic and atraumatic  Eyes: pupils equal, round, extraocular eye movements intact, and conjunctivae normal  Pulmonary/Chest: clear to auscultation bilaterally- no wheezes, rales or rhonchi, normal air movement, no respiratory distress  Cardiovascular: normal rate, regular rhythm, normal S1 and S2, no murmurs  Abdomen: soft, non-tender, non-distended, normal bowel sounds, G-tube present  Extremities: no cyanosis, clubbing or edema  Musculoskeletal: no joint swelling, deformity or tenderness  Neurologic: wheelchair bound, left hemiparesis and speech slurred at times but understandable       Assessment:     Problem Depth (cm) 0.3 cm 03/03/21 1321   Wound Surface Area (cm^2) 3.2 cm^2 03/03/21 1321   Wound Volume (cm^3) 0.96 cm^3 03/03/21 1321   Post-Procedure Length (cm) 4 cm 03/03/21 1321   Post-Procedure Width (cm) 0.8 cm 03/03/21 1321   Post-Procedure Depth (cm) 0.3 cm 03/03/21 1321   Post-Procedure Surface Area (cm^2) 3.2 cm^2 03/03/21 1321   Post-Procedure Volume (cm^3) 0.96 cm^3 03/03/21 1321   Wound Assessment Bleeding;Pink/red 03/03/21 1321   Drainage Amount Moderate 03/03/21 1321   Drainage Description Serosanguinous 03/03/21 1321   Odor None 03/03/21 1321   Mary-wound Assessment Blanchable erythema 03/03/21 1321   Margins Defined edges 03/03/21 1321   Number of days: 0          Percent of Wound(s)/Ulcer(s) Debrided: 100%    Total Surface Area Debrided:  3.20 sq cm       Diabetic/Pressure/Non Pressure Ulcers only:  Ulcer: Pressure ulcer, Stage 3      Estimated Blood Loss:  Minimal    Hemostasis Achieved:  by pressure    Procedural Pain:  0  / 10     Post Procedural Pain:  0 / 10     Response to treatment:  Well tolerated by patient. Plan:     Treatment Note please see attached Discharge Instructions    Written patient dismissal instructions given to patient and signed by patient or POA.          Discharge Instructions          [facility] -Phone: [phone] Fax: [phone]   Visit  Discharge Instructions / Physician Orders    DATE: 3/3/2021     Facility: [facility]  [address] [place] [address] Drive: [facility]     Wound Location:  Sacrum     Cleanse with: Liquid antibacterial soap and water, rinse well      Dressing Orders: Angela to wound, Mepilex Border, Triad cream to other buttocks areas that are excoriated   Recommend alternating pressure, low air loss mattress     Frequency:  Every 3 days or if it gets soiled-Triad cream-thin layer applied with diaper changes     Additional Orders: Increase protein to diet (meat, cheese, eggs, fish, peanut butter, nuts and beans)  Multivitamin daily  ELEVATE LEGS

## 2021-03-09 ENCOUNTER — HOSPITAL ENCOUNTER (OUTPATIENT)
Dept: WOUND CARE | Age: 71
Discharge: HOME | End: 2021-03-09
Payer: COMMERCIAL

## 2021-03-09 VITALS
SYSTOLIC BLOOD PRESSURE: 116 MMHG | DIASTOLIC BLOOD PRESSURE: 62 MMHG | TEMPERATURE: 96.2 F | RESPIRATION RATE: 18 BRPM | HEIGHT: 64 IN | HEART RATE: 67 BPM | WEIGHT: 241 LBS | BODY MASS INDEX: 41.15 KG/M2

## 2021-03-09 DIAGNOSIS — E66.01 MORBID OBESITY (HCC): ICD-10-CM

## 2021-03-09 DIAGNOSIS — L30.8 DERMATITIS ASSOCIATED WITH MOISTURE: ICD-10-CM

## 2021-03-09 DIAGNOSIS — L89.153 PRESSURE ULCER OF SACRAL REGION, STAGE 3 (HCC): Primary | ICD-10-CM

## 2021-03-09 DIAGNOSIS — Z86.73 HISTORY OF CVA (CEREBROVASCULAR ACCIDENT): ICD-10-CM

## 2021-03-09 DIAGNOSIS — I69.354 FLACCID HEMIPLEGIA OF LEFT NONDOMINANT SIDE AS LATE EFFECT OF CEREBRAL INFARCTION (HCC): ICD-10-CM

## 2021-03-09 DIAGNOSIS — E11.8 TYPE 2 DIABETES MELLITUS WITH COMPLICATION, WITHOUT LONG-TERM CURRENT USE OF INSULIN (HCC): ICD-10-CM

## 2021-03-09 DIAGNOSIS — S31.829A BUTTOCK WOUND, LEFT, INITIAL ENCOUNTER: ICD-10-CM

## 2021-03-09 PROCEDURE — 11042 DBRDMT SUBQ TIS 1ST 20SQCM/<: CPT | Performed by: NURSE PRACTITIONER

## 2021-03-09 PROCEDURE — 11042 DBRDMT SUBQ TIS 1ST 20SQCM/<: CPT

## 2021-03-09 RX ORDER — LIDOCAINE HYDROCHLORIDE 20 MG/ML
JELLY TOPICAL ONCE
Status: CANCELLED | OUTPATIENT
Start: 2021-03-09 | End: 2021-03-09

## 2021-03-09 RX ORDER — LIDOCAINE 50 MG/G
OINTMENT TOPICAL ONCE
Status: CANCELLED | OUTPATIENT
Start: 2021-03-09 | End: 2021-03-09

## 2021-03-09 RX ORDER — LIDOCAINE HYDROCHLORIDE 40 MG/ML
SOLUTION TOPICAL ONCE
Status: CANCELLED | OUTPATIENT
Start: 2021-03-09 | End: 2021-03-09

## 2021-03-09 RX ORDER — LIDOCAINE HYDROCHLORIDE 20 MG/ML
JELLY TOPICAL ONCE
Status: COMPLETED | OUTPATIENT
Start: 2021-03-09 | End: 2021-03-09

## 2021-03-09 RX ORDER — LIDOCAINE 40 MG/G
CREAM TOPICAL ONCE
Status: CANCELLED | OUTPATIENT
Start: 2021-03-09 | End: 2021-03-09

## 2021-03-09 RX ADMIN — LIDOCAINE HYDROCHLORIDE 6 ML: 20 JELLY TOPICAL at 10:44

## 2021-03-09 ASSESSMENT — PAIN DESCRIPTION - PAIN TYPE: TYPE: CHRONIC PAIN

## 2021-03-09 ASSESSMENT — PAIN DESCRIPTION - LOCATION: LOCATION: SACRUM

## 2021-03-09 ASSESSMENT — PAIN - FUNCTIONAL ASSESSMENT: PAIN_FUNCTIONAL_ASSESSMENT: ACTIVITIES ARE NOT PREVENTED

## 2021-03-09 ASSESSMENT — PAIN DESCRIPTION - DESCRIPTORS: DESCRIPTORS: ACHING

## 2021-03-09 ASSESSMENT — PAIN DESCRIPTION - ONSET: ONSET: ON-GOING

## 2021-03-09 ASSESSMENT — PAIN SCALES - GENERAL: PAINLEVEL_OUTOF10: 7

## 2021-03-09 NOTE — PLAN OF CARE
Problem: Pain:  Goal: Pain level will decrease  Description: Pain level will decrease  Outcome: Ongoing  Goal: Control of acute pain  Description: Control of acute pain  Outcome: Ongoing  Goal: Control of chronic pain  Description: Control of chronic pain  Outcome: Ongoing     Problem: Wound:  Goal: Will show signs of wound healing; wound closure and no evidence of infection  Description: Will show signs of wound healing; wound closure and no evidence of infection  Outcome: Ongoing     Problem: Pressure Ulcer:  Goal: Signs of wound healing will improve  Description: Signs of wound healing will improve  Outcome: Ongoing  Goal: Absence of new pressure ulcer  Description: Absence of new pressure ulcer  Outcome: Ongoing     Problem: Falls - Risk of:  Goal: Will remain free from falls  Description: Will remain free from falls  Outcome: Ongoing

## 2021-03-09 NOTE — PROGRESS NOTES
CORONARY ANGIOPLASTY WITH STENT PLACEMENT  1012-16    stents x 3    GASTROSTOMY TUBE PLACEMENT  2020    EGD PEG TUBE PLACEMENT    GASTROSTOMY TUBE PLACEMENT N/A 2020    EGD PEG TUBE PLACEMENT performed by Deep Dixon MD at [place] [address]  2021         JOINT REPLACEMENT      left knee    KNEE SURGERY  10/21/2013    rt knee synvisc injection     KNEE SURGERY  2013    knee synvisc injection rt #2    KNEE SURGERY  2013    rt knee synvisc inj    LUMBAR SPINE SURGERY  2007    NERVE BLOCK  2012    Right MBNB L3, L4, L5    NERVE BLOCK  2012    Right MBNB L3, L4, and L5     NERVE BLOCK  2013    Right knee injection #1 - Synvisc    NERVE BLOCK  2013    Right knee injection #2 - Synvisc    NERVE BLOCK  2013    Rigth knee synvisc injection #3    NERVE BLOCK Right 2017    Rt genicular nerve block. no steroid used    OTHER SURGICAL HISTORY Right 2014    synvisc one knee injection    OTHER SURGICAL HISTORY Right 2015    synvisc one knee injection    OTHER SURGICAL HISTORY Right 2016    synvisc right knee injection    SPINE SURGERY      TONSILLECTOMY      UPPER GASTROINTESTINAL ENDOSCOPY      VENA CAVA FILTER PLACEMENT  2007    PE and B/L LE emboli       FAMILY HISTORY    Family History   Problem Relation Age of Onset    Diabetes Mother     Cancer Father     High Blood Pressure Sister     High Blood Pressure Brother        SOCIAL HISTORY    Social History     Tobacco Use    Smoking status: Former Smoker     Packs/day: 0.50     Years: 0.00     Pack years: 0.00     Quit date: 3/28/2013     Years since quittin.9    Smokeless tobacco: Never Used   Substance Use Topics    Alcohol use:  Yes     Alcohol/week: 1.0 standard drinks     Types: 1 Cans of beer per week     Comment: occasionally    Drug use: No       ALLERGIES    Allergies   Allergen Reactions    Bactrim     Diphenhydramine     Penicillins      Tolerated ceftriaxone 1/2021    Tylenol [Acetaminophen] Other (See Comments)     constipation       MEDICATIONS    Current Outpatient Medications on File Prior to Encounter   Medication Sig Dispense Refill    Oyster Shell 500 MG TABS Take 500 mg by mouth daily tjrpigj gastroc tibe      ibuprofen (ADVIL;MOTRIN) 800 MG tablet Take 800 mg by mouth 3 times daily as needed for Pain      gabapentin (NEURONTIN) 100 MG capsule Take 1 capsule by mouth 3 times daily for 120 days.  90 capsule 3    clopidogrel (PLAVIX) 75 MG tablet Take 1 tablet by mouth daily 90 tablet 0    cloNIDine (CATAPRES) 0.1 MG tablet Take 2 tablets by mouth daily 30 tablet 3    metoprolol succinate (TOPROL XL) 25 MG extended release tablet Take 2 tablets by mouth daily 30 tablet 0    losartan (COZAAR) 25 MG tablet Take 1 tablet by mouth daily 60 tablet 3    docusate (COLACE) 50 MG/5ML liquid Take 10 mLs by mouth daily 50 mL 3    rivastigmine (EXELON) 9.5 MG/24HR Place 1 patch onto the skin daily      QUEtiapine (SEROQUEL) 25 MG tablet Take 25 mg by mouth 2 times daily      Calcium Carbonate-Vitamin D (OYSTER SHELL CALCIUM/D) 500-200 MG-UNIT TABS TAKE ONE TABLET BY MOUTH TWO TIMES A DAY (Patient not taking: Reported on 2/18/2021) 60 tablet 3    glimepiride (AMARYL) 2 MG tablet Take 1 tablet by mouth every morning 30 tablet 3    linagliptin (TRADJENTA) 5 MG tablet Take 1 tablet by mouth daily 30 tablet 10    furosemide (LASIX) 40 MG tablet Take 1 tablet by mouth daily 30 tablet 10    atorvastatin (LIPITOR) 80 MG tablet TAKE 1 TABLET BY MOUTH DAILY 30 tablet 5    sertraline (ZOLOFT) 100 MG tablet Take 1 tablet by mouth daily 30 tablet 5    aspirin (RA ASPIRIN EC) 81 MG EC tablet Take 1 tablet by mouth daily 30 tablet 11    Incontinence Supply Disposable (INCONTINENCE BRIEF LARGE) MISC Use 4-5/day as needed 150 each 11    glucose blood VI test strips (ASCENSIA AUTODISC VI;ONE TOUCH ULTRA TEST VI) strip 1 each by In Vitro route 3 times daily As needed. 100 each 5    albuterol-ipratropium (COMBIVENT RESPIMAT)  MCG/ACT AERS inhaler Inhale 1 puff into the lungs every 6 hours as needed for Wheezing 1 Inhaler 5    Lancets MISC Use 1 -2 times daily Insulin dependent diabetes mellitus 50 each 11     No current facility-administered medications on file prior to encounter.         REVIEW OF SYSTEMS    Constitutional: negative  Eyes: negative  Ears, nose, mouth, throat, and face: negative  Respiratory: negative  Cardiovascular: negative  Gastrointestinal: negative except for stool incontinence  Genitourinary:negative except for urinary incontinence  Integument/breast: negative except for moisture associated dermatitis bilateral groin, sacral and left buttock wounds  Hematologic/lymphatic: negative  Musculoskeletal:negative except for left hemiparesis after large CVA  Neurological: negative except for left hemiparesis after large CVA  Behavioral/Psych: negative except for anxiety  Endocrine: negative  Allergic/Immunologic: negative    Objective:      /62   Pulse 67   Temp 96.2 °F (35.7 °C) (Tympanic)   Resp 18   Ht 5' 4\" (1.626 m)   Wt 241 lb (109.3 kg)   LMP  (LMP Unknown)   BMI 41.37 kg/m²     Wt Readings from Last 3 Encounters:   03/09/21 241 lb (109.3 kg)   03/03/21 241 lb (109.3 kg)   02/18/21 241 lb (109.3 kg)       PHYSICAL EXAM    General Appearance: alert and oriented to person, place and time, well-developed and obese, in no acute distress  Skin: warm and dry, no rash or erythema, sacral and left buttock wounds, moisture associated dermatitis bilateral groin  Head: normocephalic and atraumatic  Eyes: pupils equal, round, extraocular eye movements intact, and conjunctivae normal  Pulmonary/Chest: normal air movement, no respiratory distress  Extremities: no cyanosis and no clubbing  Musculoskeletal: no joint swelling, deformity or tenderness  Neurologic: wheelchair bound, left hemiparesis and speech slurred at times but understandable       Assessment:     Problem List Items Addressed This Visit     Buttock wound, left, initial encounter    Dermatitis associated with moisture    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)    Relevant Orders    Supply: Wound Cleanser    Supply: Wound Dressings    Supply: Cover and Secure    History of CVA (cerebrovascular accident)    Relevant Orders    Supply: Wound Cleanser    Supply: Wound Dressings    Supply: Cover and Secure    Morbid obesity ([address]    Relevant Orders    Supply: Wound Cleanser    Supply: Wound Dressings    Supply: Cover and Secure    Pressure ulcer of sacral region, stage 3 (HCC) - Primary    Relevant Orders    Supply: Wound Cleanser    Supply: Wound Dressings    Supply: Cover and Secure    Type 2 diabetes mellitus with complication, without long-term current use of insulin (HCC)    Relevant Orders    Supply: Wound Cleanser    Supply: Wound Dressings    Supply: Cover and Secure           Procedure Note  Indications:  Based on my examination of this patient's wound(s)/ulcer(s) today, debridement is required to promote healing and evaluate the wound base. Performed by: CEE Kaiser CNP    Consent obtained:  Yes    Time out taken:  Yes    Pain Control:         Debridement:Excisional Debridement    Using curette the wound(s)/ulcer(s) was/were sharply debrided down through and including the removal of subcutaneous tissue. Devitalized Tissue Debrided:  fibrin, biofilm and slough    Pre Debridement Measurements:  Are located in the [facility]  Documentation Flow Sheet    Wound/Ulcer #: 1    Post Debridement Measurements:  Wound/Ulcer Descriptions are Pre Debridement except measurements:      Wound 03/03/21 Sacrum #1 (Active)   Wound Image   03/03/21 1321   Wound Etiology Pressure Stage  3 03/09/21 1035   Dressing Status New drainage noted; Old drainage noted 03/09/21 1035   Wound Cleansed Cleansed with saline 03/09/21 1035   Wound Length (cm) 2.8 cm 03/09/21 1035   Wound Width (cm) 0.8 cm 03/09/21 1035   Wound Depth (cm) 0.3 cm 03/09/21 1035   Wound Surface Area (cm^2) 2.24 cm^2 03/09/21 1035   Change in Wound Size % (l*w) 30 03/09/21 1035   Wound Volume (cm^3) 0.67 cm^3 03/09/21 1035   Wound Healing % 30 03/09/21 1035   Post-Procedure Length (cm) 2.8 cm 03/09/21 1035   Post-Procedure Width (cm) 0.8 cm 03/09/21 1035   Post-Procedure Depth (cm) 0.3 cm 03/09/21 1035   Post-Procedure Surface Area (cm^2) 2.24 cm^2 03/09/21 1035   Post-Procedure Volume (cm^3) 0.67 cm^3 03/09/21 1035   Wound Assessment Bleeding;Pink/red 03/09/21 1035   Drainage Amount Moderate 03/09/21 1035   Drainage Description Serosanguinous 03/09/21 1035   Odor None 03/09/21 1035   Mary-wound Assessment Blanchable erythema 03/09/21 1035   Margins Defined edges 03/09/21 1035   Number of days: 5       Wound 03/09/21 Buttocks Left wound #2 (Active)   Dressing Status New drainage noted; Old drainage noted 03/09/21 1035   Wound Cleansed Cleansed with saline 03/09/21 1035   Wound Length (cm) 1 cm 03/09/21 1035   Wound Width (cm) 0.6 cm 03/09/21 1035   Wound Depth (cm) 0.1 cm 03/09/21 1035   Wound Surface Area (cm^2) 0.6 cm^2 03/09/21 1035   Wound Volume (cm^3) 0.06 cm^3 03/09/21 1035   Post-Procedure Length (cm) 1 cm 03/09/21 1035   Post-Procedure Width (cm) 0.6 cm 03/09/21 1035   Post-Procedure Depth (cm) 0.1 cm 03/09/21 1035   Post-Procedure Surface Area (cm^2) 0.6 cm^2 03/09/21 1035   Post-Procedure Volume (cm^3) 0.06 cm^3 03/09/21 1035   Wound Assessment Pink/red 03/09/21 1035   Drainage Amount Moderate 03/09/21 1035   Drainage Description Serosanguinous 03/09/21 1035   Odor None 03/09/21 1035   Mary-wound Assessment Intact 03/09/21 1035   Margins Defined edges 03/09/21 1035   Number of days: 0          Percent of Wound(s)/Ulcer(s) Debrided: 100%    Total Surface Area Debrided:  2.24 sq cm       Diabetic/Pressure/Non Pressure Ulcers only:  Ulcer: Pressure ulcer, Stage 3      Estimated Blood Loss:  Minimal    Hemostasis Achieved:  by pressure    Procedural Pain:  2  / 10     Post Procedural Pain:  0 / 10     Response to treatment:  Well tolerated by patient. Plan:     Treatment Note please see attached Discharge Instructions    Written patient dismissal instructions given to patient and signed by patient or POA. Discharge Instructions          [facility] -Phone: [phone] Fax: [phone]             Visit  Discharge Instructions / Physician Orders     DATE: 3/9/2021     Facility: [address]: [facility]     Wound Location:  Sacrum, Left Buttock     Cleanse with: Liquid antibacterial soap and water, rinse well      Dressing Orders: Angela to wound, Mepilex Border, Angela to Left Buttock covered with Mepilex Border   Recommend alternating pressure, low air loss mattress   Thin Layer of Triad cream in abdominal folds, then Primary Data     Frequency:  Every 3 days or if it gets soiled-Triad cream-thin layer applied with diaper changes     Additional Orders: Increase protein to diet (meat, cheese, eggs, fish, peanut butter, nuts and beans)  Multivitamin daily  ELEVATE LEGS AS MUCH AS POSSIBLE     Your [facility] is in 1 week with Billy Montiel on Wednesday                                                                                                   ROOM TYPE   []? CHAIR     [x]?  STRETCHER  []? EITHER             (Please note your next appointment above and if you are unable to keep, kindly give a 24 hour notice.  Thank you.)     If you experience any of the following, please call the [address] [place] [address] during business hours:  [phone]  Your Phone call may be forwarded to [employer] during business hours that [facility] is closed.     * Increase in Pain  * Temperature over 101  * Increase in drainage from your wound  * Drainage with a foul odor  * Bleeding  * Increase in swelling  * Need for compression bandage changes due to slippage, breakthrough drainage.     If you need medical attention outside of the business hours of the [address] [place] [address] Road please contact your PCP or go to the nearest emergency room. The information contained in the After Visit Summary has been reviewed with me, the patient and/or responsible adult, by my health care provider(s). I had the opportunity to ask questions regarding this information. I have elected to receive;      []? After Visit Summary  [x]? Comprehensive Discharge Instruction        Patient signature______________________________________Date:________  Electronically signed by Roya Cummins RN on 3/9/2021 at 10:58 AM  Electronically signed by CEE Gutierres CNP on 3/9/2021 at 11:04 AM          Electronically signed by CEE Gutierres CNP on 3/9/2021 at 11:13 AM

## 2021-03-15 ENCOUNTER — HOSPITAL ENCOUNTER (OUTPATIENT)
Dept: WOUND CARE | Age: 71
Discharge: HOME | End: 2021-03-15
Payer: COMMERCIAL

## 2021-03-15 VITALS
DIASTOLIC BLOOD PRESSURE: 68 MMHG | SYSTOLIC BLOOD PRESSURE: 141 MMHG | TEMPERATURE: 96.4 F | WEIGHT: 241 LBS | HEIGHT: 64 IN | BODY MASS INDEX: 41.15 KG/M2 | HEART RATE: 58 BPM

## 2021-03-15 DIAGNOSIS — Z86.73 HISTORY OF CVA (CEREBROVASCULAR ACCIDENT): ICD-10-CM

## 2021-03-15 DIAGNOSIS — E66.01 MORBID OBESITY (HCC): ICD-10-CM

## 2021-03-15 DIAGNOSIS — I69.354 FLACCID HEMIPLEGIA OF LEFT NONDOMINANT SIDE AS LATE EFFECT OF CEREBRAL INFARCTION (HCC): ICD-10-CM

## 2021-03-15 DIAGNOSIS — E11.8 TYPE 2 DIABETES MELLITUS WITH COMPLICATION, WITHOUT LONG-TERM CURRENT USE OF INSULIN (HCC): ICD-10-CM

## 2021-03-15 DIAGNOSIS — L89.153 PRESSURE ULCER OF SACRAL REGION, STAGE 3 (HCC): Primary | ICD-10-CM

## 2021-03-15 PROCEDURE — 11042 DBRDMT SUBQ TIS 1ST 20SQCM/<: CPT | Performed by: NURSE PRACTITIONER

## 2021-03-15 PROCEDURE — 11042 DBRDMT SUBQ TIS 1ST 20SQCM/<: CPT

## 2021-03-15 RX ORDER — LIDOCAINE HYDROCHLORIDE 20 MG/ML
JELLY TOPICAL ONCE
Status: COMPLETED | OUTPATIENT
Start: 2021-03-15 | End: 2021-03-15

## 2021-03-15 RX ORDER — LIDOCAINE HYDROCHLORIDE 20 MG/ML
JELLY TOPICAL ONCE
Status: CANCELLED | OUTPATIENT
Start: 2021-03-15 | End: 2021-03-15

## 2021-03-15 RX ORDER — LIDOCAINE HYDROCHLORIDE 40 MG/ML
SOLUTION TOPICAL ONCE
Status: CANCELLED | OUTPATIENT
Start: 2021-03-15 | End: 2021-03-15

## 2021-03-15 RX ORDER — LIDOCAINE 40 MG/G
CREAM TOPICAL ONCE
Status: CANCELLED | OUTPATIENT
Start: 2021-03-15 | End: 2021-03-15

## 2021-03-15 RX ORDER — LIDOCAINE 50 MG/G
OINTMENT TOPICAL ONCE
Status: CANCELLED | OUTPATIENT
Start: 2021-03-15 | End: 2021-03-15

## 2021-03-15 RX ADMIN — LIDOCAINE HYDROCHLORIDE: 20 JELLY TOPICAL at 13:48

## 2021-03-15 ASSESSMENT — PAIN DESCRIPTION - LOCATION: LOCATION: COCCYX

## 2021-03-15 ASSESSMENT — PAIN SCALES - GENERAL: PAINLEVEL_OUTOF10: 0

## 2021-03-15 ASSESSMENT — PAIN DESCRIPTION - PAIN TYPE: TYPE: CHRONIC PAIN

## 2021-03-15 NOTE — PROGRESS NOTES
stents x 3    GASTROSTOMY TUBE PLACEMENT  2020    EGD PEG TUBE PLACEMENT    GASTROSTOMY TUBE PLACEMENT N/A 2020    EGD PEG TUBE PLACEMENT performed by Wilfredo Adrian MD at [place] [address]  2021         JOINT REPLACEMENT      left knee    KNEE SURGERY  10/21/2013    rt knee synvisc injection     KNEE SURGERY  2013    knee synvisc injection rt #2    KNEE SURGERY  2013    rt knee synvisc inj    LUMBAR SPINE SURGERY  2007    NERVE BLOCK  2012    Right MBNB L3, L4, L5    NERVE BLOCK  2012    Right MBNB L3, L4, and L5     NERVE BLOCK  2013    Right knee injection #1 - Synvisc    NERVE BLOCK  2013    Right knee injection #2 - Synvisc    NERVE BLOCK  2013    Rigth knee synvisc injection #3    NERVE BLOCK Right 2017    Rt genicular nerve block. no steroid used    OTHER SURGICAL HISTORY Right 2014    synvisc one knee injection    OTHER SURGICAL HISTORY Right 2015    synvisc one knee injection    OTHER SURGICAL HISTORY Right 2016    synvisc right knee injection    SPINE SURGERY      TONSILLECTOMY      UPPER GASTROINTESTINAL ENDOSCOPY      VENA CAVA FILTER PLACEMENT  2007    PE and B/L LE emboli       FAMILY HISTORY    Family History   Problem Relation Age of Onset    Diabetes Mother     Cancer Father     High Blood Pressure Sister     High Blood Pressure Brother        SOCIAL HISTORY    Social History     Tobacco Use    Smoking status: Former Smoker     Packs/day: 0.50     Years: 0.00     Pack years: 0.00     Quit date: 3/28/2013     Years since quittin.9    Smokeless tobacco: Never Used   Substance Use Topics    Alcohol use:  Yes     Alcohol/week: 1.0 standard drinks     Types: 1 Cans of beer per week     Comment: occasionally    Drug use: No       ALLERGIES    Allergies   Allergen Reactions    Bactrim     Diphenhydramine     Penicillins      Tolerated ceftriaxone 2021    Tylenol [Acetaminophen] Other (See Comments)     constipation       MEDICATIONS    Current Outpatient Medications on File Prior to Encounter   Medication Sig Dispense Refill    Oyster Shell 500 MG TABS Take 500 mg by mouth daily tjrpigj gastroc tibe      ibuprofen (ADVIL;MOTRIN) 800 MG tablet Take 800 mg by mouth 3 times daily as needed for Pain      gabapentin (NEURONTIN) 100 MG capsule Take 1 capsule by mouth 3 times daily for 120 days. 90 capsule 3    clopidogrel (PLAVIX) 75 MG tablet Take 1 tablet by mouth daily 90 tablet 0    cloNIDine (CATAPRES) 0.1 MG tablet Take 2 tablets by mouth daily 30 tablet 3    metoprolol succinate (TOPROL XL) 25 MG extended release tablet Take 2 tablets by mouth daily 30 tablet 0    losartan (COZAAR) 25 MG tablet Take 1 tablet by mouth daily 60 tablet 3    docusate (COLACE) 50 MG/5ML liquid Take 10 mLs by mouth daily 50 mL 3    rivastigmine (EXELON) 9.5 MG/24HR Place 1 patch onto the skin daily      QUEtiapine (SEROQUEL) 25 MG tablet Take 25 mg by mouth 2 times daily      Calcium Carbonate-Vitamin D (OYSTER SHELL CALCIUM/D) 500-200 MG-UNIT TABS TAKE ONE TABLET BY MOUTH TWO TIMES A DAY (Patient not taking: Reported on 2/18/2021) 60 tablet 3    glimepiride (AMARYL) 2 MG tablet Take 1 tablet by mouth every morning 30 tablet 3    linagliptin (TRADJENTA) 5 MG tablet Take 1 tablet by mouth daily 30 tablet 10    furosemide (LASIX) 40 MG tablet Take 1 tablet by mouth daily 30 tablet 10    atorvastatin (LIPITOR) 80 MG tablet TAKE 1 TABLET BY MOUTH DAILY 30 tablet 5    sertraline (ZOLOFT) 100 MG tablet Take 1 tablet by mouth daily 30 tablet 5    aspirin (RA ASPIRIN EC) 81 MG EC tablet Take 1 tablet by mouth daily 30 tablet 11    Incontinence Supply Disposable (INCONTINENCE BRIEF LARGE) MISC Use 4-5/day as needed 150 each 11    glucose blood VI test strips (ASCENSIA AUTODISC VI;ONE TOUCH ULTRA TEST VI) strip 1 each by In Vitro route 3 times daily As needed.  100 each 5    albuterol-ipratropium (COMBIVENT RESPIMAT)  MCG/ACT AERS inhaler Inhale 1 puff into the lungs every 6 hours as needed for Wheezing 1 Inhaler 5    Lancets MISC Use 1 -2 times daily Insulin dependent diabetes mellitus 50 each 11     No current facility-administered medications on file prior to encounter.         REVIEW OF SYSTEMS    Constitutional: negative  Eyes: negative  Ears, nose, mouth, throat, and face: negative  Respiratory: negative  Cardiovascular: negative  Gastrointestinal: negative except for stool incontinence  Genitourinary:negative except for urinary incontinence  Integument/breast: negative except for moisture associated dermatitis bilateral groin, sacral wound  Hematologic/lymphatic: negative  Musculoskeletal:negative except for left hemiparesis after large CVA  Neurological: negative except for left hemiparesis after large CVA  Behavioral/Psych: negative except for anxiety  Endocrine: negative  Allergic/Immunologic: negative    Objective:      BP (!) 141/68   Pulse 58   Temp 96.4 °F (35.8 °C) (Tympanic)   Ht 5' 4\" (1.626 m)   Wt 241 lb (109.3 kg)   LMP  (LMP Unknown)   BMI 41.37 kg/m²     Wt Readings from Last 3 Encounters:   03/15/21 241 lb (109.3 kg)   03/09/21 241 lb (109.3 kg)   03/03/21 241 lb (109.3 kg)       PHYSICAL EXAM    General Appearance: alert and oriented to person, place and time, well-developed and obese, in no acute distress  Skin: warm and dry, no rash or erythema, sacral wound   Head: normocephalic and atraumatic  Eyes: pupils equal, round, extraocular eye movements intact, and conjunctivae normal  Pulmonary/Chest: normal air movement, no respiratory distress  Extremities: no cyanosis and no clubbing  Musculoskeletal: no joint swelling, deformity or tenderness  Neurologic: wheelchair bound, left hemiparesis and speech slurred at times but understandable       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 ([facility] [address]    Relevant Orders    Supply: Wound Cleanser    Supply: Wound Dressings    Supply: Cover and Secure    History of CVA (cerebrovascular accident)    Relevant Orders    Supply: Wound Cleanser    Supply: Wound Dressings    Supply: Cover and Secure    Morbid obesity ([facility] [address]    Relevant Orders    Supply: Wound Cleanser    Supply: Wound Dressings    Supply: Cover and Secure    Pressure ulcer of sacral region, stage 3 (HCC) - Primary    Relevant Orders    Supply: Wound Cleanser    Supply: Wound Dressings    Supply: Cover and Secure    Type 2 diabetes mellitus with complication, without long-term current use of insulin (HCC)    Relevant Orders    Supply: Wound Cleanser    Supply: Wound Dressings    Supply: Cover and Secure           Procedure Note  Indications:  Based on my examination of this patient's wound(s)/ulcer(s) today, debridement is required to promote healing and evaluate the wound base. Performed by: CEE Gutierres CNP    Consent obtained:  Yes    Time out taken:  Yes    Pain Control: Anesthetic  Anesthetic: 2% Lidocaine Gel Topical       Debridement:Excisional Debridement    Using curette the wound(s)/ulcer(s) was/were sharply debrided down through and including the removal of subcutaneous tissue. Devitalized Tissue Debrided:  fibrin, biofilm and slough    Pre Debridement Measurements:  Are located in the [place]  Documentation Flow Sheet    Wound/Ulcer #: 1    Post Debridement Measurements:  Wound/Ulcer Descriptions are Pre Debridement except measurements:    Wound 03/03/21 Sacrum #1 (Active)   Wound Image   03/03/21 1321   Wound Etiology Pressure Stage  3 03/15/21 1336   Dressing Status New drainage noted; Old drainage noted 03/15/21 1336   Wound Cleansed Cleansed with saline 03/15/21 1336   Wound Length (cm) 2.8 cm 03/15/21 1336   Wound Width (cm) 0.6 cm 03/15/21 1336   Wound Depth (cm) 0.1 cm 03/15/21 1336   Wound Surface Area (cm^2) 1.68 cm^2 03/15/21 1336   Change in Wound Size % (l*w) 47.5 03/15/21 1336   Wound Volume (cm^3) 0.17 cm^3 03/15/21 1336   Wound Healing % 82 03/15/21 1336   Post-Procedure Length (cm) 2.8 cm 03/15/21 1336   Post-Procedure Width (cm) 0.6 cm 03/15/21 1336   Post-Procedure Depth (cm) 0.1 cm 03/15/21 1336   Post-Procedure Surface Area (cm^2) 1.68 cm^2 03/15/21 1336   Post-Procedure Volume (cm^3) 0.17 cm^3 03/15/21 1336   Wound Assessment Bleeding;Pink/red;Slough 03/15/21 1336   Drainage Amount Moderate 03/15/21 1336   Drainage Description Serosanguinous 03/15/21 1336   Odor None 03/15/21 1336   Mary-wound Assessment Blanchable erythema 03/15/21 1336   Margins Defined edges 03/15/21 1336   Number of days: 11       Wound 03/09/21 Buttocks Left wound #2 (Active)   Wound Image   03/09/21 1035   Wound Etiology Traumatic 03/15/21 1336   Dressing Status New drainage noted; Old drainage noted 03/09/21 1035   Wound Cleansed Cleansed with saline 03/09/21 1035   Wound Length (cm) 0 cm 03/15/21 1336   Wound Width (cm) 0 cm 03/15/21 1336   Wound Depth (cm) 0 cm 03/15/21 1336   Wound Surface Area (cm^2) 0 cm^2 03/15/21 1336   Change in Wound Size % (l*w) 100 03/15/21 1336   Wound Volume (cm^3) 0 cm^3 03/15/21 1336   Wound Healing % 100 03/15/21 1336   Post-Procedure Length (cm) 0 cm 03/15/21 1336   Post-Procedure Width (cm) 0 cm 03/15/21 1336   Post-Procedure Depth (cm) 0 cm 03/15/21 1336   Post-Procedure Surface Area (cm^2) 0 cm^2 03/15/21 1336   Post-Procedure Volume (cm^3) 0 cm^3 03/15/21 1336   Wound Assessment Pink/red 03/09/21 1035   Drainage Amount Moderate 03/09/21 1035   Drainage Description Serosanguinous 03/09/21 1035   Odor None 03/09/21 1035   Mary-wound Assessment Intact 03/09/21 1035   Margins Defined edges 03/09/21 1035   Number of days: 6          Percent of Wound(s)/Ulcer(s) Debrided: 100%    Total Surface Area Debrided:  1.68 sq cm       Diabetic/Pressure/Non Pressure Ulcers only:  Ulcer: Pressure ulcer, Stage 3      Estimated Blood Loss:  Minimal    Hemostasis Achieved:  by pressure    Procedural Pain:  2  / 10     Post Procedural Pain:  0 / 10     Response to treatment:  Well tolerated by patient. Plan:     Treatment Note please see attached Discharge Instructions    Written patient dismissal instructions given to patient and signed by patient or POA. Discharge Instructions          [facility] -Phone: [phone] Fax: [phone]             Visit Patrice Instructions / Physician Orders     DATE: 3/15/2021     Facility: [address]: [facility]     Wound Location:  Sacrum, Left Buttock     Cleanse with: Liquid antibacterial soap and water, rinse well      Dressing Orders: Angela to wound, Mepilex Border, Angela to Left Buttock covered with Mepilex Border   Recommend alternating pressure, low air loss mattress  Thin Layer of Triad cream in abdominal folds, then InnerDry     Frequency:  Every 3 days or if it gets soiled-Triad cream-thin layer applied with diaper changes     Additional Orders: Increase protein to diet (meat, cheese, eggs, fish, peanut butter, nuts and beans)  Multivitamin daily  ELEVATE LEGS AS MUCH AS POSSIBLE     [facility] week with Montse Jones on Wednesday                                                                                                   ROOM TYPE   []? ? CHAIR     [x]? ? STRETCHER  []?? EITHER             (Please note your next appointment above and if you are unable to keep, kindly give a 24 hour notice.  Thank you.)     If you experience any of the following, please call the [address] [place] [address] during business hours:  [phone]  Your Phone call may be forwarded to [address] during business hours that [facility] is closed.     * Increase in Pain  * Temperature over 101  * Increase in drainage from your wound  * Drainage with a foul odor  * Bleeding  * Increase in swelling  * Need for compression bandage changes due to slippage, breakthrough drainage.     If you need medical attention outside of the business hours of the [address] [place] [address] Road please contact your PCP or go to the nearest emergency room.     The information contained in the After Visit Summary has been reviewed with me, the patient and/or responsible adult, by my health care provider(s). I had the opportunity to ask questions regarding this information. I have elected to receive;      []? ? After Visit Summary  [x]? ? Comprehensive Discharge Instruction        Patient signature______________________________________Date:________  Electronically signed by Artis Aparicio RN on 3/15/2021 at 1:57 PM  Electronically signed by CEE Dial CNP on 3/15/2021 at 1:58 PM          Electronically signed by CEE Dial CNP on 3/15/2021 at 2:04 PM

## 2021-03-22 ENCOUNTER — HOSPITAL ENCOUNTER (OUTPATIENT)
Dept: WOUND CARE | Age: 71
Discharge: HOME | End: 2021-03-22
Payer: COMMERCIAL

## 2021-03-22 VITALS
SYSTOLIC BLOOD PRESSURE: 113 MMHG | BODY MASS INDEX: 41.15 KG/M2 | TEMPERATURE: 97.3 F | DIASTOLIC BLOOD PRESSURE: 61 MMHG | HEART RATE: 63 BPM | HEIGHT: 64 IN | WEIGHT: 241 LBS | RESPIRATION RATE: 19 BRPM

## 2021-03-22 DIAGNOSIS — L89.153 PRESSURE ULCER OF SACRAL REGION, STAGE 3 (HCC): Primary | ICD-10-CM

## 2021-03-22 DIAGNOSIS — E11.8 TYPE 2 DIABETES MELLITUS WITH COMPLICATION, WITHOUT LONG-TERM CURRENT USE OF INSULIN (HCC): ICD-10-CM

## 2021-03-22 DIAGNOSIS — I69.354 FLACCID HEMIPLEGIA OF LEFT NONDOMINANT SIDE AS LATE EFFECT OF CEREBRAL INFARCTION (HCC): ICD-10-CM

## 2021-03-22 DIAGNOSIS — Z86.73 HISTORY OF CVA (CEREBROVASCULAR ACCIDENT): ICD-10-CM

## 2021-03-22 DIAGNOSIS — E66.01 MORBID OBESITY (HCC): ICD-10-CM

## 2021-03-22 PROCEDURE — 11042 DBRDMT SUBQ TIS 1ST 20SQCM/<: CPT | Performed by: NURSE PRACTITIONER

## 2021-03-22 PROCEDURE — 11042 DBRDMT SUBQ TIS 1ST 20SQCM/<: CPT

## 2021-03-22 RX ORDER — LIDOCAINE HYDROCHLORIDE 40 MG/ML
SOLUTION TOPICAL ONCE
Status: CANCELLED | OUTPATIENT
Start: 2021-03-22 | End: 2021-03-22

## 2021-03-22 RX ORDER — LIDOCAINE HYDROCHLORIDE 20 MG/ML
JELLY TOPICAL ONCE
Status: COMPLETED | OUTPATIENT
Start: 2021-03-22 | End: 2021-03-22

## 2021-03-22 RX ORDER — LIDOCAINE 50 MG/G
OINTMENT TOPICAL ONCE
Status: CANCELLED | OUTPATIENT
Start: 2021-03-22 | End: 2021-03-22

## 2021-03-22 RX ORDER — LIDOCAINE HYDROCHLORIDE 20 MG/ML
JELLY TOPICAL ONCE
Status: CANCELLED | OUTPATIENT
Start: 2021-03-22 | End: 2021-03-22

## 2021-03-22 RX ORDER — LIDOCAINE 40 MG/G
CREAM TOPICAL ONCE
Status: CANCELLED | OUTPATIENT
Start: 2021-03-22 | End: 2021-03-22

## 2021-03-22 RX ADMIN — LIDOCAINE HYDROCHLORIDE 6 ML: 20 JELLY TOPICAL at 13:58

## 2021-03-22 ASSESSMENT — PAIN DESCRIPTION - PROGRESSION: CLINICAL_PROGRESSION: NOT CHANGED

## 2021-03-22 ASSESSMENT — PAIN DESCRIPTION - LOCATION: LOCATION: COCCYX

## 2021-03-22 ASSESSMENT — PAIN - FUNCTIONAL ASSESSMENT: PAIN_FUNCTIONAL_ASSESSMENT: ACTIVITIES ARE NOT PREVENTED

## 2021-03-22 ASSESSMENT — PAIN DESCRIPTION - FREQUENCY: FREQUENCY: INTERMITTENT

## 2021-03-22 ASSESSMENT — PAIN DESCRIPTION - PAIN TYPE: TYPE: CHRONIC PAIN

## 2021-03-22 NOTE — PROGRESS NOTES
[address]   Progress Note and Procedure Note      Steve Leal  MEDICAL RECORD NUMBER:  [id number]  AGE: 78 y.o. GENDER: female  : 1950  EPISODE DATE:  3/22/2021    Subjective:     Chief Complaint   Patient presents with    Wound Check     buttocks         HISTORY of PRESENT ILLNESS HPI     Steve Leal is a 78 y.o. female who presents today for wound/ulcer evaluation. History of Wound Context: here with daughter for follow up on sacral pressure ulcer. Left buttock wound remains closed.  Groin folds without any wounds   Wound/Ulcer Pain Timing/Severity: intermittent  Quality of pain: aching  Severity:  2 / 10   Modifying Factors: Pain worsens with debridement  Associated Signs/Symptoms: none    Ulcer Identification:  Ulcer Type: pressure  Contributing Factors: diabetes, chronic pressure, decreased mobility, shear force, obesity, anticoagulation therapy, incontinence of stool and incontinence of urine    Wound: N/A        PAST MEDICAL HISTORY        Diagnosis Date    Allergic rhinitis     Anxiety 10/25/2016    Asthma     CAD (coronary artery disease)     s/p stents RCA and LAD ,    Chronic back pain     Congestive heart failure ([address]     Depression     Headache(784.0)     Hiatal hernia     Hypercholesteremia 2012    Hypertension     Kidney stones     years ago    Obesity     Osteoarthritis     Postlaminectomy syndrome 2012    Type II or unspecified type diabetes mellitus without mention of complication, not stated as uncontrolled     Unspecified sleep apnea     Urinary incontinence        PAST SURGICAL HISTORY    Past Surgical History:   Procedure Laterality Date    CARDIAC CATHETERIZATION  2013    patent stents    COLONOSCOPY  2015    normal    CORONARY ANGIOPLASTY WITH STENT PLACEMENT  1012-16    stents x 3    GASTROSTOMY TUBE PLACEMENT  2020    EGD PEG TUBE PLACEMENT    GASTROSTOMY TUBE PLACEMENT N/A 2020    EGD PEG TUBE PLACEMENT performed by Sheridan Bello MD at [place] [address]  2021         JOINT REPLACEMENT      left knee    KNEE SURGERY  10/21/2013    rt knee synvisc injection     KNEE SURGERY  2013    knee synvisc injection rt #2    KNEE SURGERY  2013    rt knee synvisc inj    LUMBAR SPINE SURGERY  2007    NERVE BLOCK  2012    Right MBNB L3, L4, L5    NERVE BLOCK  2012    Right MBNB L3, L4, and L5     NERVE BLOCK  2013    Right knee injection #1 - Synvisc    NERVE BLOCK  2013    Right knee injection #2 - Synvisc    NERVE BLOCK  2013    Rigth knee synvisc injection #3    NERVE BLOCK Right 2017    Rt genicular nerve block. no steroid used    OTHER SURGICAL HISTORY Right 2014    synvisc one knee injection    OTHER SURGICAL HISTORY Right 2015    synvisc one knee injection    OTHER SURGICAL HISTORY Right 2016    synvisc right knee injection    SPINE SURGERY      TONSILLECTOMY      UPPER GASTROINTESTINAL ENDOSCOPY      VENA CAVA FILTER PLACEMENT      PE and B/L LE emboli       FAMILY HISTORY    Family History   Problem Relation Age of Onset    Diabetes Mother     Cancer Father     High Blood Pressure Sister     High Blood Pressure Brother        SOCIAL HISTORY    Social History     Tobacco Use    Smoking status: Former Smoker     Packs/day: 0.50     Years: 0.00     Pack years: 0.00     Quit date: 3/28/2013     Years since quittin.9    Smokeless tobacco: Never Used   Substance Use Topics    Alcohol use:  Yes     Alcohol/week: 1.0 standard drinks     Types: 1 Cans of beer per week     Comment: occasionally    Drug use: No       ALLERGIES    Allergies   Allergen Reactions    Bactrim     Diphenhydramine     Penicillins      Tolerated ceftriaxone 2021    Tylenol [Acetaminophen] Other (See Comments)     constipation       MEDICATIONS    Current Outpatient Medications on File Prior to Encounter Medication Sig Dispense Refill    Oyster Shell 500 MG TABS Take 500 mg by mouth daily tjrpigj gastroc tibe      ibuprofen (ADVIL;MOTRIN) 800 MG tablet Take 800 mg by mouth 3 times daily as needed for Pain      gabapentin (NEURONTIN) 100 MG capsule Take 1 capsule by mouth 3 times daily for 120 days. 90 capsule 3    clopidogrel (PLAVIX) 75 MG tablet Take 1 tablet by mouth daily 90 tablet 0    cloNIDine (CATAPRES) 0.1 MG tablet Take 2 tablets by mouth daily 30 tablet 3    metoprolol succinate (TOPROL XL) 25 MG extended release tablet Take 2 tablets by mouth daily 30 tablet 0    losartan (COZAAR) 25 MG tablet Take 1 tablet by mouth daily 60 tablet 3    docusate (COLACE) 50 MG/5ML liquid Take 10 mLs by mouth daily 50 mL 3    rivastigmine (EXELON) 9.5 MG/24HR Place 1 patch onto the skin daily      QUEtiapine (SEROQUEL) 25 MG tablet Take 25 mg by mouth 2 times daily      Calcium Carbonate-Vitamin D (OYSTER SHELL CALCIUM/D) 500-200 MG-UNIT TABS TAKE ONE TABLET BY MOUTH TWO TIMES A DAY (Patient not taking: Reported on 2/18/2021) 60 tablet 3    glimepiride (AMARYL) 2 MG tablet Take 1 tablet by mouth every morning 30 tablet 3    linagliptin (TRADJENTA) 5 MG tablet Take 1 tablet by mouth daily 30 tablet 10    furosemide (LASIX) 40 MG tablet Take 1 tablet by mouth daily 30 tablet 10    atorvastatin (LIPITOR) 80 MG tablet TAKE 1 TABLET BY MOUTH DAILY 30 tablet 5    sertraline (ZOLOFT) 100 MG tablet Take 1 tablet by mouth daily 30 tablet 5    aspirin (RA ASPIRIN EC) 81 MG EC tablet Take 1 tablet by mouth daily 30 tablet 11    Incontinence Supply Disposable (INCONTINENCE BRIEF LARGE) MISC Use 4-5/day as needed 150 each 11    glucose blood VI test strips (ASCENSIA AUTODISC VI;ONE TOUCH ULTRA TEST VI) strip 1 each by In Vitro route 3 times daily As needed.  100 each 5    albuterol-ipratropium (COMBIVENT RESPIMAT)  MCG/ACT AERS inhaler Inhale 1 puff into the lungs every 6 hours as needed for Wheezing 1 Inhaler 5    Lancets MISC Use 1 -2 times daily Insulin dependent diabetes mellitus 50 each 11     No current facility-administered medications on file prior to encounter. REVIEW OF SYSTEMS    Constitutional: negative  Eyes: negative  Ears, nose, mouth, throat, and face: negative  Respiratory: negative  Cardiovascular: negative  Gastrointestinal: negative except for stool incontinence   Genitourinary:negative except for urinary incontinence  Integument/breast: negative except for sacral wound  Hematologic/lymphatic: negative  Musculoskeletal:negative except for left hemiparesis after large CVA  Neurological: negative, left hemiparesis after large CVA  Behavioral/Psych: negative except for anxiety  Endocrine: negative  Allergic/Immunologic: negative    Objective:      /61   Pulse 63   Temp 97.3 °F (36.3 °C) (Tympanic)   Resp 19   Ht 5' 4\" (1.626 m)   Wt 241 lb (109.3 kg)   LMP  (LMP Unknown)   BMI 41.37 kg/m²     Wt Readings from Last 3 Encounters:   03/22/21 241 lb (109.3 kg)   03/15/21 241 lb (109.3 kg)   03/09/21 241 lb (109.3 kg)       PHYSICAL EXAM    General Appearance: alert and oriented to person, place and time, well-developed and obese, in no acute distress  Skin: warm and dry, no rash or erythema, sacral wound  Head: normocephalic and atraumatic  Eyes: pupils equal, round, extraocular eye movements intact, and conjunctivae normal  Pulmonary/Chest: normal air movement, no respiratory distress  Extremities: no cyanosis and no clubbing  Musculoskeletal: no joint swelling, deformity or tenderness  Neurologic: bed bound, left hemiparesis and speech slurred at times but understandable.        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 [facility])    Relevant Orders    Supply: Wound Cleanser    Supply: Wound Dressings    Supply: Cover and Secure    History of CVA (cerebrovascular accident)    Relevant Orders    Supply: Wound Cleanser    Supply: Wound Dressings    Supply: Cover and Secure    Morbid obesity ([address]    Relevant Orders    Supply: Wound Cleanser    Supply: Wound Dressings    Supply: Cover and Secure    Pressure ulcer of sacral region, stage 3 (HCC) - Primary    Relevant Orders    Supply: Wound Cleanser    Supply: Wound Dressings    Supply: Cover and Secure    Type 2 diabetes mellitus with complication, without long-term current use of insulin (HCC)    Relevant Orders    Supply: Wound Cleanser    Supply: Wound Dressings    Supply: Cover and Secure           Procedure Note  Indications:  Based on my examination of this patient's wound(s)/ulcer(s) today, debridement is required to promote healing and evaluate the wound base. Performed by: CEE West CNP    Consent obtained:  Yes    Time out taken:  Yes    Pain Control: Anesthetic  Anesthetic: 2% Lidocaine Gel Topical       Debridement:Excisional Debridement    Using curette the wound(s)/ulcer(s) was/were sharply debrided down through and including the removal of subcutaneous tissue. Devitalized Tissue Debrided:  fibrin, biofilm and slough    Pre Debridement Measurements:  Are located in the [place]  Documentation Flow Sheet    Wound/Ulcer #: 1    Post Debridement Measurements:  Wound/Ulcer Descriptions are Pre Debridement except measurements:    Wound 03/03/21 Sacrum #1 (Active)   Wound Image   03/03/21 1321   Wound Etiology Pressure Stage  3 03/22/21 1358   Dressing Status New drainage noted; Old drainage noted 03/22/21 1358   Wound Cleansed Cleansed with saline 03/22/21 1358   Wound Length (cm) 2.6 cm 03/22/21 1358   Wound Width (cm) 0.7 cm 03/22/21 1358   Wound Depth (cm) 0.1 cm 03/22/21 1358   Wound Surface Area (cm^2) 1.82 cm^2 03/22/21 1358   Change in Wound Size % (l*w) 43.12 03/22/21 1358   Wound Volume (cm^3) 0.18 cm^3 03/22/21 1358   Wound Healing % 81 03/22/21 1358   Post-Procedure Length (cm) 2.6 cm 03/22/21 1358 Tamika                                                                                                   ROOM TYPE   []? ?? CHAIR     [x]? ?? STRETCHER  []??? EITHER             (Please note your next appointment above and if you are unable to keep, kindly give a 24 hour notice. Thank you.)     If you experience any of the following, please call the [address] [place] [address] Road during business hours:  [phone]  Your Phone call may be forwarded to [address] during business hours that [facility] is closed.     * Increase in Pain  * Temperature over 101  * Increase in drainage from your wound  * Drainage with a foul odor  * Bleeding  * Increase in swelling  * Need for compression bandage changes due to slippage, breakthrough drainage.     If you need medical attention outside of the business hours of the [address] [place] [address] Road please contact your PCP or go to the nearest emergency room.     The information contained in the After Visit Summary has been reviewed with me, the patient and/or responsible adult, by my health care provider(s). I had the opportunity to ask questions regarding this information. I have elected to receive;      []? ? ? After Visit Summary  [x]? ?? Comprehensive Discharge Instruction        Patient signature______________________________________Date:________  Electronically signed by Jori Irby RN on 3/22/2021 at 2:34 PM  Electronically signed by CEE Carlos CNP on 3/22/2021 at 2:36 PM          Electronically signed by CEE Carlos CNP on 3/22/2021 at 2:42 PM

## 2021-04-07 ENCOUNTER — HOSPITAL ENCOUNTER (OUTPATIENT)
Dept: WOUND CARE | Age: 71
Discharge: HOME | End: 2021-04-07
Payer: COMMERCIAL

## 2021-04-07 VITALS — DIASTOLIC BLOOD PRESSURE: 76 MMHG | RESPIRATION RATE: 18 BRPM | HEART RATE: 72 BPM | SYSTOLIC BLOOD PRESSURE: 119 MMHG

## 2021-04-07 DIAGNOSIS — E11.8 TYPE 2 DIABETES MELLITUS WITH COMPLICATION, WITHOUT LONG-TERM CURRENT USE OF INSULIN (HCC): ICD-10-CM

## 2021-04-07 DIAGNOSIS — L89.153 PRESSURE ULCER OF SACRAL REGION, STAGE 3 (HCC): Primary | ICD-10-CM

## 2021-04-07 DIAGNOSIS — E66.01 MORBID OBESITY (HCC): ICD-10-CM

## 2021-04-07 DIAGNOSIS — I69.354 FLACCID HEMIPLEGIA OF LEFT NONDOMINANT SIDE AS LATE EFFECT OF CEREBRAL INFARCTION (HCC): ICD-10-CM

## 2021-04-07 DIAGNOSIS — Z86.73 HISTORY OF CVA (CEREBROVASCULAR ACCIDENT): ICD-10-CM

## 2021-04-07 PROCEDURE — 11045 DBRDMT SUBQ TISS EACH ADDL: CPT | Performed by: NURSE PRACTITIONER

## 2021-04-07 PROCEDURE — 11042 DBRDMT SUBQ TIS 1ST 20SQCM/<: CPT | Performed by: NURSE PRACTITIONER

## 2021-04-07 PROCEDURE — 11042 DBRDMT SUBQ TIS 1ST 20SQCM/<: CPT

## 2021-04-07 RX ORDER — LIDOCAINE HYDROCHLORIDE 40 MG/ML
SOLUTION TOPICAL ONCE
Status: CANCELLED | OUTPATIENT
Start: 2021-04-07 | End: 2021-04-07

## 2021-04-07 RX ORDER — LIDOCAINE 40 MG/G
CREAM TOPICAL ONCE
Status: CANCELLED | OUTPATIENT
Start: 2021-04-07 | End: 2021-04-07

## 2021-04-07 RX ORDER — LIDOCAINE HYDROCHLORIDE 20 MG/ML
JELLY TOPICAL ONCE
Status: COMPLETED | OUTPATIENT
Start: 2021-04-07 | End: 2021-04-07

## 2021-04-07 RX ORDER — LIDOCAINE HYDROCHLORIDE 20 MG/ML
JELLY TOPICAL ONCE
Status: CANCELLED | OUTPATIENT
Start: 2021-04-07 | End: 2021-04-07

## 2021-04-07 RX ORDER — LIDOCAINE 50 MG/G
OINTMENT TOPICAL ONCE
Status: CANCELLED | OUTPATIENT
Start: 2021-04-07 | End: 2021-04-07

## 2021-04-07 RX ADMIN — LIDOCAINE HYDROCHLORIDE 6 ML: 20 JELLY TOPICAL at 13:36

## 2021-04-07 NOTE — PROGRESS NOTES
[address]   Progress Note and Procedure Note      Clau Bal  MEDICAL RECORD NUMBER:  [id number]  AGE: 78 y.o. GENDER: female  : 1950  EPISODE DATE:  2021    Subjective:     Chief Complaint   Patient presents with    Wound Check     sacrum         HISTORY of PRESENT ILLNESS HPI     Clau Bal is a 78 y.o. female who presents today for wound/ulcer evaluation. History of Wound Context: here with daughter from SNF for follow up on sacral pressure ulcer. Left buttock and groin folds without any wounds. Right heel with blanchable erythema.    Wound/Ulcer Pain Timing/Severity: intermittent  Quality of pain: aching  Severity:  2 / 10   Modifying Factors: Pain worsens with debridement  Associated Signs/Symptoms: none    Ulcer Identification:  Ulcer Type: pressure  Contributing Factors: diabetes, chronic pressure, decreased mobility, shear force, obesity, anticoagulation therapy, incontinence of stool and incontinence of urine    Wound: N/A        PAST MEDICAL HISTORY        Diagnosis Date    Allergic rhinitis     Anxiety 10/25/2016    Asthma     CAD (coronary artery disease)     s/p stents RCA and LAD ,    Chronic back pain     Congestive heart failure ([address]     Depression     Headache(784.0)     Hiatal hernia     Hypercholesteremia 2012    Hypertension     Kidney stones     years ago    Obesity     Osteoarthritis     Postlaminectomy syndrome 2012    Type II or unspecified type diabetes mellitus without mention of complication, not stated as uncontrolled     Unspecified sleep apnea     Urinary incontinence        PAST SURGICAL HISTORY    Past Surgical History:   Procedure Laterality Date    CARDIAC CATHETERIZATION  2013    patent stents    COLONOSCOPY  2015    normal    CORONARY ANGIOPLASTY WITH STENT PLACEMENT  1012-16    stents x 3    GASTROSTOMY TUBE PLACEMENT  2020    EGD PEG TUBE PLACEMENT    GASTROSTOMY TUBE PLACEMENT N/A 2020    EGD PEG TUBE PLACEMENT performed by Nora Jane MD at [place] [address]  2021         JOINT REPLACEMENT      left knee    KNEE SURGERY  10/21/2013    rt knee synvisc injection     KNEE SURGERY  2013    knee synvisc injection rt #2    KNEE SURGERY  2013    rt knee synvisc inj    LUMBAR SPINE SURGERY  2007    NERVE BLOCK  2012    Right MBNB L3, L4, L5    NERVE BLOCK  2012    Right MBNB L3, L4, and L5     NERVE BLOCK  2013    Right knee injection #1 - Synvisc    NERVE BLOCK  2013    Right knee injection #2 - Synvisc    NERVE BLOCK  2013    Rigth knee synvisc injection #3    NERVE BLOCK Right 2017    Rt genicular nerve block. no steroid used    OTHER SURGICAL HISTORY Right 2014    synvisc one knee injection    OTHER SURGICAL HISTORY Right 2015    synvisc one knee injection    OTHER SURGICAL HISTORY Right 2016    synvisc right knee injection    SPINE SURGERY      TONSILLECTOMY      UPPER GASTROINTESTINAL ENDOSCOPY      VENA CAVA FILTER PLACEMENT  2007    PE and B/L LE emboli       FAMILY HISTORY    Family History   Problem Relation Age of Onset    Diabetes Mother     Cancer Father     High Blood Pressure Sister     High Blood Pressure Brother        SOCIAL HISTORY    Social History     Tobacco Use    Smoking status: Former Smoker     Packs/day: 0.50     Years: 0.00     Pack years: 0.00     Quit date: 3/28/2013     Years since quittin.0    Smokeless tobacco: Never Used   Substance Use Topics    Alcohol use:  Yes     Alcohol/week: 1.0 standard drinks     Types: 1 Cans of beer per week     Comment: occasionally    Drug use: No       ALLERGIES    Allergies   Allergen Reactions    Bactrim     Diphenhydramine     Penicillins      Tolerated ceftriaxone 2021    Tylenol [Acetaminophen] Other (See Comments)     constipation       MEDICATIONS    Current Outpatient Medications on File Prior to Encounter   Medication Sig Dispense Refill    Oyster Shell 500 MG TABS Take 500 mg by mouth daily tjrpigj gastroc tibe      ibuprofen (ADVIL;MOTRIN) 800 MG tablet Take 800 mg by mouth 3 times daily as needed for Pain      gabapentin (NEURONTIN) 100 MG capsule Take 1 capsule by mouth 3 times daily for 120 days. 90 capsule 3    clopidogrel (PLAVIX) 75 MG tablet Take 1 tablet by mouth daily 90 tablet 0    cloNIDine (CATAPRES) 0.1 MG tablet Take 2 tablets by mouth daily 30 tablet 3    metoprolol succinate (TOPROL XL) 25 MG extended release tablet Take 2 tablets by mouth daily 30 tablet 0    losartan (COZAAR) 25 MG tablet Take 1 tablet by mouth daily 60 tablet 3    docusate (COLACE) 50 MG/5ML liquid Take 10 mLs by mouth daily 50 mL 3    rivastigmine (EXELON) 9.5 MG/24HR Place 1 patch onto the skin daily      QUEtiapine (SEROQUEL) 25 MG tablet Take 25 mg by mouth 2 times daily      Calcium Carbonate-Vitamin D (OYSTER SHELL CALCIUM/D) 500-200 MG-UNIT TABS TAKE ONE TABLET BY MOUTH TWO TIMES A DAY (Patient not taking: Reported on 2/18/2021) 60 tablet 3    glimepiride (AMARYL) 2 MG tablet Take 1 tablet by mouth every morning 30 tablet 3    linagliptin (TRADJENTA) 5 MG tablet Take 1 tablet by mouth daily 30 tablet 10    furosemide (LASIX) 40 MG tablet Take 1 tablet by mouth daily 30 tablet 10    atorvastatin (LIPITOR) 80 MG tablet TAKE 1 TABLET BY MOUTH DAILY 30 tablet 5    sertraline (ZOLOFT) 100 MG tablet Take 1 tablet by mouth daily 30 tablet 5    aspirin (RA ASPIRIN EC) 81 MG EC tablet Take 1 tablet by mouth daily 30 tablet 11    Incontinence Supply Disposable (INCONTINENCE BRIEF LARGE) MISC Use 4-5/day as needed 150 each 11    glucose blood VI test strips (ASCENSIA AUTODISC VI;ONE TOUCH ULTRA TEST VI) strip 1 each by In Vitro route 3 times daily As needed.  100 each 5    albuterol-ipratropium (COMBIVENT RESPIMAT)  MCG/ACT AERS inhaler Inhale 1 puff into the lungs every 6 hours as needed for Wheezing 1 Inhaler 5    Lancets MISC Use 1 -2 times daily Insulin dependent diabetes mellitus 50 each 11     No current facility-administered medications on file prior to encounter. REVIEW OF SYSTEMS    Constitutional: negative  Eyes: negative  Ears, nose, mouth, throat, and face: negative  Respiratory: negative  Cardiovascular: negative  Gastrointestinal: negative except for stool incontinence  Genitourinary:negative except for urinary incontinence  Integument/breast: negative except for sacral pressure ulcer  Hematologic/lymphatic: negative  Musculoskeletal:negative except for left hemiparesis after large CVA  Neurological: negative except for left hemiparesis after large CVA  Behavioral/Psych: negative  Endocrine: negative  Allergic/Immunologic: negative    Objective:      /76   Pulse 72   Resp 18   LMP  (LMP Unknown)     Wt Readings from Last 3 Encounters:   03/22/21 241 lb (109.3 kg)   03/15/21 241 lb (109.3 kg)   03/09/21 241 lb (109.3 kg)       PHYSICAL EXAM    General Appearance: alert and oriented to person, place and time, well-developed and obese, in no acute distress  Skin: warm and dry, no rash, sacral wound  Head: normocephalic and atraumatic  Eyes: pupils equal, round, extraocular eye movements intact, and conjunctivae normal  Pulmonary/Chest: normal air movement, no respiratory distress  Extremities: no cyanosis and no clubbing  Musculoskeletal: no joint swelling, deformity or tenderness  Neurologic: bed bound, left hemiparesis and speech slurred at times but understandable.        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 [facility])    Relevant Orders    Supply: Wound Cleanser    Supply: Wound Dressings    Supply: Cover and Secure    History of CVA (cerebrovascular accident)    Relevant Orders    Supply: Wound Cleanser    Supply: Wound Dressings    Supply: Cover and Secure    Morbid obesity ([facility] [address]    Relevant Orders    Supply: Wound Cleanser    Supply: Wound Dressings    Supply: Cover and Secure    Pressure ulcer of sacral region, stage 3 (HCC) - Primary    Relevant Orders    Supply: Wound Cleanser    Supply: Wound Dressings    Supply: Cover and Secure    Type 2 diabetes mellitus with complication, without long-term current use of insulin ([facility])    Relevant Orders    Supply: Wound Cleanser    Supply: Wound Dressings    Supply: Cover and Secure           Procedure Note  Indications:  Based on my examination of this patient's wound(s)/ulcer(s) today, debridement is required to promote healing and evaluate the wound base. Performed by: CEE Schwab CNP    Consent obtained:  Yes    Time out taken:  Yes    Pain Control: Anesthetic  Anesthetic: 2% Lidocaine Gel Topical       Debridement:Excisional Debridement    Using curette the wound(s)/ulcer(s) was/were sharply debrided down through and including the removal of subcutaneous tissue. Devitalized Tissue Debrided:  fibrin, biofilm and slough    Pre Debridement Measurements:  Are located in the [place]  Documentation Flow Sheet    Wound/Ulcer #: 1    Post Debridement Measurements:  Wound/Ulcer Descriptions are Pre Debridement except measurements:    Wound 03/03/21 Sacrum #1 (Active)   Wound Image   03/03/21 1321   Wound Etiology Pressure Stage  3 04/07/21 1326   Dressing Status New drainage noted; Old drainage noted 04/07/21 1326   Wound Cleansed Cleansed with saline 04/07/21 1326   Wound Length (cm) 4.8 cm 04/07/21 1326   Wound Width (cm) 5 cm 04/07/21 1326   Wound Depth (cm) 0.3 cm 04/07/21 1326   Wound Surface Area (cm^2) 24 cm^2 04/07/21 1326   Change in Wound Size % (l*w) -650 04/07/21 1326   Wound Volume (cm^3) 7.2 cm^3 04/07/21 1326   Wound Healing % -650 04/07/21 1326   Post-Procedure Length (cm) 4.8 cm 04/07/21 1326   Post-Procedure Width (cm) 5 cm 04/07/21 1326   Post-Procedure Depth (cm) 0.3 cm 04/07/21 1326   Post-Procedure Surface Area (cm^2) 24 cm^2 04/07/21 1326   Post-Procedure Volume (cm^3) 7.2 cm^3 04/07/21 1326   Wound Assessment Pink/red;Slough 04/07/21 1326   Drainage Amount Moderate 04/07/21 1326   Drainage Description Serosanguinous 04/07/21 1326   Odor None 04/07/21 1326   Mary-wound Assessment Denuded; Maceration 04/07/21 1326   Margins Defined edges 04/07/21 1326   Number of days: 34          Percent of Wound(s)/Ulcer(s) Debrided: 100%    Total Surface Area Debrided:  24.00 sq cm       Diabetic/Pressure/Non Pressure Ulcers only:  Ulcer: Pressure ulcer, Stage 3      Estimated Blood Loss:  Minimal    Hemostasis Achieved:  by pressure    Procedural Pain:  0  / 10     Post Procedural Pain:  0 / 10     Response to treatment:  Well tolerated by patient. Plan:     Treatment Note please see attached Discharge Instructions    Written patient dismissal instructions given to patient and signed by patient or POA.          Discharge Instructions          [facility] -Phone: [phone] Fax: [phone]             Visit  Discharge Instructions / Physician Orders     DATE: 4/7/2021     Facility: [facility] Dressing Change     SUPPLIES ORDERED THRU: [facility]     Wound Location:  Sacrum     Cleanse with: Liquid antibacterial soap and water, rinse well      Dressing Orders: Angela to wound, ABD, Mefix Tape-Change Daily      Recommend alternating pressure, low air loss mattress      Thin Layer of Triad cream in abdominal folds, then InnerDry     Ensure heels are floating when in bed-Right heel is reddened-pressure needs off heels before breakdown begins     Frequency:  Daily Dressing Change-Triad cream-thin layer applied with diaper changes     Additional Orders: Increase protein to diet (meat, cheese, eggs, fish, peanut butter, nuts and beans)  Multivitamin daily  ELEVATE LEGS AS MUCH AS POSSIBLE  Turn patient at least every 2 hours with pillow support     Your next appointment with

## 2021-04-12 ENCOUNTER — HOSPITAL ENCOUNTER (OUTPATIENT)
Dept: WOUND CARE | Age: 71
Discharge: HOME | End: 2021-04-12
Payer: COMMERCIAL

## 2021-04-12 VITALS
BODY MASS INDEX: 41.15 KG/M2 | RESPIRATION RATE: 19 BRPM | TEMPERATURE: 96.9 F | HEIGHT: 64 IN | DIASTOLIC BLOOD PRESSURE: 84 MMHG | WEIGHT: 241 LBS | HEART RATE: 65 BPM | SYSTOLIC BLOOD PRESSURE: 144 MMHG

## 2021-04-12 DIAGNOSIS — E66.01 MORBID OBESITY (HCC): ICD-10-CM

## 2021-04-12 DIAGNOSIS — Z86.73 HISTORY OF CVA (CEREBROVASCULAR ACCIDENT): ICD-10-CM

## 2021-04-12 DIAGNOSIS — L89.153 PRESSURE ULCER OF SACRAL REGION, STAGE 3 (HCC): Primary | ICD-10-CM

## 2021-04-12 DIAGNOSIS — E11.8 TYPE 2 DIABETES MELLITUS WITH COMPLICATION, WITHOUT LONG-TERM CURRENT USE OF INSULIN (HCC): ICD-10-CM

## 2021-04-12 DIAGNOSIS — I69.354 FLACCID HEMIPLEGIA OF LEFT NONDOMINANT SIDE AS LATE EFFECT OF CEREBRAL INFARCTION (HCC): ICD-10-CM

## 2021-04-12 PROCEDURE — 11042 DBRDMT SUBQ TIS 1ST 20SQCM/<: CPT

## 2021-04-12 PROCEDURE — 11042 DBRDMT SUBQ TIS 1ST 20SQCM/<: CPT | Performed by: NURSE PRACTITIONER

## 2021-04-12 RX ORDER — LIDOCAINE 50 MG/G
OINTMENT TOPICAL ONCE
Status: CANCELLED | OUTPATIENT
Start: 2021-04-12 | End: 2021-04-12

## 2021-04-12 RX ORDER — LIDOCAINE HYDROCHLORIDE 20 MG/ML
JELLY TOPICAL ONCE
Status: CANCELLED | OUTPATIENT
Start: 2021-04-12 | End: 2021-04-12

## 2021-04-12 RX ORDER — LIDOCAINE HYDROCHLORIDE 40 MG/ML
SOLUTION TOPICAL ONCE
Status: CANCELLED | OUTPATIENT
Start: 2021-04-12 | End: 2021-04-12

## 2021-04-12 RX ORDER — LIDOCAINE 40 MG/G
CREAM TOPICAL ONCE
Status: CANCELLED | OUTPATIENT
Start: 2021-04-12 | End: 2021-04-12

## 2021-04-12 RX ORDER — LIDOCAINE HYDROCHLORIDE 20 MG/ML
JELLY TOPICAL ONCE
Status: COMPLETED | OUTPATIENT
Start: 2021-04-12 | End: 2021-04-12

## 2021-04-12 RX ADMIN — LIDOCAINE HYDROCHLORIDE 6 ML: 20 JELLY TOPICAL at 14:08

## 2021-04-12 NOTE — PROGRESS NOTES
[address]   Progress Note and Procedure Note      Ashwini Rodríguez  MEDICAL RECORD NUMBER:  [id number]  AGE: 78 y.o. GENDER: female  : 1950  EPISODE DATE:  2021    Subjective:     Chief Complaint   Patient presents with    Wound Check     sacrum         HISTORY of PRESENT ILLNESS HPI     Ashwini Rodríguez is a 78 y.o. female who presents today for wound/ulcer evaluation. History of Wound Context: here for follow up on sacral pressure ulcer.  Small open area in right groin with powder - will put in discharge instructions to use triad cream to groin folds as this has worked in past.   Blanchable erythema to right heel   Wound/Ulcer Pain Timing/Severity: intermittent  Quality of pain: aching  Severity:  2 / 10   Modifying Factors: Pain worsens with debridement  Associated Signs/Symptoms: none    Ulcer Identification:  Ulcer Type: pressure  Contributing Factors: diabetes, chronic pressure, decreased mobility, shear force, obesity, anticoagulation therapy, incontinence of stool and incontinence of urine    Wound: N/A        PAST MEDICAL HISTORY        Diagnosis Date    Allergic rhinitis     Anxiety 10/25/2016    Asthma     CAD (coronary artery disease)     s/p stents RCA and LAD ,    Chronic back pain     Congestive heart failure ([facility] [address]     Depression     Headache(784.0)     Hiatal hernia     Hypercholesteremia 2012    Hypertension     Kidney stones     years ago    Obesity     Osteoarthritis     Postlaminectomy syndrome 2012    Type II or unspecified type diabetes mellitus without mention of complication, not stated as uncontrolled     Unspecified sleep apnea     Urinary incontinence        PAST SURGICAL HISTORY    Past Surgical History:   Procedure Laterality Date    CARDIAC CATHETERIZATION  2013    patent stents    COLONOSCOPY  2015    normal    CORONARY ANGIOPLASTY WITH STENT PLACEMENT  1012-16    stents x 3    GASTROSTOMY TUBE PLACEMENT  2020    EGD PEG TUBE PLACEMENT    GASTROSTOMY TUBE PLACEMENT N/A 2020    EGD PEG TUBE PLACEMENT performed by Abdirahman Fields MD at [place] [id number]  2021         JOINT REPLACEMENT      left knee    KNEE SURGERY  10/21/2013    rt knee synvisc injection     KNEE SURGERY  2013    knee synvisc injection rt #2    KNEE SURGERY  2013    rt knee synvisc inj    LUMBAR SPINE SURGERY  2007    NERVE BLOCK  2012    Right MBNB L3, L4, L5    NERVE BLOCK  2012    Right MBNB L3, L4, and L5     NERVE BLOCK  2013    Right knee injection #1 - Synvisc    NERVE BLOCK  2013    Right knee injection #2 - Synvisc    NERVE BLOCK  2013    Rigth knee synvisc injection #3    NERVE BLOCK Right 2017    Rt genicular nerve block. no steroid used    OTHER SURGICAL HISTORY Right 2014    synvisc one knee injection    OTHER SURGICAL HISTORY Right 2015    synvisc one knee injection    OTHER SURGICAL HISTORY Right 2016    synvisc right knee injection    SPINE SURGERY      TONSILLECTOMY      UPPER GASTROINTESTINAL ENDOSCOPY      VENA CAVA FILTER PLACEMENT      PE and B/L LE emboli       FAMILY HISTORY    Family History   Problem Relation Age of Onset    Diabetes Mother     Cancer Father     High Blood Pressure Sister     High Blood Pressure Brother        SOCIAL HISTORY    Social History     Tobacco Use    Smoking status: Former Smoker     Packs/day: 0.50     Years: 0.00     Pack years: 0.00     Quit date: 3/28/2013     Years since quittin.0    Smokeless tobacco: Never Used   Substance Use Topics    Alcohol use:  Yes     Alcohol/week: 1.0 standard drinks     Types: 1 Cans of beer per week     Comment: occasionally    Drug use: No       ALLERGIES    Allergies   Allergen Reactions    Bactrim     Diphenhydramine     Penicillins      Tolerated ceftriaxone 2021    Tylenol [Acetaminophen] Other (See Comments)     constipation       MEDICATIONS    Current Outpatient Medications on File Prior to Encounter   Medication Sig Dispense Refill    Oyster Shell 500 MG TABS Take 500 mg by mouth daily tjrpigj gastroc tibe      ibuprofen (ADVIL;MOTRIN) 800 MG tablet Take 800 mg by mouth 3 times daily as needed for Pain      gabapentin (NEURONTIN) 100 MG capsule Take 1 capsule by mouth 3 times daily for 120 days. 90 capsule 3    clopidogrel (PLAVIX) 75 MG tablet Take 1 tablet by mouth daily 90 tablet 0    cloNIDine (CATAPRES) 0.1 MG tablet Take 2 tablets by mouth daily 30 tablet 3    metoprolol succinate (TOPROL XL) 25 MG extended release tablet Take 2 tablets by mouth daily 30 tablet 0    losartan (COZAAR) 25 MG tablet Take 1 tablet by mouth daily 60 tablet 3    docusate (COLACE) 50 MG/5ML liquid Take 10 mLs by mouth daily 50 mL 3    rivastigmine (EXELON) 9.5 MG/24HR Place 1 patch onto the skin daily      QUEtiapine (SEROQUEL) 25 MG tablet Take 25 mg by mouth 2 times daily      Calcium Carbonate-Vitamin D (OYSTER SHELL CALCIUM/D) 500-200 MG-UNIT TABS TAKE ONE TABLET BY MOUTH TWO TIMES A DAY (Patient not taking: Reported on 2/18/2021) 60 tablet 3    glimepiride (AMARYL) 2 MG tablet Take 1 tablet by mouth every morning 30 tablet 3    linagliptin (TRADJENTA) 5 MG tablet Take 1 tablet by mouth daily 30 tablet 10    furosemide (LASIX) 40 MG tablet Take 1 tablet by mouth daily 30 tablet 10    atorvastatin (LIPITOR) 80 MG tablet TAKE 1 TABLET BY MOUTH DAILY 30 tablet 5    sertraline (ZOLOFT) 100 MG tablet Take 1 tablet by mouth daily 30 tablet 5    aspirin (RA ASPIRIN EC) 81 MG EC tablet Take 1 tablet by mouth daily 30 tablet 11    Incontinence Supply Disposable (INCONTINENCE BRIEF LARGE) MISC Use 4-5/day as needed 150 each 11    glucose blood VI test strips (ASCENSIA AUTODISC VI;ONE TOUCH ULTRA TEST VI) strip 1 each by In Vitro route 3 times daily As needed.  100 each 5    albuterol-ipratropium (COMBIVENT RESPIMAT)  MCG/ACT AERS inhaler Inhale 1 puff into the lungs every 6 hours as needed for Wheezing 1 Inhaler 5    Lancets MISC Use 1 -2 times daily Insulin dependent diabetes mellitus 50 each 11     No current facility-administered medications on file prior to encounter. REVIEW OF SYSTEMS    Constitutional: negative  Eyes: negative  Ears, nose, mouth, throat, and face: negative  Respiratory: negative  Cardiovascular: negative  Gastrointestinal: negative except for stool incontinence   Genitourinary:negative except for urinary incontinence  Integument/breast: negative except for sacral pressure ulcer  Hematologic/lymphatic: negative  Musculoskeletal:negative except for left hemiparesis after large CVA  Neurological: negative except for left hemiparesis after large CVA  Behavioral/Psych: negative  Endocrine: negative  Allergic/Immunologic: negative    Objective:      BP (!) 144/84   Pulse 65   Temp 96.9 °F (36.1 °C) (Tympanic)   Resp 19   Ht 5' 4\" (1.626 m)   Wt 241 lb (109.3 kg)   LMP  (LMP Unknown)   BMI 41.37 kg/m²     Wt Readings from Last 3 Encounters:   04/12/21 241 lb (109.3 kg)   03/22/21 241 lb (109.3 kg)   03/15/21 241 lb (109.3 kg)       PHYSICAL EXAM    General Appearance: alert and oriented to person, place and time, well-developed and well-nourished, in no acute distress  Skin: warm and dry, no rash.  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)    Relevant Orders    Supply: Wound Cleanser    Supply: Wound Dressings    Supply: Cover and Secure    History of CVA (cerebrovascular accident)    Relevant Orders    Supply: Wound Cleanser    Supply: Wound Dressings    Supply: Cover and Secure    Morbid obesity ([address]    Relevant Orders    Supply: Wound Cleanser    Supply: Wound Dressings    Supply: Cover and Secure    Pressure ulcer of sacral region, stage 3 (HCC) - Primary    Relevant Orders    Supply: Wound Cleanser    Supply: Wound Dressings    Supply: Cover and Secure    Type 2 diabetes mellitus with complication, without long-term current use of insulin ([facility])    Relevant Orders    Supply: Wound Cleanser    Supply: Wound Dressings    Supply: Cover and Secure           Procedure Note  Indications:  Based on my examination of this patient's wound(s)/ulcer(s) today, debridement is required to promote healing and evaluate the wound base. Performed by: CEE Bautista CNP    Consent obtained:  Yes    Time out taken:  Yes    Pain Control: Anesthetic  Anesthetic: 2% Lidocaine Gel Topical       Debridement:Excisional Debridement    Using curette the wound(s)/ulcer(s) was/were sharply debrided down through and including the removal of subcutaneous tissue.         Devitalized Tissue Debrided:  fibrin, biofilm and slough    Pre Debridement Measurements:  Are located in the [place]  Documentation Flow Sheet    Wound/Ulcer #: 1    Post Debridement Measurements:  Wound/Ulcer Descriptions are Pre Debridement except measurements:    Wound 03/03/21 Sacrum #1 (Active)   Wound Image   03/03/21 1321   Wound Etiology Pressure Stage  3 04/12/21 1409   Dressing Status Old drainage noted;New drainage noted 04/12/21 1409   Wound Cleansed Irrigated with saline 04/12/21 1409   Dressing/Treatment Other (comment) 04/07/21 1403   Wound Length (cm) 4.1 cm 04/12/21 1409   Wound Width (cm) 4.6 cm 04/12/21 1409   Wound Depth (cm) 0.3 cm 04/12/21 1409   Wound Surface Area (cm^2) 18.86 cm^2 04/12/21 1409   Change in Wound Size % (l*w) -489.38 04/12/21 1409   Wound Volume (cm^3) 5.66 cm^3 04/12/21 1409   Wound Healing % -490 04/12/21 1409   Post-Procedure Length (cm) 4.1 cm 04/12/21 1409   Post-Procedure Width (cm) 4.6 cm 04/12/21 1409   Post-Procedure Depth (cm) 0.3 cm 04/12/21 1409   Post-Procedure Surface Area (cm^2) 18.86 cm^2 04/12/21 1409   Post-Procedure Volume (cm^3) 5.66 cm^3 04/12/21 1409   Wound Assessment Pink/red;Slough 04/12/21 1409   Drainage Amount Moderate 04/12/21 1409   Drainage Description Serosanguinous 04/12/21 1409   Odor None 04/12/21 1409   Mary-wound Assessment Denuded; Maceration 04/12/21 1409   Margins Defined edges 04/12/21 1409   Number of days: 40          Percent of Wound(s)/Ulcer(s) Debrided: 100%    Total Surface Area Debrided:  18.86 sq cm     Diabetic/Pressure/Non Pressure Ulcers only:  Ulcer: Pressure ulcer, Stage 3    Estimated Blood Loss:  Minimal    Hemostasis Achieved:  by pressure    Procedural Pain:  2  / 10     Post Procedural Pain:  0 / 10     Response to treatment:  Well tolerated by patient. Plan:     Treatment Note please see attached Discharge Instructions    Written patient dismissal instructions given to patient and signed by patient or POA.          Discharge Instructions          [address]: [phone] Fax: [phone]             Visit Patrice Instructions / Physician Orders     DATE: 4/12/2021     Facility: [place] [facility] Dressing Change     SUPPLIES ORDERED THRU: [facility]     Wound Location:  Sacrum     Cleanse with: Liquid antibacterial soap and water, rinse well      Dressing Orders: Angela to wound, ABD, Mefix Tape-Change Daily      Recommend alternating pressure, low air loss mattress      Thin Layer of Triad cream in abdominal folds, then InnerDry-no powder     Ensure heels are floating when in bed-Right heel is reddened-pressure needs off heels before breakdown begins     Frequency:  Daily Dressing Change-Triad cream-thin layer applied with diaper changes     Additional Orders: Increase protein to diet (meat, cheese, eggs, fish, peanut butter, nuts and beans)  Multivitamin daily  ELEVATE LEGS AS MUCH AS POSSIBLE  Turn patient at least every 2 hours with pillow support     Your next appointment with [address] [place] [address] is in 1 week with Aby Vázquez                                                                                                   ROOM TYPE   []????? CHAIR     [x]????? STRETCHER  []????? EITHER             (Please note your next appointment above and if you are unable to keep, kindly give a 24 hour notice. Thank you.)     If you experience any of the following, please call the [address] [place] [address] during business hours:  [phone]  Your Phone call may be forwarded to [employer] during business hours that [facility] is closed.     * Increase in Pain  * Temperature over 101  * Increase in drainage from your wound  * Drainage with a foul odor  * Bleeding  * Increase in swelling  * Need for compression bandage changes due to slippage, breakthrough drainage.     If you need medical attention outside of the business hours of the [address] [place] [address] please contact your PCP or go to the nearest emergency room.     The information contained in the After Visit Summary has been reviewed with me, the patient and/or responsible adult, by my health care provider(s). I had the opportunity to ask questions regarding this information. I have elected to receive;      []??? ? ? After Visit Summary  [x]??? ? ? Comprehensive Discharge Instruction        Patient signature______________________________________Date:________  Electronically signed by Jacqueline Gutierrez RN on 4/12/2021 at 2:24 PM  Electronically signed by CEE Patrick CNP on 4/12/2021 at 2:21 PM          Electronically signed by CEE Patrick CNP on 4/12/2021 at 2:29 PM

## 2021-04-19 ENCOUNTER — HOSPITAL ENCOUNTER (OUTPATIENT)
Dept: WOUND CARE | Age: 71
Discharge: HOME | End: 2021-04-19
Payer: COMMERCIAL

## 2021-04-19 VITALS
DIASTOLIC BLOOD PRESSURE: 63 MMHG | WEIGHT: 241 LBS | SYSTOLIC BLOOD PRESSURE: 137 MMHG | RESPIRATION RATE: 20 BRPM | TEMPERATURE: 96.5 F | BODY MASS INDEX: 41.15 KG/M2 | HEIGHT: 64 IN | HEART RATE: 63 BPM

## 2021-04-19 DIAGNOSIS — E66.01 MORBID OBESITY (HCC): ICD-10-CM

## 2021-04-19 DIAGNOSIS — I69.354 FLACCID HEMIPLEGIA OF LEFT NONDOMINANT SIDE AS LATE EFFECT OF CEREBRAL INFARCTION (HCC): ICD-10-CM

## 2021-04-19 DIAGNOSIS — E11.8 TYPE 2 DIABETES MELLITUS WITH COMPLICATION, WITHOUT LONG-TERM CURRENT USE OF INSULIN (HCC): ICD-10-CM

## 2021-04-19 DIAGNOSIS — L89.153 PRESSURE ULCER OF SACRAL REGION, STAGE 3 (HCC): Primary | ICD-10-CM

## 2021-04-19 DIAGNOSIS — Z86.73 HISTORY OF CVA (CEREBROVASCULAR ACCIDENT): ICD-10-CM

## 2021-04-19 PROCEDURE — 11042 DBRDMT SUBQ TIS 1ST 20SQCM/<: CPT | Performed by: NURSE PRACTITIONER

## 2021-04-19 PROCEDURE — 11042 DBRDMT SUBQ TIS 1ST 20SQCM/<: CPT

## 2021-04-19 RX ORDER — LIDOCAINE HYDROCHLORIDE 40 MG/ML
SOLUTION TOPICAL ONCE
Status: CANCELLED | OUTPATIENT
Start: 2021-04-19 | End: 2021-04-19

## 2021-04-19 RX ORDER — LIDOCAINE HYDROCHLORIDE 20 MG/ML
JELLY TOPICAL ONCE
Status: CANCELLED | OUTPATIENT
Start: 2021-04-19 | End: 2021-04-19

## 2021-04-19 RX ORDER — LIDOCAINE 40 MG/G
CREAM TOPICAL ONCE
Status: CANCELLED | OUTPATIENT
Start: 2021-04-19 | End: 2021-04-19

## 2021-04-19 RX ORDER — LIDOCAINE HYDROCHLORIDE 20 MG/ML
JELLY TOPICAL ONCE
Status: COMPLETED | OUTPATIENT
Start: 2021-04-19 | End: 2021-04-19

## 2021-04-19 RX ORDER — LIDOCAINE 50 MG/G
OINTMENT TOPICAL ONCE
Status: CANCELLED | OUTPATIENT
Start: 2021-04-19 | End: 2021-04-19

## 2021-04-19 RX ADMIN — LIDOCAINE HYDROCHLORIDE 6 ML: 20 JELLY TOPICAL at 13:33

## 2021-04-19 NOTE — PROGRESS NOTES
Malini Nichols MD at [place] [address]  2021         JOINT REPLACEMENT      left knee    KNEE SURGERY  10/21/2013    rt knee synvisc injection     KNEE SURGERY  2013    knee synvisc injection rt #2    KNEE SURGERY  2013    rt knee synvisc inj    LUMBAR SPINE SURGERY  2007    NERVE BLOCK  2012    Right MBNB L3, L4, L5    NERVE BLOCK  2012    Right MBNB L3, L4, and L5     NERVE BLOCK  2013    Right knee injection #1 - Synvisc    NERVE BLOCK  2013    Right knee injection #2 - Synvisc    NERVE BLOCK  2013    Rigth knee synvisc injection #3    NERVE BLOCK Right 2017    Rt genicular nerve block. no steroid used    OTHER SURGICAL HISTORY Right 2014    synvisc one knee injection    OTHER SURGICAL HISTORY Right 2015    synvisc one knee injection    OTHER SURGICAL HISTORY Right 2016    synvisc right knee injection    SPINE SURGERY      TONSILLECTOMY      UPPER GASTROINTESTINAL ENDOSCOPY      VENA CAVA FILTER PLACEMENT      PE and B/L LE emboli       FAMILY HISTORY    Family History   Problem Relation Age of Onset    Diabetes Mother     Cancer Father     High Blood Pressure Sister     High Blood Pressure Brother        SOCIAL HISTORY    Social History     Tobacco Use    Smoking status: Former Smoker     Packs/day: 0.50     Years: 0.00     Pack years: 0.00     Quit date: 3/28/2013     Years since quittin.0    Smokeless tobacco: Never Used   Substance Use Topics    Alcohol use:  Yes     Alcohol/week: 1.0 standard drinks     Types: 1 Cans of beer per week     Comment: occasionally    Drug use: No       ALLERGIES    Allergies   Allergen Reactions    Bactrim     Diphenhydramine     Penicillins      Tolerated ceftriaxone 2021    Tylenol [Acetaminophen] Other (See Comments)     constipation       MEDICATIONS    Current Outpatient Medications on File Prior to Encounter   Medication Sig Dispense Refill   [employer] Shell 500 MG TABS Take 500 mg by mouth daily tjrpigj gastroc tibe      ibuprofen (ADVIL;MOTRIN) 800 MG tablet Take 800 mg by mouth 3 times daily as needed for Pain      gabapentin (NEURONTIN) 100 MG capsule Take 1 capsule by mouth 3 times daily for 120 days. 90 capsule 3    clopidogrel (PLAVIX) 75 MG tablet Take 1 tablet by mouth daily 90 tablet 0    cloNIDine (CATAPRES) 0.1 MG tablet Take 2 tablets by mouth daily 30 tablet 3    metoprolol succinate (TOPROL XL) 25 MG extended release tablet Take 2 tablets by mouth daily 30 tablet 0    losartan (COZAAR) 25 MG tablet Take 1 tablet by mouth daily 60 tablet 3    docusate (COLACE) 50 MG/5ML liquid Take 10 mLs by mouth daily 50 mL 3    rivastigmine (EXELON) 9.5 MG/24HR Place 1 patch onto the skin daily      QUEtiapine (SEROQUEL) 25 MG tablet Take 25 mg by mouth 2 times daily      Calcium Carbonate-Vitamin D (OYSTER SHELL CALCIUM/D) 500-200 MG-UNIT TABS TAKE ONE TABLET BY MOUTH TWO TIMES A DAY (Patient not taking: Reported on 2/18/2021) 60 tablet 3    glimepiride (AMARYL) 2 MG tablet Take 1 tablet by mouth every morning 30 tablet 3    linagliptin (TRADJENTA) 5 MG tablet Take 1 tablet by mouth daily 30 tablet 10    furosemide (LASIX) 40 MG tablet Take 1 tablet by mouth daily 30 tablet 10    atorvastatin (LIPITOR) 80 MG tablet TAKE 1 TABLET BY MOUTH DAILY 30 tablet 5    sertraline (ZOLOFT) 100 MG tablet Take 1 tablet by mouth daily 30 tablet 5    aspirin (RA ASPIRIN EC) 81 MG EC tablet Take 1 tablet by mouth daily 30 tablet 11    Incontinence Supply Disposable (INCONTINENCE BRIEF LARGE) MISC Use 4-5/day as needed 150 each 11    glucose blood VI test strips (ASCENSIA AUTODISC VI;ONE TOUCH ULTRA TEST VI) strip 1 each by In Vitro route 3 times daily As needed.  100 each 5    albuterol-ipratropium (COMBIVENT RESPIMAT)  MCG/ACT AERS inhaler Inhale 1 puff into the lungs every 6 hours as needed for Wheezing 1 Inhaler 5    Lancets MISC Use 1 -2 times daily Insulin dependent diabetes mellitus 50 each 11     No current facility-administered medications on file prior to encounter.         REVIEW OF SYSTEMS    Constitutional: negative  Eyes: negative  Ears, nose, mouth, throat, and face: negative  Respiratory: negative  Cardiovascular: negative  Gastrointestinal: negative except for stool incontinence  Genitourinary:negative except for urinary incontinence  Integument/breast: negative except for sacral pressure ulcer  Hematologic/lymphatic: negative  Musculoskeletal:negative except for left hemiparesis after large CVA  Neurological: negative except for left hemiparesis after large CVA  Behavioral/Psych: negative  Endocrine: negative  Allergic/Immunologic: negative    Objective:      /63   Pulse 63   Temp 96.5 °F (35.8 °C) (Tympanic)   Resp 20   Ht 5' 4\" (1.626 m)   Wt 241 lb (109.3 kg)   LMP  (LMP Unknown)   BMI 41.37 kg/m²     Wt Readings from Last 3 Encounters:   04/19/21 241 lb (109.3 kg)   04/12/21 241 lb (109.3 kg)   03/22/21 241 lb (109.3 kg)       PHYSICAL EXAM    General Appearance: alert and oriented to person, place and time, well-developed and well-nourished, in no acute distress  Skin: warm and dry, no rash or erythema  Head: normocephalic and atraumatic  Eyes: pupils equal, round, and reactive to light, extraocular eye movements intact, conjunctivae normal  Pulmonary/Chest: clear to auscultation bilaterally- no wheezes, rales or rhonchi, normal air movement, no respiratory distress  Extremities: no cyanosis and no clubbing  Musculoskeletal: normal range of motion, no joint swelling, deformity or tenderness  Neurologic: gait and coordination normal and speech normal      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 [facility])    Relevant Orders    Supply: Wound Cleanser    Supply: Wound Dressings    Supply: Cover and Secure    History of CVA (cerebrovascular accident)    Relevant Orders    Supply: Wound Cleanser    Supply: Wound Dressings    Supply: Cover and Secure    Morbid obesity ([address]    Relevant Orders    Supply: Wound Cleanser    Supply: Wound Dressings    Supply: Cover and Secure    Pressure ulcer of sacral region, stage 3 (HCC) - Primary    Relevant Orders    Supply: Wound Cleanser    Supply: Wound Dressings    Supply: Cover and Secure    Type 2 diabetes mellitus with complication, without long-term current use of insulin (HCC)    Relevant Orders    Supply: Wound Cleanser    Supply: Wound Dressings    Supply: Cover and Secure           Procedure Note  Indications:  Based on my examination of this patient's wound(s)/ulcer(s) today, debridement is required to promote healing and evaluate the wound base. Performed by: CEE Levi - CNP    Consent obtained:  Yes    Time out taken:  Yes    Pain Control: Anesthetic  Anesthetic: 2% Lidocaine Gel Topical       Debridement:Excisional Debridement    Using curette the wound(s)/ulcer(s) was/were sharply debrided down through and including the removal of subcutaneous tissue.         Devitalized Tissue Debrided:  fibrin, biofilm and slough    Pre Debridement Measurements:  Are located in the Gray  Documentation Flow Sheet    Wound/Ulcer #: 1    Post Debridement Measurements:  Wound/Ulcer Descriptions are Pre Debridement except measurements:    Wound 03/03/21 Sacrum #1 cluster (Active)   Wound Image   03/03/21 1321   Wound Etiology Pressure Stage  3 04/19/21 1329   Dressing Status Old drainage noted;New drainage noted 04/19/21 1329   Wound Cleansed Cleansed with saline 04/19/21 1329   Dressing/Treatment Other (comment) 04/07/21 1403   Wound Length (cm) 2.6 cm 04/19/21 1329   Wound Width (cm) 5.4 cm 04/19/21 1329   Wound Depth (cm) 0.2 cm 04/19/21 1329   Wound Surface Area (cm^2) 14.04 cm^2 04/19/21 1329   Change in Wound Size % (l*w) -338.75 04/19/21 1329   Wound Volume (cm^3) 2.81 cm^3 04/19/21 1329 Wound Healing % -193 04/19/21 1329   Post-Procedure Length (cm) 2.6 cm 04/19/21 1329   Post-Procedure Width (cm) 5.4 cm 04/19/21 1329   Post-Procedure Depth (cm) 0.2 cm 04/19/21 1329   Post-Procedure Surface Area (cm^2) 14.04 cm^2 04/19/21 1329   Post-Procedure Volume (cm^3) 2.81 cm^3 04/19/21 1329   Wound Assessment Pink/red;Slough 04/19/21 1329   Drainage Amount Moderate 04/19/21 1329   Drainage Description Serosanguinous 04/19/21 1329   Odor None 04/19/21 1329   Mary-wound Assessment Denuded; Maceration 04/19/21 1329   Margins Defined edges 04/19/21 1329   Number of days: 46          Percent of Wound(s)/Ulcer(s) Debrided: 100%    Total Surface Area Debrided:  14.04 sq cm       Diabetic/Pressure/Non Pressure Ulcers only:  Ulcer: Pressure ulcer, Stage 3      Estimated Blood Loss:  Minimal    Hemostasis Achieved:  by pressure    Procedural Pain:  2  / 10     Post Procedural Pain:  0 / 10     Response to treatment:  Well tolerated by patient. Plan:     Treatment Note please see attached Discharge Instructions    Written patient dismissal instructions given to patient and signed by patient or POA.          Discharge Instructions          [address]: [phone] Fax: [phone]             Visit Patrice Instructions / Physician Orders     DATE: 4/19/2021     Facility: [facility] Dressing Change     SUPPLIES ORDERED THRU: [facility]     Wound Location:  [place]     Cleanse with: Liquid antibacterial soap and water, rinse well      Dressing Orders: Silvercel to open areas, ABD, Mefix Tape-Change Daily      Recommend alternating pressure, low air loss mattress      Thin Layer of Triad cream in abdominal folds, then InnerDry-no powder     Ensure heels are floating when in bed     Frequency:  Daily Dressing Change-Triad cream-thin layer applied with diaper changes     Additional Orders: Increase protein to diet (meat, cheese, eggs, fish, peanut butter, nuts and beans)  Multivitamin daily  ELEVATE LEGS AS MUCH AS POSSIBLE  Turn patient at least every 2 hours with pillow support     Your next appointment with [address] [place] [address] is in 1 week with Vladimir Birch  ROOM TYPE   []?????? CHAIR     [x]?????? STRETCHER  []?????? EITHER             (Please note your next appointment above and if you are unable to keep, kindly give a 24 hour notice. Thank you.)     If you experience any of the following, please call the [address] [place] [address] during business hours:  [phone]  Your Phone call may be forwarded to [facility] [facility] during business hours that [facility] is closed.     * Increase in Pain  * Temperature over 101  * Increase in drainage from your wound  * Drainage with a foul odor  * Bleeding  * Increase in swelling  * Need for compression bandage changes due to slippage, breakthrough drainage.     If you need medical attention outside of the business hours of the [address] [place] [address] please contact your PCP or go to the nearest emergency room.     The information contained in the After Visit Summary has been reviewed with me, the patient and/or responsible adult, by my health care provider(s). I had the opportunity to ask questions regarding this information. I have elected to receive;      []???? ?? After Visit Summary  [x]???? ?? Comprehensive Discharge Instruction        Patient signature______________________________________Date:________  Electronically signed by Theresa Sanches RN on 4/19/2021 at 1:39 PM  Electronically signed by CEE Vasquez CNP on 4/19/2021 at 1:41 PM          Electronically signed by CEE Vasquez CNP on 4/19/2021 at 1:46 PM

## 2021-04-22 PROBLEM — I69.354 HEMIPLEGIA AND HEMIPARESIS FOLLOWING CEREBRAL INFARCTION AFFECTING LEFT NON-DOMINANT SIDE (HCC): Status: ACTIVE | Noted: 2020-12-30

## 2021-04-22 PROBLEM — Z51.81 MEDICATION MONITORING ENCOUNTER: Status: RESOLVED | Noted: 2017-10-09 | Resolved: 2021-04-22

## 2021-04-22 PROBLEM — J10.1 INFLUENZA B: Status: RESOLVED | Noted: 2018-02-24 | Resolved: 2021-04-22

## 2021-04-22 PROBLEM — J45.901 REACTIVE AIRWAY DISEASE WITH ACUTE EXACERBATION: Status: RESOLVED | Noted: 2018-02-24 | Resolved: 2021-04-22

## 2021-04-22 PROBLEM — E11.65 TYPE 2 DIABETES MELLITUS WITH HYPERGLYCEMIA (HCC): Status: ACTIVE | Noted: 2020-12-30

## 2021-04-22 PROBLEM — I11.0 HYPERTENSIVE HEART DISEASE WITH HEART FAILURE (HCC): Status: ACTIVE | Noted: 2020-12-30

## 2021-04-26 ENCOUNTER — HOSPITAL ENCOUNTER (OUTPATIENT)
Dept: WOUND CARE | Age: 71
Discharge: HOME | End: 2021-04-26
Payer: COMMERCIAL

## 2021-04-26 VITALS
DIASTOLIC BLOOD PRESSURE: 100 MMHG | BODY MASS INDEX: 41.15 KG/M2 | HEIGHT: 64 IN | HEART RATE: 74 BPM | TEMPERATURE: 96.2 F | SYSTOLIC BLOOD PRESSURE: 152 MMHG | WEIGHT: 241 LBS

## 2021-04-26 DIAGNOSIS — E11.8 TYPE 2 DIABETES MELLITUS WITH COMPLICATION, WITHOUT LONG-TERM CURRENT USE OF INSULIN (HCC): ICD-10-CM

## 2021-04-26 DIAGNOSIS — I69.354 FLACCID HEMIPLEGIA OF LEFT NONDOMINANT SIDE AS LATE EFFECT OF CEREBRAL INFARCTION (HCC): ICD-10-CM

## 2021-04-26 DIAGNOSIS — L89.153 PRESSURE ULCER OF SACRAL REGION, STAGE 3 (HCC): Primary | ICD-10-CM

## 2021-04-26 DIAGNOSIS — Z86.73 HISTORY OF CVA (CEREBROVASCULAR ACCIDENT): ICD-10-CM

## 2021-04-26 DIAGNOSIS — E66.01 MORBID OBESITY (HCC): ICD-10-CM

## 2021-04-26 PROCEDURE — 11042 DBRDMT SUBQ TIS 1ST 20SQCM/<: CPT | Performed by: NURSE PRACTITIONER

## 2021-04-26 PROCEDURE — 11042 DBRDMT SUBQ TIS 1ST 20SQCM/<: CPT

## 2021-04-26 RX ORDER — LIDOCAINE 40 MG/G
CREAM TOPICAL ONCE
Status: CANCELLED | OUTPATIENT
Start: 2021-04-26 | End: 2021-04-26

## 2021-04-26 RX ORDER — LIDOCAINE HYDROCHLORIDE 20 MG/ML
JELLY TOPICAL ONCE
Status: CANCELLED | OUTPATIENT
Start: 2021-04-26 | End: 2021-04-26

## 2021-04-26 RX ORDER — LIDOCAINE 50 MG/G
OINTMENT TOPICAL ONCE
Status: CANCELLED | OUTPATIENT
Start: 2021-04-26 | End: 2021-04-26

## 2021-04-26 RX ORDER — LIDOCAINE HYDROCHLORIDE 20 MG/ML
JELLY TOPICAL ONCE
Status: COMPLETED | OUTPATIENT
Start: 2021-04-26 | End: 2021-04-26

## 2021-04-26 RX ORDER — LIDOCAINE HYDROCHLORIDE 40 MG/ML
SOLUTION TOPICAL ONCE
Status: CANCELLED | OUTPATIENT
Start: 2021-04-26 | End: 2021-04-26

## 2021-04-26 RX ADMIN — LIDOCAINE HYDROCHLORIDE: 20 JELLY TOPICAL at 14:34

## 2021-04-26 NOTE — PROGRESS NOTES
[address]   Progress Note and Procedure Note      Kameron Nash  MEDICAL RECORD NUMBER:  [id number]  AGE: 73 y.o. GENDER: female  : 1950  EPISODE DATE:  2021    Subjective:     Chief Complaint   Patient presents with    Wound Check     buttocks         HISTORY of PRESENT ILLNESS HPI     Kameron Nash is a 73 y.o. female who presents today for wound/ulcer evaluation. History of Wound Context: here to follow up on sacral pressure ulcer. All other skin looks normal today.    Wound/Ulcer Pain Timing/Severity: intermittent  Quality of pain: aching  Severity:  2 / 10   Modifying Factors: Pain worsens with debridement  Associated Signs/Symptoms: none    Ulcer Identification:  Ulcer Type: pressure  Contributing Factors: diabetes, chronic pressure, decreased mobility, shear force, obesity, anticoagulation therapy, incontinence of stool and incontinence of urine    Wound: N/A        PAST MEDICAL HISTORY        Diagnosis Date    Allergic rhinitis     Anxiety 10/25/2016    Asthma     CAD (coronary artery disease)     s/p stents RCA and LAD ,    Chronic back pain     Congestive heart failure ([address]     Depression     Headache(784.0)     Hiatal hernia     Hypercholesteremia 2012    Hypertension     Kidney stones     years ago    Obesity     Osteoarthritis     Postlaminectomy syndrome 2012    Type II or unspecified type diabetes mellitus without mention of complication, not stated as uncontrolled     Unspecified sleep apnea     Urinary incontinence        PAST SURGICAL HISTORY    Past Surgical History:   Procedure Laterality Date    CARDIAC CATHETERIZATION  2013    patent stents    COLONOSCOPY  2015    normal    CORONARY ANGIOPLASTY WITH STENT PLACEMENT  1012-16    stents x 3    GASTROSTOMY TUBE PLACEMENT  2020    EGD PEG TUBE PLACEMENT    GASTROSTOMY TUBE PLACEMENT N/A 2020    EGD PEG TUBE PLACEMENT performed by Merry Weber MD [place] [facility] [place] [address]  2021         JOINT REPLACEMENT      left knee    KNEE SURGERY  10/21/2013    rt knee synvisc injection     KNEE SURGERY  2013    knee synvisc injection rt #2    KNEE SURGERY  2013    rt knee synvisc inj    LUMBAR SPINE SURGERY  2007    NERVE BLOCK  2012    Right MBNB L3, L4, L5    NERVE BLOCK  2012    Right MBNB L3, L4, and L5     NERVE BLOCK  2013    Right knee injection #1 - Synvisc    NERVE BLOCK  2013    Right knee injection #2 - Synvisc    NERVE BLOCK  2013    Rigth knee synvisc injection #3    NERVE BLOCK Right 2017    Rt genicular nerve block. no steroid used    OTHER SURGICAL HISTORY Right 2014    synvisc one knee injection    OTHER SURGICAL HISTORY Right 2015    synvisc one knee injection    OTHER SURGICAL HISTORY Right 2016    synvisc right knee injection    SPINE SURGERY      TONSILLECTOMY      UPPER GASTROINTESTINAL ENDOSCOPY      VENA CAVA FILTER PLACEMENT      PE and B/L LE emboli       FAMILY HISTORY    Family History   Problem Relation Age of Onset    Diabetes Mother     Cancer Father     High Blood Pressure Sister     High Blood Pressure Brother        SOCIAL HISTORY    Social History     Tobacco Use    Smoking status: Former Smoker     Packs/day: 0.50     Years: 0.00     Pack years: 0.00     Quit date: 3/28/2013     Years since quittin.0    Smokeless tobacco: Never Used   Substance Use Topics    Alcohol use:  Yes     Alcohol/week: 1.0 standard drinks     Types: 1 Cans of beer per week     Comment: occasionally    Drug use: No       ALLERGIES    Allergies   Allergen Reactions    Bactrim     Diphenhydramine     Penicillins      Tolerated ceftriaxone 2021    Tylenol [Acetaminophen] Other (See Comments)     constipation       MEDICATIONS    Current Outpatient Medications on File Prior to Encounter   Medication Sig Dispense Refill   [employer] 500 MG TABS Take 500 mg by mouth daily tjrpigj gastroc tibe      ibuprofen (ADVIL;MOTRIN) 800 MG tablet Take 800 mg by mouth 3 times daily as needed for Pain      gabapentin (NEURONTIN) 100 MG capsule Take 1 capsule by mouth 3 times daily for 120 days. 90 capsule 3    clopidogrel (PLAVIX) 75 MG tablet Take 1 tablet by mouth daily 90 tablet 0    cloNIDine (CATAPRES) 0.1 MG tablet Take 2 tablets by mouth daily 30 tablet 3    metoprolol succinate (TOPROL XL) 25 MG extended release tablet Take 2 tablets by mouth daily 30 tablet 0    losartan (COZAAR) 25 MG tablet Take 1 tablet by mouth daily 60 tablet 3    docusate (COLACE) 50 MG/5ML liquid Take 10 mLs by mouth daily 50 mL 3    rivastigmine (EXELON) 9.5 MG/24HR Place 1 patch onto the skin daily      QUEtiapine (SEROQUEL) 25 MG tablet Take 25 mg by mouth 2 times daily      Calcium Carbonate-Vitamin D (OYSTER SHELL CALCIUM/D) 500-200 MG-UNIT TABS TAKE ONE TABLET BY MOUTH TWO TIMES A DAY (Patient not taking: Reported on 2/18/2021) 60 tablet 3    glimepiride (AMARYL) 2 MG tablet Take 1 tablet by mouth every morning 30 tablet 3    linagliptin (TRADJENTA) 5 MG tablet Take 1 tablet by mouth daily 30 tablet 10    furosemide (LASIX) 40 MG tablet Take 1 tablet by mouth daily 30 tablet 10    atorvastatin (LIPITOR) 80 MG tablet TAKE 1 TABLET BY MOUTH DAILY 30 tablet 5    sertraline (ZOLOFT) 100 MG tablet Take 1 tablet by mouth daily 30 tablet 5    aspirin (RA ASPIRIN EC) 81 MG EC tablet Take 1 tablet by mouth daily 30 tablet 11    Incontinence Supply Disposable (INCONTINENCE BRIEF LARGE) MISC Use 4-5/day as needed 150 each 11    glucose blood VI test strips (ASCENSIA AUTODISC VI;ONE TOUCH ULTRA TEST VI) strip 1 each by In Vitro route 3 times daily As needed.  100 each 5    albuterol-ipratropium (COMBIVENT RESPIMAT)  MCG/ACT AERS inhaler Inhale 1 puff into the lungs every 6 hours as needed for Wheezing 1 Inhaler 5    Lancets MISC Use 1 -2 times daily Insulin dependent diabetes mellitus 50 each 11     No current facility-administered medications on file prior to encounter.         REVIEW OF SYSTEMS    Constitutional: negative  Eyes: negative  Ears, nose, mouth, throat, and face: negative  Respiratory: negative  Cardiovascular: negative  Gastrointestinal: negative except for stool incontinence  Genitourinary:negative except for urinary incontinence  Integument/breast: negative except for sacral pressure ulcer  Hematologic/lymphatic: negative  Musculoskeletal:negative except for left hemiparesis after large CVA  Neurological: negative except for left hemiparesis after large CVA  Behavioral/Psych: negative  Endocrine: negative  Allergic/Immunologic: negative    Objective:      BP (!) 152/100   Pulse 74   Temp 96.2 °F (35.7 °C) (Tympanic)   Ht 5' 4\" (1.626 m)   Wt 241 lb (109.3 kg)   LMP  (LMP Unknown)   BMI 41.37 kg/m²     Wt Readings from Last 3 Encounters:   04/26/21 241 lb (109.3 kg)   04/19/21 241 lb (109.3 kg)   04/12/21 241 lb (109.3 kg)       PHYSICAL EXAM    General Appearance: alert and oriented to person, place and time, well-developed and well-nourished, in no acute distress  Skin: warm and dry, no rash or erythema, sacral pressure ulcer   Head: normocephalic and atraumatic  Eyes: pupils equal, round, extraocular eye movements intact, and conjunctivae normal  Pulmonary/Chest: normal air movement, no respiratory distress  Extremities: no cyanosis and no clubbing or edema   Musculoskeletal: no joint swelling, deformity or tenderness  Neurologic: bed bound, left hemiparesis and slurred speech at times but understandable     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 [facility])    Relevant Orders    Supply: Wound Cleanser    Supply: Wound Dressings    Supply: Cover and Secure    History of CVA (cerebrovascular accident)    Relevant Orders    Supply: Wound Cleanser    Supply: Wound Dressings    Supply: Cover and Secure    Morbid obesity ([address]    Relevant Orders    Supply: Wound Cleanser    Supply: Wound Dressings    Supply: Cover and Secure    Pressure ulcer of sacral region, stage 3 (HCC) - Primary    Relevant Orders    Supply: Wound Cleanser    Supply: Wound Dressings    Supply: Cover and Secure    Type 2 diabetes mellitus with complication, without long-term current use of insulin (HCC)    Relevant Orders    Supply: Wound Cleanser    Supply: Wound Dressings    Supply: Cover and Secure           Procedure Note  Indications:  Based on my examination of this patient's wound(s)/ulcer(s) today, debridement is required to promote healing and evaluate the wound base. Performed by: CEE Melgar - CNP    Consent obtained:  Yes    Time out taken:  Yes    Pain Control: Anesthetic  Anesthetic: 2% Lidocaine Gel Topical       Debridement:Excisional Debridement    Using curette the wound(s)/ulcer(s) was/were sharply debrided down through and including the removal of subcutaneous tissue.         Devitalized Tissue Debrided:  fibrin, biofilm and slough    Pre Debridement Measurements:  Are located in the [place]  Documentation Flow Sheet    Wound/Ulcer #: 1    Post Debridement Measurements:  Wound/Ulcer Descriptions are Pre Debridement except measurements:    Wound 03/03/21 Sacrum #1 cluster (Active)   Wound Image   03/03/21 1321   Wound Etiology Pressure Stage  3 04/26/21 1412   Dressing Status Old drainage noted;New drainage noted 04/26/21 1412   Wound Cleansed Cleansed with saline 04/26/21 1412   Dressing/Treatment Other (comment) 04/07/21 1403   Wound Length (cm) 2.8 cm 04/26/21 1412   Wound Width (cm) 2.1 cm 04/26/21 1412   Wound Depth (cm) 0.1 cm 04/26/21 1412   Wound Surface Area (cm^2) 5.88 cm^2 04/26/21 1412   Change in Wound Size % (l*w) -83.75 04/26/21 1412   Wound Volume (cm^3) 0.59 cm^3 04/26/21 1412   Wound Healing % 39 04/26/21 1412   Post-Procedure Length (cm) 2.8 cm 04/26/21 1412   Post-Procedure Width (cm) 2.1 cm 04/26/21 1412   Post-Procedure Depth (cm) 0.1 cm 04/26/21 1412   Post-Procedure Surface Area (cm^2) 5.88 cm^2 04/26/21 1412   Post-Procedure Volume (cm^3) 0.59 cm^3 04/26/21 1412   Wound Assessment Pink/red 04/26/21 1412   Drainage Amount Moderate 04/26/21 1412   Drainage Description Serosanguinous 04/26/21 1412   Odor None 04/26/21 1412   Mary-wound Assessment Maceration; Hyperpigmented 04/26/21 1412   Margins Defined edges 04/26/21 1412   Number of days: 54          Percent of Wound(s)/Ulcer(s) Debrided: 100%    Total Surface Area Debrided:  5.88 sq cm     Diabetic/Pressure/Non Pressure Ulcers only:  Ulcer: Pressure ulcer, Stage 3      Estimated Blood Loss:  Minimal    Hemostasis Achieved:  by pressure    Procedural Pain:  2  / 10     Post Procedural Pain:  0 / 10     Response to treatment:  Well tolerated by patient. Plan:     Treatment Note please see attached Discharge Instructions    Written patient dismissal instructions given to patient and signed by patient or POA.          Discharge Instructions          [address]: [phone] Fax: [phone]             Visit Nino Samano Instructions / Physician Orders     DATE: 4/26/2021     Facility: [facility] Dressing Change     SUPPLIES ORDERED THRU: SNF     Wound Location:  S[place]     Cleanse with: Liquid antibacterial soap and water, rinse well      Dressing Orders: Silvercel to open areas, ABD, Mefix Tape-Change Daily      Recommend alternating pressure, low air loss mattress      Thin Layer of Triad cream in abdominal folds, then InnerDry-no powder     Ensure heels are floating when in bed     Frequency:  Daily Dressing Change-Triad cream-thin layer applied with diaper changes     Additional Orders: Increase protein to diet (meat, cheese, eggs, fish, peanut butter, nuts and beans)  Multivitamin daily  ELEVATE LEGS AS MUCH AS POSSIBLE  Turn patient at least every 2 hours with pillow support     Your next appointment with [address] [place] [address] is in 1 week with Jas Mora  ROOM TYPE   []??????? CHAIR     [x]??????? STRETCHER  []??????? EITHER             (Please note your next appointment above and if you are unable to keep, kindly give a 24 hour notice. Thank you.)     If you experience any of the following, please call the [address] [place] [address] during business hours:  [phone]  Your Phone call may be forwarded to [facility] during business hours that [facility] is closed.     * Increase in Pain  * Temperature over 101  * Increase in drainage from your wound  * Drainage with a foul odor  * Bleeding  * Increase in swelling  * Need for compression bandage changes due to slippage, breakthrough drainage.     If you need medical attention outside of the business hours of the [address] [place] [address] please contact your PCP or go to the nearest emergency room.     The information contained in the After Visit Summary has been reviewed with me, the patient and/or responsible adult, by my health care provider(s). I had the opportunity to ask questions regarding this information. I have elected to receive;      []????? ?? After Visit Summary  [x]??????? Comprehensive Discharge Instruction        Patient signature______________________________________Date:________  Electronically signed by Unruly Martinez RN on 4/26/2021 at 2:31 PM  Electronically signed by CEE Beauchamp CNP on 4/26/2021 at 2:30 PM          Electronically signed by CEE Beauchamp CNP on 4/26/2021 at 2:37 PM

## 2021-05-11 ENCOUNTER — HOSPITAL ENCOUNTER (OUTPATIENT)
Dept: WOUND CARE | Age: 71
Discharge: HOME | End: 2021-05-11
Payer: COMMERCIAL

## 2021-05-11 VITALS — SYSTOLIC BLOOD PRESSURE: 148 MMHG | HEART RATE: 79 BPM | TEMPERATURE: 97 F | DIASTOLIC BLOOD PRESSURE: 96 MMHG

## 2021-05-11 DIAGNOSIS — E11.8 TYPE 2 DIABETES MELLITUS WITH COMPLICATION, WITHOUT LONG-TERM CURRENT USE OF INSULIN (HCC): ICD-10-CM

## 2021-05-11 DIAGNOSIS — I69.354 FLACCID HEMIPLEGIA OF LEFT NONDOMINANT SIDE AS LATE EFFECT OF CEREBRAL INFARCTION (HCC): ICD-10-CM

## 2021-05-11 DIAGNOSIS — L89.153 PRESSURE ULCER OF SACRAL REGION, STAGE 3 (HCC): Primary | ICD-10-CM

## 2021-05-11 DIAGNOSIS — Z86.73 HISTORY OF CVA (CEREBROVASCULAR ACCIDENT): ICD-10-CM

## 2021-05-11 DIAGNOSIS — E66.01 MORBID OBESITY (HCC): ICD-10-CM

## 2021-05-11 PROBLEM — L30.8 DERMATITIS ASSOCIATED WITH MOISTURE: Status: RESOLVED | Noted: 2021-03-09 | Resolved: 2021-05-11

## 2021-05-11 PROBLEM — S31.829A BUTTOCK WOUND, LEFT, INITIAL ENCOUNTER: Status: RESOLVED | Noted: 2021-03-09 | Resolved: 2021-05-11

## 2021-05-11 PROCEDURE — 99213 OFFICE O/P EST LOW 20 MIN: CPT | Performed by: NURSE PRACTITIONER

## 2021-05-11 PROCEDURE — 99213 OFFICE O/P EST LOW 20 MIN: CPT

## 2021-05-11 RX ORDER — LIDOCAINE HYDROCHLORIDE 40 MG/ML
SOLUTION TOPICAL ONCE
Status: CANCELLED | OUTPATIENT
Start: 2021-05-11 | End: 2021-05-11

## 2021-05-11 RX ORDER — LIDOCAINE HYDROCHLORIDE 20 MG/ML
JELLY TOPICAL ONCE
Status: CANCELLED | OUTPATIENT
Start: 2021-05-11 | End: 2021-05-11

## 2021-05-11 RX ORDER — LIDOCAINE 40 MG/G
CREAM TOPICAL ONCE
Status: CANCELLED | OUTPATIENT
Start: 2021-05-11 | End: 2021-05-11

## 2021-05-11 RX ORDER — LIDOCAINE 50 MG/G
OINTMENT TOPICAL ONCE
Status: CANCELLED | OUTPATIENT
Start: 2021-05-11 | End: 2021-05-11

## 2021-05-11 ASSESSMENT — PAIN SCALES - WONG BAKER: WONGBAKER_NUMERICALRESPONSE: 6

## 2021-05-11 NOTE — PROGRESS NOTES
[address]   Progress Note and Procedure Note      Bruno Braun  MEDICAL RECORD NUMBER:  [id number]  AGE: 73 y.o. GENDER: female  : 1950  EPISODE DATE:  2021    Subjective:     Chief Complaint   Patient presents with    Wound Check     buttocks/coccyx         HISTORY of PRESENT ILLNESS HPI     Bruno Braun is a 73 y.o. female who presents today for wound/ulcer evaluation. History of Wound Context: here to follow up on sacral pressure ulcer that is now closed.    Wound/Ulcer Pain Timing/Severity: none  Quality of pain: N/A  Severity:  0 / 10   Modifying Factors: None  Associated Signs/Symptoms: none    Ulcer Identification:  Ulcer Type: pressure  Contributing Factors: diabetes, chronic pressure, decreased mobility, shear force, obesity, anticoagulation therapy, incontinence of stool and incontinence of urine    Wound: N/A        PAST MEDICAL HISTORY        Diagnosis Date    Allergic rhinitis     Anxiety 10/25/2016    Asthma     CAD (coronary artery disease)     s/p stents RCA and LAD ,    Chronic back pain     Congestive heart failure ([address]     Depression     Headache(784.0)     Hiatal hernia     Hypercholesteremia 2012    Hypertension     Kidney stones     years ago    Obesity     Osteoarthritis     Postlaminectomy syndrome 2012    Type II or unspecified type diabetes mellitus without mention of complication, not stated as uncontrolled     Unspecified sleep apnea     Urinary incontinence        PAST SURGICAL HISTORY    Past Surgical History:   Procedure Laterality Date    CARDIAC CATHETERIZATION  2013    patent stents    COLONOSCOPY  2015    normal    CORONARY ANGIOPLASTY WITH STENT PLACEMENT  1012-16    stents x 3    GASTROSTOMY TUBE PLACEMENT  2020    EGD PEG TUBE PLACEMENT    GASTROSTOMY TUBE PLACEMENT N/A 2020    EGD PEG TUBE PLACEMENT performed by Jacqueline Richardson MD at [address] [place] [address] tjrpigj gastroc tibe      ibuprofen (ADVIL;MOTRIN) 800 MG tablet Take 800 mg by mouth 3 times daily as needed for Pain      gabapentin (NEURONTIN) 100 MG capsule Take 1 capsule by mouth 3 times daily for 120 days. 90 capsule 3    clopidogrel (PLAVIX) 75 MG tablet Take 1 tablet by mouth daily 90 tablet 0    cloNIDine (CATAPRES) 0.1 MG tablet Take 2 tablets by mouth daily 30 tablet 3    metoprolol succinate (TOPROL XL) 25 MG extended release tablet Take 2 tablets by mouth daily 30 tablet 0    losartan (COZAAR) 25 MG tablet Take 1 tablet by mouth daily 60 tablet 3    docusate (COLACE) 50 MG/5ML liquid Take 10 mLs by mouth daily 50 mL 3    rivastigmine (EXELON) 9.5 MG/24HR Place 1 patch onto the skin daily      QUEtiapine (SEROQUEL) 25 MG tablet Take 25 mg by mouth 2 times daily      Calcium Carbonate-Vitamin D (OYSTER SHELL CALCIUM/D) 500-200 MG-UNIT TABS TAKE ONE TABLET BY MOUTH TWO TIMES A DAY (Patient not taking: Reported on 2/18/2021) 60 tablet 3    glimepiride (AMARYL) 2 MG tablet Take 1 tablet by mouth every morning 30 tablet 3    linagliptin (TRADJENTA) 5 MG tablet Take 1 tablet by mouth daily 30 tablet 10    furosemide (LASIX) 40 MG tablet Take 1 tablet by mouth daily 30 tablet 10    atorvastatin (LIPITOR) 80 MG tablet TAKE 1 TABLET BY MOUTH DAILY 30 tablet 5    sertraline (ZOLOFT) 100 MG tablet Take 1 tablet by mouth daily 30 tablet 5    aspirin (RA ASPIRIN EC) 81 MG EC tablet Take 1 tablet by mouth daily 30 tablet 11    Incontinence Supply Disposable (INCONTINENCE BRIEF LARGE) MISC Use 4-5/day as needed 150 each 11    glucose blood VI test strips (ASCENSIA AUTODISC VI;ONE TOUCH ULTRA TEST VI) strip 1 each by In Vitro route 3 times daily As needed.  100 each 5    albuterol-ipratropium (COMBIVENT RESPIMAT)  MCG/ACT AERS inhaler Inhale 1 puff into the lungs every 6 hours as needed for Wheezing 1 Inhaler 5    Lancets MISC Use 1 -2 times daily Insulin dependent diabetes mellitus 50 each 11 No current facility-administered medications on file prior to encounter.         REVIEW OF SYSTEMS    Constitutional: negative  Eyes: negative  Ears, nose, mouth, throat, and face: negative  Respiratory: negative  Cardiovascular: negative  Gastrointestinal: negative except for stool incontinence  Genitourinary:negative except for urinary incontinence  Integument/breast: negative  Hematologic/lymphatic: negative  Musculoskeletal:negative except for left hemiparesis after large CVA  Neurological: negative except for left hemiparesis after large CVA  Behavioral/Psych: negative  Endocrine: negative  Allergic/Immunologic: negative    Objective:      BP (!) 148/96   Pulse 79   Temp 97 °F (36.1 °C) (Tympanic)   LMP  (LMP Unknown)     Wt Readings from Last 3 Encounters:   04/26/21 241 lb (109.3 kg)   04/19/21 241 lb (109.3 kg)   04/12/21 241 lb (109.3 kg)       PHYSICAL EXAM    General Appearance: alert and oriented to person, place and time, well-developed and obse, in no acute distress  Skin: warm and dry, no rash or erythema  Head: normocephalic and atraumatic  Eyes: pupils equal, round, extraocular eye movements intact, and conjunctivae normal  Pulmonary/Chest: normal air movement, no respiratory distress  Extremities: no cyanosis and no clubbing  Musculoskeletal: no joint swelling, deformity or tenderness  Neurologic: bed bound, left hemiparesis, and slurred speech at times but understandable      Assessment:      Problem List Items Addressed This Visit     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 [facility])    Relevant Orders    Supply: Wound Cleanser    Supply: Cover and Secure    History of CVA (cerebrovascular accident)    Relevant Orders    Supply: Wound Cleanser    Supply: Cover and Secure    Morbid obesity ([address]    Relevant Orders    Supply: Wound Cleanser    Supply: Cover and Secure    Pressure ulcer of sacral region, stage 3 (HCC) - Primary    Relevant Orders Supply: Wound Cleanser    Supply: Cover and Secure    Type 2 diabetes mellitus with complication, without long-term current use of insulin ([address]    Relevant Orders    Supply: Wound Cleanser    Supply: Cover and Secure           Wound 03/03/21 Sacrum #1 cluster (Active)   Wound Image   03/03/21 1321   Wound Etiology Pressure Stage  3 05/11/21 1016   Dressing Status Clean;Dry; Intact 05/11/21 1016   Wound Cleansed Soap and water 05/11/21 1016   Dressing/Treatment Other (comment) 05/11/21 1027   Wound Length (cm) 0 cm 05/11/21 1016   Wound Width (cm) 0 cm 05/11/21 1016   Wound Depth (cm) 0 cm 05/11/21 1016   Wound Surface Area (cm^2) 0 cm^2 05/11/21 1016   Change in Wound Size % (l*w) 100 05/11/21 1016   Wound Volume (cm^3) 0 cm^3 05/11/21 1016   Wound Healing % 100 05/11/21 1016   Post-Procedure Length (cm) 0 cm 05/11/21 1016   Post-Procedure Width (cm) 0 cm 05/11/21 1016   Post-Procedure Depth (cm) 0 cm 05/11/21 1016   Post-Procedure Surface Area (cm^2) 0 cm^2 05/11/21 1016   Post-Procedure Volume (cm^3) 0 cm^3 05/11/21 1016   Wound Assessment Pink/red 04/26/21 1412   Drainage Amount None 05/11/21 1016   Drainage Description Serosanguinous 04/26/21 1412   Odor None 04/26/21 1412   Mary-wound Assessment Maceration; Hyperpigmented 04/26/21 1412   Margins Defined edges 04/26/21 1412   Number of days: 68          Wound is healed    Plan:     Treatment Note please see attached Discharge Instructions    Written patient dismissal instructions given to patient and signed by patient or POA.          Discharge Instructions          [address]: [phone] Fax: [phone]             Visit Patrice Instructions / Physician Orders     DATE: 5/11/2021     Facility: [facility] Dressing Change     SUPPLIES ORDERED THRU: SNF     Wound Location:  S[place]rum-healed     Cleanse with: Liquid antibacterial soap and water, rinse well      Dressing Orders: Mepilex Border to buttocks for protection     Ensure heels are floating when in bed     Frequency:       Additional Orders: Increase protein to diet (meat, cheese, eggs, fish, peanut butter, nuts and beans)  Multivitamin daily  ELEVATE LEGS AS MUCH AS POSSIBLE  Turn patient at least every 2 hours with pillow support     Your next appointment with [address] [place] [address] is-call if needed                                                                                                   ROOM TYPE   []???????? CHAIR     [x]???????? STRETCHER  []???????? EITHER             (Please note your next appointment above and if you are unable to keep, kindly give a 24 hour notice. Thank you.)     If you experience any of the following, please call the [address] [place] [address] during business hours:  [phone]  Your Phone call may be forwarded to [id number] during business hours that [facility] is closed.     * Increase in Pain  * Temperature over 101  * Increase in drainage from your wound  * Drainage with a foul odor  * Bleeding  * Increase in swelling  * Need for compression bandage changes due to slippage, breakthrough drainage.     If you need medical attention outside of the business hours of the [address] [place] [address] please contact your PCP or go to the nearest emergency room.     The information contained in the After Visit Summary has been reviewed with me, the patient and/or responsible adult, by my health care provider(s). I had the opportunity to ask questions regarding this information. I have elected to receive;      []?????? ?? After Visit Summary  [x]???????? Comprehensive Discharge Instruction        Patient signature______________________________________Date:________  Electronically signed by Austen Woodson RN on 5/11/2021 at 10:27 AM  Electronically signed by CEE Mcpherson CNP on 5/11/2021 at 10:30 AM          Electronically signed by CEE Mcpherson CNP on 5/11/2021 at 10:30 AM

## 2021-05-20 ENCOUNTER — OFFICE VISIT (OUTPATIENT)
Dept: NEUROLOGY | Age: 71
End: 2021-05-20
Payer: COMMERCIAL

## 2021-05-20 VITALS
OXYGEN SATURATION: 99 % | BODY MASS INDEX: 41.15 KG/M2 | WEIGHT: 241 LBS | DIASTOLIC BLOOD PRESSURE: 85 MMHG | HEART RATE: 67 BPM | HEIGHT: 64 IN | SYSTOLIC BLOOD PRESSURE: 147 MMHG

## 2021-05-20 DIAGNOSIS — E11.8 TYPE 2 DIABETES MELLITUS WITH COMPLICATION, WITHOUT LONG-TERM CURRENT USE OF INSULIN (HCC): ICD-10-CM

## 2021-05-20 DIAGNOSIS — I69.354 FLACCID HEMIPLEGIA OF LEFT NONDOMINANT SIDE AS LATE EFFECT OF CEREBRAL INFARCTION (HCC): Primary | ICD-10-CM

## 2021-05-20 DIAGNOSIS — I69.354 HEMIPLEGIA AND HEMIPARESIS FOLLOWING CEREBRAL INFARCTION AFFECTING LEFT NON-DOMINANT SIDE (HCC): ICD-10-CM

## 2021-05-20 DIAGNOSIS — Z95.5 S/P CORONARY ARTERY STENT PLACEMENT: ICD-10-CM

## 2021-05-20 DIAGNOSIS — I63.59 ISCHEMIC CEREBRAL STROKE DUE TO INTRACRANIAL LARGE ARTERY ATHEROSCLEROSIS (HCC): ICD-10-CM

## 2021-05-20 DIAGNOSIS — I63.511 RIGHT MIDDLE CEREBRAL ARTERY STROKE (HCC): ICD-10-CM

## 2021-05-20 DIAGNOSIS — E78.41 ELEVATED LIPOPROTEIN(A): ICD-10-CM

## 2021-05-20 DIAGNOSIS — I63.411 CEREBROVASCULAR ACCIDENT (CVA) DUE TO EMBOLIC OCCLUSION OF RIGHT MIDDLE CEREBRAL ARTERY (HCC): ICD-10-CM

## 2021-05-20 DIAGNOSIS — Z86.73 HISTORY OF CVA (CEREBROVASCULAR ACCIDENT): ICD-10-CM

## 2021-05-20 PROCEDURE — 1090F PRES/ABSN URINE INCON ASSESS: CPT | Performed by: STUDENT IN AN ORGANIZED HEALTH CARE EDUCATION/TRAINING PROGRAM

## 2021-05-20 PROCEDURE — 2022F DILAT RTA XM EVC RTNOPTHY: CPT | Performed by: STUDENT IN AN ORGANIZED HEALTH CARE EDUCATION/TRAINING PROGRAM

## 2021-05-20 PROCEDURE — 3046F HEMOGLOBIN A1C LEVEL >9.0%: CPT | Performed by: STUDENT IN AN ORGANIZED HEALTH CARE EDUCATION/TRAINING PROGRAM

## 2021-05-20 PROCEDURE — 1123F ACP DISCUSS/DSCN MKR DOCD: CPT | Performed by: STUDENT IN AN ORGANIZED HEALTH CARE EDUCATION/TRAINING PROGRAM

## 2021-05-20 PROCEDURE — G8417 CALC BMI ABV UP PARAM F/U: HCPCS | Performed by: STUDENT IN AN ORGANIZED HEALTH CARE EDUCATION/TRAINING PROGRAM

## 2021-05-20 PROCEDURE — 3017F COLORECTAL CA SCREEN DOC REV: CPT | Performed by: STUDENT IN AN ORGANIZED HEALTH CARE EDUCATION/TRAINING PROGRAM

## 2021-05-20 PROCEDURE — G8399 PT W/DXA RESULTS DOCUMENT: HCPCS | Performed by: STUDENT IN AN ORGANIZED HEALTH CARE EDUCATION/TRAINING PROGRAM

## 2021-05-20 PROCEDURE — 1036F TOBACCO NON-USER: CPT | Performed by: STUDENT IN AN ORGANIZED HEALTH CARE EDUCATION/TRAINING PROGRAM

## 2021-05-20 PROCEDURE — G8427 DOCREV CUR MEDS BY ELIG CLIN: HCPCS | Performed by: STUDENT IN AN ORGANIZED HEALTH CARE EDUCATION/TRAINING PROGRAM

## 2021-05-20 PROCEDURE — 4040F PNEUMOC VAC/ADMIN/RCVD: CPT | Performed by: STUDENT IN AN ORGANIZED HEALTH CARE EDUCATION/TRAINING PROGRAM

## 2021-05-20 PROCEDURE — 99214 OFFICE O/P EST MOD 30 MIN: CPT | Performed by: STUDENT IN AN ORGANIZED HEALTH CARE EDUCATION/TRAINING PROGRAM

## 2021-05-20 RX ORDER — AMMONIUM LACTATE 12 G/100G
CREAM TOPICAL
COMMUNITY
Start: 2021-02-20

## 2021-05-20 RX ORDER — CLONIDINE HYDROCHLORIDE 0.3 MG/1
TABLET ORAL
COMMUNITY
Start: 2021-04-29

## 2021-05-20 ASSESSMENT — ENCOUNTER SYMPTOMS
NAUSEA: 0
EYE PAIN: 0
SINUS PAIN: 0
APNEA: 0
COUGH: 0
SHORTNESS OF BREATH: 0
ABDOMINAL DISTENTION: 0
VOMITING: 0
ABDOMINAL PAIN: 0
CHEST TIGHTNESS: 0

## 2021-05-20 ASSESSMENT — VISUAL ACUITY: VA_NORMAL: 1

## 2021-05-20 NOTE — PROGRESS NOTES
[address] [place] [address], [facility], [facility], [address], [address] [place] [address]  P: [phone]  F: [phone]    NEUROLOGY CLINIC NOTE     PATIENT NAME: Saul Villarreal  PATIENT MRN: [id number]  PRIMARY CARE PHYSICIAN: Akshat Corral MD    HPI:      Saul Villarreal is a 73 y.o. female who presents to clinic today as a follow-up from her recent clinic visit 2/18/2021. Past medical history significant for YUDITH, morbid obesity, acute coronary syndrome, CHF, type 2 diabetes, hypertension, CAD status post stenting of the right RCA, right MCA territory stroke 1/13/2021. Patient is awake oriented to person and place although not that date or time. Patient currently at her baseline accompanied by EMS who brought her from her skilled nursing facility. Patient does not have any family or other caregivers present to give further history. Patient denies any acute issues although has difficulty expressing what she wants. Patient denies any acute complaints since her recent visit and has been doing well stable at her facility. Patient does have history of intracranial atherosclerosis underwent 90 days dual antiplatelet therapy currently on monotherapy with aspirin 81 mg. Recommend continuing aspirin 81 mg Lipitor 80 mg nightly. Patient would also benefit from continued PT/OT/ST. Will have patient follow-up in 3 months recommend having patient's family at that time and can reassess for spasticity of left upper and lower extremity possible Botox candidate in the future if she does have worsening spasticity. Pertinent imaging reviewed  CTA head and neck 12/15/2020  Impression   1. Moderate volume acute ischemic infarct in the right middle cerebral artery   territory.  No intracranial hemorrhage or mass effect. 2. Moderate volume penumbra in the peripheral right middle cerebral artery   territory based on perfusion images.    3. Acute nearly occlusive thrombosis of the M1 and M2 segments right middle   cerebral artery. 4. Severe stenosis of the M1 segment left middle cerebral artery. 5. Severe stenosis of the A1 segment left anterior cerebral artery. 6. 75% stenosis of the proximal left internal carotid secondary to vessel   kinking and superimposed atherosclerotic plaque. 7. 40% stenosis of the proximal right internal carotid artery.    8. Moderate stenosis of the V4 segment left vertebral artery.      Cardiac echo 12/15/2020  EF 50% left ventricular systolic function is low, LVH, grade 1 diastolic dysfunction negative bubble study.     EKG 12/15/2020-normal sinus rhythm heart rate 65    PREVIOUS WORKUP:     Lab Results   Component Value Date    WBC 7.8 01/22/2021    HGB 10.6 (L) 01/22/2021    HCT 35.9 (L) 01/22/2021    MCV 94.5 01/22/2021     01/22/2021       Past Medical History:   Diagnosis Date    Allergic rhinitis     Anxiety 10/25/2016    Asthma     CAD (coronary artery disease)     s/p stents RCA and LAD 2009,    Chronic back pain     Congestive heart failure ([address]     Depression     Headache(784.0)     Hiatal hernia     Hypercholesteremia 2/21/2012    Hypertension     Kidney stones     years ago    Obesity     Osteoarthritis     Postlaminectomy syndrome 6/6/2012    Type II or unspecified type diabetes mellitus without mention of complication, not stated as uncontrolled     Unspecified sleep apnea     Urinary incontinence         Past Surgical History:   Procedure Laterality Date    CARDIAC CATHETERIZATION  01/2013    patent stents    COLONOSCOPY  09/2015    normal    CORONARY ANGIOPLASTY WITH STENT PLACEMENT  1012-16    stents x 3    GASTROSTOMY TUBE PLACEMENT  12/28/2020    EGD PEG TUBE PLACEMENT    GASTROSTOMY TUBE PLACEMENT N/A 12/28/2020    EGD PEG TUBE PLACEMENT performed by Jono Guzman MD at [place] [address]  1/21/2021         JOINT REPLACEMENT      left knee    KNEE SURGERY  10/21/2013    rt knee synvisc injection     KNEE SURGERY  2013    knee synvisc injection rt #2    KNEE SURGERY  2013    rt knee synvisc inj    LUMBAR SPINE SURGERY  2007    NERVE BLOCK  2012    Right MBNB L3, L4, L5    NERVE BLOCK  2012    Right MBNB L3, L4, and L5     NERVE BLOCK  2013    Right knee injection #1 - Synvisc    NERVE BLOCK  2013    Right knee injection #2 - Synvisc    NERVE BLOCK  2013    Rigth knee synvisc injection #3    NERVE BLOCK Right 2017    Rt genicular nerve block. no steroid used    OTHER SURGICAL HISTORY Right 2014    synvisc one knee injection    OTHER SURGICAL HISTORY Right 2015    synvisc one knee injection    OTHER SURGICAL HISTORY Right 2016    synvisc right knee injection    SPINE SURGERY      TONSILLECTOMY      UPPER GASTROINTESTINAL ENDOSCOPY      VENA CAVA FILTER PLACEMENT  2007    PE and B/L LE emboli        Social History     Socioeconomic History    Marital status: Legally      Spouse name: Not on file    Number of children: Not on file    Years of education: Not on file    Highest education level: Not on file   Occupational History    Not on file   Tobacco Use    Smoking status: Former Smoker     Packs/day: 0.50     Years: 0.00     Pack years: 0.00     Quit date: 3/28/2013     Years since quittin.1    Smokeless tobacco: Never Used   Substance and Sexual Activity    Alcohol use: Yes     Alcohol/week: 1.0 standard drinks     Types: 1 Cans of beer per week     Comment: occasionally    Drug use: No    Sexual activity: Not on file   Other Topics Concern    Not on file   Social History Narrative    Not on file     Social Determinants of Health     Financial Resource Strain:     Difficulty of Paying Living Expenses:    Food Insecurity:     Worried About Running Out of Food in the Last Year:     [address] in the Last Year:    Transportation Needs:     Lack of Transportation (Medical):      Lack of Transportation (Non-Medical):    Physical Activity:     Days of Exercise per Week:     Minutes of Exercise per Session:    Stress:     Feeling of Stress :    Social Connections:     Frequency of Communication with Friends and Family:     Frequency of Social Gatherings with Friends and Family:     Attends Jew Services:     Active Member of Clubs or Organizations:     Attends Club or Organization Meetings:     Marital Status:    Intimate Partner Violence:     Fear of Current or Ex-Partner:     Emotionally Abused:     Physically Abused:     Sexually Abused:         Current Outpatient Medications   Medication Sig Dispense Refill    Oyster Shell 500 MG TABS Take 500 mg by mouth daily tjrpigj gastroc tibe      ibuprofen (ADVIL;MOTRIN) 800 MG tablet Take 800 mg by mouth 3 times daily as needed for Pain      docusate (COLACE) 50 MG/5ML liquid Take 10 mLs by mouth daily 50 mL 3    QUEtiapine (SEROQUEL) 25 MG tablet Take 25 mg by mouth 2 times daily      Calcium Carbonate-Vitamin D (OYSTER SHELL CALCIUM/D) 500-200 MG-UNIT TABS TAKE ONE TABLET BY MOUTH TWO TIMES A DAY 60 tablet 3    glimepiride (AMARYL) 2 MG tablet Take 1 tablet by mouth every morning 30 tablet 3    linagliptin (TRADJENTA) 5 MG tablet Take 1 tablet by mouth daily 30 tablet 10    furosemide (LASIX) 40 MG tablet Take 1 tablet by mouth daily 30 tablet 10    atorvastatin (LIPITOR) 80 MG tablet TAKE 1 TABLET BY MOUTH DAILY 30 tablet 5    sertraline (ZOLOFT) 100 MG tablet Take 1 tablet by mouth daily 30 tablet 5    aspirin (RA ASPIRIN EC) 81 MG EC tablet Take 1 tablet by mouth daily 30 tablet 11    Incontinence Supply Disposable (INCONTINENCE BRIEF LARGE) MISC Use 4-5/day as needed 150 each 11    albuterol-ipratropium (COMBIVENT RESPIMAT)  MCG/ACT AERS inhaler Inhale 1 puff into the lungs every 6 hours as needed for Wheezing 1 Inhaler 5    Lancets MISC Use 1 -2 times daily Insulin dependent diabetes mellitus 50 each 11    ammonium lactate (AMLACTIN) 12 % cream       cloNIDine (CATAPRES) 0.3 MG tablet       gabapentin (NEURONTIN) 100 MG capsule Take 1 capsule by mouth 3 times daily for 120 days. (Patient not taking: Reported on 5/20/2021) 90 capsule 3    clopidogrel (PLAVIX) 75 MG tablet Take 1 tablet by mouth daily 90 tablet 0    metoprolol succinate (TOPROL XL) 25 MG extended release tablet Take 2 tablets by mouth daily (Patient not taking: Reported on 5/20/2021) 30 tablet 0    losartan (COZAAR) 25 MG tablet Take 1 tablet by mouth daily (Patient not taking: Reported on 5/20/2021) 60 tablet 3    rivastigmine (EXELON) 9.5 MG/24HR Place 1 patch onto the skin daily (Patient not taking: Reported on 5/20/2021)      glucose blood VI test strips (ASCENSIA AUTODISC VI;ONE TOUCH ULTRA TEST VI) strip 1 each by In Vitro route 3 times daily As needed. 100 each 5     No current facility-administered medications for this visit. Allergies   Allergen Reactions    Bactrim     Diphenhydramine     Penicillins      Tolerated ceftriaxone 1/2021    Tylenol [Acetaminophen] Other (See Comments)     constipation        REVIEW OF SYSTEMS:     Review of Systems   Constitutional: Negative for activity change, appetite change, chills and fatigue. Patient is morbidly obese -American female on bed stretcher with EMS personnel present. HENT: Negative for ear pain and sinus pain. Eyes: Negative for pain and visual disturbance. Respiratory: Negative for apnea, cough, chest tightness and shortness of breath. Cardiovascular: Negative for chest pain, palpitations and leg swelling. Gastrointestinal: Negative for abdominal distention, abdominal pain, nausea and vomiting. Endocrine: Negative for polydipsia and polyuria. Genitourinary: Negative for difficulty urinating and dysuria. Musculoskeletal: Negative for arthralgias and myalgias. Skin: Negative for rash. Allergic/Immunologic: Negative for environmental allergies.    Neurological: Positive for weakness (Left upper and lower extremity, right lower extremity and foot binder). Negative for dizziness, facial asymmetry and headaches. Psychiatric/Behavioral: Negative for agitation and behavioral problems. VITALS  BP (!) 147/85   Pulse 67   Ht 5' 4\" (1.626 m)   Wt 241 lb (109.3 kg)   LMP  (LMP Unknown)   SpO2 99%   BMI 41.37 kg/m²          NEUROLOGICAL EXAMINATION:     Neurological Exam  Mental Status  Awake and alert. Orientation: Patient is oriented to person and year although not place or time. . Moderate dysarthria present. Language: Patient has dysarthric speech with waxing waning confusion. Cranial Nerves  CN II: Visual acuity is normal. Visual fields full to confrontation. CN III, IV, VI: Extraocular movements intact bilaterally. Normal lids and orbits bilaterally. Pupils equal round and reactive to light bilaterally. CN V: Facial sensation is normal.  CN VII: Full and symmetric facial movement. CN VIII: Hearing is normal.  CN IX, X: Palate elevates symmetrically. Normal gag reflex. CN XI: Shoulder shrug strength is normal.  CN XII: Tongue midline without atrophy or fasciculations. Motor  Decreased muscle bulk throughout. No fasciculations present. Decreased muscle tone. LUE 1/5  LLE 0/5   RUE 5/5   RLE 3/5 . Sensory  Sensation is intact to light touch, pinprick, vibration and proprioception in all four extremities. Reflexes                                           Right                      Left  Brachioradialis                    1+                         1+  Biceps                                 1+                         1+  Triceps                                1+                         1+    Right pathological reflexes: Lavon's absent. Ankle clonus absent. Left pathological reflexes: Lavon's absent. Ankle clonus absent. Coordination  Finger-to-nose, rapid alternating movements and heel-to-shin normal bilaterally without dysmetria. ASSESSMENT / PLAN:   Lynn Stephens is a 59-year-old -American who is a follow-up from her recent visit 2/18/2021. Patient has a history of large right MCA territory stroke following acute change in mentation previously. Patient has underwent LTME monitoring in the past which was unremarkable for any epileptiform activity. CTA head and neck showing intracranial atherosclerosis including severe stenosis of the M1 left MCA and A1 left NAYELY, 75% stenosis proximal left ICA and 40% stenosis proximal right ICA. Patient underwent dual antiplatelet therapy for 90 days currently on aspirin monotherapy. Assessment  1. Large right MCA territory CVA  2. Neuropathic pain  3. Waxing and waning confusion    Plan  - Continue aspirin 81 mg, Lipitor 80 mg nightly  -  Check lipid panel  -  Continue Neurontin 100 mg 3 times daily  -  Continue PT/OT/ST  -  Follow-up in 3 months        Ms. Jorge L Hunter received counseling on the following healthy behaviors: medical compliance, smoking cessation, blood pressure control, regular follow up with primary doctor.         Electronically signed by Milady Womack MD on 5/20/2021 at 2:12 PM

## 2023-05-11 ENCOUNTER — HOSPITAL ENCOUNTER (OUTPATIENT)
Dept: WOUND CARE | Age: 73
Discharge: HOME | End: 2023-05-11
Payer: COMMERCIAL

## 2023-05-11 VITALS
SYSTOLIC BLOOD PRESSURE: 180 MMHG | HEART RATE: 59 BPM | RESPIRATION RATE: 16 BRPM | TEMPERATURE: 96.7 F | DIASTOLIC BLOOD PRESSURE: 86 MMHG

## 2023-05-11 DIAGNOSIS — S81.811A SKIN TEAR OF RIGHT LOWER LEG WITHOUT COMPLICATION, INITIAL ENCOUNTER: ICD-10-CM

## 2023-05-11 DIAGNOSIS — E11.8 TYPE 2 DIABETES MELLITUS WITH COMPLICATION, WITHOUT LONG-TERM CURRENT USE OF INSULIN (HCC): ICD-10-CM

## 2023-05-11 DIAGNOSIS — Z86.73 HISTORY OF CVA (CEREBROVASCULAR ACCIDENT): ICD-10-CM

## 2023-05-11 DIAGNOSIS — L89.153 SACRAL DECUBITUS ULCER, STAGE III (HCC): Primary | ICD-10-CM

## 2023-05-11 DIAGNOSIS — E66.01 MORBID OBESITY (HCC): ICD-10-CM

## 2023-05-11 DIAGNOSIS — I69.354 FLACCID HEMIPLEGIA OF LEFT NONDOMINANT SIDE AS LATE EFFECT OF CEREBRAL INFARCTION (HCC): ICD-10-CM

## 2023-05-11 PROCEDURE — 11042 DBRDMT SUBQ TIS 1ST 20SQCM/<: CPT

## 2023-05-11 PROCEDURE — 99214 OFFICE O/P EST MOD 30 MIN: CPT

## 2023-05-11 RX ORDER — BACITRACIN, NEOMYCIN, POLYMYXIN B 400; 3.5; 5 [USP'U]/G; MG/G; [USP'U]/G
OINTMENT TOPICAL ONCE
OUTPATIENT
Start: 2023-05-11 | End: 2023-05-11

## 2023-05-11 RX ORDER — LIDOCAINE 50 MG/G
OINTMENT TOPICAL ONCE
OUTPATIENT
Start: 2023-05-11 | End: 2023-05-11

## 2023-05-11 RX ORDER — CLOBETASOL PROPIONATE 0.5 MG/G
OINTMENT TOPICAL ONCE
OUTPATIENT
Start: 2023-05-11 | End: 2023-05-11

## 2023-05-11 RX ORDER — LIDOCAINE HYDROCHLORIDE 20 MG/ML
JELLY TOPICAL ONCE
OUTPATIENT
Start: 2023-05-11 | End: 2023-05-11

## 2023-05-11 RX ORDER — LIDOCAINE HYDROCHLORIDE 20 MG/ML
JELLY TOPICAL ONCE
Status: COMPLETED | OUTPATIENT
Start: 2023-05-11 | End: 2023-05-11

## 2023-05-11 RX ORDER — BETAMETHASONE DIPROPIONATE 0.05 %
OINTMENT (GRAM) TOPICAL ONCE
OUTPATIENT
Start: 2023-05-11 | End: 2023-05-11

## 2023-05-11 RX ORDER — LIDOCAINE 40 MG/G
CREAM TOPICAL ONCE
OUTPATIENT
Start: 2023-05-11 | End: 2023-05-11

## 2023-05-11 RX ORDER — GENTAMICIN SULFATE 1 MG/G
OINTMENT TOPICAL ONCE
OUTPATIENT
Start: 2023-05-11 | End: 2023-05-11

## 2023-05-11 RX ORDER — GINSENG 100 MG
CAPSULE ORAL ONCE
OUTPATIENT
Start: 2023-05-11 | End: 2023-05-11

## 2023-05-11 RX ORDER — BACITRACIN ZINC AND POLYMYXIN B SULFATE 500; 1000 [USP'U]/G; [USP'U]/G
OINTMENT TOPICAL ONCE
OUTPATIENT
Start: 2023-05-11 | End: 2023-05-11

## 2023-05-11 RX ORDER — LIDOCAINE HYDROCHLORIDE 40 MG/ML
SOLUTION TOPICAL ONCE
OUTPATIENT
Start: 2023-05-11 | End: 2023-05-11

## 2023-05-11 RX ADMIN — LIDOCAINE HYDROCHLORIDE 6 ML: 20 JELLY TOPICAL at 11:07

## 2023-05-11 ASSESSMENT — PAIN DESCRIPTION - LOCATION: LOCATION: BUTTOCKS

## 2023-05-11 ASSESSMENT — PAIN SCALES - GENERAL: PAINLEVEL_OUTOF10: 10

## 2023-05-11 ASSESSMENT — PAIN - FUNCTIONAL ASSESSMENT: PAIN_FUNCTIONAL_ASSESSMENT: ACTIVITIES ARE NOT PREVENTED

## 2023-05-11 ASSESSMENT — PAIN DESCRIPTION - DESCRIPTORS: DESCRIPTORS: SHARP

## 2023-05-11 NOTE — DISCHARGE INSTRUCTIONS
signature______________________________________Date:________  Electronically signed by Raúl Bradford RN on 5/11/2023 at 10:19 AM  Electronically signed by Evangelina Kennedy MD on 5/11/2023 at 10:37 AM

## 2023-05-11 NOTE — PROGRESS NOTES
1.5 cm 05/11/23 1020   Wound Depth (cm) 0.1 cm 05/11/23 1020   Wound Surface Area (cm^2) 3.45 cm^2 05/11/23 1020   Wound Volume (cm^3) 0.345 cm^3 05/11/23 1020   Post-Procedure Length (cm) 2.3 cm 05/11/23 1020   Post-Procedure Width (cm) 1.5 cm 05/11/23 1020   Post-Procedure Depth (cm) 0.1 cm 05/11/23 1020   Post-Procedure Surface Area (cm^2) 3.45 cm^2 05/11/23 1020   Post-Procedure Volume (cm^3) 0.345 cm^3 05/11/23 1020   Wound Assessment Pink/red 05/11/23 1020   Drainage Amount Moderate 05/11/23 1020   Drainage Description Serosanguinous 05/11/23 1020   Odor None 05/11/23 1020   Mary-wound Assessment Blanchable erythema 05/11/23 1020   Margins Attached edges 05/11/23 1020   Wound Thickness Description not for Pressure Injury Full thickness 05/11/23 1020   Number of days: 0      Percent of Wound(s)/Ulcer(s) Debrided: 100%     Total Surface Area Debrided:  10.4 sq cm           Estimated Blood Loss:  Minimal     Hemostasis Achieved:  by pressure     Procedural Pain:  7  / 10      Post Procedural Pain:  2 / 10      Response to treatment:  Well tolerated by patient. Plan:     Treatment Note please see attached Discharge Instructions    Written patient dismissal instructions given to patient and signed by patient or POA.          Discharge Instructions            [address] [facility] -Phone: [phone] Fax: [phone]   Visit  Discharge Instructions / Physician Orders    DATE: 5/11/2023     Facility: [facility]  [address]: Facility     Wound Location: Buttocks, Right Lateral Thigh     Cleanse with: Liquid antibacterial soap and water, rinse well      Dressing Orders: Collagen (Fibracol preferred) to wounds, Silicone Foam Border     Frequency: Daily     Additional Orders: Increase protein to diet (meat, cheese, eggs, fish, peanut butter, nuts and beans)  Turn Every 2 hours to alleviate and change pressure points  Low air loss-alternating pressure mattress recommended    Your next

## 2023-05-25 ENCOUNTER — HOSPITAL ENCOUNTER (OUTPATIENT)
Dept: WOUND CARE | Age: 73
Discharge: HOME | End: 2023-05-25
Payer: COMMERCIAL

## 2023-05-25 DIAGNOSIS — E66.01 MORBID OBESITY (HCC): ICD-10-CM

## 2023-05-25 DIAGNOSIS — I69.354 FLACCID HEMIPLEGIA OF LEFT NONDOMINANT SIDE AS LATE EFFECT OF CEREBRAL INFARCTION (HCC): ICD-10-CM

## 2023-05-25 DIAGNOSIS — E11.8 TYPE 2 DIABETES MELLITUS WITH COMPLICATION, WITHOUT LONG-TERM CURRENT USE OF INSULIN (HCC): ICD-10-CM

## 2023-05-25 DIAGNOSIS — S81.811A SKIN TEAR OF RIGHT LOWER LEG WITHOUT COMPLICATION, INITIAL ENCOUNTER: ICD-10-CM

## 2023-05-25 DIAGNOSIS — L89.153 SACRAL DECUBITUS ULCER, STAGE III (HCC): Primary | ICD-10-CM

## 2023-05-25 DIAGNOSIS — S81.811D SKIN TEAR OF RIGHT LOWER LEG WITHOUT COMPLICATION, SUBSEQUENT ENCOUNTER: ICD-10-CM

## 2023-05-25 DIAGNOSIS — Z86.73 HISTORY OF CVA (CEREBROVASCULAR ACCIDENT): ICD-10-CM

## 2023-05-25 PROCEDURE — 11042 DBRDMT SUBQ TIS 1ST 20SQCM/<: CPT

## 2023-05-25 RX ORDER — LIDOCAINE 50 MG/G
OINTMENT TOPICAL ONCE
Status: CANCELLED | OUTPATIENT
Start: 2023-05-25 | End: 2023-05-25

## 2023-05-25 RX ORDER — LIDOCAINE HYDROCHLORIDE 20 MG/ML
JELLY TOPICAL ONCE
Status: CANCELLED | OUTPATIENT
Start: 2023-05-25 | End: 2023-05-25

## 2023-05-25 RX ORDER — LIDOCAINE HYDROCHLORIDE 40 MG/ML
SOLUTION TOPICAL ONCE
OUTPATIENT
Start: 2023-05-25 | End: 2023-05-25

## 2023-05-25 RX ORDER — BACITRACIN, NEOMYCIN, POLYMYXIN B 400; 3.5; 5 [USP'U]/G; MG/G; [USP'U]/G
OINTMENT TOPICAL ONCE
Status: CANCELLED | OUTPATIENT
Start: 2023-05-25 | End: 2023-05-25

## 2023-05-25 RX ORDER — LIDOCAINE 40 MG/G
CREAM TOPICAL ONCE
Status: CANCELLED | OUTPATIENT
Start: 2023-05-25 | End: 2023-05-25

## 2023-05-25 RX ORDER — CLOBETASOL PROPIONATE 0.5 MG/G
OINTMENT TOPICAL ONCE
Status: CANCELLED | OUTPATIENT
Start: 2023-05-25 | End: 2023-05-25

## 2023-05-25 RX ORDER — LIDOCAINE HYDROCHLORIDE 20 MG/ML
JELLY TOPICAL ONCE
Status: COMPLETED | OUTPATIENT
Start: 2023-05-25 | End: 2023-05-25

## 2023-05-25 RX ORDER — BACITRACIN ZINC AND POLYMYXIN B SULFATE 500; 1000 [USP'U]/G; [USP'U]/G
OINTMENT TOPICAL ONCE
Status: CANCELLED | OUTPATIENT
Start: 2023-05-25 | End: 2023-05-25

## 2023-05-25 RX ORDER — BETAMETHASONE DIPROPIONATE 0.05 %
OINTMENT (GRAM) TOPICAL ONCE
Status: CANCELLED | OUTPATIENT
Start: 2023-05-25 | End: 2023-05-25

## 2023-05-25 RX ORDER — LIDOCAINE HYDROCHLORIDE 20 MG/ML
JELLY TOPICAL ONCE
OUTPATIENT
Start: 2023-05-25 | End: 2023-05-25

## 2023-05-25 RX ORDER — GINSENG 100 MG
CAPSULE ORAL ONCE
Status: CANCELLED | OUTPATIENT
Start: 2023-05-25 | End: 2023-05-25

## 2023-05-25 RX ORDER — GENTAMICIN SULFATE 1 MG/G
OINTMENT TOPICAL ONCE
Status: CANCELLED | OUTPATIENT
Start: 2023-05-25 | End: 2023-05-25

## 2023-05-25 RX ADMIN — LIDOCAINE HYDROCHLORIDE 6 ML: 20 JELLY TOPICAL at 10:12

## 2023-05-25 ASSESSMENT — ENCOUNTER SYMPTOMS
RHINORRHEA: 0
SHORTNESS OF BREATH: 0
COUGH: 0

## 2023-05-25 NOTE — PROGRESS NOTES
[address]   Progress Note and Procedure Note      Nataliya Paz  MEDICAL RECORD NUMBER:  [id number]  AGE: 72 y.o. GENDER: female  : 1950  EPISODE DATE:  2023    Subjective:     Chief Complaint   Patient presents with    Wound Check     sacral         HISTORY of PRESENT ILLNESS HPI     Nataliya Paz is a 72 y.o. female who presents today for wound/ulcer evaluation. History of Wound Context: here with her two daughters to follow up on sacral and right thigh pressure ulcerations. Right thigh ulcer is healed. Sacral ulcer is healing well without signs or symptoms of infection. She is residing at [facility].    Wound/Ulcer Pain Timing/Severity: intermittent  Quality of pain: sharp  Severity:  3 / 10   Modifying Factors: None  Associated Signs/Symptoms: none    Ulcer Identification:  Ulcer Type: pressure  Contributing Factors: diabetes, chronic pressure, decreased mobility, shear force, obesity, anticoagulation therapy, incontinence of stool, and incontinence of urine         PAST MEDICAL HISTORY        Diagnosis Date    Allergic rhinitis     Anxiety 10/25/2016    Asthma     CAD (coronary artery disease)     s/p stents RCA and LAD ,    Chronic back pain     Congestive heart failure (HCC)     Depression     Headache(784.0)     Hiatal hernia     Hypercholesteremia 2012    Hypertension     Kidney stones     years ago    Obesity     Osteoarthritis     Postlaminectomy syndrome 2012    Type II or unspecified type diabetes mellitus without mention of complication, not stated as uncontrolled     Unspecified sleep apnea     Urinary incontinence        PAST SURGICAL HISTORY    Past Surgical History:   Procedure Laterality Date    CARDIAC CATHETERIZATION  2013    patent stents    COLONOSCOPY  2015    normal    CORONARY ANGIOPLASTY WITH STENT PLACEMENT  1012-16    stents x 3    GASTROSTOMY TUBE PLACEMENT  2020    EGD PEG TUBE PLACEMENT    GASTROSTOMY

## 2023-05-25 NOTE — DISCHARGE INSTRUCTIONS
Summary  [x]Comprehensive Discharge Instruction        Patient signature______________________________________Date:________  Electronically signed by Johnny Jorge RN on 5/25/2023 at 10:30 AM  Electronically signed by CEE Preston CNP on 5/25/2023 at 10:33 AM

## 2023-06-08 ENCOUNTER — HOSPITAL ENCOUNTER (OUTPATIENT)
Dept: WOUND CARE | Age: 73
Discharge: HOME | End: 2023-06-08
Payer: COMMERCIAL

## 2023-06-08 VITALS
SYSTOLIC BLOOD PRESSURE: 95 MMHG | WEIGHT: 196.6 LBS | RESPIRATION RATE: 18 BRPM | BODY MASS INDEX: 33.75 KG/M2 | TEMPERATURE: 97.2 F | DIASTOLIC BLOOD PRESSURE: 49 MMHG | HEART RATE: 49 BPM

## 2023-06-08 DIAGNOSIS — I69.354 FLACCID HEMIPLEGIA OF LEFT NONDOMINANT SIDE AS LATE EFFECT OF CEREBRAL INFARCTION (HCC): ICD-10-CM

## 2023-06-08 DIAGNOSIS — E66.01 MORBID OBESITY (HCC): ICD-10-CM

## 2023-06-08 DIAGNOSIS — Z86.73 HISTORY OF CVA (CEREBROVASCULAR ACCIDENT): ICD-10-CM

## 2023-06-08 DIAGNOSIS — S81.811D SKIN TEAR OF RIGHT LOWER LEG WITHOUT COMPLICATION, SUBSEQUENT ENCOUNTER: ICD-10-CM

## 2023-06-08 DIAGNOSIS — E11.8 TYPE 2 DIABETES MELLITUS WITH COMPLICATION, WITHOUT LONG-TERM CURRENT USE OF INSULIN (HCC): ICD-10-CM

## 2023-06-08 DIAGNOSIS — L89.153 SACRAL DECUBITUS ULCER, STAGE III (HCC): Primary | ICD-10-CM

## 2023-06-08 PROCEDURE — 99213 OFFICE O/P EST LOW 20 MIN: CPT

## 2023-06-08 RX ORDER — LIDOCAINE HYDROCHLORIDE 20 MG/ML
JELLY TOPICAL ONCE
OUTPATIENT
Start: 2023-06-08 | End: 2023-06-08

## 2023-06-08 RX ORDER — LEVETIRACETAM 500 MG/1
500 TABLET ORAL 2 TIMES DAILY
COMMUNITY

## 2023-06-08 RX ORDER — CLOPIDOGREL BISULFATE 75 MG/1
75 TABLET ORAL DAILY
COMMUNITY

## 2023-06-08 RX ORDER — GABAPENTIN 100 MG/1
100 CAPSULE ORAL 3 TIMES DAILY
COMMUNITY

## 2023-06-08 RX ORDER — METOPROLOL TARTRATE 50 MG/1
50 TABLET, FILM COATED ORAL 2 TIMES DAILY
COMMUNITY

## 2023-06-08 RX ORDER — LOSARTAN POTASSIUM 50 MG/1
50 TABLET ORAL 2 TIMES DAILY
COMMUNITY

## 2023-06-08 RX ORDER — LIDOCAINE HYDROCHLORIDE 40 MG/ML
SOLUTION TOPICAL ONCE
OUTPATIENT
Start: 2023-06-08 | End: 2023-06-08

## 2023-06-08 NOTE — PROGRESS NOTES
Frequency: Daily and As Needed     Additional Orders: Increase protein to diet (meat, cheese, eggs, fish, peanut butter, nuts and beans)  Turn Every 2 hours to alleviate and change pressure points  Low air loss-alternating pressure mattress recommended     Your next appointment with [address] [place] [address] is-call if needed     (Please note your next appointment above and if you are unable to keep, kindly give a 24 hour notice. Thank you.)  If more than 15 min late we cannot guarantee you will be seen due to clinician schedule  Per Policy, Excessive cancellation will call for dismissal from program.     If you experience any of the following, please call the [address] [place] [address] during business hours:  [phone]  Your Phone call may be forwarded to [phone] during business hours that [facility] is closed. * Increase in Pain  * Temperature over 101  * Increase in drainage from your wound  * Drainage with a foul odor  * Bleeding  * Increase in swelling  * Need for compression bandage changes due to slippage, breakthrough drainage. If you need medical attention outside of the business hours of the [address] [place] [address] please contact your PCP or go to the nearest emergency room. The information contained in the After Visit Summary has been reviewed with me, the patient and/or responsible adult, by my health care provider(s). I had the opportunity to ask questions regarding this information.  I have elected to receive;      []After Visit Summary  [x]Comprehensive Discharge Instruction        Patient signature______________________________________Date:________  Electronically signed by Shmuel Peña RN on 6/8/2023 at 10:56 AM          Electronically signed by Chyna Pena MD on 6/8/2023 at 10:58 AM

## 2023-08-03 NOTE — PLAN OF CARE
Problem: ACTIVITY INTOLERANCE/IMPAIRED MOBILITY  Goal: Mobility/activity is maintained at optimum level for patient  Outcome: Ongoing     Problem: Restraint Use - Nonviolent/Non-Self-Destructive Behavior:  Goal: Absence of restraint indications  Description: Absence of restraint indications  12/22/2020 1234 by Matt Bocanegra RN  Outcome: Ongoing  12/22/2020 0702 by Macy Arrieta RN  Outcome: Ongoing  Goal: Absence of restraint-related injury  Description: Absence of restraint-related injury  12/22/2020 1234 by Matt Bocanegra RN  Outcome: Ongoing  12/22/2020 0702 by Macy Arrieta RN  Outcome: Ongoing     Problem: Skin Integrity:  Goal: Will show no infection signs and symptoms  Description: Will show no infection signs and symptoms  Outcome: Ongoing  Goal: Absence of new skin breakdown  Description: Absence of new skin breakdown  Outcome: Ongoing Hemostasis: Aluminum Chloride

## 2023-12-17 ENCOUNTER — APPOINTMENT (OUTPATIENT)
Dept: CT IMAGING | Age: 73
DRG: 689 | End: 2023-12-17
Payer: COMMERCIAL

## 2023-12-17 ENCOUNTER — APPOINTMENT (OUTPATIENT)
Dept: GENERAL RADIOLOGY | Age: 73
DRG: 689 | End: 2023-12-17
Payer: COMMERCIAL

## 2023-12-17 ENCOUNTER — APPOINTMENT (OUTPATIENT)
Dept: ULTRASOUND IMAGING | Age: 73
DRG: 689 | End: 2023-12-17
Payer: COMMERCIAL

## 2023-12-17 ENCOUNTER — APPOINTMENT (OUTPATIENT)
Dept: MRI IMAGING | Age: 73
DRG: 689 | End: 2023-12-17
Payer: COMMERCIAL

## 2023-12-17 ENCOUNTER — HOSPITAL ENCOUNTER (INPATIENT)
Age: 73
LOS: 6 days | Discharge: HOME HEALTH | DRG: 689 | End: 2023-12-23
Attending: EMERGENCY MEDICINE | Admitting: INTERNAL MEDICINE
Payer: COMMERCIAL

## 2023-12-17 DIAGNOSIS — R41.82 ALTERED MENTAL STATUS, UNSPECIFIED ALTERED MENTAL STATUS TYPE: Primary | ICD-10-CM

## 2023-12-17 DIAGNOSIS — R53.1 WEAKNESS: ICD-10-CM

## 2023-12-17 DIAGNOSIS — I21.4 NSTEMI (NON-ST ELEVATED MYOCARDIAL INFARCTION) (HCC): ICD-10-CM

## 2023-12-17 PROBLEM — F02.80 DIFFUSE LEWY BODY DISEASE (HCC): Status: ACTIVE | Noted: 2020-08-19

## 2023-12-17 PROBLEM — G93.41 METABOLIC ENCEPHALOPATHY: Status: ACTIVE | Noted: 2023-12-17

## 2023-12-17 PROBLEM — G31.83 DIFFUSE LEWY BODY DISEASE (HCC): Status: ACTIVE | Noted: 2020-08-19

## 2023-12-17 PROBLEM — E04.1 THYROID NODULE GREATER THAN OR EQUAL TO 1.5 CM IN DIAMETER INCIDENTALLY NOTED ON IMAGING STUDY: Status: ACTIVE | Noted: 2023-12-17

## 2023-12-17 PROBLEM — R94.31 PROLONGED Q-T INTERVAL ON ECG: Status: ACTIVE | Noted: 2023-12-17

## 2023-12-17 PROBLEM — J44.9 CHRONIC OBSTRUCTIVE PULMONARY DISEASE (HCC): Status: ACTIVE | Noted: 2023-12-17

## 2023-12-17 PROBLEM — F25.9 SCHIZOAFFECTIVE DISORDER (HCC): Status: ACTIVE | Noted: 2022-10-28

## 2023-12-17 PROBLEM — E04.1 THYROID NODULE: Status: ACTIVE | Noted: 2023-12-17

## 2023-12-17 PROBLEM — N17.9 ACUTE RENAL FAILURE (HCC): Status: ACTIVE | Noted: 2023-12-17

## 2023-12-17 PROBLEM — R56.9 SEIZURE (HCC): Status: ACTIVE | Noted: 2023-05-13

## 2023-12-17 LAB
ALBUMIN SERPL-MCNC: 3.1 G/DL (ref 3.5–5.2)
ALBUMIN/GLOB SERPL: 1 {RATIO} (ref 1–2.5)
ALP SERPL-CCNC: 80 U/L (ref 35–104)
ALT SERPL-CCNC: 22 U/L (ref 10–35)
AMMONIA PLAS-SCNC: 33 UMOL/L (ref 11–51)
ANION GAP SERPL CALCULATED.3IONS-SCNC: 14 MMOL/L (ref 9–17)
AST SERPL-CCNC: 34 U/L (ref 10–35)
BACTERIA URNS QL MICRO: ABNORMAL
BASOPHILS # BLD: 0.03 K/UL (ref 0–0.2)
BASOPHILS NFR BLD: 0 % (ref 0–2)
BILIRUB DIRECT SERPL-MCNC: <0.2 MG/DL (ref 0–0.3)
BILIRUB INDIRECT SERPL-MCNC: 0.2 MG/DL (ref 0–1)
BILIRUB SERPL-MCNC: 0.2 MG/DL (ref 0–1.2)
BILIRUB UR QL STRIP: NEGATIVE
BUN BLD-MCNC: 54 MG/DL (ref 8–26)
BUN SERPL-MCNC: 60 MG/DL (ref 8–23)
CA-I BLD-SCNC: 1.05 MMOL/L (ref 1.15–1.33)
CALCIUM SERPL-MCNC: 9.9 MG/DL (ref 8.6–10.4)
CASTS #/AREA URNS LPF: ABNORMAL /LPF (ref 0–8)
CHLORIDE BLD-SCNC: 109 MMOL/L (ref 98–107)
CHLORIDE SERPL-SCNC: 101 MMOL/L (ref 98–107)
CK SERPL-CCNC: 702 U/L (ref 26–192)
CLARITY UR: ABNORMAL
CO2 BLD CALC-SCNC: 21 MMOL/L (ref 22–30)
CO2 SERPL-SCNC: 24 MMOL/L (ref 20–31)
COLOR UR: YELLOW
CREAT SERPL-MCNC: 1.3 MG/DL (ref 0.5–0.9)
EGFR, POC: 54 ML/MIN/1.73M2
EOSINOPHIL # BLD: 0.15 K/UL (ref 0–0.44)
EOSINOPHILS RELATIVE PERCENT: 2 % (ref 1–4)
EPI CELLS #/AREA URNS HPF: ABNORMAL /HPF (ref 0–5)
ERYTHROCYTE [DISTWIDTH] IN BLOOD BY AUTOMATED COUNT: 14.2 % (ref 11.8–14.4)
EST. AVERAGE GLUCOSE BLD GHB EST-MCNC: 134 MG/DL
FREE KAPPA/LAMBDA RATIO: 1.4 (ref 0.26–1.65)
GFR SERPL CREATININE-BSD FRML MDRD: 44 ML/MIN/1.73M2
GLOBULIN SER CALC-MCNC: 3.1 G/DL
GLUCOSE BLD-MCNC: 117 MG/DL (ref 74–100)
GLUCOSE SERPL-MCNC: 161 MG/DL (ref 70–99)
GLUCOSE UR STRIP-MCNC: NEGATIVE MG/DL
HBA1C MFR BLD: 6.3 % (ref 4–6)
HCO3 VENOUS: 21.6 MMOL/L (ref 22–29)
HCT VFR BLD AUTO: 34 % (ref 36–46)
HCT VFR BLD AUTO: 38.1 % (ref 36.3–47.1)
HGB BLD-MCNC: 11.7 G/DL (ref 11.9–15.1)
HGB UR QL STRIP.AUTO: ABNORMAL
IMM GRANULOCYTES # BLD AUTO: 0.03 K/UL (ref 0–0.3)
IMM GRANULOCYTES NFR BLD: 0 %
INR PPP: 1
KAPPA LC FREE SER-MCNC: 81.4 MG/L (ref 3.7–19.4)
KETONES UR STRIP-MCNC: NEGATIVE MG/DL
LAMBDA LC FREE SERPL-MCNC: 58.3 MG/L (ref 5.7–26.3)
LEUKOCYTE ESTERASE UR QL STRIP: ABNORMAL
LEVETIRACETAM SERPL-MCNC: 40 UG/ML
LYMPHOCYTES NFR BLD: 2.66 K/UL (ref 1.1–3.7)
LYMPHOCYTES RELATIVE PERCENT: 34 % (ref 24–43)
MCH RBC QN AUTO: 28.1 PG (ref 25.2–33.5)
MCHC RBC AUTO-ENTMCNC: 30.7 G/DL (ref 28.4–34.8)
MCV RBC AUTO: 91.4 FL (ref 82.6–102.9)
MONOCYTES NFR BLD: 0.41 K/UL (ref 0.1–1.2)
MONOCYTES NFR BLD: 5 % (ref 3–12)
MYOGLOBIN SERPL-MCNC: 187 NG/ML (ref 25–58)
NEGATIVE BASE EXCESS, VEN: 2 MMOL/L (ref 0–2)
NEUTROPHILS NFR BLD: 59 % (ref 36–65)
NEUTS SEG NFR BLD: 4.57 K/UL (ref 1.5–8.1)
NITRITE UR QL STRIP: NEGATIVE
NRBC BLD-RTO: 0 PER 100 WBC
O2 SAT, VEN: 89.3 % (ref 60–85)
PARTIAL THROMBOPLASTIN TIME: 24.8 SEC (ref 23–36.5)
PCO2, VEN: 32.5 MM HG (ref 41–51)
PH UR STRIP: 5 [PH] (ref 5–8)
PH VENOUS: 7.43 (ref 7.32–7.43)
PLATELET # BLD AUTO: 226 K/UL (ref 138–453)
PMV BLD AUTO: 10 FL (ref 8.1–13.5)
PO2, VEN: 54.7 MM HG (ref 30–50)
POC ANION GAP: 10 MMOL/L (ref 7–16)
POC CREATININE: 1.1 MG/DL (ref 0.51–1.19)
POC HEMOGLOBIN (CALC): 11.6 G/DL (ref 12–16)
POC LACTIC ACID: 1.4 MMOL/L (ref 0.56–1.39)
POTASSIUM BLD-SCNC: 4.4 MMOL/L (ref 3.5–4.5)
POTASSIUM SERPL-SCNC: 4.6 MMOL/L (ref 3.7–5.3)
PROT SERPL-MCNC: 6.2 G/DL (ref 6.6–8.7)
PROT UR STRIP-MCNC: ABNORMAL MG/DL
PROTHROMBIN TIME: 13.4 SEC (ref 11.7–14.9)
RBC # BLD AUTO: 4.17 M/UL (ref 3.95–5.11)
RBC #/AREA URNS HPF: ABNORMAL /HPF (ref 0–4)
SODIUM BLD-SCNC: 139 MMOL/L (ref 138–146)
SODIUM SERPL-SCNC: 139 MMOL/L (ref 135–144)
SODIUM UR-SCNC: 26 MMOL/L
SP GR UR STRIP: 1.02 (ref 1–1.03)
TROPONIN I SERPL HS-MCNC: 53 NG/L (ref 0–14)
TROPONIN I SERPL HS-MCNC: 60 NG/L (ref 0–14)
TROPONIN I SERPL HS-MCNC: 66 NG/L (ref 0–14)
TSH SERPL DL<=0.05 MIU/L-ACNC: 0.93 UIU/ML (ref 0.27–4.2)
UROBILINOGEN UR STRIP-ACNC: NORMAL EU/DL (ref 0–1)
UUN UR-MCNC: 483 MG/DL
WBC #/AREA URNS HPF: ABNORMAL /HPF (ref 0–5)
WBC OTHER # BLD: 7.9 K/UL (ref 3.5–11.3)

## 2023-12-17 PROCEDURE — 2500000003 HC RX 250 WO HCPCS

## 2023-12-17 PROCEDURE — 36415 COLL VENOUS BLD VENIPUNCTURE: CPT

## 2023-12-17 PROCEDURE — 87186 SC STD MICRODIL/AGAR DIL: CPT

## 2023-12-17 PROCEDURE — 80177 DRUG SCRN QUAN LEVETIRACETAM: CPT

## 2023-12-17 PROCEDURE — 84520 ASSAY OF UREA NITROGEN: CPT

## 2023-12-17 PROCEDURE — 99285 EMERGENCY DEPT VISIT HI MDM: CPT

## 2023-12-17 PROCEDURE — 2580000003 HC RX 258

## 2023-12-17 PROCEDURE — 82330 ASSAY OF CALCIUM: CPT

## 2023-12-17 PROCEDURE — 95700 EEG CONT REC W/VID EEG TECH: CPT

## 2023-12-17 PROCEDURE — 93005 ELECTROCARDIOGRAM TRACING: CPT | Performed by: STUDENT IN AN ORGANIZED HEALTH CARE EDUCATION/TRAINING PROGRAM

## 2023-12-17 PROCEDURE — 1200000000 HC SEMI PRIVATE

## 2023-12-17 PROCEDURE — 80076 HEPATIC FUNCTION PANEL: CPT

## 2023-12-17 PROCEDURE — 85610 PROTHROMBIN TIME: CPT

## 2023-12-17 PROCEDURE — 71045 X-RAY EXAM CHEST 1 VIEW: CPT

## 2023-12-17 PROCEDURE — 84540 ASSAY OF URINE/UREA-N: CPT

## 2023-12-17 PROCEDURE — 87086 URINE CULTURE/COLONY COUNT: CPT

## 2023-12-17 PROCEDURE — 83521 IG LIGHT CHAINS FREE EACH: CPT

## 2023-12-17 PROCEDURE — 82550 ASSAY OF CK (CPK): CPT

## 2023-12-17 PROCEDURE — 70551 MRI BRAIN STEM W/O DYE: CPT

## 2023-12-17 PROCEDURE — 82803 BLOOD GASES ANY COMBINATION: CPT

## 2023-12-17 PROCEDURE — 99223 1ST HOSP IP/OBS HIGH 75: CPT | Performed by: PSYCHIATRY & NEUROLOGY

## 2023-12-17 PROCEDURE — 85014 HEMATOCRIT: CPT

## 2023-12-17 PROCEDURE — 82947 ASSAY GLUCOSE BLOOD QUANT: CPT

## 2023-12-17 PROCEDURE — 84443 ASSAY THYROID STIM HORMONE: CPT

## 2023-12-17 PROCEDURE — 94761 N-INVAS EAR/PLS OXIMETRY MLT: CPT

## 2023-12-17 PROCEDURE — 84484 ASSAY OF TROPONIN QUANT: CPT

## 2023-12-17 PROCEDURE — 82140 ASSAY OF AMMONIA: CPT

## 2023-12-17 PROCEDURE — 70496 CT ANGIOGRAPHY HEAD: CPT

## 2023-12-17 PROCEDURE — 6360000002 HC RX W HCPCS

## 2023-12-17 PROCEDURE — 81001 URINALYSIS AUTO W/SCOPE: CPT

## 2023-12-17 PROCEDURE — 87040 BLOOD CULTURE FOR BACTERIA: CPT

## 2023-12-17 PROCEDURE — 2580000003 HC RX 258: Performed by: STUDENT IN AN ORGANIZED HEALTH CARE EDUCATION/TRAINING PROGRAM

## 2023-12-17 PROCEDURE — 96365 THER/PROPH/DIAG IV INF INIT: CPT

## 2023-12-17 PROCEDURE — 70450 CT HEAD/BRAIN W/O DYE: CPT

## 2023-12-17 PROCEDURE — 83605 ASSAY OF LACTIC ACID: CPT

## 2023-12-17 PROCEDURE — 99222 1ST HOSP IP/OBS MODERATE 55: CPT | Performed by: PSYCHIATRY & NEUROLOGY

## 2023-12-17 PROCEDURE — 6360000002 HC RX W HCPCS: Performed by: STUDENT IN AN ORGANIZED HEALTH CARE EDUCATION/TRAINING PROGRAM

## 2023-12-17 PROCEDURE — 96375 TX/PRO/DX INJ NEW DRUG ADDON: CPT

## 2023-12-17 PROCEDURE — 6370000000 HC RX 637 (ALT 250 FOR IP)

## 2023-12-17 PROCEDURE — 80048 BASIC METABOLIC PNL TOTAL CA: CPT

## 2023-12-17 PROCEDURE — 84300 ASSAY OF URINE SODIUM: CPT

## 2023-12-17 PROCEDURE — 85025 COMPLETE CBC W/AUTO DIFF WBC: CPT

## 2023-12-17 PROCEDURE — 83874 ASSAY OF MYOGLOBIN: CPT

## 2023-12-17 PROCEDURE — 76770 US EXAM ABDO BACK WALL COMP: CPT

## 2023-12-17 PROCEDURE — 82565 ASSAY OF CREATININE: CPT

## 2023-12-17 PROCEDURE — 87088 URINE BACTERIA CULTURE: CPT

## 2023-12-17 PROCEDURE — 95711 VEEG 2-12 HR UNMONITORED: CPT

## 2023-12-17 PROCEDURE — 6360000004 HC RX CONTRAST MEDICATION: Performed by: STUDENT IN AN ORGANIZED HEALTH CARE EDUCATION/TRAINING PROGRAM

## 2023-12-17 PROCEDURE — 51702 INSERT TEMP BLADDER CATH: CPT

## 2023-12-17 PROCEDURE — 85730 THROMBOPLASTIN TIME PARTIAL: CPT

## 2023-12-17 PROCEDURE — 80051 ELECTROLYTE PANEL: CPT

## 2023-12-17 PROCEDURE — 83036 HEMOGLOBIN GLYCOSYLATED A1C: CPT

## 2023-12-17 PROCEDURE — 82553 CREATINE MB FRACTION: CPT

## 2023-12-17 RX ORDER — LEVETIRACETAM 500 MG/1
500 TABLET ORAL 2 TIMES DAILY
Status: DISCONTINUED | OUTPATIENT
Start: 2023-12-17 | End: 2023-12-24 | Stop reason: HOSPADM

## 2023-12-17 RX ORDER — MAGNESIUM SULFATE IN WATER 40 MG/ML
2000 INJECTION, SOLUTION INTRAVENOUS ONCE
Status: COMPLETED | OUTPATIENT
Start: 2023-12-17 | End: 2023-12-17

## 2023-12-17 RX ORDER — GABAPENTIN 100 MG/1
100 CAPSULE ORAL 3 TIMES DAILY
Status: CANCELLED | OUTPATIENT
Start: 2023-12-17

## 2023-12-17 RX ORDER — ONDANSETRON 2 MG/ML
4 INJECTION INTRAMUSCULAR; INTRAVENOUS EVERY 6 HOURS PRN
Status: CANCELLED | OUTPATIENT
Start: 2023-12-17

## 2023-12-17 RX ORDER — CALCIUM GLUCONATE 20 MG/ML
1000 INJECTION, SOLUTION INTRAVENOUS ONCE
Status: COMPLETED | OUTPATIENT
Start: 2023-12-17 | End: 2023-12-17

## 2023-12-17 RX ORDER — SODIUM CHLORIDE 9 MG/ML
INJECTION, SOLUTION INTRAVENOUS PRN
Status: DISCONTINUED | OUTPATIENT
Start: 2023-12-17 | End: 2023-12-24 | Stop reason: HOSPADM

## 2023-12-17 RX ORDER — ONDANSETRON 4 MG/1
4 TABLET, ORALLY DISINTEGRATING ORAL EVERY 8 HOURS PRN
Status: CANCELLED | OUTPATIENT
Start: 2023-12-17

## 2023-12-17 RX ORDER — POLYETHYLENE GLYCOL 3350 17 G/17G
17 POWDER, FOR SOLUTION ORAL DAILY PRN
Status: DISCONTINUED | OUTPATIENT
Start: 2023-12-17 | End: 2023-12-24 | Stop reason: HOSPADM

## 2023-12-17 RX ORDER — ACETAMINOPHEN 325 MG/1
650 TABLET ORAL EVERY 6 HOURS PRN
Status: DISCONTINUED | OUTPATIENT
Start: 2023-12-17 | End: 2023-12-24 | Stop reason: HOSPADM

## 2023-12-17 RX ORDER — CLOPIDOGREL BISULFATE 75 MG/1
75 TABLET ORAL DAILY
Status: DISCONTINUED | OUTPATIENT
Start: 2023-12-17 | End: 2023-12-24 | Stop reason: HOSPADM

## 2023-12-17 RX ORDER — SODIUM CHLORIDE 0.9 % (FLUSH) 0.9 %
5-40 SYRINGE (ML) INJECTION PRN
Status: DISCONTINUED | OUTPATIENT
Start: 2023-12-17 | End: 2023-12-24 | Stop reason: HOSPADM

## 2023-12-17 RX ORDER — ASPIRIN 81 MG/1
81 TABLET ORAL DAILY
Status: DISCONTINUED | OUTPATIENT
Start: 2023-12-17 | End: 2023-12-24 | Stop reason: HOSPADM

## 2023-12-17 RX ORDER — ACETAMINOPHEN 650 MG/1
650 SUPPOSITORY RECTAL EVERY 6 HOURS PRN
Status: DISCONTINUED | OUTPATIENT
Start: 2023-12-17 | End: 2023-12-24 | Stop reason: HOSPADM

## 2023-12-17 RX ORDER — HEPARIN SODIUM 5000 [USP'U]/ML
5000 INJECTION, SOLUTION INTRAVENOUS; SUBCUTANEOUS EVERY 8 HOURS SCHEDULED
Status: DISCONTINUED | OUTPATIENT
Start: 2023-12-17 | End: 2023-12-20

## 2023-12-17 RX ORDER — POTASSIUM CHLORIDE 7.45 MG/ML
10 INJECTION INTRAVENOUS PRN
Status: DISCONTINUED | OUTPATIENT
Start: 2023-12-17 | End: 2023-12-24 | Stop reason: HOSPADM

## 2023-12-17 RX ORDER — ATORVASTATIN CALCIUM 80 MG/1
80 TABLET, FILM COATED ORAL NIGHTLY
Status: DISCONTINUED | OUTPATIENT
Start: 2023-12-17 | End: 2023-12-24 | Stop reason: HOSPADM

## 2023-12-17 RX ORDER — SODIUM CHLORIDE 9 MG/ML
INJECTION, SOLUTION INTRAVENOUS CONTINUOUS
Status: ACTIVE | OUTPATIENT
Start: 2023-12-17 | End: 2023-12-18

## 2023-12-17 RX ORDER — 0.9 % SODIUM CHLORIDE 0.9 %
500 INTRAVENOUS SOLUTION INTRAVENOUS ONCE
Status: COMPLETED | OUTPATIENT
Start: 2023-12-17 | End: 2023-12-17

## 2023-12-17 RX ORDER — QUETIAPINE FUMARATE 25 MG/1
25 TABLET, FILM COATED ORAL 2 TIMES DAILY
Status: CANCELLED | OUTPATIENT
Start: 2023-12-17

## 2023-12-17 RX ORDER — ALBUTEROL SULFATE 90 UG/1
2 AEROSOL, METERED RESPIRATORY (INHALATION) EVERY 6 HOURS PRN
Status: DISCONTINUED | OUTPATIENT
Start: 2023-12-17 | End: 2023-12-24 | Stop reason: HOSPADM

## 2023-12-17 RX ORDER — SODIUM CHLORIDE 0.9 % (FLUSH) 0.9 %
5-40 SYRINGE (ML) INJECTION EVERY 12 HOURS SCHEDULED
Status: DISCONTINUED | OUTPATIENT
Start: 2023-12-17 | End: 2023-12-24 | Stop reason: HOSPADM

## 2023-12-17 RX ORDER — MAGNESIUM SULFATE IN WATER 40 MG/ML
2000 INJECTION, SOLUTION INTRAVENOUS PRN
Status: DISCONTINUED | OUTPATIENT
Start: 2023-12-17 | End: 2023-12-24 | Stop reason: HOSPADM

## 2023-12-17 RX ORDER — POTASSIUM CHLORIDE 20 MEQ/1
40 TABLET, EXTENDED RELEASE ORAL PRN
Status: DISCONTINUED | OUTPATIENT
Start: 2023-12-17 | End: 2023-12-24 | Stop reason: HOSPADM

## 2023-12-17 RX ADMIN — HEPARIN SODIUM 5000 UNITS: 5000 INJECTION INTRAVENOUS; SUBCUTANEOUS at 18:01

## 2023-12-17 RX ADMIN — CALCIUM GLUCONATE 1000 MG: 20 INJECTION, SOLUTION INTRAVENOUS at 18:43

## 2023-12-17 RX ADMIN — IOPAMIDOL 75 ML: 755 INJECTION, SOLUTION INTRAVENOUS at 13:04

## 2023-12-17 RX ADMIN — MAGNESIUM SULFATE HEPTAHYDRATE 2000 MG: 40 INJECTION, SOLUTION INTRAVENOUS at 13:45

## 2023-12-17 RX ADMIN — LEVETIRACETAM 500 MG: 500 TABLET, FILM COATED ORAL at 22:45

## 2023-12-17 RX ADMIN — ATORVASTATIN CALCIUM 80 MG: 80 TABLET, FILM COATED ORAL at 22:46

## 2023-12-17 RX ADMIN — SODIUM CHLORIDE, PRESERVATIVE FREE 10 ML: 5 INJECTION INTRAVENOUS at 22:48

## 2023-12-17 RX ADMIN — SODIUM CHLORIDE: 9 INJECTION, SOLUTION INTRAVENOUS at 18:43

## 2023-12-17 RX ADMIN — CEFTRIAXONE SODIUM 1000 MG: 1 INJECTION, POWDER, FOR SOLUTION INTRAMUSCULAR; INTRAVENOUS at 14:34

## 2023-12-17 RX ADMIN — SODIUM CHLORIDE 500 ML: 9 INJECTION, SOLUTION INTRAVENOUS at 17:57

## 2023-12-17 NOTE — ED NOTES
Ultrasound called, notified of patient bed status, en route to MRI. MRI called, notified patient has clean and ready bed. Patient floor paged by ED charge and notified patient will be en route to floor after MRI completion.       Mitzi Lopez RN  12/17/23 7813

## 2023-12-17 NOTE — ED NOTES
The following labs were labeled with appropriate pt sticker and tubed to lab:     [] Blue     [] Lavender   [] on ice  [x] Green/yellow  [] Green/black [] on ice  [] Wynette Buttner  [] on ice  [] Yellow  [] Red  [] Pink  [] Type/ Screen  [] ABG  [] VBG    [] COVID-19 swab    [] Rapid  [] PCR  [] Flu swab  [] Peds Viral Panel     [] Urine Sample  [] Fecal Sample  [] Pelvic Cultures  [] Blood Cultures  [] X 2  [] STREP Cultures  [] Wound Cultures      Mark Schmid LPN  82/77/56 [id number]

## 2023-12-17 NOTE — ED NOTES
Dr. Wilmer Donaldson at bedside to evaluate pt. Pt in [facility], he went to the basement to evaluate pt.       Yudith Johnson RN  12/17/23 1999

## 2023-12-17 NOTE — ED NOTES
Documented patietn takes asa and plavix. Hx of cva in past with left sided flaccid. Patient has been altered per family starting Friday, worsening today with worsening slurred speech and facial droop.       Aurora Matamoros RN  12/17/23 1257

## 2023-12-17 NOTE — ED NOTES
Lab calling with critical troponin of 66. Dr Elissa Pichardo and primary RN notified.       Gaurav Sanches, GUERON  73/58/69 [phone]

## 2023-12-17 NOTE — PROGRESS NOTES
[address]     Emergency/Trauma Note    PATIENT NAME: Alfredo Garcia    Shift date: 12/17/2023  Shift day: Sunday   Shift # 1    Room # 14/14   Name: Alfredo Garcia            Age: 72 y.o. Gender: female          Congregational: [employer]   Place of [place]:     Trauma/Incident type: Stroke Alert  Admit Date & Time: 12/17/2023 12:49 PM  TRAUMA NAME: None    ADVANCE DIRECTIVES IN CHART? No    NAME OF DECISION MAKER: Unknown    RELATIONSHIP OF DECISION MAKER TO PATIENT:     PATIENT/EVENT DESCRIPTION:  Alfredo Garcia is a 72 y.o. female who arrived ER as stroke alert. Patient to be admitted to 14/14. SPIRITUAL ASSESSMENT-INTERVENTION-OUTCOME:  No spiritual assessment was carried out because patient looked too weak and seemed to be having a rough time. Family was present and open to spiritual care.  maintained listening presence, offered support and prayed with family at patient's bedside. Family was very grateful for the visit and prayer said with them. PATIENT BELONGINGS:  No belongings noted    ANY BELONGINGS OF SIGNIFICANT VALUE NOTED:  Unknown    REGISTRATION STAFF NOTIFIED? Yes    WHAT IS YOUR SPIRITUAL CARE PLAN FOR THIS PATIENT?:  Follow up visits recommended for ongoing assessment of patient's condition and for more prayers and support. Electronically signed by Fr. Madison Medrano on 12/17/2023 at 2:51 PM.  [address] [place] [address]  [phone]

## 2023-12-17 NOTE — CONSULTS
Pura Jones MD   levETIRAcetam (KEPPRA) 500 MG tablet Take 1 tablet by mouth 2 times daily    Pura Jones MD   gabapentin (NEURONTIN) 100 MG capsule Take 1 capsule by mouth 3 times daily. Pura Jones MD   linagliptin (TRADJENTA) 5 MG tablet Take 1 tablet by mouth daily    Pura Jones MD   ammonium lactate (AMLACTIN) 12 % cream  2/20/21   Pura Jones MD   cloNIDine (CATAPRES) 0.3 MG tablet  4/29/21   Pura Jones MD   Oyster Shell 500 MG TABS Take 1 tablet by mouth daily tjrpigj gastroc adonaye    Pura Jones MD   ibuprofen (ADVIL;MOTRIN) 800 MG tablet Take 1 tablet by mouth 3 times daily as needed for Pain    Pura Jones MD   gabapentin (NEURONTIN) 100 MG capsule Take 1 capsule by mouth 3 times daily for 120 days.   Patient not taking: Reported on 5/20/2021 2/18/21 6/18/21  Richa Martinez MD   clopidogrel (PLAVIX) 75 MG tablet Take 1 tablet by mouth daily 1/22/21 4/22/21  Eugenia Stack MD   metoprolol succinate (TOPROL XL) 25 MG extended release tablet Take 2 tablets by mouth daily 1/20/21   Grace Moreno MD   losartan (COZAAR) 25 MG tablet Take 1 tablet by mouth daily  Patient not taking: Reported on 5/20/2021 12/29/20   Imer Law MD   docusate (COLACE) 50 MG/5ML liquid Take 10 mLs by mouth daily 12/30/20   Imer Law MD   rivastigmine (EXELON) 9.5 MG/24HR Place 1 patch onto the skin daily  Patient not taking: Reported on 5/20/2021    Pura Jones MD   QUEtiapine (SEROQUEL) 25 MG tablet Take 1 tablet by mouth 2 times daily    Pura Jones MD   Calcium Carbonate-Vitamin D (OYSTER SHELL CALCIUM/D) 500-200 MG-UNIT TABS TAKE ONE TABLET BY MOUTH TWO TIMES A DAY 1/17/19   Alexsander Bellamy MD   glimepiride (AMARYL) 2 MG tablet Take 1 tablet by mouth every morning 12/19/18   Alexsander Bellamy MD   linagliptin (TRADJENTA) 5 MG tablet Take 1 tablet by mouth daily 12/19/18   Alexsander Bellamy MD to person only. , in no acute distress. GCS 15. Nontoxic. Mild dysarthria. No aphasia. HEAD:  normocephalic, atraumatic    EYES:  PERRL, EOMI.   ENT:  moist mucous membranes   NECK:  supple, symmetric   LUNGS:  Equal air entry bilaterally   CARDIOVASCULAR:  normal s1 / s2   ABDOMEN:  Soft, no rigidity   NEUROLOGIC:  Mental Status:  Not oriented to date and Not oriented to place,awake             Cranial Nerves:    II: Visual fields:  abnormal - left peripheral vision loss  III: Pupils:  equal, round, reactive to light  III,IV,VI: Extra Ocular Movements: intact  V: Facial sensation:  intact  VII: Facial strength: abnormal left facial weakness    Motor Exam:    Drift:  present - All extremities  Tone:  abnormal - Decreased tone left upper    Left upper and left lower extremity motor strength 0/5  Right upper able to weakly antigravity and then drifts to the bed. Moderate hand grasp. 3 out of 5 motor strength  Right lower extremity 2/5 motor strength    Sensory:    Touch:    Grossly intact. SKIN:  no rash        INITIAL NIH STROKE SCALE    Time Performed:  5393 PM    Administer stroke scale items in the order listed. Record performance in each category after each subscale exam. Do not go back and change scores. Follow directions provided for each exam technique. Scores should reflect what the patient does, not what the clinician thinks the patient can do. The clinician should record answers while administering the exam and work quickly. Except where indicated, the patient should not be coached (i.e., repeated requests to patient to make a special effort). 1a.  Level of consciousness:  0 - alert; keenly responsive  1b. Level of consciousness questions:  1 - answers one question correctly  1c. Level of consciousness questions:  0 - performs both tasks correctly  2. Best Gaze:  0 - normal  3. Visual:  1 - partial hemianopia  4.     Facial Palsy:  1 - minor paralysis (flattened nasolabial fold, asymmetric on

## 2023-12-17 NOTE — ED PROVIDER NOTES
[facility] ED  Emergency Department Encounter  Emergency Medicine Resident     Pt Jennifer Payne  MRN: [id number]  [address] [facility] 1950  Date of evaluation: 12/17/23  PCP:  Ramon Barrett MD  Note Started: 12:55 PM EST      CHIEF COMPLAINT       Chief Complaint   Patient presents with    Aphasia    Altered Mental Status       HISTORY OF PRESENT ILLNESS  (Location/Symptom, Timing/Onset, Context/Setting, Quality, Duration, Modifying Factors, Severity.)      Cathie Mckee is a 72 y.o. female who presents with altered mental status, concern for stroke, last known well was 10:00 on Friday. Patient has been progressively more altered per  EMS. She has apparently also been developing right-sided upper and lower extremity weakness with a worsening right-sided facial droop. Symptoms seem to get worse today however timing of acute decline is unclear. The patient does have a history of left-sided residual deficits from large MCA stroke. She is on Plavix. Patient is unable to provide any history due to altered mental status    PAST MEDICAL / SURGICAL / SOCIAL / FAMILY HISTORY      has a past medical history of Allergic rhinitis, Anxiety, Asthma, CAD (coronary artery disease), Chronic back pain, Chronic pain, Chronic use of opiate drugs therapeutic purposes, Congestive heart failure ([address]), Depression, Headache(784.0), Hiatal hernia, Hypercholesteremia, Hypertension, Kidney stones, Obesity, Osteoarthritis, Postlaminectomy syndrome, Sacral decubitus ulcer, stage III ([address]), Type II or unspecified type diabetes mellitus without mention of complication, not stated as uncontrolled, Unspecified sleep apnea, and Urinary incontinence. has a past surgical history that includes Spine surgery; Nerve Block (04/23/2012); Nerve Block (05/22/2012); Nerve Block (01/14/2013); Nerve Block (01/21/2013); Nerve Block (01/28/2013); Cardiac catheterization (01/2013); Lumbar spine surgery (2007);  Vena Cava Filter Authorizing Provider   losartan (COZAAR) 50 MG tablet Take 1 tablet by mouth in the morning and at bedtime    Pura Jones MD   metoprolol tartrate (LOPRESSOR) 50 MG tablet Take 1 tablet by mouth 2 times daily    Pura Jones MD   clopidogrel (PLAVIX) 75 MG tablet Take 1 tablet by mouth daily    Pura Jones MD   levETIRAcetam (KEPPRA) 500 MG tablet Take 1 tablet by mouth 2 times daily    Pura Jones MD   gabapentin (NEURONTIN) 100 MG capsule Take 1 capsule by mouth 3 times daily. Pura Jones MD   linagliptin (TRADJENTA) 5 MG tablet Take 1 tablet by mouth daily    Pura Jones MD   ammonium lactate (AMLACTIN) 12 % cream  2/20/21   Pura Jones MD   cloNIDine (CATAPRES) 0.3 MG tablet  4/29/21   Pura Jones MD   Oyster Shell 500 MG TABS Take 1 tablet by mouth daily tjrpigj jose f uriase    Pura Jones MD   ibuprofen (ADVIL;MOTRIN) 800 MG tablet Take 1 tablet by mouth 3 times daily as needed for Pain    Pura Jones MD   gabapentin (NEURONTIN) 100 MG capsule Take 1 capsule by mouth 3 times daily for 120 days.   Patient not taking: Reported on 5/20/2021 2/18/21 6/18/21  Justin Lee MD   clopidogrel (PLAVIX) 75 MG tablet Take 1 tablet by mouth daily 1/22/21 4/22/21  Esme Acuña MD   metoprolol succinate (TOPROL XL) 25 MG extended release tablet Take 2 tablets by mouth daily 1/20/21   Luis Manuel Munroe MD   losartan (COZAAR) 25 MG tablet Take 1 tablet by mouth daily  Patient not taking: Reported on 5/20/2021 12/29/20   Sravani Henderson MD   docusate (COLACE) 50 MG/5ML liquid Take 10 mLs by mouth daily 12/30/20   Sravani Henderson MD   rivastigmine (EXELON) 9.5 MG/24HR Place 1 patch onto the skin daily  Patient not taking: Reported on 5/20/2021    Pura Jones MD   QUEtiapine (SEROQUEL) 25 MG tablet Take 1 tablet by mouth 2 times daily    Pura Jones MD   Calcium Carbonate-Vitamin D (OYSTER SHELL

## 2023-12-17 NOTE — ED NOTES
Pt updated on cot, no distress noted, patient is mumbling to herself. Patient family reports this is normal for her behavior. Patient daughters updated on plan of care, at bedside.      Jenny Sweeney RN  12/17/23 3927

## 2023-12-17 NOTE — H&P
[address]     Department of Internal Medicine - Staff Internal Medicine Teaching Service          ADMISSION NOTE/HISTORY AND PHYSICAL EXAMINATION   Date: 12/17/2023  Patient Name: Jenni Shafer  Date of admission: 12/17/2023 12:49 PM  YOB: 1950  PCP: Melecio Lowe MD  History Obtained From:  patient, family member - daughter, electronic medical record    CHIEF COMPLAINT     Chief complaint: Altered Mental Status    HISTORY OF PRESENTING ILLNESS     The patient is a pleasant 72 y.o. 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 and dysarthria, HTN, HLD, type II DM, YUDITH, anxiety, depression, seizures, CAD s/p stenting to RCA 2016 presents with a chief complaint of confusion, worsening dysarthria and right-sided weakness, brought as a stroke alert, per family they started to notice some altered mentation since Friday 12/5, per EMS worsening of right upper and lower extremity weakness along with right-sided facial droop. In ED patient was found to be slightly hypertensive 152/86, HR 67, saturating well on room air, AAO x 1 to self only, left facial droop, diffuse weakness 0 out of 5 in left upper and lower extremities, no weakness to right upper and lower extremity, CT head showed chronic large right MCA infarct, CTA head suboptimal but shows severe intracranial atherosclerotic disease with 75% stenosis of left proximal cervical ICA, moderate to severe stenosis of cavernous segment ICA and left proximal PCA, occlusion of right proximal PCA, chronic occlusion of distal right M1 and proximal M2, neurology on board stat MRI ordered to rule out acute stroke, EEG to rule out subclinical seizures, elevated troponin 66-60, creatinine 1.3, UA suggestive of UTI. Patient to be admitted to internal medicine inpatient service for further evaluation and management.     Home meds: Aspirin, Lipitor 80, calcium strict I's/O's      UTI  - UA suggestive of UTI  - Urine culture sent, follow-up  - Started on Rocephin      Hx of CAD s/p Stent to RCA    Chronic diastolic CHF    Hx of Hypertension, Hyperlipidemia     Elevated troponin    Prolonged QTc  - Downtrending troponin 66 > 60 > 53  - Likely secondary to GILA  - EKG with NSR, PVC, prolonged QTc 617, 2 g mag, 1 g calcium  - Echo 12/2020 LVEF 50%, moderate to severe LVH, grade 1 DD, mild TR, RVSP 30  - On aspirin, Plavix, Lipitor, Lopressor 50, losartan 50, Lasix 40 at home  - Resume aspirin, Plavix, Lipitor, hold Lopressor, losartan and Lasix for soft BP and GILA  - Echo ordered - follow up  - Avoid all QT prolonging meds (trazodone, Seroquel, Zofran, Zoloft)      Type II DM  - On glimepiride, linagliptin at home - do not resume  - A1c 6.3%  - Hypoglycemia protocol in place.  - Continue to monitor BG, cover with insulin      Right thyroid lobe nodule  - 1.5 cm right thyroid lobe nodule on CT, nonemergent thyroid ultrasound recommended as outpatient  - TSH normal 1.93  - Follow-up with PCP      Anxiety, Depression -on trazodone, Seroquel, Zoloft - Avoid due to prolonged Qtc      DVT ppx: Heparin SQ    PT/OT/SW: Consulted  Discharge Planning:  consulted    Ale Early MD  Internal Medicine Resident, PGY-1  [address];  [place], [place]  12/17/2023, 3:50 PM

## 2023-12-17 NOTE — CONSULTS
[facility] Neurology   [address] [facility]    Neurology Consult Note            Date:   12/17/2023  Patient name:  Juan Galvan  Date of admission:  12/17/2023 12:49 PM  MRN:   [id number]  Account:  [de-identified]  YOB: 1950  PCP:    Jet Devlin MD  Room:   14/14  Code Status:    Prior    Chief Complaint:     Chief Complaint   Patient presents with    Aphasia    Altered Mental Status       History Obtained From:     patient    History of Present Illness: The patient is a 72 y.o. female with significant past medical history of right MCA stroke with hemorrhagic transformation (2020) with residual left hemiparesis and dysarthria, HTN, HLD, T2DM, YUDITH, anxiety, depression, CAD status post stenting who presents with confusion, worsening dysarthria, right-sided weakness. Patient's last normal was Friday 12/15. Per history obtained by EMS, patient had been gradually developing right-sided upper and lower extremity weakness with a worsening right-sided facial droop. Symptoms seem to worsen today. Patient has a previous large right MCA stroke with left-sided residual deficits and follows in the neurology clinic. On exam, patient is awake and oriented to person only. Cranial nerves II to XII intact with the exception of left facial droop and mild dysarthria. Patient also has left homonymous hemianopsia. Patient is diffusely weak with strength 0 out of 5 in left upper and lower extremities (baseline), 3 out of 5 in the right upper extremity, 2 out of 5 right lower extremity (baseline right lower extremity 3 out of 5). Patient is evaluated by stroke team.  CT head showed chronic large right MCA infarct. CTA head and neck was suboptimal due to bolus timing but did reveal occlusion of the right proximal PCA progressed since last study in 2020, near chronic occlusion of the distal right M1 and proximal M2 segments stable.   Moderate to severe stenosis of the Ammonia    Collection Time: 12/17/23 12:54 PM   Result Value Ref Range    Ammonia 33 11 - 51 umol/L   Venous Blood Gas, POC    Collection Time: 12/17/23 12:57 PM   Result Value Ref Range    pH, [place] 7.431 (H) 7.320 - 7.430    pCO2, [place] 32.5 (L) 41.0 - 51.0 mm Hg    pO2, [place] 54.7 (H) 30.0 - 50.0 mm Hg    HCO3, Venous 21.6 (L) 22.0 - 29.0 mmol/L    Negative Base Excess, [place] 2.0 0.0 - 2.0 mmol/L    O2 Sat, [place] 89.3 (H) 60.0 - 85.0 %   ELECTROLYTES PLUS    Collection Time: 12/17/23 12:57 PM   Result Value Ref Range    POC Sodium 139 138 - 146 mmol/L    POC Potassium 4.4 3.5 - 4.5 mmol/L    POC Chloride 109 (H) 98 - 107 mmol/L    POC TCO2 21 (L) 22 - 30 mmol/L    POC Anion Gap 10 7 - 16 mmol/L   Hemoglobin and hematocrit, blood    Collection Time: 12/17/23 12:57 PM   Result Value Ref Range    POC Hemoglobin (calc) 11.6 (L) 12.0 - 16.0 g/dL    POC Hematocrit 34 (L) 36 - 46 %   Creatinine W/GFR Point of Care    Collection Time: 12/17/23 12:57 PM   Result Value Ref Range    POC Creatinine 1.1 0.51 - 1.19 mg/dL    eGFR, POC 54 mL/min/1.73m2   CALCIUM, IONIC (POC)    Collection Time: 12/17/23 12:57 PM   Result Value Ref Range    POC Ionized Calcium 1.05 (L) 1.15 - 1.33 mmol/L   POCT urea (BUN)    Collection Time: 12/17/23 12:57 PM   Result Value Ref Range    POC BUN 54 (H) 8 - 26 mg/dL   Lactic Acid, POC    Collection Time: 12/17/23 12:57 PM   Result Value Ref Range    POC Lactic Acid 1.4 (H) 0.56 - 1.39 mmol/L   POCT Glucose    Collection Time: 12/17/23 12:57 PM   Result Value Ref Range    POC Glucose 117 (H) 74 - 100 mg/dL   Urinalysis with Microscopic    Collection Time: 12/17/23  1:50 PM   Result Value Ref Range    Color, UA Yellow Yellow    Turbidity UA Turbid (A) Clear    Glucose, Ur NEGATIVE NEGATIVE mg/dL    Bilirubin Urine NEGATIVE NEGATIVE    Ketones, Urine NEGATIVE NEGATIVE mg/dL    Specific Gravity, UA 1.019 1.005 - 1.030    Urine Hgb TRACE (A) NEGATIVE    pH, UA 5.0 5.0 - 8.0    Protein, UA TRACE (A) NEGATIVE

## 2023-12-18 PROBLEM — R53.1 WEAKNESS: Status: ACTIVE | Noted: 2023-12-18

## 2023-12-18 LAB
ANION GAP SERPL CALCULATED.3IONS-SCNC: 10 MMOL/L (ref 9–17)
BASOPHILS # BLD: <0.03 K/UL (ref 0–0.2)
BASOPHILS NFR BLD: 0 % (ref 0–2)
BUN SERPL-MCNC: 49 MG/DL (ref 8–23)
CALCIUM SERPL-MCNC: 9.5 MG/DL (ref 8.6–10.4)
CHLORIDE SERPL-SCNC: 102 MMOL/L (ref 98–107)
CO2 SERPL-SCNC: 24 MMOL/L (ref 20–31)
CREAT SERPL-MCNC: 0.6 MG/DL (ref 0.5–0.9)
EKG ATRIAL RATE: 58 BPM
EKG P AXIS: 2 DEGREES
EKG P-R INTERVAL: 148 MS
EKG Q-T INTERVAL: 468 MS
EKG QRS DURATION: 98 MS
EKG QTC CALCULATION (BAZETT): 459 MS
EKG R AXIS: -12 DEGREES
EKG T AXIS: 29 DEGREES
EKG VENTRICULAR RATE: 58 BPM
EOSINOPHIL # BLD: 0.14 K/UL (ref 0–0.44)
EOSINOPHILS RELATIVE PERCENT: 2 % (ref 1–4)
ERYTHROCYTE [DISTWIDTH] IN BLOOD BY AUTOMATED COUNT: 13.8 % (ref 11.8–14.4)
GFR SERPL CREATININE-BSD FRML MDRD: >60 ML/MIN/1.73M2
GLUCOSE SERPL-MCNC: 97 MG/DL (ref 70–99)
HCT VFR BLD AUTO: 38.6 % (ref 36.3–47.1)
HGB BLD-MCNC: 12 G/DL (ref 11.9–15.1)
IMM GRANULOCYTES # BLD AUTO: 0.03 K/UL (ref 0–0.3)
IMM GRANULOCYTES NFR BLD: 0 %
LYMPHOCYTES NFR BLD: 2 K/UL (ref 1.1–3.7)
LYMPHOCYTES RELATIVE PERCENT: 24 % (ref 24–43)
MAGNESIUM SERPL-MCNC: 2.7 MG/DL (ref 1.6–2.6)
MCH RBC QN AUTO: 28.2 PG (ref 25.2–33.5)
MCHC RBC AUTO-ENTMCNC: 31.1 G/DL (ref 28.4–34.8)
MCV RBC AUTO: 90.8 FL (ref 82.6–102.9)
MICROORGANISM SPEC CULT: ABNORMAL
MONOCYTES NFR BLD: 0.63 K/UL (ref 0.1–1.2)
MONOCYTES NFR BLD: 8 % (ref 3–12)
NEUTROPHILS NFR BLD: 66 % (ref 36–65)
NEUTS SEG NFR BLD: 5.57 K/UL (ref 1.5–8.1)
NRBC BLD-RTO: 0 PER 100 WBC
PLATELET # BLD AUTO: 213 K/UL (ref 138–453)
PMV BLD AUTO: 10 FL (ref 8.1–13.5)
POTASSIUM SERPL-SCNC: 4.1 MMOL/L (ref 3.7–5.3)
RBC # BLD AUTO: 4.25 M/UL (ref 3.95–5.11)
SODIUM SERPL-SCNC: 136 MMOL/L (ref 135–144)
SPECIMEN DESCRIPTION: ABNORMAL
WBC OTHER # BLD: 8.4 K/UL (ref 3.5–11.3)

## 2023-12-18 PROCEDURE — 36415 COLL VENOUS BLD VENIPUNCTURE: CPT

## 2023-12-18 PROCEDURE — 6370000000 HC RX 637 (ALT 250 FOR IP)

## 2023-12-18 PROCEDURE — 95718 EEG PHYS/QHP 2-12 HR W/VEEG: CPT | Performed by: PSYCHIATRY & NEUROLOGY

## 2023-12-18 PROCEDURE — 6360000002 HC RX W HCPCS

## 2023-12-18 PROCEDURE — 95714 VEEG EA 12-26 HR UNMNTR: CPT

## 2023-12-18 PROCEDURE — 80048 BASIC METABOLIC PNL TOTAL CA: CPT

## 2023-12-18 PROCEDURE — 99232 SBSQ HOSP IP/OBS MODERATE 35: CPT | Performed by: STUDENT IN AN ORGANIZED HEALTH CARE EDUCATION/TRAINING PROGRAM

## 2023-12-18 PROCEDURE — 1200000000 HC SEMI PRIVATE

## 2023-12-18 PROCEDURE — 2580000003 HC RX 258

## 2023-12-18 PROCEDURE — 95720 EEG PHY/QHP EA INCR W/VEEG: CPT | Performed by: PSYCHIATRY & NEUROLOGY

## 2023-12-18 PROCEDURE — 85025 COMPLETE CBC W/AUTO DIFF WBC: CPT

## 2023-12-18 PROCEDURE — APPSS30 APP SPLIT SHARED TIME 16-30 MINUTES: Performed by: NURSE PRACTITIONER

## 2023-12-18 PROCEDURE — 83735 ASSAY OF MAGNESIUM: CPT

## 2023-12-18 PROCEDURE — 6360000002 HC RX W HCPCS: Performed by: NURSE PRACTITIONER

## 2023-12-18 PROCEDURE — 99223 1ST HOSP IP/OBS HIGH 75: CPT | Performed by: INTERNAL MEDICINE

## 2023-12-18 PROCEDURE — 94640 AIRWAY INHALATION TREATMENT: CPT

## 2023-12-18 PROCEDURE — 92610 EVALUATE SWALLOWING FUNCTION: CPT

## 2023-12-18 PROCEDURE — 93010 ELECTROCARDIOGRAM REPORT: CPT | Performed by: INTERNAL MEDICINE

## 2023-12-18 PROCEDURE — 93005 ELECTROCARDIOGRAM TRACING: CPT

## 2023-12-18 RX ORDER — SODIUM CHLORIDE 9 MG/ML
INJECTION, SOLUTION INTRAVENOUS CONTINUOUS
Status: ACTIVE | OUTPATIENT
Start: 2023-12-18 | End: 2023-12-18

## 2023-12-18 RX ORDER — LABETALOL HYDROCHLORIDE 5 MG/ML
10 INJECTION, SOLUTION INTRAVENOUS ONCE
Status: COMPLETED | OUTPATIENT
Start: 2023-12-18 | End: 2023-12-18

## 2023-12-18 RX ADMIN — Medication 10 MG: at 21:31

## 2023-12-18 RX ADMIN — ASPIRIN 81 MG: 81 TABLET, COATED ORAL at 08:13

## 2023-12-18 RX ADMIN — HEPARIN SODIUM 5000 UNITS: 5000 INJECTION INTRAVENOUS; SUBCUTANEOUS at 05:37

## 2023-12-18 RX ADMIN — HEPARIN SODIUM 5000 UNITS: 5000 INJECTION INTRAVENOUS; SUBCUTANEOUS at 14:45

## 2023-12-18 RX ADMIN — TIOTROPIUM BROMIDE INHALATION SPRAY 2 PUFF: 3.12 SPRAY, METERED RESPIRATORY (INHALATION) at 08:51

## 2023-12-18 RX ADMIN — ATORVASTATIN CALCIUM 80 MG: 80 TABLET, FILM COATED ORAL at 21:30

## 2023-12-18 RX ADMIN — LEVETIRACETAM 500 MG: 500 TABLET, FILM COATED ORAL at 08:13

## 2023-12-18 RX ADMIN — SODIUM CHLORIDE, PRESERVATIVE FREE 10 ML: 5 INJECTION INTRAVENOUS at 08:13

## 2023-12-18 RX ADMIN — HEPARIN SODIUM 5000 UNITS: 5000 INJECTION INTRAVENOUS; SUBCUTANEOUS at 21:30

## 2023-12-18 RX ADMIN — CLOPIDOGREL BISULFATE 75 MG: 75 TABLET ORAL at 08:13

## 2023-12-18 RX ADMIN — LEVETIRACETAM 500 MG: 500 TABLET, FILM COATED ORAL at 21:30

## 2023-12-18 RX ADMIN — Medication 1000 MG: at 08:15

## 2023-12-18 RX ADMIN — SODIUM CHLORIDE: 9 INJECTION, SOLUTION INTRAVENOUS at 08:20

## 2023-12-18 RX ADMIN — SODIUM CHLORIDE, PRESERVATIVE FREE 10 ML: 5 INJECTION INTRAVENOUS at 21:31

## 2023-12-18 NOTE — PLAN OF CARE
Problem: Chronic Conditions and Co-morbidities  Goal: Patient's chronic conditions and co-morbidity symptoms are monitored and maintained or improved  Outcome: Progressing     Problem: Confusion  Goal: Confusion, delirium, dementia, or psychosis is improved or at baseline  Description: INTERVENTIONS:  1. Assess for possible contributors to thought disturbance, including medications, impaired vision or hearing, underlying metabolic abnormalities, dehydration, psychiatric diagnoses, and notify attending LIP  2. [place] high risk fall precautions, as indicated  3. Provide frequent short contacts to provide reality reorientation, refocusing and direction  4. Decrease environmental stimuli, including noise as appropriate  5. Monitor and intervene to maintain adequate nutrition, hydration, elimination, sleep and activity  6. If unable to ensure safety without constant attention obtain sitter and review sitter guidelines with assigned personnel  7. Initiate Psychosocial CNS and Spiritual Care consult, as indicated  Outcome: Progressing     Problem: Skin/Tissue Integrity  Goal: Absence of new skin breakdown  Description: 1. Monitor for areas of redness and/or skin breakdown  2. Assess vascular access sites hourly  3. Every 4-6 hours minimum:  Change oxygen saturation probe site  4. Every 4-6 hours:  If on nasal continuous positive airway pressure, respiratory therapy assess nares and determine need for appliance change or resting period.   Outcome: Progressing     Problem: Safety - Adult  Goal: Free from fall injury  Outcome: Progressing     Problem: Pain  Goal: Verbalizes/displays adequate comfort level or baseline comfort level  Outcome: Progressing     Problem: Respiratory - Adult  Goal: Achieves optimal ventilation and oxygenation  12/18/2023 1809 by Govind Bowling RN  Outcome: Progressing  12/18/2023 1018 by Dary Rojas RCP  Outcome: Progressing

## 2023-12-18 NOTE — CARE COORDINATION
Case Management Assessment  Initial Evaluation    Date/Time of Evaluation: 12/18/2023 4:42 PM  Assessment Completed by: Jamaica Saunders RN    If patient is discharged prior to next notation, then this note serves as note for discharge by case management. Patient Name: Derick Bryant                   YOB: 1950  Diagnosis: Metabolic encephalopathy [P66.73]  Weakness [R53.1]  NSTEMI (non-ST elevated myocardial infarction) ([address]) [I21.4]  Altered mental status, unspecified altered mental status type [R41.82]                   Date / Time: 12/17/2023 12:49 PM    Patient Admission Status: Inpatient   Readmission Risk (Low < 19, Mod (19-27), High > 27): Readmission Risk Score: 15.3    Current PCP: Melony Cole MD  PCP verified by CM? Yes (Dr Bri Padilla)    Chart Reviewed: Yes      History Provided by: Child/Family (DTR Chaparrita)  Patient Orientation:      Patient Cognition:      Hospitalization in the last 30 days (Readmission):  No    If yes, Readmission Assessment in CM Navigator will be completed.     Advance Directives:      Code Status: Full Code   Patient's Primary Decision Maker is:      Primary Decision Maker: Edison Botello  Child - [phone]    Secondary Decision Maker: Korey Burrell  Child - [phone]    Discharge Planning:    Patient lives with: Children Type of Home: House  Primary Care Giver: Family  Patient Support Systems include: Family Members, Children, Home Care Staff, JAMAR/Passport   Current Financial resources: Medicare, Medicaid  Current community resources: ECF/Home Care  Current services prior to admission: Durable Medical Equipment, JAMAR/Passport            Current DME: Wheelchair            Type of Home Care services:  (P) Nursing Services, Aide Services    ADLS  Prior functional level: Assistance with the following:, Bathing, Dressing, Toileting, Feeding, Cooking, Housework, Shopping, Mobility  Current functional level: Assistance with the following:, Bathing, Dressing, patient representative Major Najjar and her family were provided with a choice of provider and agrees with the discharge plan. Freedom of choice list with basic dialogue that supports the patient's individualized plan of care/goals and shares the quality data associated with the providers was provided to: (P) Patient Representative   Patient Representative Name:       The Patient and/or Patient Representative Agree with the Discharge Plan?  (P) Yes    Olga Lidia Cuellar RN  Case Management Department  Ph: [phone]

## 2023-12-18 NOTE — PLAN OF CARE
Problem: Chronic Conditions and Co-morbidities  Goal: Patient's chronic conditions and co-morbidity symptoms are monitored and maintained or improved  Outcome: Progressing     Problem: Confusion  Goal: Confusion, delirium, dementia, or psychosis is improved or at baseline  Description: INTERVENTIONS:  1. Assess for possible contributors to thought disturbance, including medications, impaired vision or hearing, underlying metabolic abnormalities, dehydration, psychiatric diagnoses, and notify attending LIP  2. [place] high risk fall precautions, as indicated  3. Provide frequent short contacts to provide reality reorientation, refocusing and direction  4. Decrease environmental stimuli, including noise as appropriate  5. Monitor and intervene to maintain adequate nutrition, hydration, elimination, sleep and activity  6. If unable to ensure safety without constant attention obtain sitter and review sitter guidelines with assigned personnel  7. Initiate Psychosocial CNS and Spiritual Care consult, as indicated  Outcome: Progressing     Problem: Skin/Tissue Integrity  Goal: Absence of new skin breakdown  Description: 1. Monitor for areas of redness and/or skin breakdown  2. Assess vascular access sites hourly  3. Every 4-6 hours minimum:  Change oxygen saturation probe site  4. Every 4-6 hours:  If on nasal continuous positive airway pressure, respiratory therapy assess nares and determine need for appliance change or resting period.   Outcome: Progressing     Problem: Safety - Adult  Goal: Free from fall injury  Outcome: Progressing

## 2023-12-18 NOTE — PROGRESS NOTES
Assessment: 0-10  Pain Level: 0    Reason for Referral  Leann Noel was referred for a bedside swallow evaluation to assess the efficiency of her swallow function, identify signs and symptoms of aspiration and make recommendations regarding safe dietary consistencies, effective compensatory strategies, and safe eating environment. Impression   Patient presents with probable safe swallow for Dysphagia Pureed (Dysphagia I) diet with Mildly Thick (Nectar) liquids, Meds crushed in applesauce. Daughter and pt report this is pt's baseline diet, they are not interested in completing barium swallow study at this time for potential diet advancement. Pt with x1 cough following larger/sequential sips nectar. Single sips nectar and puree trials with no overt s/s of aspiration. Pt lethargic but cooperative. Recommend small sips and bites, only feed when alert and awake and upright at 90 degrees for all PO intake. Recommend close monitoring for overt/clinical s/s of aspiration and D/C PO intake and complete Modified Barium Swallow Study should they occur. Results and recommendations reported to RN. Dysphagia Outcome Severity Scale: Level 3: Moderate dysphagia- Total assistance, supervision or strategies. Two or more diet consistencies restricted     Treatment Plan  Requires SLP Intervention: Yes   Frequency: 3-5x/week  D/C Recommendations: Ongoing speech therapy is recommended during this hospitalization       Recommended Diet and Intervention  Diet Solids Recommendation: Pureed  Liquid Consistency Recommendation: Mildly Thick (Nectar)      Meds: crushed in puree  Therapeutic Interventions: Diet tolerance monitoring;Patient/Family education;Oral care; Therapeutic PO trials with SLP    Compensatory Swallowing Strategies  Compensatory Swallowing Strategies :  Alternate solids and liquids;Eat/Feed slowly;Upright as possible for all oral intake;Small bites/sips    Treatment/Goals  Short-term Goals  Goal 1: Pt will tolerate recommended diet without overt s/s of aspiration. Goal 2: Pt will implement compensatory strategies for swallowing. General  Chart Reviewed: Yes  Behavior/Cognition: Alert; Cooperative  Communication Observation: Dysarthria  Follows Directions: Simple  Dentition: Some missing teeth  Patient Positioning: Upright in bed (bed unable to position upright at 90 degrees)  Baseline Vocal Quality: Weak  Consistencies Administered: Pureed;Mildly Thick - straw    Vision/Hearing       Oral Motor Deficits  Labial: Left droop  Lingual: No impairment  Consistencies Administered: Pureed;Mildly Thick - straw    Oral Phase Dysfunction  Oral Phase  Oral Phase: Exceptions  Limited dentition solids not trialed as pt only eats puree at baseline. Indicators of Pharyngeal Phase Dysfunction   Pharyngeal Phase  Pharyngeal Phase: Exceptions  Pharyngeal Phase   Pharyngeal Phase: Exceptions  Pt with x1 cough following larger/sequential sips nectar. Single sips nectar and puree trials with no overt s/s of aspiration.     Prognosis  Prognosis: Good  Consulted and agree with results and recommendations: Patient;RN    Education  Patient Education: yes  Patient Education Response: Verbalizes understanding             Therapy Time   [id number]            SINCERE Philip  12/18/2023 11:00 AM

## 2023-12-18 NOTE — PROGRESS NOTES
Occupational [address]  Occupational Therapy Not Seen Note    DATE: 2023    NAME: Dilip Resendiz  MRN: [id number]   : 1950      Patient not seen this date for Occupational Therapy due to:    Patient at baseline functional level with no acute OT needs. Will defer OT evaluation at this time. Please reorder OT if future needs arise. Pt is bedbound and dependent on dtr for all ADL's at home, pt reportedly had major CVA in  and was at a SNF/ECF until 2023 where she moved to dtr's home, dtr is concerned about R knee tibial tuberosity larger than normal and painful during PROM for pt's knee flexion, Home OT/PT stopped 1 month ago.      Next Scheduled Treatment: N/A    Electronically signed by Lubna Candelaria OT on 2023 at 11:12 AM

## 2023-12-18 NOTE — PROCEDURES
LONG-TERM EEG-VIDEO [facility] [address] [employer]    Patient: Derick Bryant  Age: 72 y.o. MRN: [id number]    Referring Physician: No ref. provider found  History: The patient is a 72 y.o. female who presented breakthrough seizure/encephalopathy. This long-term video-EEG monitoring study was performed to determine the nature of the patient's clinical events. The patient is on neuroactive medications.    Derick Bryant   Current Facility-Administered Medications   Medication Dose Route Frequency Provider Last Rate Last Admin    cefTRIAXone (ROCEPHIN) 1000 mg in sterile water 10 mL IV syringe  1,000 mg IntraVENous Q24H Renny Mayberry MD        0.9 % sodium chloride infusion   IntraVENous Continuous Renny Mayberry MD        aspirin EC tablet 81 mg  81 mg Oral Daily Renny Mayberry MD        atorvastatin (LIPITOR) tablet 80 mg  80 mg Oral Nightly Renny Mayberry MD   80 mg at 12/17/23 2246    clopidogrel (PLAVIX) tablet 75 mg  75 mg Oral Daily Renny Mayberry MD        levETIRAcetam (KEPPRA) tablet 500 mg  500 mg Oral BID Renny Mayberry MD   500 mg at 12/17/23 2245    sodium chloride flush 0.9 % injection 5-40 mL  5-40 mL IntraVENous 2 times per day Renny Mayberry MD   10 mL at 12/17/23 2248    sodium chloride flush 0.9 % injection 5-40 mL  5-40 mL IntraVENous PRN Renny Mayberry MD        0.9 % sodium chloride infusion   IntraVENous PRN Renny Mayberry MD        potassium chloride (KLOR-CON M) extended release tablet 40 mEq  40 mEq Oral PRN Renny Mayberry MD        Or    potassium bicarb-citric acid (EFFER-K) effervescent tablet 40 mEq  40 mEq Oral PRN Renny Mayberry MD        Or    potassium chloride 10 mEq/100 mL IVPB (Peripheral Line)  10 mEq IntraVENous PRN Renny Mayberry MD        magnesium sulfate 2000 mg in 50 mL IVPB premix  2,000 mg IntraVENous PRN Renny Mayberry MD        polyethylene glycol (GLYCOLAX) packet 17 g  17 g Oral Daily PRN findings with clinical correlation.

## 2023-12-18 NOTE — PROGRESS NOTES
Physical Therapy        Physical Therapy Cancel Note      DATE: 2023    NAME: Bandar Delaney  MRN: [id number]   : 1950      Patient not seen this date for Physical Therapy due to:    Patient at baseline functional level with no acute PT needs. Will defer PT evaluation at this time. Please reorder PT if future needs arise. Spoke with pt's daughter. Per daughter pt is dependent for all mobility including bed mobility and transfers. Daughter did voice a concern about pt c/o RLE pain. PT notified RN. Will defer PT d/t pt being at baseline.       Electronically signed by Marcella Rivero PT on 2023 at 12:25 PM

## 2023-12-18 NOTE — DISCHARGE INSTR - COC
Continuity of Care Form    Patient Name: Mayito Durham   :  1950  MRN:  [id number]    Admit date:  2023  Discharge date:  ***2023    Code Status Order: Full Code   Advance Directives:     Admitting Physician:  Ruma Taylor MD  PCP: Song Mccurdy MD    Discharging Nurse: Lucio De Oliveira RN  [facility] Unit/Room#: 1066/5867-39  Discharging Unit Phone Number: [phone]    Emergency Contact:   Extended Emergency Contact Information  Primary Emergency Contact: [address]  Phone: [phone]  Relation: Child  Secondary Emergency Contact: Latoya Romero  Address: NOT AVAILABLE  Home Phone: [phone]  Mobile Phone: [phone]  Relation: Child  Preferred language: English   needed? No    Past Surgical History:  Past Surgical History:   Procedure Laterality Date    CARDIAC CATHETERIZATION  2013    patent stents    COLONOSCOPY  2015    normal    CORONARY ANGIOPLASTY WITH STENT PLACEMENT  1012-16    stents x 3 - [employer] stents 1.5T or less    GASTROSTOMY TUBE PLACEMENT  2020    EGD PEG TUBE PLACEMENT    GASTROSTOMY TUBE PLACEMENT N/A 2020    EGD PEG TUBE PLACEMENT performed by Argenis Smith MD at [address] [place] [address]  2021         JOINT REPLACEMENT      left knee    KNEE SURGERY  10/21/2013    rt knee synvisc injection     KNEE SURGERY  2013    knee synvisc injection rt #2    KNEE SURGERY  2013    rt knee synvisc inj    LUMBAR SPINE SURGERY  2007    NERVE BLOCK  2012    Right MBNB L3, L4, L5    NERVE BLOCK  2012    Right MBNB L3, L4, and L5     NERVE BLOCK  2013    Right knee injection #1 - Synvisc    NERVE BLOCK  2013    Right knee injection #2 - Synvisc    NERVE BLOCK  2013    Rigth knee synvisc injection #3    NERVE BLOCK Right 2017    Rt genicular nerve block.  no steroid used    OTHER SURGICAL HISTORY Right 2014    synvisc one knee injection    OTHER SURGICAL Address:  Phone:  Fax:    Dialysis Facility (if applicable)   Name:  Address:  Dialysis Schedule:  Phone:  Fax:    / signature: S Quiroga RN  PHYSICIAN SECTION    Prognosis: Fair    Condition at Discharge: Stable    Rehab Potential (if transferring to Rehab): Fair    Recommended Labs or Other Treatments After Discharge: follow dc instruction      Physician Certification: I certify the above information and transfer of Jules Donnelly  is necessary for the continuing treatment of the diagnosis listed and that she requires [address] for less 30 days.      Update Admission H&P: No change in H&P    PHYSICIAN SIGNATURE:  Ronni Leone MD at 12/23/2023 7;45 AM

## 2023-12-18 NOTE — PROGRESS NOTES
[facility]  Internal Medicine Teaching Residency Program  Inpatient Daily Progress Note  ______________________________________________________________________________    Patient: Scotty Rdz  YOB: 1950   RLI:[id number]    Acct: [de-identified]     Room: [facility]7754-  Admit date: 12/17/2023  Today's date: 12/18/23  Number of days in the hospital: 1    SUBJECTIVE   Admitting Diagnosis: UTI (urinary tract infection)  CC: Altered Mental Status    Pt seen and examined. No acute events overnight. Labs and charts reviewed. Afebrile, hemodynamically stable, saturating well on room air. EKG this a.m. sinus bradycardia, occasional PVCs, QTc 459 has significantly shortened compared to previous EKG, Trop down trending  UOP 1250 ml last 24hrs  Urine cultures growing E. coli, creatinine 1.6 this a.m. GILA resolved, CBC unremarkable  CXR -shallow flexion with basilar opacities subsegmental atelectasis favored over infiltrates  Will give IS. Mentation slightly better this am Aox2 to self and place, still confused  Neurology on board, currently on LTME, following recommendations    Review of Systems  Review of Systems   Constitutional:  Negative for chills and fever. Respiratory:  Negative for cough, shortness of breath and wheezing. Cardiovascular:  Negative for chest pain, palpitations and leg swelling (mild). Gastrointestinal:  Negative for abdominal distention, abdominal pain, diarrhea, nausea and vomiting. Genitourinary:  Negative for difficulty urinating. Neurological:  Positive for facial asymmetry and weakness. Negative for dizziness and seizures.    Psychiatric/Behavioral:  Positive for confusion    BRIEF HISTORY     The patient is a pleasant 72 y.o. 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 and dysarthria, HTN, HLD, type II DM, YUDITH, anxiety, depression, seizures, CAD s/p

## 2023-12-19 PROBLEM — I69.30 HISTORY OF CVA WITH RESIDUAL DEFICIT: Status: ACTIVE | Noted: 2023-12-19

## 2023-12-19 LAB
ANION GAP SERPL CALCULATED.3IONS-SCNC: 13 MMOL/L (ref 9–17)
BASOPHILS # BLD: <0.03 K/UL (ref 0–0.2)
BASOPHILS NFR BLD: 0 % (ref 0–2)
BUN SERPL-MCNC: 29 MG/DL (ref 8–23)
CALCIUM SERPL-MCNC: 9.6 MG/DL (ref 8.6–10.4)
CHLORIDE SERPL-SCNC: 108 MMOL/L (ref 98–107)
CO2 SERPL-SCNC: 21 MMOL/L (ref 20–31)
CREAT SERPL-MCNC: 0.5 MG/DL (ref 0.5–0.9)
EOSINOPHIL # BLD: <0.03 K/UL (ref 0–0.44)
EOSINOPHILS RELATIVE PERCENT: 0 % (ref 1–4)
ERYTHROCYTE [DISTWIDTH] IN BLOOD BY AUTOMATED COUNT: 13.2 % (ref 11.8–14.4)
GFR SERPL CREATININE-BSD FRML MDRD: >60 ML/MIN/1.73M2
GLUCOSE BLD-MCNC: 155 MG/DL (ref 65–105)
GLUCOSE BLD-MCNC: 194 MG/DL (ref 65–105)
GLUCOSE BLD-MCNC: 215 MG/DL (ref 65–105)
GLUCOSE BLD-MCNC: 246 MG/DL (ref 65–105)
GLUCOSE SERPL-MCNC: 239 MG/DL (ref 70–99)
HCT VFR BLD AUTO: 40.3 % (ref 36.3–47.1)
HGB BLD-MCNC: 12.5 G/DL (ref 11.9–15.1)
IMM GRANULOCYTES # BLD AUTO: 0.03 K/UL (ref 0–0.3)
IMM GRANULOCYTES NFR BLD: 0 %
LYMPHOCYTES NFR BLD: 0.98 K/UL (ref 1.1–3.7)
LYMPHOCYTES RELATIVE PERCENT: 11 % (ref 24–43)
MCH RBC QN AUTO: 28 PG (ref 25.2–33.5)
MCHC RBC AUTO-ENTMCNC: 31 G/DL (ref 28.4–34.8)
MCV RBC AUTO: 90.2 FL (ref 82.6–102.9)
MONOCYTES NFR BLD: 0.4 K/UL (ref 0.1–1.2)
MONOCYTES NFR BLD: 5 % (ref 3–12)
NEUTROPHILS NFR BLD: 84 % (ref 36–65)
NEUTS SEG NFR BLD: 7.23 K/UL (ref 1.5–8.1)
NRBC BLD-RTO: 0 PER 100 WBC
PLATELET # BLD AUTO: 238 K/UL (ref 138–453)
PMV BLD AUTO: 9.8 FL (ref 8.1–13.5)
POTASSIUM SERPL-SCNC: 3.6 MMOL/L (ref 3.7–5.3)
RBC # BLD AUTO: 4.47 M/UL (ref 3.95–5.11)
SODIUM SERPL-SCNC: 142 MMOL/L (ref 135–144)
WBC OTHER # BLD: 8.7 K/UL (ref 3.5–11.3)

## 2023-12-19 PROCEDURE — 6360000002 HC RX W HCPCS

## 2023-12-19 PROCEDURE — 99232 SBSQ HOSP IP/OBS MODERATE 35: CPT | Performed by: STUDENT IN AN ORGANIZED HEALTH CARE EDUCATION/TRAINING PROGRAM

## 2023-12-19 PROCEDURE — 6370000000 HC RX 637 (ALT 250 FOR IP)

## 2023-12-19 PROCEDURE — 94640 AIRWAY INHALATION TREATMENT: CPT

## 2023-12-19 PROCEDURE — 95720 EEG PHY/QHP EA INCR W/VEEG: CPT | Performed by: PSYCHIATRY & NEUROLOGY

## 2023-12-19 PROCEDURE — 2580000003 HC RX 258

## 2023-12-19 PROCEDURE — 1200000000 HC SEMI PRIVATE

## 2023-12-19 PROCEDURE — 95714 VEEG EA 12-26 HR UNMNTR: CPT

## 2023-12-19 PROCEDURE — 36415 COLL VENOUS BLD VENIPUNCTURE: CPT

## 2023-12-19 PROCEDURE — 82947 ASSAY GLUCOSE BLOOD QUANT: CPT

## 2023-12-19 PROCEDURE — 99232 SBSQ HOSP IP/OBS MODERATE 35: CPT | Performed by: INTERNAL MEDICINE

## 2023-12-19 PROCEDURE — 94760 N-INVAS EAR/PLS OXIMETRY 1: CPT

## 2023-12-19 PROCEDURE — 85025 COMPLETE CBC W/AUTO DIFF WBC: CPT

## 2023-12-19 PROCEDURE — APPSS30 APP SPLIT SHARED TIME 16-30 MINUTES: Performed by: NURSE PRACTITIONER

## 2023-12-19 PROCEDURE — 80048 BASIC METABOLIC PNL TOTAL CA: CPT

## 2023-12-19 PROCEDURE — 80307 DRUG TEST PRSMV CHEM ANLYZR: CPT

## 2023-12-19 RX ORDER — INSULIN LISPRO 100 [IU]/ML
0-4 INJECTION, SOLUTION INTRAVENOUS; SUBCUTANEOUS
Status: DISCONTINUED | OUTPATIENT
Start: 2023-12-19 | End: 2023-12-24 | Stop reason: HOSPADM

## 2023-12-19 RX ORDER — METOPROLOL SUCCINATE 25 MG/1
50 TABLET, EXTENDED RELEASE ORAL DAILY
Status: DISCONTINUED | OUTPATIENT
Start: 2023-12-19 | End: 2023-12-19

## 2023-12-19 RX ORDER — INSULIN LISPRO 100 [IU]/ML
0-4 INJECTION, SOLUTION INTRAVENOUS; SUBCUTANEOUS NIGHTLY
Status: DISCONTINUED | OUTPATIENT
Start: 2023-12-19 | End: 2023-12-24 | Stop reason: HOSPADM

## 2023-12-19 RX ORDER — METOPROLOL TARTRATE 50 MG/1
50 TABLET, FILM COATED ORAL 2 TIMES DAILY
Status: DISCONTINUED | OUTPATIENT
Start: 2023-12-20 | End: 2023-12-22

## 2023-12-19 RX ORDER — LOSARTAN POTASSIUM 50 MG/1
50 TABLET ORAL 2 TIMES DAILY
Status: DISCONTINUED | OUTPATIENT
Start: 2023-12-19 | End: 2023-12-24 | Stop reason: HOSPADM

## 2023-12-19 RX ORDER — LABETALOL HYDROCHLORIDE 5 MG/ML
10 INJECTION, SOLUTION INTRAVENOUS ONCE
Status: COMPLETED | OUTPATIENT
Start: 2023-12-19 | End: 2023-12-19

## 2023-12-19 RX ORDER — LABETALOL HYDROCHLORIDE 5 MG/ML
40 INJECTION, SOLUTION INTRAVENOUS ONCE
Status: COMPLETED | OUTPATIENT
Start: 2023-12-19 | End: 2023-12-19

## 2023-12-19 RX ORDER — SENNA AND DOCUSATE SODIUM 50; 8.6 MG/1; MG/1
2 TABLET, FILM COATED ORAL DAILY PRN
Status: DISCONTINUED | OUTPATIENT
Start: 2023-12-19 | End: 2023-12-24 | Stop reason: HOSPADM

## 2023-12-19 RX ORDER — DEXTROSE MONOHYDRATE 100 MG/ML
INJECTION, SOLUTION INTRAVENOUS CONTINUOUS PRN
Status: DISCONTINUED | OUTPATIENT
Start: 2023-12-19 | End: 2023-12-24 | Stop reason: HOSPADM

## 2023-12-19 RX ORDER — LABETALOL HYDROCHLORIDE 5 MG/ML
10 INJECTION, SOLUTION INTRAVENOUS EVERY 4 HOURS PRN
Status: DISCONTINUED | OUTPATIENT
Start: 2023-12-19 | End: 2023-12-24 | Stop reason: HOSPADM

## 2023-12-19 RX ADMIN — CLOPIDOGREL BISULFATE 75 MG: 75 TABLET ORAL at 09:25

## 2023-12-19 RX ADMIN — SODIUM CHLORIDE, PRESERVATIVE FREE 10 ML: 5 INJECTION INTRAVENOUS at 19:56

## 2023-12-19 RX ADMIN — HEPARIN SODIUM 5000 UNITS: 5000 INJECTION INTRAVENOUS; SUBCUTANEOUS at 21:05

## 2023-12-19 RX ADMIN — SODIUM CHLORIDE, PRESERVATIVE FREE 10 ML: 5 INJECTION INTRAVENOUS at 09:25

## 2023-12-19 RX ADMIN — Medication 40 MG: at 14:05

## 2023-12-19 RX ADMIN — HEPARIN SODIUM 5000 UNITS: 5000 INJECTION INTRAVENOUS; SUBCUTANEOUS at 14:05

## 2023-12-19 RX ADMIN — LEVETIRACETAM 500 MG: 500 TABLET, FILM COATED ORAL at 21:05

## 2023-12-19 RX ADMIN — TIOTROPIUM BROMIDE INHALATION SPRAY 2 PUFF: 3.12 SPRAY, METERED RESPIRATORY (INHALATION) at 07:46

## 2023-12-19 RX ADMIN — INSULIN LISPRO 1 UNITS: 100 INJECTION, SOLUTION INTRAVENOUS; SUBCUTANEOUS at 11:40

## 2023-12-19 RX ADMIN — Medication 10 MG: at 19:58

## 2023-12-19 RX ADMIN — Medication 1000 MG: at 09:25

## 2023-12-19 RX ADMIN — HEPARIN SODIUM 5000 UNITS: 5000 INJECTION INTRAVENOUS; SUBCUTANEOUS at 05:55

## 2023-12-19 RX ADMIN — ASPIRIN 81 MG: 81 TABLET, COATED ORAL at 09:25

## 2023-12-19 RX ADMIN — POTASSIUM BICARBONATE 40 MEQ: 782 TABLET, EFFERVESCENT ORAL at 09:25

## 2023-12-19 RX ADMIN — POLYETHYLENE GLYCOL 3350 17 G: 17 POWDER, FOR SOLUTION ORAL at 11:41

## 2023-12-19 RX ADMIN — ATORVASTATIN CALCIUM 80 MG: 80 TABLET, FILM COATED ORAL at 21:05

## 2023-12-19 RX ADMIN — LEVETIRACETAM 500 MG: 500 TABLET, FILM COATED ORAL at 09:25

## 2023-12-19 RX ADMIN — METOPROLOL SUCCINATE 50 MG: 25 TABLET, EXTENDED RELEASE ORAL at 11:40

## 2023-12-19 RX ADMIN — LOSARTAN POTASSIUM 50 MG: 50 TABLET ORAL at 21:05

## 2023-12-19 RX ADMIN — Medication 10 MG: at 09:43

## 2023-12-19 RX ADMIN — LOSARTAN POTASSIUM 50 MG: 50 TABLET ORAL at 11:40

## 2023-12-19 NOTE — PLAN OF CARE
Problem: Respiratory - Adult  Goal: Achieves optimal ventilation and oxygenation  12/19/2023 0754 by Dez Claire RCP  Outcome: Progressing   BRONCHOSPASM/BRONCHOCONSTRICTION     [x]         IMPROVE AERATION/BREATH SOUNDS  [x]   ADMINISTER BRONCHODILATOR THERAPY AS APPROPRIATE  [x]   ASSESS BREATH SOUNDS  []   IMPLEMENT AEROSOL/MDI PROTOCOL  [x]   PATIENT EDUCATION AS NEEDED

## 2023-12-19 NOTE — PROGRESS NOTES
[facility]  Internal Medicine Teaching Residency Program  Inpatient Daily Progress Note  ______________________________________________________________________________    Patient: Miko Arzola  YOB: 1950   EDN:[id number]    Acct: [de-identified]     Room: [facility]8531-  Admit date: 12/17/2023  Today's date: 12/19/23  Number of days in the hospital: 2    SUBJECTIVE   Admitting Diagnosis: UTI (urinary tract infection)  CC: Altered Mental Status    Pt seen and examined. No acute events overnight. Labs and charts reviewed. Afebrile, hemodynamically stable, saturating well on room air. EKG this a.m. sinus bradycardia, occasional PVCs, QTc 459 has significantly shortened compared to previous EKG, Trop down trending  UOP 1250 ml last 24hrs  Urine cultures growing E. coli, creatinine 1.6 this a.m. GILA resolved, CBC unremarkable  CXR -shallow flexion with basilar opacities subsegmental atelectasis favored over infiltrates  Will give IS. Mentation slightly better this am Aox2 to self and place, still confused  Neurology on board, currently on LTME, following recommendations    Review of Systems  Review of Systems   Constitutional:  Negative for chills and fever. Respiratory:  Negative for cough, shortness of breath and wheezing. Cardiovascular:  Negative for chest pain, palpitations and leg swelling (mild). Gastrointestinal:  Negative for abdominal distention, abdominal pain, diarrhea, nausea and vomiting. Genitourinary:  Negative for difficulty urinating. Neurological:  Positive for facial asymmetry and weakness. Negative for dizziness and seizures.    Psychiatric/Behavioral:  Positive for confusion    BRIEF HISTORY     The patient is a pleasant 72 y.o. 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 and dysarthria, HTN, HLD, type II DM, YUDITH, anxiety, depression, seizures, CAD s/p stroke  Hx of large right MCA  Hx of Seizure new onset May 2023  - Hx of large right MCA stroke with baseline left-sided weakness, left facial droop  - Confused, AO x 1  - CT head - No acute finding, large old right MCA, old left cerebellar lacunar, mild chronic microvascular disease within the periventricular white matter  - CTA - suboptimal, head moderate to severe stenosis of the cavernous segment ICA and left proximal PCA, occlusion of right proximal PCA, near chronic occlusion of distal right M1 and proximal M2, atherosclerotic stenosis 75% of left proximal cervical ICA  - Neurology on boar, appreciate recommendations. - stat limited MRI to rule out acute stroke - reviewed no evidence of acute stroke  - EEG to r/o subclinical seizures, if abnormal consider LTME  - continue Keppra 500 BID, level checked - normal  - Continue aspirin, Plavix, Lipitor  - Neuro checks, Seizure precautions,  monitor mentation       GILA - resolved  - Likely prerenal secondary to dehydration with poor p.o. intake  - Renal ultrasound -limited resolved, unremarkable right kidney, left kidney and urinary bladder could not be evaluated.   - Elevated kappa lambda, with normal ratio  - Urine sodium 26, urine urea 483  - 1 bolus 500 cc, with IVF 75 cc/h  - Monitor UOP, strict I's/O's       UTI  - UA suggestive of UTI  - Urine culture sent, follow-up  - Started on Rocephin       Hx of CAD s/p Stent to RCA    Chronic diastolic CHF    Hx of Hypertension, Hyperlipidemia     Elevated troponin    Prolonged QTc  - Downtrending troponin 66 > 60 > 53  - Likely secondary to GILA  - EKG with NSR, PVC, prolonged QTc 617, 2 g mag, 1 g calcium  - Echo 12/2020 LVEF 50%, moderate to severe LVH, grade 1 DD, mild TR, RVSP 30  - On aspirin, Plavix, Lipitor, Lopressor 50, losartan 50, Lasix 40 at home  - Resume aspirin, Plavix, Lipitor, hold Lopressor, losartan and Lasix for soft BP and GILA  - Echo ordered - follow up  - Avoid all QT prolonging meds (trazodone,

## 2023-12-19 NOTE — PLAN OF CARE
Problem: Chronic Conditions and Co-morbidities  Goal: Patient's chronic conditions and co-morbidity symptoms are monitored and maintained or improved  Outcome: Progressing     Problem: Confusion  Goal: Confusion, delirium, dementia, or psychosis is improved or at baseline  Description: INTERVENTIONS:  1. Assess for possible contributors to thought disturbance, including medications, impaired vision or hearing, underlying metabolic abnormalities, dehydration, psychiatric diagnoses, and notify attending LIP  2. [place] high risk fall precautions, as indicated  3. Provide frequent short contacts to provide reality reorientation, refocusing and direction  4. Decrease environmental stimuli, including noise as appropriate  5. Monitor and intervene to maintain adequate nutrition, hydration, elimination, sleep and activity  6. If unable to ensure safety without constant attention obtain sitter and review sitter guidelines with assigned personnel  7. Initiate Psychosocial CNS and Spiritual Care consult, as indicated  Outcome: Progressing     Problem: Skin/Tissue Integrity  Goal: Absence of new skin breakdown  Description: 1. Monitor for areas of redness and/or skin breakdown  2. Assess vascular access sites hourly  3. Every 4-6 hours minimum:  Change oxygen saturation probe site  4. Every 4-6 hours:  If on nasal continuous positive airway pressure, respiratory therapy assess nares and determine need for appliance change or resting period.   Outcome: Progressing     Problem: Safety - Adult  Goal: Free from fall injury  Outcome: Progressing     Problem: Pain  Goal: Verbalizes/displays adequate comfort level or baseline comfort level  Outcome: Progressing     Problem: Respiratory - Adult  Goal: Achieves optimal ventilation and oxygenation  12/19/2023 1135 by Angela Chino RN  Outcome: Progressing  12/19/2023 2274 by Miranda Solis RCP  Outcome: Progressing

## 2023-12-19 NOTE — PROCEDURES
LONG-TERM EEG-VIDEO [facility] [address] [employer]    Patient: Scotty Rdz  Age: 72 y.o. MRN: [id number]    Referring Physician: No ref. provider found  History: The patient is a 72 y.o. female who presented breakthrough seizure/encephalopathy. This long-term video-EEG monitoring study was performed to determine the nature of the patient's clinical events. The patient is on neuroactive medications.    Scotty Rdz   Current Facility-Administered Medications   Medication Dose Route Frequency Provider Last Rate Last Admin    insulin lispro (HUMALOG) injection vial 0-4 Units  0-4 Units SubCUTAneous TID WC Romy Garcia MD        insulin lispro (HUMALOG) injection vial 0-4 Units  0-4 Units SubCUTAneous Nightly Romy Garcia MD        glucose chewable tablet 16 g  4 tablet Oral PRN Romy Garcia MD        dextrose bolus 10% 125 mL  125 mL IntraVENous PRN Romy Garcia MD        Or    dextrose bolus 10% 250 mL  250 mL IntraVENous PRN Romy Garcia MD        glucagon (rDNA) injection 1 mg  1 mg SubCUTAneous PRN Romy Garcia MD        dextrose 10 % infusion   IntraVENous Continuous PRN Romy Garcia MD        cefTRIAXone (ROCEPHIN) 1000 mg in sterile water 10 mL IV syringe  1,000 mg IntraVENous Q24H Mera Paris MD   1,000 mg at 12/18/23 0815    aspirin EC tablet 81 mg  81 mg Oral Daily Mera Paris MD   81 mg at 12/18/23 0813    atorvastatin (LIPITOR) tablet 80 mg  80 mg Oral Nightly Mera Paris MD   80 mg at 12/18/23 2130    clopidogrel (PLAVIX) tablet 75 mg  75 mg Oral Daily Mera Paris MD   75 mg at 12/18/23 0813    levETIRAcetam (KEPPRA) tablet 500 mg  500 mg Oral BID Mera Paris MD   500 mg at 12/18/23 2130    sodium chloride flush 0.9 % injection 5-40 mL  5-40 mL IntraVENous 2 times per day Mera Paris MD   10 mL at 12/18/23 2131    sodium chloride flush

## 2023-12-20 LAB
ANION GAP SERPL CALCULATED.3IONS-SCNC: 11 MMOL/L (ref 9–17)
BASOPHILS # BLD: <0.03 K/UL (ref 0–0.2)
BASOPHILS NFR BLD: 0 % (ref 0–2)
BUN SERPL-MCNC: 16 MG/DL (ref 8–23)
CALCIUM SERPL-MCNC: 9.6 MG/DL (ref 8.6–10.4)
CHLORIDE SERPL-SCNC: 108 MMOL/L (ref 98–107)
CO2 SERPL-SCNC: 25 MMOL/L (ref 20–31)
CREAT SERPL-MCNC: 0.5 MG/DL (ref 0.5–0.9)
EKG ATRIAL RATE: 67 BPM
EKG P AXIS: 57 DEGREES
EKG P-R INTERVAL: 204 MS
EKG Q-T INTERVAL: 584 MS
EKG QRS DURATION: 98 MS
EKG QTC CALCULATION (BAZETT): 617 MS
EKG R AXIS: -12 DEGREES
EKG T AXIS: 20 DEGREES
EKG VENTRICULAR RATE: 67 BPM
EOSINOPHIL # BLD: 0.05 K/UL (ref 0–0.44)
EOSINOPHILS RELATIVE PERCENT: 1 % (ref 1–4)
ERYTHROCYTE [DISTWIDTH] IN BLOOD BY AUTOMATED COUNT: 13.7 % (ref 11.8–14.4)
GFR SERPL CREATININE-BSD FRML MDRD: >60 ML/MIN/1.73M2
GLUCOSE BLD-MCNC: 169 MG/DL (ref 65–105)
GLUCOSE BLD-MCNC: 174 MG/DL (ref 65–105)
GLUCOSE BLD-MCNC: 178 MG/DL (ref 65–105)
GLUCOSE BLD-MCNC: 226 MG/DL (ref 65–105)
GLUCOSE SERPL-MCNC: 170 MG/DL (ref 70–99)
HCT VFR BLD AUTO: 41.6 % (ref 36.3–47.1)
HGB BLD-MCNC: 13.3 G/DL (ref 11.9–15.1)
IMM GRANULOCYTES # BLD AUTO: 0.03 K/UL (ref 0–0.3)
IMM GRANULOCYTES NFR BLD: 0 %
LYMPHOCYTES NFR BLD: 1.76 K/UL (ref 1.1–3.7)
LYMPHOCYTES RELATIVE PERCENT: 22 % (ref 24–43)
MCH RBC QN AUTO: 28.7 PG (ref 25.2–33.5)
MCHC RBC AUTO-ENTMCNC: 32 G/DL (ref 28.4–34.8)
MCV RBC AUTO: 89.8 FL (ref 82.6–102.9)
MONOCYTES NFR BLD: 0.63 K/UL (ref 0.1–1.2)
MONOCYTES NFR BLD: 8 % (ref 3–12)
NEUTROPHILS NFR BLD: 69 % (ref 36–65)
NEUTS SEG NFR BLD: 5.43 K/UL (ref 1.5–8.1)
NRBC BLD-RTO: 0 PER 100 WBC
PLATELET # BLD AUTO: 270 K/UL (ref 138–453)
PMV BLD AUTO: 9.9 FL (ref 8.1–13.5)
POTASSIUM SERPL-SCNC: 3.7 MMOL/L (ref 3.7–5.3)
RBC # BLD AUTO: 4.63 M/UL (ref 3.95–5.11)
SODIUM SERPL-SCNC: 144 MMOL/L (ref 135–144)
WBC OTHER # BLD: 7.9 K/UL (ref 3.5–11.3)

## 2023-12-20 PROCEDURE — 95714 VEEG EA 12-26 HR UNMNTR: CPT

## 2023-12-20 PROCEDURE — 6360000002 HC RX W HCPCS

## 2023-12-20 PROCEDURE — 36415 COLL VENOUS BLD VENIPUNCTURE: CPT

## 2023-12-20 PROCEDURE — 94640 AIRWAY INHALATION TREATMENT: CPT

## 2023-12-20 PROCEDURE — 85025 COMPLETE CBC W/AUTO DIFF WBC: CPT

## 2023-12-20 PROCEDURE — 6370000000 HC RX 637 (ALT 250 FOR IP): Performed by: NURSE PRACTITIONER

## 2023-12-20 PROCEDURE — 6370000000 HC RX 637 (ALT 250 FOR IP)

## 2023-12-20 PROCEDURE — 80048 BASIC METABOLIC PNL TOTAL CA: CPT

## 2023-12-20 PROCEDURE — 93010 ELECTROCARDIOGRAM REPORT: CPT | Performed by: INTERNAL MEDICINE

## 2023-12-20 PROCEDURE — APPSS30 APP SPLIT SHARED TIME 16-30 MINUTES: Performed by: NURSE PRACTITIONER

## 2023-12-20 PROCEDURE — 2580000003 HC RX 258

## 2023-12-20 PROCEDURE — 6370000000 HC RX 637 (ALT 250 FOR IP): Performed by: INTERNAL MEDICINE

## 2023-12-20 PROCEDURE — 1200000000 HC SEMI PRIVATE

## 2023-12-20 PROCEDURE — 82947 ASSAY GLUCOSE BLOOD QUANT: CPT

## 2023-12-20 PROCEDURE — 99232 SBSQ HOSP IP/OBS MODERATE 35: CPT | Performed by: STUDENT IN AN ORGANIZED HEALTH CARE EDUCATION/TRAINING PROGRAM

## 2023-12-20 PROCEDURE — 95720 EEG PHY/QHP EA INCR W/VEEG: CPT | Performed by: PSYCHIATRY & NEUROLOGY

## 2023-12-20 PROCEDURE — 99232 SBSQ HOSP IP/OBS MODERATE 35: CPT | Performed by: INTERNAL MEDICINE

## 2023-12-20 RX ORDER — RIVASTIGMINE 9.5 MG/24H
1 PATCH, EXTENDED RELEASE TRANSDERMAL DAILY
Status: DISCONTINUED | OUTPATIENT
Start: 2023-12-20 | End: 2023-12-24 | Stop reason: HOSPADM

## 2023-12-20 RX ORDER — GABAPENTIN 100 MG/1
100 CAPSULE ORAL 3 TIMES DAILY
Status: DISCONTINUED | OUTPATIENT
Start: 2023-12-20 | End: 2023-12-24 | Stop reason: HOSPADM

## 2023-12-20 RX ORDER — AMLODIPINE BESYLATE 5 MG/1
5 TABLET ORAL ONCE
Status: COMPLETED | OUTPATIENT
Start: 2023-12-20 | End: 2023-12-20

## 2023-12-20 RX ORDER — ENOXAPARIN SODIUM 100 MG/ML
40 INJECTION SUBCUTANEOUS DAILY
Status: DISCONTINUED | OUTPATIENT
Start: 2023-12-20 | End: 2023-12-24 | Stop reason: HOSPADM

## 2023-12-20 RX ADMIN — Medication 1000 MG: at 09:00

## 2023-12-20 RX ADMIN — SODIUM CHLORIDE, PRESERVATIVE FREE 10 ML: 5 INJECTION INTRAVENOUS at 09:00

## 2023-12-20 RX ADMIN — Medication 10 MG: at 05:01

## 2023-12-20 RX ADMIN — METOPROLOL TARTRATE 50 MG: 50 TABLET, FILM COATED ORAL at 09:00

## 2023-12-20 RX ADMIN — AMLODIPINE BESYLATE 5 MG: 5 TABLET ORAL at 21:30

## 2023-12-20 RX ADMIN — LOSARTAN POTASSIUM 50 MG: 50 TABLET ORAL at 22:06

## 2023-12-20 RX ADMIN — CLOPIDOGREL BISULFATE 75 MG: 75 TABLET ORAL at 09:00

## 2023-12-20 RX ADMIN — Medication 10 MG: at 17:46

## 2023-12-20 RX ADMIN — SODIUM CHLORIDE, PRESERVATIVE FREE 10 ML: 5 INJECTION INTRAVENOUS at 21:56

## 2023-12-20 RX ADMIN — LEVETIRACETAM 500 MG: 500 TABLET, FILM COATED ORAL at 21:56

## 2023-12-20 RX ADMIN — GABAPENTIN 100 MG: 100 CAPSULE ORAL at 22:14

## 2023-12-20 RX ADMIN — TIOTROPIUM BROMIDE INHALATION SPRAY 2 PUFF: 3.12 SPRAY, METERED RESPIRATORY (INHALATION) at 07:47

## 2023-12-20 RX ADMIN — ATORVASTATIN CALCIUM 80 MG: 80 TABLET, FILM COATED ORAL at 21:56

## 2023-12-20 RX ADMIN — METOPROLOL TARTRATE 50 MG: 50 TABLET, FILM COATED ORAL at 21:56

## 2023-12-20 RX ADMIN — ASPIRIN 81 MG: 81 TABLET, COATED ORAL at 09:00

## 2023-12-20 RX ADMIN — ENOXAPARIN SODIUM 40 MG: 100 INJECTION SUBCUTANEOUS at 12:33

## 2023-12-20 RX ADMIN — LEVETIRACETAM 500 MG: 500 TABLET, FILM COATED ORAL at 09:00

## 2023-12-20 RX ADMIN — INSULIN LISPRO 1 UNITS: 100 INJECTION, SOLUTION INTRAVENOUS; SUBCUTANEOUS at 12:33

## 2023-12-20 RX ADMIN — POTASSIUM BICARBONATE 40 MEQ: 782 TABLET, EFFERVESCENT ORAL at 09:00

## 2023-12-20 RX ADMIN — LOSARTAN POTASSIUM 50 MG: 50 TABLET ORAL at 09:00

## 2023-12-20 RX ADMIN — SODIUM CHLORIDE, PRESERVATIVE FREE 10 ML: 5 INJECTION INTRAVENOUS at 05:01

## 2023-12-20 RX ADMIN — HEPARIN SODIUM 5000 UNITS: 5000 INJECTION INTRAVENOUS; SUBCUTANEOUS at 05:01

## 2023-12-20 RX ADMIN — SERTRALINE 75 MG: 25 TABLET, FILM COATED ORAL at 21:30

## 2023-12-20 NOTE — PROGRESS NOTES
[facility]  Internal Medicine Teaching Residency Program  Inpatient Daily Progress Note  ______________________________________________________________________________    Patient: Morgan Seals  YOB: 1950   QAS:[id number]    Acct: [de-identified]     Room: [address] [place] [address]  Admit date: 12/17/2023  Today's date: 12/20/23  Number of days in the hospital: 3    SUBJECTIVE   Admitting Diagnosis: UTI (urinary tract infection)  CC: Altered Mental Status    Pt seen and examined. Aox1, having breakfast with daughter at bedside. Per daughter mentation is almost back to baseline, she is usually more talkative but orientation wise she is like this fluctuating at home as well. No acute events overnight. Labs and charts reviewed. Afebrile, hemodynamically stable, saturating well on room air. BP elevated, home meds of losartan and lopressor resumed, continue to monitor BP and adjust as needed. EKG 11/18 sinus bradycardia, occasional PVCs, QTc 459 has significantly shortened compared to previous EKG, Trop down trending likely was due to GILA  Echo still pending  Urine cultures growing E. Coli, pan-sensitive, continue Rocephin  CXR - shallow flexion with basilar opacities subsegmental atelectasis favored over infiltrates, Will continue IS. Rivastigmine patch resumed. Neurology on board, currently on LTME, following recommendations. Review of Systems  Review of Systems   Constitutional:  Negative for chills and fever. Respiratory:  Negative for cough, shortness of breath and wheezing. Cardiovascular:  Negative for chest pain, palpitations and leg swelling (mild). Gastrointestinal:  Negative for abdominal distention, abdominal pain, diarrhea, nausea and vomiting. Genitourinary:  Negative for difficulty urinating. Neurological:  Positive for facial asymmetry and weakness. Negative for dizziness and seizures.    Psychiatric/Behavioral:  Positive for residual deficit  Resolved Problems:    * No resolved hospital problems. *        Principal Problem:    Altered mental status    Metabolic encephalopathy  - Likely secondary to UTI, stroke and seizure ruled out with CT, MRI and LTME  - Continue to monitor mentation     Concern for stroke  Hx of large right MCA  Hx of Lewy Body disease  Hx of Seizure new onset May 2023  - Hx of large right MCA stroke with baseline left-sided weakness, left facial droop  - CT head - No acute finding, large old right MCA, old left cerebellar lacunar, mild chronic microvascular disease within the periventricular white matter  - CTA - suboptimal, head moderate to severe stenosis of the cavernous segment ICA and left proximal PCA, occlusion of right proximal PCA, near chronic occlusion of distal right M1 and proximal M2, atherosclerotic stenosis 75% of left proximal cervical ICA  - Neurology on boar, appreciate recommendations. - stat limited MRI to rule out acute stroke - reviewed no evidence of acute stroke  - EEG to r/o subclinical seizures, if abnormal consider LTME  - LTME with mild to moderate encephalopathy,   - continue Keppra 500 BID, level checked - normal  - Continue aspirin, Plavix, Lipitor  - Neuro checks, Seizure precautions,  monitor mentation  - Rivastigmine patch resumed        UTI  - UA suggestive of UTI  - Urine culture Growing E. Coli, pan-sensitive  - Continue Rocephin      Dysphagia  - SLP on board, appreciate recs.   - safe for pureed diet with mildly thick liquids  - if any suspicion for aspiration will change to NPO and check MBSS       Hx of CAD s/p Stent to RCA    Chronic diastolic CHF    Hx of Hypertension, Hyperlipidemia     Elevated troponin    Prolonged Qtc - resolved  - Downtrending troponin 66 > 60 > 53, Likely secondary to GILA  - EKG with NSR, PVC, prolonged QTc 617, 2 g mag, 1 g calcium, rpt EKG 12/18 QTc back to normal  - Echo 12/2020 LVEF 50%, moderate to severe LVH, grade 1 DD, mild TR, RVSP 30  - On

## 2023-12-20 NOTE — PLAN OF CARE
Problem: Chronic Conditions and Co-morbidities  Goal: Patient's chronic conditions and co-morbidity symptoms are monitored and maintained or improved  12/20/2023 1615 by Eboni Khan RN  Outcome: Progressing  12/20/2023 0633 by Mookie Bone RN  Outcome: Progressing     Problem: Confusion  Goal: Confusion, delirium, dementia, or psychosis is improved or at baseline  Description: INTERVENTIONS:  1. Assess for possible contributors to thought disturbance, including medications, impaired vision or hearing, underlying metabolic abnormalities, dehydration, psychiatric diagnoses, and notify attending LIP  2. [place] high risk fall precautions, as indicated  3. Provide frequent short contacts to provide reality reorientation, refocusing and direction  4. Decrease environmental stimuli, including noise as appropriate  5. Monitor and intervene to maintain adequate nutrition, hydration, elimination, sleep and activity  6. If unable to ensure safety without constant attention obtain sitter and review sitter guidelines with assigned personnel  7. Initiate Psychosocial CNS and Spiritual Care consult, as indicated  12/20/2023 1615 by Eboni Khan RN  Outcome: Progressing  12/20/2023 0633 by Mookie Bone RN  Outcome: Progressing     Problem: Skin/Tissue Integrity  Goal: Absence of new skin breakdown  Description: 1. Monitor for areas of redness and/or skin breakdown  2. Assess vascular access sites hourly  3. Every 4-6 hours minimum:  Change oxygen saturation probe site  4. Every 4-6 hours:  If on nasal continuous positive airway pressure, respiratory therapy assess nares and determine need for appliance change or resting period.   12/20/2023 1615 by Eboni Khan RN  Outcome: Progressing  12/20/2023 0633 by Mookie Bone RN  Outcome: Progressing     Problem: Safety - Adult  Goal: Free from fall injury  12/20/2023 1615 by Eboni Khan RN  Outcome: Progressing  12/20/2023 0633 by Mookie Bone

## 2023-12-20 NOTE — PLAN OF CARE
Problem: Chronic Conditions and Co-morbidities  Goal: Patient's chronic conditions and co-morbidity symptoms are monitored and maintained or improved  12/20/2023 0633 by Krista Rendon RN  Outcome: Progressing  12/19/2023 1735 by Ashwini Martin RN  Outcome: Progressing     Problem: Confusion  Goal: Confusion, delirium, dementia, or psychosis is improved or at baseline  Description: INTERVENTIONS:  1. Assess for possible contributors to thought disturbance, including medications, impaired vision or hearing, underlying metabolic abnormalities, dehydration, psychiatric diagnoses, and notify attending LIP  2. [place] high risk fall precautions, as indicated  3. Provide frequent short contacts to provide reality reorientation, refocusing and direction  4. Decrease environmental stimuli, including noise as appropriate  5. Monitor and intervene to maintain adequate nutrition, hydration, elimination, sleep and activity  6. If unable to ensure safety without constant attention obtain sitter and review sitter guidelines with assigned personnel  7. Initiate Psychosocial CNS and Spiritual Care consult, as indicated  12/20/2023 4582 by Krista Rendon RN  Outcome: Progressing  12/19/2023 1735 by Ashwini Martin RN  Outcome: Progressing     Problem: Skin/Tissue Integrity  Goal: Absence of new skin breakdown  Description: 1. Monitor for areas of redness and/or skin breakdown  2. Assess vascular access sites hourly  3. Every 4-6 hours minimum:  Change oxygen saturation probe site  4. Every 4-6 hours:  If on nasal continuous positive airway pressure, respiratory therapy assess nares and determine need for appliance change or resting period.   12/20/2023 7811 by Krista Rendon RN  Outcome: Progressing  12/19/2023 1735 by Ashwini Martin RN  Outcome: Progressing     Problem: Safety - Adult  Goal: Free from fall injury  12/20/2023 0633 by Krista Rendon RN  Outcome: Progressing  12/19/2023 1735 by Ashwini Martin

## 2023-12-20 NOTE — PROGRESS NOTES
Neurology Nurse Practitioner Progress Note      INTERVAL HISTORY: This is a 72 y.o.  female admitted 12/17/2023 for worsening confusion. This is a follow-up neurology progress note. The patient was examined and the chart was reviewed. Discussed with the RN. There were no acute events overnight. Patient appeared more awake today; poor eye contact, dysarthric and tangential speech. Residual flaccid left hemiplegia. Followed a few simple commands only. Spoke with the family yesterday; at baseline pt is A&O to self & place (daughter's home) with waxing & waning mentation. HPI: Chandra Bear is a 72 y.o. female with H/O R MCA stroke with residual flaccid L hemiplegia & dysarthria, seizure disorder, Lewy body disease, HTN, HLD, DM, CAD s/p multiple stents, CHF, COPD, chronic pain syndrome, chronic back pain, headaches, cervical spine stenosis, who was admitted 12/17/2023 for worsening confusion. Last known well was 12/15/2023 around 10 AM.  Family called EMS due to progressively worsening confusion. They were also concerned about new onset right-sided weakness and facial droop. Patient has significant history of large R MCA stroke (12/16/2020) with residual flaccid L hemiplegia & dysarthria; is on dual antiplatelets and statin. Patient has had short-term memory issues since the stroke. She is usually oriented to herself and the place. Daughter reported that patient developed hallucinations almost 3 years ago and was diagnosed with Lewy body disease. She was seen by our service in 5/2023 due to new onset seizure disorder; she has been on Keppra 500 mg twice daily. EMS brought the patient to [facility] ER as a stroke alert. No seizures were reported.       enoxaparin  40 mg SubCUTAneous Daily    insulin lispro  0-4 Units SubCUTAneous TID WC    insulin lispro  0-4 Units SubCUTAneous Nightly    losartan  50 mg Oral BID    metoprolol tartrate  50 mg Oral BID    cefTRIAXone (ROCEPHIN) IV  1,000 mg IntraVENous Q24H superimposed on baseline cognitive impairment. MRI limited brain - negative for acute pathology. Patient more awake today; poor eye contact, dysarthric/tangential speech. Followed few simple commands only. Per family pt is usually A&Ox2 (self & daughter's home) with waxing & waning mentation; she is almost at her baseline mentation    Prior history of large R MCA stroke(12/2020) with residual flaccid L hemiplegia & dysarthria    Lewy body disease (diagnosed 3 yrs ago per daughter); on Exelon patch    Seizure disorder (since 5/2023); LTME - mild to moderate encephalopathy    QTc 617 -> 459 (12/18)    Comorbid conditions - HTN, HLD, DM, CAD s/p multiple stents, CHF, COPD, chronic pain syndrome, chronic back pain, headaches, cervical spine stenosis, GERD, schizoaffective disorder, lumbar laminectomy, depression              PLAN:  PM&R team consulted for disposition    D/C LTME    Continue Keppra 500 mg BID PO    Home meds Neurontin 100 mg TID & Zoloft 75 mg QD - ordered    Continue ASA 81 mg QD, Plavix 75 mg QD & Lipitor 80 mg HS    PT/OT    No further neurological testing is required at this time. We will sign off. Please, call with any questions or concerns. Thank you    Please note that this note was generated using a voice recognition dictation software. Although every effort was made to ensure the accuracy of this automated transcription, some errors in transcription may have occurred.

## 2023-12-20 NOTE — PROCEDURES
LONG-TERM EEG-VIDEO [address] [facility] [address]    Patient: Chandra Bear  Age: 72 y.o. MRN: [id number]    Referring Physician: No ref. provider found  History: The patient is a 72 y.o. female who presented breakthrough seizure/encephalopathy. This long-term video-EEG monitoring study was performed to determine the nature of the patient's clinical events. The patient is on neuroactive medications.    Chandra Bear   Current Facility-Administered Medications   Medication Dose Route Frequency Provider Last Rate Last Admin    enoxaparin (LOVENOX) injection 40 mg  40 mg SubCUTAneous Daily Eriberto Richard MD        insulin lispro (HUMALOG) injection vial 0-4 Units  0-4 Units SubCUTAneous TID WC Juana Vera MD   1 Units at 12/19/23 1140    insulin lispro (HUMALOG) injection vial 0-4 Units  0-4 Units SubCUTAneous Nightly Juana Vera MD        glucose chewable tablet 16 g  4 tablet Oral PRN Juana Vera MD        dextrose bolus 10% 125 mL  125 mL IntraVENous PRN Juana Vera MD        Or    dextrose bolus 10% 250 mL  250 mL IntraVENous PRN Juana Vera MD        glucagon (rDNA) injection 1 mg  1 mg SubCUTAneous PRN Juana Vera MD        dextrose 10 % infusion   IntraVENous Continuous PRN Juana Vera MD        losartan (COZAAR) tablet 50 mg  50 mg Oral BID Juanagosia Vera MD   50 mg at 12/20/23 0900    sennosides-docusate sodium (SENOKOT-S) 8.6-50 MG tablet 2 tablet  2 tablet Oral Daily PRN Juana Vera MD        labetalol (NORMODYNE;TRANDATE) injection 10 mg  10 mg IntraVENous Q4H PRN Ankit Ca MD   10 mg at 12/20/23 0501    metoprolol tartrate (LOPRESSOR) tablet 50 mg  50 mg Oral BID Sal Dunham MD   50 mg at 12/20/23 0900    cefTRIAXone (ROCEPHIN) 1000 mg in sterile water 10 mL IV syringe  1,000 mg IntraVENous Q24H Eriberto Richard MD events or seizures. Summary: During this day of recording no events were recorded. The interictal EEG was abnormal due to diffuse polymorphic theta slowing suggesting mild to moderate encephalopathy. Continuous right hemispheric attenuation and polymorphic delta slowing suggested underlying structural defect. Monitoring was continued in order to record the patient's typical events. The EKG channel revealed no abnormalities. Cristo Mckenna MD  Diplomate, American Board of Psychiatry and Neurology  Diplomate, American Board of Clinical Neurophysiology  Diplomate, American Board of Epilepsy     Please note this is a preliminary report and updated daily. The final report will have a summary of behavior and electrographic findings with clinical correlation.

## 2023-12-21 ENCOUNTER — APPOINTMENT (OUTPATIENT)
Age: 73
DRG: 689 | End: 2023-12-21
Payer: COMMERCIAL

## 2023-12-21 LAB
ANION GAP SERPL CALCULATED.3IONS-SCNC: 9 MMOL/L (ref 9–17)
BASOPHILS # BLD: 0.05 K/UL (ref 0–0.2)
BASOPHILS NFR BLD: 1 % (ref 0–2)
BUN SERPL-MCNC: 13 MG/DL (ref 8–23)
CALCIUM SERPL-MCNC: 9.1 MG/DL (ref 8.6–10.4)
CHLORIDE SERPL-SCNC: 109 MMOL/L (ref 98–107)
CO2 SERPL-SCNC: 24 MMOL/L (ref 20–31)
CREAT SERPL-MCNC: 0.4 MG/DL (ref 0.5–0.9)
ECHO AO ROOT DIAM: 3.1 CM
ECHO AO ROOT INDEX: 1.6 CM/M2
ECHO AV AREA PEAK VELOCITY: 3.3 CM2
ECHO AV AREA VTI: 3.2 CM2
ECHO AV AREA/BSA PEAK VELOCITY: 1.7 CM2/M2
ECHO AV AREA/BSA VTI: 1.6 CM2/M2
ECHO AV MEAN GRADIENT: 3 MMHG
ECHO AV MEAN VELOCITY: 0.7 M/S
ECHO AV PEAK GRADIENT: 7 MMHG
ECHO AV PEAK VELOCITY: 1.3 M/S
ECHO AV VELOCITY RATIO: 0.85
ECHO AV VTI: 25 CM
ECHO BSA: 2.03 M2
ECHO EST RA PRESSURE: 3 MMHG
ECHO LA AREA 2C: 24.3 CM2
ECHO LA AREA 4C: 22.3 CM2
ECHO LA DIAMETER INDEX: 2.06 CM/M2
ECHO LA DIAMETER: 4 CM
ECHO LA MAJOR AXIS: 6.2 CM
ECHO LA MINOR AXIS: 5.5 CM
ECHO LA TO AORTIC ROOT RATIO: 1.29
ECHO LA VOL BP: 79 ML (ref 22–52)
ECHO LA VOL MOD A2C: 90 ML (ref 22–52)
ECHO LA VOL MOD A4C: 61 ML (ref 22–52)
ECHO LA VOL/BSA BIPLANE: 41 ML/M2 (ref 16–34)
ECHO LA VOLUME INDEX MOD A2C: 46 ML/M2 (ref 16–34)
ECHO LA VOLUME INDEX MOD A4C: 31 ML/M2 (ref 16–34)
ECHO LV E' LATERAL VELOCITY: 3 CM/S
ECHO LV E' SEPTAL VELOCITY: 4 CM/S
ECHO LV EDV A2C: 72 ML
ECHO LV EDV A4C: 74 ML
ECHO LV EDV INDEX A4C: 38 ML/M2
ECHO LV EDV NDEX A2C: 37 ML/M2
ECHO LV EJECTION FRACTION A2C: 43 %
ECHO LV EJECTION FRACTION A4C: 29 %
ECHO LV EJECTION FRACTION BIPLANE: 39 % (ref 55–100)
ECHO LV ESV A2C: 41 ML
ECHO LV ESV A4C: 53 ML
ECHO LV ESV INDEX A2C: 21 ML/M2
ECHO LV ESV INDEX A4C: 27 ML/M2
ECHO LV FRACTIONAL SHORTENING: 27 % (ref 28–44)
ECHO LV INTERNAL DIMENSION DIASTOLE INDEX: 2.32 CM/M2
ECHO LV INTERNAL DIMENSION DIASTOLIC: 4.5 CM (ref 3.9–5.3)
ECHO LV INTERNAL DIMENSION SYSTOLIC INDEX: 1.7 CM/M2
ECHO LV INTERNAL DIMENSION SYSTOLIC: 3.3 CM
ECHO LV IVSD: 1.4 CM (ref 0.6–0.9)
ECHO LV MASS 2D: 248.4 G (ref 67–162)
ECHO LV MASS INDEX 2D: 128.1 G/M2 (ref 43–95)
ECHO LV POSTERIOR WALL DIASTOLIC: 1.4 CM (ref 0.6–0.9)
ECHO LV RELATIVE WALL THICKNESS RATIO: 0.62
ECHO LVOT AREA: 3.8 CM2
ECHO LVOT AV VTI INDEX: 0.84
ECHO LVOT DIAM: 2.2 CM
ECHO LVOT MEAN GRADIENT: 2 MMHG
ECHO LVOT PEAK GRADIENT: 5 MMHG
ECHO LVOT PEAK VELOCITY: 1.1 M/S
ECHO LVOT STROKE VOLUME INDEX: 41.3 ML/M2
ECHO LVOT SV: 80.2 ML
ECHO LVOT VTI: 21.1 CM
ECHO MV A VELOCITY: 1.33 M/S
ECHO MV AREA VTI: 2.8 CM2
ECHO MV E DECELERATION TIME (DT): 158 MS
ECHO MV E VELOCITY: 0.66 M/S
ECHO MV E/A RATIO: 0.5
ECHO MV E/E' LATERAL: 22
ECHO MV E/E' RATIO (AVERAGED): 19.25
ECHO MV LVOT VTI INDEX: 1.34
ECHO MV MAX VELOCITY: 1.5 M/S
ECHO MV MEAN GRADIENT: 3 MMHG
ECHO MV MEAN VELOCITY: 0.7 M/S
ECHO MV PEAK GRADIENT: 9 MMHG
ECHO MV VTI: 28.3 CM
ECHO PV MAX VELOCITY: 1.3 M/S
ECHO PV PEAK GRADIENT: 7 MMHG
ECHO RIGHT VENTRICULAR SYSTOLIC PRESSURE (RVSP): 32 MMHG
ECHO RV BASAL DIMENSION: 3.1 CM
ECHO RV FREE WALL PEAK S': 16 CM/S
ECHO TV REGURGITANT MAX VELOCITY: 2.7 M/S
ECHO TV REGURGITANT PEAK GRADIENT: 29 MMHG
EOSINOPHIL # BLD: 0.23 K/UL (ref 0–0.44)
EOSINOPHILS RELATIVE PERCENT: 2 % (ref 1–4)
ERYTHROCYTE [DISTWIDTH] IN BLOOD BY AUTOMATED COUNT: 13.9 % (ref 11.8–14.4)
GFR SERPL CREATININE-BSD FRML MDRD: >60 ML/MIN/1.73M2
GLUCOSE BLD-MCNC: 154 MG/DL (ref 65–105)
GLUCOSE BLD-MCNC: 160 MG/DL (ref 65–105)
GLUCOSE BLD-MCNC: 174 MG/DL (ref 65–105)
GLUCOSE BLD-MCNC: 193 MG/DL (ref 65–105)
GLUCOSE SERPL-MCNC: 149 MG/DL (ref 70–99)
HCT VFR BLD AUTO: 39.9 % (ref 36.3–47.1)
HGB BLD-MCNC: 12.3 G/DL (ref 11.9–15.1)
IMM GRANULOCYTES # BLD AUTO: 0.07 K/UL (ref 0–0.3)
IMM GRANULOCYTES NFR BLD: 1 %
LYMPHOCYTES NFR BLD: 3.46 K/UL (ref 1.1–3.7)
LYMPHOCYTES RELATIVE PERCENT: 36 % (ref 24–43)
MCH RBC QN AUTO: 27.8 PG (ref 25.2–33.5)
MCHC RBC AUTO-ENTMCNC: 30.8 G/DL (ref 28.4–34.8)
MCV RBC AUTO: 90.3 FL (ref 82.6–102.9)
MONOCYTES NFR BLD: 1.01 K/UL (ref 0.1–1.2)
MONOCYTES NFR BLD: 11 % (ref 3–12)
NEUTROPHILS NFR BLD: 49 % (ref 36–65)
NEUTS SEG NFR BLD: 4.75 K/UL (ref 1.5–8.1)
NRBC BLD-RTO: 0 PER 100 WBC
PLATELET # BLD AUTO: 238 K/UL (ref 138–453)
PMV BLD AUTO: 9.8 FL (ref 8.1–13.5)
POTASSIUM SERPL-SCNC: 4 MMOL/L (ref 3.7–5.3)
RBC # BLD AUTO: 4.42 M/UL (ref 3.95–5.11)
SODIUM SERPL-SCNC: 142 MMOL/L (ref 135–144)
WBC OTHER # BLD: 9.6 K/UL (ref 3.5–11.3)

## 2023-12-21 PROCEDURE — 99212 OFFICE O/P EST SF 10 MIN: CPT

## 2023-12-21 PROCEDURE — 92526 ORAL FUNCTION THERAPY: CPT

## 2023-12-21 PROCEDURE — 80048 BASIC METABOLIC PNL TOTAL CA: CPT

## 2023-12-21 PROCEDURE — 6370000000 HC RX 637 (ALT 250 FOR IP): Performed by: NURSE PRACTITIONER

## 2023-12-21 PROCEDURE — 6370000000 HC RX 637 (ALT 250 FOR IP)

## 2023-12-21 PROCEDURE — 51798 US URINE CAPACITY MEASURE: CPT

## 2023-12-21 PROCEDURE — 94640 AIRWAY INHALATION TREATMENT: CPT

## 2023-12-21 PROCEDURE — 85025 COMPLETE CBC W/AUTO DIFF WBC: CPT

## 2023-12-21 PROCEDURE — 2580000003 HC RX 258

## 2023-12-21 PROCEDURE — 1200000000 HC SEMI PRIVATE

## 2023-12-21 PROCEDURE — 99232 SBSQ HOSP IP/OBS MODERATE 35: CPT | Performed by: INTERNAL MEDICINE

## 2023-12-21 PROCEDURE — 36415 COLL VENOUS BLD VENIPUNCTURE: CPT

## 2023-12-21 PROCEDURE — 99221 1ST HOSP IP/OBS SF/LOW 40: CPT | Performed by: PHYSICAL MEDICINE & REHABILITATION

## 2023-12-21 PROCEDURE — 94760 N-INVAS EAR/PLS OXIMETRY 1: CPT

## 2023-12-21 PROCEDURE — 6370000000 HC RX 637 (ALT 250 FOR IP): Performed by: INTERNAL MEDICINE

## 2023-12-21 PROCEDURE — 6360000002 HC RX W HCPCS

## 2023-12-21 PROCEDURE — 82947 ASSAY GLUCOSE BLOOD QUANT: CPT

## 2023-12-21 PROCEDURE — 93306 TTE W/DOPPLER COMPLETE: CPT

## 2023-12-21 PROCEDURE — 93306 TTE W/DOPPLER COMPLETE: CPT | Performed by: INTERNAL MEDICINE

## 2023-12-21 RX ORDER — AMLODIPINE BESYLATE 5 MG/1
5 TABLET ORAL DAILY
Status: DISCONTINUED | OUTPATIENT
Start: 2023-12-21 | End: 2023-12-22

## 2023-12-21 RX ADMIN — METOPROLOL TARTRATE 50 MG: 50 TABLET, FILM COATED ORAL at 09:16

## 2023-12-21 RX ADMIN — SODIUM CHLORIDE, PRESERVATIVE FREE 10 ML: 5 INJECTION INTRAVENOUS at 20:15

## 2023-12-21 RX ADMIN — AMLODIPINE BESYLATE 5 MG: 5 TABLET ORAL at 09:16

## 2023-12-21 RX ADMIN — ATORVASTATIN CALCIUM 80 MG: 80 TABLET, FILM COATED ORAL at 20:15

## 2023-12-21 RX ADMIN — Medication 1000 MG: at 09:17

## 2023-12-21 RX ADMIN — GABAPENTIN 100 MG: 100 CAPSULE ORAL at 09:17

## 2023-12-21 RX ADMIN — LOSARTAN POTASSIUM 50 MG: 50 TABLET ORAL at 09:17

## 2023-12-21 RX ADMIN — SERTRALINE 75 MG: 25 TABLET, FILM COATED ORAL at 09:16

## 2023-12-21 RX ADMIN — LOSARTAN POTASSIUM 50 MG: 50 TABLET ORAL at 20:15

## 2023-12-21 RX ADMIN — TIOTROPIUM BROMIDE INHALATION SPRAY 2 PUFF: 3.12 SPRAY, METERED RESPIRATORY (INHALATION) at 09:17

## 2023-12-21 RX ADMIN — ASPIRIN 81 MG: 81 TABLET, COATED ORAL at 09:16

## 2023-12-21 RX ADMIN — ENOXAPARIN SODIUM 40 MG: 100 INJECTION SUBCUTANEOUS at 09:17

## 2023-12-21 RX ADMIN — LEVETIRACETAM 500 MG: 500 TABLET, FILM COATED ORAL at 20:15

## 2023-12-21 RX ADMIN — METOPROLOL TARTRATE 50 MG: 50 TABLET, FILM COATED ORAL at 20:15

## 2023-12-21 RX ADMIN — SODIUM CHLORIDE, PRESERVATIVE FREE 10 ML: 5 INJECTION INTRAVENOUS at 09:17

## 2023-12-21 RX ADMIN — GABAPENTIN 100 MG: 100 CAPSULE ORAL at 20:19

## 2023-12-21 RX ADMIN — LEVETIRACETAM 500 MG: 500 TABLET, FILM COATED ORAL at 09:16

## 2023-12-21 RX ADMIN — DOCUSATE SODIUM 50 MG AND SENNOSIDES 8.6 MG 2 TABLET: 8.6; 5 TABLET, FILM COATED ORAL at 20:15

## 2023-12-21 RX ADMIN — CLOPIDOGREL BISULFATE 75 MG: 75 TABLET ORAL at 09:16

## 2023-12-21 NOTE — PLAN OF CARE
Problem: Chronic Conditions and Co-morbidities  Goal: Patient's chronic conditions and co-morbidity symptoms are monitored and maintained or improved  12/21/2023 0518 by Yokasta Roberts RN  Outcome: Progressing  12/20/2023 1615 by Rene Lorenzana RN  Outcome: Progressing     Problem: Skin/Tissue Integrity  Goal: Absence of new skin breakdown  Description: 1. Monitor for areas of redness and/or skin breakdown  2. Assess vascular access sites hourly  3. Every 4-6 hours minimum:  Change oxygen saturation probe site  4. Every 4-6 hours:  If on nasal continuous positive airway pressure, respiratory therapy assess nares and determine need for appliance change or resting period.   12/21/2023 0518 by Yokasta Roberts RN  Outcome: Progressing  12/20/2023 1615 by Rene Lorenzana RN  Outcome: Progressing     Problem: Safety - Adult  Goal: Free from fall injury  12/21/2023 0518 by Yokasta Roberts RN  Outcome: Progressing  12/20/2023 1615 by Rene Lorenzana RN  Outcome: Progressing     Problem: Pain  Goal: Verbalizes/displays adequate comfort level or baseline comfort level  12/21/2023 0518 by Yokasta Roberts RN  Outcome: Progressing  12/20/2023 1615 by Rene Lorenzana RN  Outcome: Progressing     Problem: Respiratory - Adult  Goal: Achieves optimal ventilation and oxygenation  12/21/2023 0518 by Yokasta Roberts RN  Outcome: Progressing  12/20/2023 1615 by Rene Lorenzana RN  Outcome: Progressing

## 2023-12-21 NOTE — CONSULTS
Physical Medicine & Rehabilitation  Consult Note      Admitting Physician: Ruma Taylor MD    Primary Care Provider: Song Mccurdy MD     Reason for Consult:  Acute Inpatient Rehabilitation    Chief Complaint: Altered mental status    History of Present Illness:  Referring Provider is requesting an evaluation for appropriate placement upon discharge from acute care. Ms. Mayito Durham is a 72 y.o.  female who was admitted to [facility] on 12/17/2023 with Aphasia and Altered Mental Status    79-year-old female with history of right MCA CVA with hemorrhagic transformation 2020 with residual left hemiparesis dysarthria, hypertension, hyperlipidemia, type 2 diabetes, struct of sleep apnea, anxiety, depression, seizures, coronary artery status post stent to right ICA in 2016 presented with altered mental status, worsening dysarthria and right-sided weakness in ER she was alert oriented to self only and diffuse left-sided weakness. CT showed chronic large right MCA infarct    Internal medicine MRI negative for acute stroke change in mental status secondary UTI/GILA?,  Metabolic encephalopathy secondary UTI history of new onset seizures May 2023 continue Keppra, on antibiotics for UTI continue dysphagia diet, diabetic meds, history of right thyroid nodule follow-up as outpatient, GILA resolved    Neurology-recurrent UTI continue aspirin and Plavix    Radiology:  CT HEAD WO CONTRAST    Result Date: 12/18/2023  No acute intracranial abnormality. Large old right MCA infarct. Old left cerebellar lacune. Mild chronic microvascular disease within the periventricular white matter. MRI LIMITED BRAIN    Result Date: 12/17/2023  No acute abnormality. Chronic large right middle cerebral artery territory infarct. XR CHEST PORTABLE    Result Date: 12/17/2023  Shallow inflation with basilar opacities favoring subsegmental atelectasis versus developing infiltrates.      US RETROPERITONEAL COMPLETE    Result Date: mg, Oral, Nightly  clopidogrel (PLAVIX) tablet 75 mg, 75 mg, Oral, Daily  levETIRAcetam (KEPPRA) tablet 500 mg, 500 mg, Oral, BID  sodium chloride flush 0.9 % injection 5-40 mL, 5-40 mL, IntraVENous, 2 times per day  sodium chloride flush 0.9 % injection 5-40 mL, 5-40 mL, IntraVENous, PRN  0.9 % sodium chloride infusion, , IntraVENous, PRN  potassium chloride (KLOR-CON M) extended release tablet 40 mEq, 40 mEq, Oral, PRN **OR** potassium bicarb-citric acid (EFFER-K) effervescent tablet 40 mEq, 40 mEq, Oral, PRN **OR** potassium chloride 10 mEq/100 mL IVPB (Peripheral Line), 10 mEq, IntraVENous, PRN  magnesium sulfate 2000 mg in 50 mL IVPB premix, 2,000 mg, IntraVENous, PRN  polyethylene glycol (GLYCOLAX) packet 17 g, 17 g, Oral, Daily PRN  acetaminophen (TYLENOL) tablet 650 mg, 650 mg, Oral, Q6H PRN **OR** acetaminophen (TYLENOL) suppository 650 mg, 650 mg, Rectal, Q6H PRN  albuterol sulfate HFA (PROVENTIL;VENTOLIN;PROAIR) 108 (90 Base) MCG/ACT inhaler 2 puff, 2 puff, Inhalation, Q6H PRN  tiotropium (SPIRIVA RESPIMAT) 2.5 MCG/ACT inhaler 2 puff, 2 puff, Inhalation, Daily RT    Social History:  Social History     Socioeconomic History    Marital status: Legally      Spouse name: Not on file    Number of children: Not on file    Years of education: Not on file    Highest education level: Not on file   Occupational History    Not on file   Tobacco Use    Smoking status: Former     Current packs/day: 0.00     Types: Cigarettes     Start date: 3/28/2013     Quit date: 3/28/2013     Years since quitting: 10.7    Smokeless tobacco: Never   Substance and Sexual Activity    Alcohol use:  Yes     Alcohol/week: 1.0 standard drink of alcohol     Types: 1 Cans of beer per week     Comment: occasionally    Drug use: No    Sexual activity: Not on file   Other Topics Concern    Not on file   Social History Narrative    Not on file     Social Determinants of Health     Financial Resource Strain: Not on file   Food

## 2023-12-21 NOTE — PROGRESS NOTES
for overt/clinical s/s of aspiration and D/C PO intake and complete Modified Barium Swallow Study should they occur. Results and recommendations reported to RN. Plan:  [x] Continue ST services (decrease frequency to 1x/week)   [] Discharge from ST:        Discharge recommendations: []  Further therapy recommended at discharge. The patient should be able to tolerate at least 3 hours of therapy per day over 5 days or 15 hours over 7 days. [] Further therapy recommended at discharge. [x] No therapy recommended at discharge.          Treatment completed by: Richard Gutierrez, SLP, M.S. Dionne Warner

## 2023-12-21 NOTE — PLAN OF CARE
Problem: Respiratory - Adult  Goal: Achieves optimal ventilation and oxygenation  12/21/2023 0943 by Elizabeth Hutchinson RCP  Outcome: Progressing   BRONCHOSPASM/BRONCHOCONSTRICTION     [x]         IMPROVE AERATION/BREATH SOUNDS  [x]   ADMINISTER BRONCHODILATOR THERAPY AS APPROPRIATE  [x]   ASSESS BREATH SOUNDS  []   IMPLEMENT AEROSOL/MDI PROTOCOL  [x]   PATIENT EDUCATION AS NEEDED

## 2023-12-21 NOTE — PLAN OF CARE
Problem: Chronic Conditions and Co-morbidities  Goal: Patient's chronic conditions and co-morbidity symptoms are monitored and maintained or improved  12/21/2023 1507 by Ivan Swan RN  Outcome: Progressing  12/21/2023 0518 by Leah Velez RN  Outcome: Progressing     Problem: Confusion  Goal: Confusion, delirium, dementia, or psychosis is improved or at baseline  Description: INTERVENTIONS:  1. Assess for possible contributors to thought disturbance, including medications, impaired vision or hearing, underlying metabolic abnormalities, dehydration, psychiatric diagnoses, and notify attending LIP  2. [place] high risk fall precautions, as indicated  3. Provide frequent short contacts to provide reality reorientation, refocusing and direction  4. Decrease environmental stimuli, including noise as appropriate  5. Monitor and intervene to maintain adequate nutrition, hydration, elimination, sleep and activity  6. If unable to ensure safety without constant attention obtain sitter and review sitter guidelines with assigned personnel  7. Initiate Psychosocial CNS and Spiritual Care consult, as indicated  Outcome: Progressing     Problem: Skin/Tissue Integrity  Goal: Absence of new skin breakdown  Description: 1. Monitor for areas of redness and/or skin breakdown  2. Assess vascular access sites hourly  3. Every 4-6 hours minimum:  Change oxygen saturation probe site  4. Every 4-6 hours:  If on nasal continuous positive airway pressure, respiratory therapy assess nares and determine need for appliance change or resting period.   12/21/2023 1507 by Ivan Swan RN  Outcome: Progressing  12/21/2023 0518 by Leah Velez RN  Outcome: Progressing     Problem: Safety - Adult  Goal: Free from fall injury  12/21/2023 1507 by Ivan Swan RN  Outcome: Progressing  12/21/2023 0518 by Leah Velez RN  Outcome: Progressing     Problem: Pain  Goal: Verbalizes/displays adequate comfort level or baseline comfort level  12/21/2023 1507 by Crystal Parham RN  Outcome: Progressing  12/21/2023 0518 by Tomi Smyth RN  Outcome: Progressing     Problem: Respiratory - Adult  Goal: Achieves optimal ventilation and oxygenation  12/21/2023 1507 by Crystal Parham RN  Outcome: Progressing  12/21/2023 0943 by Willam Peralta RCP  Outcome: Progressing  12/21/2023 0518 by Tomi Smyth RN  Outcome: Progressing

## 2023-12-22 LAB
AMPHETAMINE: NEGATIVE NG/ML
ANION GAP SERPL CALCULATED.3IONS-SCNC: 8 MMOL/L (ref 9–17)
BARBITURATES: NEGATIVE NG/ML
BASOPHILS # BLD: 0.06 K/UL (ref 0–0.2)
BASOPHILS NFR BLD: 1 % (ref 0–2)
BENZODIAZEPINES: NEGATIVE NG/ML
BUN SERPL-MCNC: 10 MG/DL (ref 8–23)
BUPRENORPHINE: NEGATIVE NG/ML
CALCIUM SERPL-MCNC: 9.2 MG/DL (ref 8.6–10.4)
CHLORIDE SERPL-SCNC: 106 MMOL/L (ref 98–107)
CO2 SERPL-SCNC: 27 MMOL/L (ref 20–31)
COCAINE: NEGATIVE NG/ML
CREAT SERPL-MCNC: 0.4 MG/DL (ref 0.5–0.9)
DRUGS OF ABUSE COMMENT: NORMAL
EOSINOPHIL # BLD: 0.29 K/UL (ref 0–0.44)
EOSINOPHILS RELATIVE PERCENT: 3 % (ref 1–4)
ERYTHROCYTE [DISTWIDTH] IN BLOOD BY AUTOMATED COUNT: 14.2 % (ref 11.8–14.4)
GFR SERPL CREATININE-BSD FRML MDRD: >60 ML/MIN/1.73M2
GLUCOSE BLD-MCNC: 139 MG/DL (ref 65–105)
GLUCOSE BLD-MCNC: 165 MG/DL (ref 65–105)
GLUCOSE BLD-MCNC: 175 MG/DL (ref 65–105)
GLUCOSE BLD-MCNC: 227 MG/DL (ref 65–105)
GLUCOSE SERPL-MCNC: 124 MG/DL (ref 70–99)
HCT VFR BLD AUTO: 38.2 % (ref 36.3–47.1)
HGB BLD-MCNC: 11.9 G/DL (ref 11.9–15.1)
IMM GRANULOCYTES # BLD AUTO: 0.05 K/UL (ref 0–0.3)
IMM GRANULOCYTES NFR BLD: 0 %
LYMPHOCYTES NFR BLD: 4.06 K/UL (ref 1.1–3.7)
LYMPHOCYTES RELATIVE PERCENT: 36 % (ref 24–43)
MCH RBC QN AUTO: 27.7 PG (ref 25.2–33.5)
MCHC RBC AUTO-ENTMCNC: 31.2 G/DL (ref 28.4–34.8)
MCV RBC AUTO: 89 FL (ref 82.6–102.9)
METHADONE: NEGATIVE NG/ML
METHAMPHETAMINE: NEGATIVE NG/ML
MICROORGANISM SPEC CULT: NORMAL
MICROORGANISM SPEC CULT: NORMAL
MONOCYTES NFR BLD: 1.05 K/UL (ref 0.1–1.2)
MONOCYTES NFR BLD: 9 % (ref 3–12)
NEUTROPHILS NFR BLD: 51 % (ref 36–65)
NEUTS SEG NFR BLD: 5.75 K/UL (ref 1.5–8.1)
NRBC BLD-RTO: 0 PER 100 WBC
OPIATES: NEGATIVE NG/ML
OXYCODONE: NEGATIVE NG/ML
PHENCYCLIDINE: NEGATIVE NG/ML
PLATELET # BLD AUTO: 230 K/UL (ref 138–453)
PMV BLD AUTO: 9.6 FL (ref 8.1–13.5)
POTASSIUM SERPL-SCNC: 4 MMOL/L (ref 3.7–5.3)
RBC # BLD AUTO: 4.29 M/UL (ref 3.95–5.11)
SERVICE CMNT-IMP: NORMAL
SERVICE CMNT-IMP: NORMAL
SODIUM SERPL-SCNC: 141 MMOL/L (ref 135–144)
SPECIMEN DESCRIPTION: NORMAL
SPECIMEN DESCRIPTION: NORMAL
THC: NEGATIVE NG/ML
WBC OTHER # BLD: 11.3 K/UL (ref 3.5–11.3)

## 2023-12-22 PROCEDURE — 85025 COMPLETE CBC W/AUTO DIFF WBC: CPT

## 2023-12-22 PROCEDURE — 6370000000 HC RX 637 (ALT 250 FOR IP)

## 2023-12-22 PROCEDURE — 2580000003 HC RX 258

## 2023-12-22 PROCEDURE — 99232 SBSQ HOSP IP/OBS MODERATE 35: CPT | Performed by: INTERNAL MEDICINE

## 2023-12-22 PROCEDURE — 51798 US URINE CAPACITY MEASURE: CPT

## 2023-12-22 PROCEDURE — 1200000000 HC SEMI PRIVATE

## 2023-12-22 PROCEDURE — 6360000002 HC RX W HCPCS: Performed by: INTERNAL MEDICINE

## 2023-12-22 PROCEDURE — 6370000000 HC RX 637 (ALT 250 FOR IP): Performed by: STUDENT IN AN ORGANIZED HEALTH CARE EDUCATION/TRAINING PROGRAM

## 2023-12-22 PROCEDURE — 6360000002 HC RX W HCPCS

## 2023-12-22 PROCEDURE — 6370000000 HC RX 637 (ALT 250 FOR IP): Performed by: NURSE PRACTITIONER

## 2023-12-22 PROCEDURE — 99222 1ST HOSP IP/OBS MODERATE 55: CPT | Performed by: INTERNAL MEDICINE

## 2023-12-22 PROCEDURE — 36415 COLL VENOUS BLD VENIPUNCTURE: CPT

## 2023-12-22 PROCEDURE — 6370000000 HC RX 637 (ALT 250 FOR IP): Performed by: INTERNAL MEDICINE

## 2023-12-22 PROCEDURE — 94640 AIRWAY INHALATION TREATMENT: CPT

## 2023-12-22 PROCEDURE — 82947 ASSAY GLUCOSE BLOOD QUANT: CPT

## 2023-12-22 PROCEDURE — 80048 BASIC METABOLIC PNL TOTAL CA: CPT

## 2023-12-22 RX ORDER — CARVEDILOL 12.5 MG/1
12.5 TABLET ORAL 2 TIMES DAILY WITH MEALS
Status: DISCONTINUED | OUTPATIENT
Start: 2023-12-22 | End: 2023-12-23

## 2023-12-22 RX ORDER — AMLODIPINE BESYLATE 10 MG/1
10 TABLET ORAL DAILY
Status: DISCONTINUED | OUTPATIENT
Start: 2023-12-22 | End: 2023-12-24 | Stop reason: HOSPADM

## 2023-12-22 RX ADMIN — ASPIRIN 81 MG: 81 TABLET, COATED ORAL at 09:12

## 2023-12-22 RX ADMIN — METOPROLOL TARTRATE 50 MG: 50 TABLET, FILM COATED ORAL at 09:12

## 2023-12-22 RX ADMIN — Medication 1000 MG: at 09:15

## 2023-12-22 RX ADMIN — ENOXAPARIN SODIUM 40 MG: 100 INJECTION SUBCUTANEOUS at 09:11

## 2023-12-22 RX ADMIN — LEVETIRACETAM 500 MG: 500 TABLET, FILM COATED ORAL at 21:19

## 2023-12-22 RX ADMIN — SERTRALINE 75 MG: 25 TABLET, FILM COATED ORAL at 09:12

## 2023-12-22 RX ADMIN — GABAPENTIN 100 MG: 100 CAPSULE ORAL at 21:19

## 2023-12-22 RX ADMIN — GABAPENTIN 100 MG: 100 CAPSULE ORAL at 17:54

## 2023-12-22 RX ADMIN — LEVETIRACETAM 500 MG: 500 TABLET, FILM COATED ORAL at 09:12

## 2023-12-22 RX ADMIN — CARVEDILOL 12.5 MG: 12.5 TABLET, FILM COATED ORAL at 17:54

## 2023-12-22 RX ADMIN — SODIUM CHLORIDE, PRESERVATIVE FREE 10 ML: 5 INJECTION INTRAVENOUS at 09:15

## 2023-12-22 RX ADMIN — GABAPENTIN 100 MG: 100 CAPSULE ORAL at 09:12

## 2023-12-22 RX ADMIN — CLOPIDOGREL BISULFATE 75 MG: 75 TABLET ORAL at 09:12

## 2023-12-22 RX ADMIN — LOSARTAN POTASSIUM 50 MG: 50 TABLET ORAL at 21:19

## 2023-12-22 RX ADMIN — TIOTROPIUM BROMIDE INHALATION SPRAY 2 PUFF: 3.12 SPRAY, METERED RESPIRATORY (INHALATION) at 08:44

## 2023-12-22 RX ADMIN — AMLODIPINE BESYLATE 10 MG: 10 TABLET ORAL at 12:18

## 2023-12-22 RX ADMIN — ATORVASTATIN CALCIUM 80 MG: 80 TABLET, FILM COATED ORAL at 21:19

## 2023-12-22 RX ADMIN — LOSARTAN POTASSIUM 50 MG: 50 TABLET ORAL at 09:12

## 2023-12-22 RX ADMIN — SODIUM CHLORIDE, PRESERVATIVE FREE 10 ML: 5 INJECTION INTRAVENOUS at 21:19

## 2023-12-22 NOTE — PROGRESS NOTES
[address]  Internal Medicine Teaching Residency Program  Inpatient Daily Progress Note  ______________________________________________________________________________    Patient: Gudelia Soliz  YOB: 1950   [id number]:[id number]    Acct: [de-identified]     Room: [facility]6541-  Admit date: 12/17/2023  Today's date: 12/22/23  Number of days in the hospital: 5    SUBJECTIVE   Admitting Diagnosis: UTI (urinary tract infection)  CC: Altered Mental Status    Pt seen and examined. Per daughter mentation is almost back to baseline  No acute events overnight. Labs and charts reviewed - stable  Afebrile, hemodynamically stable, saturating well on room air. BP still elevated but better controlled, on home meds of losartan and lopressor, Amlodipine 5 was added, continue to monitor BP and adjust as needed. EKG 11/18 sinus bradycardia, occasional PVCs, QTc 459 has significantly shortened compared to previous EKG, Trop down trending likely was due to GILA  - Echo 12/21: Moderately reduced LVEF 35 to 40% (updated to 40 - 45% after formal cardiology review ), severe hypokinesia of apical anterior, apical septal, apical inferior and apical lateral severe hypokinesis of apex, abnormal diastolic function  Cardiology consulted for New drop in EF, awaiting further recs  On Rocephin for E. Coli UTI. Neurology signed off, off LTME, recommendations noted and appreciated. Wound ostomy consulted for lower back wound  PM&R recs appreciated, at baseline SNF vs Home with [address] per CM, SNF versus home with home care, likely home with home care as per family request, they are able to provide care 24/7 - pending cardiology evaluation and recommendations    Review of Systems  Review of Systems   Constitutional:  Negative for chills and fever. Respiratory:  Negative for cough, shortness of breath and wheezing.     Cardiovascular:  Negative for chest pain, palpitations and leg swelling

## 2023-12-22 NOTE — PROGRESS NOTES
Comprehensive Nutrition Assessment    Type and Reason for Visit:  RD Nutrition Re-Screen/LOS    Nutrition Recommendations/Plan:   Continue current diet per SLP, Pureed with Mildly Thick liquids. Will send frozen ONS BID (vanilla preferred)  Monitor/encourage PO intake     Malnutrition Assessment:  Malnutrition Status: At risk for malnutrition (Comment) (12/22/23 1152)    Context:  Chronic Illness     Findings of the 6 clinical characteristics of malnutrition:  Energy Intake:  Mild decrease in energy intake (Comment)  Weight Loss:  No significant weight loss     Body Fat Loss:  No significant body fat loss     Muscle Mass Loss:  No significant muscle mass loss    Fluid Accumulation:  Mild Extremities   Strength:  Not Performed    Nutrition Assessment:    Pt seen for LOS. 71 yo F adm UTI. PMH significant for HTN, HLD, T2DM, YUDITH, CAD, COPD. Per SLP (12/21), recommend pureed diet with thin liquids. Pt reports fair PO intake, reports ~25% PO itnake at breakfast this morning. No significant wt change noted per chart review. Pt is agreeable to frozen ONS, vanilla preferred. LBM 12/21. +1 generalized, +2 BLE edema noted. Nutrition Related Findings:    labs/meds reviewed Wound Type: Pressure Injury, Stage II (sacrum)       Current Nutrition Intake & Therapies:    Average Meal Intake: 1-25%  Average Supplements Intake: None Ordered  ADULT DIET; Dysphagia - Pureed; Mildly Thick (Nectar)    Anthropometric Measures:  Height: 157.5 cm (5' 2.01\")  Ideal Body Weight (IBW): 110 lbs (50 kg)       Current Body Weight: 93.9 kg (207 lb 0.2 oz), 188.2 % IBW.     Current BMI (kg/m2): 37.9  Usual Body Weight: 94.4 kg (208 lb 1.8 oz) (5/13/23)  % Weight Change (Calculated): -0.5  Weight Adjustment For: No Adjustment                 BMI Categories: Obese Class 2 (BMI 35.0 -39.9)    Estimated Daily Nutrient Needs:  Energy Requirements Based On: Formula  Weight Used for Energy Requirements: Current  Energy (kcal/day): 1700 to 1900

## 2023-12-22 NOTE — PLAN OF CARE
Problem: Chronic Conditions and Co-morbidities  Goal: Patient's chronic conditions and co-morbidity symptoms are monitored and maintained or improved  12/21/2023 2223 by Kip Cordova RN  Outcome: Progressing    Problem: Confusion  Goal: Confusion, delirium, dementia, or psychosis is improved or at baseline  Description: INTERVENTIONS:  1. Assess for possible contributors to thought disturbance, including medications, impaired vision or hearing, underlying metabolic abnormalities, dehydration, psychiatric diagnoses, and notify attending LIP  2. [place] high risk fall precautions, as indicated  3. Provide frequent short contacts to provide reality reorientation, refocusing and direction  4. Decrease environmental stimuli, including noise as appropriate  5. Monitor and intervene to maintain adequate nutrition, hydration, elimination, sleep and activity  6. If unable to ensure safety without constant attention obtain sitter and review sitter guidelines with assigned personnel  7. Initiate Psychosocial CNS and Spiritual Care consult, as indicated  12/21/2023 2223 by Kip Cordova RN  Outcome: Progressing    Problem: Skin/Tissue Integrity  Goal: Absence of new skin breakdown  Description: 1. Monitor for areas of redness and/or skin breakdown  2. Assess vascular access sites hourly  3. Every 4-6 hours minimum:  Change oxygen saturation probe site  4. Every 4-6 hours:  If on nasal continuous positive airway pressure, respiratory therapy assess nares and determine need for appliance change or resting period.   12/21/2023 2223 by Kip Cordova RN  Outcome: Progressing    Problem: Safety - Adult  Goal: Free from fall injury  12/21/2023 2223 by Kip Cordova RN  Outcome: Progressing

## 2023-12-22 NOTE — CONSULTS
[facility]    Consult / H&P               Today's Date: 12/22/2023  Patient Name: Anda Bosworth  Date of admission: 12/17/2023 12:49 PM  Patient's age: 72 y.o., 1950  Admission Dx: Metabolic encephalopathy [S41.04]  Weakness [R53.1]  NSTEMI (non-ST elevated myocardial infarction) ([address]) [I21.4]  Altered mental status, unspecified altered mental status type [R41.82]    Requesting Physician: Merary Rosales MD    Cardiac Evaluation Reason: New drop in EF    History Obtained From: patient and chart review     History of Present Illness: This patient 72y.o. years old female with past medical history as below. Patient initially was brought to ER for altered mentation and was admitted to hospital for stroke. Neurology has evaluated the patient. An echocardiogram was ordered that resulted with EF of 40 to 45%, wall motion abnormalities. Cardiology has been consulted to evaluate for drop in EF. Patient has history of CAD with prior PCI, most recent in 2016. The cath at that time also showed 100% occlusion of the distal LAD which most likely is the reason of apical hypokinesis. She also have history of ischemic cardiomyopathy with mildly low EF. EKG sinus bradycardia, PVCs. Mild elevation of high sensitive troponins around 60 with flat trend on admission. Patient was in hypertensive urgency on admission. She denies any chest pain or shortness of breath.     Past Medical History:   has a past medical history of Acute renal failure ([address]), Allergic rhinitis, Anxiety, Asthma, CAD (coronary artery disease), Chronic back pain, Chronic obstructive pulmonary disease ([address]), Chronic obstructive pulmonary disease ([address]), Chronic pain, Chronic use of opiate drugs therapeutic purposes, Congestive heart failure ([address]), Depression, Headache(784.0), Hiatal hernia, Hypercholesteremia, Hypertension, Kidney stones, Obesity, Osteoarthritis, Postlaminectomy syndrome, Sacral decubitus ulcer, stage III ([address]),

## 2023-12-22 NOTE — CARE COORDINATION
Transitional planning: PS message to Dr. Jacquie Nazario requesting [facility] order.

## 2023-12-23 VITALS
RESPIRATION RATE: 18 BRPM | DIASTOLIC BLOOD PRESSURE: 83 MMHG | OXYGEN SATURATION: 100 % | TEMPERATURE: 97.9 F | WEIGHT: 207 LBS | HEIGHT: 62 IN | HEART RATE: 72 BPM | BODY MASS INDEX: 38.09 KG/M2 | SYSTOLIC BLOOD PRESSURE: 129 MMHG

## 2023-12-23 LAB
ANION GAP SERPL CALCULATED.3IONS-SCNC: 8 MMOL/L (ref 9–17)
BASOPHILS # BLD: 0.03 K/UL (ref 0–0.2)
BASOPHILS NFR BLD: 0 % (ref 0–2)
BUN SERPL-MCNC: 11 MG/DL (ref 8–23)
CALCIUM SERPL-MCNC: 9.2 MG/DL (ref 8.6–10.4)
CHLORIDE SERPL-SCNC: 105 MMOL/L (ref 98–107)
CO2 SERPL-SCNC: 27 MMOL/L (ref 20–31)
CREAT SERPL-MCNC: 0.4 MG/DL (ref 0.5–0.9)
EOSINOPHIL # BLD: 0.25 K/UL (ref 0–0.44)
EOSINOPHILS RELATIVE PERCENT: 3 % (ref 1–4)
ERYTHROCYTE [DISTWIDTH] IN BLOOD BY AUTOMATED COUNT: 14.1 % (ref 11.8–14.4)
GFR SERPL CREATININE-BSD FRML MDRD: >60 ML/MIN/1.73M2
GLUCOSE BLD-MCNC: 132 MG/DL (ref 65–105)
GLUCOSE BLD-MCNC: 210 MG/DL (ref 65–105)
GLUCOSE BLD-MCNC: 226 MG/DL (ref 65–105)
GLUCOSE SERPL-MCNC: 146 MG/DL (ref 70–99)
HCT VFR BLD AUTO: 36.9 % (ref 36.3–47.1)
HGB BLD-MCNC: 11.7 G/DL (ref 11.9–15.1)
IMM GRANULOCYTES # BLD AUTO: 0.06 K/UL (ref 0–0.3)
IMM GRANULOCYTES NFR BLD: 1 %
LYMPHOCYTES NFR BLD: 2.97 K/UL (ref 1.1–3.7)
LYMPHOCYTES RELATIVE PERCENT: 31 % (ref 24–43)
MCH RBC QN AUTO: 28 PG (ref 25.2–33.5)
MCHC RBC AUTO-ENTMCNC: 31.7 G/DL (ref 28.4–34.8)
MCV RBC AUTO: 88.3 FL (ref 82.6–102.9)
MONOCYTES NFR BLD: 0.72 K/UL (ref 0.1–1.2)
MONOCYTES NFR BLD: 8 % (ref 3–12)
NEUTROPHILS NFR BLD: 57 % (ref 36–65)
NEUTS SEG NFR BLD: 5.63 K/UL (ref 1.5–8.1)
NRBC BLD-RTO: 0 PER 100 WBC
PLATELET # BLD AUTO: 239 K/UL (ref 138–453)
PMV BLD AUTO: 9.8 FL (ref 8.1–13.5)
POTASSIUM SERPL-SCNC: 3.9 MMOL/L (ref 3.7–5.3)
RBC # BLD AUTO: 4.18 M/UL (ref 3.95–5.11)
SODIUM SERPL-SCNC: 140 MMOL/L (ref 135–144)
WBC OTHER # BLD: 9.7 K/UL (ref 3.5–11.3)

## 2023-12-23 PROCEDURE — 99232 SBSQ HOSP IP/OBS MODERATE 35: CPT | Performed by: INTERNAL MEDICINE

## 2023-12-23 PROCEDURE — 6370000000 HC RX 637 (ALT 250 FOR IP)

## 2023-12-23 PROCEDURE — 82947 ASSAY GLUCOSE BLOOD QUANT: CPT

## 2023-12-23 PROCEDURE — 80048 BASIC METABOLIC PNL TOTAL CA: CPT

## 2023-12-23 PROCEDURE — 6370000000 HC RX 637 (ALT 250 FOR IP): Performed by: NURSE PRACTITIONER

## 2023-12-23 PROCEDURE — 6370000000 HC RX 637 (ALT 250 FOR IP): Performed by: INTERNAL MEDICINE

## 2023-12-23 PROCEDURE — 85025 COMPLETE CBC W/AUTO DIFF WBC: CPT

## 2023-12-23 PROCEDURE — 2580000003 HC RX 258

## 2023-12-23 PROCEDURE — 6360000002 HC RX W HCPCS: Performed by: INTERNAL MEDICINE

## 2023-12-23 PROCEDURE — 36415 COLL VENOUS BLD VENIPUNCTURE: CPT

## 2023-12-23 PROCEDURE — 6360000002 HC RX W HCPCS

## 2023-12-23 RX ORDER — CARVEDILOL 25 MG/1
25 TABLET ORAL 2 TIMES DAILY WITH MEALS
Status: DISCONTINUED | OUTPATIENT
Start: 2023-12-23 | End: 2023-12-24 | Stop reason: HOSPADM

## 2023-12-23 RX ORDER — CARVEDILOL 12.5 MG/1
12.5 TABLET ORAL 2 TIMES DAILY WITH MEALS
Qty: 30 TABLET | Refills: 2 | Status: SHIPPED | OUTPATIENT
Start: 2023-12-23

## 2023-12-23 RX ORDER — AMLODIPINE BESYLATE 10 MG/1
10 TABLET ORAL DAILY
Qty: 30 TABLET | Refills: 3 | Status: SHIPPED | OUTPATIENT
Start: 2023-12-24

## 2023-12-23 RX ADMIN — CARVEDILOL 25 MG: 25 TABLET, FILM COATED ORAL at 16:54

## 2023-12-23 RX ADMIN — SERTRALINE 75 MG: 25 TABLET, FILM COATED ORAL at 08:59

## 2023-12-23 RX ADMIN — GABAPENTIN 100 MG: 100 CAPSULE ORAL at 16:54

## 2023-12-23 RX ADMIN — ENOXAPARIN SODIUM 40 MG: 100 INJECTION SUBCUTANEOUS at 09:00

## 2023-12-23 RX ADMIN — GABAPENTIN 100 MG: 100 CAPSULE ORAL at 09:05

## 2023-12-23 RX ADMIN — Medication 1000 MG: at 09:04

## 2023-12-23 RX ADMIN — INSULIN LISPRO 1 UNITS: 100 INJECTION, SOLUTION INTRAVENOUS; SUBCUTANEOUS at 16:58

## 2023-12-23 RX ADMIN — CLOPIDOGREL BISULFATE 75 MG: 75 TABLET ORAL at 08:59

## 2023-12-23 RX ADMIN — SODIUM CHLORIDE, PRESERVATIVE FREE 10 ML: 5 INJECTION INTRAVENOUS at 09:02

## 2023-12-23 RX ADMIN — AMLODIPINE BESYLATE 10 MG: 10 TABLET ORAL at 08:59

## 2023-12-23 RX ADMIN — ASPIRIN 81 MG: 81 TABLET, COATED ORAL at 08:59

## 2023-12-23 RX ADMIN — LOSARTAN POTASSIUM 50 MG: 50 TABLET ORAL at 08:59

## 2023-12-23 RX ADMIN — CARVEDILOL 25 MG: 25 TABLET, FILM COATED ORAL at 09:00

## 2023-12-23 RX ADMIN — LEVETIRACETAM 500 MG: 500 TABLET, FILM COATED ORAL at 09:00

## 2023-12-23 NOTE — DISCHARGE INSTRUCTIONS
You were HERE for new onset altered mental status from your baseline  Neurology evaluated you in patient,you underwent MRI and EEG ,both of them were unremarkable  You were treated with IV ANTIBITOICS FOR UTI  Follow up with cardiology  Follow up with neuro endovacular in 1 week  Continue aspirin and plavix before you see neurologist  Follow up with neurologist   Follow up with your PCP  Return to ED if symptom worsen

## 2023-12-23 NOTE — CARE COORDINATION
Transitional planning     Writer to room to discuss d/c plan, plan is home , will need transportation , no voiced needs, [facility] accepted for home care. 1250 d/c order received, transport request faxed to [address] [facility] for first available. Call to [facility], spoke with after hours , update patient discharging, nurse updated. [facility], lara booked for 1900, daughter updated.

## 2023-12-23 NOTE — PROGRESS NOTES
[facility]  Internal Medicine Teaching Residency Program  Inpatient Daily Progress Note  ______________________________________________________________________________    Patient: Cassie Dominguez  YOB: 1950   ZUR:[id number]    Acct: [de-identified]     Room: [facility]-  Admit date: 12/17/2023  Today's date: 12/23/23  Number of days in the hospital: 6    SUBJECTIVE   Admitting Diagnosis: UTI (urinary tract infection)  CC: Altered Mental Status    Pt seen and examined. mentation is back to baseline, more alert and talkative this morning, wants to go home with her daughter  No acute events overnight. Labs and charts reviewed - stable  Afebrile, hemodynamically stable, saturating well on room air. BP slightly elevated, Lopressor switched to Coreg yesterday, inc coreg to 25, continue losartan 40, Amlodipine 10, check BP later if ok can discharge. - Echo 12/21: Moderately reduced LVEF 35 to 40% (updated to 40 - 45% after formal cardiology review ), severe hypokinesia of apical anterior, apical septal, apical inferior and apical lateral severe hypokinesis of apex, abnormal diastolic function  Cardiology consulted for New drop in EF, known 100% occlusion of distal LAD likely causing apical hypokinesia, known mildly reduced EF, no inpatient workup at this point. On Rocephin for E. Coli UTI last dose 7/24  Neurology signed off, recommendations noted and appreciated. PM&R recs appreciated, at baseline, SNF vs Home with HC  DC planning per CM, SNF versus home with home care, likely home with home care as per family request, they are able to provide care 24/7 - cleared by cardio, can go later today once her BP is better. Review of Systems  Review of Systems   Constitutional:  Negative for chills and fever. Respiratory:  Negative for cough, shortness of breath and wheezing. Cardiovascular:  Negative for chest pain, palpitations and leg swelling (mild). recommended as outpatient  - TSH normal 1.93  - Follow-up with PCP      Anxiety, Depression - on trazodone, Seroquel, Zoloft - Avoid due to prolonged Qtc    - Zoloft and Gabapentin resumed by Neuro. GILA - resolved  - Likely prerenal secondary to dehydration with poor p.o. intake  - Renal ultrasound - limited results, unremarkable right kidney, left kidney and urinary bladder could not be evaluated. - Elevated kappa lambda, with normal ratio  - Urine sodium 26, urine urea 483  - off IVF, Pavon removed  - Monitor UOP, strict I's/O's     - Wound ostomy consulted for lower back wound    DVT ppx: Heparin SQ     PT/OT/SW: on board  Discharge Planning:  on board, appreciate recs, going home with [facility] and family/daughter, later today if BP better. Paola Da Silva MD  Internal Medicine Resident, PGY- [address], [place].

## 2023-12-23 NOTE — PLAN OF CARE
needed   Assess and instruct to report shortness of breath or any respiratory difficulty   Respiratory therapy support as indicated     Problem: Nutrition Deficit:  Goal: Optimize nutritional status  Outcome: Progressing

## 2023-12-24 NOTE — PROGRESS NOTES
CLINICAL PHARMACY NOTE: MEDS TO BEDS    Total # of Prescriptions Filled: 2   The following medications were delivered to the patient:  Amlodipine 10mg  Carvedilol 12.5mg    Additional Documentation:  Delivered by genaro Barth copay

## 2023-12-27 NOTE — ADT AUTH CERT
matter  - CTA - suboptimal, head moderate to severe stenosis of the cavernous segment ICA and left proximal PCA, occlusion of right proximal PCA, near chronic occlusion of distal right M1 and proximal M2, atherosclerotic stenosis 75% of left proximal cervical ICA  - Neurology on boar, appreciate recommendations. - stat limited MRI to rule out acute stroke - reviewed no evidence of acute stroke  - EEG to r/o subclinical seizures, if abnormal consider LTME  - continue Keppra 500 BID, level checked - normal  - Continue aspirin, Plavix, Lipitor  - Neuro checks, Seizure precautions,  monitor mentation     GILA - resolved  - Likely prerenal secondary to dehydration with poor p.o. intake  - Renal ultrasound -limited resolved, unremarkable right kidney, left kidney and urinary bladder could not be evaluated.   - Elevated kappa lambda, with normal ratio  - Urine sodium 26, urine urea 483  - 1 bolus 500 cc, with IVF 75 cc/h  - Monitor UOP, strict I's/O's     UTI  - UA suggestive of UTI  - Urine culture sent, follow-up  - Started on Rocephin     Hx of CAD s/p Stent to RCA    Chronic diastolic CHF    Hx of Hypertension, Hyperlipidemia     Elevated troponin    Prolonged QTc  - Downtrending troponin 66 > 60 > 53  - Likely secondary to GILA  - EKG with NSR, PVC, prolonged QTc 617, 2 g mag, 1 g calcium  - Echo 12/2020 LVEF 50%, moderate to severe LVH, grade 1 DD, mild TR, RVSP 30  - On aspirin, Plavix, Lipitor, Lopressor 50, losartan 50, Lasix 40 at home  - Resume aspirin, Plavix, Lipitor, hold Lopressor, losartan and Lasix for soft BP and GILA  - Echo ordered - follow up  - Avoid all QT prolonging meds (trazodone, Seroquel, Zofran, Zoloft)     Type II DM  - On glimepiride, linagliptin at home - do not resume  - A1c 6.3%  - Hypoglycemia protocol in place.  - Continue to monitor BG, cover with insulin     Right thyroid lobe nodule  - 1.5 cm right thyroid lobe nodule on CT, nonemergent thyroid ultrasound recommended as outpatient  -

## 2024-02-15 ENCOUNTER — TELEPHONE (OUTPATIENT)
Dept: NEUROLOGY | Age: 74
End: 2024-02-15

## 2024-09-24 ENCOUNTER — APPOINTMENT (OUTPATIENT)
Dept: CT IMAGING | Age: 74
DRG: 871 | End: 2024-09-24
Payer: COMMERCIAL

## 2024-09-24 ENCOUNTER — APPOINTMENT (OUTPATIENT)
Dept: GENERAL RADIOLOGY | Age: 74
DRG: 871 | End: 2024-09-24
Payer: COMMERCIAL

## 2024-09-24 ENCOUNTER — HOSPITAL ENCOUNTER (INPATIENT)
Age: 74
LOS: 13 days | Discharge: HOME HEALTH | DRG: 871 | End: 2024-10-07
Attending: EMERGENCY MEDICINE | Admitting: STUDENT IN AN ORGANIZED HEALTH CARE EDUCATION/TRAINING PROGRAM
Payer: COMMERCIAL

## 2024-09-24 DIAGNOSIS — E11.00 HYPEROSMOLAR HYPERGLYCEMIC STATE (HHS) (HCC): ICD-10-CM

## 2024-09-24 DIAGNOSIS — E11.65 TYPE 2 DIABETES MELLITUS WITH HYPERGLYCEMIA, UNSPECIFIED WHETHER LONG TERM INSULIN USE (HCC): ICD-10-CM

## 2024-09-24 DIAGNOSIS — I47.10 SVT (SUPRAVENTRICULAR TACHYCARDIA) (HCC): ICD-10-CM

## 2024-09-24 DIAGNOSIS — R41.82 ALTERED MENTAL STATUS, UNSPECIFIED ALTERED MENTAL STATUS TYPE: ICD-10-CM

## 2024-09-24 DIAGNOSIS — R79.89 ELEVATED TROPONIN: ICD-10-CM

## 2024-09-24 DIAGNOSIS — I48.0 PAF (PAROXYSMAL ATRIAL FIBRILLATION) (HCC): Primary | ICD-10-CM

## 2024-09-24 LAB
AMMONIA PLAS-SCNC: 32 UMOL/L (ref 11–51)
ANION GAP SERPL CALCULATED.3IONS-SCNC: 14 MMOL/L (ref 9–16)
ANION GAP SERPL CALCULATED.3IONS-SCNC: 19 MMOL/L (ref 9–16)
ANION GAP SERPL CALCULATED.3IONS-SCNC: 21 MMOL/L (ref 9–16)
ANION GAP SERPL CALCULATED.3IONS-SCNC: 22 MMOL/L (ref 9–16)
APAP SERPL-MCNC: <5 UG/ML (ref 10–30)
B-OH-BUTYR SERPL-MCNC: 1.47 MMOL/L (ref 0.02–0.27)
BACTERIA URNS QL MICRO: ABNORMAL
BASOPHILS # BLD: <0.03 K/UL (ref 0–0.2)
BASOPHILS NFR BLD: 0 % (ref 0–2)
BILIRUB UR QL STRIP: NEGATIVE
BUN BLD-MCNC: 52 MG/DL (ref 8–26)
BUN SERPL-MCNC: 42 MG/DL (ref 8–23)
BUN SERPL-MCNC: 43 MG/DL (ref 8–23)
BUN SERPL-MCNC: 43 MG/DL (ref 8–23)
BUN SERPL-MCNC: 46 MG/DL (ref 8–23)
CA-I BLD-SCNC: 1.23 MMOL/L (ref 1.15–1.33)
CALCIUM SERPL-MCNC: 8.7 MG/DL (ref 8.6–10.4)
CALCIUM SERPL-MCNC: 8.9 MG/DL (ref 8.6–10.4)
CALCIUM SERPL-MCNC: 9 MG/DL (ref 8.6–10.4)
CALCIUM SERPL-MCNC: 9.7 MG/DL (ref 8.6–10.4)
CASTS #/AREA URNS LPF: ABNORMAL /LPF (ref 0–2)
CASTS #/AREA URNS LPF: ABNORMAL /LPF (ref 0–2)
CHLORIDE BLD-SCNC: 109 MMOL/L (ref 98–107)
CHLORIDE SERPL-SCNC: 106 MMOL/L (ref 98–107)
CHLORIDE SERPL-SCNC: 110 MMOL/L (ref 98–107)
CHLORIDE SERPL-SCNC: 115 MMOL/L (ref 98–107)
CHLORIDE SERPL-SCNC: 116 MMOL/L (ref 98–107)
CK SERPL-CCNC: 488 U/L (ref 26–192)
CLARITY UR: ABNORMAL
CO2 BLD CALC-SCNC: 34 MMOL/L (ref 22–30)
CO2 SERPL-SCNC: 17 MMOL/L (ref 20–31)
CO2 SERPL-SCNC: 19 MMOL/L (ref 20–31)
CO2 SERPL-SCNC: 21 MMOL/L (ref 20–31)
CO2 SERPL-SCNC: 27 MMOL/L (ref 20–31)
COLOR UR: YELLOW
CREAT SERPL-MCNC: 1.6 MG/DL (ref 0.5–0.9)
CREAT SERPL-MCNC: 1.8 MG/DL (ref 0.5–0.9)
CREAT SERPL-MCNC: 1.9 MG/DL (ref 0.5–0.9)
CREAT SERPL-MCNC: 2 MG/DL (ref 0.5–0.9)
EGFR, POC: 34 ML/MIN/1.73M2
EOSINOPHIL # BLD: <0.03 K/UL (ref 0–0.44)
EOSINOPHILS RELATIVE PERCENT: 0 % (ref 1–4)
EPI CELLS #/AREA URNS HPF: ABNORMAL /HPF (ref 0–5)
ERYTHROCYTE [DISTWIDTH] IN BLOOD BY AUTOMATED COUNT: 15.5 % (ref 11.8–14.4)
EST. AVERAGE GLUCOSE BLD GHB EST-MCNC: 295 MG/DL
ETHANOL PERCENT: <0.01 %
ETHANOLAMINE SERPL-MCNC: <10 MG/DL (ref 0–0.08)
GFR, ESTIMATED: 26 ML/MIN/1.73M2
GFR, ESTIMATED: 28 ML/MIN/1.73M2
GFR, ESTIMATED: 30 ML/MIN/1.73M2
GFR, ESTIMATED: 33 ML/MIN/1.73M2
GLUCOSE BLD-MCNC: 223 MG/DL (ref 65–105)
GLUCOSE BLD-MCNC: 267 MG/DL (ref 65–105)
GLUCOSE BLD-MCNC: 311 MG/DL (ref 65–105)
GLUCOSE BLD-MCNC: 336 MG/DL (ref 65–105)
GLUCOSE BLD-MCNC: 377 MG/DL (ref 65–105)
GLUCOSE BLD-MCNC: 408 MG/DL (ref 65–105)
GLUCOSE BLD-MCNC: 437 MG/DL (ref 65–105)
GLUCOSE BLD-MCNC: 441 MG/DL (ref 65–105)
GLUCOSE BLD-MCNC: 495 MG/DL (ref 65–105)
GLUCOSE BLD-MCNC: 513 MG/DL (ref 65–105)
GLUCOSE BLD-MCNC: 596 MG/DL (ref 65–105)
GLUCOSE BLD-MCNC: >600 MG/DL (ref 65–105)
GLUCOSE BLD-MCNC: >700 MG/DL (ref 74–100)
GLUCOSE SERPL-MCNC: 359 MG/DL (ref 74–99)
GLUCOSE SERPL-MCNC: 499 MG/DL (ref 74–99)
GLUCOSE SERPL-MCNC: 752 MG/DL (ref 74–99)
GLUCOSE SERPL-MCNC: 883 MG/DL (ref 74–99)
GLUCOSE UR STRIP-MCNC: ABNORMAL MG/DL
HBA1C MFR BLD: 11.9 % (ref 4–6)
HCO3 VENOUS: 33.6 MMOL/L (ref 22–29)
HCT VFR BLD AUTO: 42.2 % (ref 36.3–47.1)
HCT VFR BLD AUTO: 43 % (ref 36–46)
HGB BLD-MCNC: 12.5 G/DL (ref 11.9–15.1)
HGB UR QL STRIP.AUTO: ABNORMAL
IMM GRANULOCYTES # BLD AUTO: 0.11 K/UL (ref 0–0.3)
IMM GRANULOCYTES NFR BLD: 1 %
INR PPP: 1.2
INR PPP: ABNORMAL
KETONES UR STRIP-MCNC: NEGATIVE MG/DL
LACTIC ACID, SEPSIS WHOLE BLOOD: 3.1 MMOL/L (ref 0.5–1.9)
LACTIC ACID, SEPSIS WHOLE BLOOD: 6.9 MMOL/L (ref 0.5–1.9)
LACTIC ACID, WHOLE BLOOD: 12.8 MMOL/L (ref 0.7–2.1)
LACTIC ACID, WHOLE BLOOD: 7.7 MMOL/L (ref 0.7–2.1)
LEUKOCYTE ESTERASE UR QL STRIP: ABNORMAL
LYMPHOCYTES NFR BLD: 2.88 K/UL (ref 1.1–3.7)
LYMPHOCYTES RELATIVE PERCENT: 20 % (ref 24–43)
MAGNESIUM SERPL-MCNC: 1.9 MG/DL (ref 1.6–2.4)
MAGNESIUM SERPL-MCNC: 2.2 MG/DL (ref 1.6–2.4)
MAGNESIUM SERPL-MCNC: 2.2 MG/DL (ref 1.6–2.4)
MCH RBC QN AUTO: 26.6 PG (ref 25.2–33.5)
MCHC RBC AUTO-ENTMCNC: 29.6 G/DL (ref 28.4–34.8)
MCV RBC AUTO: 89.8 FL (ref 82.6–102.9)
MONOCYTES NFR BLD: 0.96 K/UL (ref 0.1–1.2)
MONOCYTES NFR BLD: 7 % (ref 3–12)
MYOGLOBIN SERPL-MCNC: 896 NG/ML (ref 25–58)
NEUTROPHILS NFR BLD: 73 % (ref 36–65)
NEUTS SEG NFR BLD: 10.6 K/UL (ref 1.5–8.1)
NITRITE UR QL STRIP: NEGATIVE
NRBC BLD-RTO: 0 PER 100 WBC
O2 SAT, VEN: 72 % (ref 60–85)
OSMOLALITY SERPL: 378 MOSM/KG (ref 275–295)
PARTIAL THROMBOPLASTIN TIME: <20 SEC (ref 23–36.5)
PARTIAL THROMBOPLASTIN TIME: ABNORMAL SEC
PCO2 VENOUS: 51.7 MM HG (ref 41–51)
PH UR STRIP: 5.5 [PH] (ref 5–8)
PH VENOUS: 7.42 (ref 7.32–7.43)
PHOSPHATE SERPL-MCNC: 1.6 MG/DL (ref 2.5–4.5)
PHOSPHATE SERPL-MCNC: 1.7 MG/DL (ref 2.5–4.5)
PHOSPHATE SERPL-MCNC: 2.9 MG/DL (ref 2.5–4.5)
PLATELET # BLD AUTO: ABNORMAL K/UL (ref 138–453)
PLATELET, FLUORESCENCE: 238 K/UL (ref 138–453)
PLATELETS.RETICULATED NFR BLD AUTO: 2.4 % (ref 1.1–10.3)
PO2 VENOUS: 38 MM HG (ref 30–50)
POC ANION GAP: 9 MMOL/L (ref 7–16)
POC CREATININE: 1.6 MG/DL (ref 0.51–1.19)
POC HEMOGLOBIN (CALC): 14.7 G/DL (ref 12–16)
POC LACTIC ACID: 2.3 MMOL/L (ref 0.56–1.39)
POSITIVE BASE EXCESS, VEN: 7.5 MMOL/L (ref 0–3)
POTASSIUM BLD-SCNC: 5.3 MMOL/L (ref 3.5–4.5)
POTASSIUM SERPL-SCNC: 3.3 MMOL/L (ref 3.7–5.3)
POTASSIUM SERPL-SCNC: 3.9 MMOL/L (ref 3.7–5.3)
POTASSIUM SERPL-SCNC: 4.1 MMOL/L (ref 3.7–5.3)
POTASSIUM SERPL-SCNC: 4.9 MMOL/L (ref 3.7–5.3)
PROT UR STRIP-MCNC: ABNORMAL MG/DL
PROTHROMBIN TIME: 14.7 SEC (ref 11.7–14.9)
PROTHROMBIN TIME: ABNORMAL SEC (ref 11.7–14.9)
RBC # BLD AUTO: 4.7 M/UL (ref 3.95–5.11)
RBC # BLD: ABNORMAL 10*6/UL
RBC #/AREA URNS HPF: ABNORMAL /HPF (ref 0–2)
SALICYLATES SERPL-MCNC: <0.5 MG/DL (ref 0–10)
SODIUM BLD-SCNC: 151 MMOL/L (ref 138–146)
SODIUM SERPL-SCNC: 147 MMOL/L (ref 136–145)
SODIUM SERPL-SCNC: 150 MMOL/L (ref 136–145)
SODIUM SERPL-SCNC: 154 MMOL/L (ref 136–145)
SODIUM SERPL-SCNC: 156 MMOL/L (ref 136–145)
SP GR UR STRIP: 1.04 (ref 1–1.03)
TROPONIN I SERPL HS-MCNC: 49 NG/L (ref 0–14)
TROPONIN I SERPL HS-MCNC: 56 NG/L (ref 0–14)
TSH SERPL DL<=0.05 MIU/L-ACNC: 2.05 UIU/ML (ref 0.27–4.2)
UROBILINOGEN UR STRIP-ACNC: NORMAL EU/DL (ref 0–1)
WBC #/AREA URNS HPF: ABNORMAL /HPF (ref 0–5)
WBC OTHER # BLD: 14.6 K/UL (ref 3.5–11.3)

## 2024-09-24 PROCEDURE — 85055 RETICULATED PLATELET ASSAY: CPT

## 2024-09-24 PROCEDURE — 82947 ASSAY GLUCOSE BLOOD QUANT: CPT

## 2024-09-24 PROCEDURE — 93005 ELECTROCARDIOGRAM TRACING: CPT

## 2024-09-24 PROCEDURE — 87154 CUL TYP ID BLD PTHGN 6+ TRGT: CPT

## 2024-09-24 PROCEDURE — 85025 COMPLETE CBC W/AUTO DIFF WBC: CPT

## 2024-09-24 PROCEDURE — 82550 ASSAY OF CK (CPK): CPT

## 2024-09-24 PROCEDURE — 85014 HEMATOCRIT: CPT

## 2024-09-24 PROCEDURE — 82330 ASSAY OF CALCIUM: CPT

## 2024-09-24 PROCEDURE — 87641 MR-STAPH DNA AMP PROBE: CPT

## 2024-09-24 PROCEDURE — 99291 CRITICAL CARE FIRST HOUR: CPT | Performed by: INTERNAL MEDICINE

## 2024-09-24 PROCEDURE — 87077 CULTURE AEROBIC IDENTIFY: CPT

## 2024-09-24 PROCEDURE — 87205 SMEAR GRAM STAIN: CPT

## 2024-09-24 PROCEDURE — 80051 ELECTROLYTE PANEL: CPT

## 2024-09-24 PROCEDURE — 87186 SC STD MICRODIL/AGAR DIL: CPT

## 2024-09-24 PROCEDURE — 83605 ASSAY OF LACTIC ACID: CPT

## 2024-09-24 PROCEDURE — 6360000002 HC RX W HCPCS

## 2024-09-24 PROCEDURE — 36415 COLL VENOUS BLD VENIPUNCTURE: CPT

## 2024-09-24 PROCEDURE — 81001 URINALYSIS AUTO W/SCOPE: CPT

## 2024-09-24 PROCEDURE — 80048 BASIC METABOLIC PNL TOTAL CA: CPT

## 2024-09-24 PROCEDURE — 83874 ASSAY OF MYOGLOBIN: CPT

## 2024-09-24 PROCEDURE — 99448 NTRPROF PH1/NTRNET/EHR 21-30: CPT | Performed by: PSYCHIATRY & NEUROLOGY

## 2024-09-24 PROCEDURE — 84100 ASSAY OF PHOSPHORUS: CPT

## 2024-09-24 PROCEDURE — 2580000003 HC RX 258

## 2024-09-24 PROCEDURE — 94761 N-INVAS EAR/PLS OXIMETRY MLT: CPT

## 2024-09-24 PROCEDURE — 6360000004 HC RX CONTRAST MEDICATION

## 2024-09-24 PROCEDURE — 84484 ASSAY OF TROPONIN QUANT: CPT

## 2024-09-24 PROCEDURE — 82553 CREATINE MB FRACTION: CPT

## 2024-09-24 PROCEDURE — 84520 ASSAY OF UREA NITROGEN: CPT

## 2024-09-24 PROCEDURE — 71045 X-RAY EXAM CHEST 1 VIEW: CPT

## 2024-09-24 PROCEDURE — 87088 URINE BACTERIA CULTURE: CPT

## 2024-09-24 PROCEDURE — 6370000000 HC RX 637 (ALT 250 FOR IP)

## 2024-09-24 PROCEDURE — 82565 ASSAY OF CREATININE: CPT

## 2024-09-24 PROCEDURE — 80143 DRUG ASSAY ACETAMINOPHEN: CPT

## 2024-09-24 PROCEDURE — 96374 THER/PROPH/DIAG INJ IV PUSH: CPT

## 2024-09-24 PROCEDURE — 82803 BLOOD GASES ANY COMBINATION: CPT

## 2024-09-24 PROCEDURE — 2500000003 HC RX 250 WO HCPCS

## 2024-09-24 PROCEDURE — G0480 DRUG TEST DEF 1-7 CLASSES: HCPCS

## 2024-09-24 PROCEDURE — 2580000003 HC RX 258: Performed by: INTERNAL MEDICINE

## 2024-09-24 PROCEDURE — 51798 US URINE CAPACITY MEASURE: CPT

## 2024-09-24 PROCEDURE — 85610 PROTHROMBIN TIME: CPT

## 2024-09-24 PROCEDURE — 83735 ASSAY OF MAGNESIUM: CPT

## 2024-09-24 PROCEDURE — 82140 ASSAY OF AMMONIA: CPT

## 2024-09-24 PROCEDURE — 87086 URINE CULTURE/COLONY COUNT: CPT

## 2024-09-24 PROCEDURE — 70450 CT HEAD/BRAIN W/O DYE: CPT

## 2024-09-24 PROCEDURE — 70498 CT ANGIOGRAPHY NECK: CPT

## 2024-09-24 PROCEDURE — 80179 DRUG ASSAY SALICYLATE: CPT

## 2024-09-24 PROCEDURE — 84443 ASSAY THYROID STIM HORMONE: CPT

## 2024-09-24 PROCEDURE — 85730 THROMBOPLASTIN TIME PARTIAL: CPT

## 2024-09-24 PROCEDURE — 87040 BLOOD CULTURE FOR BACTERIA: CPT

## 2024-09-24 PROCEDURE — 2000000000 HC ICU R&B

## 2024-09-24 PROCEDURE — 83036 HEMOGLOBIN GLYCOSYLATED A1C: CPT

## 2024-09-24 PROCEDURE — 99285 EMERGENCY DEPT VISIT HI MDM: CPT

## 2024-09-24 PROCEDURE — 83930 ASSAY OF BLOOD OSMOLALITY: CPT

## 2024-09-24 PROCEDURE — 82010 KETONE BODYS QUAN: CPT

## 2024-09-24 PROCEDURE — 6360000002 HC RX W HCPCS: Performed by: INTERNAL MEDICINE

## 2024-09-24 RX ORDER — IOPAMIDOL 755 MG/ML
90 INJECTION, SOLUTION INTRAVASCULAR
Status: COMPLETED | OUTPATIENT
Start: 2024-09-24 | End: 2024-09-24

## 2024-09-24 RX ORDER — POTASSIUM CHLORIDE 7.45 MG/ML
10 INJECTION INTRAVENOUS PRN
Status: DISCONTINUED | OUTPATIENT
Start: 2024-09-24 | End: 2024-10-07 | Stop reason: HOSPADM

## 2024-09-24 RX ORDER — HEPARIN SODIUM 5000 [USP'U]/ML
5000 INJECTION, SOLUTION INTRAVENOUS; SUBCUTANEOUS EVERY 8 HOURS SCHEDULED
Status: DISCONTINUED | OUTPATIENT
Start: 2024-09-24 | End: 2024-09-25

## 2024-09-24 RX ORDER — 0.9 % SODIUM CHLORIDE 0.9 %
1000 INTRAVENOUS SOLUTION INTRAVENOUS ONCE
Status: DISCONTINUED | OUTPATIENT
Start: 2024-09-24 | End: 2024-09-24

## 2024-09-24 RX ORDER — POLYETHYLENE GLYCOL 3350 17 G/17G
17 POWDER, FOR SOLUTION ORAL DAILY PRN
Status: DISCONTINUED | OUTPATIENT
Start: 2024-09-24 | End: 2024-10-07 | Stop reason: HOSPADM

## 2024-09-24 RX ORDER — MAGNESIUM SULFATE IN WATER 40 MG/ML
2000 INJECTION, SOLUTION INTRAVENOUS PRN
Status: DISCONTINUED | OUTPATIENT
Start: 2024-09-24 | End: 2024-10-07 | Stop reason: HOSPADM

## 2024-09-24 RX ORDER — SODIUM CHLORIDE 450 MG/100ML
INJECTION, SOLUTION INTRAVENOUS CONTINUOUS
Status: DISCONTINUED | OUTPATIENT
Start: 2024-09-24 | End: 2024-09-25

## 2024-09-24 RX ORDER — DEXTROSE MONOHYDRATE AND SODIUM CHLORIDE 5; .45 G/100ML; G/100ML
INJECTION, SOLUTION INTRAVENOUS CONTINUOUS PRN
Status: DISCONTINUED | OUTPATIENT
Start: 2024-09-24 | End: 2024-09-26

## 2024-09-24 RX ORDER — ONDANSETRON 2 MG/ML
4 INJECTION INTRAMUSCULAR; INTRAVENOUS EVERY 6 HOURS PRN
Status: DISCONTINUED | OUTPATIENT
Start: 2024-09-24 | End: 2024-10-07 | Stop reason: HOSPADM

## 2024-09-24 RX ORDER — SODIUM CHLORIDE 450 MG/100ML
INJECTION, SOLUTION INTRAVENOUS
Status: COMPLETED
Start: 2024-09-24 | End: 2024-09-24

## 2024-09-24 RX ORDER — SODIUM CHLORIDE 0.9 % (FLUSH) 0.9 %
5-40 SYRINGE (ML) INJECTION EVERY 12 HOURS SCHEDULED
Status: DISCONTINUED | OUTPATIENT
Start: 2024-09-24 | End: 2024-10-07 | Stop reason: HOSPADM

## 2024-09-24 RX ORDER — 0.9 % SODIUM CHLORIDE 0.9 %
2000 INTRAVENOUS SOLUTION INTRAVENOUS ONCE
Status: COMPLETED | OUTPATIENT
Start: 2024-09-24 | End: 2024-09-24

## 2024-09-24 RX ORDER — ACETAMINOPHEN 650 MG/1
650 SUPPOSITORY RECTAL EVERY 4 HOURS PRN
Status: DISCONTINUED | OUTPATIENT
Start: 2024-09-24 | End: 2024-10-07 | Stop reason: HOSPADM

## 2024-09-24 RX ORDER — FENTANYL CITRATE 50 UG/ML
25 INJECTION, SOLUTION INTRAMUSCULAR; INTRAVENOUS
Status: DISCONTINUED | OUTPATIENT
Start: 2024-09-24 | End: 2024-09-30

## 2024-09-24 RX ORDER — SODIUM CHLORIDE 9 MG/ML
INJECTION, SOLUTION INTRAVENOUS PRN
Status: DISCONTINUED | OUTPATIENT
Start: 2024-09-24 | End: 2024-10-07 | Stop reason: HOSPADM

## 2024-09-24 RX ORDER — ONDANSETRON 4 MG/1
4 TABLET, ORALLY DISINTEGRATING ORAL EVERY 8 HOURS PRN
Status: DISCONTINUED | OUTPATIENT
Start: 2024-09-24 | End: 2024-10-07 | Stop reason: HOSPADM

## 2024-09-24 RX ORDER — SODIUM CHLORIDE 0.9 % (FLUSH) 0.9 %
5-40 SYRINGE (ML) INJECTION PRN
Status: DISCONTINUED | OUTPATIENT
Start: 2024-09-24 | End: 2024-10-07 | Stop reason: HOSPADM

## 2024-09-24 RX ORDER — 0.9 % SODIUM CHLORIDE 0.9 %
2000 INTRAVENOUS SOLUTION INTRAVENOUS ONCE
Status: DISCONTINUED | OUTPATIENT
Start: 2024-09-24 | End: 2024-09-24

## 2024-09-24 RX ORDER — MAGNESIUM SULFATE IN WATER 40 MG/ML
2000 INJECTION, SOLUTION INTRAVENOUS ONCE
Status: COMPLETED | OUTPATIENT
Start: 2024-09-24 | End: 2024-09-24

## 2024-09-24 RX ADMIN — SODIUM CHLORIDE: 4.5 INJECTION, SOLUTION INTRAVENOUS at 13:53

## 2024-09-24 RX ADMIN — HEPARIN SODIUM 5000 UNITS: 5000 INJECTION INTRAVENOUS; SUBCUTANEOUS at 22:36

## 2024-09-24 RX ADMIN — SODIUM CHLORIDE: 4.5 INJECTION, SOLUTION INTRAVENOUS at 08:01

## 2024-09-24 RX ADMIN — SODIUM CHLORIDE 10.8 UNITS/HR: 9 INJECTION, SOLUTION INTRAVENOUS at 12:08

## 2024-09-24 RX ADMIN — SODIUM CHLORIDE, PRESERVATIVE FREE 40 MG: 5 INJECTION INTRAVENOUS at 10:21

## 2024-09-24 RX ADMIN — ACETAMINOPHEN 650 MG: 650 SUPPOSITORY RECTAL at 13:24

## 2024-09-24 RX ADMIN — HEPARIN SODIUM 5000 UNITS: 5000 INJECTION INTRAVENOUS; SUBCUTANEOUS at 13:56

## 2024-09-24 RX ADMIN — POTASSIUM CHLORIDE 10 MEQ: 7.46 INJECTION, SOLUTION INTRAVENOUS at 22:36

## 2024-09-24 RX ADMIN — SODIUM CHLORIDE: 9 INJECTION, SOLUTION INTRAVENOUS at 22:44

## 2024-09-24 RX ADMIN — IOPAMIDOL 90 ML: 755 INJECTION, SOLUTION INTRAVENOUS at 06:00

## 2024-09-24 RX ADMIN — SODIUM PHOSPHATE, MONOBASIC, MONOHYDRATE AND SODIUM PHOSPHATE, DIBASIC, ANHYDROUS 15 MMOL: 276; 142 INJECTION, SOLUTION INTRAVENOUS at 22:45

## 2024-09-24 RX ADMIN — SODIUM CHLORIDE 2000 ML: 9 INJECTION, SOLUTION INTRAVENOUS at 11:32

## 2024-09-24 RX ADMIN — POTASSIUM CHLORIDE 10 MEQ: 7.46 INJECTION, SOLUTION INTRAVENOUS at 18:09

## 2024-09-24 RX ADMIN — CEFEPIME 2000 MG: 2 INJECTION, POWDER, FOR SOLUTION INTRAVENOUS at 20:02

## 2024-09-24 RX ADMIN — SODIUM CHLORIDE: 4.5 INJECTION, SOLUTION INTRAVENOUS at 22:12

## 2024-09-24 RX ADMIN — Medication 1000 MG: at 11:44

## 2024-09-24 RX ADMIN — SODIUM CHLORIDE 14.7 UNITS/HR: 9 INJECTION, SOLUTION INTRAVENOUS at 23:34

## 2024-09-24 RX ADMIN — POTASSIUM CHLORIDE 10 MEQ: 7.46 INJECTION, SOLUTION INTRAVENOUS at 20:33

## 2024-09-24 RX ADMIN — HEPARIN SODIUM 5000 UNITS: 5000 INJECTION INTRAVENOUS; SUBCUTANEOUS at 10:21

## 2024-09-24 RX ADMIN — SODIUM CHLORIDE: 4.5 INJECTION, SOLUTION INTRAVENOUS at 18:09

## 2024-09-24 RX ADMIN — SODIUM CHLORIDE 20.9 UNITS/HR: 9 INJECTION, SOLUTION INTRAVENOUS at 18:32

## 2024-09-24 RX ADMIN — POTASSIUM CHLORIDE 10 MEQ: 7.46 INJECTION, SOLUTION INTRAVENOUS at 21:40

## 2024-09-24 RX ADMIN — FENTANYL CITRATE 25 MCG: 50 INJECTION, SOLUTION INTRAMUSCULAR; INTRAVENOUS at 13:36

## 2024-09-24 RX ADMIN — SODIUM CHLORIDE: 9 INJECTION, SOLUTION INTRAVENOUS at 18:06

## 2024-09-24 RX ADMIN — MAGNESIUM SULFATE HEPTAHYDRATE 2000 MG: 40 INJECTION, SOLUTION INTRAVENOUS at 06:10

## 2024-09-24 NOTE — ED PROVIDER NOTES
[facility]     Emergency Department     Faculty Attestation    I performed a history and physical examination of the patient and discussed management with the resident. I have reviewed and agree with the resident’s findings including all diagnostic interpretations, and treatment plans as written. Any areas of disagreement are noted on the chart. I was personally present for the key portions of any procedures. I have documented in the chart those procedures where I was not present during the key portions. I have reviewed the emergency nurses triage note. I agree with the chief complaint, past medical history, past surgical history, allergies, medications, social and family history as documented unless otherwise noted below. Documentation of the HPI, Physical Exam and Medical Decision Making performed by [email or web address] is based on my personal performance of the HPI, PE and MDM. For Physician Assistant/ Nurse Practitioner cases/documentation I have personally evaluated this patient and have completed at least one if not all key elements of the E/M (history, physical exam, and MDM). Additional findings are as noted.    Note Started: 5:49 AM EDT     73 yo F altered mentation, ems report \"hi\" glucose, hx non compliance, hx L side flaccid d/t past cva,   PE airway intact, R gaze, flaccid L upper L lower extremity,     -admit  EKG Interpretation    Interpreted by me  Normal sinus, hr 91, no ischemia, normal axis, qtc 516,     CRITICAL CARE: There was a high probability of clinically significant/life threatening deterioration in this patient's condition which required my urgent intervention.  Total critical care time was 10 minutes.  This excludes any time for separately reportable procedures.       Dhaval Sandoval DO  09/24/24 0552       Dhaval Mari DO  09/24/24 0650       Dhaval Mari DO  10/01/24 9292    
       [facility] ED  Emergency Department  Emergency Medicine Resident Turn-Over     Note Started: 8:03 AM EDT    Care of Leatha Mason was assumed from Dr. Krishna and is being seen for Altered Mental Status  .  The patient's initial evaluation and plan have been discussed with the prior provider who initially evaluated the patient.     EMERGENCY DEPARTMENT COURSE / MEDICAL DECISION MAKING:       MEDICATIONS GIVEN:  Orders Placed This Encounter   Medications    iopamidol (ISOVUE-370) 76 % injection 90 mL    magnesium sulfate 2000 mg in 50 mL IVPB premix    insulin regular (HUMULIN R;NOVOLIN R) 100 Units in sodium chloride 0.9 % 100 mL infusion     Order Specific Question:   Goal Blood Glucose Range     Answer:   HHS: 200-300     Order Specific Question:   Calculate initial bolus with the embedded Insulin Calculator?     Answer:   No    OR Linked Order Group     dextrose bolus 10% 125 mL     dextrose bolus 10% 250 mL    potassium chloride 10 mEq/100 mL IVPB (Peripheral Line)    magnesium sulfate 2000 mg in 50 mL IVPB premix    sodium phosphate 15 mmol in sodium chloride 0.9 % 250 mL IVPB    0.45 % sodium chloride infusion    dextrose 5 % and 0.45 % sodium chloride infusion    sodium chloride 0.45 % infusion     Nehemias De La Garza: cabinet override    sodium chloride flush 0.9 % injection 5-40 mL    sodium chloride flush 0.9 % injection 5-40 mL    0.9 % sodium chloride infusion    OR Linked Order Group     ondansetron (ZOFRAN-ODT) disintegrating tablet 4 mg     ondansetron (ZOFRAN) injection 4 mg    polyethylene glycol (GLYCOLAX) packet 17 g    heparin (porcine) injection 5,000 Units       LABS / RADIOLOGY:     Labs Reviewed   STROKE PANEL - Abnormal; Notable for the following components:       Result Value    Sodium 147 (*)     Glucose 883 (*)     BUN 46 (*)     Creatinine 2.0 (*)     Est, Glom Filt Rate 26 (*)     WBC 14.6 (*)     RDW 15.5 (*)     Total  (*)     Neutrophils % 73 (*)     
limits   ELECTROLYTES PLUS - Abnormal; Notable for the following components:    POC Sodium 151 (*)     POC Potassium 5.3 (*)     POC Chloride 109 (*)     POC TCO2 34 (*)     All other components within normal limits   BETA-HYDROXYBUTYRATE - Abnormal; Notable for the following components:    Beta-Hydroxybutyrate 1.47 (*)     All other components within normal limits   APTT - Abnormal; Notable for the following components:    APTT <20.0 (*)     All other components within normal limits   TROPONIN - Abnormal; Notable for the following components:    Troponin, High Sensitivity 49 (*)     All other components within normal limits   VENOUS BLOOD GAS, POINT OF CARE - Abnormal; Notable for the following components:    pCO2, [place] 51.7 (*)     HCO3, Venous 33.6 (*)     Positive Base Excess, [place] 7.5 (*)     All other components within normal limits   CREATININE W/GFR POINT OF CARE - Abnormal; Notable for the following components:    POC Creatinine 1.6 (*)     eGFR, POC 34 (*)     All other components within normal limits   UREA N (POC) - Abnormal; Notable for the following components:    POC BUN 52 (*)     All other components within normal limits   LACTIC ACID,POINT OF CARE - Abnormal; Notable for the following components:    POC Lactic Acid 2.3 (*)     All other components within normal limits   POCT GLUCOSE - Abnormal; Notable for the following components:    POC Glucose >700 (*)     All other components within normal limits   CULTURE, BLOOD 1   CULTURE, BLOOD 2   TSH   AMMONIA   HGB/HCT   CALCIUM, IONIC (POC)   PROTIME-INR   PREVIOUS SPECIMEN   LACTATE, SEPSIS   URINALYSIS   OSMOLALITY   BASIC METABOLIC PANEL   BASIC METABOLIC PANEL   BASIC METABOLIC PANEL   MAGNESIUM   MAGNESIUM   MAGNESIUM   PHOSPHORUS   PHOSPHORUS   PHOSPHORUS   HEMOGLOBIN A1C           PLAN/ TASKS OUTSTANDING     This patient is a 74 y.o. Female.  Patient has a prior right-sided stroke with significant deficits, lives at home with family, glucose found 
troponin of 56, will trend. [TF]   0735 Discussed patient with MICU.  Plan for admission.  Insulin drip started.  [facility] called to discuss patient's CT head and CTA neck.  Both studies negative. Patient to be admitted to MICU. [TF]   0745 Repeat troponin 49 from 56. [TF]      ED Course User Index  [TF] Raul Krishna MD       PROCEDURES:  None    CONSULTS:  IP CONSULT TO CRITICAL CARE    CRITICAL CARE:  There was significant risk of life threatening deterioration of patient's condition requiring my direct management. Critical care time 30 minutes, excluding any documented procedures.    FINAL IMPRESSION      1. Altered mental status, unspecified altered mental status type    2. Elevated troponin    3. Hyperosmolar hyperglycemic state (HHS) ([facility])          DISPOSITION / PLAN     DISPOSITION Decision To Admit 09/24/2024 08:02:16 AM  Condition at Disposition: Fair      PATIENT REFERRED TO:  No follow-up provider specified.    DISCHARGE MEDICATIONS:  New Prescriptions    No medications on file       Raul Krishna MD  Emergency Medicine Resident    (Please note that portions of this note were completed with a voice recognition program.  Efforts were made to edit the dictations but occasionally words are mis-transcribed.)

## 2024-09-24 NOTE — ED NOTES
Pt arrived to ED through EMS with c/o of AMS  Pt daughter stated she has had decreased appetite so they have been giving her insures and she thinks that has increased her BS  Pt daughter stated she had a previous stroke that left her left side paralyzed   Pt daughter stated she is normally more verbal  Pt has been non verbal, making un comprehensible noises since arrival   Pt has a hx of htn, COPD, CAD and CHF  Pt daughter stated she has been compliant with daily meds  Pt connected to monitor, bp and pulse ox  IV access started with labs drawn

## 2024-09-24 NOTE — CARE COORDINATION
Case Management Assessment  Initial Evaluation    Date/Time of Evaluation: 9/24/2024 4:02 PM  Assessment Completed by: YANA JUSTICE RN    If patient is discharged prior to next notation, then this note serves as note for discharge by case management.    Patient Name: Leatha Mason                   YOB: 1950  Diagnosis: Elevated troponin [R79.89]  Altered mental status, unspecified altered mental status type [R41.82]  Hyperosmolar hyperglycemic state (HHS) (HCC) [E11.00]                   Date / Time: 9/24/2024  5:23 AM    Patient Admission Status: Inpatient   Readmission Risk (Low < 19, Mod (19-27), High > 27): Readmission Risk Score: 14.9    Current PCP: Lenore Bobby APRN - CNP  PCP verified by CM? Yes    Chart Reviewed: Yes      History Provided by: Child/Family  Patient Orientation: Unable to Assess    Patient Cognition: Other (see comment) (very drowsy, A & O X 0,)    Hospitalization in the last 30 days (Readmission):  No    If yes, Readmission Assessment in CM Navigator will be completed.    Advance Directives:      Code Status: Full Code   Patient's Primary Decision Maker is: Legal Next of Kin    Primary Decision Maker: Becky Romero - Child - [phone]    Secondary Decision Maker: Latoya Romero - Child - [phone]    Discharge Planning:    Patient lives with: (P) Children Type of Home: (P) House  Primary Care Giver: Family  Patient Support Systems include: Children   Current Financial resources: (P) Medicare, Medicaid ([facility])  Current community resources: (P) ECF/Home Care  Current services prior to admission: (P) Durable Medical Equipment            Current DME: (P) Hospital Bed, Wheelchair, Other (Comment) (Antoinette lift)            Type of Home Care services:  (P) PT, OT, Nursing Services    ADLS  Prior functional level: (P) Assistance with the following:, Bathing, Dressing, Toileting, Mobility  Current functional level: (P) Assistance with the

## 2024-09-24 NOTE — DISCHARGE INSTR - COC
Continuity of Care Form    Patient Name: Leatha Mason   :  1950  MRN:  [id number]    Admit date:  2024  Discharge date:  ***    Code Status Order: Full Code   Advance Directives:   Advance Care Flowsheet Documentation             Admitting Physician:  Marcial Arrieta MD  PCP: Lenore Bobby APRN - CNP    Discharging Nurse: ***  Discharging Hospital Unit/Room#: 3009/3009-01  Discharging Unit Phone Number: ***    Emergency Contact:   Extended Emergency Contact Information  Primary Emergency Contact: An Romero  Home Phone: [phone]  Relation: Child  Secondary Emergency Contact: Latoya Romero  Address: NOT AVAILABLE  Home Phone: [phone]  Mobile Phone: [phone]  Relation: Child  Preferred language: English   needed? No    Past Surgical History:  Past Surgical History:   Procedure Laterality Date    CARDIAC CATHETERIZATION  2013    patent stents    COLONOSCOPY  2015    normal    CORONARY ANGIOPLASTY WITH STENT PLACEMENT  1012-16    stents x 3 - [employer] stents 1.5T or less    GASTROSTOMY TUBE PLACEMENT  2020    EGD PEG TUBE PLACEMENT    GASTROSTOMY TUBE PLACEMENT N/A 2020    EGD PEG TUBE PLACEMENT performed by Salvador Aguayo MD at [facility] OR    INSERT MIDLINE CATHETER  2021         JOINT REPLACEMENT      left knee    KNEE SURGERY  10/21/2013    rt knee synvisc injection     KNEE SURGERY  2013    knee synvisc injection rt #2    KNEE SURGERY  2013    rt knee synvisc inj    LUMBAR SPINE SURGERY  2007    NERVE BLOCK  2012    Right MBNB L3, L4, L5    NERVE BLOCK  2012    Right MBNB L3, L4, and L5     NERVE BLOCK  2013    Right knee injection #1 - Synvisc    NERVE BLOCK  2013    Right knee injection #2 - Synvisc    NERVE BLOCK  2013    Rigth knee synvisc injection #3    NERVE BLOCK Right 2017    Rt genicular nerve block. no steroid used    OTHER SURGICAL HISTORY Right 2014

## 2024-09-24 NOTE — H&P
Critical Care - History and Physical Examination    Patient's name:  Leatha Mason  Medical Record Number: [id number]  Patient's account/billing number: [id number]  Patient's YOB: 1950  Age: 74 y.o.  Date of Admission: 9/24/2024  5:23 AM  Reason of ICU admission:   Date of History and Physical Examination: 9/24/2024      Primary Care Physician: No primary care provider on file.  Attending Physician:    Code Status: Full Code    Chief complaint: AMS    Reason for ICU admission: HHS      History Of Present Illness:   History was obtained from child (daughter), chart review, and the patient.      Leatha Mason is a 74 y.o. 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  Patient lives with the daughter.  Per daughter, patient was last at her baseline yesterday on 9/23 around 7 PM.  At baseline, patient has left-sided paralysis but is alert and oriented x 3, able to speak in complete sentences.  Daughter reports that she checked on the patient in the early morning of 9/24 and noticed that she was only responsive to pain, not speaking, not moving.  EMS noted that patient's blood sugar read as \"high.\"  Daughter reports that the patient has had decreased appetite over the last 2 to 3 days and was being given Ensure drinks.  Daughter states that otherwise the patient has been taking all of her medications as prescribed.    In the ED, patient was noted to have a GCS of 9-10, maintaining airway and oxygen saturation.  On arrival to the ED, hypotensive 86/58 which soon improved.  Patient was made a stroke alert noncritical.    ED workup: VBG showed pH of 7.421, pCO2 51.7, pO2 38.0,

## 2024-09-25 ENCOUNTER — APPOINTMENT (OUTPATIENT)
Dept: ULTRASOUND IMAGING | Age: 74
DRG: 871 | End: 2024-09-25
Payer: COMMERCIAL

## 2024-09-25 PROBLEM — A41.51 E. COLI SEPTICEMIA (HCC): Status: ACTIVE | Noted: 2024-09-25

## 2024-09-25 PROBLEM — N12 PYELONEPHRITIS: Status: ACTIVE | Noted: 2024-09-25

## 2024-09-25 PROBLEM — D72.825 BANDEMIA: Status: ACTIVE | Noted: 2024-09-25

## 2024-09-25 PROBLEM — R65.10 SIRS (SYSTEMIC INFLAMMATORY RESPONSE SYNDROME) (HCC): Status: ACTIVE | Noted: 2024-09-25

## 2024-09-25 LAB
ANION GAP SERPL CALCULATED.3IONS-SCNC: 11 MMOL/L (ref 9–16)
ANION GAP SERPL CALCULATED.3IONS-SCNC: 12 MMOL/L (ref 9–16)
ANION GAP SERPL CALCULATED.3IONS-SCNC: 14 MMOL/L (ref 9–16)
ANION GAP SERPL CALCULATED.3IONS-SCNC: 15 MMOL/L (ref 9–16)
ANION GAP SERPL CALCULATED.3IONS-SCNC: 15 MMOL/L (ref 9–16)
ANION GAP SERPL CALCULATED.3IONS-SCNC: 18 MMOL/L (ref 9–16)
ANTI-XA UNFRAC HEPARIN: <0.1 IU/L
ATYPICAL LYMPHOCYTE ABSOLUTE COUNT: 0.22 K/UL
ATYPICAL LYMPHOCYTES: 1 %
B PARAP IS1001 DNA NPH QL NAA+NON-PROBE: NOT DETECTED
B PERT DNA SPEC QL NAA+PROBE: NOT DETECTED
BASOPHILS # BLD: 0 K/UL (ref 0–0.2)
BASOPHILS NFR BLD: 0 % (ref 0–2)
BUN SERPL-MCNC: 27 MG/DL (ref 8–23)
BUN SERPL-MCNC: 29 MG/DL (ref 8–23)
BUN SERPL-MCNC: 32 MG/DL (ref 8–23)
BUN SERPL-MCNC: 37 MG/DL (ref 8–23)
BUN SERPL-MCNC: 38 MG/DL (ref 8–23)
BUN SERPL-MCNC: 41 MG/DL (ref 8–23)
C PNEUM DNA NPH QL NAA+NON-PROBE: NOT DETECTED
CALCIUM SERPL-MCNC: 7.8 MG/DL (ref 8.6–10.4)
CALCIUM SERPL-MCNC: 7.9 MG/DL (ref 8.6–10.4)
CALCIUM SERPL-MCNC: 8.2 MG/DL (ref 8.6–10.4)
CALCIUM SERPL-MCNC: 8.3 MG/DL (ref 8.6–10.4)
CALCIUM SERPL-MCNC: 8.5 MG/DL (ref 8.6–10.4)
CALCIUM SERPL-MCNC: 8.5 MG/DL (ref 8.6–10.4)
CHLORIDE SERPL-SCNC: 114 MMOL/L (ref 98–107)
CHLORIDE SERPL-SCNC: 114 MMOL/L (ref 98–107)
CHLORIDE SERPL-SCNC: 115 MMOL/L (ref 98–107)
CHLORIDE SERPL-SCNC: 116 MMOL/L (ref 98–107)
CHLORIDE SERPL-SCNC: 117 MMOL/L (ref 98–107)
CHLORIDE SERPL-SCNC: 117 MMOL/L (ref 98–107)
CO2 SERPL-SCNC: 16 MMOL/L (ref 20–31)
CO2 SERPL-SCNC: 16 MMOL/L (ref 20–31)
CO2 SERPL-SCNC: 18 MMOL/L (ref 20–31)
CO2 SERPL-SCNC: 18 MMOL/L (ref 20–31)
CO2 SERPL-SCNC: 19 MMOL/L (ref 20–31)
CO2 SERPL-SCNC: 20 MMOL/L (ref 20–31)
CREAT SERPL-MCNC: 0.8 MG/DL (ref 0.5–0.9)
CREAT SERPL-MCNC: 0.8 MG/DL (ref 0.5–0.9)
CREAT SERPL-MCNC: 0.9 MG/DL (ref 0.5–0.9)
CREAT SERPL-MCNC: 1.1 MG/DL (ref 0.5–0.9)
CREAT SERPL-MCNC: 1.2 MG/DL (ref 0.5–0.9)
CREAT SERPL-MCNC: 1.4 MG/DL (ref 0.5–0.9)
CRP SERPL HS-MCNC: 240 MG/L (ref 0–5)
EKG ATRIAL RATE: 120 BPM
EKG ATRIAL RATE: 214 BPM
EKG ATRIAL RATE: 99 BPM
EKG P AXIS: 70 DEGREES
EKG P-R INTERVAL: 160 MS
EKG Q-T INTERVAL: 234 MS
EKG Q-T INTERVAL: 420 MS
EKG Q-T INTERVAL: 424 MS
EKG QRS DURATION: 64 MS
EKG QRS DURATION: 84 MS
EKG QRS DURATION: 94 MS
EKG QTC CALCULATION (BAZETT): 422 MS
EKG QTC CALCULATION (BAZETT): 519 MS
EKG QTC CALCULATION (BAZETT): 599 MS
EKG R AXIS: -12 DEGREES
EKG R AXIS: -166 DEGREES
EKG R AXIS: 27 DEGREES
EKG T AXIS: -35 DEGREES
EKG T AXIS: 103 DEGREES
EKG T AXIS: 93 DEGREES
EKG VENTRICULAR RATE: 120 BPM
EKG VENTRICULAR RATE: 196 BPM
EKG VENTRICULAR RATE: 92 BPM
EOSINOPHIL # BLD: 0 K/UL (ref 0–0.4)
EOSINOPHILS RELATIVE PERCENT: 0 % (ref 1–4)
ERYTHROCYTE [DISTWIDTH] IN BLOOD BY AUTOMATED COUNT: 15.3 % (ref 11.8–14.4)
FLUAV RNA NPH QL NAA+NON-PROBE: NOT DETECTED
FLUBV RNA NPH QL NAA+NON-PROBE: NOT DETECTED
GFR, ESTIMATED: 39 ML/MIN/1.73M2
GFR, ESTIMATED: 49 ML/MIN/1.73M2
GFR, ESTIMATED: 53 ML/MIN/1.73M2
GFR, ESTIMATED: 66 ML/MIN/1.73M2
GFR, ESTIMATED: 74 ML/MIN/1.73M2
GFR, ESTIMATED: 76 ML/MIN/1.73M2
GLUCOSE BLD-MCNC: 152 MG/DL (ref 65–105)
GLUCOSE BLD-MCNC: 159 MG/DL (ref 65–105)
GLUCOSE BLD-MCNC: 166 MG/DL (ref 65–105)
GLUCOSE BLD-MCNC: 169 MG/DL (ref 65–105)
GLUCOSE BLD-MCNC: 170 MG/DL (ref 65–105)
GLUCOSE BLD-MCNC: 171 MG/DL (ref 65–105)
GLUCOSE BLD-MCNC: 178 MG/DL (ref 65–105)
GLUCOSE BLD-MCNC: 188 MG/DL (ref 65–105)
GLUCOSE BLD-MCNC: 190 MG/DL (ref 65–105)
GLUCOSE BLD-MCNC: 197 MG/DL (ref 65–105)
GLUCOSE BLD-MCNC: 198 MG/DL (ref 65–105)
GLUCOSE BLD-MCNC: 206 MG/DL (ref 65–105)
GLUCOSE BLD-MCNC: 211 MG/DL (ref 65–105)
GLUCOSE BLD-MCNC: 229 MG/DL (ref 65–105)
GLUCOSE BLD-MCNC: 280 MG/DL (ref 65–105)
GLUCOSE BLD-MCNC: 285 MG/DL (ref 65–105)
GLUCOSE BLD-MCNC: 292 MG/DL (ref 65–105)
GLUCOSE BLD-MCNC: 323 MG/DL (ref 65–105)
GLUCOSE BLD-MCNC: 327 MG/DL (ref 65–105)
GLUCOSE BLD-MCNC: 356 MG/DL (ref 65–105)
GLUCOSE BLD-MCNC: 362 MG/DL (ref 65–105)
GLUCOSE BLD-MCNC: 398 MG/DL (ref 65–105)
GLUCOSE SERPL-MCNC: 173 MG/DL (ref 74–99)
GLUCOSE SERPL-MCNC: 185 MG/DL (ref 74–99)
GLUCOSE SERPL-MCNC: 241 MG/DL (ref 74–99)
GLUCOSE SERPL-MCNC: 288 MG/DL (ref 74–99)
GLUCOSE SERPL-MCNC: 361 MG/DL (ref 74–99)
GLUCOSE SERPL-MCNC: 442 MG/DL (ref 74–99)
HADV DNA NPH QL NAA+NON-PROBE: NOT DETECTED
HCOV 229E RNA NPH QL NAA+NON-PROBE: NOT DETECTED
HCOV HKU1 RNA NPH QL NAA+NON-PROBE: NOT DETECTED
HCOV NL63 RNA NPH QL NAA+NON-PROBE: NOT DETECTED
HCOV OC43 RNA NPH QL NAA+NON-PROBE: NOT DETECTED
HCT VFR BLD AUTO: 34.9 % (ref 36.3–47.1)
HCT VFR BLD AUTO: 42.1 % (ref 36.3–47.1)
HGB BLD-MCNC: 10.9 G/DL (ref 11.9–15.1)
HGB BLD-MCNC: 12.4 G/DL (ref 11.9–15.1)
HMPV RNA NPH QL NAA+NON-PROBE: NOT DETECTED
HPIV1 RNA NPH QL NAA+NON-PROBE: NOT DETECTED
HPIV2 RNA NPH QL NAA+NON-PROBE: NOT DETECTED
HPIV3 RNA NPH QL NAA+NON-PROBE: NOT DETECTED
HPIV4 RNA NPH QL NAA+NON-PROBE: NOT DETECTED
IMM GRANULOCYTES # BLD AUTO: 0.44 K/UL (ref 0–0.3)
IMM GRANULOCYTES NFR BLD: 2 %
INR PPP: 1.1
L PNEUMO1 AG UR QL IA.RAPID: NEGATIVE
LACTIC ACID, WHOLE BLOOD: 4.8 MMOL/L (ref 0.7–2.1)
LYMPHOCYTES NFR BLD: 2.44 K/UL (ref 1–4.8)
LYMPHOCYTES RELATIVE PERCENT: 11 % (ref 24–44)
M PNEUMO DNA NPH QL NAA+NON-PROBE: NOT DETECTED
MAGNESIUM SERPL-MCNC: 1.6 MG/DL (ref 1.6–2.4)
MAGNESIUM SERPL-MCNC: 1.8 MG/DL (ref 1.6–2.4)
MAGNESIUM SERPL-MCNC: 1.8 MG/DL (ref 1.6–2.4)
MAGNESIUM SERPL-MCNC: 1.9 MG/DL (ref 1.6–2.4)
MAGNESIUM SERPL-MCNC: 2.1 MG/DL (ref 1.6–2.4)
MAGNESIUM SERPL-MCNC: 2.1 MG/DL (ref 1.6–2.4)
MCH RBC QN AUTO: 27.1 PG (ref 25.2–33.5)
MCHC RBC AUTO-ENTMCNC: 29.5 G/DL (ref 28.4–34.8)
MCV RBC AUTO: 92.1 FL (ref 82.6–102.9)
MONOCYTES NFR BLD: 0.67 K/UL (ref 0.1–0.8)
MONOCYTES NFR BLD: 3 % (ref 1–7)
MORPHOLOGY: ABNORMAL
MRSA, DNA, NASAL: ABNORMAL
NEUTROPHILS NFR BLD: 83 % (ref 36–66)
NEUTS SEG NFR BLD: 18.43 K/UL (ref 1.8–7.7)
NRBC BLD-RTO: 0 PER 100 WBC
OSMOLALITY SERPL: 326 MOSM/KG (ref 275–295)
OSMOLALITY SERPL: 326 MOSM/KG (ref 275–295)
PARTIAL THROMBOPLASTIN TIME: 27.5 SEC (ref 23–36.5)
PHOSPHATE SERPL-MCNC: 1.4 MG/DL (ref 2.5–4.5)
PHOSPHATE SERPL-MCNC: 2 MG/DL (ref 2.5–4.5)
PHOSPHATE SERPL-MCNC: 2.2 MG/DL (ref 2.5–4.5)
PHOSPHATE SERPL-MCNC: 2.4 MG/DL (ref 2.5–4.5)
PHOSPHATE SERPL-MCNC: 2.5 MG/DL (ref 2.5–4.5)
PHOSPHATE SERPL-MCNC: 2.8 MG/DL (ref 2.5–4.5)
PLATELET # BLD AUTO: 202 K/UL (ref 138–453)
PMV BLD AUTO: 10.5 FL (ref 8.1–13.5)
POTASSIUM SERPL-SCNC: 3.2 MMOL/L (ref 3.7–5.3)
POTASSIUM SERPL-SCNC: 3.5 MMOL/L (ref 3.7–5.3)
POTASSIUM SERPL-SCNC: 3.8 MMOL/L (ref 3.7–5.3)
POTASSIUM SERPL-SCNC: 3.9 MMOL/L (ref 3.7–5.3)
POTASSIUM SERPL-SCNC: 3.9 MMOL/L (ref 3.7–5.3)
POTASSIUM SERPL-SCNC: 4.1 MMOL/L (ref 3.7–5.3)
PROTHROMBIN TIME: 14.3 SEC (ref 11.7–14.9)
RBC # BLD AUTO: 4.57 M/UL (ref 3.95–5.11)
RSV RNA NPH QL NAA+NON-PROBE: NOT DETECTED
RV+EV RNA NPH QL NAA+NON-PROBE: NOT DETECTED
S PNEUM AG SPEC QL: NEGATIVE
SARS-COV-2 RNA NPH QL NAA+NON-PROBE: NOT DETECTED
SODIUM SERPL-SCNC: 145 MMOL/L (ref 136–145)
SODIUM SERPL-SCNC: 145 MMOL/L (ref 136–145)
SODIUM SERPL-SCNC: 146 MMOL/L (ref 136–145)
SODIUM SERPL-SCNC: 149 MMOL/L (ref 136–145)
SODIUM SERPL-SCNC: 149 MMOL/L (ref 136–145)
SODIUM SERPL-SCNC: 151 MMOL/L (ref 136–145)
SPECIMEN DESCRIPTION: ABNORMAL
SPECIMEN DESCRIPTION: NORMAL
SPECIMEN SOURCE: NORMAL
WBC OTHER # BLD: 22.2 K/UL (ref 3.5–11.3)

## 2024-09-25 PROCEDURE — 93005 ELECTROCARDIOGRAM TRACING: CPT | Performed by: INTERNAL MEDICINE

## 2024-09-25 PROCEDURE — 04HY32Z INSERTION OF MONITORING DEVICE INTO LOWER ARTERY, PERCUTANEOUS APPROACH: ICD-10-PCS | Performed by: INTERNAL MEDICINE

## 2024-09-25 PROCEDURE — 85025 COMPLETE CBC W/AUTO DIFF WBC: CPT

## 2024-09-25 PROCEDURE — 85014 HEMATOCRIT: CPT

## 2024-09-25 PROCEDURE — 2000000000 HC ICU R&B

## 2024-09-25 PROCEDURE — 2580000003 HC RX 258

## 2024-09-25 PROCEDURE — 6360000002 HC RX W HCPCS

## 2024-09-25 PROCEDURE — 2500000003 HC RX 250 WO HCPCS

## 2024-09-25 PROCEDURE — 87449 NOS EACH ORGANISM AG IA: CPT

## 2024-09-25 PROCEDURE — 93010 ELECTROCARDIOGRAM REPORT: CPT | Performed by: INTERNAL MEDICINE

## 2024-09-25 PROCEDURE — 83605 ASSAY OF LACTIC ACID: CPT

## 2024-09-25 PROCEDURE — 82947 ASSAY GLUCOSE BLOOD QUANT: CPT

## 2024-09-25 PROCEDURE — 99213 OFFICE O/P EST LOW 20 MIN: CPT

## 2024-09-25 PROCEDURE — 99291 CRITICAL CARE FIRST HOUR: CPT | Performed by: INTERNAL MEDICINE

## 2024-09-25 PROCEDURE — 83930 ASSAY OF BLOOD OSMOLALITY: CPT

## 2024-09-25 PROCEDURE — 86140 C-REACTIVE PROTEIN: CPT

## 2024-09-25 PROCEDURE — 85610 PROTHROMBIN TIME: CPT

## 2024-09-25 PROCEDURE — 85520 HEPARIN ASSAY: CPT

## 2024-09-25 PROCEDURE — 87899 AGENT NOS ASSAY W/OPTIC: CPT

## 2024-09-25 PROCEDURE — 2580000003 HC RX 258: Performed by: INTERNAL MEDICINE

## 2024-09-25 PROCEDURE — 85018 HEMOGLOBIN: CPT

## 2024-09-25 PROCEDURE — 36556 INSERT NON-TUNNEL CV CATH: CPT

## 2024-09-25 PROCEDURE — 6360000002 HC RX W HCPCS: Performed by: INTERNAL MEDICINE

## 2024-09-25 PROCEDURE — 85730 THROMBOPLASTIN TIME PARTIAL: CPT

## 2024-09-25 PROCEDURE — 80048 BASIC METABOLIC PNL TOTAL CA: CPT

## 2024-09-25 PROCEDURE — 99223 1ST HOSP IP/OBS HIGH 75: CPT | Performed by: INTERNAL MEDICINE

## 2024-09-25 PROCEDURE — 06HM33Z INSERTION OF INFUSION DEVICE INTO RIGHT FEMORAL VEIN, PERCUTANEOUS APPROACH: ICD-10-PCS | Performed by: INTERNAL MEDICINE

## 2024-09-25 PROCEDURE — 84100 ASSAY OF PHOSPHORUS: CPT

## 2024-09-25 PROCEDURE — 36415 COLL VENOUS BLD VENIPUNCTURE: CPT

## 2024-09-25 PROCEDURE — 93005 ELECTROCARDIOGRAM TRACING: CPT

## 2024-09-25 PROCEDURE — 0202U NFCT DS 22 TRGT SARS-COV-2: CPT

## 2024-09-25 PROCEDURE — 86738 MYCOPLASMA ANTIBODY: CPT

## 2024-09-25 PROCEDURE — 83735 ASSAY OF MAGNESIUM: CPT

## 2024-09-25 PROCEDURE — 6370000000 HC RX 637 (ALT 250 FOR IP)

## 2024-09-25 PROCEDURE — 76770 US EXAM ABDO BACK WALL COMP: CPT

## 2024-09-25 PROCEDURE — 94761 N-INVAS EAR/PLS OXIMETRY MLT: CPT

## 2024-09-25 RX ORDER — ADENOSINE 3 MG/ML
INJECTION, SOLUTION INTRAVENOUS
Status: COMPLETED
Start: 2024-09-25 | End: 2024-09-25

## 2024-09-25 RX ORDER — ADENOSINE 3 MG/ML
INJECTION, SOLUTION INTRAVENOUS
Status: DISPENSED
Start: 2024-09-25 | End: 2024-09-26

## 2024-09-25 RX ORDER — SODIUM CHLORIDE, SODIUM LACTATE, POTASSIUM CHLORIDE, AND CALCIUM CHLORIDE .6; .31; .03; .02 G/100ML; G/100ML; G/100ML; G/100ML
1000 INJECTION, SOLUTION INTRAVENOUS ONCE
Status: COMPLETED | OUTPATIENT
Start: 2024-09-25 | End: 2024-09-25

## 2024-09-25 RX ORDER — 0.9 % SODIUM CHLORIDE 0.9 %
500 INTRAVENOUS SOLUTION INTRAVENOUS ONCE
Status: COMPLETED | OUTPATIENT
Start: 2024-09-25 | End: 2024-09-25

## 2024-09-25 RX ORDER — HEPARIN SODIUM 10000 [USP'U]/100ML
5-30 INJECTION, SOLUTION INTRAVENOUS CONTINUOUS
Status: DISCONTINUED | OUTPATIENT
Start: 2024-09-25 | End: 2024-09-26

## 2024-09-25 RX ORDER — METOPROLOL TARTRATE 1 MG/ML
5 INJECTION, SOLUTION INTRAVENOUS EVERY 5 MIN PRN
Status: COMPLETED | OUTPATIENT
Start: 2024-09-25 | End: 2024-09-25

## 2024-09-25 RX ORDER — FENTANYL CITRATE 50 UG/ML
25 INJECTION, SOLUTION INTRAMUSCULAR; INTRAVENOUS ONCE
Status: COMPLETED | OUTPATIENT
Start: 2024-09-25 | End: 2024-09-25

## 2024-09-25 RX ORDER — ASPIRIN 81 MG/1
81 TABLET ORAL DAILY
Status: DISCONTINUED | OUTPATIENT
Start: 2024-09-26 | End: 2024-09-26

## 2024-09-25 RX ORDER — HEPARIN SODIUM 1000 [USP'U]/ML
2000 INJECTION, SOLUTION INTRAVENOUS; SUBCUTANEOUS PRN
Status: DISCONTINUED | OUTPATIENT
Start: 2024-09-25 | End: 2024-09-26

## 2024-09-25 RX ORDER — HEPARIN SODIUM 1000 [USP'U]/ML
4000 INJECTION, SOLUTION INTRAVENOUS; SUBCUTANEOUS ONCE
Status: COMPLETED | OUTPATIENT
Start: 2024-09-25 | End: 2024-09-25

## 2024-09-25 RX ORDER — LEVETIRACETAM 500 MG/1
500 TABLET ORAL 2 TIMES DAILY
Status: DISCONTINUED | OUTPATIENT
Start: 2024-09-25 | End: 2024-09-26

## 2024-09-25 RX ORDER — DIGOXIN 0.25 MG/ML
250 INJECTION INTRAMUSCULAR; INTRAVENOUS ONCE
Status: DISCONTINUED | OUTPATIENT
Start: 2024-09-25 | End: 2024-09-25

## 2024-09-25 RX ORDER — ATORVASTATIN CALCIUM 80 MG/1
80 TABLET, FILM COATED ORAL DAILY
Status: DISCONTINUED | OUTPATIENT
Start: 2024-09-26 | End: 2024-09-26

## 2024-09-25 RX ORDER — DIGOXIN 0.25 MG/ML
250 INJECTION INTRAMUSCULAR; INTRAVENOUS ONCE
Status: COMPLETED | OUTPATIENT
Start: 2024-09-25 | End: 2024-09-25

## 2024-09-25 RX ORDER — CALCIUM GLUCONATE 20 MG/ML
2000 INJECTION, SOLUTION INTRAVENOUS ONCE
Status: COMPLETED | OUTPATIENT
Start: 2024-09-25 | End: 2024-09-25

## 2024-09-25 RX ORDER — METOPROLOL TARTRATE 1 MG/ML
INJECTION, SOLUTION INTRAVENOUS
Status: COMPLETED
Start: 2024-09-25 | End: 2024-09-25

## 2024-09-25 RX ORDER — HEPARIN SODIUM 1000 [USP'U]/ML
4000 INJECTION, SOLUTION INTRAVENOUS; SUBCUTANEOUS PRN
Status: DISCONTINUED | OUTPATIENT
Start: 2024-09-25 | End: 2024-09-26

## 2024-09-25 RX ORDER — POTASSIUM CHLORIDE 29.8 MG/ML
20 INJECTION INTRAVENOUS PRN
Status: DISCONTINUED | OUTPATIENT
Start: 2024-09-25 | End: 2024-10-07 | Stop reason: HOSPADM

## 2024-09-25 RX ORDER — LEVETIRACETAM 500 MG/5ML
500 INJECTION, SOLUTION, CONCENTRATE INTRAVENOUS EVERY 12 HOURS
Status: DISCONTINUED | OUTPATIENT
Start: 2024-09-25 | End: 2024-09-26

## 2024-09-25 RX ORDER — MAGNESIUM SULFATE IN WATER 40 MG/ML
2000 INJECTION, SOLUTION INTRAVENOUS ONCE
Status: COMPLETED | OUTPATIENT
Start: 2024-09-25 | End: 2024-09-25

## 2024-09-25 RX ORDER — LEVETIRACETAM 5 MG/ML
500 INJECTION INTRAVASCULAR EVERY 12 HOURS
Status: DISCONTINUED | OUTPATIENT
Start: 2024-09-25 | End: 2024-09-25

## 2024-09-25 RX ADMIN — POTASSIUM CHLORIDE 10 MEQ: 7.46 INJECTION, SOLUTION INTRAVENOUS at 06:43

## 2024-09-25 RX ADMIN — LEVETIRACETAM 500 MG: 100 INJECTION INTRAVENOUS at 22:54

## 2024-09-25 RX ADMIN — FENTANYL CITRATE 25 MCG: 50 INJECTION, SOLUTION INTRAMUSCULAR; INTRAVENOUS at 14:50

## 2024-09-25 RX ADMIN — POTASSIUM CHLORIDE 10 MEQ: 7.46 INJECTION, SOLUTION INTRAVENOUS at 03:59

## 2024-09-25 RX ADMIN — SODIUM CHLORIDE, POTASSIUM CHLORIDE, SODIUM LACTATE AND CALCIUM CHLORIDE 1000 ML: 600; 310; 30; 20 INJECTION, SOLUTION INTRAVENOUS at 14:46

## 2024-09-25 RX ADMIN — POTASSIUM PHOSPHATE, MONOBASIC AND POTASSIUM PHOSPHATE, DIBASIC 10 MMOL: 224; 236 INJECTION, SOLUTION, CONCENTRATE INTRAVENOUS at 10:06

## 2024-09-25 RX ADMIN — Medication 150 MG: at 16:16

## 2024-09-25 RX ADMIN — METOPROLOL TARTRATE 5 MG: 1 INJECTION, SOLUTION INTRAVENOUS at 15:47

## 2024-09-25 RX ADMIN — POTASSIUM CHLORIDE 10 MEQ: 7.46 INJECTION, SOLUTION INTRAVENOUS at 05:38

## 2024-09-25 RX ADMIN — METOPROLOL TARTRATE 5 MG: 1 INJECTION, SOLUTION INTRAVENOUS at 15:48

## 2024-09-25 RX ADMIN — SODIUM CHLORIDE: 9 INJECTION, SOLUTION INTRAVENOUS at 06:42

## 2024-09-25 RX ADMIN — FENTANYL CITRATE 25 MCG: 50 INJECTION, SOLUTION INTRAMUSCULAR; INTRAVENOUS at 13:37

## 2024-09-25 RX ADMIN — POTASSIUM CHLORIDE 10 MEQ: 7.46 INJECTION, SOLUTION INTRAVENOUS at 02:38

## 2024-09-25 RX ADMIN — METOPROLOL TARTRATE 5 MG: 5 INJECTION INTRAVENOUS at 15:48

## 2024-09-25 RX ADMIN — SODIUM CHLORIDE 500 ML: 9 INJECTION, SOLUTION INTRAVENOUS at 08:24

## 2024-09-25 RX ADMIN — DEXTROSE AND SODIUM CHLORIDE: 5; 450 INJECTION, SOLUTION INTRAVENOUS at 01:29

## 2024-09-25 RX ADMIN — SODIUM CHLORIDE, PRESERVATIVE FREE 10 ML: 5 INJECTION INTRAVENOUS at 20:52

## 2024-09-25 RX ADMIN — DIGOXIN 250 MCG: 0.25 INJECTION INTRAMUSCULAR; INTRAVENOUS at 22:24

## 2024-09-25 RX ADMIN — MAGNESIUM SULFATE HEPTAHYDRATE 2000 MG: 40 INJECTION, SOLUTION INTRAVENOUS at 13:22

## 2024-09-25 RX ADMIN — HEPARIN SODIUM 4000 UNITS: 1000 INJECTION INTRAVENOUS; SUBCUTANEOUS at 22:50

## 2024-09-25 RX ADMIN — CALCIUM GLUCONATE 2000 MG: 20 INJECTION, SOLUTION INTRAVENOUS at 10:09

## 2024-09-25 RX ADMIN — FENTANYL CITRATE 25 MCG: 50 INJECTION, SOLUTION INTRAMUSCULAR; INTRAVENOUS at 17:37

## 2024-09-25 RX ADMIN — METOPROLOL TARTRATE 5 MG: 5 INJECTION INTRAVENOUS at 15:47

## 2024-09-25 RX ADMIN — SODIUM CHLORIDE: 9 INJECTION, SOLUTION INTRAVENOUS at 18:31

## 2024-09-25 RX ADMIN — SODIUM CHLORIDE, PRESERVATIVE FREE 40 MG: 5 INJECTION INTRAVENOUS at 10:05

## 2024-09-25 RX ADMIN — HEPARIN SODIUM 5000 UNITS: 5000 INJECTION INTRAVENOUS; SUBCUTANEOUS at 21:35

## 2024-09-25 RX ADMIN — SODIUM PHOSPHATE, MONOBASIC, MONOHYDRATE AND SODIUM PHOSPHATE, DIBASIC, ANHYDROUS 15 MMOL: 276; 142 INJECTION, SOLUTION INTRAVENOUS at 06:24

## 2024-09-25 RX ADMIN — HEPARIN SODIUM 5000 UNITS: 5000 INJECTION INTRAVENOUS; SUBCUTANEOUS at 13:37

## 2024-09-25 RX ADMIN — ADENOSINE 12 MG: 3 INJECTION, SOLUTION INTRAVENOUS at 15:43

## 2024-09-25 RX ADMIN — POTASSIUM CHLORIDE 10 MEQ: 7.46 INJECTION, SOLUTION INTRAVENOUS at 01:25

## 2024-09-25 RX ADMIN — HEPARIN SODIUM 5000 UNITS: 5000 INJECTION INTRAVENOUS; SUBCUTANEOUS at 06:40

## 2024-09-25 RX ADMIN — POTASSIUM CHLORIDE 10 MEQ: 7.46 INJECTION, SOLUTION INTRAVENOUS at 07:52

## 2024-09-25 RX ADMIN — POTASSIUM CHLORIDE 10 MEQ: 29.8 INJECTION, SOLUTION INTRAVENOUS at 18:56

## 2024-09-25 RX ADMIN — Medication 1 MG/MIN: at 22:43

## 2024-09-25 RX ADMIN — SODIUM CHLORIDE: 9 INJECTION, SOLUTION INTRAVENOUS at 22:04

## 2024-09-25 RX ADMIN — SODIUM CHLORIDE 250 ML: 4.5 INJECTION, SOLUTION INTRAVENOUS at 18:39

## 2024-09-25 RX ADMIN — POTASSIUM CHLORIDE 20 MEQ: 29.8 INJECTION, SOLUTION INTRAVENOUS at 17:31

## 2024-09-25 RX ADMIN — SODIUM CHLORIDE, POTASSIUM CHLORIDE, SODIUM LACTATE AND CALCIUM CHLORIDE 1000 ML: 600; 310; 30; 20 INJECTION, SOLUTION INTRAVENOUS at 10:52

## 2024-09-25 RX ADMIN — SODIUM PHOSPHATE, MONOBASIC, MONOHYDRATE AND SODIUM PHOSPHATE, DIBASIC, ANHYDROUS 15 MMOL: 276; 142 INJECTION, SOLUTION INTRAVENOUS at 22:05

## 2024-09-25 RX ADMIN — POTASSIUM CHLORIDE 20 MEQ: 29.8 INJECTION, SOLUTION INTRAVENOUS at 22:17

## 2024-09-25 RX ADMIN — SODIUM CHLORIDE: 9 INJECTION, SOLUTION INTRAVENOUS at 17:28

## 2024-09-25 RX ADMIN — Medication 1 MG/MIN: at 16:35

## 2024-09-25 RX ADMIN — FENTANYL CITRATE 25 MCG: 50 INJECTION, SOLUTION INTRAMUSCULAR; INTRAVENOUS at 03:16

## 2024-09-25 RX ADMIN — SODIUM CHLORIDE 0.1 UNITS/HR: 9 INJECTION, SOLUTION INTRAVENOUS at 16:47

## 2024-09-25 RX ADMIN — POTASSIUM CHLORIDE 20 MEQ: 29.8 INJECTION, SOLUTION INTRAVENOUS at 23:23

## 2024-09-25 RX ADMIN — DEXTROSE AND SODIUM CHLORIDE: 5; 450 INJECTION, SOLUTION INTRAVENOUS at 18:30

## 2024-09-25 RX ADMIN — MEROPENEM 1000 MG: 1 INJECTION INTRAVENOUS at 18:33

## 2024-09-25 RX ADMIN — POTASSIUM CHLORIDE 10 MEQ: 7.46 INJECTION, SOLUTION INTRAVENOUS at 08:54

## 2024-09-25 RX ADMIN — SODIUM CHLORIDE 500 ML: 9 INJECTION, SOLUTION INTRAVENOUS at 06:44

## 2024-09-25 RX ADMIN — SODIUM CHLORIDE: 9 INJECTION, SOLUTION INTRAVENOUS at 22:16

## 2024-09-25 RX ADMIN — ADENOSINE 6 MG: 3 INJECTION, SOLUTION INTRAVENOUS at 15:40

## 2024-09-25 RX ADMIN — HEPARIN SODIUM 11 UNITS/KG/HR: 10000 INJECTION, SOLUTION INTRAVENOUS at 22:52

## 2024-09-25 RX ADMIN — DEXTROSE AND SODIUM CHLORIDE: 5; 450 INJECTION, SOLUTION INTRAVENOUS at 13:25

## 2024-09-25 ASSESSMENT — PAIN DESCRIPTION - LOCATION
LOCATION: LEG
LOCATION: HAND;BACK;LEG

## 2024-09-25 ASSESSMENT — PAIN DESCRIPTION - DESCRIPTORS
DESCRIPTORS: ACHING;DISCOMFORT
DESCRIPTORS: ACHING;DISCOMFORT

## 2024-09-25 ASSESSMENT — PAIN DESCRIPTION - ORIENTATION
ORIENTATION: LEFT;RIGHT
ORIENTATION: RIGHT;LEFT

## 2024-09-25 NOTE — PROCEDURES
PROCEDURE NOTE - CENTRAL VENOUS LINE PLACEMENT    PATIENT NAME: Leatha Mason  MEDICAL RECORD NO. [id number]  DATE: 9/25/2024  ATTENDING PHYSICIAN:     PREOPERATIVE DIAGNOSIS:  vascular access and poor peripheral access  POSTOPERATIVE DIAGNOSIS:  Same  PROCEDURE PERFORMED:  Right Femoral Vein Central Line Insertion  PERFORMING PHYSICIAN: Suhail Peters MD  ANESTHESIA:  Local utilizing 1% lidocaine  ESTIMATED BLOOD LOSS:  Less than 25 ml  COMPLICATIONS:  None immediately appreciated.     DISCUSSION:  Leatha Mason is a 74 y.o.-year-old female who requires central IV access vascular access and poor peripheral access. The history and physical examination were reviewed and confirmed.      CONSENT: Unable to be obtained due to the emergent nature of this procedure.     PROCEDURE:  A timeout was initiated by the bedside nurse and was confirmed by those present.  The patient was placed in a supine position. The skin overlying the Right Femoral Vein was prepped with chlorhexadine and draped in sterile fashion. The skin was infiltrated with local anesthetic. The vessel and surrounding anatomy was visualized using ultrasound. Through the anesthetized region, the introducer needle was inserted into the femoral vein returning dark red non pulsatile blood. A guidewire was placed through the center of the needle with no resistance. Ultrasound confirmed presence of wire in the vein. A small incision made in the skin with a #11 scalpel blade. The dilator was inserted into the skin and vein over guidewire using Seldinger technique. The dilator was then removed and the 7 F 20 cm catheter was placed in the vein over the guidewire using Seldinger technique. The guidewire was then removed and all ports aspirated and flushed appropriately. The catheter then secured using silk suture and a temporary sterile dressing was applied. All sponge, instrument and needle counts were correct at the completion of the

## 2024-09-25 NOTE — PROCEDURES
PROCEDURE NOTE - ARTERIAL LINE PLACEMENT    PATIENT NAME: Leatha Mason  MEDICAL RECORD NO. [id number]  DATE: 9/25/2024  ATTENDING PHYSICIAN:      PREOPERATIVE DIAGNOSIS:  Need for blood pressure monitoring  POSTOPERATIVE DIAGNOSIS:  Same  PROCEDURE PERFORMED: Right Femoral Arterial Line Insertion  PERFORMING PHYSICIAN:  Suhail Peters MD  ESTIMATED BLOOD LOSS:  Less than 25 ml  COMPLICATIONS:  None immediately appreciated.     DISCUSSION:  Leatha Mason is a 74 y.o. female who requires invasive pressure monitoring. The history and physical examination were reviewed and confirmed.      CONSENT: Unable to be obtained due to the emergent nature of this procedure.     PROCEDURE:  A timeout was initiated by the bedside nurse and was confirmed by those present.  The patient was placed in a supine position. The skin overlying the Right Femoral was prepped with chlorhexadine. Through this region, the introducer needle through catheter was inserted into right femoral artery until pulsatile bright blood was seen in collection tubing. Guidewire was advanced with no resistance. Catheter was advanced into the artery, wire was pulled with brisk bleeding noted. Pressure monitoring setup was connected to the catheter, it aspirated and flushed easily. The catheter was secured to the wrist with 3-0 silk.     No immediate complication was evident.  All sponge, instrument and needle counts were correct at the completion of the procedure.      Electronically signed by Suhail Peters MD on 9/25/2024 at 8:37 AM    Suhail Peters MD  Internal Medicine Resident, PGY- 3  [place], [place].  8:37 AM     This note is created with the assistance of a speech-recognition program. While intending to generate a document that actually reflects the content of the visit, no guarantees can be provided that every mistake has been identified and corrected by editing.

## 2024-09-26 ENCOUNTER — APPOINTMENT (OUTPATIENT)
Age: 74
DRG: 871 | End: 2024-09-26
Payer: COMMERCIAL

## 2024-09-26 ENCOUNTER — APPOINTMENT (OUTPATIENT)
Dept: GENERAL RADIOLOGY | Age: 74
DRG: 871 | End: 2024-09-26
Payer: COMMERCIAL

## 2024-09-26 ENCOUNTER — APPOINTMENT (OUTPATIENT)
Dept: CT IMAGING | Age: 74
DRG: 871 | End: 2024-09-26
Payer: COMMERCIAL

## 2024-09-26 PROBLEM — I48.20 CHRONIC ATRIAL FIBRILLATION (HCC): Status: ACTIVE | Noted: 2024-09-26

## 2024-09-26 PROBLEM — I47.10 SVT (SUPRAVENTRICULAR TACHYCARDIA) (HCC): Status: ACTIVE | Noted: 2024-09-26

## 2024-09-26 PROBLEM — R79.89 ELEVATED TROPONIN: Status: ACTIVE | Noted: 2024-09-26

## 2024-09-26 LAB
ANION GAP SERPL CALCULATED.3IONS-SCNC: 10 MMOL/L (ref 9–16)
ANION GAP SERPL CALCULATED.3IONS-SCNC: 8 MMOL/L (ref 9–16)
ANION GAP SERPL CALCULATED.3IONS-SCNC: 9 MMOL/L (ref 9–16)
ANION GAP SERPL CALCULATED.3IONS-SCNC: 9 MMOL/L (ref 9–16)
ANTI-XA UNFRAC HEPARIN: 0.22 IU/L
ANTI-XA UNFRAC HEPARIN: 0.45 IU/L
ANTI-XA UNFRAC HEPARIN: 0.45 IU/L
BASOPHILS # BLD: 0.03 K/UL (ref 0–0.2)
BASOPHILS NFR BLD: 0 % (ref 0–2)
BUN SERPL-MCNC: 15 MG/DL (ref 8–23)
BUN SERPL-MCNC: 19 MG/DL (ref 8–23)
BUN SERPL-MCNC: 22 MG/DL (ref 8–23)
BUN SERPL-MCNC: 24 MG/DL (ref 8–23)
CALCIUM SERPL-MCNC: 7.7 MG/DL (ref 8.6–10.4)
CALCIUM SERPL-MCNC: 7.8 MG/DL (ref 8.6–10.4)
CALCIUM SERPL-MCNC: 7.8 MG/DL (ref 8.6–10.4)
CALCIUM SERPL-MCNC: 8 MG/DL (ref 8.6–10.4)
CHLORIDE SERPL-SCNC: 118 MMOL/L (ref 98–107)
CHLORIDE SERPL-SCNC: 119 MMOL/L (ref 98–107)
CHLORIDE SERPL-SCNC: 120 MMOL/L (ref 98–107)
CHLORIDE SERPL-SCNC: 120 MMOL/L (ref 98–107)
CO2 SERPL-SCNC: 16 MMOL/L (ref 20–31)
CO2 SERPL-SCNC: 17 MMOL/L (ref 20–31)
CREAT SERPL-MCNC: 0.7 MG/DL (ref 0.5–0.9)
CREAT SERPL-MCNC: 0.7 MG/DL (ref 0.5–0.9)
CREAT SERPL-MCNC: 0.8 MG/DL (ref 0.5–0.9)
CREAT SERPL-MCNC: 0.8 MG/DL (ref 0.5–0.9)
CRP SERPL HS-MCNC: 192 MG/L (ref 0–5)
ECHO BSA: 2.02 M2
ECHO LV EDV A2C: 71 ML
ECHO LV EDV A4C: 67 ML
ECHO LV EDV INDEX A4C: 35 ML/M2
ECHO LV EDV NDEX A2C: 37 ML/M2
ECHO LV EJECTION FRACTION A2C: 55 %
ECHO LV EJECTION FRACTION A4C: 58 %
ECHO LV EJECTION FRACTION BIPLANE: 57 % (ref 55–100)
ECHO LV ESV A2C: 32 ML
ECHO LV ESV A4C: 28 ML
ECHO LV ESV INDEX A2C: 16 ML/M2
ECHO LV ESV INDEX A4C: 14 ML/M2
EOSINOPHIL # BLD: 0.3 K/UL (ref 0–0.44)
EOSINOPHILS RELATIVE PERCENT: 2 % (ref 1–4)
ERYTHROCYTE [DISTWIDTH] IN BLOOD BY AUTOMATED COUNT: 15.4 % (ref 11.8–14.4)
EST. AVERAGE GLUCOSE BLD GHB EST-MCNC: 312 MG/DL
GFR, ESTIMATED: 78 ML/MIN/1.73M2
GFR, ESTIMATED: 78 ML/MIN/1.73M2
GFR, ESTIMATED: >90 ML/MIN/1.73M2
GFR, ESTIMATED: >90 ML/MIN/1.73M2
GLUCOSE BLD-MCNC: 126 MG/DL (ref 65–105)
GLUCOSE BLD-MCNC: 132 MG/DL (ref 65–105)
GLUCOSE BLD-MCNC: 136 MG/DL (ref 65–105)
GLUCOSE BLD-MCNC: 139 MG/DL (ref 65–105)
GLUCOSE BLD-MCNC: 150 MG/DL (ref 65–105)
GLUCOSE BLD-MCNC: 158 MG/DL (ref 65–105)
GLUCOSE BLD-MCNC: 160 MG/DL (ref 65–105)
GLUCOSE BLD-MCNC: 161 MG/DL (ref 65–105)
GLUCOSE BLD-MCNC: 163 MG/DL (ref 65–105)
GLUCOSE BLD-MCNC: 169 MG/DL (ref 65–105)
GLUCOSE BLD-MCNC: 188 MG/DL (ref 65–105)
GLUCOSE BLD-MCNC: 190 MG/DL (ref 65–105)
GLUCOSE BLD-MCNC: 190 MG/DL (ref 65–105)
GLUCOSE BLD-MCNC: 205 MG/DL (ref 65–105)
GLUCOSE BLD-MCNC: 209 MG/DL (ref 65–105)
GLUCOSE BLD-MCNC: 215 MG/DL (ref 65–105)
GLUCOSE BLD-MCNC: 239 MG/DL (ref 65–105)
GLUCOSE BLD-MCNC: 273 MG/DL (ref 65–105)
GLUCOSE BLD-MCNC: 283 MG/DL (ref 65–105)
GLUCOSE BLD-MCNC: 316 MG/DL (ref 65–105)
GLUCOSE SERPL-MCNC: 210 MG/DL (ref 74–99)
GLUCOSE SERPL-MCNC: 219 MG/DL (ref 74–99)
GLUCOSE SERPL-MCNC: 306 MG/DL (ref 74–99)
GLUCOSE SERPL-MCNC: 374 MG/DL (ref 74–99)
HBA1C MFR BLD: 12.5 % (ref 4–6)
HCT VFR BLD AUTO: 35.3 % (ref 36.3–47.1)
HCT VFR BLD AUTO: 35.8 % (ref 36.3–47.1)
HGB BLD-MCNC: 10.8 G/DL (ref 11.9–15.1)
HGB BLD-MCNC: 10.9 G/DL (ref 11.9–15.1)
IMM GRANULOCYTES # BLD AUTO: 0.43 K/UL (ref 0–0.3)
IMM GRANULOCYTES NFR BLD: 3 %
INR PPP: 1.1
LYMPHOCYTES NFR BLD: 1.39 K/UL (ref 1.1–3.7)
LYMPHOCYTES RELATIVE PERCENT: 9 % (ref 24–43)
MAGNESIUM SERPL-MCNC: 1.8 MG/DL (ref 1.6–2.4)
MAGNESIUM SERPL-MCNC: 1.9 MG/DL (ref 1.6–2.4)
MAGNESIUM SERPL-MCNC: 1.9 MG/DL (ref 1.6–2.4)
MCH RBC QN AUTO: 26.3 PG (ref 25.2–33.5)
MCHC RBC AUTO-ENTMCNC: 30.2 G/DL (ref 28.4–34.8)
MCV RBC AUTO: 87.1 FL (ref 82.6–102.9)
MICROORGANISM SPEC CULT: ABNORMAL
MONOCYTES NFR BLD: 0.83 K/UL (ref 0.1–1.2)
MONOCYTES NFR BLD: 5 % (ref 3–12)
NEUTROPHILS NFR BLD: 81 % (ref 36–65)
NEUTS SEG NFR BLD: 12.67 K/UL (ref 1.5–8.1)
NRBC BLD-RTO: 0 PER 100 WBC
OSMOLALITY SERPL: 314 MOSM/KG (ref 275–295)
PARTIAL THROMBOPLASTIN TIME: 74.5 SEC (ref 23–36.5)
PHOSPHATE SERPL-MCNC: 2.1 MG/DL (ref 2.5–4.5)
PHOSPHATE SERPL-MCNC: 2.4 MG/DL (ref 2.5–4.5)
PHOSPHATE SERPL-MCNC: 3.1 MG/DL (ref 2.5–4.5)
PLATELET # BLD AUTO: 182 K/UL (ref 138–453)
PMV BLD AUTO: 10.8 FL (ref 8.1–13.5)
POTASSIUM SERPL-SCNC: 4.1 MMOL/L (ref 3.7–5.3)
POTASSIUM SERPL-SCNC: 4.3 MMOL/L (ref 3.7–5.3)
POTASSIUM SERPL-SCNC: 4.3 MMOL/L (ref 3.7–5.3)
POTASSIUM SERPL-SCNC: 4.4 MMOL/L (ref 3.7–5.3)
PROTHROMBIN TIME: 14.3 SEC (ref 11.7–14.9)
RBC # BLD AUTO: 4.11 M/UL (ref 3.95–5.11)
RBC # BLD: ABNORMAL 10*6/UL
SERVICE CMNT-IMP: ABNORMAL
SODIUM SERPL-SCNC: 144 MMOL/L (ref 136–145)
SODIUM SERPL-SCNC: 144 MMOL/L (ref 136–145)
SODIUM SERPL-SCNC: 146 MMOL/L (ref 136–145)
SODIUM SERPL-SCNC: 146 MMOL/L (ref 136–145)
SPECIMEN DESCRIPTION: ABNORMAL
TROPONIN I SERPL HS-MCNC: 35 NG/L (ref 0–14)
WBC OTHER # BLD: 15.7 K/UL (ref 3.5–11.3)

## 2024-09-26 PROCEDURE — 84100 ASSAY OF PHOSPHORUS: CPT

## 2024-09-26 PROCEDURE — 2500000003 HC RX 250 WO HCPCS

## 2024-09-26 PROCEDURE — 99291 CRITICAL CARE FIRST HOUR: CPT | Performed by: INTERNAL MEDICINE

## 2024-09-26 PROCEDURE — 85025 COMPLETE CBC W/AUTO DIFF WBC: CPT

## 2024-09-26 PROCEDURE — 2580000003 HC RX 258: Performed by: INTERNAL MEDICINE

## 2024-09-26 PROCEDURE — 85520 HEPARIN ASSAY: CPT

## 2024-09-26 PROCEDURE — 86140 C-REACTIVE PROTEIN: CPT

## 2024-09-26 PROCEDURE — 6370000000 HC RX 637 (ALT 250 FOR IP)

## 2024-09-26 PROCEDURE — 2580000003 HC RX 258

## 2024-09-26 PROCEDURE — 6360000002 HC RX W HCPCS

## 2024-09-26 PROCEDURE — 93308 TTE F-UP OR LMTD: CPT | Performed by: INTERNAL MEDICINE

## 2024-09-26 PROCEDURE — 93005 ELECTROCARDIOGRAM TRACING: CPT

## 2024-09-26 PROCEDURE — 83036 HEMOGLOBIN GLYCOSYLATED A1C: CPT

## 2024-09-26 PROCEDURE — 82947 ASSAY GLUCOSE BLOOD QUANT: CPT

## 2024-09-26 PROCEDURE — 80048 BASIC METABOLIC PNL TOTAL CA: CPT

## 2024-09-26 PROCEDURE — 2000000000 HC ICU R&B

## 2024-09-26 PROCEDURE — 85014 HEMATOCRIT: CPT

## 2024-09-26 PROCEDURE — 71045 X-RAY EXAM CHEST 1 VIEW: CPT

## 2024-09-26 PROCEDURE — 51798 US URINE CAPACITY MEASURE: CPT

## 2024-09-26 PROCEDURE — C8924 2D TTE W OR W/O FOL W/CON,FU: HCPCS

## 2024-09-26 PROCEDURE — 99233 SBSQ HOSP IP/OBS HIGH 50: CPT | Performed by: INTERNAL MEDICINE

## 2024-09-26 PROCEDURE — 6360000002 HC RX W HCPCS: Performed by: INTERNAL MEDICINE

## 2024-09-26 PROCEDURE — 36415 COLL VENOUS BLD VENIPUNCTURE: CPT

## 2024-09-26 PROCEDURE — 70450 CT HEAD/BRAIN W/O DYE: CPT

## 2024-09-26 PROCEDURE — 85610 PROTHROMBIN TIME: CPT

## 2024-09-26 PROCEDURE — 85730 THROMBOPLASTIN TIME PARTIAL: CPT

## 2024-09-26 PROCEDURE — 99222 1ST HOSP IP/OBS MODERATE 55: CPT | Performed by: INTERNAL MEDICINE

## 2024-09-26 PROCEDURE — 83930 ASSAY OF BLOOD OSMOLALITY: CPT

## 2024-09-26 PROCEDURE — 84484 ASSAY OF TROPONIN QUANT: CPT

## 2024-09-26 PROCEDURE — 85018 HEMOGLOBIN: CPT

## 2024-09-26 PROCEDURE — 74018 RADEX ABDOMEN 1 VIEW: CPT

## 2024-09-26 PROCEDURE — 83735 ASSAY OF MAGNESIUM: CPT

## 2024-09-26 RX ORDER — CARVEDILOL 12.5 MG/1
12.5 TABLET ORAL 2 TIMES DAILY WITH MEALS
Status: DISCONTINUED | OUTPATIENT
Start: 2024-09-26 | End: 2024-09-28

## 2024-09-26 RX ORDER — MIDAZOLAM HYDROCHLORIDE 2 MG/2ML
4 INJECTION, SOLUTION INTRAMUSCULAR; INTRAVENOUS ONCE
Status: DISCONTINUED | OUTPATIENT
Start: 2024-09-26 | End: 2024-09-26

## 2024-09-26 RX ORDER — SODIUM CHLORIDE, SODIUM LACTATE, POTASSIUM CHLORIDE, CALCIUM CHLORIDE 600; 310; 30; 20 MG/100ML; MG/100ML; MG/100ML; MG/100ML
INJECTION, SOLUTION INTRAVENOUS CONTINUOUS
Status: DISCONTINUED | OUTPATIENT
Start: 2024-09-26 | End: 2024-09-27

## 2024-09-26 RX ORDER — LOSARTAN POTASSIUM 50 MG/1
50 TABLET ORAL 2 TIMES DAILY
Status: DISCONTINUED | OUTPATIENT
Start: 2024-09-26 | End: 2024-10-04

## 2024-09-26 RX ORDER — LABETALOL HYDROCHLORIDE 5 MG/ML
20 INJECTION, SOLUTION INTRAVENOUS EVERY 4 HOURS PRN
Status: DISCONTINUED | OUTPATIENT
Start: 2024-09-26 | End: 2024-10-02

## 2024-09-26 RX ORDER — DEXTROSE MONOHYDRATE 100 MG/ML
INJECTION, SOLUTION INTRAVENOUS CONTINUOUS PRN
Status: DISCONTINUED | OUTPATIENT
Start: 2024-09-26 | End: 2024-09-26

## 2024-09-26 RX ORDER — LEVETIRACETAM 500 MG/5ML
500 INJECTION, SOLUTION, CONCENTRATE INTRAVENOUS EVERY 12 HOURS
Status: DISCONTINUED | OUTPATIENT
Start: 2024-09-26 | End: 2024-10-07 | Stop reason: HOSPADM

## 2024-09-26 RX ORDER — HEPARIN SODIUM 1000 [USP'U]/ML
4000 INJECTION, SOLUTION INTRAVENOUS; SUBCUTANEOUS PRN
Status: DISCONTINUED | OUTPATIENT
Start: 2024-09-26 | End: 2024-09-27

## 2024-09-26 RX ORDER — CARVEDILOL 12.5 MG/1
12.5 TABLET ORAL 2 TIMES DAILY WITH MEALS
Status: DISCONTINUED | OUTPATIENT
Start: 2024-09-26 | End: 2024-09-26

## 2024-09-26 RX ORDER — DILTIAZEM HYDROCHLORIDE 5 MG/ML
15 INJECTION INTRAVENOUS ONCE
Status: DISCONTINUED | OUTPATIENT
Start: 2024-09-26 | End: 2024-09-26

## 2024-09-26 RX ORDER — CLOPIDOGREL BISULFATE 75 MG/1
75 TABLET ORAL DAILY
Status: DISCONTINUED | OUTPATIENT
Start: 2024-09-26 | End: 2024-09-26

## 2024-09-26 RX ORDER — QUETIAPINE FUMARATE 25 MG/1
25 TABLET, FILM COATED ORAL 2 TIMES DAILY
Status: DISCONTINUED | OUTPATIENT
Start: 2024-09-26 | End: 2024-10-05

## 2024-09-26 RX ORDER — AMLODIPINE BESYLATE 10 MG/1
10 TABLET ORAL DAILY
Status: DISCONTINUED | OUTPATIENT
Start: 2024-09-26 | End: 2024-09-26

## 2024-09-26 RX ORDER — GLUCAGON 1 MG/ML
1 KIT INJECTION PRN
Status: DISCONTINUED | OUTPATIENT
Start: 2024-09-26 | End: 2024-10-07 | Stop reason: HOSPADM

## 2024-09-26 RX ORDER — GLIPIZIDE 5 MG/1
5 TABLET ORAL
Status: DISCONTINUED | OUTPATIENT
Start: 2024-09-27 | End: 2024-09-27

## 2024-09-26 RX ORDER — ASPIRIN 81 MG/1
81 TABLET, CHEWABLE ORAL DAILY
Status: DISCONTINUED | OUTPATIENT
Start: 2024-09-26 | End: 2024-10-04

## 2024-09-26 RX ORDER — ATORVASTATIN CALCIUM 80 MG/1
80 TABLET, FILM COATED ORAL DAILY
Status: DISCONTINUED | OUTPATIENT
Start: 2024-09-27 | End: 2024-10-04

## 2024-09-26 RX ORDER — AMLODIPINE BESYLATE 10 MG/1
10 TABLET ORAL DAILY
Status: DISCONTINUED | OUTPATIENT
Start: 2024-09-26 | End: 2024-09-28

## 2024-09-26 RX ORDER — FUROSEMIDE 40 MG
40 TABLET ORAL DAILY
Status: DISCONTINUED | OUTPATIENT
Start: 2024-09-26 | End: 2024-10-04

## 2024-09-26 RX ORDER — HEPARIN SODIUM 10000 [USP'U]/100ML
5-30 INJECTION, SOLUTION INTRAVENOUS CONTINUOUS
Status: DISCONTINUED | OUTPATIENT
Start: 2024-09-26 | End: 2024-09-27

## 2024-09-26 RX ORDER — METOPROLOL TARTRATE 1 MG/ML
5 INJECTION, SOLUTION INTRAVENOUS EVERY 5 MIN PRN
Status: DISCONTINUED | OUTPATIENT
Start: 2024-09-26 | End: 2024-10-07 | Stop reason: HOSPADM

## 2024-09-26 RX ORDER — QUETIAPINE FUMARATE 25 MG/1
25 TABLET, FILM COATED ORAL 2 TIMES DAILY
Status: DISCONTINUED | OUTPATIENT
Start: 2024-09-26 | End: 2024-09-26

## 2024-09-26 RX ORDER — HEPARIN SODIUM 1000 [USP'U]/ML
2000 INJECTION, SOLUTION INTRAVENOUS; SUBCUTANEOUS PRN
Status: DISCONTINUED | OUTPATIENT
Start: 2024-09-26 | End: 2024-09-27

## 2024-09-26 RX ORDER — HEPARIN SODIUM 1000 [USP'U]/ML
4000 INJECTION, SOLUTION INTRAVENOUS; SUBCUTANEOUS ONCE
Status: DISCONTINUED | OUTPATIENT
Start: 2024-09-26 | End: 2024-09-26

## 2024-09-26 RX ORDER — LABETALOL HYDROCHLORIDE 5 MG/ML
10 INJECTION, SOLUTION INTRAVENOUS ONCE
Status: COMPLETED | OUTPATIENT
Start: 2024-09-26 | End: 2024-09-26

## 2024-09-26 RX ORDER — CLOPIDOGREL BISULFATE 75 MG/1
75 TABLET ORAL DAILY
Status: DISCONTINUED | OUTPATIENT
Start: 2024-09-26 | End: 2024-10-04

## 2024-09-26 RX ORDER — FUROSEMIDE 40 MG
40 TABLET ORAL DAILY
Status: DISCONTINUED | OUTPATIENT
Start: 2024-09-26 | End: 2024-09-26

## 2024-09-26 RX ORDER — ALOGLIPTIN 12.5 MG/1
12.5 TABLET, FILM COATED ORAL DAILY
Status: DISCONTINUED | OUTPATIENT
Start: 2024-09-26 | End: 2024-10-07 | Stop reason: HOSPADM

## 2024-09-26 RX ADMIN — DEXTROSE AND SODIUM CHLORIDE: 5; 450 INJECTION, SOLUTION INTRAVENOUS at 08:09

## 2024-09-26 RX ADMIN — LABETALOL HYDROCHLORIDE 10 MG: 5 INJECTION, SOLUTION INTRAVENOUS at 05:36

## 2024-09-26 RX ADMIN — PERFLUTREN 1.5 ML: 6.52 INJECTION, SUSPENSION INTRAVENOUS at 11:05

## 2024-09-26 RX ADMIN — SODIUM CHLORIDE, POTASSIUM CHLORIDE, SODIUM LACTATE AND CALCIUM CHLORIDE: 600; 310; 30; 20 INJECTION, SOLUTION INTRAVENOUS at 15:24

## 2024-09-26 RX ADMIN — HEPARIN SODIUM 2000 UNITS: 1000 INJECTION INTRAVENOUS; SUBCUTANEOUS at 22:21

## 2024-09-26 RX ADMIN — METOPROLOL TARTRATE 5 MG: 5 INJECTION INTRAVENOUS at 01:27

## 2024-09-26 RX ADMIN — FENTANYL CITRATE 25 MCG: 50 INJECTION, SOLUTION INTRAMUSCULAR; INTRAVENOUS at 19:21

## 2024-09-26 RX ADMIN — POTASSIUM CHLORIDE 20 MEQ: 29.8 INJECTION, SOLUTION INTRAVENOUS at 07:48

## 2024-09-26 RX ADMIN — AMLODIPINE BESYLATE 10 MG: 10 TABLET ORAL at 17:11

## 2024-09-26 RX ADMIN — Medication 1 MG/MIN: at 04:43

## 2024-09-26 RX ADMIN — SODIUM PHOSPHATE, MONOBASIC, MONOHYDRATE AND SODIUM PHOSPHATE, DIBASIC, ANHYDROUS 15 MMOL: 276; 142 INJECTION, SOLUTION INTRAVENOUS at 04:41

## 2024-09-26 RX ADMIN — SODIUM CHLORIDE, POTASSIUM CHLORIDE, SODIUM LACTATE AND CALCIUM CHLORIDE: 600; 310; 30; 20 INJECTION, SOLUTION INTRAVENOUS at 22:28

## 2024-09-26 RX ADMIN — SODIUM CHLORIDE 3.9 UNITS/HR: 9 INJECTION, SOLUTION INTRAVENOUS at 08:08

## 2024-09-26 RX ADMIN — POTASSIUM CHLORIDE 20 MEQ: 29.8 INJECTION, SOLUTION INTRAVENOUS at 05:55

## 2024-09-26 RX ADMIN — Medication 2000 MG: at 22:20

## 2024-09-26 RX ADMIN — SODIUM CHLORIDE, POTASSIUM CHLORIDE, SODIUM LACTATE AND CALCIUM CHLORIDE: 600; 310; 30; 20 INJECTION, SOLUTION INTRAVENOUS at 08:27

## 2024-09-26 RX ADMIN — SODIUM CHLORIDE, PRESERVATIVE FREE 10 ML: 5 INJECTION INTRAVENOUS at 08:10

## 2024-09-26 RX ADMIN — LEVETIRACETAM 500 MG: 100 INJECTION INTRAVENOUS at 09:53

## 2024-09-26 RX ADMIN — PIPERACILLIN AND TAZOBACTAM 3375 MG: 3; .375 INJECTION, POWDER, LYOPHILIZED, FOR SOLUTION INTRAVENOUS at 13:25

## 2024-09-26 RX ADMIN — CARVEDILOL 12.5 MG: 12.5 TABLET, FILM COATED ORAL at 17:13

## 2024-09-26 RX ADMIN — SODIUM CHLORIDE, PRESERVATIVE FREE 10 ML: 5 INJECTION INTRAVENOUS at 19:51

## 2024-09-26 RX ADMIN — SERTRALINE HYDROCHLORIDE 75 MG: 50 TABLET ORAL at 17:13

## 2024-09-26 RX ADMIN — LEVETIRACETAM 500 MG: 100 INJECTION INTRAVENOUS at 20:12

## 2024-09-26 RX ADMIN — ASPIRIN 81 MG 81 MG: 81 TABLET ORAL at 17:12

## 2024-09-26 RX ADMIN — LABETALOL HYDROCHLORIDE 20 MG: 5 INJECTION, SOLUTION INTRAVENOUS at 11:33

## 2024-09-26 RX ADMIN — Medication 0.5 MG/MIN: at 16:23

## 2024-09-26 RX ADMIN — CLOPIDOGREL BISULFATE 75 MG: 75 TABLET ORAL at 17:12

## 2024-09-26 RX ADMIN — FENTANYL CITRATE 25 MCG: 50 INJECTION, SOLUTION INTRAMUSCULAR; INTRAVENOUS at 04:38

## 2024-09-26 RX ADMIN — PIPERACILLIN AND TAZOBACTAM 3375 MG: 3; .375 INJECTION, POWDER, LYOPHILIZED, FOR SOLUTION INTRAVENOUS at 19:53

## 2024-09-26 RX ADMIN — MEROPENEM 1000 MG: 1 INJECTION INTRAVENOUS at 01:09

## 2024-09-26 RX ADMIN — DEXTROSE AND SODIUM CHLORIDE: 5; 450 INJECTION, SOLUTION INTRAVENOUS at 01:09

## 2024-09-26 RX ADMIN — FENTANYL CITRATE 25 MCG: 50 INJECTION, SOLUTION INTRAMUSCULAR; INTRAVENOUS at 14:58

## 2024-09-26 RX ADMIN — POTASSIUM CHLORIDE 20 MEQ: 29.8 INJECTION, SOLUTION INTRAVENOUS at 01:38

## 2024-09-27 PROBLEM — I48.0 PAF (PAROXYSMAL ATRIAL FIBRILLATION) (HCC): Status: ACTIVE | Noted: 2024-09-27

## 2024-09-27 LAB
ANION GAP SERPL CALCULATED.3IONS-SCNC: 10 MMOL/L (ref 9–16)
ANION GAP SERPL CALCULATED.3IONS-SCNC: 7 MMOL/L (ref 9–16)
ANION GAP SERPL CALCULATED.3IONS-SCNC: 9 MMOL/L (ref 9–16)
ANTI-XA UNFRAC HEPARIN: 0.45 IU/L
ANTI-XA UNFRAC HEPARIN: 0.53 IU/L
BASOPHILS # BLD: <0.03 K/UL (ref 0–0.2)
BASOPHILS NFR BLD: 0 % (ref 0–2)
BUN SERPL-MCNC: 11 MG/DL (ref 8–23)
BUN SERPL-MCNC: 12 MG/DL (ref 8–23)
BUN SERPL-MCNC: 13 MG/DL (ref 8–23)
CALCIUM SERPL-MCNC: 7.6 MG/DL (ref 8.6–10.4)
CALCIUM SERPL-MCNC: 7.9 MG/DL (ref 8.6–10.4)
CALCIUM SERPL-MCNC: 8.1 MG/DL (ref 8.6–10.4)
CHLORIDE SERPL-SCNC: 118 MMOL/L (ref 98–107)
CHLORIDE SERPL-SCNC: 118 MMOL/L (ref 98–107)
CHLORIDE SERPL-SCNC: 119 MMOL/L (ref 98–107)
CO2 SERPL-SCNC: 17 MMOL/L (ref 20–31)
CO2 SERPL-SCNC: 18 MMOL/L (ref 20–31)
CO2 SERPL-SCNC: 19 MMOL/L (ref 20–31)
CREAT SERPL-MCNC: 0.5 MG/DL (ref 0.5–0.9)
CREAT SERPL-MCNC: 0.6 MG/DL (ref 0.5–0.9)
CREAT SERPL-MCNC: 0.6 MG/DL (ref 0.5–0.9)
EKG ATRIAL RATE: 105 BPM
EKG ATRIAL RATE: 163 BPM
EKG ATRIAL RATE: 77 BPM
EKG ATRIAL RATE: 78 BPM
EKG P AXIS: 41 DEGREES
EKG P-R INTERVAL: 134 MS
EKG Q-T INTERVAL: 316 MS
EKG Q-T INTERVAL: 348 MS
EKG Q-T INTERVAL: 512 MS
EKG Q-T INTERVAL: 516 MS
EKG QRS DURATION: 70 MS
EKG QRS DURATION: 80 MS
EKG QRS DURATION: 82 MS
EKG QRS DURATION: 88 MS
EKG QTC CALCULATION (BAZETT): 470 MS
EKG QTC CALCULATION (BAZETT): 483 MS
EKG QTC CALCULATION (BAZETT): 583 MS
EKG QTC CALCULATION (BAZETT): 583 MS
EKG R AXIS: -178 DEGREES
EKG R AXIS: -2 DEGREES
EKG R AXIS: -20 DEGREES
EKG R AXIS: -6 DEGREES
EKG T AXIS: 173 DEGREES
EKG T AXIS: 26 DEGREES
EKG T AXIS: 35 DEGREES
EKG T AXIS: 67 DEGREES
EKG VENTRICULAR RATE: 116 BPM
EKG VENTRICULAR RATE: 133 BPM
EKG VENTRICULAR RATE: 77 BPM
EKG VENTRICULAR RATE: 78 BPM
EOSINOPHIL # BLD: 0.23 K/UL (ref 0–0.44)
EOSINOPHILS RELATIVE PERCENT: 2 % (ref 1–4)
ERYTHROCYTE [DISTWIDTH] IN BLOOD BY AUTOMATED COUNT: 15.2 % (ref 11.8–14.4)
GFR, ESTIMATED: >90 ML/MIN/1.73M2
GLUCOSE BLD-MCNC: 135 MG/DL (ref 65–105)
GLUCOSE BLD-MCNC: 139 MG/DL (ref 65–105)
GLUCOSE BLD-MCNC: 147 MG/DL (ref 65–105)
GLUCOSE BLD-MCNC: 153 MG/DL (ref 65–105)
GLUCOSE BLD-MCNC: 157 MG/DL (ref 65–105)
GLUCOSE BLD-MCNC: 165 MG/DL (ref 65–105)
GLUCOSE BLD-MCNC: 171 MG/DL (ref 65–105)
GLUCOSE BLD-MCNC: 175 MG/DL (ref 65–105)
GLUCOSE BLD-MCNC: 182 MG/DL (ref 65–105)
GLUCOSE BLD-MCNC: 191 MG/DL (ref 65–105)
GLUCOSE BLD-MCNC: 195 MG/DL (ref 65–105)
GLUCOSE BLD-MCNC: 196 MG/DL (ref 65–105)
GLUCOSE BLD-MCNC: 261 MG/DL (ref 65–105)
GLUCOSE SERPL-MCNC: 169 MG/DL (ref 74–99)
GLUCOSE SERPL-MCNC: 182 MG/DL (ref 74–99)
GLUCOSE SERPL-MCNC: 212 MG/DL (ref 74–99)
HCT VFR BLD AUTO: 34 % (ref 36.3–47.1)
HGB BLD-MCNC: 10.3 G/DL (ref 11.9–15.1)
IMM GRANULOCYTES # BLD AUTO: 0.11 K/UL (ref 0–0.3)
IMM GRANULOCYTES NFR BLD: 1 %
LYMPHOCYTES NFR BLD: 0.79 K/UL (ref 1.1–3.7)
LYMPHOCYTES RELATIVE PERCENT: 7 % (ref 24–43)
M PNEUMO IGM SER QL IA: 0.49
MAGNESIUM SERPL-MCNC: 1.7 MG/DL (ref 1.6–2.4)
MAGNESIUM SERPL-MCNC: 2.2 MG/DL (ref 1.6–2.4)
MCH RBC QN AUTO: 26.7 PG (ref 25.2–33.5)
MCHC RBC AUTO-ENTMCNC: 30.3 G/DL (ref 28.4–34.8)
MCV RBC AUTO: 88.1 FL (ref 82.6–102.9)
MONOCYTES NFR BLD: 0.44 K/UL (ref 0.1–1.2)
MONOCYTES NFR BLD: 4 % (ref 3–12)
NEUTROPHILS NFR BLD: 86 % (ref 36–65)
NEUTS SEG NFR BLD: 9.62 K/UL (ref 1.5–8.1)
NRBC BLD-RTO: 0 PER 100 WBC
OSMOLALITY SERPL: 310 MOSM/KG (ref 275–295)
PLATELET # BLD AUTO: ABNORMAL K/UL (ref 138–453)
PLATELET, FLUORESCENCE: 152 K/UL (ref 138–453)
PLATELETS.RETICULATED NFR BLD AUTO: 2.8 % (ref 1.1–10.3)
POTASSIUM SERPL-SCNC: 4.1 MMOL/L (ref 3.7–5.3)
POTASSIUM SERPL-SCNC: 4.2 MMOL/L (ref 3.7–5.3)
POTASSIUM SERPL-SCNC: 4.4 MMOL/L (ref 3.7–5.3)
RBC # BLD AUTO: 3.86 M/UL (ref 3.95–5.11)
RBC # BLD: ABNORMAL 10*6/UL
SODIUM SERPL-SCNC: 145 MMOL/L (ref 136–145)
WBC OTHER # BLD: 11.2 K/UL (ref 3.5–11.3)

## 2024-09-27 PROCEDURE — 93010 ELECTROCARDIOGRAM REPORT: CPT | Performed by: INTERNAL MEDICINE

## 2024-09-27 PROCEDURE — 6360000002 HC RX W HCPCS

## 2024-09-27 PROCEDURE — 83735 ASSAY OF MAGNESIUM: CPT

## 2024-09-27 PROCEDURE — 6370000000 HC RX 637 (ALT 250 FOR IP)

## 2024-09-27 PROCEDURE — 2580000003 HC RX 258

## 2024-09-27 PROCEDURE — 2500000003 HC RX 250 WO HCPCS

## 2024-09-27 PROCEDURE — 99233 SBSQ HOSP IP/OBS HIGH 50: CPT | Performed by: INTERNAL MEDICINE

## 2024-09-27 PROCEDURE — 80048 BASIC METABOLIC PNL TOTAL CA: CPT

## 2024-09-27 PROCEDURE — 85520 HEPARIN ASSAY: CPT

## 2024-09-27 PROCEDURE — 83930 ASSAY OF BLOOD OSMOLALITY: CPT

## 2024-09-27 PROCEDURE — 82947 ASSAY GLUCOSE BLOOD QUANT: CPT

## 2024-09-27 PROCEDURE — 85055 RETICULATED PLATELET ASSAY: CPT

## 2024-09-27 PROCEDURE — 85025 COMPLETE CBC W/AUTO DIFF WBC: CPT

## 2024-09-27 PROCEDURE — 6360000002 HC RX W HCPCS: Performed by: INTERNAL MEDICINE

## 2024-09-27 PROCEDURE — 93005 ELECTROCARDIOGRAM TRACING: CPT

## 2024-09-27 PROCEDURE — 2060000000 HC ICU INTERMEDIATE R&B

## 2024-09-27 PROCEDURE — 36415 COLL VENOUS BLD VENIPUNCTURE: CPT

## 2024-09-27 PROCEDURE — 99291 CRITICAL CARE FIRST HOUR: CPT | Performed by: INTERNAL MEDICINE

## 2024-09-27 RX ORDER — MAGNESIUM SULFATE 1 G/100ML
1000 INJECTION INTRAVENOUS ONCE
Status: COMPLETED | OUTPATIENT
Start: 2024-09-27 | End: 2024-09-27

## 2024-09-27 RX ORDER — AMIODARONE HYDROCHLORIDE 200 MG/1
200 TABLET ORAL 2 TIMES DAILY
Status: COMPLETED | OUTPATIENT
Start: 2024-09-27 | End: 2024-10-03

## 2024-09-27 RX ORDER — MAGNESIUM SULFATE IN WATER 40 MG/ML
2000 INJECTION, SOLUTION INTRAVENOUS ONCE
Status: DISCONTINUED | OUTPATIENT
Start: 2024-09-27 | End: 2024-09-27

## 2024-09-27 RX ORDER — MAGNESIUM SULFATE 1 G/100ML
1000 INJECTION INTRAVENOUS ONCE
Status: DISCONTINUED | OUTPATIENT
Start: 2024-09-27 | End: 2024-10-07 | Stop reason: HOSPADM

## 2024-09-27 RX ORDER — AMIODARONE HYDROCHLORIDE 200 MG/1
200 TABLET ORAL DAILY
Status: DISCONTINUED | OUTPATIENT
Start: 2024-10-04 | End: 2024-10-07 | Stop reason: HOSPADM

## 2024-09-27 RX ORDER — INSULIN LISPRO 100 [IU]/ML
0-16 INJECTION, SOLUTION INTRAVENOUS; SUBCUTANEOUS EVERY 6 HOURS
Status: DISCONTINUED | OUTPATIENT
Start: 2024-09-27 | End: 2024-10-07 | Stop reason: HOSPADM

## 2024-09-27 RX ORDER — INSULIN LISPRO 100 [IU]/ML
0-4 INJECTION, SOLUTION INTRAVENOUS; SUBCUTANEOUS NIGHTLY
Status: DISCONTINUED | OUTPATIENT
Start: 2024-09-27 | End: 2024-09-27

## 2024-09-27 RX ORDER — INSULIN GLARGINE 100 [IU]/ML
35 INJECTION, SOLUTION SUBCUTANEOUS DAILY
Status: DISCONTINUED | OUTPATIENT
Start: 2024-09-27 | End: 2024-09-28

## 2024-09-27 RX ADMIN — CARVEDILOL 12.5 MG: 12.5 TABLET, FILM COATED ORAL at 16:45

## 2024-09-27 RX ADMIN — SODIUM CHLORIDE, PRESERVATIVE FREE 10 ML: 5 INJECTION INTRAVENOUS at 20:42

## 2024-09-27 RX ADMIN — CARVEDILOL 12.5 MG: 12.5 TABLET, FILM COATED ORAL at 07:50

## 2024-09-27 RX ADMIN — FENTANYL CITRATE 25 MCG: 50 INJECTION, SOLUTION INTRAMUSCULAR; INTRAVENOUS at 17:15

## 2024-09-27 RX ADMIN — APIXABAN 5 MG: 5 TABLET, FILM COATED ORAL at 14:37

## 2024-09-27 RX ADMIN — ASPIRIN 81 MG 81 MG: 81 TABLET ORAL at 07:49

## 2024-09-27 RX ADMIN — LEVETIRACETAM 500 MG: 100 INJECTION INTRAVENOUS at 07:50

## 2024-09-27 RX ADMIN — LOSARTAN POTASSIUM 50 MG: 50 TABLET, FILM COATED ORAL at 08:54

## 2024-09-27 RX ADMIN — Medication 2000 MG: at 22:18

## 2024-09-27 RX ADMIN — MAGNESIUM SULFATE HEPTAHYDRATE 1000 MG: 1 INJECTION, SOLUTION INTRAVENOUS at 06:49

## 2024-09-27 RX ADMIN — HEPARIN SODIUM 13 UNITS/KG/HR: 10000 INJECTION, SOLUTION INTRAVENOUS at 04:13

## 2024-09-27 RX ADMIN — AMIODARONE HYDROCHLORIDE 200 MG: 200 TABLET ORAL at 12:09

## 2024-09-27 RX ADMIN — SERTRALINE HYDROCHLORIDE 75 MG: 50 TABLET ORAL at 07:50

## 2024-09-27 RX ADMIN — CLOPIDOGREL BISULFATE 75 MG: 75 TABLET ORAL at 07:50

## 2024-09-27 RX ADMIN — SODIUM CHLORIDE, PRESERVATIVE FREE 10 ML: 5 INJECTION INTRAVENOUS at 20:32

## 2024-09-27 RX ADMIN — QUETIAPINE FUMARATE 25 MG: 25 TABLET ORAL at 20:42

## 2024-09-27 RX ADMIN — LEVETIRACETAM 500 MG: 100 INJECTION INTRAVENOUS at 20:42

## 2024-09-27 RX ADMIN — ATORVASTATIN CALCIUM 80 MG: 80 TABLET, FILM COATED ORAL at 07:50

## 2024-09-27 RX ADMIN — LABETALOL HYDROCHLORIDE 20 MG: 5 INJECTION, SOLUTION INTRAVENOUS at 06:43

## 2024-09-27 RX ADMIN — AMIODARONE HYDROCHLORIDE 200 MG: 200 TABLET ORAL at 20:42

## 2024-09-27 RX ADMIN — AMLODIPINE BESYLATE 10 MG: 10 TABLET ORAL at 07:49

## 2024-09-27 RX ADMIN — SODIUM CHLORIDE, POTASSIUM CHLORIDE, SODIUM LACTATE AND CALCIUM CHLORIDE: 600; 310; 30; 20 INJECTION, SOLUTION INTRAVENOUS at 05:23

## 2024-09-27 RX ADMIN — INSULIN LISPRO 8 UNITS: 100 INJECTION, SOLUTION INTRAVENOUS; SUBCUTANEOUS at 20:48

## 2024-09-27 RX ADMIN — LABETALOL HYDROCHLORIDE 20 MG: 5 INJECTION, SOLUTION INTRAVENOUS at 01:11

## 2024-09-27 RX ADMIN — APIXABAN 5 MG: 5 TABLET, FILM COATED ORAL at 20:42

## 2024-09-27 RX ADMIN — Medication 0.5 MG/MIN: at 08:53

## 2024-09-27 RX ADMIN — MAGNESIUM SULFATE HEPTAHYDRATE 2000 MG: 40 INJECTION, SOLUTION INTRAVENOUS at 08:46

## 2024-09-27 NOTE — TRANSITION OF CARE
cerebral stroke due to intracranial large artery atherosclerosis (HCC)     Acute cerebrovascular accident (CVA) (HCC)     Right middle cerebral artery stroke (HCC)     Cerebrovascular accident (CVA) due to embolic occlusion of right middle cerebral artery (HCC) 12/15/2020    Diffuse Lewy body disease (HCC) 08/19/2020    Long term (current) use of antithrombotics/antiplatelets 02/13/2018    Primary osteoarthritis of right knee     Anxiety 10/25/2016    Major depressive disorder 10/25/2016    Postlaminectomy syndrome, lumbar 10/25/2016    S/P coronary artery stent placement - RCA 10/12/16 - Dr. allan 10/12/2016    Type 2 diabetes mellitus with complication, without long-term current use of insulin (HCC) 06/16/2016    Chronic prescription opiate use 02/10/2016    Chronic pain     Coronary artery disease involving native coronary artery without angina pectoris     Acute coronary syndrome (HCC)     Congestive heart failure (HCC)     Lymphadenopathy     Chronic knee pain 02/24/2015    Chronic use of opiate drugs therapeutic purposes 11/25/2014    Lumbar spondylosis 08/27/2014    Cervical spondylosis 08/27/2014    Urge incontinence of urine 07/31/2014    Knee pain, bilateral 06/25/2014    S/P TKR (total knee replacement) 06/25/2014    Morbid obesity 04/03/2014    YUDITH (obstructive sleep apnea) 04/02/2014    Chest pain 03/31/2014    Spinal stenosis in cervical region 02/06/2013    Edema 08/07/2012    Knee osteoarthritis 06/06/2012    HTN (hypertension) 02/21/2012    Asthma 02/21/2012     Recommended Follow-up:     Patient currently has right femoral central line.  As per ID recommendations patient will need midline/PICC line at discharge.  That has been consulted 2 times who recommend reconsult them again at the time of discharge.    Continue with Lantus as ordered, readjust medications as necessary  Discharge planning  Make sure that we take out the NG as soon as possible, once patient is more awake and is stable and able

## 2024-09-27 NOTE — CONSULTS
Nutrition Note    Consulted for Tube Feedings.  Pt remains lethargic.  NGT has been placed.  Tube Feedings of Diabetic formula ordered and running at 10 mL/hr at visit.  Discussed with RN.    Continue feedings of Diabetic (Glucerna 1.2) TF slowly advancing to goal 60 mL/hr to provide 1728 kcals, 86 gm protein per day.  If Glucerna 1.5 formula back in stock, suggest running at decreased goal rate of 50 mL/hr.  Will monitor TF tolerance.    For additional information, refer to full RD assessment from 9/26/24.    Electronically signed by Radha Fagan RD, LD on 9/27/24 at 10:35 AM EDT    Contact: [phone]  
  Department of Neurology/Telestroke/Stroke  Resident Consult Note  Stroke Alert @ 536 AM  Arrival at bedside @ 537    Reason for Consult:  ED Stroke Alert  Requesting Physician: Dr. Mccrary  Endovascular Neurosurgeon:   Aiden Brannon MD    Stroke Team:  Daryl Whitten MD      History Obtained From:  electronic medical record    CHIEF COMPLAINT:       AMS    HISTORY OF PRESENT ILLNESS:       The patient is a 73 y.o. female with significant past medical history of right MCA stroke with hemorrhagic transformation (2020) with residual left hemiparesis and dysarthria, seizures HTN, HLD, T2DM, YUDITH, anxiety, depression, CAD status post stenting who presents with AMS    Patient last known well was 9/23/2024 PM; Patient was found today to be AMS and unresponsive was brought by EMS.  Patient was not answering appropriately or following commands. Her Blood sugar levels >700. A stroke alert was then called.    On examination patient has nihss of 25 as detailed below. She is lethargic and not following commands. She is mumbling without clear speech. There is clear left sided visual field cut. There is evidence of left sided hemiplegia with right sided hemiparesis. Those exam findings do not seem to be changed compared to neurology note in 2023. .     Patient already on ASA and Plavix. She is also on Keppra 500 mg BID for seizure prophylaxis    Of note    Patient was last seen by neurology in Dec 2023, at that time she also presented as a stroke alert, when she was developing gradual right upper and lower extremity weakness with worsening right sided facial droop.   Patient did seem to have hemiplegic left upper and lower extremity and is also paretic in the right upper and lower at baseline.   MRI brain at that time did not reveal any new infarct but showed a very large right MCA encephalomalacia. Patient had found to have iCAD with known m1m2 occlusion and bilateral cavernous stenosis as well as L CICA    During that time 
VAT consulted for PIV placement.   Multiple non-compressible vessels noted in bilateral lower arms. Patient had 2 existing PIV's, one infiltrated. VAT team placed 2 additional peripheral catheters. Limited access for peripheral IV placement noted upon assessment.      Recommendation:  May be in the best interest of the patient to pursue central venous access if patient will require additional vascular access in the near future.     Nurse updated on interventions and recommendations.  
VAT consulted on pt to place midline for antibiotic until 10/7. Pt has CVC and PIV x 2 at this time. VAT speaks with RN and ordering provider and they are going to use CVC and PIV's until d/c and re-consult when/if midline is needed at that time.   
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)    Sacral decubitus ulcer, stage III (HCC)    Hypertensive heart disease with heart failure (HCC)    Type 2 diabetes mellitus with hyperglycemia (HCC)    Hemiplegia and hemiparesis following cerebral infarction affecting left non-dominant side (HCC)    Skin tear of right lower leg without complication    Metabolic encephalopathy    NSTEMI (non-ST elevated myocardial infarction) (HCC)    Acute renal failure (HCC)    Chronic obstructive pulmonary disease (HCC)    Diffuse Lewy body disease (HCC)    Schizoaffective disorder (HCC)    Prolonged Q-T interval on ECG    Thyroid nodule    Altered mental status    Weakness    History of CVA with residual deficit    Hyperosmolar hyperglycemic state (HHS) (HCC)    Pyelonephritis    Bandemia    SIRS (systemic inflammatory response syndrome) (HCC)    E. coli septicemia (HCC)       DIAGNOSTICS:    EKG:    Date: 09/26/24  Reading: No acute ischemia      LAST ECHO:  Date: 12/21/2023  Findings Summary:    Left Ventricle: Moderately reduced left ventricular systolic function with a visually estimated EF of 40 - 45%. Left ventricle size is normal. Moderately increased wall thickness. Severe hypokinesis of the following segments: apical anterior, apical septal, apical inferior and apical lateral. Severe hypokinesis of the apex. Abnormal diastolic function.    Aortic Valve: Probable trileaflet valve.    Mitral Valve: Mildly thickened leaflet.    Tricuspid Valve: Mild to moderate regurgitation. Normal RVSP. The estimated RVSP is 32 mmHg.    Interatrial Septum: Agitated saline study was negative without provocation.    Image quality is adequate.    LAST Stress Test:   Date of last ST:  Major Findings:    LAST Cardiac 
performed by me.  I have decided the assessment, plan and orders as documented by the resident.student or CNP    Alyssia Price, Infectious Diseases

## 2024-09-28 ENCOUNTER — APPOINTMENT (OUTPATIENT)
Dept: VASCULAR LAB | Age: 74
DRG: 871 | End: 2024-09-28
Payer: COMMERCIAL

## 2024-09-28 PROBLEM — A41.50 GRAM NEGATIVE SEPTICEMIA (HCC): Status: ACTIVE | Noted: 2024-09-28

## 2024-09-28 LAB
ANION GAP SERPL CALCULATED.3IONS-SCNC: 8 MMOL/L (ref 9–16)
BASOPHILS # BLD: <0.03 K/UL (ref 0–0.2)
BASOPHILS NFR BLD: 0 % (ref 0–2)
BUN SERPL-MCNC: 11 MG/DL (ref 8–23)
CALCIUM SERPL-MCNC: 8.5 MG/DL (ref 8.6–10.4)
CHLORIDE SERPL-SCNC: 117 MMOL/L (ref 98–107)
CO2 SERPL-SCNC: 18 MMOL/L (ref 20–31)
CREAT SERPL-MCNC: 0.6 MG/DL (ref 0.5–0.9)
CRP SERPL HS-MCNC: 77 MG/L (ref 0–5)
ECHO BSA: 2.02 M2
EKG ATRIAL RATE: 94 BPM
EKG P-R INTERVAL: 168 MS
EKG Q-T INTERVAL: 420 MS
EKG QRS DURATION: 84 MS
EKG QTC CALCULATION (BAZETT): 525 MS
EKG R AXIS: -7 DEGREES
EKG T AXIS: 17 DEGREES
EKG VENTRICULAR RATE: 94 BPM
EOSINOPHIL # BLD: 0.2 K/UL (ref 0–0.44)
EOSINOPHILS RELATIVE PERCENT: 2 % (ref 1–4)
ERYTHROCYTE [DISTWIDTH] IN BLOOD BY AUTOMATED COUNT: 15.6 % (ref 11.8–14.4)
GFR, ESTIMATED: >90 ML/MIN/1.73M2
GLUCOSE BLD-MCNC: 232 MG/DL (ref 65–105)
GLUCOSE BLD-MCNC: 242 MG/DL (ref 65–105)
GLUCOSE BLD-MCNC: 270 MG/DL (ref 65–105)
GLUCOSE SERPL-MCNC: 265 MG/DL (ref 74–99)
HCT VFR BLD AUTO: 35.9 % (ref 36.3–47.1)
HGB BLD-MCNC: 10.7 G/DL (ref 11.9–15.1)
IMM GRANULOCYTES # BLD AUTO: 0.09 K/UL (ref 0–0.3)
IMM GRANULOCYTES NFR BLD: 1 %
LYMPHOCYTES NFR BLD: 0.88 K/UL (ref 1.1–3.7)
LYMPHOCYTES RELATIVE PERCENT: 11 % (ref 24–43)
MAGNESIUM SERPL-MCNC: 2.1 MG/DL (ref 1.6–2.4)
MCH RBC QN AUTO: 26.7 PG (ref 25.2–33.5)
MCHC RBC AUTO-ENTMCNC: 29.8 G/DL (ref 28.4–34.8)
MCV RBC AUTO: 89.5 FL (ref 82.6–102.9)
MONOCYTES NFR BLD: 0.57 K/UL (ref 0.1–1.2)
MONOCYTES NFR BLD: 7 % (ref 3–12)
NEUTROPHILS NFR BLD: 79 % (ref 36–65)
NEUTS SEG NFR BLD: 6.64 K/UL (ref 1.5–8.1)
NRBC BLD-RTO: 0 PER 100 WBC
PLATELET # BLD AUTO: 144 K/UL (ref 138–453)
PMV BLD AUTO: 11 FL (ref 8.1–13.5)
POTASSIUM SERPL-SCNC: 3.9 MMOL/L (ref 3.7–5.3)
RBC # BLD AUTO: 4.01 M/UL (ref 3.95–5.11)
RBC # BLD: ABNORMAL 10*6/UL
SODIUM SERPL-SCNC: 143 MMOL/L (ref 136–145)
WBC OTHER # BLD: 8.4 K/UL (ref 3.5–11.3)

## 2024-09-28 PROCEDURE — 2580000003 HC RX 258

## 2024-09-28 PROCEDURE — 6360000002 HC RX W HCPCS: Performed by: INTERNAL MEDICINE

## 2024-09-28 PROCEDURE — 36415 COLL VENOUS BLD VENIPUNCTURE: CPT

## 2024-09-28 PROCEDURE — 6370000000 HC RX 637 (ALT 250 FOR IP)

## 2024-09-28 PROCEDURE — 2500000003 HC RX 250 WO HCPCS

## 2024-09-28 PROCEDURE — 93010 ELECTROCARDIOGRAM REPORT: CPT | Performed by: INTERNAL MEDICINE

## 2024-09-28 PROCEDURE — 99233 SBSQ HOSP IP/OBS HIGH 50: CPT | Performed by: SURGERY

## 2024-09-28 PROCEDURE — 93971 EXTREMITY STUDY: CPT

## 2024-09-28 PROCEDURE — 6370000000 HC RX 637 (ALT 250 FOR IP): Performed by: SURGERY

## 2024-09-28 PROCEDURE — 85025 COMPLETE CBC W/AUTO DIFF WBC: CPT

## 2024-09-28 PROCEDURE — 93005 ELECTROCARDIOGRAM TRACING: CPT | Performed by: SURGERY

## 2024-09-28 PROCEDURE — 99232 SBSQ HOSP IP/OBS MODERATE 35: CPT | Performed by: INTERNAL MEDICINE

## 2024-09-28 PROCEDURE — 6360000002 HC RX W HCPCS

## 2024-09-28 PROCEDURE — 86140 C-REACTIVE PROTEIN: CPT

## 2024-09-28 PROCEDURE — 93971 EXTREMITY STUDY: CPT | Performed by: STUDENT IN AN ORGANIZED HEALTH CARE EDUCATION/TRAINING PROGRAM

## 2024-09-28 PROCEDURE — 2060000000 HC ICU INTERMEDIATE R&B

## 2024-09-28 PROCEDURE — 82947 ASSAY GLUCOSE BLOOD QUANT: CPT

## 2024-09-28 PROCEDURE — 80048 BASIC METABOLIC PNL TOTAL CA: CPT

## 2024-09-28 PROCEDURE — 99232 SBSQ HOSP IP/OBS MODERATE 35: CPT | Performed by: STUDENT IN AN ORGANIZED HEALTH CARE EDUCATION/TRAINING PROGRAM

## 2024-09-28 PROCEDURE — 83735 ASSAY OF MAGNESIUM: CPT

## 2024-09-28 RX ORDER — MIDODRINE HYDROCHLORIDE 5 MG/1
5 TABLET ORAL
Status: CANCELLED | OUTPATIENT
Start: 2024-09-28

## 2024-09-28 RX ORDER — MIDODRINE HYDROCHLORIDE 5 MG/1
10 TABLET ORAL
Status: DISCONTINUED | OUTPATIENT
Start: 2024-09-28 | End: 2024-09-29

## 2024-09-28 RX ORDER — METOPROLOL TARTRATE 25 MG/1
12.5 TABLET, FILM COATED ORAL 2 TIMES DAILY
Status: DISCONTINUED | OUTPATIENT
Start: 2024-09-28 | End: 2024-10-01

## 2024-09-28 RX ORDER — INSULIN GLARGINE 100 [IU]/ML
15 INJECTION, SOLUTION SUBCUTANEOUS 2 TIMES DAILY
Status: DISCONTINUED | OUTPATIENT
Start: 2024-09-28 | End: 2024-09-30

## 2024-09-28 RX ORDER — SODIUM CHLORIDE, SODIUM LACTATE, POTASSIUM CHLORIDE, AND CALCIUM CHLORIDE .6; .31; .03; .02 G/100ML; G/100ML; G/100ML; G/100ML
1000 INJECTION, SOLUTION INTRAVENOUS ONCE
Status: COMPLETED | OUTPATIENT
Start: 2024-09-28 | End: 2024-09-28

## 2024-09-28 RX ADMIN — QUETIAPINE FUMARATE 25 MG: 25 TABLET ORAL at 20:35

## 2024-09-28 RX ADMIN — APIXABAN 5 MG: 5 TABLET, FILM COATED ORAL at 09:47

## 2024-09-28 RX ADMIN — INSULIN GLARGINE 15 UNITS: 100 INJECTION, SOLUTION SUBCUTANEOUS at 20:32

## 2024-09-28 RX ADMIN — QUETIAPINE FUMARATE 25 MG: 25 TABLET ORAL at 09:49

## 2024-09-28 RX ADMIN — LEVETIRACETAM 500 MG: 100 INJECTION INTRAVENOUS at 09:48

## 2024-09-28 RX ADMIN — INSULIN LISPRO 4 UNITS: 100 INJECTION, SOLUTION INTRAVENOUS; SUBCUTANEOUS at 15:42

## 2024-09-28 RX ADMIN — MIDODRINE HYDROCHLORIDE 10 MG: 5 TABLET ORAL at 13:53

## 2024-09-28 RX ADMIN — METOPROLOL TARTRATE 12.5 MG: 25 TABLET, FILM COATED ORAL at 20:35

## 2024-09-28 RX ADMIN — SODIUM CHLORIDE, PRESERVATIVE FREE 10 ML: 5 INJECTION INTRAVENOUS at 20:31

## 2024-09-28 RX ADMIN — APIXABAN 5 MG: 5 TABLET, FILM COATED ORAL at 20:35

## 2024-09-28 RX ADMIN — ANTI-FUNGAL POWDER MICONAZOLE NITRATE TALC FREE: 1.42 POWDER TOPICAL at 20:35

## 2024-09-28 RX ADMIN — ATORVASTATIN CALCIUM 80 MG: 80 TABLET, FILM COATED ORAL at 09:47

## 2024-09-28 RX ADMIN — SODIUM CHLORIDE, POTASSIUM CHLORIDE, SODIUM LACTATE AND CALCIUM CHLORIDE 1000 ML: 600; 310; 30; 20 INJECTION, SOLUTION INTRAVENOUS at 13:09

## 2024-09-28 RX ADMIN — SERTRALINE HYDROCHLORIDE 75 MG: 50 TABLET ORAL at 09:48

## 2024-09-28 RX ADMIN — AMIODARONE HYDROCHLORIDE 200 MG: 200 TABLET ORAL at 20:34

## 2024-09-28 RX ADMIN — ASPIRIN 81 MG 81 MG: 81 TABLET ORAL at 09:47

## 2024-09-28 RX ADMIN — Medication 2000 MG: at 20:30

## 2024-09-28 RX ADMIN — CLOPIDOGREL BISULFATE 75 MG: 75 TABLET ORAL at 09:47

## 2024-09-28 RX ADMIN — SODIUM CHLORIDE, PRESERVATIVE FREE 10 ML: 5 INJECTION INTRAVENOUS at 09:48

## 2024-09-28 RX ADMIN — INSULIN LISPRO 4 UNITS: 100 INJECTION, SOLUTION INTRAVENOUS; SUBCUTANEOUS at 20:32

## 2024-09-28 RX ADMIN — LOSARTAN POTASSIUM 50 MG: 50 TABLET, FILM COATED ORAL at 09:48

## 2024-09-28 RX ADMIN — AMIODARONE HYDROCHLORIDE 200 MG: 200 TABLET ORAL at 09:47

## 2024-09-28 RX ADMIN — CARVEDILOL 12.5 MG: 12.5 TABLET, FILM COATED ORAL at 09:47

## 2024-09-28 RX ADMIN — AMLODIPINE BESYLATE 10 MG: 10 TABLET ORAL at 09:47

## 2024-09-28 RX ADMIN — INSULIN GLARGINE 35 UNITS: 100 INJECTION, SOLUTION SUBCUTANEOUS at 09:47

## 2024-09-28 RX ADMIN — INSULIN LISPRO 4 UNITS: 100 INJECTION, SOLUTION INTRAVENOUS; SUBCUTANEOUS at 03:30

## 2024-09-28 RX ADMIN — INSULIN LISPRO 8 UNITS: 100 INJECTION, SOLUTION INTRAVENOUS; SUBCUTANEOUS at 09:46

## 2024-09-28 RX ADMIN — LEVETIRACETAM 500 MG: 100 INJECTION INTRAVENOUS at 20:35

## 2024-09-29 LAB
AMMONIA PLAS-SCNC: 73 UMOL/L (ref 11–51)
ANION GAP SERPL CALCULATED.3IONS-SCNC: 5 MMOL/L (ref 9–16)
BASOPHILS # BLD: 0.03 K/UL (ref 0–0.2)
BASOPHILS NFR BLD: 0 % (ref 0–2)
BODY TEMPERATURE: 37
BUN SERPL-MCNC: 11 MG/DL (ref 8–23)
CALCIUM SERPL-MCNC: 8 MG/DL (ref 8.6–10.4)
CHLORIDE SERPL-SCNC: 121 MMOL/L (ref 98–107)
CO2 SERPL-SCNC: 21 MMOL/L (ref 20–31)
COHGB MFR BLD: 1.6 % (ref 0–5)
CREAT SERPL-MCNC: 0.6 MG/DL (ref 0.5–0.9)
EKG ATRIAL RATE: 73 BPM
EKG P AXIS: 81 DEGREES
EKG P-R INTERVAL: 170 MS
EKG Q-T INTERVAL: 370 MS
EKG QRS DURATION: 88 MS
EKG QTC CALCULATION (BAZETT): 407 MS
EKG R AXIS: -21 DEGREES
EKG T AXIS: 8 DEGREES
EKG VENTRICULAR RATE: 73 BPM
EOSINOPHIL # BLD: 0.24 K/UL (ref 0–0.44)
EOSINOPHILS RELATIVE PERCENT: 3 % (ref 1–4)
ERYTHROCYTE [DISTWIDTH] IN BLOOD BY AUTOMATED COUNT: 15.6 % (ref 11.8–14.4)
FIO2 ON VENT: ABNORMAL %
GFR, ESTIMATED: >90 ML/MIN/1.73M2
GLUCOSE BLD-MCNC: 212 MG/DL (ref 65–105)
GLUCOSE BLD-MCNC: 213 MG/DL (ref 65–105)
GLUCOSE BLD-MCNC: 227 MG/DL (ref 65–105)
GLUCOSE BLD-MCNC: 253 MG/DL (ref 65–105)
GLUCOSE SERPL-MCNC: 258 MG/DL (ref 74–99)
HCO3 VENOUS: 22.9 MMOL/L (ref 24–30)
HCT VFR BLD AUTO: 31.4 % (ref 36.3–47.1)
HGB BLD-MCNC: 9.5 G/DL (ref 11.9–15.1)
IMM GRANULOCYTES # BLD AUTO: 0.13 K/UL (ref 0–0.3)
IMM GRANULOCYTES NFR BLD: 2 %
LYMPHOCYTES NFR BLD: 1.38 K/UL (ref 1.1–3.7)
LYMPHOCYTES RELATIVE PERCENT: 17 % (ref 24–43)
MAGNESIUM SERPL-MCNC: 1.8 MG/DL (ref 1.6–2.4)
MCH RBC QN AUTO: 27.2 PG (ref 25.2–33.5)
MCHC RBC AUTO-ENTMCNC: 30.3 G/DL (ref 28.4–34.8)
MCV RBC AUTO: 90 FL (ref 82.6–102.9)
MONOCYTES NFR BLD: 0.69 K/UL (ref 0.1–1.2)
MONOCYTES NFR BLD: 9 % (ref 3–12)
NEGATIVE BASE EXCESS, VEN: 2 MMOL/L (ref 0–2)
NEUTROPHILS NFR BLD: 69 % (ref 36–65)
NEUTS SEG NFR BLD: 5.45 K/UL (ref 1.5–8.1)
NRBC BLD-RTO: 0 PER 100 WBC
O2 SAT, VEN: 91.1 % (ref 60–85)
PCO2 VENOUS: 42 MM HG (ref 39–55)
PH VENOUS: 7.36 (ref 7.32–7.42)
PLATELET # BLD AUTO: 143 K/UL (ref 138–453)
PMV BLD AUTO: 11.5 FL (ref 8.1–13.5)
PO2 VENOUS: 57.4 MM HG (ref 30–50)
POTASSIUM SERPL-SCNC: 3.8 MMOL/L (ref 3.7–5.3)
RBC # BLD AUTO: 3.49 M/UL (ref 3.95–5.11)
RBC # BLD: ABNORMAL 10*6/UL
SODIUM SERPL-SCNC: 147 MMOL/L (ref 136–145)
WBC OTHER # BLD: 7.9 K/UL (ref 3.5–11.3)

## 2024-09-29 PROCEDURE — 6370000000 HC RX 637 (ALT 250 FOR IP)

## 2024-09-29 PROCEDURE — 2060000000 HC ICU INTERMEDIATE R&B

## 2024-09-29 PROCEDURE — 6360000002 HC RX W HCPCS: Performed by: INTERNAL MEDICINE

## 2024-09-29 PROCEDURE — 36415 COLL VENOUS BLD VENIPUNCTURE: CPT

## 2024-09-29 PROCEDURE — 6360000002 HC RX W HCPCS

## 2024-09-29 PROCEDURE — 2580000003 HC RX 258

## 2024-09-29 PROCEDURE — 82140 ASSAY OF AMMONIA: CPT

## 2024-09-29 PROCEDURE — 99232 SBSQ HOSP IP/OBS MODERATE 35: CPT | Performed by: INTERNAL MEDICINE

## 2024-09-29 PROCEDURE — 6370000000 HC RX 637 (ALT 250 FOR IP): Performed by: SURGERY

## 2024-09-29 PROCEDURE — 82805 BLOOD GASES W/O2 SATURATION: CPT

## 2024-09-29 PROCEDURE — 83735 ASSAY OF MAGNESIUM: CPT

## 2024-09-29 PROCEDURE — 82947 ASSAY GLUCOSE BLOOD QUANT: CPT

## 2024-09-29 PROCEDURE — 80048 BASIC METABOLIC PNL TOTAL CA: CPT

## 2024-09-29 PROCEDURE — 99233 SBSQ HOSP IP/OBS HIGH 50: CPT | Performed by: SURGERY

## 2024-09-29 PROCEDURE — 93010 ELECTROCARDIOGRAM REPORT: CPT | Performed by: INTERNAL MEDICINE

## 2024-09-29 PROCEDURE — 85025 COMPLETE CBC W/AUTO DIFF WBC: CPT

## 2024-09-29 RX ORDER — MIDODRINE HYDROCHLORIDE 5 MG/1
10 TABLET ORAL 3 TIMES DAILY PRN
Status: DISCONTINUED | OUTPATIENT
Start: 2024-09-29 | End: 2024-10-02

## 2024-09-29 RX ADMIN — SODIUM CHLORIDE, PRESERVATIVE FREE 10 ML: 5 INJECTION INTRAVENOUS at 07:48

## 2024-09-29 RX ADMIN — QUETIAPINE FUMARATE 25 MG: 25 TABLET ORAL at 07:45

## 2024-09-29 RX ADMIN — CLOPIDOGREL BISULFATE 75 MG: 75 TABLET ORAL at 07:46

## 2024-09-29 RX ADMIN — ATORVASTATIN CALCIUM 80 MG: 80 TABLET, FILM COATED ORAL at 07:46

## 2024-09-29 RX ADMIN — INSULIN GLARGINE 15 UNITS: 100 INJECTION, SOLUTION SUBCUTANEOUS at 07:46

## 2024-09-29 RX ADMIN — AMIODARONE HYDROCHLORIDE 200 MG: 200 TABLET ORAL at 07:40

## 2024-09-29 RX ADMIN — LEVETIRACETAM 500 MG: 100 INJECTION INTRAVENOUS at 07:47

## 2024-09-29 RX ADMIN — LOSARTAN POTASSIUM 50 MG: 50 TABLET, FILM COATED ORAL at 10:38

## 2024-09-29 RX ADMIN — APIXABAN 5 MG: 5 TABLET, FILM COATED ORAL at 07:46

## 2024-09-29 RX ADMIN — INSULIN LISPRO 8 UNITS: 100 INJECTION, SOLUTION INTRAVENOUS; SUBCUTANEOUS at 20:53

## 2024-09-29 RX ADMIN — INSULIN GLARGINE 15 UNITS: 100 INJECTION, SOLUTION SUBCUTANEOUS at 20:53

## 2024-09-29 RX ADMIN — METOPROLOL TARTRATE 12.5 MG: 25 TABLET, FILM COATED ORAL at 07:40

## 2024-09-29 RX ADMIN — ANTI-FUNGAL POWDER MICONAZOLE NITRATE TALC FREE: 1.42 POWDER TOPICAL at 21:00

## 2024-09-29 RX ADMIN — INSULIN LISPRO 4 UNITS: 100 INJECTION, SOLUTION INTRAVENOUS; SUBCUTANEOUS at 07:47

## 2024-09-29 RX ADMIN — METOPROLOL TARTRATE 12.5 MG: 25 TABLET, FILM COATED ORAL at 20:54

## 2024-09-29 RX ADMIN — FENTANYL CITRATE 25 MCG: 50 INJECTION, SOLUTION INTRAMUSCULAR; INTRAVENOUS at 18:18

## 2024-09-29 RX ADMIN — SODIUM CHLORIDE, PRESERVATIVE FREE 10 ML: 5 INJECTION INTRAVENOUS at 20:54

## 2024-09-29 RX ADMIN — SERTRALINE HYDROCHLORIDE 75 MG: 50 TABLET ORAL at 07:45

## 2024-09-29 RX ADMIN — INSULIN LISPRO 4 UNITS: 100 INJECTION, SOLUTION INTRAVENOUS; SUBCUTANEOUS at 14:06

## 2024-09-29 RX ADMIN — Medication 2000 MG: at 21:31

## 2024-09-29 RX ADMIN — APIXABAN 5 MG: 5 TABLET, FILM COATED ORAL at 20:55

## 2024-09-29 RX ADMIN — AMIODARONE HYDROCHLORIDE 200 MG: 200 TABLET ORAL at 20:55

## 2024-09-29 RX ADMIN — INSULIN LISPRO 4 UNITS: 100 INJECTION, SOLUTION INTRAVENOUS; SUBCUTANEOUS at 03:07

## 2024-09-29 RX ADMIN — ANTI-FUNGAL POWDER MICONAZOLE NITRATE TALC FREE: 1.42 POWDER TOPICAL at 07:47

## 2024-09-29 RX ADMIN — ASPIRIN 81 MG 81 MG: 81 TABLET ORAL at 07:39

## 2024-09-29 RX ADMIN — LOSARTAN POTASSIUM 50 MG: 50 TABLET, FILM COATED ORAL at 20:55

## 2024-09-29 RX ADMIN — LEVETIRACETAM 500 MG: 100 INJECTION INTRAVENOUS at 20:54

## 2024-09-29 ASSESSMENT — PAIN SCALES - GENERAL: PAINLEVEL_OUTOF10: 10

## 2024-09-30 ENCOUNTER — APPOINTMENT (OUTPATIENT)
Dept: GENERAL RADIOLOGY | Age: 74
DRG: 871 | End: 2024-09-30
Payer: COMMERCIAL

## 2024-09-30 LAB
ANION GAP SERPL CALCULATED.3IONS-SCNC: 5 MMOL/L (ref 9–16)
BUN SERPL-MCNC: 9 MG/DL (ref 8–23)
CALCIUM SERPL-MCNC: 8.6 MG/DL (ref 8.6–10.4)
CHLORIDE SERPL-SCNC: 119 MMOL/L (ref 98–107)
CO2 SERPL-SCNC: 24 MMOL/L (ref 20–31)
CREAT SERPL-MCNC: 0.5 MG/DL (ref 0.5–0.9)
GFR, ESTIMATED: >90 ML/MIN/1.73M2
GLUCOSE BLD-MCNC: 192 MG/DL (ref 65–105)
GLUCOSE BLD-MCNC: 194 MG/DL (ref 65–105)
GLUCOSE BLD-MCNC: 213 MG/DL (ref 65–105)
GLUCOSE BLD-MCNC: 218 MG/DL (ref 65–105)
GLUCOSE BLD-MCNC: 222 MG/DL (ref 65–105)
GLUCOSE SERPL-MCNC: 221 MG/DL (ref 74–99)
POTASSIUM SERPL-SCNC: 3.8 MMOL/L (ref 3.7–5.3)
SODIUM SERPL-SCNC: 148 MMOL/L (ref 136–145)

## 2024-09-30 PROCEDURE — 74230 X-RAY XM SWLNG FUNCJ C+: CPT

## 2024-09-30 PROCEDURE — 36415 COLL VENOUS BLD VENIPUNCTURE: CPT

## 2024-09-30 PROCEDURE — 99232 SBSQ HOSP IP/OBS MODERATE 35: CPT | Performed by: INTERNAL MEDICINE

## 2024-09-30 PROCEDURE — 6360000002 HC RX W HCPCS

## 2024-09-30 PROCEDURE — 92523 SPEECH SOUND LANG COMPREHEN: CPT

## 2024-09-30 PROCEDURE — 2580000003 HC RX 258

## 2024-09-30 PROCEDURE — 6370000000 HC RX 637 (ALT 250 FOR IP)

## 2024-09-30 PROCEDURE — 99233 SBSQ HOSP IP/OBS HIGH 50: CPT | Performed by: INTERNAL MEDICINE

## 2024-09-30 PROCEDURE — 99213 OFFICE O/P EST LOW 20 MIN: CPT

## 2024-09-30 PROCEDURE — 6360000002 HC RX W HCPCS: Performed by: INTERNAL MEDICINE

## 2024-09-30 PROCEDURE — 82947 ASSAY GLUCOSE BLOOD QUANT: CPT

## 2024-09-30 PROCEDURE — 2060000000 HC ICU INTERMEDIATE R&B

## 2024-09-30 PROCEDURE — 92610 EVALUATE SWALLOWING FUNCTION: CPT

## 2024-09-30 PROCEDURE — 92611 MOTION FLUOROSCOPY/SWALLOW: CPT

## 2024-09-30 PROCEDURE — 80048 BASIC METABOLIC PNL TOTAL CA: CPT

## 2024-09-30 PROCEDURE — 6370000000 HC RX 637 (ALT 250 FOR IP): Performed by: SURGERY

## 2024-09-30 RX ORDER — INSULIN GLARGINE 100 [IU]/ML
20 INJECTION, SOLUTION SUBCUTANEOUS 2 TIMES DAILY
Status: DISCONTINUED | OUTPATIENT
Start: 2024-09-30 | End: 2024-10-04

## 2024-09-30 RX ORDER — HYDROCODONE BITARTRATE AND ACETAMINOPHEN 5; 325 MG/1; MG/1
1 TABLET ORAL EVERY 6 HOURS PRN
Status: DISCONTINUED | OUTPATIENT
Start: 2024-09-30 | End: 2024-10-07 | Stop reason: HOSPADM

## 2024-09-30 RX ORDER — FENTANYL CITRATE 50 UG/ML
25 INJECTION, SOLUTION INTRAMUSCULAR; INTRAVENOUS EVERY 4 HOURS PRN
Status: DISCONTINUED | OUTPATIENT
Start: 2024-09-30 | End: 2024-09-30

## 2024-09-30 RX ORDER — ACETAMINOPHEN 325 MG/1
650 TABLET ORAL EVERY 4 HOURS PRN
Status: DISCONTINUED | OUTPATIENT
Start: 2024-09-30 | End: 2024-10-07 | Stop reason: HOSPADM

## 2024-09-30 RX ADMIN — APIXABAN 5 MG: 5 TABLET, FILM COATED ORAL at 21:25

## 2024-09-30 RX ADMIN — AMIODARONE HYDROCHLORIDE 200 MG: 200 TABLET ORAL at 21:24

## 2024-09-30 RX ADMIN — LEVETIRACETAM 500 MG: 100 INJECTION INTRAVENOUS at 21:24

## 2024-09-30 RX ADMIN — LOSARTAN POTASSIUM 50 MG: 50 TABLET, FILM COATED ORAL at 21:25

## 2024-09-30 RX ADMIN — INSULIN LISPRO 4 UNITS: 100 INJECTION, SOLUTION INTRAVENOUS; SUBCUTANEOUS at 09:31

## 2024-09-30 RX ADMIN — CLOPIDOGREL BISULFATE 75 MG: 75 TABLET ORAL at 09:28

## 2024-09-30 RX ADMIN — ANTI-FUNGAL POWDER MICONAZOLE NITRATE TALC FREE: 1.42 POWDER TOPICAL at 21:26

## 2024-09-30 RX ADMIN — SODIUM CHLORIDE, PRESERVATIVE FREE 10 ML: 5 INJECTION INTRAVENOUS at 09:32

## 2024-09-30 RX ADMIN — ATORVASTATIN CALCIUM 80 MG: 80 TABLET, FILM COATED ORAL at 09:29

## 2024-09-30 RX ADMIN — SERTRALINE HYDROCHLORIDE 75 MG: 50 TABLET ORAL at 09:28

## 2024-09-30 RX ADMIN — AMIODARONE HYDROCHLORIDE 200 MG: 200 TABLET ORAL at 09:28

## 2024-09-30 RX ADMIN — METOPROLOL TARTRATE 12.5 MG: 25 TABLET, FILM COATED ORAL at 21:24

## 2024-09-30 RX ADMIN — INSULIN GLARGINE 20 UNITS: 100 INJECTION, SOLUTION SUBCUTANEOUS at 09:30

## 2024-09-30 RX ADMIN — LEVETIRACETAM 500 MG: 100 INJECTION INTRAVENOUS at 09:30

## 2024-09-30 RX ADMIN — METOPROLOL TARTRATE 12.5 MG: 25 TABLET, FILM COATED ORAL at 09:17

## 2024-09-30 RX ADMIN — APIXABAN 5 MG: 5 TABLET, FILM COATED ORAL at 09:28

## 2024-09-30 RX ADMIN — INSULIN GLARGINE 20 UNITS: 100 INJECTION, SOLUTION SUBCUTANEOUS at 21:05

## 2024-09-30 RX ADMIN — LABETALOL HYDROCHLORIDE 20 MG: 5 INJECTION, SOLUTION INTRAVENOUS at 13:41

## 2024-09-30 RX ADMIN — LOSARTAN POTASSIUM 50 MG: 50 TABLET, FILM COATED ORAL at 09:29

## 2024-09-30 RX ADMIN — ANTI-FUNGAL POWDER MICONAZOLE NITRATE TALC FREE: 1.42 POWDER TOPICAL at 09:31

## 2024-09-30 RX ADMIN — Medication 2000 MG: at 21:25

## 2024-09-30 RX ADMIN — ASPIRIN 81 MG 81 MG: 81 TABLET ORAL at 09:29

## 2024-09-30 RX ADMIN — SODIUM CHLORIDE, PRESERVATIVE FREE 10 ML: 5 INJECTION INTRAVENOUS at 21:26

## 2024-09-30 ASSESSMENT — PAIN SCALES - GENERAL
PAINLEVEL_OUTOF10: 10
PAINLEVEL_OUTOF10: 0

## 2024-09-30 ASSESSMENT — PAIN DESCRIPTION - LOCATION: LOCATION: FOOT

## 2024-09-30 NOTE — PROCEDURES
INSTRUMENTAL SWALLOW REPORT  MODIFIED BARIUM SWALLOW    NAME: Leatha Mason   : 1950  MRN: [id number]       Date of Eval: 2024        Radiologist: Dr. Murphy    Past Medical History:  has a past medical history of Acute renal failure (HCC), Allergic rhinitis, Anxiety, Asthma, CAD (coronary artery disease), Chronic back pain, Chronic obstructive pulmonary disease (HCC), Chronic obstructive pulmonary disease (HCC), Chronic pain, Chronic use of opiate drugs therapeutic purposes, Congestive heart failure (HCC), Depression, Headache(784.0), Hiatal hernia, Hypercholesteremia, Hypertension, Kidney stones, Obesity, Osteoarthritis, Postlaminectomy syndrome, Sacral decubitus ulcer, stage III (HCC), Seizure (HCC), SVT (supraventricular tachycardia) ([facility]), Type II or unspecified type diabetes mellitus without mention of complication, not stated as uncontrolled, Unspecified sleep apnea, and Urinary incontinence.  Past Surgical History:  has a past surgical history that includes Spine surgery; Nerve Block (2012); Nerve Block (2012); Nerve Block (2013); Nerve Block (2013); Nerve Block (2013); Cardiac catheterization (2013); Lumbar spine surgery (); Vena Cava Filter Placement (); Tonsillectomy; joint replacement; knee surgery (10/21/2013); knee surgery (2013); knee surgery (2013); other surgical history (Right, 2014); other surgical history (Right, 2015); Upper gastrointestinal endoscopy; Colonoscopy (2015); other surgical history (Right, 2016); Coronary angioplasty with stent (1012-16); Nerve Block (Right, 2017); Gastrostomy tube placement (2020); Gastrostomy tube placement (N/A, 2020); and Insert Midline Catheter (2021).    Type of Study: Initial MBS    Patient Complaints/Reason for Referral:  Leatha Mason was referred for a MBS to assess the efficiency of his/her swallow function, assess for aspiration, and to make

## 2024-10-01 LAB
ANION GAP SERPL CALCULATED.3IONS-SCNC: 9 MMOL/L (ref 9–16)
BASOPHILS # BLD: 0 K/UL (ref 0–0.2)
BASOPHILS NFR BLD: 0 % (ref 0–2)
BUN SERPL-MCNC: 8 MG/DL (ref 8–23)
CALCIUM SERPL-MCNC: 8.9 MG/DL (ref 8.6–10.4)
CHLORIDE SERPL-SCNC: 113 MMOL/L (ref 98–107)
CO2 SERPL-SCNC: 24 MMOL/L (ref 20–31)
CREAT SERPL-MCNC: 0.4 MG/DL (ref 0.5–0.9)
EOSINOPHIL # BLD: 0.32 K/UL (ref 0–0.4)
EOSINOPHILS RELATIVE PERCENT: 3 % (ref 1–4)
ERYTHROCYTE [DISTWIDTH] IN BLOOD BY AUTOMATED COUNT: 15.4 % (ref 11.8–14.4)
GFR, ESTIMATED: >90 ML/MIN/1.73M2
GLUCOSE BLD-MCNC: 221 MG/DL (ref 65–105)
GLUCOSE BLD-MCNC: 221 MG/DL (ref 65–105)
GLUCOSE BLD-MCNC: 235 MG/DL (ref 65–105)
GLUCOSE SERPL-MCNC: 208 MG/DL (ref 74–99)
HCT VFR BLD AUTO: 34.7 % (ref 36.3–47.1)
HGB BLD-MCNC: 10.7 G/DL (ref 11.9–15.1)
IMM GRANULOCYTES # BLD AUTO: 0.11 K/UL (ref 0–0.3)
IMM GRANULOCYTES NFR BLD: 1 %
LYMPHOCYTES NFR BLD: 3.13 K/UL (ref 1–4.8)
LYMPHOCYTES RELATIVE PERCENT: 29 % (ref 24–44)
MCH RBC QN AUTO: 27.2 PG (ref 25.2–33.5)
MCHC RBC AUTO-ENTMCNC: 30.8 G/DL (ref 28.4–34.8)
MCV RBC AUTO: 88.3 FL (ref 82.6–102.9)
MONOCYTES NFR BLD: 0.65 K/UL (ref 0.1–0.8)
MONOCYTES NFR BLD: 6 % (ref 1–7)
MORPHOLOGY: ABNORMAL
NEUTROPHILS NFR BLD: 61 % (ref 36–66)
NEUTS SEG NFR BLD: 6.59 K/UL (ref 1.8–7.7)
NRBC BLD-RTO: 0.2 PER 100 WBC
OSMOLALITY SERPL: 310 MOSM/KG (ref 275–295)
PLATELET # BLD AUTO: 273 K/UL (ref 138–453)
PMV BLD AUTO: 10.8 FL (ref 8.1–13.5)
POTASSIUM SERPL-SCNC: 3.9 MMOL/L (ref 3.7–5.3)
RBC # BLD AUTO: 3.93 M/UL (ref 3.95–5.11)
SODIUM SERPL-SCNC: 146 MMOL/L (ref 136–145)
WBC OTHER # BLD: 10.8 K/UL (ref 3.5–11.3)

## 2024-10-01 PROCEDURE — 6370000000 HC RX 637 (ALT 250 FOR IP)

## 2024-10-01 PROCEDURE — 6360000002 HC RX W HCPCS

## 2024-10-01 PROCEDURE — 83930 ASSAY OF BLOOD OSMOLALITY: CPT

## 2024-10-01 PROCEDURE — 6360000002 HC RX W HCPCS: Performed by: INTERNAL MEDICINE

## 2024-10-01 PROCEDURE — 85025 COMPLETE CBC W/AUTO DIFF WBC: CPT

## 2024-10-01 PROCEDURE — 99232 SBSQ HOSP IP/OBS MODERATE 35: CPT | Performed by: INTERNAL MEDICINE

## 2024-10-01 PROCEDURE — 2580000003 HC RX 258

## 2024-10-01 PROCEDURE — 6370000000 HC RX 637 (ALT 250 FOR IP): Performed by: SURGERY

## 2024-10-01 PROCEDURE — 36415 COLL VENOUS BLD VENIPUNCTURE: CPT

## 2024-10-01 PROCEDURE — 80048 BASIC METABOLIC PNL TOTAL CA: CPT

## 2024-10-01 PROCEDURE — 82947 ASSAY GLUCOSE BLOOD QUANT: CPT

## 2024-10-01 PROCEDURE — 2060000000 HC ICU INTERMEDIATE R&B

## 2024-10-01 PROCEDURE — 92526 ORAL FUNCTION THERAPY: CPT

## 2024-10-01 RX ORDER — METOPROLOL TARTRATE 25 MG/1
25 TABLET, FILM COATED ORAL 2 TIMES DAILY
Status: DISCONTINUED | OUTPATIENT
Start: 2024-10-02 | End: 2024-10-02

## 2024-10-01 RX ORDER — FUROSEMIDE 10 MG/ML
20 INJECTION INTRAMUSCULAR; INTRAVENOUS ONCE
Status: CANCELLED | OUTPATIENT
Start: 2024-10-01

## 2024-10-01 RX ADMIN — METOPROLOL TARTRATE 12.5 MG: 25 TABLET, FILM COATED ORAL at 09:33

## 2024-10-01 RX ADMIN — INSULIN LISPRO 4 UNITS: 100 INJECTION, SOLUTION INTRAVENOUS; SUBCUTANEOUS at 15:21

## 2024-10-01 RX ADMIN — INSULIN LISPRO 4 UNITS: 100 INJECTION, SOLUTION INTRAVENOUS; SUBCUTANEOUS at 04:06

## 2024-10-01 RX ADMIN — ANTI-FUNGAL POWDER MICONAZOLE NITRATE TALC FREE: 1.42 POWDER TOPICAL at 09:38

## 2024-10-01 RX ADMIN — SODIUM CHLORIDE 500 ML: 4.5 INJECTION, SOLUTION INTRAVENOUS at 22:28

## 2024-10-01 RX ADMIN — APIXABAN 5 MG: 5 TABLET, FILM COATED ORAL at 20:50

## 2024-10-01 RX ADMIN — LEVETIRACETAM 500 MG: 100 INJECTION INTRAVENOUS at 09:37

## 2024-10-01 RX ADMIN — INSULIN GLARGINE 20 UNITS: 100 INJECTION, SOLUTION SUBCUTANEOUS at 09:35

## 2024-10-01 RX ADMIN — SERTRALINE HYDROCHLORIDE 75 MG: 50 TABLET ORAL at 09:34

## 2024-10-01 RX ADMIN — CLOPIDOGREL BISULFATE 75 MG: 75 TABLET ORAL at 09:35

## 2024-10-01 RX ADMIN — AMIODARONE HYDROCHLORIDE 200 MG: 200 TABLET ORAL at 09:35

## 2024-10-01 RX ADMIN — SODIUM CHLORIDE, PRESERVATIVE FREE 10 ML: 5 INJECTION INTRAVENOUS at 09:36

## 2024-10-01 RX ADMIN — HYDROCODONE BITARTRATE AND ACETAMINOPHEN 1 TABLET: 5; 325 TABLET ORAL at 00:14

## 2024-10-01 RX ADMIN — ANTI-FUNGAL POWDER MICONAZOLE NITRATE TALC FREE: 1.42 POWDER TOPICAL at 20:51

## 2024-10-01 RX ADMIN — AMIODARONE HYDROCHLORIDE 200 MG: 200 TABLET ORAL at 20:50

## 2024-10-01 RX ADMIN — METOPROLOL TARTRATE 12.5 MG: 25 TABLET, FILM COATED ORAL at 20:50

## 2024-10-01 RX ADMIN — ASPIRIN 81 MG 81 MG: 81 TABLET ORAL at 09:35

## 2024-10-01 RX ADMIN — APIXABAN 5 MG: 5 TABLET, FILM COATED ORAL at 09:35

## 2024-10-01 RX ADMIN — LABETALOL HYDROCHLORIDE 20 MG: 5 INJECTION, SOLUTION INTRAVENOUS at 07:21

## 2024-10-01 RX ADMIN — SODIUM CHLORIDE, PRESERVATIVE FREE 10 ML: 5 INJECTION INTRAVENOUS at 20:51

## 2024-10-01 RX ADMIN — INSULIN GLARGINE 20 UNITS: 100 INJECTION, SOLUTION SUBCUTANEOUS at 20:16

## 2024-10-01 RX ADMIN — ATORVASTATIN CALCIUM 80 MG: 80 TABLET, FILM COATED ORAL at 09:34

## 2024-10-01 RX ADMIN — LOSARTAN POTASSIUM 50 MG: 50 TABLET, FILM COATED ORAL at 09:35

## 2024-10-01 RX ADMIN — INSULIN LISPRO 4 UNITS: 100 INJECTION, SOLUTION INTRAVENOUS; SUBCUTANEOUS at 20:17

## 2024-10-01 RX ADMIN — LEVETIRACETAM 500 MG: 100 INJECTION INTRAVENOUS at 20:17

## 2024-10-01 RX ADMIN — LOSARTAN POTASSIUM 50 MG: 50 TABLET, FILM COATED ORAL at 20:50

## 2024-10-01 RX ADMIN — Medication 2000 MG: at 20:51

## 2024-10-01 RX ADMIN — INSULIN LISPRO 4 UNITS: 100 INJECTION, SOLUTION INTRAVENOUS; SUBCUTANEOUS at 09:36

## 2024-10-01 ASSESSMENT — PAIN SCALES - GENERAL
PAINLEVEL_OUTOF10: 4
PAINLEVEL_OUTOF10: 6
PAINLEVEL_OUTOF10: 6

## 2024-10-01 NOTE — CARE COORDINATION
Traditional planning: plan is still to return home w/ family support and [facility]  and they have accepted patient back. Will need stretcher transport home. Has [facility] and 1st appt scheduled for 10/5.  Needs a new glucometer. Referral sent for DM teaching.

## 2024-10-02 LAB
ANION GAP SERPL CALCULATED.3IONS-SCNC: 9 MMOL/L (ref 9–16)
BUN SERPL-MCNC: 7 MG/DL (ref 8–23)
CALCIUM SERPL-MCNC: 8.7 MG/DL (ref 8.6–10.4)
CHLORIDE SERPL-SCNC: 108 MMOL/L (ref 98–107)
CO2 SERPL-SCNC: 24 MMOL/L (ref 20–31)
CREAT SERPL-MCNC: 0.5 MG/DL (ref 0.5–0.9)
GFR, ESTIMATED: >90 ML/MIN/1.73M2
GLUCOSE BLD-MCNC: 149 MG/DL (ref 65–105)
GLUCOSE BLD-MCNC: 204 MG/DL (ref 65–105)
GLUCOSE BLD-MCNC: 216 MG/DL (ref 65–105)
GLUCOSE BLD-MCNC: 225 MG/DL (ref 65–105)
GLUCOSE BLD-MCNC: 236 MG/DL (ref 65–105)
GLUCOSE SERPL-MCNC: 218 MG/DL (ref 74–99)
OSMOLALITY SERPL: 309 MOSM/KG (ref 275–295)
POTASSIUM SERPL-SCNC: 4.3 MMOL/L (ref 3.7–5.3)
SODIUM SERPL-SCNC: 141 MMOL/L (ref 136–145)

## 2024-10-02 PROCEDURE — 6370000000 HC RX 637 (ALT 250 FOR IP)

## 2024-10-02 PROCEDURE — 6370000000 HC RX 637 (ALT 250 FOR IP): Performed by: INTERNAL MEDICINE

## 2024-10-02 PROCEDURE — 99232 SBSQ HOSP IP/OBS MODERATE 35: CPT | Performed by: INTERNAL MEDICINE

## 2024-10-02 PROCEDURE — 6360000002 HC RX W HCPCS

## 2024-10-02 PROCEDURE — 94761 N-INVAS EAR/PLS OXIMETRY MLT: CPT

## 2024-10-02 PROCEDURE — 2580000003 HC RX 258

## 2024-10-02 PROCEDURE — 83930 ASSAY OF BLOOD OSMOLALITY: CPT

## 2024-10-02 PROCEDURE — 6360000002 HC RX W HCPCS: Performed by: INTERNAL MEDICINE

## 2024-10-02 PROCEDURE — 2060000000 HC ICU INTERMEDIATE R&B

## 2024-10-02 PROCEDURE — 80048 BASIC METABOLIC PNL TOTAL CA: CPT

## 2024-10-02 PROCEDURE — 92526 ORAL FUNCTION THERAPY: CPT

## 2024-10-02 PROCEDURE — 36415 COLL VENOUS BLD VENIPUNCTURE: CPT

## 2024-10-02 RX ORDER — GABAPENTIN 100 MG/1
100 CAPSULE ORAL 3 TIMES DAILY
Status: DISCONTINUED | OUTPATIENT
Start: 2024-10-02 | End: 2024-10-07 | Stop reason: HOSPADM

## 2024-10-02 RX ORDER — AMLODIPINE BESYLATE 5 MG/1
5 TABLET ORAL DAILY
Status: DISCONTINUED | OUTPATIENT
Start: 2024-10-02 | End: 2024-10-02

## 2024-10-02 RX ORDER — CLONIDINE HYDROCHLORIDE 0.1 MG/1
0.1 TABLET ORAL 2 TIMES DAILY
Status: DISCONTINUED | OUTPATIENT
Start: 2024-10-02 | End: 2024-10-03

## 2024-10-02 RX ORDER — HYDRALAZINE HYDROCHLORIDE 20 MG/ML
10 INJECTION INTRAMUSCULAR; INTRAVENOUS EVERY 6 HOURS PRN
Status: DISCONTINUED | OUTPATIENT
Start: 2024-10-02 | End: 2024-10-07 | Stop reason: HOSPADM

## 2024-10-02 RX ORDER — METOPROLOL TARTRATE 50 MG
50 TABLET ORAL 2 TIMES DAILY
Status: DISCONTINUED | OUTPATIENT
Start: 2024-10-02 | End: 2024-10-05

## 2024-10-02 RX ADMIN — METOPROLOL TARTRATE 50 MG: 50 TABLET, FILM COATED ORAL at 20:22

## 2024-10-02 RX ADMIN — AMIODARONE HYDROCHLORIDE 200 MG: 200 TABLET ORAL at 20:21

## 2024-10-02 RX ADMIN — INSULIN LISPRO 4 UNITS: 100 INJECTION, SOLUTION INTRAVENOUS; SUBCUTANEOUS at 09:04

## 2024-10-02 RX ADMIN — SODIUM CHLORIDE, PRESERVATIVE FREE 10 ML: 5 INJECTION INTRAVENOUS at 20:12

## 2024-10-02 RX ADMIN — ASPIRIN 81 MG 81 MG: 81 TABLET ORAL at 08:46

## 2024-10-02 RX ADMIN — INSULIN GLARGINE 20 UNITS: 100 INJECTION, SOLUTION SUBCUTANEOUS at 20:12

## 2024-10-02 RX ADMIN — SODIUM CHLORIDE, PRESERVATIVE FREE 10 ML: 5 INJECTION INTRAVENOUS at 08:53

## 2024-10-02 RX ADMIN — APIXABAN 5 MG: 5 TABLET, FILM COATED ORAL at 20:23

## 2024-10-02 RX ADMIN — LEVETIRACETAM 500 MG: 100 INJECTION INTRAVENOUS at 20:12

## 2024-10-02 RX ADMIN — CLOPIDOGREL BISULFATE 75 MG: 75 TABLET ORAL at 08:46

## 2024-10-02 RX ADMIN — GABAPENTIN 100 MG: 100 CAPSULE ORAL at 20:21

## 2024-10-02 RX ADMIN — LABETALOL HYDROCHLORIDE 20 MG: 5 INJECTION, SOLUTION INTRAVENOUS at 00:06

## 2024-10-02 RX ADMIN — CLONIDINE HYDROCHLORIDE 0.1 MG: 0.1 TABLET ORAL at 13:06

## 2024-10-02 RX ADMIN — LEVETIRACETAM 500 MG: 100 INJECTION INTRAVENOUS at 08:53

## 2024-10-02 RX ADMIN — APIXABAN 5 MG: 5 TABLET, FILM COATED ORAL at 08:46

## 2024-10-02 RX ADMIN — Medication 2000 MG: at 20:30

## 2024-10-02 RX ADMIN — INSULIN LISPRO 4 UNITS: 100 INJECTION, SOLUTION INTRAVENOUS; SUBCUTANEOUS at 04:22

## 2024-10-02 RX ADMIN — METOPROLOL TARTRATE 25 MG: 25 TABLET, FILM COATED ORAL at 08:46

## 2024-10-02 RX ADMIN — HYDROCODONE BITARTRATE AND ACETAMINOPHEN 1 TABLET: 5; 325 TABLET ORAL at 04:22

## 2024-10-02 RX ADMIN — INSULIN LISPRO 4 UNITS: 100 INJECTION, SOLUTION INTRAVENOUS; SUBCUTANEOUS at 14:58

## 2024-10-02 RX ADMIN — LOSARTAN POTASSIUM 50 MG: 50 TABLET, FILM COATED ORAL at 08:53

## 2024-10-02 RX ADMIN — ATORVASTATIN CALCIUM 80 MG: 80 TABLET, FILM COATED ORAL at 08:45

## 2024-10-02 RX ADMIN — INSULIN GLARGINE 20 UNITS: 100 INJECTION, SOLUTION SUBCUTANEOUS at 08:45

## 2024-10-02 RX ADMIN — ANTI-FUNGAL POWDER MICONAZOLE NITRATE TALC FREE: 1.42 POWDER TOPICAL at 08:54

## 2024-10-02 RX ADMIN — SERTRALINE HYDROCHLORIDE 75 MG: 50 TABLET ORAL at 08:46

## 2024-10-02 RX ADMIN — GABAPENTIN 100 MG: 100 CAPSULE ORAL at 14:45

## 2024-10-02 RX ADMIN — AMIODARONE HYDROCHLORIDE 200 MG: 200 TABLET ORAL at 08:45

## 2024-10-02 RX ADMIN — ANTI-FUNGAL POWDER MICONAZOLE NITRATE TALC FREE: 1.42 POWDER TOPICAL at 20:24

## 2024-10-02 RX ADMIN — LABETALOL HYDROCHLORIDE 20 MG: 5 INJECTION, SOLUTION INTRAVENOUS at 14:45

## 2024-10-02 RX ADMIN — LOSARTAN POTASSIUM 50 MG: 50 TABLET, FILM COATED ORAL at 20:21

## 2024-10-02 RX ADMIN — CLONIDINE HYDROCHLORIDE 0.1 MG: 0.1 TABLET ORAL at 20:23

## 2024-10-02 ASSESSMENT — PAIN SCALES - GENERAL
PAINLEVEL_OUTOF10: 2
PAINLEVEL_OUTOF10: 5

## 2024-10-02 ASSESSMENT — PAIN DESCRIPTION - LOCATION: LOCATION: LEG;FOOT

## 2024-10-03 LAB
ANION GAP SERPL CALCULATED.3IONS-SCNC: 6 MMOL/L (ref 9–16)
BASOPHILS # BLD: 0.04 K/UL (ref 0–0.2)
BASOPHILS NFR BLD: 0 % (ref 0–2)
BUN SERPL-MCNC: 8 MG/DL (ref 8–23)
CALCIUM SERPL-MCNC: 8.6 MG/DL (ref 8.6–10.4)
CHLORIDE SERPL-SCNC: 104 MMOL/L (ref 98–107)
CO2 SERPL-SCNC: 27 MMOL/L (ref 20–31)
CREAT SERPL-MCNC: 0.5 MG/DL (ref 0.5–0.9)
EOSINOPHIL # BLD: 0.2 K/UL (ref 0–0.44)
EOSINOPHILS RELATIVE PERCENT: 2 % (ref 1–4)
ERYTHROCYTE [DISTWIDTH] IN BLOOD BY AUTOMATED COUNT: 15.4 % (ref 11.8–14.4)
GFR, ESTIMATED: >90 ML/MIN/1.73M2
GLUCOSE BLD-MCNC: 149 MG/DL (ref 65–105)
GLUCOSE BLD-MCNC: 158 MG/DL (ref 65–105)
GLUCOSE BLD-MCNC: 164 MG/DL (ref 65–105)
GLUCOSE BLD-MCNC: 245 MG/DL (ref 65–105)
GLUCOSE SERPL-MCNC: 204 MG/DL (ref 74–99)
HCT VFR BLD AUTO: 33 % (ref 36.3–47.1)
HGB BLD-MCNC: 10 G/DL (ref 11.9–15.1)
IMM GRANULOCYTES # BLD AUTO: 0.42 K/UL (ref 0–0.3)
IMM GRANULOCYTES NFR BLD: 4 %
LYMPHOCYTES NFR BLD: 3.25 K/UL (ref 1.1–3.7)
LYMPHOCYTES RELATIVE PERCENT: 27 % (ref 24–43)
MCH RBC QN AUTO: 27 PG (ref 25.2–33.5)
MCHC RBC AUTO-ENTMCNC: 30.3 G/DL (ref 28.4–34.8)
MCV RBC AUTO: 88.9 FL (ref 82.6–102.9)
MONOCYTES NFR BLD: 0.71 K/UL (ref 0.1–1.2)
MONOCYTES NFR BLD: 6 % (ref 3–12)
NEUTROPHILS NFR BLD: 61 % (ref 36–65)
NEUTS SEG NFR BLD: 7.25 K/UL (ref 1.5–8.1)
NRBC BLD-RTO: 0.3 PER 100 WBC
PLATELET # BLD AUTO: 349 K/UL (ref 138–453)
PMV BLD AUTO: 9.9 FL (ref 8.1–13.5)
POTASSIUM SERPL-SCNC: 4.7 MMOL/L (ref 3.7–5.3)
RBC # BLD AUTO: 3.71 M/UL (ref 3.95–5.11)
RBC # BLD: ABNORMAL 10*6/UL
SODIUM SERPL-SCNC: 137 MMOL/L (ref 136–145)
WBC OTHER # BLD: 11.9 K/UL (ref 3.5–11.3)

## 2024-10-03 PROCEDURE — 6370000000 HC RX 637 (ALT 250 FOR IP)

## 2024-10-03 PROCEDURE — 6370000000 HC RX 637 (ALT 250 FOR IP): Performed by: INTERNAL MEDICINE

## 2024-10-03 PROCEDURE — 94761 N-INVAS EAR/PLS OXIMETRY MLT: CPT

## 2024-10-03 PROCEDURE — 99232 SBSQ HOSP IP/OBS MODERATE 35: CPT | Performed by: INTERNAL MEDICINE

## 2024-10-03 PROCEDURE — 2580000003 HC RX 258

## 2024-10-03 PROCEDURE — 99232 SBSQ HOSP IP/OBS MODERATE 35: CPT | Performed by: STUDENT IN AN ORGANIZED HEALTH CARE EDUCATION/TRAINING PROGRAM

## 2024-10-03 PROCEDURE — 2060000000 HC ICU INTERMEDIATE R&B

## 2024-10-03 PROCEDURE — 82947 ASSAY GLUCOSE BLOOD QUANT: CPT

## 2024-10-03 PROCEDURE — 36415 COLL VENOUS BLD VENIPUNCTURE: CPT

## 2024-10-03 PROCEDURE — 6360000002 HC RX W HCPCS: Performed by: INTERNAL MEDICINE

## 2024-10-03 PROCEDURE — 6370000000 HC RX 637 (ALT 250 FOR IP): Performed by: STUDENT IN AN ORGANIZED HEALTH CARE EDUCATION/TRAINING PROGRAM

## 2024-10-03 PROCEDURE — 6360000002 HC RX W HCPCS

## 2024-10-03 PROCEDURE — 85025 COMPLETE CBC W/AUTO DIFF WBC: CPT

## 2024-10-03 PROCEDURE — 80048 BASIC METABOLIC PNL TOTAL CA: CPT

## 2024-10-03 RX ORDER — HYDRALAZINE HYDROCHLORIDE 25 MG/1
25 TABLET, FILM COATED ORAL EVERY 8 HOURS SCHEDULED
Status: DISCONTINUED | OUTPATIENT
Start: 2024-10-03 | End: 2024-10-04

## 2024-10-03 RX ORDER — CLONIDINE HYDROCHLORIDE 0.1 MG/1
0.2 TABLET ORAL 2 TIMES DAILY
Status: DISCONTINUED | OUTPATIENT
Start: 2024-10-03 | End: 2024-10-05

## 2024-10-03 RX ORDER — AMLODIPINE BESYLATE 10 MG/1
10 TABLET ORAL DAILY
Status: DISCONTINUED | OUTPATIENT
Start: 2024-10-03 | End: 2024-10-07 | Stop reason: HOSPADM

## 2024-10-03 RX ADMIN — SERTRALINE HYDROCHLORIDE 75 MG: 50 TABLET ORAL at 09:41

## 2024-10-03 RX ADMIN — SODIUM CHLORIDE, PRESERVATIVE FREE 10 ML: 5 INJECTION INTRAVENOUS at 21:26

## 2024-10-03 RX ADMIN — INSULIN LISPRO 4 UNITS: 100 INJECTION, SOLUTION INTRAVENOUS; SUBCUTANEOUS at 15:10

## 2024-10-03 RX ADMIN — ANTI-FUNGAL POWDER MICONAZOLE NITRATE TALC FREE: 1.42 POWDER TOPICAL at 09:46

## 2024-10-03 RX ADMIN — Medication 2000 MG: at 21:26

## 2024-10-03 RX ADMIN — GABAPENTIN 100 MG: 100 CAPSULE ORAL at 15:10

## 2024-10-03 RX ADMIN — GABAPENTIN 100 MG: 100 CAPSULE ORAL at 21:27

## 2024-10-03 RX ADMIN — METOPROLOL TARTRATE 50 MG: 50 TABLET, FILM COATED ORAL at 21:27

## 2024-10-03 RX ADMIN — ANTI-FUNGAL POWDER MICONAZOLE NITRATE TALC FREE: 1.42 POWDER TOPICAL at 21:25

## 2024-10-03 RX ADMIN — APIXABAN 5 MG: 5 TABLET, FILM COATED ORAL at 09:39

## 2024-10-03 RX ADMIN — AMIODARONE HYDROCHLORIDE 200 MG: 200 TABLET ORAL at 21:26

## 2024-10-03 RX ADMIN — LEVETIRACETAM 500 MG: 100 INJECTION INTRAVENOUS at 21:25

## 2024-10-03 RX ADMIN — CLONIDINE HYDROCHLORIDE 0.2 MG: 0.1 TABLET ORAL at 09:40

## 2024-10-03 RX ADMIN — INSULIN GLARGINE 20 UNITS: 100 INJECTION, SOLUTION SUBCUTANEOUS at 09:45

## 2024-10-03 RX ADMIN — APIXABAN 5 MG: 5 TABLET, FILM COATED ORAL at 21:27

## 2024-10-03 RX ADMIN — CLOPIDOGREL BISULFATE 75 MG: 75 TABLET ORAL at 09:40

## 2024-10-03 RX ADMIN — INSULIN GLARGINE 20 UNITS: 100 INJECTION, SOLUTION SUBCUTANEOUS at 21:25

## 2024-10-03 RX ADMIN — ATORVASTATIN CALCIUM 80 MG: 80 TABLET, FILM COATED ORAL at 09:40

## 2024-10-03 RX ADMIN — ASPIRIN 81 MG 81 MG: 81 TABLET ORAL at 09:40

## 2024-10-03 RX ADMIN — AMIODARONE HYDROCHLORIDE 200 MG: 200 TABLET ORAL at 09:40

## 2024-10-03 RX ADMIN — HYDRALAZINE HYDROCHLORIDE 25 MG: 25 TABLET ORAL at 15:10

## 2024-10-03 RX ADMIN — HYDROCODONE BITARTRATE AND ACETAMINOPHEN 1 TABLET: 5; 325 TABLET ORAL at 11:48

## 2024-10-03 RX ADMIN — HYDRALAZINE HYDROCHLORIDE 25 MG: 25 TABLET ORAL at 21:27

## 2024-10-03 RX ADMIN — GABAPENTIN 100 MG: 100 CAPSULE ORAL at 09:40

## 2024-10-03 RX ADMIN — AMLODIPINE BESYLATE 10 MG: 10 TABLET ORAL at 09:48

## 2024-10-03 RX ADMIN — ACETAMINOPHEN 650 MG: 325 TABLET ORAL at 21:27

## 2024-10-03 RX ADMIN — LOSARTAN POTASSIUM 50 MG: 50 TABLET, FILM COATED ORAL at 09:40

## 2024-10-03 RX ADMIN — SODIUM CHLORIDE, PRESERVATIVE FREE 10 ML: 5 INJECTION INTRAVENOUS at 09:46

## 2024-10-03 RX ADMIN — METOPROLOL TARTRATE 50 MG: 50 TABLET, FILM COATED ORAL at 09:39

## 2024-10-03 RX ADMIN — LEVETIRACETAM 500 MG: 100 INJECTION INTRAVENOUS at 09:49

## 2024-10-03 ASSESSMENT — PAIN SCALES - GENERAL
PAINLEVEL_OUTOF10: 0
PAINLEVEL_OUTOF10: 8
PAINLEVEL_OUTOF10: 7
PAINLEVEL_OUTOF10: 4

## 2024-10-03 ASSESSMENT — PAIN DESCRIPTION - ORIENTATION: ORIENTATION: POSTERIOR

## 2024-10-03 ASSESSMENT — PAIN DESCRIPTION - LOCATION: LOCATION: BACK

## 2024-10-03 ASSESSMENT — PAIN DESCRIPTION - DESCRIPTORS: DESCRIPTORS: DISCOMFORT

## 2024-10-04 LAB
ANION GAP SERPL CALCULATED.3IONS-SCNC: 7 MMOL/L (ref 9–16)
BASOPHILS # BLD: 0.04 K/UL (ref 0–0.2)
BASOPHILS NFR BLD: 0 % (ref 0–2)
BUN SERPL-MCNC: 10 MG/DL (ref 8–23)
CALCIUM SERPL-MCNC: 8.1 MG/DL (ref 8.6–10.4)
CHLORIDE SERPL-SCNC: 104 MMOL/L (ref 98–107)
CO2 SERPL-SCNC: 27 MMOL/L (ref 20–31)
CREAT SERPL-MCNC: 0.5 MG/DL (ref 0.5–0.9)
EOSINOPHIL # BLD: 0.14 K/UL (ref 0–0.44)
EOSINOPHILS RELATIVE PERCENT: 1 % (ref 1–4)
ERYTHROCYTE [DISTWIDTH] IN BLOOD BY AUTOMATED COUNT: 15.9 % (ref 11.8–14.4)
GFR, ESTIMATED: >90 ML/MIN/1.73M2
GLUCOSE BLD-MCNC: 171 MG/DL (ref 65–105)
GLUCOSE BLD-MCNC: 172 MG/DL (ref 65–105)
GLUCOSE BLD-MCNC: 256 MG/DL (ref 65–105)
GLUCOSE BLD-MCNC: 81 MG/DL (ref 65–105)
GLUCOSE SERPL-MCNC: 167 MG/DL (ref 74–99)
HCT VFR BLD AUTO: 33.9 % (ref 36.3–47.1)
HGB BLD-MCNC: 10.3 G/DL (ref 11.9–15.1)
IMM GRANULOCYTES # BLD AUTO: 0.31 K/UL (ref 0–0.3)
IMM GRANULOCYTES NFR BLD: 3 %
LYMPHOCYTES NFR BLD: 2.77 K/UL (ref 1.1–3.7)
LYMPHOCYTES RELATIVE PERCENT: 24 % (ref 24–43)
MCH RBC QN AUTO: 26.8 PG (ref 25.2–33.5)
MCHC RBC AUTO-ENTMCNC: 30.4 G/DL (ref 28.4–34.8)
MCV RBC AUTO: 88.1 FL (ref 82.6–102.9)
MONOCYTES NFR BLD: 0.87 K/UL (ref 0.1–1.2)
MONOCYTES NFR BLD: 8 % (ref 3–12)
NEUTROPHILS NFR BLD: 64 % (ref 36–65)
NEUTS SEG NFR BLD: 7.22 K/UL (ref 1.5–8.1)
NRBC BLD-RTO: 0.2 PER 100 WBC
PLATELET # BLD AUTO: 372 K/UL (ref 138–453)
PMV BLD AUTO: 9.6 FL (ref 8.1–13.5)
POTASSIUM SERPL-SCNC: 4.5 MMOL/L (ref 3.7–5.3)
RBC # BLD AUTO: 3.85 M/UL (ref 3.95–5.11)
RBC # BLD: ABNORMAL 10*6/UL
SODIUM SERPL-SCNC: 138 MMOL/L (ref 136–145)
WBC OTHER # BLD: 11.4 K/UL (ref 3.5–11.3)

## 2024-10-04 PROCEDURE — 6370000000 HC RX 637 (ALT 250 FOR IP)

## 2024-10-04 PROCEDURE — 80048 BASIC METABOLIC PNL TOTAL CA: CPT

## 2024-10-04 PROCEDURE — 85025 COMPLETE CBC W/AUTO DIFF WBC: CPT

## 2024-10-04 PROCEDURE — 6360000002 HC RX W HCPCS: Performed by: INTERNAL MEDICINE

## 2024-10-04 PROCEDURE — 82947 ASSAY GLUCOSE BLOOD QUANT: CPT

## 2024-10-04 PROCEDURE — 6360000002 HC RX W HCPCS

## 2024-10-04 PROCEDURE — G0108 DIAB MANAGE TRN  PER INDIV: HCPCS

## 2024-10-04 PROCEDURE — 2580000003 HC RX 258

## 2024-10-04 PROCEDURE — 99232 SBSQ HOSP IP/OBS MODERATE 35: CPT | Performed by: INTERNAL MEDICINE

## 2024-10-04 PROCEDURE — 2060000000 HC ICU INTERMEDIATE R&B

## 2024-10-04 PROCEDURE — 36415 COLL VENOUS BLD VENIPUNCTURE: CPT

## 2024-10-04 PROCEDURE — 6370000000 HC RX 637 (ALT 250 FOR IP): Performed by: INTERNAL MEDICINE

## 2024-10-04 PROCEDURE — 6370000000 HC RX 637 (ALT 250 FOR IP): Performed by: STUDENT IN AN ORGANIZED HEALTH CARE EDUCATION/TRAINING PROGRAM

## 2024-10-04 RX ORDER — AMIODARONE HYDROCHLORIDE 200 MG/1
200 TABLET ORAL DAILY
Qty: 30 TABLET | Refills: 1 | Status: SHIPPED | OUTPATIENT
Start: 2024-10-05 | End: 2024-12-04

## 2024-10-04 RX ORDER — INSULIN GLARGINE 100 [IU]/ML
15 INJECTION, SOLUTION SUBCUTANEOUS 2 TIMES DAILY
Status: DISCONTINUED | OUTPATIENT
Start: 2024-10-04 | End: 2024-10-05

## 2024-10-04 RX ORDER — CLOPIDOGREL BISULFATE 75 MG/1
75 TABLET ORAL DAILY
Status: DISCONTINUED | OUTPATIENT
Start: 2024-10-05 | End: 2024-10-07 | Stop reason: HOSPADM

## 2024-10-04 RX ORDER — LOSARTAN POTASSIUM 50 MG/1
50 TABLET ORAL DAILY
Status: DISCONTINUED | OUTPATIENT
Start: 2024-10-05 | End: 2024-10-07 | Stop reason: HOSPADM

## 2024-10-04 RX ORDER — UBIQUINOL 100 MG
1 CAPSULE ORAL
Qty: 100 EACH | Refills: 3 | Status: SHIPPED | OUTPATIENT
Start: 2024-10-04

## 2024-10-04 RX ORDER — ASPIRIN 81 MG/1
81 TABLET, CHEWABLE ORAL DAILY
Status: DISCONTINUED | OUTPATIENT
Start: 2024-10-05 | End: 2024-10-07 | Stop reason: HOSPADM

## 2024-10-04 RX ORDER — INSULIN GLARGINE 100 [IU]/ML
30 INJECTION, SOLUTION SUBCUTANEOUS NIGHTLY
Qty: 5 ADJUSTABLE DOSE PRE-FILLED PEN SYRINGE | Refills: 3 | Status: SHIPPED | OUTPATIENT
Start: 2024-10-04 | End: 2024-10-05 | Stop reason: HOSPADM

## 2024-10-04 RX ORDER — ATORVASTATIN CALCIUM 80 MG/1
80 TABLET, FILM COATED ORAL DAILY
Status: DISCONTINUED | OUTPATIENT
Start: 2024-10-05 | End: 2024-10-07 | Stop reason: HOSPADM

## 2024-10-04 RX ORDER — FUROSEMIDE 40 MG
40 TABLET ORAL DAILY
Status: DISCONTINUED | OUTPATIENT
Start: 2024-10-05 | End: 2024-10-07 | Stop reason: HOSPADM

## 2024-10-04 RX ORDER — METOPROLOL TARTRATE 50 MG
50 TABLET ORAL 2 TIMES DAILY
Qty: 60 TABLET | Refills: 3 | Status: SHIPPED | OUTPATIENT
Start: 2024-10-04

## 2024-10-04 RX ORDER — INSULIN GLARGINE 100 [IU]/ML
15 INJECTION, SOLUTION SUBCUTANEOUS NIGHTLY
Qty: 5 ADJUSTABLE DOSE PRE-FILLED PEN SYRINGE | Refills: 3 | Status: SHIPPED | OUTPATIENT
Start: 2024-10-04 | End: 2024-10-04

## 2024-10-04 RX ADMIN — GABAPENTIN 100 MG: 100 CAPSULE ORAL at 08:47

## 2024-10-04 RX ADMIN — ANTI-FUNGAL POWDER MICONAZOLE NITRATE TALC FREE: 1.42 POWDER TOPICAL at 21:27

## 2024-10-04 RX ADMIN — ASPIRIN 81 MG 81 MG: 81 TABLET ORAL at 08:48

## 2024-10-04 RX ADMIN — METOPROLOL TARTRATE 50 MG: 50 TABLET, FILM COATED ORAL at 08:48

## 2024-10-04 RX ADMIN — CLONIDINE HYDROCHLORIDE 0.2 MG: 0.1 TABLET ORAL at 08:48

## 2024-10-04 RX ADMIN — HYDRALAZINE HYDROCHLORIDE 25 MG: 25 TABLET ORAL at 05:46

## 2024-10-04 RX ADMIN — ANTI-FUNGAL POWDER MICONAZOLE NITRATE TALC FREE: 1.42 POWDER TOPICAL at 08:49

## 2024-10-04 RX ADMIN — CLOPIDOGREL BISULFATE 75 MG: 75 TABLET ORAL at 08:48

## 2024-10-04 RX ADMIN — LEVETIRACETAM 500 MG: 100 INJECTION INTRAVENOUS at 21:18

## 2024-10-04 RX ADMIN — APIXABAN 5 MG: 5 TABLET, FILM COATED ORAL at 08:48

## 2024-10-04 RX ADMIN — INSULIN LISPRO 8 UNITS: 100 INJECTION, SOLUTION INTRAVENOUS; SUBCUTANEOUS at 15:13

## 2024-10-04 RX ADMIN — ACETAMINOPHEN 650 MG: 325 TABLET ORAL at 08:48

## 2024-10-04 RX ADMIN — LOSARTAN POTASSIUM 50 MG: 50 TABLET, FILM COATED ORAL at 08:51

## 2024-10-04 RX ADMIN — LEVETIRACETAM 500 MG: 100 INJECTION INTRAVENOUS at 08:47

## 2024-10-04 RX ADMIN — AMIODARONE HYDROCHLORIDE 200 MG: 200 TABLET ORAL at 08:51

## 2024-10-04 RX ADMIN — INSULIN GLARGINE 20 UNITS: 100 INJECTION, SOLUTION SUBCUTANEOUS at 08:47

## 2024-10-04 RX ADMIN — SODIUM CHLORIDE, PRESERVATIVE FREE 10 ML: 5 INJECTION INTRAVENOUS at 21:18

## 2024-10-04 RX ADMIN — Medication 2000 MG: at 15:13

## 2024-10-04 RX ADMIN — APIXABAN 5 MG: 5 TABLET, FILM COATED ORAL at 21:18

## 2024-10-04 RX ADMIN — SERTRALINE HYDROCHLORIDE 75 MG: 50 TABLET ORAL at 08:47

## 2024-10-04 RX ADMIN — GABAPENTIN 100 MG: 100 CAPSULE ORAL at 21:18

## 2024-10-04 RX ADMIN — ATORVASTATIN CALCIUM 80 MG: 80 TABLET, FILM COATED ORAL at 08:48

## 2024-10-04 RX ADMIN — AMLODIPINE BESYLATE 10 MG: 10 TABLET ORAL at 08:48

## 2024-10-04 RX ADMIN — GABAPENTIN 100 MG: 100 CAPSULE ORAL at 15:13

## 2024-10-04 ASSESSMENT — PAIN DESCRIPTION - LOCATION
LOCATION: LEG
LOCATION: LEG

## 2024-10-04 ASSESSMENT — PAIN SCALES - GENERAL
PAINLEVEL_OUTOF10: 9
PAINLEVEL_OUTOF10: 6
PAINLEVEL_OUTOF10: 4

## 2024-10-04 ASSESSMENT — PAIN DESCRIPTION - ORIENTATION: ORIENTATION: LEFT

## 2024-10-04 ASSESSMENT — PAIN - FUNCTIONAL ASSESSMENT: PAIN_FUNCTIONAL_ASSESSMENT: ACTIVITIES ARE NOT PREVENTED

## 2024-10-04 ASSESSMENT — PAIN DESCRIPTION - DESCRIPTORS: DESCRIPTORS: ACHING

## 2024-10-04 NOTE — CARE COORDINATION
Transitional Planning:   Galindo ESPINOZA states pt to d/c today.   [facility]  [facility] to arrange d/c for today - no answer message left, awaiting call  1206-Spoke kike Manley at [facility]- pt covered 100% for IV ATB.  Call to [facility] and spoke w addmissions/ intake Lea who puts CM on hold for extended period of time:   Spoke kike [facility] , who states thy do not have any availability today but can do 12p tomorrow. Pt will need ATB prior to d/c from hospital   1227-Call to sindhu at [facility] and notified of pt med 1 [facility] time for tomorrow at 12noon..States they will have IV ATB/ supplies delivered today btwn [phone]  Spoke w Galindo ESPINOZA who states he spoke w daughter An and notified of d/c order and stated that she \" Had stuff to do this weekend\"   Notified by [facility] that pt daughter An appealing discharge.   Spoke w internal med MD who states pt daughter refused to speak w him.   Call to Sindhu at [facility]- on hold they will f/u Monday  [facility] and spoke kike Tate and notified pt family appealing d/c

## 2024-10-04 NOTE — DISCHARGE INSTRUCTIONS
If you begin to experience any symptoms such as chest pain shortness of breath nausea vomiting dizziness drowsiness abdominal pain loss of consciousness or any other symptoms you find concerning please come to the ED for follow-up evaluation.    If you have been given medication please take them as prescribed. Do not take more medication than recommended at any given time.     Please follow-up with your primary care provider within 5 to 7 days for continued care.     Please feel free return to the hospital if your symptoms worsen or any new concerning symptoms develop.  Follow-up with your primary care physician as needed for all other the concerns.     Please follow-up with your PCP for BP and Diabetes management.    Please follow-up with cardiology

## 2024-10-05 LAB
ANION GAP SERPL CALCULATED.3IONS-SCNC: 8 MMOL/L (ref 9–16)
BASOPHILS # BLD: 0.04 K/UL (ref 0–0.2)
BASOPHILS NFR BLD: 0 % (ref 0–2)
BUN SERPL-MCNC: 9 MG/DL (ref 8–23)
CALCIUM SERPL-MCNC: 8.4 MG/DL (ref 8.6–10.4)
CHLORIDE SERPL-SCNC: 107 MMOL/L (ref 98–107)
CO2 SERPL-SCNC: 23 MMOL/L (ref 20–31)
CREAT SERPL-MCNC: 0.5 MG/DL (ref 0.5–0.9)
EOSINOPHIL # BLD: 0.13 K/UL (ref 0–0.44)
EOSINOPHILS RELATIVE PERCENT: 1 % (ref 1–4)
ERYTHROCYTE [DISTWIDTH] IN BLOOD BY AUTOMATED COUNT: 16 % (ref 11.8–14.4)
GFR, ESTIMATED: >90 ML/MIN/1.73M2
GLUCOSE BLD-MCNC: 146 MG/DL (ref 65–105)
GLUCOSE BLD-MCNC: 149 MG/DL (ref 65–105)
GLUCOSE BLD-MCNC: 184 MG/DL (ref 65–105)
GLUCOSE BLD-MCNC: 85 MG/DL (ref 65–105)
GLUCOSE SERPL-MCNC: 75 MG/DL (ref 74–99)
HCT VFR BLD AUTO: 33.1 % (ref 36.3–47.1)
HGB BLD-MCNC: 9.9 G/DL (ref 11.9–15.1)
IMM GRANULOCYTES # BLD AUTO: 0.3 K/UL (ref 0–0.3)
IMM GRANULOCYTES NFR BLD: 3 %
LYMPHOCYTES NFR BLD: 2.41 K/UL (ref 1.1–3.7)
LYMPHOCYTES RELATIVE PERCENT: 26 % (ref 24–43)
MCH RBC QN AUTO: 27.1 PG (ref 25.2–33.5)
MCHC RBC AUTO-ENTMCNC: 29.9 G/DL (ref 28.4–34.8)
MCV RBC AUTO: 90.7 FL (ref 82.6–102.9)
MONOCYTES NFR BLD: 0.8 K/UL (ref 0.1–1.2)
MONOCYTES NFR BLD: 9 % (ref 3–12)
NEUTROPHILS NFR BLD: 61 % (ref 36–65)
NEUTS SEG NFR BLD: 5.58 K/UL (ref 1.5–8.1)
NRBC BLD-RTO: 0 PER 100 WBC
PLATELET # BLD AUTO: 373 K/UL (ref 138–453)
PMV BLD AUTO: 9.2 FL (ref 8.1–13.5)
POTASSIUM SERPL-SCNC: 4.2 MMOL/L (ref 3.7–5.3)
RBC # BLD AUTO: 3.65 M/UL (ref 3.95–5.11)
RBC # BLD: ABNORMAL 10*6/UL
SODIUM SERPL-SCNC: 138 MMOL/L (ref 136–145)
WBC OTHER # BLD: 9.3 K/UL (ref 3.5–11.3)

## 2024-10-05 PROCEDURE — 6360000002 HC RX W HCPCS

## 2024-10-05 PROCEDURE — 6370000000 HC RX 637 (ALT 250 FOR IP)

## 2024-10-05 PROCEDURE — 36415 COLL VENOUS BLD VENIPUNCTURE: CPT

## 2024-10-05 PROCEDURE — 99232 SBSQ HOSP IP/OBS MODERATE 35: CPT | Performed by: INTERNAL MEDICINE

## 2024-10-05 PROCEDURE — 2060000000 HC ICU INTERMEDIATE R&B

## 2024-10-05 PROCEDURE — 80048 BASIC METABOLIC PNL TOTAL CA: CPT

## 2024-10-05 PROCEDURE — 85025 COMPLETE CBC W/AUTO DIFF WBC: CPT

## 2024-10-05 PROCEDURE — 6360000002 HC RX W HCPCS: Performed by: INTERNAL MEDICINE

## 2024-10-05 PROCEDURE — 6370000000 HC RX 637 (ALT 250 FOR IP): Performed by: STUDENT IN AN ORGANIZED HEALTH CARE EDUCATION/TRAINING PROGRAM

## 2024-10-05 PROCEDURE — 2580000003 HC RX 258

## 2024-10-05 PROCEDURE — 82947 ASSAY GLUCOSE BLOOD QUANT: CPT

## 2024-10-05 RX ORDER — INSULIN GLARGINE 100 [IU]/ML
10 INJECTION, SOLUTION SUBCUTANEOUS 2 TIMES DAILY
Status: DISCONTINUED | OUTPATIENT
Start: 2024-10-05 | End: 2024-10-06

## 2024-10-05 RX ORDER — METOPROLOL TARTRATE 25 MG/1
25 TABLET, FILM COATED ORAL 2 TIMES DAILY
Status: DISCONTINUED | OUTPATIENT
Start: 2024-10-05 | End: 2024-10-07 | Stop reason: HOSPADM

## 2024-10-05 RX ORDER — INSULIN GLARGINE 100 [IU]/ML
10 INJECTION, SOLUTION SUBCUTANEOUS 2 TIMES DAILY
Status: DISCONTINUED | OUTPATIENT
Start: 2024-10-05 | End: 2024-10-05

## 2024-10-05 RX ORDER — CLONIDINE HYDROCHLORIDE 0.1 MG/1
0.1 TABLET ORAL 2 TIMES DAILY
Status: DISCONTINUED | OUTPATIENT
Start: 2024-10-05 | End: 2024-10-07 | Stop reason: HOSPADM

## 2024-10-05 RX ORDER — INSULIN GLARGINE 100 [IU]/ML
10 INJECTION, SOLUTION SUBCUTANEOUS NIGHTLY
Qty: 5 ADJUSTABLE DOSE PRE-FILLED PEN SYRINGE | Refills: 3 | Status: SHIPPED | OUTPATIENT
Start: 2024-10-05 | End: 2024-10-07 | Stop reason: HOSPADM

## 2024-10-05 RX ADMIN — SODIUM CHLORIDE, PRESERVATIVE FREE 10 ML: 5 INJECTION INTRAVENOUS at 21:14

## 2024-10-05 RX ADMIN — APIXABAN 5 MG: 5 TABLET, FILM COATED ORAL at 08:26

## 2024-10-05 RX ADMIN — LEVETIRACETAM 500 MG: 100 INJECTION INTRAVENOUS at 08:26

## 2024-10-05 RX ADMIN — INSULIN GLARGINE 10 UNITS: 100 INJECTION, SOLUTION SUBCUTANEOUS at 08:27

## 2024-10-05 RX ADMIN — APIXABAN 5 MG: 5 TABLET, FILM COATED ORAL at 21:13

## 2024-10-05 RX ADMIN — GABAPENTIN 100 MG: 100 CAPSULE ORAL at 08:26

## 2024-10-05 RX ADMIN — AMIODARONE HYDROCHLORIDE 200 MG: 200 TABLET ORAL at 08:26

## 2024-10-05 RX ADMIN — ASPIRIN 81 MG 81 MG: 81 TABLET ORAL at 08:26

## 2024-10-05 RX ADMIN — ATORVASTATIN CALCIUM 80 MG: 80 TABLET, FILM COATED ORAL at 08:26

## 2024-10-05 RX ADMIN — SERTRALINE HYDROCHLORIDE 75 MG: 50 TABLET ORAL at 08:27

## 2024-10-05 RX ADMIN — LOSARTAN POTASSIUM 50 MG: 50 TABLET, FILM COATED ORAL at 08:26

## 2024-10-05 RX ADMIN — Medication 2000 MG: at 21:13

## 2024-10-05 RX ADMIN — ANTI-FUNGAL POWDER MICONAZOLE NITRATE TALC FREE: 1.42 POWDER TOPICAL at 08:28

## 2024-10-05 RX ADMIN — GABAPENTIN 100 MG: 100 CAPSULE ORAL at 21:13

## 2024-10-05 RX ADMIN — INSULIN GLARGINE 10 UNITS: 100 INJECTION, SOLUTION SUBCUTANEOUS at 21:12

## 2024-10-05 RX ADMIN — CLONIDINE HYDROCHLORIDE 0.2 MG: 0.1 TABLET ORAL at 08:26

## 2024-10-05 RX ADMIN — METOPROLOL TARTRATE 25 MG: 25 TABLET, FILM COATED ORAL at 08:26

## 2024-10-05 RX ADMIN — AMLODIPINE BESYLATE 10 MG: 10 TABLET ORAL at 08:26

## 2024-10-05 RX ADMIN — ACETAMINOPHEN 650 MG: 325 TABLET ORAL at 21:13

## 2024-10-05 RX ADMIN — ANTI-FUNGAL POWDER MICONAZOLE NITRATE TALC FREE: 1.42 POWDER TOPICAL at 21:14

## 2024-10-05 RX ADMIN — LEVETIRACETAM 500 MG: 100 INJECTION INTRAVENOUS at 21:13

## 2024-10-05 RX ADMIN — CLONIDINE HYDROCHLORIDE 0.1 MG: 0.1 TABLET ORAL at 21:13

## 2024-10-05 RX ADMIN — CLOPIDOGREL BISULFATE 75 MG: 75 TABLET ORAL at 08:26

## 2024-10-05 RX ADMIN — FUROSEMIDE 40 MG: 40 TABLET ORAL at 08:26

## 2024-10-05 RX ADMIN — METOPROLOL TARTRATE 25 MG: 25 TABLET, FILM COATED ORAL at 21:13

## 2024-10-05 RX ADMIN — GABAPENTIN 100 MG: 100 CAPSULE ORAL at 14:46

## 2024-10-05 NOTE — CARE COORDINATION
Transitional planning note: per previous cm notes, plan was for patient to transition home with daughter and [address]. However, previous CM notes states daughter would not be available this weekend and that daughter would be appealing discharge. No paperwork found from [employer] on  fax and no documentation of DND being given to patient.     1145: follow up with bedside nurse who has appeal number written that he received from patient's daughter. CM back to office and found copy of fax from [facility] in CM manager's mail box.     1230: completed detailed notice of discharge. Uploaded all required documents to [employer] case for medicare appeal. Tracking # [id number]     Case control ID: [id number]  [facility] Whiting: WESYL Mcgill website: [email or web address].cms.gov/[email or web address]

## 2024-10-05 NOTE — CARE COORDINATION
Met with patient and daughter to discuss transitional planning. Plan remains home with daughter and [address], However, daughter reports she is not comfortable administering IV antibiotics for patient and there is no teachable caregiver available in the home for IV's. Patient's antibiotics due to be completed after dose on 10/07/24. Patient's daughter stated that if patient needed IV antibiotics for longer than that, plan would be SNF as she just is not comfortable with administering IV ATB's at home for her mother. Additionally, patient's daughter does state she has all necessary DME to care for her mother when she does arrive home including hospital bed and selena lift.

## 2024-10-06 LAB
ANION GAP SERPL CALCULATED.3IONS-SCNC: 7 MMOL/L (ref 9–16)
BASOPHILS # BLD: 0.03 K/UL (ref 0–0.2)
BASOPHILS NFR BLD: 0 % (ref 0–2)
BUN SERPL-MCNC: 7 MG/DL (ref 8–23)
CALCIUM SERPL-MCNC: 8.8 MG/DL (ref 8.6–10.4)
CHLORIDE SERPL-SCNC: 106 MMOL/L (ref 98–107)
CO2 SERPL-SCNC: 23 MMOL/L (ref 20–31)
CREAT SERPL-MCNC: 0.5 MG/DL (ref 0.5–0.9)
EOSINOPHIL # BLD: 0.15 K/UL (ref 0–0.44)
EOSINOPHILS RELATIVE PERCENT: 2 % (ref 1–4)
ERYTHROCYTE [DISTWIDTH] IN BLOOD BY AUTOMATED COUNT: 15.8 % (ref 11.8–14.4)
GFR, ESTIMATED: >90 ML/MIN/1.73M2
GLUCOSE BLD-MCNC: 101 MG/DL (ref 65–105)
GLUCOSE BLD-MCNC: 108 MG/DL (ref 65–105)
GLUCOSE BLD-MCNC: 155 MG/DL (ref 65–105)
GLUCOSE BLD-MCNC: 97 MG/DL (ref 65–105)
GLUCOSE SERPL-MCNC: 71 MG/DL (ref 74–99)
HCT VFR BLD AUTO: 33.7 % (ref 36.3–47.1)
HGB BLD-MCNC: 10.7 G/DL (ref 11.9–15.1)
IMM GRANULOCYTES # BLD AUTO: 0.16 K/UL (ref 0–0.3)
IMM GRANULOCYTES NFR BLD: 2 %
LYMPHOCYTES NFR BLD: 2.22 K/UL (ref 1.1–3.7)
LYMPHOCYTES RELATIVE PERCENT: 24 % (ref 24–43)
MCH RBC QN AUTO: 27.9 PG (ref 25.2–33.5)
MCHC RBC AUTO-ENTMCNC: 31.8 G/DL (ref 28.4–34.8)
MCV RBC AUTO: 88 FL (ref 82.6–102.9)
MONOCYTES NFR BLD: 0.89 K/UL (ref 0.1–1.2)
MONOCYTES NFR BLD: 10 % (ref 3–12)
NEUTROPHILS NFR BLD: 62 % (ref 36–65)
NEUTS SEG NFR BLD: 5.84 K/UL (ref 1.5–8.1)
NRBC BLD-RTO: 0 PER 100 WBC
PLATELET # BLD AUTO: 531 K/UL (ref 138–453)
PMV BLD AUTO: 10 FL (ref 8.1–13.5)
POTASSIUM SERPL-SCNC: 4.8 MMOL/L (ref 3.7–5.3)
RBC # BLD AUTO: 3.83 M/UL (ref 3.95–5.11)
RBC # BLD: ABNORMAL 10*6/UL
SODIUM SERPL-SCNC: 136 MMOL/L (ref 136–145)
WBC OTHER # BLD: 9.3 K/UL (ref 3.5–11.3)

## 2024-10-06 PROCEDURE — 80048 BASIC METABOLIC PNL TOTAL CA: CPT

## 2024-10-06 PROCEDURE — 6370000000 HC RX 637 (ALT 250 FOR IP)

## 2024-10-06 PROCEDURE — 2580000003 HC RX 258

## 2024-10-06 PROCEDURE — 6360000002 HC RX W HCPCS

## 2024-10-06 PROCEDURE — 99232 SBSQ HOSP IP/OBS MODERATE 35: CPT | Performed by: INTERNAL MEDICINE

## 2024-10-06 PROCEDURE — 85025 COMPLETE CBC W/AUTO DIFF WBC: CPT

## 2024-10-06 PROCEDURE — 6360000002 HC RX W HCPCS: Performed by: INTERNAL MEDICINE

## 2024-10-06 PROCEDURE — 82947 ASSAY GLUCOSE BLOOD QUANT: CPT

## 2024-10-06 PROCEDURE — 2060000000 HC ICU INTERMEDIATE R&B

## 2024-10-06 PROCEDURE — 36415 COLL VENOUS BLD VENIPUNCTURE: CPT

## 2024-10-06 PROCEDURE — 6370000000 HC RX 637 (ALT 250 FOR IP): Performed by: STUDENT IN AN ORGANIZED HEALTH CARE EDUCATION/TRAINING PROGRAM

## 2024-10-06 RX ORDER — INSULIN GLARGINE 100 [IU]/ML
10 INJECTION, SOLUTION SUBCUTANEOUS 2 TIMES DAILY
Status: DISCONTINUED | OUTPATIENT
Start: 2024-10-06 | End: 2024-10-07 | Stop reason: HOSPADM

## 2024-10-06 RX ORDER — INSULIN GLARGINE 100 [IU]/ML
15 INJECTION, SOLUTION SUBCUTANEOUS 2 TIMES DAILY
Status: DISCONTINUED | OUTPATIENT
Start: 2024-10-06 | End: 2024-10-06

## 2024-10-06 RX ADMIN — Medication 2000 MG: at 21:50

## 2024-10-06 RX ADMIN — CLONIDINE HYDROCHLORIDE 0.1 MG: 0.1 TABLET ORAL at 08:15

## 2024-10-06 RX ADMIN — AMIODARONE HYDROCHLORIDE 200 MG: 200 TABLET ORAL at 08:14

## 2024-10-06 RX ADMIN — ASPIRIN 81 MG 81 MG: 81 TABLET ORAL at 08:15

## 2024-10-06 RX ADMIN — METOPROLOL TARTRATE 25 MG: 25 TABLET, FILM COATED ORAL at 08:14

## 2024-10-06 RX ADMIN — GABAPENTIN 100 MG: 100 CAPSULE ORAL at 16:36

## 2024-10-06 RX ADMIN — METOPROLOL TARTRATE 25 MG: 25 TABLET, FILM COATED ORAL at 21:50

## 2024-10-06 RX ADMIN — LOSARTAN POTASSIUM 50 MG: 50 TABLET, FILM COATED ORAL at 08:15

## 2024-10-06 RX ADMIN — APIXABAN 5 MG: 5 TABLET, FILM COATED ORAL at 08:15

## 2024-10-06 RX ADMIN — ATORVASTATIN CALCIUM 80 MG: 80 TABLET, FILM COATED ORAL at 08:14

## 2024-10-06 RX ADMIN — ANTI-FUNGAL POWDER MICONAZOLE NITRATE TALC FREE: 1.42 POWDER TOPICAL at 21:54

## 2024-10-06 RX ADMIN — LEVETIRACETAM 500 MG: 100 INJECTION INTRAVENOUS at 20:32

## 2024-10-06 RX ADMIN — FUROSEMIDE 40 MG: 40 TABLET ORAL at 08:14

## 2024-10-06 RX ADMIN — LEVETIRACETAM 500 MG: 100 INJECTION INTRAVENOUS at 08:14

## 2024-10-06 RX ADMIN — APIXABAN 5 MG: 5 TABLET, FILM COATED ORAL at 21:50

## 2024-10-06 RX ADMIN — GABAPENTIN 100 MG: 100 CAPSULE ORAL at 21:50

## 2024-10-06 RX ADMIN — CLOPIDOGREL BISULFATE 75 MG: 75 TABLET ORAL at 08:15

## 2024-10-06 RX ADMIN — INSULIN GLARGINE 10 UNITS: 100 INJECTION, SOLUTION SUBCUTANEOUS at 08:12

## 2024-10-06 RX ADMIN — HYDROCODONE BITARTRATE AND ACETAMINOPHEN 1 TABLET: 5; 325 TABLET ORAL at 21:50

## 2024-10-06 RX ADMIN — SERTRALINE HYDROCHLORIDE 75 MG: 50 TABLET ORAL at 08:14

## 2024-10-06 RX ADMIN — GABAPENTIN 100 MG: 100 CAPSULE ORAL at 08:14

## 2024-10-06 RX ADMIN — SODIUM CHLORIDE, PRESERVATIVE FREE 10 ML: 5 INJECTION INTRAVENOUS at 20:32

## 2024-10-06 RX ADMIN — SODIUM CHLORIDE, PRESERVATIVE FREE 10 ML: 5 INJECTION INTRAVENOUS at 21:50

## 2024-10-06 RX ADMIN — AMLODIPINE BESYLATE 10 MG: 10 TABLET ORAL at 08:15

## 2024-10-06 RX ADMIN — CLONIDINE HYDROCHLORIDE 0.1 MG: 0.1 TABLET ORAL at 21:50

## 2024-10-06 RX ADMIN — ANTI-FUNGAL POWDER MICONAZOLE NITRATE TALC FREE: 1.42 POWDER TOPICAL at 08:16

## 2024-10-06 ASSESSMENT — PAIN SCALES - GENERAL: PAINLEVEL_OUTOF10: 7

## 2024-10-06 NOTE — CARE COORDINATION
Assist patient to bathroom in wheelchair. Pt able to stand and transfer to commode with steadying assist from RN. Staff pulls up and down patient. Pt able to wife herself. Right extremity flaccid, slight right drool to mouth. Pt encourage to call with any needs. Call light with in reach. Transitional planning note: per case status review on [employer] website, physician reviewer disagrees with termination of hospital services.       https://www.[email or web address].cms.gov/en/case_lookup    Case number: [id number]

## 2024-10-07 VITALS
HEART RATE: 59 BPM | OXYGEN SATURATION: 99 % | HEIGHT: 62 IN | TEMPERATURE: 97.2 F | RESPIRATION RATE: 18 BRPM | BODY MASS INDEX: 41.11 KG/M2 | DIASTOLIC BLOOD PRESSURE: 59 MMHG | SYSTOLIC BLOOD PRESSURE: 116 MMHG | WEIGHT: 223.4 LBS

## 2024-10-07 LAB
GLUCOSE BLD-MCNC: 112 MG/DL (ref 65–105)
GLUCOSE BLD-MCNC: 119 MG/DL (ref 65–105)
GLUCOSE BLD-MCNC: 142 MG/DL (ref 65–105)

## 2024-10-07 PROCEDURE — 6370000000 HC RX 637 (ALT 250 FOR IP)

## 2024-10-07 PROCEDURE — 6360000002 HC RX W HCPCS

## 2024-10-07 PROCEDURE — 2580000003 HC RX 258

## 2024-10-07 PROCEDURE — 6360000002 HC RX W HCPCS: Performed by: INTERNAL MEDICINE

## 2024-10-07 PROCEDURE — 99239 HOSP IP/OBS DSCHRG MGMT >30: CPT | Performed by: INTERNAL MEDICINE

## 2024-10-07 PROCEDURE — 6370000000 HC RX 637 (ALT 250 FOR IP): Performed by: STUDENT IN AN ORGANIZED HEALTH CARE EDUCATION/TRAINING PROGRAM

## 2024-10-07 PROCEDURE — 82947 ASSAY GLUCOSE BLOOD QUANT: CPT

## 2024-10-07 RX ORDER — CLONIDINE HYDROCHLORIDE 0.1 MG/1
0.1 TABLET ORAL 2 TIMES DAILY
Qty: 60 TABLET | Refills: 3 | Status: SHIPPED | OUTPATIENT
Start: 2024-10-07

## 2024-10-07 RX ORDER — GLIMEPIRIDE 1 MG/1
1 TABLET ORAL EVERY MORNING
Qty: 30 TABLET | Refills: 0 | Status: SHIPPED | OUTPATIENT
Start: 2024-10-07

## 2024-10-07 RX ADMIN — AMLODIPINE BESYLATE 10 MG: 10 TABLET ORAL at 07:46

## 2024-10-07 RX ADMIN — LEVETIRACETAM 500 MG: 100 INJECTION INTRAVENOUS at 07:46

## 2024-10-07 RX ADMIN — APIXABAN 5 MG: 5 TABLET, FILM COATED ORAL at 07:46

## 2024-10-07 RX ADMIN — METOPROLOL TARTRATE 25 MG: 25 TABLET, FILM COATED ORAL at 07:46

## 2024-10-07 RX ADMIN — ATORVASTATIN CALCIUM 80 MG: 80 TABLET, FILM COATED ORAL at 07:46

## 2024-10-07 RX ADMIN — AMIODARONE HYDROCHLORIDE 200 MG: 200 TABLET ORAL at 07:46

## 2024-10-07 RX ADMIN — LOSARTAN POTASSIUM 50 MG: 50 TABLET, FILM COATED ORAL at 07:46

## 2024-10-07 RX ADMIN — CLONIDINE HYDROCHLORIDE 0.1 MG: 0.1 TABLET ORAL at 07:46

## 2024-10-07 RX ADMIN — SERTRALINE HYDROCHLORIDE 75 MG: 50 TABLET ORAL at 07:46

## 2024-10-07 RX ADMIN — FUROSEMIDE 40 MG: 40 TABLET ORAL at 07:46

## 2024-10-07 RX ADMIN — CLOPIDOGREL BISULFATE 75 MG: 75 TABLET ORAL at 07:46

## 2024-10-07 RX ADMIN — GABAPENTIN 100 MG: 100 CAPSULE ORAL at 07:46

## 2024-10-07 RX ADMIN — ASPIRIN 81 MG 81 MG: 81 TABLET ORAL at 07:46

## 2024-10-07 RX ADMIN — SODIUM CHLORIDE, PRESERVATIVE FREE 10 ML: 5 INJECTION INTRAVENOUS at 07:47

## 2024-10-07 RX ADMIN — ANTI-FUNGAL POWDER MICONAZOLE NITRATE TALC FREE: 1.42 POWDER TOPICAL at 07:47

## 2024-10-07 RX ADMIN — GABAPENTIN 100 MG: 100 CAPSULE ORAL at 13:09

## 2024-10-07 RX ADMIN — Medication 2000 MG: at 15:58

## 2024-10-07 NOTE — PROGRESS NOTES
[facility] -   Infectious diseases evaluation  admission date 9/24/2024    reason for consultation:   UTI    Impression :   Current:  Urinary tract infection E coli - complicated pyelonephritis   E coli septicemia ESBL neg  Acute encephalopathy  from sepsis  - improved  Hyperosmolar state w DM 2  Bandemia  Elevated CRP  SIRS       Other:  Hypertension  Right MCA stroke with hemorrhagic transformation in 2020  Heart failure with reduced ejection fraction 40%  CAD, s/p 2 stents on Plavix  COPD  Obesity  Seizures  History of IVC filter placement in 2007  QTc interval prolonged and hence defer quinolones  On amiodarone  Discussion / summary of stay / plan of care/ Recommendations:     HENCE:   Treating E. coli pyelonephritis with bacteremia/septicemia with associated altered mentation, bandemia and elevated CRP  Avoid Levaquin due to amiodarone and prolonged QTc    This is a complicated pyelonephritis that required 14 days systemic AB till 10/7/24    ceftriaxone till  10/7/24 - reconsiled  - pls place a midline at DC  Ultrasound of the kidneys done 9/25, no hydronephrosis on small nonobstructive nephrolithiasis on left  CXR 9/26 improved infiltrate  9/30 sacral wound looks like stage 2 wound, mentation improved  Will follow   Infection Control Recommendations   [place] Precautions    Antimicrobial Stewardship Recommendations   Simplification of therapy  Targeted therapy    History of Present Illness:   Initial history:  Leatha Mason is a 74 y.o.-year-old female who presented to the emergency department in the morning of 9/24 accompanied by her daughter for altered mental status. Patient lives with the daughter. Per daughter, patient was last at her baseline yesterday on 9/23 around 7 PM. At baseline, patient has left-sided paralysis but is alert and oriented x 3, able to speak in complete sentences. Daughter reports that she checked on the patient in the early morning 
          [facility] -   Infectious diseases evaluation  admission date 9/24/2024    reason for consultation:   UTI    Impression :   Current:  Urinary tract infection E coli - complicated pyelonephritis   E coli septicemia ESBL neg  Acute encephalopathy  from sepsis  - improved  Hyperosmolar state w DM 2  Bandemia  Elevated CRP  SIRS       Other:  Hypertension  Right MCA stroke with hemorrhagic transformation in 2020  Heart failure with reduced ejection fraction 40%  CAD, s/p 2 stents on Plavix  COPD  Obesity  Seizures  History of IVC filter placement in 2007  QTc interval prolonged and hence defer quinolones  On amiodarone  Discussion / summary of stay / plan of care/ Recommendations:     HENCE:   Treating E. coli pyelonephritis with bacteremia/septicemia with associated altered mentation, bandemia and elevated CRP  Avoid Levaquin due to amiodarone and prolonged QTc    This is a complicated pyelonephritis that required 14 days systemic AB till 10/7/24    ceftriaxone till  10/7/24 - reconsiled  - pls place a midline at DC  Ultrasound of the kidneys done 9/25, no hydronephrosis on small nonobstructive nephrolithiasis on left  CXR 9/26 improved infil  Will follow   Infection Control Recommendations   [place] Precautions    Antimicrobial Stewardship Recommendations   Simplification of therapy  Targeted therapy    History of Present Illness:   Initial history:  Leatha Mason is a 74 y.o.-year-old female who presented to the emergency department in the morning of 9/24 accompanied by her daughter for altered mental status. Patient lives with the daughter. Per daughter, patient was last at her baseline yesterday on 9/23 around 7 PM. At baseline, patient has left-sided paralysis but is alert and oriented x 3, able to speak in complete sentences. Daughter reports that she checked on the patient in the early morning of 9/24 and noticed that she was only responsive to pain, not 
          [facility] -   Infectious diseases evaluation  admission date 9/24/2024    reason for consultation:   UTI    Impression :   Current:  Urinary tract infection E coli - complicated pyelonephritis   E coli septicemia ESBL neg  Acute encephalopathy  from sepsis  - improved  Hyperosmolar state w DM 2  Bandemia  Elevated CRP  SIRS       Other:  Hypertension  Right MCA stroke with hemorrhagic transformation in 2020  Heart failure with reduced ejection fraction 40%  CAD, s/p 2 stents on Plavix  COPD  Obesity  Seizures  History of IVC filter placement in 2007  QTc interval prolonged and hence defer quinolones  On amiodarone  Discussion / summary of stay / plan of care/ Recommendations:     HENCE:   Treating E. coli pyelonephritis with bacteremia/septicemia with associated altered mentation, bandemia and elevated CRP  Avoid Levaquin due to amiodarone and prolonged QTc    This is a complicated pyelonephritis that required 14 days systemic AB till 10/7/24    ceftriaxone till  10/7/24 - reconsiled  - pls place a midline at DC  Ultrasound of the kidneys done 9/25, no hydronephrosis on small nonobstructive nephrolithiasis on left  CXR 9/26 improved infiltrate  9/30 sacral wound looks like stage 2 wound, mentation improved  Will follow   Infection Control Recommendations   [place] Precautions    Antimicrobial Stewardship Recommendations   Simplification of therapy  Targeted therapy    History of Present Illness:   Initial history:  Leatha Mason is a 74 y.o.-year-old female who presented to the emergency department in the morning of 9/24 accompanied by her daughter for altered mental status. Patient lives with the daughter. Per daughter, patient was last at her baseline yesterday on 9/23 around 7 PM. At baseline, patient has left-sided paralysis but is alert and oriented x 3, able to speak in complete sentences. Daughter reports that she checked on the patient in the early morning 
          [facility] -   Infectious diseases evaluation  admission date 9/24/2024    reason for consultation:   UTI    Impression :   Current:  Urinary tract infection E coli - complicated pyelonephritis   E coli septicemia ESBL neg  Acute encephalopathy  from sepsis  - improved  Hyperosmolar state w DM 2  Bandemia  Elevated CRP  SIRS       Other:  Hypertension  Right MCA stroke with hemorrhagic transformation in 2020  Heart failure with reduced ejection fraction 40%  CAD, s/p 2 stents on Plavix  COPD  Obesity  Seizures  History of IVC filter placement in 2007  QTc interval prolonged and hence defer quinolones  On amiodarone  Discussion / summary of stay / plan of care/ Recommendations:     HENCE:   Blood culture and urine cultures positive for E. coli multi sensitive  9/26 mentally worse    This is a complicated pyelonephritis that required 14 days systemic AB till 10/7/24    Zosyn then meropenem then ceftriaxone till  10/7/24  Ultrasound of the kidneys done 9/25, no hydronephrosis on small nonobstructive nephrolithiasis on left  CT head 9/26 neg  CXR 9/26 improved infil  Will follow   Infection Control Recommendations   [place] Precautions    Antimicrobial Stewardship Recommendations   Simplification of therapy  Targeted therapy    History of Present Illness:   Initial history:  Leatha Mason is a 74 y.o.-year-old female who presented to the emergency department in the morning of 9/24 accompanied by her daughter for altered mental status. Patient lives with the daughter. Per daughter, patient was last at her baseline yesterday on 9/23 around 7 PM. At baseline, patient has left-sided paralysis but is alert and oriented x 3, able to speak in complete sentences. Daughter reports that she checked on the patient in the early morning of 9/24 and noticed that she was only responsive to pain, not speaking, not moving. EMS noted that patient's blood sugar read as \"high.\" Daughter 
          Pharmacy Note     Renal Dose Adjustment    Leatha Mason is a 74 y.o. female. Pharmacist assessment of renally cleared medications.    Recent Labs     09/24/24  1221 09/24/24  1617   BUN 43* 42*       Recent Labs     09/24/24  1221 09/24/24  1617   CREATININE 1.9* 1.8*       Estimated Creatinine Clearance: 29 mL/min (A) (based on SCr of 1.8 mg/dL (H)).      Height:   Ht Readings from Last 1 Encounters:   09/24/24 1.575 m (5' 2\")     Weight:  Wt Readings from Last 1 Encounters:   09/24/24 89.8 kg (197 lb 15.6 oz)       The following medication dose has been adjusted based upon renal function per P&T Guidelines:             Cefepime adjusted to 1g q24 for renal function.    -Tong Whalen PharmD, BCPS 9/24/2024 5:03 PM    
     Nutrition Note    Per RN, pt starting to take more PO at meals. On pureed diet; moderately thick liquids. RN reports pt consumed 3 fruit cups, cream of wheat with brown sugar, 3 OJ this morning. MD would like TF cut to half rate. Discussed considering nocturnal feeding to promote PO intake during the day.    Recommend modify TF order to Diabetic at 60 mL/hr cyclic x 10 hours overnight 8p to 6a to provide 900 kcal, 50 gm protein, 455 mL free water. (~50% of estimated kcal and protein needs). With current FWF = 1955 mL water/day.    If wound like to modify TF to half goal rate Diabetic at 30 mL/hr continuous provides 1080 kcal, 59 gm protein.    See full nutrition assessments 9/30, 9/26.      Electronically signed by Gisselle Barroso RD on 10/2/24 at 2:04 PM EDT    Contact: [phone]    
    [facility]  Internal Medicine Teaching Residency Program  Inpatient Daily Progress Note  ______________________________________________________________________________    Patient: Leatha Mason  YOB: 1950   MRN:[id number]    Acct: [id number]     Room: 0408/0408-01  Admit date: 9/24/2024  Today's date: 10/03/24  Number of days in the hospital: 9    SUBJECTIVE   Admitting Diagnosis: Hyperosmolar hyperglycemic state (HHS) (HCC)  CC: Altered mental status    - Pt examined at bedside. Chart & results reviewed.  - mentation continues to improve  - difficult to take interval history due to dysarthria   -  dietician is toggling with patient TF and her oral diet   - mentation seems appropriate now. Considering removing NG     ROS:  Constitutional:  negative for chills, fevers, sweats  Respiratory:  negative for cough, dyspnea on exertion, hemoptysis, shortness of breath, wheezing  Cardiovascular:  negative for chest pain, chest pressure/discomfort, lower extremity edema, palpitations  Gastrointestinal:  negative for abdominal pain, constipation, diarrhea, nausea, vomiting  Neurological: baseline left sided weakness       BRIEF HISTORY     ICU COURSE:     The patient is a 74 y.o. female with past medical history significant for   right MCA stroke with hemorrhagic transformation with residual weakness  Coronary artery disease s/p stenting  Seizures  Hypertension  Hyperlipidemia  Type 2 diabetes mellitus  YUDITH   who was initially admitted on 9/24/2024 with Elevated troponin [R79.89]  Altered mental status, unspecified altered mental status type [R41.82]  Hyperosmolar hyperglycemic state (HHS) (HCC) [E11.00] and presented with altered mental status     In the ER   Patient presented initially with complaint of altered mentation, patient was brought in the ER by the daughter who reports the patient was having symptoms of confusion and altered mentation patient was not 
    [facility]  Internal Medicine Teaching Residency Program  Inpatient Daily Progress Note  ______________________________________________________________________________    Patient: Leatha Mason  YOB: 1950   MRN:[id number]    Acct: [id number]     Room: 0408/0408-01  Admit date: 9/24/2024  Today's date: 10/01/24  Number of days in the hospital: 7    SUBJECTIVE   Admitting Diagnosis: Hyperosmolar hyperglycemic state (HHS) (HCC)  CC: Altered mental status  Pt examined at bedside. Chart & results reviewed.     -Patient was hypertensive overnight  -Patient is maintaining oxygen saturation at room air  -Labs reviewed this a.m. showed hypernatremia with corrected sodium of 150 for blood glucose 221  -Blood glucose 221, patient is on insulin Lantus 15 units twice daily and high-dose sliding scale previously, changed today lantus to 20 units BiD  -blood culture positive for E. Coli, patient is on ceftriaxone 2 g IV (day 5)  -MRSA DNA probe nasal positive    ROS:  Constitutional:  negative for chills, fevers, sweats  Respiratory:  negative for cough, dyspnea on exertion, hemoptysis, shortness of breath, wheezing  Cardiovascular:  negative for chest pain, chest pressure/discomfort, lower extremity edema, palpitations  Gastrointestinal:  negative for abdominal pain, constipation, diarrhea, nausea, vomiting  Neurological: Patient is confused and not responding to questions and commands      BRIEF HISTORY     ICU COURSE:     The patient is a 74 y.o. female with past medical history significant for   right MCA stroke with hemorrhagic transformation with residual weakness  Coronary artery disease s/p stenting  Seizures  Hypertension  Hyperlipidemia  Type 2 diabetes mellitus  YUDITH   who was initially admitted on 9/24/2024 with Elevated troponin [R79.89]  Altered mental status, unspecified altered mental status type [R41.82]  Hyperosmolar hyperglycemic state (HHS) (HCC) 
    [facility]  Internal Medicine Teaching Residency Program  Inpatient Daily Progress Note  ______________________________________________________________________________    Patient: Leatha Mason  YOB: 1950   MRN:[id number]    Acct: [id number]     Room: 0408/0408-01  Admit date: 9/24/2024  Today's date: 10/05/24  Number of days in the hospital: 11    SUBJECTIVE   Admitting Diagnosis: Hyperosmolar hyperglycemic state (HHS) (HCC)  CC: Altered mental status    Patient is at baseline. At baseline patient is unable to communicate much. Patient is also unable to provide much history.  Blood pressure medication adjusted.  Patient informed about adjustments.    ROS:  Unable to obtain.      BRIEF HISTORY     ICU COURSE:     The patient is a 74 y.o. female with past medical history significant for   right MCA stroke with hemorrhagic transformation with residual weakness  Coronary artery disease s/p stenting  Seizures  Hypertension  Hyperlipidemia  Type 2 diabetes mellitus  YUDITH   who was initially admitted on 9/24/2024 with Elevated troponin [R79.89]  Altered mental status, unspecified altered mental status type [R41.82]  Hyperosmolar hyperglycemic state (HHS) (HCC) [E11.00] and presented with altered mental status     In the ER   Patient presented initially with complaint of altered mentation, patient was brought in the ER by the daughter who reports the patient was having symptoms of confusion and altered mentation patient was not eating and drinking and was very slow to respond, EMS was called and patient was only responsive to pain.  Blood glucose was checked and it showed high  On arrival in the ED vital was /103, pulse 86, afebrile, SpO2 93% at room air  Patient was somnolent and minimally responsive with GCS of 10  EKG showed sinus rhythm with prolonged QT interval of more than 500 ms  Blood glucose was 883, with beta hydroxybutyrate 1.47, troponin 49 with 
    [facility]  Internal Medicine Teaching Residency Program  Inpatient Daily Progress Note  ______________________________________________________________________________    Patient: Leatha Mason  YOB: 1950   MRN:[id number]    Acct: [id number]     Room: 0408/0408-01  Admit date: 9/24/2024  Today's date: 10/02/24  Number of days in the hospital: 8    SUBJECTIVE   Admitting Diagnosis: Hyperosmolar hyperglycemic state (HHS) (HCC)  CC: Altered mental status  Pt examined at bedside. Chart & results reviewed.     Patient is not as oriented as yesterday. Per patients daughter she doesn't sleep well. Per nurse she slept all day and was better mentation wise in the evening. Chest skin is flaking which is new per daughter. Changed free water to 250q4 and gave bolus.    ROS:  Constitutional:  negative for chills, fevers, sweats  Respiratory:  negative for cough, dyspnea on exertion, hemoptysis, shortness of breath, wheezing  Cardiovascular:  negative for chest pain, chest pressure/discomfort, lower extremity edema, palpitations  Gastrointestinal:  negative for abdominal pain, constipation, diarrhea, nausea, vomiting  Neurological: Patient is confused and not responding to questions and commands      BRIEF HISTORY     ICU COURSE:     The patient is a 74 y.o. female with past medical history significant for   right MCA stroke with hemorrhagic transformation with residual weakness  Coronary artery disease s/p stenting  Seizures  Hypertension  Hyperlipidemia  Type 2 diabetes mellitus  YUDITH   who was initially admitted on 9/24/2024 with Elevated troponin [R79.89]  Altered mental status, unspecified altered mental status type [R41.82]  Hyperosmolar hyperglycemic state (HHS) (HCC) [E11.00] and presented with altered mental status     In the ER   Patient presented initially with complaint of altered mentation, patient was brought in the ER by the daughter who reports the patient 
    [facility]  Internal Medicine Teaching Residency Program  Inpatient Daily Progress Note  ______________________________________________________________________________    Patient: Leatha Mason  YOB: 1950   MRN:[id number]    Acct: [id number]     Room: 0408/0408-01  Admit date: 9/24/2024  Today's date: 10/04/24  Number of days in the hospital: 10    SUBJECTIVE   Admitting Diagnosis: Hyperosmolar hyperglycemic state (HHS) (HCC)  CC: Altered mental status    - Pt examined at bedside. Chart & results reviewed.  - Patient states that her left leg hurts. Mentation improved significantly today.    ROS:  Constitutional:  negative for chills, fevers, sweats  Respiratory:  negative for cough, dyspnea on exertion, hemoptysis, shortness of breath, wheezing  Cardiovascular:  negative for chest pain, chest pressure/discomfort, lower extremity edema, palpitations  Gastrointestinal:  negative for abdominal pain, constipation, diarrhea, nausea, vomiting  Neurological: baseline left sided weakness       BRIEF HISTORY     ICU COURSE:     The patient is a 74 y.o. female with past medical history significant for   right MCA stroke with hemorrhagic transformation with residual weakness  Coronary artery disease s/p stenting  Seizures  Hypertension  Hyperlipidemia  Type 2 diabetes mellitus  YUDITH   who was initially admitted on 9/24/2024 with Elevated troponin [R79.89]  Altered mental status, unspecified altered mental status type [R41.82]  Hyperosmolar hyperglycemic state (HHS) (HCC) [E11.00] and presented with altered mental status     In the ER   Patient presented initially with complaint of altered mentation, patient was brought in the ER by the daughter who reports the patient was having symptoms of confusion and altered mentation patient was not eating and drinking and was very slow to respond, EMS was called and patient was only responsive to pain.  Blood glucose was checked 
   09/24/24 1530   Respiratory   Respiratory Pattern Regular   Respiratory Depth Normal   Respiratory Quality/Effort Unlabored   Chest Assessment Chest expansion symmetrical   L Breath Sounds Clear;Diminished   R Breath Sounds Clear;Diminished   Level of Activity/Mobility 2   Subcutaneous Air/Crepitus None   Breath Sounds   Breath Sounds Bilateral Clear;Diminished   Right Upper Lobe Clear;Diminished   Right Middle Lobe Clear;Diminished   Right Lower Lobe Clear;Diminished   Left Upper Lobe Clear;Diminished   Left Lower Lobe Clear;Diminished     Pt's lung sounds are clear/diminished. Takes combivent @ home  PRN.  
   Speech Language Pathology  [facility]    Dysphagia Treatment Note    Date: 10/1/2024  Patient’s Name: Leatha Mason  MRN: [id number]  Diagnosis:   Patient Active Problem List   Diagnosis Code    HTN (hypertension) I10    Asthma J45.909    Knee osteoarthritis M17.9    Edema R60.9    Spinal stenosis in cervical region M48.02    Chest pain R07.9    YUDITH (obstructive sleep apnea) G47.33    Morbid obesity E66.01    Knee pain, bilateral M25.561, M25.562    S/P TKR (total knee replacement) Z96.659    Urge incontinence of urine N39.41    Lumbar spondylosis M47.816    Cervical spondylosis M47.812    Chronic use of opiate drugs therapeutic purposes Z79.891    Chronic knee pain M25.569, G89.29    Acute coronary syndrome ([facility]) I24.9    Congestive heart failure ([facility]) I50.9    Lymphadenopathy R59.1    Chronic pain G89.29    Coronary artery disease involving native coronary artery without angina pectoris I25.10    Chronic prescription opiate use Z79.891    Type 2 diabetes mellitus with complication, without long-term current use of insulin ([facility]) E11.8    S/P coronary artery stent placement - RCA 10/12/16 - Dr. allan Z95.5    Anxiety F41.9    Major depressive disorder F32.9    Postlaminectomy syndrome, lumbar M96.1    Primary osteoarthritis of right knee M17.11    Long term (current) use of antithrombotics/antiplatelets Z79.02    Cerebrovascular accident (CVA) due to embolic occlusion of right middle cerebral artery ([facility]) I63.411    Acute cerebrovascular accident (CVA) ([facility]) I63.9    Right middle cerebral artery stroke ([facility]) I63.511    Ischemic cerebral stroke due to intracranial large artery atherosclerosis ([facility]) I63.59    Hypernatremia E87.0    Oropharyngeal dysphagia R13.12    Seizure ([facility]) R56.9    History of CVA (cerebrovascular accident) Z86.73    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    
  [facility] [place]     Emergency/Trauma Note    PATIENT NAME: Leatha Mason    Shift date: 9/23/2024  Shift day: Monday   Shift # 3    Room # 25/25   Name: Leatha Mason            Age: 74 y.o.  Gender: female          Yazidi: L[employer] day   Place of Protestant: Unknown    Trauma/Incident type: Stroke Alert  Admit Date & Time: 9/24/2024  5:23 AM  TRAUMA NAME: N/A    ADVANCE DIRECTIVES IN CHART?  No    NAME OF DECISION MAKER: Unknown    RELATIONSHIP OF DECISION MAKER TO PATIENT: Unknown    PATIENT/EVENT DESCRIPTION:  Leatha Mason is a 74 y.o. female who arrived to ED25 and was paged out as a \"Stroke Alert.\" Patient was \"not responsive\" throughout  encounter. Patient's daughter, An, was present in room at time of visit. Pt to be admitted to 25/25.      SPIRITUAL ASSESSMENT-INTERVENTION-OUTCOME:   responded to page and gathered patient information outside room.  introduced herself to patient's daughter and learned about the events of the night. Patient's daughter expressed feelings of fear and overwhelm. Per daughter, patient lives with her. Patient's daughter indicated that patient's other daughter, Latoya, is en route to the hospital.  informed ED Triage of her suspected arrival.  provided support and care to patient's daughter in room.      PATIENT BELONGINGS:  No belongings noted    ANY BELONGINGS OF SIGNIFICANT VALUE NOTED:  N/A    REGISTRATION STAFF NOTIFIED?  Yes      WHAT IS YOUR SPIRITUAL CARE PLAN FOR THIS PATIENT?:  Chaplains can make follow-up visit, per request. Chaplains can be reached 24/7 via [employer].    Electronically signed by Chaplain Consuelo, on 9/24/2024 at 7:45 AM.  [facility]  [phone]    
  [facility]  Progress Note                   Date:   9/28/2024  Patient name: Leatha Mason  Date of admission:  9/24/2024  5:23 AM  MRN:   [id number]  YOB: 1950  PCP: Lenore Bobby APRN - CNP    Reason for Admission: Elevated troponin [R79.89]  Altered mental status, unspecified altered mental status type [R41.82]  Hyperosmolar hyperglycemic state (HHS) (HCC) [E11.00]    Subjective:       Clinical Changes /Abnormalities:Patient seen and examined.,  Garbled speech has NG tube Tele/vitals/labs reviewed .   Review of Systems    Medications:   Scheduled Meds:   insulin glargine  15 Units SubCUTAneous BID    lactated ringers  1,000 mL IntraVENous Once    midodrine  10 mg Oral TID WC    insulin lispro  0-16 Units SubCUTAneous Q6H    magnesium sulfate  1,000 mg IntraVENous Once    amiodarone  200 mg Oral BID    Followed by    [START ON 10/4/2024] amiodarone  200 mg Oral Daily    apixaban  5 mg Oral BID    [Held by provider] alogliptin  12.5 mg Oral Daily    [Held by provider] losartan  50 mg Oral BID    atorvastatin  80 mg Per NG tube Daily    aspirin  81 mg Per NG tube Daily    [Held by provider] amLODIPine  10 mg Per NG tube Daily    [Held by provider] carvedilol  12.5 mg Per NG tube BID WC    QUEtiapine  25 mg Per NG tube BID    sertraline  75 mg Per NG tube Daily    [Held by provider] furosemide  40 mg Per NG tube Daily    clopidogrel  75 mg Per NG tube Daily    levETIRAcetam  500 mg IntraVENous Q12H    cefTRIAXone (ROCEPHIN) IV  2,000 mg IntraVENous Q24H    sodium chloride flush  5-40 mL IntraVENous 2 times per day     Continuous Infusions:   sodium chloride 5 mL/hr at 09/27/24 0957     CBC:   Recent Labs     09/26/24  0442 09/27/24  0531 09/28/24  0251   WBC 15.7* 11.2 8.4   HGB 10.8* 10.3* 10.7*    See Reflexed IPF Result 144     BMP:    Recent Labs     09/27/24  0531 09/27/24  1108 09/28/24  0251    145 143   K 4.2 4.1 3.9   * 118* 117*   CO2 17* 18* 
  [facility]  Progress Note                   Date:   9/29/2024  Patient name: Leatha Mason  Date of admission:  9/24/2024  5:23 AM  MRN:   [id number]  YOB: 1950  PCP: Lenore Bobby APRN - CNP    Reason for Admission: Elevated troponin [R79.89]  Altered mental status, unspecified altered mental status type [R41.82]  Hyperosmolar hyperglycemic state (HHS) (HCC) [E11.00]    Subjective:       Clinical Changes /Abnormalities:Patient seen and examined.,  Garbled speech has NG tube Tele/vitals/labs reviewed . In SR , heart rate much improved   Review of Systems    Medications:   Scheduled Meds:   insulin glargine  15 Units SubCUTAneous BID    metoprolol tartrate  12.5 mg Oral BID    miconazole   Topical BID    insulin lispro  0-16 Units SubCUTAneous Q6H    magnesium sulfate  1,000 mg IntraVENous Once    amiodarone  200 mg Oral BID    Followed by    [START ON 10/4/2024] amiodarone  200 mg Oral Daily    apixaban  5 mg Oral BID    [Held by provider] alogliptin  12.5 mg Oral Daily    losartan  50 mg Oral BID    atorvastatin  80 mg Per NG tube Daily    aspirin  81 mg Per NG tube Daily    [Held by provider] QUEtiapine  25 mg Per NG tube BID    sertraline  75 mg Per NG tube Daily    [Held by provider] furosemide  40 mg Per NG tube Daily    clopidogrel  75 mg Per NG tube Daily    levETIRAcetam  500 mg IntraVENous Q12H    cefTRIAXone (ROCEPHIN) IV  2,000 mg IntraVENous Q24H    sodium chloride flush  5-40 mL IntraVENous 2 times per day     Continuous Infusions:   sodium chloride 5 mL/hr at 09/27/24 0957     CBC:   Recent Labs     09/27/24  0531 09/28/24  0251 09/29/24  0231   WBC 11.2 8.4 7.9   HGB 10.3* 10.7* 9.5*   PLT See Reflexed IPF Result 144 143     BMP:    Recent Labs     09/27/24  1108 09/28/24  0251 09/29/24  0231    143 147*   K 4.1 3.9 3.8   * 117* 121*   CO2 18* 18* 21   BUN 11 11 11   CREATININE 0.5 0.6 0.6   GLUCOSE 182* 265* 258*     Hepatic:No results for 
  Pharmacy Note - Extended Infusion Beta-Lactam Adjustment    Piperacillin/Tazobactam  4500mg q6  for treatment of Urinary tract infection. Per [facility] Extended Infusion Beta-Lactam Policy, piperacillin/tazobactam will be changed to 4500mg loading dose followed by 3375mg Q8h extended infusion        Please call with any questions.    Thank you,    Tong Whalen, [facility]    
  Physician Progress Note      PATIENT:               SANDER SANDS  [facility] #:                  [id number]  :                       1950  ADMIT DATE:       2024 5:23 AM  DISCH DATE:  RESPONDING  PROVIDER #:        JOSHUA PRATT          QUERY TEXT:    Pt admitted with E Coli Sepsis and UTI.  Pt noted to have elevated B/P's    overnight HR , ABP's 187/86, 171/79, 182/85, Room Air RR 18-34. If   possible, please document in progress notes and discharge summary if you are   evaluating and/or treating any of the following:    The medical record reflects the following:  Risk Factors: Age 74. HTN E Coli Sepsis, Afib w/RVR UTI  Clinical Indicators:  Pt noted to have elevated B/P's  overnight HR   , ABP's 187/86, 171/79, 182/85, Room Air RR 18-34. CC  PN  During   her central line placement on femoral vein, she was found to be in SVT, got   treated with adenosine and amiodarone.  She was also having A-fib with RVR   treated with Lopressor. Overnight she was hypertensive controlled with as   needed labetalol.Patient still altered but reoriented easily.  She is   afebrile, blood pressure is slightly high at 163/74. Normal sinus rhythm..  Treatment: IVP Labetolol/Lopressor, EKG, Labs Monitor    Any questions  Please Call  Flaquita Garay RN,BSN,[facility]  -Friday  6am-230pm  [phone]    Hypertensive Urgency: Defined as SBP of >180 OR DBP >120 w/o associated organ   damage. S/s may or may not be present, but can include severe headache, SOB,   epistaxis, severe anxiety. Tx: adjustment of oral BP medication; IV meds not   usually required.  Hypertensive Emergency: SBP of >180 OR DBP >120 w/ associated organ damage   (CVA, MI, acute CHF, GILA, encephalopathy, pulmonary edema, and unstable   angina). Documentation should include specific organ affected.  Requires   immediate treatment, usually with IV meds.  Hypertensive Crisis, unspecified: SBP of > 180 OR DBP > 120 and includes   damage to 
 Critical Care Team - Daily Progress Note      Date and time: 9/25/2024 7:57 AM  Patient's name:  Leatha Mason  Medical Record Number: [id number]  Patient's account/billing number: [id number]  Patient's YOB: 1950  Age: 74 y.o.  Date of Admission: 9/24/2024  5:23 AM  Length of stay during current admission: 1    Primary Care Physician: Lenore Bobby APRN - CNP  ICU Attending Physician:      Code Status: Full Code    Reason for ICU admission: [facility]    SUBJECTIVE:     OVERNIGHT EVENTS:     No acute events reported overnight.  Patient remained on insulin drip with half saline infusion at 250 mL/h    TODAY:  Patient examined at bedside today.  Labs and chart reviewed.  Vitals reviewed.    Patient still encephalopathic but reoriented easily.  She was very tachycardic, blood pressure stable.    Infectious disease: Has been following patient for urinary tract infection, culture is showing E. coli.  She is also growing E. coli from blood culture.  Currently she is on meropenem.    F.A.S.T. M. H.U.G.S. B.I.D.    Feeding Diet: Diet NPO  Fluids: 0.45 SALINE at 250 ml/hr  Family: Trying to contact but no one is picking up  Analgesic: none  Sedation: none   Thrombo-prophylaxis: [] Enoxaparin, [x] Unfract. Heparin Subcutaneously, [] EPC Cuffs  Mobility: none  Heads up: 45  Ulcer prophylaxis: [x] PPI Agent, [] P8Pkdcd, [] Sucralfate, [] Other:  Glycemic control: Insulin drip   Spontaneous breathing trial: None   Bowel regimen/urine output: 0.75 ml/24 hours  Indwelling catheter/lines: Peripheral fore arm left and right. PEG tube, arterial line  De-escalation: Try to wean down [facility] protocol     AWAKE & FOLLOWING COMMANDS:  [] No   [x] Yes    CURRENT VENTILATION STATUS:     [] Ventilator  [] BIPAP  [] Nasal Cannula [x] Room Air        SECRETIONS Amount:  [x] Small [] Moderate  [] Large  [] None  Color:     [] White [] Colored  [] Bloody    SEDATION:  RAAS Score:  [] Propofol gtt  [] Versed gtt  [] Ativan 
 [facility] Contin[facility] Nurse  Consult Note       NAME:  Leatha Mason  MEDICAL RECORD NUMBER:  [id number]  AGE: 74 y.o.   GENDER: female  : 1950  TODAY'S DATE:  2024    Subjective:     Reason for WOCN Evaluation and Assessment: \"sacral wound\"      Leatha Mason is a 74 y.o. female referred by:   [x] Physician  [] Nursing  [] Other:     Wound Identification:  Wound Type: pressure  Contributing Factors: chronic pressure, decreased mobility, shear force, obesity, decreased tissue oxygenation, incontinence of stool, and incontinence of urine        Recurrent sacral pressure injury. Patient's daughter reports use of honey and dry dressings at home.     Objective:      /61   Pulse (!) 117   Temp 99.5 °F (37.5 °C) (Axillary)   Resp 25   Ht 1.575 m (5' 2\")   Wt 90.7 kg (199 lb 15.3 oz)   LMP  (LMP Unknown)   SpO2 98%   BMI 36.57 kg/m²   Scott Risk Score: Scott Scale Score: 12    LABS    CBC:   Lab Results   Component Value Date/Time    WBC 22.2 2024 04:42 AM    RBC 4.57 2024 04:42 AM    RBC 4.21 2012 01:03 PM    HGB 12.4 2024 04:42 AM    HCT 42.1 2024 04:42 AM     CMP:  Albumin:  No results found for: \"LABALBU\"  PT/INR:    Lab Results   Component Value Date/Time    PROTIME 14.7 2024 06:45 AM    INR 1.2 2024 06:45 AM     HgBA1c:    Lab Results   Component Value Date/Time    LABA1C 11.9 2024 08:35 AM     PTT: No components found for: \"LABPTT\"      Assessment:       Measurements:     24 0950   Wound 23 Sacrum   Date First Assessed/Time First Assessed: 23 1200   Present on Original Admission: Yes  Primary Wound Type: Pressure Injury  Location: Sacrum   Wound Image    Wound Etiology Pressure Stage 3   Dressing Status New dressing applied   Wound Cleansed Soap and water;Cleansed with saline   Dressing/Treatment Hydrofiber Ag;Foam   Dressing Change Due 24   Wound Length (cm) 4.4 cm   Wound Width (cm) 1.8 cm   Wound Depth 
Comprehensive Nutrition Assessment    Type and Reason for Visit:  Initial, Wound    Nutrition Recommendations/Plan:   Continue NPO.    Suggest having Speech Therapy evaluate pt before start of oral diet due to previously being on a Pureed diet with thickened liquids.  Monitor plan of care.     Malnutrition Assessment:  Malnutrition Status:  Insufficient data (09/26/24 1526)    Context:  Acute Illness     Findings of the 6 clinical characteristics of malnutrition:  Energy Intake:  Mild decrease in energy intake x past few days.  Weight Loss:  No significant weight loss     Body Fat Loss:  No significant body fat loss   Muscle Mass Loss:  No significant muscle mass loss  Fluid Accumulation:  Mild Extremities   Strength:  Not Performed    Nutrition Assessment:    Pt admitted with AMS.  Pt with stage 3 pressure ulcer to sacrum present.  PMH: DM, HTN, HLD, CAD, COPD.  Pt remains NPO currently.  Spoke with family member who reports at home pt has been on a Pureed diet with thickened liquids.  Reports that pt usually has a good appetite and eats well.  Per chart review, reports of pt eating/drinking poorly x past 2-3 days and has been drinking Ensure supplements.  Suggest SLP evaluation before start of oral diet.  Labs reviewed: Na 146 mmol/L, Phos 2.1 mg/dL, Glucose 132-374 mg/dL.  Meds include: Insulin Drip, 15 mmol NaPhos replacement, 20 mEq KCl replacement.    Nutrition Related Findings:    Labs/Meds reviewed.   Wound Type: Pressure Injury, Stage III (to sacrum)       Current Nutrition Intake & Therapies:    Average Meal Intake: NPO  Average Supplements Intake: NPO  Diet NPO  Additional Calorie Sources:  None    Anthropometric Measures:  Height: 157.5 cm (5' 2.01\")  Ideal Body Weight (IBW): 110 lbs (50 kg)    Admission Body Weight: 89.8 kg (197 lb 15.6 oz)  Current Body Weight: 93.4 kg (205 lb 14.6 oz), 187.2 % IBW. Weight Source: Bed Scale  Current BMI (kg/m2): 37.7  Usual Body Weight: 94.2 kg (207 lb 11 oz) 
Comprehensive Nutrition Assessment    Type and Reason for Visit:  Reassess    Nutrition Recommendations/Plan:   Continue current diet, Pureed with Moderately Thick Liquids  Will send Frozen ONS BID  Monitor/encourage PO intake     Malnutrition Assessment:  Malnutrition Status:  Insufficient data (09/26/24 1526)    Context:  Acute Illness     Findings of the 6 clinical characteristics of malnutrition:  Energy Intake:  Mild decrease in energy intake (Comment) (x past few days)  Weight Loss:  No significant weight loss     Body Fat Loss:  No significant body fat loss     Muscle Mass Loss:  No significant muscle mass loss    Fluid Accumulation:  Mild Extremities   Strength:  Not Performed    Nutrition Assessment:    NGT removed and TF stopped 10/4, orders updated to reflect. Per RN, pt eating well, typically > 75% of meals. Pt was seen by diabetes educator on 10/4 this admission. LBM 10/4. +1 generalized/extremity edema noted. Per RN, potential pt d/c this afternoon. Wt fluctuation noted, will monitor.    Nutrition Related Findings:    Labs/meds reviewed Wound Type: Stage III, Pressure Injury (to sacrum)       Current Nutrition Intake & Therapies:    Average Meal Intake: 51-75%, %  Average Supplements Intake: None Ordered  ADULT DIET; Dysphagia - Pureed; Moderately Thick (Honey)    Anthropometric Measures:  Height: 157.5 cm (5' 2.01\")  Ideal Body Weight (IBW): 110 lbs (50 kg)    Admission Body Weight: 89.8 kg (197 lb 15.6 oz)  Current Body Weight: 101.3 kg (223 lb 5.2 oz) (10/7/24), 203 % IBW. Weight Source: Bed Scale  Current BMI (kg/m2): 40.8  Usual Body Weight: 94.2 kg (207 lb 11 oz) (12/19/23 bed scale per chart review)  % Weight Change (Calculated): -0.9  Weight Adjustment For: No Adjustment                 BMI Categories: Obese Class 3 (BMI 40.0 or greater)    Estimated Daily Nutrient Needs:  Energy Requirements Based On: Formula  Weight Used for Energy Requirements: Current  Energy (kcal/day): 1800 to 
Facility/Department: [address] STEPDOWN   CLINICAL BEDSIDE SWALLOW EVALUATION    NAME: Leatha Mason  : 1950  MRN: [id number]    ADMISSION DATE: 2024  ADMITTING DIAGNOSIS: has HTN (hypertension); Asthma; Knee osteoarthritis; Edema; Spinal stenosis in cervical region; Chest pain; YUDITH (obstructive sleep apnea); Morbid obesity; Knee pain, bilateral; S/P TKR (total knee replacement); Urge incontinence of urine; Lumbar spondylosis; Cervical spondylosis; Chronic use of opiate drugs therapeutic purposes; Chronic knee pain; Acute coronary syndrome (HCC); Congestive heart failure ([facility]); Lymphadenopathy; Chronic pain; Coronary artery disease involving native coronary artery without angina pectoris; Chronic prescription opiate use; Type 2 diabetes mellitus with complication, without long-term current use of insulin ([facility]); S/P coronary artery stent placement - RCA 10/12/16 - Dr. allan; Anxiety; Major depressive disorder; Postlaminectomy syndrome, lumbar; Primary osteoarthritis of right knee; Long term (current) use of antithrombotics/antiplatelets; Cerebrovascular accident (CVA) due to embolic occlusion of right middle cerebral artery ([facility]); Acute cerebrovascular accident (CVA) ([facility]); Right middle cerebral artery stroke ([facility]); Ischemic cerebral stroke due to intracranial large artery atherosclerosis ([facility]); Hypernatremia; Oropharyngeal dysphagia; Seizure ([facility]); History of CVA (cerebrovascular accident); 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 ([facility]); Sacral decubitus ulcer, stage III ([facility]); Hypertensive heart disease with heart failure ([facility]); Type 2 diabetes mellitus with hyperglycemia ([facility]); Hemiplegia and hemiparesis following cerebral infarction affecting left non-dominant side ([facility]); Skin tear of right lower leg without complication; Metabolic encephalopathy; NSTEMI (non-ST elevated myocardial infarction) ([facility]); Acute renal failure ([facility]); Chronic obstructive pulmonary 
Inpatient Diabetes Education    Leatha Mason was seen for evaluation and education on       Lab Results   Component Value Date    LABA1C 12.5 (H) 09/26/2024    LABA1C 11.9 (H) 09/24/2024    LABA1C 6.3 (H) 12/17/2023     Leatha was in bed sleeping when I entered. Daughter, An, at bedside. An states that I should talk directly with her for education.     An reports that her mom has had diabetes for some time and reports a strong family history of diabetes. (An, reports that she herself takes both long and short acting insulin and weekly GLP1.) An states that she uses insulin pens herself but is willing to complete education with me anyway. She states that it is good to review things.    Following Survival Skills education topics were covered as appropriate to patient plan of for care:  _X__ Type 2 Diabetes diagnosis as chronic and progressive - A1c level of her mom was reviewed  _X__ Blood Glucose Self-Monitoring (BGSM) - An states that she feels bad for her mom because she thinks that her fingers are \"getting sore\" from finger pokes. An is familiar with CGM because she herself uses a [employer]Brck 2 with reader - she showed me her reader.  I let her know that a PCP is typically the best to ask for a prescription for CGM.   _X__ Blood sugar targets Pre meal 80 - 130 and 2 hours post meal < 180  _X__ Hyperglycemia  (BG over 250) signs and symptoms and treatment   _X__ Medications -  Insulin Lantus - Basal (long acting)/Humalog -  Bolus (pre meal) regime - insulin action times   __X_ Importance of dosing pre meal rapid insulin from BG result at time of start of meal & administering  within 15 minutes of eating meals   __X_ Importance of taking long acting insulin at same time each day and not to skip doses   ___ Demonstration of self-administering insulin via vial and syringe   __X_ Showed video of self-administering insulin via Pen and pen needle tips   _X__ Reinforce safe needle / sharps 
Nutrition Assessment     Type and Reason for Visit: Reassess    Nutrition Recommendations/Plan:   Continue current TF regimen: Glucerna 1.2 @ 60 ml/hr.  ml q4h per MD. Will adjust TF rate as formula modified to Glucerna 1.5 when available.   Consider bowel meds 2/2 no BM x 3 days  Will monitor swallow eval, labs, GI status, EN tolerance and POC     Malnutrition Assessment:  Malnutrition Status: Insufficient data    Nutrition Assessment:  Pt continues w/ TF via NGT. SLP consulted today. If unable to advance PO diet, recommend continue TF at goal rate: Glucerna 1.2 @ 60 ml/hr. Will adjust TF rate once formula changed to Glucerna 1.5.  Labs: Na+ 148. FWF per MD. +19.8L x 6 days. BGs 192-258 mg/dL x 24 hrs. Meds: lasix, insulin.    Estimated Daily Nutrient Needs:  Energy (kcal):  0924-7266 kcals/day Weight Used for Energy Requirements: Current     Protein (g):   gm pro/day Weight Used for Protein Requirements: Ideal        Fluid (ml/day):  per MD Method Used for Fluid Requirements: Other (Comment)    Nutrition Related Findings:   LBM on 9/27. +1 RUE; +2 LUE, BLE edema. Wound Type: Stage III, Pressure Injury (to sacrum)    Current Nutrition Therapies:    Diet NPO  ADULT TUBE FEEDING; Nasogastric; Diabetic; Continuous; 10; Yes; 10; Q 4 hours; 60; 200; Q 4 hours    Anthropometric Measures:  Height: 157.5 cm (5' 2.01\")  Current Body Wt: 93.4 kg (205 lb 14.6 oz)   BMI: 37.7    Nutrition Diagnosis:   Inadequate oral intake related to  (current mentation/condition) as evidenced by NPO or clear liquid status due to medical condition    Nutrition Interventions:   Food and/or Nutrient Delivery: Continue Current Tube Feeding  Nutrition Education/Counseling: No recommendation at this time  Coordination of Nutrition Care: Continue to monitor while inpatient  Plan of Care discussed with: Family; RN    Goals:  Previous Goal Met: Goal(s) Achieved  Goals: Meet at least 75% of estimated needs       Nutrition Monitoring and 
Patient blood pressure was 87/45. Attending notified. 1L bolus of LR ordered and midodrine three times a day. Patient blood pressure increased to 105/51. It continued to increase to 134/57  
Patient did not receive tray for breakfast. Dietary notified, stated they would bring the tray up    Tray received patient fed by writer. Patient ate around 75% of tray and drank 1 orange juice.    
Patient had 2 bowel movements today. Loose. Washed up with soap and water twice. Linens changed. External catheter removed. Patient had rashes in abdominal folds. Attending notified and asked for nystatin powder. Nystatin ordered.   
Patient received wrong dinner tray. Order should have been Puree patient instead received easy to chew. Dietary notified no new tray has been brought    1805: called dietary manager informed he will start her tray and have it brought to floor when ready.    1856: patients tray still has not arrived. HUC went down to kitchen to get the tray at 1845 and was told that it is on its way up to the floor. Night nurse informed tray is late and patient will need assistance being fed when it arrives.  
Patients daughter arrived in room. Daughter was agitated and displeased with status of her mother. Last bath was evening of 9/30 per report daughter was advised patient receive perineal care twice and had been repositioned. Writer asked the daughter if there was anything that could be done to make her feel better, she stated she would assist with a bed bath if we could give her another bath. Writer assisted patient, patients daughter, and grandson with a full bedbath, linens changed, patient shaved, lotioned, and mouth swabbed, patient resting comfortably.   
Patients daughter arrived on unit and was concerned about patients wedges and hygiene. Daughter was informed that mother actively refused wedge placement however writer did try and reposition patient to offload sacral wound. Daughter was also informed that patient received bed bath on night shift and incontinence care this morning.  
Received patient from MICU, vitals obtained and positioned patient on her left side. Family at bedside. Patient relates pain all over, PRN pain medications given.  
NPS for completion.    
Talked with Dr. Peters and PICC team regarding a midline placement.  Will hold off placing midline until closer to discharge and will keep CVC in place at the moment due to poor peripheral access.   
Writer cleaned patient mouth at 0900. Writer cleaned patient mouth again at 1700. After patient was speaking clearer. Was able to verballize name, date of birth, and was able to verbalize the date 2022. Patient verbalized pain in feet 10/10.   
Writer notified another RN patient had bowel movement and needed cleaned up. Writer currently titrating insulin drip for another patient. writer and another RN completed incontinence care and pedro care as well as changed all linens. Afterward writer attempted to feed patient who is total care, patient repeats \"get out of here\" and shakes fist when writer attempted to feed. Will reassess feeding later.  
Writer was notified by speech therapy that the recommended diet was pureed however it was entered as easy to chew. Diet order fixed to show pureed  
883  Urinary tract infection and sepsis were suspected  The ID service was asked to evaluate the patient  Cultures since then have shown the presence of gram-negative septicemia on 9/24/2024 with 2 positive blood cultures yielding E. Coli  Patient improving with antimicrobial treatment  Discussed with nursing Staff, Discharge planner  Dr Vale's service  Infection Control and Prevention measures reviewed  Universal precaution  Contact isolation  All prior entries were reviewed  Prior IM notes reviewed  Administer medications as ordered  On ceftriaxone  Prognosis:   Guarded  Discharge planning reviewed  Follow up as outpatient.     Jason Meier MD.  9/29/2024         [facility] - Daily Progress Note  Today's Date and Time: 9/29/2024, 9:55 AM    Impression :   Current:  Urinary tract infection E coli - complicated pyelonephritis   E coli septicemia ESBL neg  Acute encephalopathy  from sepsis  - improved  Hyperosmolar state w DM 2  Bandemia  Elevated CRP  SIRS      Other:  Hypertension  Right MCA stroke with hemorrhagic transformation in 2020  Heart failure with reduced ejection fraction 40%  CAD, s/p 2 stents on Plavix  COPD  Obesity  Seizures  History of IVC filter placement in 2007  QTc interval prolonged and hence defer quinolones  On amiodarone    Recommendations:   Please follow suggestions as per Dr Meier's recommendations.     Interval History:     9/29/2024    Afebrile  VS stable on room air    The patient remains unchanged  Disorientation continues    IV Rocephin continues for E. Coli urosepsis  Leukocytosis resolved     Sacral wound managed by wound care     NG in place for tube feeding    No other acute issues noted    Physical Examination :   Patient Vitals for the past 8 hrs:   BP Temp Temp src Pulse Resp SpO2   09/29/24 0740 (!) 165/81 -- -- 92 -- --   09/29/24 0730 (!) 166/84 99.3 °F (37.4 °C) -- 93 24 100 %   09/29/24 0310 (!) 143/78 98.8 °F (37.1 °C) 
883  Urinary tract infection and sepsis were suspected  The ID service was asked to evaluate the patient  Cultures since then have shown the presence of gram-negative septicemia on 9/24/2024 with 2 positive blood cultures yielding E. Coli  Patient improving with antimicrobial treatment  Discussed with nursing Staff, Discharge planner  Dr Vale's service  Infection Control and Prevention measures reviewed  Universal precaution  Contact isolation  All prior entries were reviewed  Prior IM notes reviewed  Administer medications as ordered  On ceftriaxone  Prognosis:   Guarded  Discharge planning reviewed  Follow up as outpatient.    Jason Meier MD.  9/28/2024              [facility] - Daily Progress Note  Today's Date and Time: 9/28/2024, 11:08 AM    Impression :   Current:  Urinary tract infection E coli - complicated pyelonephritis   E coli septicemia ESBL neg  Acute encephalopathy  from sepsis  - improved  Hyperosmolar state w DM 2  Bandemia  Elevated CRP  SIRS     Other:  Hypertension  Right MCA stroke with hemorrhagic transformation in 2020  Heart failure with reduced ejection fraction 40%  CAD, s/p 2 stents on Plavix  COPD  Obesity  Seizures  History of IVC filter placement in 2007  QTc interval prolonged and hence defer quinolones  On amiodaron    Recommendations:   Please follow suggestions as per Dr Meier's recommendations.     Interval History:     9/28/2024    Afebrile  VS stable on room air    The patient continues to experience some disorientation.  She has baseline left sided weakness from a previous CVA.    IV Rocephin continues for E. Coli urosepsis  Leukocytosis resolved    Sacral wound managed by wound care    NG in place for tube feeding    No other acute issues noted    Physical Examination :   Patient Vitals for the past 8 hrs:   BP Temp Temp src Pulse Resp SpO2 Weight   09/28/24 1004 (!) 115/52 97.9 °F (36.6 °C) -- 96 23 100 % --   09/28/24 0335 -- -- 
  Contact the Wound Ostomy RN on-call during working hours Monday-Friday [phone] via [employer] by searching \"wound\" under \"groups\" and selecting the on-call clinician. Sending messages via individual names will not reach a clinician.        Electronically signed by Jhaona Gallardo RN, CWON on 9/30/2024 at 1:30 PM     
-- -- 78 27 97 % --   09/27/24 0600 -- -- -- 74 19 97 % 96.2 kg (212 lb)   09/27/24 0530 -- -- -- 72 16 98 % --   09/27/24 0500 -- -- -- 76 (!) 32 97 % --   09/27/24 0430 -- -- -- 75 23 97 % --   09/27/24 0400 (!) 143/64 98.8 °F (37.1 °C) Axillary 74 (!) 31 97 % --   09/27/24 0330 -- -- -- 74 29 97 % --   09/27/24 0300 -- -- -- 74 19 97 % --   09/27/24 0230 -- -- -- 71 28 99 % --         Intake/Output Summary (Last 24 hours) at 9/27/2024 0812  Last data filed at 9/27/2024 0651  Gross per 24 hour   Intake 4768.88 ml   Output 850 ml   Net 3918.88 ml     Wt Readings from Last 2 Encounters:   09/27/24 96.2 kg (212 lb)   12/21/23 93.9 kg (207 lb)     Body mass index is 38.77 kg/m².        PHYSICAL EXAMINATION:  Physical Exam  Vitals and nursing note reviewed.   Constitutional:       General: She is awake.      Comments: Room air.   HENT:      Nose: Nose normal.      Mouth/Throat:      Mouth: Mucous membranes are dry.   Eyes:      Conjunctiva/sclera: Conjunctivae normal.   Cardiovascular:      Rate and Rhythm: Normal rate.   Pulmonary:      Effort: Pulmonary effort is normal.      Breath sounds: Normal breath sounds.   Abdominal:      General: Abdomen is flat. There is no distension.      Palpations: Abdomen is soft.      Tenderness: There is no abdominal tenderness.   Musculoskeletal:         General: No swelling or tenderness.      Right lower leg: No edema.      Left lower leg: No edema.   Skin:     General: Skin is warm and dry.   Neurological:      Mental Status: She is alert.        Any additional physical findings:    MEDICATIONS:    Scheduled Meds:   [Held by provider] glipiZIDE  5 mg Oral QAM AC    [Held by provider] alogliptin  12.5 mg Oral Daily    [Held by provider] losartan  50 mg Oral BID    atorvastatin  80 mg Per NG tube Daily    aspirin  81 mg Per NG tube Daily    amLODIPine  10 mg Per NG tube Daily    carvedilol  12.5 mg Per NG tube BID WC    [Held by provider] QUEtiapine  25 mg Per NG tube BID    
at baseline, ST to follow along during this hospital stay however no further therapy recommended at discharge.       Plan:  [x] Continue ST services    [] Discharge from ST:        Discharge recommendations: []  Further therapy recommended at discharge.The patient should be able to tolerate at least 3 hours of therapy per day over 5 days or 15 hours over 7 days. [] Further therapy recommended at discharge.   [x] No therapy recommended at discharge.       Treatment completed by: Trupti Chavez M.A., CCC-SLP       
glargine  35 Units SubCUTAneous Daily    insulin lispro  0-16 Units SubCUTAneous Q6H    magnesium sulfate  1,000 mg IntraVENous Once    amiodarone  200 mg Oral BID    Followed by    [START ON 10/4/2024] amiodarone  200 mg Oral Daily    apixaban  5 mg Oral BID    [Held by provider] alogliptin  12.5 mg Oral Daily    losartan  50 mg Oral BID    atorvastatin  80 mg Per NG tube Daily    aspirin  81 mg Per NG tube Daily    amLODIPine  10 mg Per NG tube Daily    carvedilol  12.5 mg Per NG tube BID WC    QUEtiapine  25 mg Per NG tube BID    sertraline  75 mg Per NG tube Daily    [Held by provider] furosemide  40 mg Per NG tube Daily    clopidogrel  75 mg Per NG tube Daily    levETIRAcetam  500 mg IntraVENous Q12H    cefTRIAXone (ROCEPHIN) IV  2,000 mg IntraVENous Q24H    sodium chloride flush  5-40 mL IntraVENous 2 times per day     Continuous Infusions:   sodium chloride 5 mL/hr at 09/27/24 0957     PRN Meds:metoprolol, labetalol, glucose, dextrose bolus **OR** dextrose bolus, glucagon (rDNA), potassium chloride, dextrose bolus **OR** dextrose bolus, potassium chloride, magnesium sulfate, sodium phosphate 15 mmol in sodium chloride 0.9 % 250 mL IVPB, sodium chloride flush, sodium chloride, ondansetron **OR** ondansetron, polyethylene glycol, fentanNYL, acetaminophen    Objective:    CBC:   Recent Labs     09/25/24  0442 09/25/24  1853 09/26/24  0030 09/26/24  0442 09/27/24  0531   WBC 22.2*  --   --  15.7* 11.2   HGB 12.4   < > 10.9* 10.8* 10.3*     --   --  182 See Reflexed IPF Result    < > = values in this interval not displayed.     BMP:    Recent Labs     09/26/24  2356 09/27/24  0531 09/27/24  1108    145 145   K 4.4 4.2 4.1   * 118* 118*   CO2 19* 17* 18*   BUN 13 12 11   CREATININE 0.6 0.6 0.5   GLUCOSE 169* 212* 182*     Calcium:  Recent Labs     09/27/24  1108   CALCIUM 7.6*     Ionized Calcium:Invalid input(s): \"IONCA\"  Magnesium:  Recent Labs     09/27/24  1108   MG 2.2 
Min PRN  labetalol, 20 mg, Q4H PRN  glucose, 4 tablet, PRN  dextrose bolus, 125 mL, PRN   Or  dextrose bolus, 250 mL, PRN  glucagon (rDNA), 1 mg, PRN  potassium chloride, 20 mEq, PRN  dextrose bolus, 125 mL, PRN   Or  dextrose bolus, 250 mL, PRN  potassium chloride, 10 mEq, PRN  magnesium sulfate, 2,000 mg, PRN  sodium phosphate 15 mmol in sodium chloride 0.9 % 250 mL IVPB, 15 mmol, PRN  sodium chloride flush, 5-40 mL, PRN  sodium chloride, , PRN  ondansetron, 4 mg, Q8H PRN   Or  ondansetron, 4 mg, Q6H PRN  polyethylene glycol, 17 g, Daily PRN  fentanNYL, 25 mcg, Q2H PRN  acetaminophen, 650 mg, Q4H PRN        Diagnostic Labs:  CBC:   Recent Labs     09/27/24  0531 09/28/24  0251 09/29/24  0231   WBC 11.2 8.4 7.9   RBC 3.86* 4.01 3.49*   HGB 10.3* 10.7* 9.5*   HCT 34.0* 35.9* 31.4*   MCV 88.1 89.5 90.0   RDW 15.2* 15.6* 15.6*   PLT See Reflexed IPF Result 144 143     BMP:   Recent Labs     09/27/24  1108 09/28/24  0251 09/29/24  0231    143 147*   K 4.1 3.9 3.8   * 117* 121*   CO2 18* 18* 21   BUN 11 11 11   CREATININE 0.5 0.6 0.6     BNP: No results for input(s): \"BNP\" in the last 72 hours.  PT/INR:   Recent Labs     09/26/24  1830   PROTIME 14.3   INR 1.1     APTT:   Recent Labs     09/26/24  1830   APTT 74.5*     CARDIAC ENZYMES: No results for input(s): \"CKMB\", \"CKMBINDEX\", \"TROPONINI\" in the last 72 hours.    Invalid input(s): \"CKTOTAL;3\"  FASTING LIPID PANEL:  Lab Results   Component Value Date    CHOL 188 04/05/2019    HDL 48 12/16/2020    TRIG 177 (H) 04/05/2019     LIVER PROFILE: No results for input(s): \"AST\", \"ALT\", \"BILIDIR\", \"BILITOT\", \"ALKPHOS\" in the last 72 hours.    Invalid input(s): \"ALB\"   MICROBIOLOGY:   Lab Results   Component Value Date/Time    CULTURE ESCHERICHIA COLI >100,000 CFU/ML (A) 09/24/2024 10:48 AM       Imaging:    CT HEAD WO CONTRAST    Result Date: 9/27/2024  Sequela of a right middle cerebral artery territory infarct with encephalomalacia in the right middle cerebral 
breath support;Imprecise      Cognition:      Orientation  Overall Orientation Status: Within Functional Limits  Memory  Memory: Exceptions to WFL (Immediate: 3/3; 1/5; 3/3)  Short-term Memory: Moderate (1/3; BRITNI second delayed recall with distractions.)  Problem Solving  Problem Solving: Exceptions to WFL  Verbal Reasoning Skills: Moderate (Word Association: 0/3; Antonyms: 1/2; BRITNI inductive reasoning, similarities/differences, and deductive reasoning.)  Sequencing: [facility]  Abstract Reasoning  Abstract Reasoning: Unable to assess (BRITNI convergent and divergent thinking.)  Safety/Judgment  Safety/Judgment: Unable to assess (BRITIN task insight and thought flexibility.)        Prognosis:  Speech Therapy Prognosis  Prognosis: Guarded  Individuals consulted  Consulted and agree with results and recommendations: Patient    Education:      yes       Therapy Time:   Individual Concurrent Group Co-treatment   Time In  9:30         Time Out  9:45         Minutes  15                 Completed by: Demetra Blankenship  Clinician    Cosigned By: Makeda Otero M.A., CCC/SLP   
middle cerebral artery territory and associated wallerian degeneration.  Remote left cerebellar infarct.  Chronic small vessel ischemic changes.  No acute brain parenchymal abnormality.     XR CHEST PORTABLE    Result Date: 9/26/2024  Improvement in aeration of the lung bases.     XR ABDOMEN FOR NG/OG/NE TUBE PLACEMENT    Result Date: 9/26/2024  The tip of the OG/NG tube is in the stomach. The side hole/port is near the expected position of the GE junction.  The tube should be advanced approximately 10 cm.     US RETROPERITONEAL COMPLETE    Result Date: 9/25/2024  1. Normal renal size and echotexture without significant cortical thinning. 2. No hydronephrosis. 3. Small echogenic foci suspicious for nonobstructive nephrolithiasis on the left. 4. Small cyst in the left kidney, felt to be benign, also seen on previous remote CT comparison study.     CT Head W/O Contrast    Result Date: 9/24/2024  1. No acute intracranial abnormality is seen.  No intracranial hemorrhage. 2. Redemonstration of findings of old extensive infarction in the right middle cerebral artery vascular territory. 3. Redemonstration of old focal infarction in the upper left frontoparietal region. 4. Old focal infarction in the left cerebellar hemisphere. 5. Old lacunar infarction in the right side of the upper domenica. 6. No significant interval change on CTA of neck as compared to previous CTA of neck on 12/17/2023. 7. Severe stenosis at the origin of the right vertebral artery. 8. No significant stenosis of the bilateral internal carotid arteries. 9. In the midportion of the M1 segment of the right middle cerebral artery there is occlusion or near occlusion without identifiable lumen. Evidence of patent M2 branch of the right middle cerebral artery and some of the distal branches are patent. The anterior branch of the right middle cerebral artery is not clearly opacified. The distal branches of the right middle cerebral artery in M3 and M4 segments 
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)    Sacral decubitus ulcer, stage III (HCC)    Hypertensive heart disease with heart failure (HCC)    Type 2 diabetes mellitus with hyperglycemia (HCC)    Hemiplegia and hemiparesis following cerebral infarction affecting left non-dominant side (HCC)    Skin tear of right lower leg without complication    Metabolic encephalopathy    NSTEMI (non-ST elevated myocardial infarction) (HCC)    Acute renal failure (HCC)    Chronic obstructive pulmonary disease (HCC)    Diffuse Lewy body disease (HCC)    Schizoaffective disorder (HCC)    Prolonged Q-T interval on ECG    Thyroid nodule    Altered mental status    Weakness    History of CVA with residual deficit    Hyperosmolar hyperglycemic state (HHS) (HCC)    Pyelonephritis    Bandemia    SIRS (systemic inflammatory response syndrome) (HCC)    E. coli septicemia (HCC)    Elevated troponin    SVT (supraventricular tachycardia) (HCC)    Chronic atrial fibrillation (HCC)       DIAGNOSTICS:    EKG:    Date: 09/27/24  Reading: No acute ischemia      LAST ECHO:  Date: 12/21/2023  Findings Summary:    Left Ventricle: Moderately reduced left ventricular systolic function with a visually estimated EF of 40 - 45%. Left ventricle size is normal. Moderately increased wall thickness. Severe hypokinesis of the following segments: apical anterior, apical septal, apical inferior and apical lateral. Severe hypokinesis of the apex. Abnormal diastolic function.    Aortic Valve: Probable trileaflet valve.    Mitral Valve: Mildly thickened leaflet.    Tricuspid Valve: Mild to moderate regurgitation. Normal RVSP. The estimated RVSP is 32 
(systemic inflammatory response syndrome) (HCC)    E. coli septicemia (HCC)    Elevated troponin    SVT (supraventricular tachycardia) (HCC)    Chronic atrial fibrillation (HCC)    PAF (paroxysmal atrial fibrillation) (HCC)    Metabolic encephalopathy secondary to hyper osmolar hyperglycemic state   -Currently blood glucose 218  -Continue insulin glargine 20 units subcutaneous daily and insulin lispro high-dose sliding scale  -patient is known case of Type 2 DM  -HBA1C is 12.5 on 9/26/24       Sepsis secondary to pyelonephritis  -Blood culture positive for E. Coli  -Nasal swab positive for MRSA DNA probe  -Ultrasound of the kidneys on 9/25/2024 showed no hydronephrosis small nonobstructive nephrolithiasis on left kidney  -Infectious disease on board for concern of broad-spectrum antibiotic coverage  -Continue ceftriaxone 2 g IV with tentative stop date on 10/7/2024     Coronary artery disease  -S/p stenting  -Last cath in 2016 showed patent mid LAD and JANICE stent, apical akinesia with occluded 100% distal LAD, patent LCx with distal 40 to 50% stenosis and RCA distal 90% and mid 80% and BMS both lesions  -At home patient takes Coreg 12.5 Mg, Lasix 40 Mg 1 OD, Lipitor 80 Mg 1 OD, aspirin 81 Mg  -Continue Coreg 12.5 Mg, losartan 50 Mg, metoprolol 5 Mg     History of paroxysmal A-fib  - [id number] Score 8  -Currently patient is in sinus rhythm  -Continue Eliquis 5 Mg twice daily and Lopressor 12.5 Mg twice daily    Essential hypertension  -At home patient is on amlodipine 10 Mg and Coreg 12.5 Mg  -Continue Lopressor 12.5 Mg twice daily and losartan 50 Mg  -Monitor blood pressure     SVT (resolved)  -Secondary to central line insertion/sepsis  -Cardiology was taken on board  -IV amiodarone infusion was started in ICU  -Continue amiodarone IV amiodarone for 7 days and then switch it to amiodarone 200 Mg daily  -Echo on 9/26/2024 showed EF 55 to 60% with apical hypokinesis  -Will switch IV amiodarone infusion to per oral 
Meropenem  Hematology:  Recent Labs     09/25/24  1853 09/26/24  0030 09/26/24  0442   HGB 10.9* 10.9* 10.8*    stable  Endocrine:   HHS.  Type 2 diabetes mellitus.  Glucose controlled - most recent BGL is   Recent Labs     09/25/24 2010 09/26/24  0030 09/26/24  0442   GLUCOSE 442* 374* 306*     DVT Prophylaxis  Heparin    Electronically signed by Suhail Peters MD on 9/26/2024 at 8:07 AM    Suhail Peters MD  Internal Medicine Resident, PGY- 3  [place], [place].  8:07 AM     This note is created with the assistance of a speech-recognition program. While intending to generate a document that actually reflects the content of the visit, no guarantees can be provided that every mistake has been identified and corrected by editing.              
[phone]  Perfect [employer] / office [phone]        I have discussed the care of the patient, including pertinent history and exam findings,  with the resident., student or CNP- I have seen and examined the patient and the key elements of all parts of the encounter have been performed by me.  I have decided the assessment, plan and orders as documented by the resident.student or CNP    Alyssia Dee, Infectious Diseases   
Problem:    Hyperosmolar hyperglycemic state (HHS) ([facility])  Active Problems:    Encephalopathy, metabolic    Pyelonephritis    Bandemia    SIRS (systemic inflammatory response syndrome) (HCC)    E. coli septicemia (HCC)    Elevated troponin    SVT (supraventricular tachycardia) (HCC)    Chronic atrial fibrillation (HCC)    PAF (paroxysmal atrial fibrillation) ([facility])    Gram negative septicemia ([facility])  Resolved Problems:    * No resolved hospital problems. *    Metabolic encephalopathy secondary to hyper osmolar hyperglycemic state - improved   -Continue insulin glargine 20 units subcutaneous daily and insulin lispro high-dose sliding scale  -patient is known case of Type 2 DM  -HBA1C is 12.5 on 9/26/24  - osmolality 10/2- 309. Decreasing free water to 150ml q6h     Sepsis secondary to pyelonephritis   -Blood culture positive for E. Coli  -Nasal swab positive for MRSA DNA probe  -Ultrasound of the kidneys on 9/25/2024 showed no hydronephrosis small nonobstructive nephrolithiasis on left kidney  -Infectious disease on board for concern of broad-spectrum antibiotic coverage  -Continue ceftriaxone 2 g IV with tentative stop date on 10/7/2024  - will need midline prior to discharge      Coronary artery disease  Essential hypertension  -S/p stenting  -Last cath in 2016 showed patent mid LAD and JANICE stent, apical akinesia with occluded 100% distal LAD, patent LCx with distal 40 to 50% stenosis and RCA distal 90% and mid 80% and BMS both lesions  -At home patient takes lopressor 50mg BID , Lasix 40 Mg 1 OD, Lipitor 80 Mg 1 OD, aspirin 81 Mg  - on clonidine 0.2 mg BID, lopressor 50mg BID, novasc 10mg qd, hydralazine 25mg TID, and losartan 50mg BID   - continue lipitor 80mg and plavix 75mg  - Overnight 1 dose of clonidine and lopressor withheld.  - Continue Clonidine 0.2, amlodipine 10, cozaar 50 OD, lopressor 25 BID     History of paroxysmal A-fib  - [id number] Score 8  -Currently patient is in sinus rhythm  -Continue Eliquis 
the visit, the document can still have some errors including those of syntax and sound a like substitutions which may escape proof reading.  It such instances, actual meaningcan be extrapolated by contextual diversion.    Naomi Martinez. MS4  Office: [phone]  Perfect serve / office [phone]        I have discussed the care of the patient, including pertinent history and exam findings,  with the resident., student or CNP- I have seen and examined the patient and the key elements of all parts of the encounter have been performed by me.  I have decided the assessment, plan and orders as documented by the resident.student or CNP    Alyssia Dee, Infectious Diseases   
may have occurred.     Roberto Soto MD  10/6/2024 9:38 AM

## 2024-10-07 NOTE — PLAN OF CARE
Problem: Discharge Planning  Goal: Discharge to home or other facility with appropriate resources  10/3/2024 2247 by Shauna Shaw RN  Outcome: Progressing  10/3/2024 1746 by Ayaan Morley RN  Outcome: Progressing     Problem: Skin/Tissue Integrity  Goal: Absence of new skin breakdown  Description: 1.  Monitor for areas of redness and/or skin breakdown  2.  Assess vascular access sites hourly  3.  Every 4-6 hours minimum:  Change oxygen saturation probe site  4.  Every 4-6 hours:  If on nasal continuous positive airway pressure, respiratory therapy assess nares and determine need for appliance change or resting period.  10/3/2024 2247 by Shauna Shaw RN  Outcome: Progressing  10/3/2024 1746 by Ayaan Morley RN  Outcome: Progressing     Problem: Confusion  Goal: Confusion, delirium, dementia, or psychosis is improved or at baseline  Description: INTERVENTIONS:  1. Assess for possible contributors to thought disturbance, including medications, impaired vision or hearing, underlying metabolic abnormalities, dehydration, psychiatric diagnoses, and notify attending LIP  2. [place] high risk fall precautions, as indicated  3. Provide frequent short contacts to provide reality reorientation, refocusing and direction  4. Decrease environmental stimuli, including noise as appropriate  5. Monitor and intervene to maintain adequate nutrition, hydration, elimination, sleep and activity  6. If unable to ensure safety without constant attention obtain sitter and review sitter guidelines with assigned personnel  7. Initiate Psychosocial CNS and Spiritual Care consult, as indicated  10/3/2024 2247 by Shauna Shaw RN  Outcome: Progressing  10/3/2024 1746 by Ayaan Morley RN  Outcome: Progressing     Problem: Safety - Adult  Goal: Free from fall injury  10/3/2024 2247 by Shauna Shaw RN  Outcome: Progressing  10/3/2024 1746 by Ayaan Morley RN  Outcome: Progressing     Problem: Pain  Goal: 
  Problem: Discharge Planning  Goal: Discharge to home or other facility with appropriate resources  10/4/2024 2324 by Shauna Shaw RN  Outcome: Progressing  10/4/2024 1439 by Sundeep Torres  Outcome: Progressing     Problem: Skin/Tissue Integrity  Goal: Absence of new skin breakdown  Description: 1.  Monitor for areas of redness and/or skin breakdown  2.  Assess vascular access sites hourly  3.  Every 4-6 hours minimum:  Change oxygen saturation probe site  4.  Every 4-6 hours:  If on nasal continuous positive airway pressure, respiratory therapy assess nares and determine need for appliance change or resting period.  10/4/2024 2324 by Shauna Shaw RN  Outcome: Progressing  10/4/2024 1439 by Sundeep Torres  Outcome: Progressing     Problem: Confusion  Goal: Confusion, delirium, dementia, or psychosis is improved or at baseline  Description: INTERVENTIONS:  1. Assess for possible contributors to thought disturbance, including medications, impaired vision or hearing, underlying metabolic abnormalities, dehydration, psychiatric diagnoses, and notify attending LIP  2. [place] high risk fall precautions, as indicated  3. Provide frequent short contacts to provide reality reorientation, refocusing and direction  4. Decrease environmental stimuli, including noise as appropriate  5. Monitor and intervene to maintain adequate nutrition, hydration, elimination, sleep and activity  6. If unable to ensure safety without constant attention obtain sitter and review sitter guidelines with assigned personnel  7. Initiate Psychosocial CNS and Spiritual Care consult, as indicated  10/4/2024 2324 by Shauna Shaw RN  Outcome: Progressing  10/4/2024 1439 by Sundeep Torres  Outcome: Progressing     Problem: Safety - Adult  Goal: Free from fall injury  10/4/2024 2324 by Shauna Shaw RN  Outcome: Progressing  10/4/2024 1439 by Sundeep Torres  Outcome: Progressing     Problem: Pain  Goal: Verbalizes/displays 
  Problem: Discharge Planning  Goal: Discharge to home or other facility with appropriate resources  10/5/2024 0916 by Sundeep Torres  Outcome: Progressing  10/4/2024 2324 by Shauna Shaw, RN  Outcome: Progressing     Problem: Skin/Tissue Integrity  Goal: Absence of new skin breakdown  Description: 1.  Monitor for areas of redness and/or skin breakdown  2.  Assess vascular access sites hourly  3.  Every 4-6 hours minimum:  Change oxygen saturation probe site  4.  Every 4-6 hours:  If on nasal continuous positive airway pressure, respiratory therapy assess nares and determine need for appliance change or resting period.  10/5/2024 0916 by Sundeep Torres  Outcome: Progressing  10/4/2024 2324 by Shauna Shaw, RN  Outcome: Progressing     Problem: Confusion  Goal: Confusion, delirium, dementia, or psychosis is improved or at baseline  Description: INTERVENTIONS:  1. Assess for possible contributors to thought disturbance, including medications, impaired vision or hearing, underlying metabolic abnormalities, dehydration, psychiatric diagnoses, and notify attending LIP  2. [place] high risk fall precautions, as indicated  3. Provide frequent short contacts to provide reality reorientation, refocusing and direction  4. Decrease environmental stimuli, including noise as appropriate  5. Monitor and intervene to maintain adequate nutrition, hydration, elimination, sleep and activity  6. If unable to ensure safety without constant attention obtain sitter and review sitter guidelines with assigned personnel  7. Initiate Psychosocial CNS and Spiritual Care consult, as indicated  10/5/2024 0916 by Sundeep Torres  Outcome: Progressing  10/4/2024 2324 by Shauna Shaw, RN  Outcome: Progressing     Problem: Safety - Adult  Goal: Free from fall injury  10/5/2024 0916 by Sundeep Torres  Outcome: Progressing  10/4/2024 2324 by Shauna Shaw, RN  Outcome: Progressing     Problem: Pain  Goal: Verbalizes/displays 
  Problem: Discharge Planning  Goal: Discharge to home or other facility with appropriate resources  10/7/2024 1216 by Sundeep Torres  Outcome: Progressing  10/7/2024 0239 by Lea Campo, RN  Outcome: Progressing  Flowsheets (Taken 10/6/2024 1945)  Discharge to home or other facility with appropriate resources:   Identify barriers to discharge with patient and caregiver   Arrange for needed discharge resources and transportation as appropriate   Identify discharge learning needs (meds, wound care, etc)   Refer to discharge planning if patient needs post-hospital services based on physician order or complex needs related to functional status, cognitive ability or social support system     Problem: Skin/Tissue Integrity  Goal: Absence of new skin breakdown  Description: 1.  Monitor for areas of redness and/or skin breakdown  2.  Assess vascular access sites hourly  3.  Every 4-6 hours minimum:  Change oxygen saturation probe site  4.  Every 4-6 hours:  If on nasal continuous positive airway pressure, respiratory therapy assess nares and determine need for appliance change or resting period.  10/7/2024 1216 by Sundeep Torres  Outcome: Progressing  10/7/2024 0239 by Lea Campo, RN  Outcome: Progressing     Problem: Confusion  Goal: Confusion, delirium, dementia, or psychosis is improved or at baseline  Description: INTERVENTIONS:  1. Assess for possible contributors to thought disturbance, including medications, impaired vision or hearing, underlying metabolic abnormalities, dehydration, psychiatric diagnoses, and notify attending LIP  2. [place] high risk fall precautions, as indicated  3. Provide frequent short contacts to provide reality reorientation, refocusing and direction  4. Decrease environmental stimuli, including noise as appropriate  5. Monitor and intervene to maintain adequate nutrition, hydration, elimination, sleep and activity  6. If unable to ensure safety without constant 
  Problem: Discharge Planning  Goal: Discharge to home or other facility with appropriate resources  9/27/2024 0430 by Gisela Tomas RN  Outcome: Progressing  9/26/2024 1633 by Andree Nash RN  Outcome: Progressing     Problem: Skin/Tissue Integrity  Goal: Absence of new skin breakdown  Description: 1.  Monitor for areas of redness and/or skin breakdown  2.  Assess vascular access sites hourly  3.  Every 4-6 hours minimum:  Change oxygen saturation probe site  4.  Every 4-6 hours:  If on nasal continuous positive airway pressure, respiratory therapy assess nares and determine need for appliance change or resting period.  9/27/2024 0430 by Gisela Tomas RN  Outcome: Progressing  9/26/2024 1633 by Andree Nash RN  Outcome: Progressing     Problem: Confusion  Goal: Confusion, delirium, dementia, or psychosis is improved or at baseline  Description: INTERVENTIONS:  1. Assess for possible contributors to thought disturbance, including medications, impaired vision or hearing, underlying metabolic abnormalities, dehydration, psychiatric diagnoses, and notify attending LIP  2. [place] high risk fall precautions, as indicated  3. Provide frequent short contacts to provide reality reorientation, refocusing and direction  4. Decrease environmental stimuli, including noise as appropriate  5. Monitor and intervene to maintain adequate nutrition, hydration, elimination, sleep and activity  6. If unable to ensure safety without constant attention obtain sitter and review sitter guidelines with assigned personnel  7. Initiate Psychosocial CNS and Spiritual Care consult, as indicated  9/27/2024 0430 by Gisela Tomas RN  Outcome: Progressing  9/26/2024 1633 by Andree Nash RN  Outcome: Progressing     Problem: Safety - Adult  Goal: Free from fall injury  9/27/2024 0430 by Gisela Tomas RN  Outcome: Progressing  9/26/2024 1633 by Andree Nash RN  Outcome: Progressing     Problem: Pain  Goal: 
  Problem: Discharge Planning  Goal: Discharge to home or other facility with appropriate resources  9/27/2024 1110 by Jacky Ceja RN  Outcome: Progressing  9/27/2024 0430 by Gisela Tomas RN  Outcome: Progressing     Problem: Skin/Tissue Integrity  Goal: Absence of new skin breakdown  Description: 1.  Monitor for areas of redness and/or skin breakdown  2.  Assess vascular access sites hourly  3.  Every 4-6 hours minimum:  Change oxygen saturation probe site  4.  Every 4-6 hours:  If on nasal continuous positive airway pressure, respiratory therapy assess nares and determine need for appliance change or resting period.  9/27/2024 1110 by Jacky Ceja RN  Outcome: Progressing  9/27/2024 0430 by Gisela Tomas RN  Outcome: Progressing     Problem: Confusion  Goal: Confusion, delirium, dementia, or psychosis is improved or at baseline  Description: INTERVENTIONS:  1. Assess for possible contributors to thought disturbance, including medications, impaired vision or hearing, underlying metabolic abnormalities, dehydration, psychiatric diagnoses, and notify attending LIP  2. [place] high risk fall precautions, as indicated  3. Provide frequent short contacts to provide reality reorientation, refocusing and direction  4. Decrease environmental stimuli, including noise as appropriate  5. Monitor and intervene to maintain adequate nutrition, hydration, elimination, sleep and activity  6. If unable to ensure safety without constant attention obtain sitter and review sitter guidelines with assigned personnel  7. Initiate Psychosocial CNS and Spiritual Care consult, as indicated  9/27/2024 1110 by Jacky Ceja RN  Outcome: Progressing  9/27/2024 0430 by Gisela Tomas RN  Outcome: Progressing     Problem: Safety - Adult  Goal: Free from fall injury  9/27/2024 1110 by Jacky Ceja RN  Outcome: Progressing  9/27/2024 0430 by Gisela Tomas RN  Outcome: Progressing     Problem: Pain  Goal: Verbalizes/displays adequate 
  Problem: Discharge Planning  Goal: Discharge to home or other facility with appropriate resources  9/28/2024 1229 by Jose Benavides RN  Outcome: Progressing  9/28/2024 0207 by Samantha Laureano RN  Outcome: Progressing     Problem: Skin/Tissue Integrity  Goal: Absence of new skin breakdown  Description: 1.  Monitor for areas of redness and/or skin breakdown  2.  Assess vascular access sites hourly  3.  Every 4-6 hours minimum:  Change oxygen saturation probe site  4.  Every 4-6 hours:  If on nasal continuous positive airway pressure, respiratory therapy assess nares and determine need for appliance change or resting period.  9/28/2024 1229 by Jose Benavides RN  Outcome: Progressing  9/28/2024 0207 by Samantha Laureano RN  Outcome: Progressing     Problem: Confusion  Goal: Confusion, delirium, dementia, or psychosis is improved or at baseline  Description: INTERVENTIONS:  1. Assess for possible contributors to thought disturbance, including medications, impaired vision or hearing, underlying metabolic abnormalities, dehydration, psychiatric diagnoses, and notify attending LIP  2. [place] high risk fall precautions, as indicated  3. Provide frequent short contacts to provide reality reorientation, refocusing and direction  4. Decrease environmental stimuli, including noise as appropriate  5. Monitor and intervene to maintain adequate nutrition, hydration, elimination, sleep and activity  6. If unable to ensure safety without constant attention obtain sitter and review sitter guidelines with assigned personnel  7. Initiate Psychosocial CNS and Spiritual Care consult, as indicated  9/28/2024 1229 by Jose Benavides RN  Outcome: Progressing  9/28/2024 0207 by Samantha Laureano RN  Outcome: Progressing     Problem: Safety - Adult  Goal: Free from fall injury  9/28/2024 1229 by Jose Benavides RN  Outcome: Progressing  9/28/2024 0207 by Samantha Laureano RN  Outcome: Progressing     Problem: Pain  Goal: 
  Problem: Discharge Planning  Goal: Discharge to home or other facility with appropriate resources  9/30/2024 0320 by Samantha Laureano RN  Outcome: Progressing  9/29/2024 1907 by Jose Benavides RN  Outcome: Progressing     Problem: Skin/Tissue Integrity  Goal: Absence of new skin breakdown  Description: 1.  Monitor for areas of redness and/or skin breakdown  2.  Assess vascular access sites hourly  3.  Every 4-6 hours minimum:  Change oxygen saturation probe site  4.  Every 4-6 hours:  If on nasal continuous positive airway pressure, respiratory therapy assess nares and determine need for appliance change or resting period.  9/30/2024 0320 by Samantha Laureano RN  Outcome: Progressing  9/29/2024 1907 by Jose Benavides RN  Outcome: Progressing     Problem: Confusion  Goal: Confusion, delirium, dementia, or psychosis is improved or at baseline  Description: INTERVENTIONS:  1. Assess for possible contributors to thought disturbance, including medications, impaired vision or hearing, underlying metabolic abnormalities, dehydration, psychiatric diagnoses, and notify attending LIP  2. [place] high risk fall precautions, as indicated  3. Provide frequent short contacts to provide reality reorientation, refocusing and direction  4. Decrease environmental stimuli, including noise as appropriate  5. Monitor and intervene to maintain adequate nutrition, hydration, elimination, sleep and activity  6. If unable to ensure safety without constant attention obtain sitter and review sitter guidelines with assigned personnel  7. Initiate Psychosocial CNS and Spiritual Care consult, as indicated  9/30/2024 0320 by Samantha Laureano RN  Outcome: Progressing  9/29/2024 1907 by Jose Benavides RN  Outcome: Progressing     Problem: Safety - Adult  Goal: Free from fall injury  9/30/2024 0320 by Samantha Laureano RN  Outcome: Progressing  9/29/2024 1907 by Jose Benavides RN  Outcome: Progressing     Problem: Pain  Goal: 
  Problem: Discharge Planning  Goal: Discharge to home or other facility with appropriate resources  Outcome: Progressing     Problem: Skin/Tissue Integrity  Goal: Absence of new skin breakdown  Description: 1.  Monitor for areas of redness and/or skin breakdown  2.  Assess vascular access sites hourly  3.  Every 4-6 hours minimum:  Change oxygen saturation probe site  4.  Every 4-6 hours:  If on nasal continuous positive airway pressure, respiratory therapy assess nares and determine need for appliance change or resting period.  Outcome: Progressing     Problem: Confusion  Goal: Confusion, delirium, dementia, or psychosis is improved or at baseline  Description: INTERVENTIONS:  1. Assess for possible contributors to thought disturbance, including medications, impaired vision or hearing, underlying metabolic abnormalities, dehydration, psychiatric diagnoses, and notify attending LIP  2. [place] high risk fall precautions, as indicated  3. Provide frequent short contacts to provide reality reorientation, refocusing and direction  4. Decrease environmental stimuli, including noise as appropriate  5. Monitor and intervene to maintain adequate nutrition, hydration, elimination, sleep and activity  6. If unable to ensure safety without constant attention obtain sitter and review sitter guidelines with assigned personnel  7. Initiate Psychosocial CNS and Spiritual Care consult, as indicated  Outcome: Progressing     Problem: Safety - Adult  Goal: Free from fall injury  Outcome: Progressing     Problem: Pain  Goal: Verbalizes/displays adequate comfort level or baseline comfort level  Outcome: Progressing     
  Problem: Discharge Planning  Goal: Discharge to home or other facility with appropriate resources  Outcome: Progressing     Problem: Skin/Tissue Integrity  Goal: Absence of new skin breakdown  Description: 1.  Monitor for areas of redness and/or skin breakdown  2.  Assess vascular access sites hourly  3.  Every 4-6 hours minimum:  Change oxygen saturation probe site  4.  Every 4-6 hours:  If on nasal continuous positive airway pressure, respiratory therapy assess nares and determine need for appliance change or resting period.  Outcome: Progressing     Problem: Confusion  Goal: Confusion, delirium, dementia, or psychosis is improved or at baseline  Description: INTERVENTIONS:  1. Assess for possible contributors to thought disturbance, including medications, impaired vision or hearing, underlying metabolic abnormalities, dehydration, psychiatric diagnoses, and notify attending LIP  2. [place] high risk fall precautions, as indicated  3. Provide frequent short contacts to provide reality reorientation, refocusing and direction  4. Decrease environmental stimuli, including noise as appropriate  5. Monitor and intervene to maintain adequate nutrition, hydration, elimination, sleep and activity  6. If unable to ensure safety without constant attention obtain sitter and review sitter guidelines with assigned personnel  7. Initiate Psychosocial CNS and Spiritual Care consult, as indicated  Outcome: Progressing     Problem: Safety - Adult  Goal: Free from fall injury  Outcome: Progressing     Problem: Pain  Goal: Verbalizes/displays adequate comfort level or baseline comfort level  Outcome: Progressing     
  Problem: Discharge Planning  Goal: Discharge to home or other facility with appropriate resources  Outcome: Progressing     Problem: Skin/Tissue Integrity  Goal: Absence of new skin breakdown  Description: 1.  Monitor for areas of redness and/or skin breakdown  2.  Assess vascular access sites hourly  3.  Every 4-6 hours minimum:  Change oxygen saturation probe site  4.  Every 4-6 hours:  If on nasal continuous positive airway pressure, respiratory therapy assess nares and determine need for appliance change or resting period.  Outcome: Progressing     Problem: Confusion  Goal: Confusion, delirium, dementia, or psychosis is improved or at baseline  Description: INTERVENTIONS:  1. Assess for possible contributors to thought disturbance, including medications, impaired vision or hearing, underlying metabolic abnormalities, dehydration, psychiatric diagnoses, and notify attending LIP  2. [place] high risk fall precautions, as indicated  3. Provide frequent short contacts to provide reality reorientation, refocusing and direction  4. Decrease environmental stimuli, including noise as appropriate  5. Monitor and intervene to maintain adequate nutrition, hydration, elimination, sleep and activity  6. If unable to ensure safety without constant attention obtain sitter and review sitter guidelines with assigned personnel  7. Initiate Psychosocial CNS and Spiritual Care consult, as indicated  Outcome: Progressing     Problem: Safety - Adult  Goal: Free from fall injury  Outcome: Progressing     Problem: Pain  Goal: Verbalizes/displays adequate comfort level or baseline comfort level  Outcome: Progressing     Problem: Chronic Conditions and Co-morbidities  Goal: Patient's chronic conditions and co-morbidity symptoms are monitored and maintained or improved  Outcome: Progressing     Problem: Nutrition Deficit:  Goal: Optimize nutritional status  Outcome: Progressing   CHRIS TERRAZAS RN    
  Problem: Discharge Planning  Goal: Discharge to home or other facility with appropriate resources  Outcome: Progressing     Problem: Skin/Tissue Integrity  Goal: Absence of new skin breakdown  Description: 1.  Monitor for areas of redness and/or skin breakdown  2.  Assess vascular access sites hourly  3.  Every 4-6 hours minimum:  Change oxygen saturation probe site  4.  Every 4-6 hours:  If on nasal continuous positive airway pressure, respiratory therapy assess nares and determine need for appliance change or resting period.  Outcome: Progressing     Problem: Confusion  Goal: Confusion, delirium, dementia, or psychosis is improved or at baseline  Description: INTERVENTIONS:  1. Assess for possible contributors to thought disturbance, including medications, impaired vision or hearing, underlying metabolic abnormalities, dehydration, psychiatric diagnoses, and notify attending LIP  2. [place] high risk fall precautions, as indicated  3. Provide frequent short contacts to provide reality reorientation, refocusing and direction  4. Decrease environmental stimuli, including noise as appropriate  5. Monitor and intervene to maintain adequate nutrition, hydration, elimination, sleep and activity  6. If unable to ensure safety without constant attention obtain sitter and review sitter guidelines with assigned personnel  7. Initiate Psychosocial CNS and Spiritual Care consult, as indicated  Outcome: Progressing     Problem: Safety - Adult  Goal: Free from fall injury  Outcome: Progressing     Problem: Pain  Goal: Verbalizes/displays adequate comfort level or baseline comfort level  Outcome: Progressing     Problem: Chronic Conditions and Co-morbidities  Goal: Patient's chronic conditions and co-morbidity symptoms are monitored and maintained or improved  Outcome: Progressing     Problem: Nutrition Deficit:  Goal: Optimize nutritional status  Outcome: Progressing     
  Problem: Discharge Planning  Goal: Discharge to home or other facility with appropriate resources  Outcome: Progressing     Problem: Skin/Tissue Integrity  Goal: Absence of new skin breakdown  Description: 1.  Monitor for areas of redness and/or skin breakdown  2.  Assess vascular access sites hourly  3.  Every 4-6 hours minimum:  Change oxygen saturation probe site  4.  Every 4-6 hours:  If on nasal continuous positive airway pressure, respiratory therapy assess nares and determine need for appliance change or resting period.  Outcome: Progressing     Problem: Confusion  Goal: Confusion, delirium, dementia, or psychosis is improved or at baseline  Description: INTERVENTIONS:  1. Assess for possible contributors to thought disturbance, including medications, impaired vision or hearing, underlying metabolic abnormalities, dehydration, psychiatric diagnoses, and notify attending LIP  2. [place] high risk fall precautions, as indicated  3. Provide frequent short contacts to provide reality reorientation, refocusing and direction  4. Decrease environmental stimuli, including noise as appropriate  5. Monitor and intervene to maintain adequate nutrition, hydration, elimination, sleep and activity  6. If unable to ensure safety without constant attention obtain sitter and review sitter guidelines with assigned personnel  7. Initiate Psychosocial CNS and Spiritual Care consult, as indicated  Outcome: Progressing     Problem: Safety - Adult  Goal: Free from fall injury  Outcome: Progressing     Problem: Pain  Goal: Verbalizes/displays adequate comfort level or baseline comfort level  Outcome: Progressing     Problem: Chronic Conditions and Co-morbidities  Goal: Patient's chronic conditions and co-morbidity symptoms are monitored and maintained or improved  Outcome: Progressing     Problem: Nutrition Deficit:  Goal: Optimize nutritional status  Outcome: Progressing  Flowsheets (Taken 9/30/2024 1119 by Rachna Mccloud, 
  Problem: Discharge Planning  Goal: Discharge to home or other facility with appropriate resources  Outcome: Progressing     Problem: Skin/Tissue Integrity  Goal: Absence of new skin breakdown  Description: 1.  Monitor for areas of redness and/or skin breakdown  2.  Assess vascular access sites hourly  3.  Every 4-6 hours minimum:  Change oxygen saturation probe site  4.  Every 4-6 hours:  If on nasal continuous positive airway pressure, respiratory therapy assess nares and determine need for appliance change or resting period.  Outcome: Progressing     Problem: Confusion  Goal: Confusion, delirium, dementia, or psychosis is improved or at baseline  Description: INTERVENTIONS:  1. Assess for possible contributors to thought disturbance, including medications, impaired vision or hearing, underlying metabolic abnormalities, dehydration, psychiatric diagnoses, and notify attending LIP  2. [place] high risk fall precautions, as indicated  3. Provide frequent short contacts to provide reality reorientation, refocusing and direction  4. Decrease environmental stimuli, including noise as appropriate  5. Monitor and intervene to maintain adequate nutrition, hydration, elimination, sleep and activity  6. If unable to ensure safety without constant attention obtain sitter and review sitter guidelines with assigned personnel  7. Initiate Psychosocial CNS and Spiritual Care consult, as indicated  Outcome: Progressing     Problem: Safety - Adult  Goal: Free from fall injury  Outcome: Progressing     Problem: Pain  Goal: Verbalizes/displays adequate comfort level or baseline comfort level  Outcome: Progressing     Problem: Chronic Conditions and Co-morbidities  Goal: Patient's chronic conditions and co-morbidity symptoms are monitored and maintained or improved  Outcome: Progressing     Problem: Nutrition Deficit:  Goal: Optimize nutritional status  Outcome: Progressing     Problem: ABCDS Injury Assessment  Goal: Absence of 
  Problem: Discharge Planning  Goal: Discharge to home or other facility with appropriate resources  Outcome: Progressing     Problem: Skin/Tissue Integrity  Goal: Absence of new skin breakdown  Description: 1.  Monitor for areas of redness and/or skin breakdown  2.  Assess vascular access sites hourly  3.  Every 4-6 hours minimum:  Change oxygen saturation probe site  4.  Every 4-6 hours:  If on nasal continuous positive airway pressure, respiratory therapy assess nares and determine need for appliance change or resting period.  Outcome: Progressing     Problem: Confusion  Goal: Confusion, delirium, dementia, or psychosis is improved or at baseline  Description: INTERVENTIONS:  1. Assess for possible contributors to thought disturbance, including medications, impaired vision or hearing, underlying metabolic abnormalities, dehydration, psychiatric diagnoses, and notify attending LIP  2. [place] high risk fall precautions, as indicated  3. Provide frequent short contacts to provide reality reorientation, refocusing and direction  4. Decrease environmental stimuli, including noise as appropriate  5. Monitor and intervene to maintain adequate nutrition, hydration, elimination, sleep and activity  6. If unable to ensure safety without constant attention obtain sitter and review sitter guidelines with assigned personnel  7. Initiate Psychosocial CNS and Spiritual Care consult, as indicated  Outcome: Progressing     Problem: Safety - Adult  Goal: Free from fall injury  Outcome: Progressing     Problem: Pain  Goal: Verbalizes/displays adequate comfort level or baseline comfort level  Outcome: Progressing     Problem: Chronic Conditions and Co-morbidities  Goal: Patient's chronic conditions and co-morbidity symptoms are monitored and maintained or improved  Outcome: Progressing     Problem: Nutrition Deficit:  Goal: Optimize nutritional status  Outcome: Progressing     
  Problem: Discharge Planning  Goal: Discharge to home or other facility with appropriate resources  Outcome: Progressing     Problem: Skin/Tissue Integrity  Goal: Absence of new skin breakdown  Description: 1.  Monitor for areas of redness and/or skin breakdown  2.  Assess vascular access sites hourly  3.  Every 4-6 hours minimum:  Change oxygen saturation probe site  4.  Every 4-6 hours:  If on nasal continuous positive airway pressure, respiratory therapy assess nares and determine need for appliance change or resting period.  Outcome: Progressing     Problem: Confusion  Goal: Confusion, delirium, dementia, or psychosis is improved or at baseline  Description: INTERVENTIONS:  1. Assess for possible contributors to thought disturbance, including medications, impaired vision or hearing, underlying metabolic abnormalities, dehydration, psychiatric diagnoses, and notify attending LIP  2. [place] high risk fall precautions, as indicated  3. Provide frequent short contacts to provide reality reorientation, refocusing and direction  4. Decrease environmental stimuli, including noise as appropriate  5. Monitor and intervene to maintain adequate nutrition, hydration, elimination, sleep and activity  6. If unable to ensure safety without constant attention obtain sitter and review sitter guidelines with assigned personnel  7. Initiate Psychosocial CNS and Spiritual Care consult, as indicated  Outcome: Progressing     Problem: Safety - Adult  Goal: Free from fall injury  Outcome: Progressing     Problem: Pain  Goal: Verbalizes/displays adequate comfort level or baseline comfort level  Outcome: Progressing     Problem: Chronic Conditions and Co-morbidities  Goal: Patient's chronic conditions and co-morbidity symptoms are monitored and maintained or improved  Outcome: Progressing     Problem: Nutrition Deficit:  Goal: Optimize nutritional status  Outcome: Progressing     Problem: ABCDS Injury Assessment  Goal: Absence of 
  Problem: Discharge Planning  Goal: Discharge to home or other facility with appropriate resources  Outcome: Progressing     Problem: Skin/Tissue Integrity  Goal: Absence of new skin breakdown  Description: 1.  Monitor for areas of redness and/or skin breakdown  2.  Assess vascular access sites hourly  3.  Every 4-6 hours minimum:  Change oxygen saturation probe site  4.  Every 4-6 hours:  If on nasal continuous positive airway pressure, respiratory therapy assess nares and determine need for appliance change or resting period.  Outcome: Progressing     Problem: Confusion  Goal: Confusion, delirium, dementia, or psychosis is improved or at baseline  Description: INTERVENTIONS:  1. Assess for possible contributors to thought disturbance, including medications, impaired vision or hearing, underlying metabolic abnormalities, dehydration, psychiatric diagnoses, and notify attending LIP  2. [place] high risk fall precautions, as indicated  3. Provide frequent short contacts to provide reality reorientation, refocusing and direction  4. Decrease environmental stimuli, including noise as appropriate  5. Monitor and intervene to maintain adequate nutrition, hydration, elimination, sleep and activity  6. If unable to ensure safety without constant attention obtain sitter and review sitter guidelines with assigned personnel  7. Initiate Psychosocial CNS and Spiritual Care consult, as indicated  Outcome: Progressing     Problem: Safety - Adult  Goal: Free from fall injury  Outcome: Progressing     Problem: Pain  Goal: Verbalizes/displays adequate comfort level or baseline comfort level  Outcome: Progressing     
  Problem: Discharge Planning  Goal: Discharge to home or other facility with appropriate resources  Outcome: Progressing     Problem: Skin/Tissue Integrity  Goal: Absence of new skin breakdown  Description: 1.  Monitor for areas of redness and/or skin breakdown  2.  Assess vascular access sites hourly  3.  Every 4-6 hours minimum:  Change oxygen saturation probe site  4.  Every 4-6 hours:  If on nasal continuous positive airway pressure, respiratory therapy assess nares and determine need for appliance change or resting period.  Outcome: Progressing     Problem: Confusion  Goal: Confusion, delirium, dementia, or psychosis is improved or at baseline  Description: INTERVENTIONS:  1. Assess for possible contributors to thought disturbance, including medications, impaired vision or hearing, underlying metabolic abnormalities, dehydration, psychiatric diagnoses, and notify attending LIP  2. [place] high risk fall precautions, as indicated  3. Provide frequent short contacts to provide reality reorientation, refocusing and direction  4. Decrease environmental stimuli, including noise as appropriate  5. Monitor and intervene to maintain adequate nutrition, hydration, elimination, sleep and activity  6. If unable to ensure safety without constant attention obtain sitter and review sitter guidelines with assigned personnel  7. Initiate Psychosocial CNS and Spiritual Care consult, as indicated  Outcome: Progressing     Problem: Safety - Adult  Goal: Free from fall injury  Outcome: Progressing     Problem: Pain  Goal: Verbalizes/displays adequate comfort level or baseline comfort level  Outcome: Progressing     Problem: Chronic Conditions and Co-morbidities  Goal: Patient's chronic conditions and co-morbidity symptoms are monitored and maintained or improved  Outcome: Progressing     Problem: Nutrition Deficit:  Goal: Optimize nutritional status  Outcome: Progressing     Problem: ABCDS Injury Assessment  Goal: Absence of 
  Problem: Discharge Planning  Goal: Discharge to home or other facility with appropriate resources  Outcome: Progressing  Flowsheets (Taken 10/5/2024 2000)  Discharge to home or other facility with appropriate resources:   Identify barriers to discharge with patient and caregiver   Arrange for needed discharge resources and transportation as appropriate   Identify discharge learning needs (meds, wound care, etc)   Arrange for interpreters to assist at discharge as needed   Refer to discharge planning if patient needs post-hospital services based on physician order or complex needs related to functional status, cognitive ability or social support system     Problem: Skin/Tissue Integrity  Goal: Absence of new skin breakdown  Description: 1.  Monitor for areas of redness and/or skin breakdown  2.  Assess vascular access sites hourly  3.  Every 4-6 hours minimum:  Change oxygen saturation probe site  4.  Every 4-6 hours:  If on nasal continuous positive airway pressure, respiratory therapy assess nares and determine need for appliance change or resting period.  Outcome: Progressing     Problem: Confusion  Goal: Confusion, delirium, dementia, or psychosis is improved or at baseline  Description: INTERVENTIONS:  1. Assess for possible contributors to thought disturbance, including medications, impaired vision or hearing, underlying metabolic abnormalities, dehydration, psychiatric diagnoses, and notify attending LIP  2. [place] high risk fall precautions, as indicated  3. Provide frequent short contacts to provide reality reorientation, refocusing and direction  4. Decrease environmental stimuli, including noise as appropriate  5. Monitor and intervene to maintain adequate nutrition, hydration, elimination, sleep and activity  6. If unable to ensure safety without constant attention obtain sitter and review sitter guidelines with assigned personnel  7. Initiate Psychosocial CNS and Spiritual Care consult, as 
activity  6. If unable to ensure safety without constant attention obtain sitter and review sitter guidelines with assigned personnel  7. Initiate Psychosocial CNS and Spiritual Care consult, as indicated  10/6/2024 1202 by Sundeep Torres  Outcome: Progressing  10/6/2024 0230 by Marta Nice, RN  Outcome: Progressing  Flowsheets (Taken 10/5/2024 2000)  Effect of thought disturbance (confusion, delirium, dementia, or psychosis) are managed with adequate functional status:   Assess for contributors to thought disturbance, including medications, impaired vision or hearing, underlying metabolic abnormalities, dehydration, psychiatric diagnoses, notify LIP   [place] high risk fall precautions, as indicated   Provide frequent short contacts to provide reality reorientation, refocusing and direction   Decrease environmental stimuli, including noise as appropriate   Monitor and intervene to maintain adequate nutrition, hydration, elimination, sleep and activity   If unable to ensure safety without constant attention obtain sitter and review sitter guidelines with assigned personnel   Initiate Psychosocial Clinical Nurse Specialist and Spiritual Care consult, as indicated     Problem: Safety - Adult  Goal: Free from fall injury  10/6/2024 1202 by Sundeep Torres  Outcome: Progressing  10/6/2024 0230 by Marta Nice, RN  Outcome: Progressing  Flowsheets (Taken 10/5/2024 2000)  Free From Fall Injury: Instruct family/caregiver on patient safety     Problem: Pain  Goal: Verbalizes/displays adequate comfort level or baseline comfort level  10/6/2024 1202 by Sundeep Torres  Outcome: Progressing  10/6/2024 0230 by Marta Nice, RN  Outcome: Progressing  Flowsheets (Taken 10/5/2024 2025)  Verbalizes/displays adequate comfort level or baseline comfort level:   Encourage patient to monitor pain and request assistance   Assess pain using appropriate pain scale   Administer analgesics based on type and severity of 
adequate comfort level or baseline comfort level  10/1/2024 2214 by Bailee Hugo RN  Outcome: Progressing  10/1/2024 1745 by Ayaan Morley RN  Outcome: Progressing     Problem: Chronic Conditions and Co-morbidities  Goal: Patient's chronic conditions and co-morbidity symptoms are monitored and maintained or improved  10/1/2024 2214 by Bailee Hugo RN  Outcome: Progressing  10/1/2024 1745 by Ayaan Morley RN  Outcome: Progressing     Problem: Nutrition Deficit:  Goal: Optimize nutritional status  10/1/2024 2214 by Bailee Hugo RN  Outcome: Progressing  10/1/2024 1745 by Ayaan Morley RN  Outcome: Progressing     Problem: ABCDS Injury Assessment  Goal: Absence of physical injury  10/1/2024 2214 by Bailee Hugo RN  Outcome: Progressing  10/1/2024 1745 by Ayaan Morley RN  Outcome: Progressing     
modified barium swallow    # E. coli septicemia/E. coli pyelonephritis: ESBL negative, ID following, Rocephin 2 g until 10/7/2024, will need midline on DC    # Hyperglycemic hyperosmolar state: Insulin drip weaned off, currently on Lantus 35 unit daily with high-dose sliding scale.  Patient will need Lantus on discharge, patient is insulin IV at home.  Will need diabetic education since newly started on insulin    # New onset A-fib with RVR: Cardiology evaluation completed, heparin drip transition to Eliquis 5 twice daily, amiodarone infusion transition to p.o. amnio.  TTE showed preserved EF of 55 to 60%.Remote Hx of PCI to LAD and RCA.History of CAD s/p PCI/BMS to RCA 10/2016    # Essential hypertension: Controlled.  Home medication resumed    Carrington Chung MD  Internal Medicine Resident,PGY 3  [facility],[place]       
noise as appropriate  5. Monitor and intervene to maintain adequate nutrition, hydration, elimination, sleep and activity  6. If unable to ensure safety without constant attention obtain sitter and review sitter guidelines with assigned personnel  7. Initiate Psychosocial CNS and Spiritual Care consult, as indicated  Outcome: Progressing  Flowsheets (Taken 10/6/2024 1945)  Effect of thought disturbance (confusion, delirium, dementia, or psychosis) are managed with adequate functional status:   Assess for contributors to thought disturbance, including medications, impaired vision or hearing, underlying metabolic abnormalities, dehydration, psychiatric diagnoses, notify [facility]   [place] high risk fall precautions, as indicated   Provide frequent short contacts to provide reality reorientation, refocusing and direction   Decrease environmental stimuli, including noise as appropriate   Monitor and intervene to maintain adequate nutrition, hydration, elimination, sleep and activity     Problem: Discharge Planning  Goal: Discharge to home or other facility with appropriate resources  Outcome: Progressing  Flowsheets (Taken 10/6/2024 1945)  Discharge to home or other facility with appropriate resources:   Identify barriers to discharge with patient and caregiver   Arrange for needed discharge resources and transportation as appropriate   Identify discharge learning needs (meds, wound care, etc)   Refer to discharge planning if patient needs post-hospital services based on physician order or complex needs related to functional status, cognitive ability or social support system

## 2024-10-07 NOTE — CARE COORDINATION
TRANSITIONAL CARE/DISCHARGE PLANNING ONGOING EVALUATION      Reason for Admission: Elevated troponin [R79.89]  Altered mental status, unspecified altered mental status type [R41.82]  Hyperosmolar hyperglycemic state (HHS) (HCC) [E11.00]     Hospital day:13    Patient goals/Transitional Plan:    Today is patients last day of IV ATB, will need transportation home, previously appealed discharge, appeal was approved per insurance and discharge was removed.  Awaiting further orders after last IV ATB dose today.  Attempted to call daughter to discuss transition plans, no answer, left VM, awaiting call back      Readmission Risk              Risk of Unplanned Readmission:  27           Spoke with Maddie to notify her patient will not be discharged on IV ATB    Discharge order, Spoke with patients daughter An, agreeable for discharge today, will schedule transport through [facility]    Transport request faxed  Spoke with Sindhu, transport arranged with Jerry Gregory at 1630    RN notified, Bernardino Nolan notified    Discharge Report    [facility]  Clinical Case Management Department  Written by: Ashli Joiner RN    Patient Name: Leatha Mason  Attending Provider: Roberto Soto MD  Admit Date: 2024  5:23 AM  MRN: [id number]  Account: [id number]                     : 1950  Discharge Date: 10/7      Disposition: home    Ashli Joiner RN

## 2024-10-11 NOTE — DISCHARGE SUMMARY
[facility]     Department of Internal Medicine - Staff Internal Medicine Teaching Service    INPATIENT DISCHARGE SUMMARY      Patient Identification:  Leatha Mason is a 74 y.o. female.  :  1950  MRN: [id number]     Acct: [id number]   PCP: Lenore Bobby APRN - CNP  Admit Date:  2024  Discharge date and time: 10/7/2024  4:57 PM   Attending Provider: No att. providers found                                     ACTIVE DISCHARGE DIAGNOSES     Hospital Problem Lists:  Principal Problem:    Hyperosmolar hyperglycemic state (HHS) (HCC)  Active Problems:    Encephalopathy, metabolic    Pyelonephritis    Bandemia    SIRS (systemic inflammatory response syndrome) (HCC)    E. coli septicemia (HCC)    Elevated troponin    SVT (supraventricular tachycardia) (HCC)    Chronic atrial fibrillation (HCC)    PAF (paroxysmal atrial fibrillation) (HCC)    Gram negative septicemia (HCC)  Resolved Problems:    * No resolved hospital problems. *      HOSPITAL STAY     Brief Inpatient course:   Leatha Mason is a 74 y.o. female who was admitted for the management of Hyperosmolar hyperglycemic state (HHS) (HCC), presented to the emergency department with altered mentation.  Patient was brought by daughter who reported that the patient was having symptoms of confusion and altered mentation and was not eating and drinking and was very slow to respond, EMS was called and patient was found to be only responsive to pain.  Blood glucose onsite was checked and it showed high reading.  On arrival to ED patient was found to have a BP of 131/103 pulse of 86, afebrile, SpO2 of 93% at room air.  Patient GCS score came to 10.  EKG showed sinus rhythm with prolonged QT interval of more than 500.  Blood glucose was 883, with beta-hydroxybutyrate 1.47, troponin 49 with repeat of 57.  Patient was given 1 L of IV fluid bolus and started on IV insulin drip.  Patient was transferred to medical ICU for

## 2024-10-17 ENCOUNTER — HOSPITAL ENCOUNTER (INPATIENT)
Age: 74
LOS: 6 days | Discharge: HOME | End: 2024-10-24
Attending: EMERGENCY MEDICINE | Admitting: STUDENT IN AN ORGANIZED HEALTH CARE EDUCATION/TRAINING PROGRAM
Payer: COMMERCIAL

## 2024-10-17 ENCOUNTER — APPOINTMENT (OUTPATIENT)
Dept: CT IMAGING | Age: 74
End: 2024-10-17
Payer: COMMERCIAL

## 2024-10-17 DIAGNOSIS — I25.10 CORONARY ARTERY DISEASE INVOLVING NATIVE CORONARY ARTERY OF NATIVE HEART WITHOUT ANGINA PECTORIS: ICD-10-CM

## 2024-10-17 DIAGNOSIS — E11.00 HYPEROSMOLAR HYPERGLYCEMIC STATE (HHS) (HCC): Primary | ICD-10-CM

## 2024-10-17 DIAGNOSIS — I25.5 ISCHEMIC CARDIOMYOPATHY: ICD-10-CM

## 2024-10-17 DIAGNOSIS — Z79.4 TYPE 2 DIABETES MELLITUS WITHOUT COMPLICATION, WITH LONG-TERM CURRENT USE OF INSULIN (HCC): ICD-10-CM

## 2024-10-17 DIAGNOSIS — E11.9 TYPE 2 DIABETES MELLITUS WITHOUT COMPLICATION, WITH LONG-TERM CURRENT USE OF INSULIN (HCC): ICD-10-CM

## 2024-10-17 DIAGNOSIS — F32.1 MODERATE MAJOR DEPRESSION (HCC): ICD-10-CM

## 2024-10-17 DIAGNOSIS — E11.8 TYPE 2 DIABETES MELLITUS WITH COMPLICATION, WITHOUT LONG-TERM CURRENT USE OF INSULIN (HCC): ICD-10-CM

## 2024-10-17 LAB
ALBUMIN SERPL-MCNC: 2.8 G/DL (ref 3.5–5.2)
ALBUMIN/GLOB SERPL: 1 {RATIO} (ref 1–2.5)
ALP SERPL-CCNC: 76 U/L (ref 35–104)
ALT SERPL-CCNC: 10 U/L (ref 10–35)
ANION GAP SERPL CALCULATED.3IONS-SCNC: 8 MMOL/L (ref 9–16)
AST SERPL-CCNC: 20 U/L (ref 10–35)
B-OH-BUTYR SERPL-MCNC: 0.09 MMOL/L (ref 0.02–0.27)
BACTERIA URNS QL MICRO: NORMAL
BASOPHILS # BLD: 0.03 K/UL (ref 0–0.2)
BASOPHILS NFR BLD: 0 % (ref 0–2)
BILIRUB SERPL-MCNC: 0.2 MG/DL (ref 0–1.2)
BILIRUB UR QL STRIP: NEGATIVE
BNP SERPL-MCNC: 1084 PG/ML (ref 0–300)
BODY TEMPERATURE: 37
BUN BLD-MCNC: 24 MG/DL (ref 8–26)
BUN SERPL-MCNC: 21 MG/DL (ref 8–23)
CA-I BLD-SCNC: 1.32 MMOL/L (ref 1.15–1.33)
CALCIUM SERPL-MCNC: 9.3 MG/DL (ref 8.6–10.4)
CASTS #/AREA URNS LPF: NORMAL /LPF (ref 0–8)
CHLORIDE BLD-SCNC: 99 MMOL/L (ref 98–107)
CHLORIDE SERPL-SCNC: 99 MMOL/L (ref 98–107)
CLARITY UR: CLEAR
CO2 BLD CALC-SCNC: 34 MMOL/L (ref 22–30)
CO2 SERPL-SCNC: 27 MMOL/L (ref 20–31)
COHGB MFR BLD: 4.5 % (ref 0–5)
COLOR UR: YELLOW
CREAT SERPL-MCNC: 0.9 MG/DL (ref 0.5–0.9)
EGFR, POC: 78 ML/MIN/1.73M2
EOSINOPHIL # BLD: 0.34 K/UL (ref 0–0.44)
EOSINOPHILS RELATIVE PERCENT: 5 % (ref 1–4)
EPI CELLS #/AREA URNS HPF: NORMAL /HPF (ref 0–5)
ERYTHROCYTE [DISTWIDTH] IN BLOOD BY AUTOMATED COUNT: 15.6 % (ref 11.8–14.4)
FIO2 ON VENT: ABNORMAL %
GFR, ESTIMATED: 67 ML/MIN/1.73M2
GLUCOSE BLD-MCNC: 311 MG/DL (ref 65–105)
GLUCOSE BLD-MCNC: 434 MG/DL (ref 74–100)
GLUCOSE SERPL-MCNC: 416 MG/DL (ref 74–99)
GLUCOSE UR STRIP-MCNC: ABNORMAL MG/DL
HCO3 VENOUS: 28.2 MMOL/L (ref 24–30)
HCO3 VENOUS: 33.3 MMOL/L (ref 22–29)
HCT VFR BLD AUTO: 31.8 % (ref 36.3–47.1)
HCT VFR BLD AUTO: 34 % (ref 36–46)
HGB BLD-MCNC: 9.7 G/DL (ref 11.9–15.1)
HGB UR QL STRIP.AUTO: ABNORMAL
IMM GRANULOCYTES # BLD AUTO: 0.03 K/UL (ref 0–0.3)
IMM GRANULOCYTES NFR BLD: 0 %
KETONES UR STRIP-MCNC: NEGATIVE MG/DL
LEUKOCYTE ESTERASE UR QL STRIP: NEGATIVE
LIPASE SERPL-CCNC: 110 U/L (ref 13–60)
LYMPHOCYTES NFR BLD: 2.21 K/UL (ref 1.1–3.7)
LYMPHOCYTES RELATIVE PERCENT: 32 % (ref 24–43)
MAGNESIUM SERPL-MCNC: 1.7 MG/DL (ref 1.6–2.4)
MCH RBC QN AUTO: 26.9 PG (ref 25.2–33.5)
MCHC RBC AUTO-ENTMCNC: 30.5 G/DL (ref 28.4–34.8)
MCV RBC AUTO: 88.1 FL (ref 82.6–102.9)
MONOCYTES NFR BLD: 0.45 K/UL (ref 0.1–1.2)
MONOCYTES NFR BLD: 6 % (ref 3–12)
NEUTROPHILS NFR BLD: 57 % (ref 36–65)
NEUTS SEG NFR BLD: 3.94 K/UL (ref 1.5–8.1)
NITRITE UR QL STRIP: NEGATIVE
NRBC BLD-RTO: 0 PER 100 WBC
O2 SAT, VEN: 27.9 % (ref 60–85)
O2 SAT, VEN: 80.9 % (ref 60–85)
PCO2 VENOUS: 50.9 MM HG (ref 39–55)
PCO2 VENOUS: 56.3 MM HG (ref 41–51)
PH UR STRIP: 6 [PH] (ref 5–8)
PH VENOUS: 7.36 (ref 7.32–7.42)
PH VENOUS: 7.38 (ref 7.32–7.43)
PLATELET # BLD AUTO: 329 K/UL (ref 138–453)
PMV BLD AUTO: 9.6 FL (ref 8.1–13.5)
PO2 VENOUS: 19.3 MM HG (ref 30–50)
PO2 VENOUS: 48 MM HG (ref 30–50)
POC ANION GAP: 7 MMOL/L (ref 7–16)
POC CREATININE: 0.8 MG/DL (ref 0.51–1.19)
POC HEMOGLOBIN (CALC): 11.4 G/DL (ref 12–16)
POC LACTIC ACID: 1.8 MMOL/L (ref 0.56–1.39)
POSITIVE BASE EXCESS, VEN: 2.7 MMOL/L (ref 0–2)
POSITIVE BASE EXCESS, VEN: 6.7 MMOL/L (ref 0–3)
POTASSIUM BLD-SCNC: 4.3 MMOL/L (ref 3.5–4.5)
POTASSIUM SERPL-SCNC: 4.2 MMOL/L (ref 3.7–5.3)
PROT SERPL-MCNC: 6.3 G/DL (ref 6.6–8.7)
PROT UR STRIP-MCNC: NEGATIVE MG/DL
RBC # BLD AUTO: 3.61 M/UL (ref 3.95–5.11)
RBC # BLD: ABNORMAL 10*6/UL
RBC #/AREA URNS HPF: NORMAL /HPF (ref 0–4)
SODIUM BLD-SCNC: 139 MMOL/L (ref 138–146)
SODIUM SERPL-SCNC: 134 MMOL/L (ref 136–145)
SP GR UR STRIP: 1.03 (ref 1–1.03)
TROPONIN I SERPL HS-MCNC: 47 NG/L (ref 0–14)
TROPONIN I SERPL HS-MCNC: 48 NG/L (ref 0–14)
UROBILINOGEN UR STRIP-ACNC: NORMAL EU/DL (ref 0–1)
WBC #/AREA URNS HPF: NORMAL /HPF (ref 0–5)
WBC OTHER # BLD: 7 K/UL (ref 3.5–11.3)

## 2024-10-17 PROCEDURE — 70498 CT ANGIOGRAPHY NECK: CPT

## 2024-10-17 PROCEDURE — 80051 ELECTROLYTE PANEL: CPT

## 2024-10-17 PROCEDURE — 82803 BLOOD GASES ANY COMBINATION: CPT

## 2024-10-17 PROCEDURE — 83735 ASSAY OF MAGNESIUM: CPT

## 2024-10-17 PROCEDURE — 2580000003 HC RX 258

## 2024-10-17 PROCEDURE — 70450 CT HEAD/BRAIN W/O DYE: CPT

## 2024-10-17 PROCEDURE — 83880 ASSAY OF NATRIURETIC PEPTIDE: CPT

## 2024-10-17 PROCEDURE — 83605 ASSAY OF LACTIC ACID: CPT

## 2024-10-17 PROCEDURE — 83930 ASSAY OF BLOOD OSMOLALITY: CPT

## 2024-10-17 PROCEDURE — 84520 ASSAY OF UREA NITROGEN: CPT

## 2024-10-17 PROCEDURE — 36415 COLL VENOUS BLD VENIPUNCTURE: CPT

## 2024-10-17 PROCEDURE — 85014 HEMATOCRIT: CPT

## 2024-10-17 PROCEDURE — 82330 ASSAY OF CALCIUM: CPT

## 2024-10-17 PROCEDURE — 93005 ELECTROCARDIOGRAM TRACING: CPT

## 2024-10-17 PROCEDURE — 82010 KETONE BODYS QUAN: CPT

## 2024-10-17 PROCEDURE — 85025 COMPLETE CBC W/AUTO DIFF WBC: CPT

## 2024-10-17 PROCEDURE — 80053 COMPREHEN METABOLIC PANEL: CPT

## 2024-10-17 PROCEDURE — 6360000004 HC RX CONTRAST MEDICATION

## 2024-10-17 PROCEDURE — 82565 ASSAY OF CREATININE: CPT

## 2024-10-17 PROCEDURE — 81001 URINALYSIS AUTO W/SCOPE: CPT

## 2024-10-17 PROCEDURE — 93005 ELECTROCARDIOGRAM TRACING: CPT | Performed by: INTERNAL MEDICINE

## 2024-10-17 PROCEDURE — 6370000000 HC RX 637 (ALT 250 FOR IP)

## 2024-10-17 PROCEDURE — 84484 ASSAY OF TROPONIN QUANT: CPT

## 2024-10-17 PROCEDURE — 99285 EMERGENCY DEPT VISIT HI MDM: CPT

## 2024-10-17 PROCEDURE — 82805 BLOOD GASES W/O2 SATURATION: CPT

## 2024-10-17 PROCEDURE — 83690 ASSAY OF LIPASE: CPT

## 2024-10-17 PROCEDURE — 82947 ASSAY GLUCOSE BLOOD QUANT: CPT

## 2024-10-17 RX ORDER — SODIUM CHLORIDE, SODIUM LACTATE, POTASSIUM CHLORIDE, AND CALCIUM CHLORIDE .6; .31; .03; .02 G/100ML; G/100ML; G/100ML; G/100ML
1000 INJECTION, SOLUTION INTRAVENOUS ONCE
Status: COMPLETED | OUTPATIENT
Start: 2024-10-17 | End: 2024-10-18

## 2024-10-17 RX ORDER — IOPAMIDOL 755 MG/ML
75 INJECTION, SOLUTION INTRAVASCULAR
Status: COMPLETED | OUTPATIENT
Start: 2024-10-17 | End: 2024-10-17

## 2024-10-17 RX ADMIN — INSULIN HUMAN 8 UNITS: 100 INJECTION, SOLUTION PARENTERAL at 21:24

## 2024-10-17 RX ADMIN — IOPAMIDOL 75 ML: 755 INJECTION, SOLUTION INTRAVENOUS at 20:37

## 2024-10-17 RX ADMIN — SODIUM CHLORIDE, POTASSIUM CHLORIDE, SODIUM LACTATE AND CALCIUM CHLORIDE 1000 ML: 600; 310; 30; 20 INJECTION, SOLUTION INTRAVENOUS at 21:25

## 2024-10-18 PROBLEM — R73.9 HYPERGLYCEMIA: Status: ACTIVE | Noted: 2024-10-18

## 2024-10-18 LAB
ALBUMIN SERPL-MCNC: 2.6 G/DL (ref 3.5–5.2)
ALBUMIN/GLOB SERPL: 1 {RATIO} (ref 1–2.5)
ALP SERPL-CCNC: 73 U/L (ref 35–104)
ALT SERPL-CCNC: 8 U/L (ref 10–35)
ANION GAP SERPL CALCULATED.3IONS-SCNC: 9 MMOL/L (ref 9–16)
AST SERPL-CCNC: 16 U/L (ref 10–35)
BILIRUB SERPL-MCNC: 0.2 MG/DL (ref 0–1.2)
BUN SERPL-MCNC: 19 MG/DL (ref 8–23)
CALCIUM SERPL-MCNC: 9 MG/DL (ref 8.6–10.4)
CHLORIDE SERPL-SCNC: 103 MMOL/L (ref 98–107)
CO2 SERPL-SCNC: 24 MMOL/L (ref 20–31)
CREAT SERPL-MCNC: 0.8 MG/DL (ref 0.5–0.9)
EKG ATRIAL RATE: 53 BPM
EKG ATRIAL RATE: 53 BPM
EKG P AXIS: 48 DEGREES
EKG P-R INTERVAL: 196 MS
EKG Q-T INTERVAL: 458 MS
EKG Q-T INTERVAL: 472 MS
EKG QRS DURATION: 100 MS
EKG QRS DURATION: 78 MS
EKG QTC CALCULATION (BAZETT): 429 MS
EKG QTC CALCULATION (BAZETT): 442 MS
EKG R AXIS: -20 DEGREES
EKG R AXIS: -5 DEGREES
EKG T AXIS: 0 DEGREES
EKG T AXIS: 19 DEGREES
EKG VENTRICULAR RATE: 53 BPM
EKG VENTRICULAR RATE: 53 BPM
GFR, ESTIMATED: 78 ML/MIN/1.73M2
GLUCOSE BLD-MCNC: 203 MG/DL (ref 65–105)
GLUCOSE BLD-MCNC: 278 MG/DL (ref 65–105)
GLUCOSE BLD-MCNC: 308 MG/DL (ref 65–105)
GLUCOSE BLD-MCNC: 310 MG/DL (ref 65–105)
GLUCOSE SERPL-MCNC: 166 MG/DL (ref 74–99)
OSMOLALITY SERPL: 313 MOSM/KG (ref 275–295)
POTASSIUM SERPL-SCNC: 4.1 MMOL/L (ref 3.7–5.3)
PROT SERPL-MCNC: 6.1 G/DL (ref 6.6–8.7)
SODIUM SERPL-SCNC: 136 MMOL/L (ref 136–145)

## 2024-10-18 PROCEDURE — 82947 ASSAY GLUCOSE BLOOD QUANT: CPT

## 2024-10-18 PROCEDURE — 1200000000 HC SEMI PRIVATE

## 2024-10-18 PROCEDURE — 6370000000 HC RX 637 (ALT 250 FOR IP)

## 2024-10-18 PROCEDURE — 93010 ELECTROCARDIOGRAM REPORT: CPT | Performed by: INTERNAL MEDICINE

## 2024-10-18 PROCEDURE — 80053 COMPREHEN METABOLIC PANEL: CPT

## 2024-10-18 PROCEDURE — 99222 1ST HOSP IP/OBS MODERATE 55: CPT | Performed by: STUDENT IN AN ORGANIZED HEALTH CARE EDUCATION/TRAINING PROGRAM

## 2024-10-18 PROCEDURE — 36415 COLL VENOUS BLD VENIPUNCTURE: CPT

## 2024-10-18 PROCEDURE — 2580000003 HC RX 258

## 2024-10-18 RX ORDER — POTASSIUM CHLORIDE 1500 MG/1
40 TABLET, EXTENDED RELEASE ORAL PRN
Status: ACTIVE | OUTPATIENT
Start: 2024-10-18 | End: 2024-10-23

## 2024-10-18 RX ORDER — INSULIN GLARGINE 100 [IU]/ML
15 INJECTION, SOLUTION SUBCUTANEOUS ONCE
Status: DISCONTINUED | OUTPATIENT
Start: 2024-10-18 | End: 2024-10-18

## 2024-10-18 RX ORDER — INSULIN GLARGINE 100 [IU]/ML
10 INJECTION, SOLUTION SUBCUTANEOUS 2 TIMES DAILY
Status: DISCONTINUED | OUTPATIENT
Start: 2024-10-18 | End: 2024-10-19

## 2024-10-18 RX ORDER — ONDANSETRON 4 MG/1
4 TABLET, ORALLY DISINTEGRATING ORAL EVERY 8 HOURS PRN
Status: DISCONTINUED | OUTPATIENT
Start: 2024-10-18 | End: 2024-10-24 | Stop reason: HOSPADM

## 2024-10-18 RX ORDER — ONDANSETRON 2 MG/ML
4 INJECTION INTRAMUSCULAR; INTRAVENOUS EVERY 6 HOURS PRN
Status: DISCONTINUED | OUTPATIENT
Start: 2024-10-18 | End: 2024-10-24 | Stop reason: HOSPADM

## 2024-10-18 RX ORDER — INSULIN LISPRO 100 [IU]/ML
0-8 INJECTION, SOLUTION INTRAVENOUS; SUBCUTANEOUS
Status: DISCONTINUED | OUTPATIENT
Start: 2024-10-18 | End: 2024-10-21

## 2024-10-18 RX ORDER — ATORVASTATIN CALCIUM 80 MG/1
80 TABLET, FILM COATED ORAL NIGHTLY
Status: DISCONTINUED | OUTPATIENT
Start: 2024-10-18 | End: 2024-10-24 | Stop reason: HOSPADM

## 2024-10-18 RX ORDER — CLOPIDOGREL BISULFATE 75 MG/1
75 TABLET ORAL DAILY
Status: DISCONTINUED | OUTPATIENT
Start: 2024-10-18 | End: 2024-10-24 | Stop reason: HOSPADM

## 2024-10-18 RX ORDER — CLONIDINE HYDROCHLORIDE 0.1 MG/1
0.1 TABLET ORAL 2 TIMES DAILY
Status: DISCONTINUED | OUTPATIENT
Start: 2024-10-18 | End: 2024-10-19

## 2024-10-18 RX ORDER — SODIUM CHLORIDE 9 MG/ML
INJECTION, SOLUTION INTRAVENOUS CONTINUOUS
Status: ACTIVE | OUTPATIENT
Start: 2024-10-18 | End: 2024-10-19

## 2024-10-18 RX ORDER — FUROSEMIDE 40 MG/1
40 TABLET ORAL DAILY
Status: DISCONTINUED | OUTPATIENT
Start: 2024-10-18 | End: 2024-10-21

## 2024-10-18 RX ORDER — AMIODARONE HYDROCHLORIDE 200 MG/1
200 TABLET ORAL DAILY
Status: DISCONTINUED | OUTPATIENT
Start: 2024-10-18 | End: 2024-10-24 | Stop reason: HOSPADM

## 2024-10-18 RX ORDER — INSULIN GLARGINE 100 [IU]/ML
10 INJECTION, SOLUTION SUBCUTANEOUS ONCE
Status: COMPLETED | OUTPATIENT
Start: 2024-10-18 | End: 2024-10-18

## 2024-10-18 RX ORDER — GLUCAGON 1 MG/ML
1 KIT INJECTION PRN
Status: DISCONTINUED | OUTPATIENT
Start: 2024-10-18 | End: 2024-10-24 | Stop reason: HOSPADM

## 2024-10-18 RX ORDER — INSULIN LISPRO 100 [IU]/ML
10 INJECTION, SOLUTION INTRAVENOUS; SUBCUTANEOUS
Status: DISCONTINUED | OUTPATIENT
Start: 2024-10-18 | End: 2024-10-18

## 2024-10-18 RX ORDER — GABAPENTIN 100 MG/1
100 CAPSULE ORAL 3 TIMES DAILY
Status: DISCONTINUED | OUTPATIENT
Start: 2024-10-18 | End: 2024-10-24 | Stop reason: HOSPADM

## 2024-10-18 RX ORDER — SODIUM CHLORIDE 9 MG/ML
INJECTION, SOLUTION INTRAVENOUS PRN
Status: DISCONTINUED | OUTPATIENT
Start: 2024-10-18 | End: 2024-10-24 | Stop reason: HOSPADM

## 2024-10-18 RX ORDER — IPRATROPIUM BROMIDE AND ALBUTEROL SULFATE 2.5; .5 MG/3ML; MG/3ML
1 SOLUTION RESPIRATORY (INHALATION) EVERY 4 HOURS PRN
Status: DISCONTINUED | OUTPATIENT
Start: 2024-10-18 | End: 2024-10-24 | Stop reason: HOSPADM

## 2024-10-18 RX ORDER — LOSARTAN POTASSIUM 50 MG/1
50 TABLET ORAL DAILY
Status: DISCONTINUED | OUTPATIENT
Start: 2024-10-18 | End: 2024-10-24 | Stop reason: HOSPADM

## 2024-10-18 RX ORDER — LEVETIRACETAM 500 MG/1
500 TABLET ORAL 2 TIMES DAILY
Status: DISCONTINUED | OUTPATIENT
Start: 2024-10-18 | End: 2024-10-19

## 2024-10-18 RX ORDER — ASPIRIN 81 MG/1
81 TABLET ORAL DAILY
Status: DISCONTINUED | OUTPATIENT
Start: 2024-10-18 | End: 2024-10-19

## 2024-10-18 RX ORDER — ENOXAPARIN SODIUM 100 MG/ML
40 INJECTION SUBCUTANEOUS DAILY
Status: DISCONTINUED | OUTPATIENT
Start: 2024-10-18 | End: 2024-10-18

## 2024-10-18 RX ORDER — SODIUM CHLORIDE 0.9 % (FLUSH) 0.9 %
5-40 SYRINGE (ML) INJECTION EVERY 12 HOURS SCHEDULED
Status: DISCONTINUED | OUTPATIENT
Start: 2024-10-18 | End: 2024-10-24 | Stop reason: HOSPADM

## 2024-10-18 RX ORDER — AMLODIPINE BESYLATE 10 MG/1
10 TABLET ORAL DAILY
Status: DISCONTINUED | OUTPATIENT
Start: 2024-10-18 | End: 2024-10-24 | Stop reason: HOSPADM

## 2024-10-18 RX ORDER — ACETAMINOPHEN 650 MG/1
650 SUPPOSITORY RECTAL EVERY 6 HOURS PRN
Status: DISCONTINUED | OUTPATIENT
Start: 2024-10-18 | End: 2024-10-24 | Stop reason: HOSPADM

## 2024-10-18 RX ORDER — DEXTROSE MONOHYDRATE 100 MG/ML
INJECTION, SOLUTION INTRAVENOUS CONTINUOUS PRN
Status: DISCONTINUED | OUTPATIENT
Start: 2024-10-18 | End: 2024-10-24 | Stop reason: HOSPADM

## 2024-10-18 RX ORDER — POLYETHYLENE GLYCOL 3350 17 G/17G
17 POWDER, FOR SOLUTION ORAL DAILY PRN
Status: DISCONTINUED | OUTPATIENT
Start: 2024-10-18 | End: 2024-10-24 | Stop reason: HOSPADM

## 2024-10-18 RX ORDER — GUAIFENESIN 200 MG/10ML
LIQUID ORAL 2 TIMES DAILY
Status: DISCONTINUED | OUTPATIENT
Start: 2024-10-18 | End: 2024-10-24 | Stop reason: HOSPADM

## 2024-10-18 RX ORDER — POTASSIUM CHLORIDE 7.45 MG/ML
10 INJECTION INTRAVENOUS PRN
Status: ACTIVE | OUTPATIENT
Start: 2024-10-18 | End: 2024-10-23

## 2024-10-18 RX ORDER — MAGNESIUM SULFATE IN WATER 40 MG/ML
2000 INJECTION, SOLUTION INTRAVENOUS PRN
Status: DISCONTINUED | OUTPATIENT
Start: 2024-10-18 | End: 2024-10-24 | Stop reason: HOSPADM

## 2024-10-18 RX ORDER — SODIUM CHLORIDE 0.9 % (FLUSH) 0.9 %
5-40 SYRINGE (ML) INJECTION PRN
Status: DISCONTINUED | OUTPATIENT
Start: 2024-10-18 | End: 2024-10-24 | Stop reason: HOSPADM

## 2024-10-18 RX ORDER — METOPROLOL TARTRATE 50 MG
50 TABLET ORAL 2 TIMES DAILY
Status: DISCONTINUED | OUTPATIENT
Start: 2024-10-18 | End: 2024-10-19

## 2024-10-18 RX ORDER — PANTOPRAZOLE SODIUM 40 MG/1
40 TABLET, DELAYED RELEASE ORAL
Status: DISCONTINUED | OUTPATIENT
Start: 2024-10-18 | End: 2024-10-19

## 2024-10-18 RX ORDER — LOSARTAN POTASSIUM 50 MG/1
50 TABLET ORAL 2 TIMES DAILY
Status: DISCONTINUED | OUTPATIENT
Start: 2024-10-18 | End: 2024-10-18

## 2024-10-18 RX ADMIN — AMLODIPINE BESYLATE 10 MG: 10 TABLET ORAL at 09:23

## 2024-10-18 RX ADMIN — GABAPENTIN 100 MG: 100 CAPSULE ORAL at 14:22

## 2024-10-18 RX ADMIN — INSULIN GLARGINE 10 UNITS: 100 INJECTION, SOLUTION SUBCUTANEOUS at 20:42

## 2024-10-18 RX ADMIN — APIXABAN 5 MG: 5 TABLET, FILM COATED ORAL at 09:22

## 2024-10-18 RX ADMIN — LEVETIRACETAM 500 MG: 500 TABLET, FILM COATED ORAL at 09:23

## 2024-10-18 RX ADMIN — INSULIN GLARGINE 10 UNITS: 100 INJECTION, SOLUTION SUBCUTANEOUS at 09:23

## 2024-10-18 RX ADMIN — CLONIDINE HYDROCHLORIDE 0.1 MG: 0.1 TABLET ORAL at 20:43

## 2024-10-18 RX ADMIN — SODIUM CHLORIDE: 9 INJECTION, SOLUTION INTRAVENOUS at 07:09

## 2024-10-18 RX ADMIN — INSULIN LISPRO 6 UNITS: 100 INJECTION, SOLUTION INTRAVENOUS; SUBCUTANEOUS at 16:43

## 2024-10-18 RX ADMIN — ASPIRIN 81 MG: 81 TABLET, COATED ORAL at 09:22

## 2024-10-18 RX ADMIN — ATORVASTATIN CALCIUM 80 MG: 80 TABLET, FILM COATED ORAL at 00:42

## 2024-10-18 RX ADMIN — CLONIDINE HYDROCHLORIDE 0.1 MG: 0.1 TABLET ORAL at 09:22

## 2024-10-18 RX ADMIN — INSULIN GLARGINE 10 UNITS: 100 INJECTION, SOLUTION SUBCUTANEOUS at 00:42

## 2024-10-18 RX ADMIN — SODIUM CHLORIDE, PRESERVATIVE FREE 10 ML: 5 INJECTION INTRAVENOUS at 09:23

## 2024-10-18 RX ADMIN — LOSARTAN POTASSIUM 50 MG: 50 TABLET, FILM COATED ORAL at 09:22

## 2024-10-18 RX ADMIN — ATORVASTATIN CALCIUM 80 MG: 80 TABLET, FILM COATED ORAL at 20:43

## 2024-10-18 RX ADMIN — LEVETIRACETAM 500 MG: 500 TABLET, FILM COATED ORAL at 20:43

## 2024-10-18 RX ADMIN — INSULIN LISPRO 6 UNITS: 100 INJECTION, SOLUTION INTRAVENOUS; SUBCUTANEOUS at 20:42

## 2024-10-18 RX ADMIN — Medication: at 16:43

## 2024-10-18 RX ADMIN — AMIODARONE HYDROCHLORIDE 200 MG: 200 TABLET ORAL at 09:22

## 2024-10-18 RX ADMIN — CLOPIDOGREL BISULFATE 75 MG: 75 TABLET ORAL at 09:22

## 2024-10-18 RX ADMIN — GABAPENTIN 100 MG: 100 CAPSULE ORAL at 09:20

## 2024-10-18 RX ADMIN — APIXABAN 5 MG: 5 TABLET, FILM COATED ORAL at 20:43

## 2024-10-18 RX ADMIN — LEVETIRACETAM 500 MG: 500 TABLET, FILM COATED ORAL at 00:42

## 2024-10-18 RX ADMIN — INSULIN LISPRO 4 UNITS: 100 INJECTION, SOLUTION INTRAVENOUS; SUBCUTANEOUS at 00:48

## 2024-10-18 RX ADMIN — FUROSEMIDE 40 MG: 40 TABLET ORAL at 09:22

## 2024-10-18 RX ADMIN — INSULIN LISPRO 2 UNITS: 100 INJECTION, SOLUTION INTRAVENOUS; SUBCUTANEOUS at 11:59

## 2024-10-18 RX ADMIN — Medication: at 20:43

## 2024-10-18 RX ADMIN — APIXABAN 5 MG: 5 TABLET, FILM COATED ORAL at 00:42

## 2024-10-18 RX ADMIN — GABAPENTIN 100 MG: 100 CAPSULE ORAL at 20:43

## 2024-10-18 RX ADMIN — PANTOPRAZOLE SODIUM 40 MG: 40 TABLET, DELAYED RELEASE ORAL at 09:24

## 2024-10-18 ASSESSMENT — PAIN SCALES - PAIN ASSESSMENT IN ADVANCED DEMENTIA (PAINAD)
FACIALEXPRESSION: SMILING OR INEXPRESSIVE
TOTALSCORE: 1
BREATHING: NORMAL
FACIALEXPRESSION: SMILING OR INEXPRESSIVE
FACIALEXPRESSION: SMILING OR INEXPRESSIVE
BODYLANGUAGE: RELAXED
BODYLANGUAGE: RELAXED
TOTALSCORE: 1
BODYLANGUAGE: RELAXED
FACIALEXPRESSION: SMILING OR INEXPRESSIVE
BREATHING: NORMAL
FACIALEXPRESSION: SMILING OR INEXPRESSIVE
BODYLANGUAGE: RELAXED
BREATHING: NORMAL
FACIALEXPRESSION: SMILING OR INEXPRESSIVE
CONSOLABILITY: NO NEED TO CONSOLE
FACIALEXPRESSION: SMILING OR INEXPRESSIVE
BREATHING: NORMAL
FACIALEXPRESSION: SMILING OR INEXPRESSIVE
BREATHING: NORMAL
BREATHING: NORMAL
TOTALSCORE: 1
BREATHING: NORMAL
BREATHING: NORMAL
CONSOLABILITY: DISTRACTED OR REASSURED BY VOICE/TOUCH
CONSOLABILITY: DISTRACTED OR REASSURED BY VOICE/TOUCH
BODYLANGUAGE: RELAXED
CONSOLABILITY: DISTRACTED OR REASSURED BY VOICE/TOUCH
TOTALSCORE: 1
FACIALEXPRESSION: SMILING OR INEXPRESSIVE
TOTALSCORE: 1
TOTALSCORE: 1
CONSOLABILITY: DISTRACTED OR REASSURED BY VOICE/TOUCH
TOTALSCORE: 1
BODYLANGUAGE: RELAXED
BODYLANGUAGE: RELAXED
CONSOLABILITY: DISTRACTED OR REASSURED BY VOICE/TOUCH
BODYLANGUAGE: RELAXED
CONSOLABILITY: DISTRACTED OR REASSURED BY VOICE/TOUCH
TOTALSCORE: 1
TOTALSCORE: 1
BODYLANGUAGE: RELAXED
BREATHING: NORMAL
CONSOLABILITY: DISTRACTED OR REASSURED BY VOICE/TOUCH
BODYLANGUAGE: RELAXED
CONSOLABILITY: DISTRACTED OR REASSURED BY VOICE/TOUCH
BODYLANGUAGE: RELAXED
TOTALSCORE: 1
BODYLANGUAGE: RELAXED
FACIALEXPRESSION: SMILING OR INEXPRESSIVE
TOTALSCORE: 1
FACIALEXPRESSION: SMILING OR INEXPRESSIVE
BREATHING: NORMAL
BODYLANGUAGE: RELAXED
BREATHING: NORMAL
CONSOLABILITY: DISTRACTED OR REASSURED BY VOICE/TOUCH
FACIALEXPRESSION: SMILING OR INEXPRESSIVE
TOTALSCORE: 1
CONSOLABILITY: DISTRACTED OR REASSURED BY VOICE/TOUCH
FACIALEXPRESSION: SMILING OR INEXPRESSIVE
CONSOLABILITY: DISTRACTED OR REASSURED BY VOICE/TOUCH
CONSOLABILITY: DISTRACTED OR REASSURED BY VOICE/TOUCH
FACIALEXPRESSION: SMILING OR INEXPRESSIVE
FACIALEXPRESSION: SMILING OR INEXPRESSIVE
BREATHING: NORMAL
TOTALSCORE: 0
BREATHING: NORMAL

## 2024-10-18 ASSESSMENT — PAIN SCALES - GENERAL: PAINLEVEL_OUTOF10: 0

## 2024-10-18 NOTE — ED PROVIDER NOTES
[facility]   Emergency Department  Emergency Medicine Attending Sign-out   Note started: 11:48 PM EDT    Care of Leatha Mason was assumed from previous attending Dr. Turner at 1140 and is being seen for Hyperglycemia  .  The patient's initial evaluation and plan have been discussed with the prior provider who initially evaluated the patient.     Attestation  I was available and discussed any additional care issues that arose and coordinated the management plans with the resident(s) caring for the patient during my duty period. Any areas of disagreement with resident's documentation of care or procedures are noted on the chart. I was personally present for the key portions of any/all procedures, during my duty period. I have documented in the chart those procedures where I was not present during the key portions.     BRIEF PATIENT SUMMARY/MDM COURSE PER INITIAL PROVIDER:   RECENT VITALS:     Temp: 98.4 °F (36.9 °C),  Pulse: 51, Respirations: (!) 31, BP: (!) 158/76, SpO2: 100 %    This patient is a 74 y.o. Female with strokelike symptoms.  Was seen by neuro, they feel that all this is likely due to her hypoglycemia.  Planning for admission    DIAGNOSTICS/MEDICATIONS:     MEDICATIONS GIVEN:  ED Medication Orders (From admission, onward)      Start Ordered     Status Ordering Provider    10/17/24 2115 10/17/24 2112  insulin regular (HumuLIN R;NovoLIN R) injection 8 Units  ONCE         Last MAR action: Given - by MICH HALL on 10/17/24 at 2124 PAMELA CLARK    10/17/24 2115 10/17/24 2112  lactated ringers bolus 1,000 mL  ONCE         Last MAR action: New Bag - by MICH HALL on 10/17/24 at 2125 PAMELA CLARK    10/17/24 2032 10/17/24 2033  iopamidol (ISOVUE-370) 76 % injection 75 mL  IMG ONCE PRN         Last MAR action: Given - by MEÑO SHEETS on 10/17/24 at 2037 PAMELA CLARK            LABS    Labs Reviewed   CBC WITH AUTO DIFFERENTIAL - Abnormal; Notable for the    Admit    Violetta Robb MD  Emergency Medicine Attending  [facility]        Violetta Robb MD  10/17/24 1679

## 2024-10-18 NOTE — CONSULTS
Department of Neurology/Telestroke/Stroke  Resident Consult Note  Stroke Alert @ 2026  Arrival at bedside @ 2030    Reason for Consult:  ED Stroke Alert  Requesting Physician:  ED team   Endovascular Neurosurgeon: Aiden Brannon MD  Stroke Team: Tavon Ware MD  History Obtained From:  patient, family member - pedro, electronic medical record  Chief Complaint:  Hyperglycemia.   Allergies:  Bactrim, Diphenhydramine, Penicillins, Pregabalin, Trimethoprim, and Tylenol [acetaminophen]    HISTORY OF PRESENT ILLNESS:       The patient is a 73 y.o. female with significant past medical history of right MCA stroke with hemorrhagic transformation (2020) with residual left hemiparesis and dysarthria, seizures HTN, HLD, T2DM, YUDITH, anxiety, depression, CAD status post stenting who presents after she was found to be sig hyperglycemic by daughter     Patient last known well was 5 mins prior to arrival, as EMS found patient to be dysarthric with right sided facial droop as well as left sided weakness.  Patient is known to have complete left sided hemiplegia and right sided weakness. Daughter at bedside reports that patient is at baseline and has no new deficits. Speech also at baseline.     On examination patient has nihss of 18 as detailed below. She is dysarthric but at baseline.  There is clear left sided visual field cut. There is evidence of left sided hemiplegia with right sided hemiparesis. Those exam findings do not seem to be changed compared to neurology note in 2023 or 2024. .      Patient already on ASA and Plavix. She is also on Keppra 500 mg BID for seizure prophylaxis     Of note     Patient was last seen by neurology in Dec 2023 and Sep 2024, for similar complaints and for AMS in setting of [facility].  Patient did seem to have hemiplegic left upper and lower extremity and is also paretic in the right upper and lower at baseline.   MRI brain at that time did not reveal any new infarct but showed a very large right MCA  left internal carotid artery, stable.      50% stenosis in the proximal left subclavian artery.               Assessment & Plan:       Leatha Mason is a 74 y.o.  female with a history as above who presents as concerns for hyperglycemia. While in EMS noted to have right sided slight facial droop and dysarthria as well as left sided weakness. Stroke alert was called. Patient seems to be at baseline per daughter at bedside.      Last Known Well (date and time)   2020 per EMS     Candidate for IV Tenecteplase therapy    Yes []  Risks including 6% of sich/death, benefits of potential improved thrombolysis, and alternatives to IV thrombolytics discussed with patient and/or family.  N/A  N/A  No   [x] due to the following exclusion criteria:  Patient at baseline.    Candidate for Thrombectomy   Yes []    No  [x] due to the following exclusion criteria:  No acute LVO       Plan   Disposition   [] General Neurology Care Status - prefer 1st floor (1C)   [] Internal Medicine General Care Status   [] NICU Status - (1B)     [x] MICU Status   [] Observation Status    Stroke admission order set  [] [id number] - STEFANIE Intercerebral Hemorrhage Admission  [] [id number] - STEFANIE Sub Arachnoid Hemorrhage Admission  [] [id number] - STEFANIE Ischemic Stroke TPA Treatment Focused  [] [id number] - IP Ischemic Stroke ICU Post Alteplase (TPA) Admission   [] [id number] - GEN Ischemic Stroke Non-Thrombolytic Focused    Frequent neuro-checks (q4h)  Permissive HTN for 24h  Basic labs: CBC, CMP, coagulation panel, troponin  Stroke labwork: HgbA1C, lipid panel  Continue Plavix 75 mg and Aspirin 81 mg daily  High intensity statin therapy (long-term goal LDL < 70)  Tight glucose control (long-term goal HgbA1c < 7%)  Stroke education and counseling provided   Physical therapy, occupational therapy, speech therapy consults  Further workup as per primary team.     Discussed with MD ZUHAIR Brewster MD   10/18/2024  7:23 AM

## 2024-10-18 NOTE — ED PROVIDER NOTES
[facility]     Emergency Department     Faculty Attestation    I performed a history and physical examination of the patient and discussed management with the resident. I reviewed the resident’s note and agree with the documented findings and plan of care. Any areas of disagreement are noted on the chart. I was personally present for the key portions of any procedures. I have documented in the chart those procedures where I was not present during the key portions. I have reviewed the emergency nurses triage note. I agree with the chief complaint, past medical history, past surgical history, allergies, medications, social and family history as documented unless otherwise noted below.        For Physician Assistant/ Nurse Practitioner cases/documentation I have personally evaluated this patient and have completed at least one if not all key elements of the E/M (history, physical exam, and MDM). Additional findings are as noted.  I have personally seen and evaluated the patient.  I find the patient's history and physical exam are consistent with the NP/PA documentation.  I agree with the care provided, treatment rendered, disposition and follow-up plan.    Patient has developed slurred speech and faint left facial droop earlier this evening per daughter history of similar events related to hyperglycemia has also had chronic extremity weakness in the past due to presentation of stroke alert was called was media upon her original assessment.  Stroke team to evaluate patient laboratory studies pending.      Critical Care     Win Turner M.D.  Attending Emergency  Physician           Win Turner MD  10/17/24 5329

## 2024-10-18 NOTE — ED NOTES
ED to inpatient nurses report      Chief Complaint:  Chief Complaint   Patient presents with    Hyperglycemia     Present to ED from: home    MOA:     LOC: alert to only name  Mobility: Requires assistance * 2  Oxygen Baseline: room air     Current needs required: room air   Pending ED orders: none   Present condition: stable    Why did the patient come to the ED? Pt originally came to ed with complaint of hyperglycemia, SAC called then cancelled due to this being patients baseline  What is the plan? admission  Any procedures or intervention occur? Labs, ct   Any safety concerns?? None at this time     Mental Status:       Psych Assessment:   Psychosocial  Psychosocial (WDL): (S) Within Defined Limits  Vital signs   Vitals:    10/17/24 2222 10/17/24 2232 10/17/24 2242 10/17/24 2252   BP: 137/66 (!) 147/75 (!) 158/89 (!) 158/76   Pulse: (!) 41 (!) 42 55 51   Resp: 15 15 14 (!) 31   Temp:    98.4 °F (36.9 °C)   TempSrc:    Oral   SpO2: 97% 100%          Vitals:  Patient Vitals for the past 24 hrs:   BP Temp Temp src Pulse Resp SpO2   10/17/24 2252 (!) 158/76 98.4 °F (36.9 °C) Oral 51 (!) 31 --   10/17/24 2242 (!) 158/89 -- -- 55 14 --   10/17/24 2232 (!) 147/75 -- -- (!) 42 15 100 %   10/17/24 2222 137/66 -- -- (!) 41 15 97 %   10/17/24 2212 (!) 148/75 -- -- (!) 43 14 100 %   10/17/24 2202 139/65 -- -- (!) 44 15 100 %   10/17/24 2152 (!) 146/63 -- -- (!) 43 14 100 %   10/17/24 2142 (!) 148/67 -- -- (!) 45 15 100 %   10/17/24 2132 -- -- -- 55 20 96 %   10/17/24 2131 (!) 145/70 -- -- 54 14 90 %   10/17/24 2130 (!) 145/70 -- -- 52 13 --   10/17/24 2122 (!) 142/66 -- -- (!) 48 13 100 %   10/17/24 2116 132/65 -- -- 53 16 90 %   10/17/24 2112 (!) 144/67 -- -- 50 12 95 %   10/17/24 2103 134/67 -- -- 51 16 93 %   10/17/24 2102 -- -- -- (!) 49 15 97 %   10/17/24 2101 125/63 -- -- 51 15 99 %   10/17/24 2052 (!) 143/71 -- -- -- -- --   10/17/24 2032 (!) 100/54 -- -- 58 13 --   10/17/24 2031 100/63 -- -- 60 16 98 %      Visit  Hematocrit 31.8 (*)     RDW 15.6 (*)     Eosinophils % 5 (*)     All other components within normal limits   COMPREHENSIVE METABOLIC PANEL - Abnormal; Notable for the following components:    Sodium 134 (*)     Anion Gap 8 (*)     Glucose 416 (*)     Total Protein 6.3 (*)     Albumin 2.8 (*)     All other components within normal limits   BLOOD GAS, VENOUS - Abnormal; Notable for the following components:    Positive Base Excess, Lincoln 2.7 (*)     All other components within normal limits   LIPASE - Abnormal; Notable for the following components:    Lipase 110 (*)     All other components within normal limits   URINALYSIS WITH REFLEX TO CULTURE - Abnormal; Notable for the following components:    Glucose, Ur 2+ (*)     Specific Gravity, UA 1.031 (*)     Urine Hgb MODERATE (*)     All other components within normal limits   ELECTROLYTES PLUS - Abnormal; Notable for the following components:    POC TCO2 34 (*)     All other components within normal limits   HGB/HCT - Abnormal; Notable for the following components:    POC Hemoglobin (calc) 11.4 (*)     POC Hematocrit 34 (*)     All other components within normal limits   TROPONIN - Abnormal; Notable for the following components:    Troponin, High Sensitivity 47 (*)     All other components within normal limits   BRAIN NATRIURETIC PEPTIDE - Abnormal; Notable for the following components:    NT Pro-BNP 1,084 (*)     All other components within normal limits   TROPONIN - Abnormal; Notable for the following components:    Troponin, High Sensitivity 48 (*)     All other components within normal limits   VENOUS BLOOD GAS, POINT OF CARE - Abnormal; Notable for the following components:    pCO2, Lincoln 56.3 (*)     PO2, [place] 19.3 (*)     HCO3, Venous 33.3 (*)     Positive Base Excess, Lincoln 6.7 (*)     O2 Sat, [place] 27.9 (*)     All other components within normal limits   LACTIC ACID,POINT OF CARE - Abnormal; Notable for the following components:    POC Lactic Acid 1.8 (*)     All other

## 2024-10-18 NOTE — CARE COORDINATION
10/18/24 1642   Readmission Assessment   Number of Days since last admission? 8-30 days   Previous Disposition Home with Family   Who is being Interviewed Caregiver   What was the patient's/caregiver's perception as to why they think they needed to return back to the hospital? Other (Comment)  (symptoms)   Did you visit your Primary Care Physician after you left the hospital, before you returned this time? Yes   Did you see a specialist, such as Cardiac, Pulmonary, Orthopedic Physician, etc. after you left the hospital? No   Who advised the patient to return to the hospital? Self-referral   Does the patient report anything that got in the way of taking their medications? No   In our efforts to provide the best possible care to you and others like you, can you think of anything that we could have done to help you after you left the hospital the first time, so that you might not have needed to return so soon? Arrange for more help when leaving the hospital

## 2024-10-18 NOTE — CARE COORDINATION
Case Management Assessment  Initial Evaluation    Date/Time of Evaluation: 10/18/2024 4:20 PM  Assessment Completed by: SETH ALCALA RN    If patient is discharged prior to next notation, then this note serves as note for discharge by case management.    Patient Name: Leatha Mason                   YOB: 1950  Diagnosis: Hyperglycemia [R73.9]  Hyperosmolar hyperglycemic state (HHS) (HCC) [E11.00]                   Date / Time: 10/17/2024  8:23 PM    Patient Admission Status: Inpatient   Readmission Risk (Low < 19, Mod (19-27), High > 27): Readmission Risk Score: 24.5    Current PCP: Lenore Bobby APRN - CNP  PCP verified by CM? Yes    Chart Reviewed: Yes      History Provided by: Child/Family (daughter An)  Patient Orientation:      Patient Cognition: Alert (confused)    Hospitalization in the last 30 days (Readmission):  Yes    If yes, Readmission Assessment in  Navigator will be completed.    Advance Directives:      Code Status: Full Code   Patient's Primary Decision Maker is:      Primary Decision Maker: Becky Romero - Child - [phone]    Secondary Decision Maker: Latoya Romero  Child - [phone]    Discharge Planning:    Patient lives with: Children Type of Home: House  Primary Care Giver: Family  Patient Support Systems include: Children   Current Financial resources:    Current community resources:    Current services prior to admission: Durable Medical Equipment, Home Care            Current DME: Hospital Bed, Wheelchair            Type of Home Care services:  PT ([facility])    ADLS  Prior functional level: Assistance with the following:  Current functional level: Assistance with the following:    PT AM-PAC:   /24  OT AM-PAC:   /24    Family can provide assistance at DC: Yes  Would you like Case Management to discuss the discharge plan with any other family members/significant others, and if so, who? Yes (dtr An)  Plans to Return to Present Housing:

## 2024-10-18 NOTE — PROGRESS NOTES
[facility]     [place]Trauma Note    PATIENT NAME: Leatha Mason    Shift date: 10/17/2024  Shift day: Thursday   Shift # 2    Room # 34/34   Name: Leatha Mason            Age: 74 y.o.  Gender: female          Restorationist: Congregation   Place of Mosque:     Trauma/Incident type: Stroke Alert  Admit Date & Time: 10/17/2024  8:23 PM  TRAUMA NAME: N/A    ADVANCE DIRECTIVES IN CHART?  No    NAME OF DECISION MAKER: N/A    RELATIONSHIP OF DECISION MAKER TO PATIENT: N/A    PATIENT/EVENT DESCRIPTION:  Leatha Mason is a 74 y.o. female who arrived at [facility]. Writer was not present upon arrival.  responded to stroke alert to ED 34. Pt to be admitted to 34/34.         SPIRITUAL ASSESSMENT-INTERVENTION-OUTCOME:   provided a ministry of presence to patient and staff upon arrival. Upon returning, writer introduced self as . Family was present at bedside and appeared receptive to  visit and engaged in conversation.  provided a supportive presence through active listening and words of affirmation.     PATIENT BELONGINGS:  Writer did not handle patient belongings    ANY BELONGINGS OF SIGNIFICANT VALUE NOTED:  N/A    REGISTRATION STAFF NOTIFIED?  Yes      WHAT IS YOUR SPIRITUAL CARE PLAN FOR THIS PATIENT?:   N/A    Electronically signed by Chaplain Jackie, on 10/17/2024 at 11:25 PM.  [facility]  [phone]

## 2024-10-18 NOTE — H&P
[facility]     Department of Internal Medicine - Staff Internal Medicine Teaching Service          ADMISSION NOTE/HISTORY AND PHYSICAL EXAMINATION   Date: 10/18/2024  Patient Name: Leatha Mason  Date of admission: 10/17/2024  8:23 PM  YOB: 1950  PCP: Lenore Bobby APRN - CNP  History Obtained From:  family member - daughter    CHIEF COMPLAINT     Chief complaint: left sided facial droop     HISTORY OF PRESENTING ILLNESS     The patient is a pleasant 74 y.o. 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  Per the patient's daughter her Dexcom has been reporting hypoglycemic events over the past day.  Patient also recently discharged from the hospital with regimen of linagliptin and glimepiride for diabetic management.  Patient's daughter said she was compliant with his diabetic regimen, but still reports high glucose levels.    Also there was concern for the patient was having left-sided facial droop, arm weakness, slurred speech, and confusion.  Patient does have baseline NIH that is over 10, at least.  Exact NIH not well-documented on recent discharge summary.    Due to suppose an onset strokelike symptoms patient was brought to the emergency department as code stroke.  Stroke team evaluated the patient.  NIH SS DMV 18.  Per patient's daughter her dysarthria seems to be at baseline.  Left-sided hemiplegia was at baseline.  Per stroke team her exam findings do not seem to be changed from neurology notes in 2023 and 2024.  CT head conducted which showed old infarcts in the right frontal parietal temporal lobes and the right basal ganglia/insular cortex and the left cerebral hemisphere which  Pro-BNP 1,084 (H) 0 - 300 pg/mL   Magnesium    Collection Time: 10/17/24  8:34 PM   Result Value Ref Range    Magnesium 1.7 1.6 - 2.4 mg/dL   Troponin    Collection Time: 10/17/24  8:34 PM   Result Value Ref Range    Troponin, High Sensitivity 48 (H) 0 - 14 ng/L   Troponin    Collection Time: 10/17/24  9:44 PM   Result Value Ref Range    Troponin, High Sensitivity 47 (H) 0 - 14 ng/L   Urinalysis with Reflex to Culture    Collection Time: 10/17/24 10:54 PM    Specimen: Urine   Result Value Ref Range    Color, UA Yellow Yellow    Turbidity UA Clear Clear    Glucose, Ur 2+ (A) NEGATIVE mg/dL    Bilirubin, Urine NEGATIVE NEGATIVE    Ketones, Urine NEGATIVE NEGATIVE mg/dL    Specific Gravity, UA 1.031 (H) 1.005 - 1.030    Urine Hgb MODERATE (A) NEGATIVE    pH, Urine 6.0 5.0 - 8.0    Protein, UA NEGATIVE NEGATIVE mg/dL    Urobilinogen, Urine Normal 0.0 - 1.0 EU/dL    Nitrite, Urine NEGATIVE NEGATIVE    Leukocyte Esterase, Urine NEGATIVE NEGATIVE   Microscopic Urinalysis    Collection Time: 10/17/24 10:54 PM   Result Value Ref Range    WBC, UA 0 TO 2 0 - 5 /HPF    RBC, UA 20 TO 50 0 - 4 /HPF    Casts UA  0 - 8 /LPF     2 TO 5 HYALINE Reference range defined for non-centrifuged specimen.    Epithelial Cells, UA 0 TO 2 0 - 5 /HPF    Bacteria, UA None None   POC Glucose Fingerstick    Collection Time: 10/17/24 11:35 PM   Result Value Ref Range    POC Glucose 311 (H) 65 - 105 mg/dL       Imaging:   CTA HEAD NECK W CONTRAST    Result Date: 10/17/2024  Severe stenosis in the proximal M1 segment of the right MCA, stable. Moderate stenosis in the mid M1 segment of the left MCA, stable. Mild-to-moderate stenosis in the cavernous/supraclinoid segments of the bilateral internal carotid arteries. 40% stenosis in the basilar artery. Severe stenosis at the origin of the right vertebral artery, stable. 60% stenosis in the V1 and V2 segments of the left vertebral artery, stable. 50% stenosis in the proximal left internal carotid artery,

## 2024-10-18 NOTE — ED NOTES
Pt arrived to ed with ems with complaint of hyperglycemia, upon arrival pt had facial droop and left sided weakness so stroke alert critical was called. Pt taken to CT. Daughter arrived and stated deficits are normal and pt is at baseline. No new stroke on ct. Stroke alert cancelled per neuro. Pt alert to self and daughter. Pts daughter stated she was discharged home two weeks ago from ICU with hyperglycemia as main complaint, upon discharge no home medications for hyperglycemia were ordered. At home daughter used her dexcom on pt to monitor blood sugars which she stated just kept rising prompting ems trip to ED.

## 2024-10-18 NOTE — PLAN OF CARE
Problem: Discharge Planning  Goal: Discharge to home or other facility with appropriate resources  Outcome: Progressing     Problem: Pain  Goal: Verbalizes/displays adequate comfort level or baseline comfort level  Outcome: Progressing  Flowsheets (Taken 10/18/2024 0915)  Verbalizes/displays adequate comfort level or baseline comfort level: Encourage patient to monitor pain and request assistance     Problem: Skin/Tissue Integrity  Goal: Absence of new skin breakdown  Description: 1.  Monitor for areas of redness and/or skin breakdown  2.  Assess vascular access sites hourly  3.  Every 4-6 hours minimum:  Change oxygen saturation probe site  4.  Every 4-6 hours:  If on nasal continuous positive airway pressure, respiratory therapy assess nares and determine need for appliance change or resting period.  Outcome: Progressing

## 2024-10-18 NOTE — ED PROVIDER NOTES
[facility] ED  Emergency Department Encounter  Emergency Medicine Resident     Pt Name:Leatha Mason  MRN: [id number]  Birthdate 1950  Date of evaluation: 10/17/24  PCP:  Lenore Bobby APRN - CNP  Note Started: 8:26 PM EDT      CHIEF COMPLAINT       Chief Complaint   Patient presents with    Hyperglycemia       HISTORY OF PRESENT ILLNESS  (Location/Symptom, Timing/Onset, Context/Setting, Quality, Duration, Modifying Factors, Severity.)      Leatha Mason is a 74 y.o. female who presents with left facial droop, arm weakness, slurred speech, and confusion. Per EMS symptoms began en route. Patient was transported by EMS for worsening hyperglycemia on Dexcom during dinner and was recently admitted for the same with no changes to her medication regimen at the time.     PAST MEDICAL / SURGICAL / SOCIAL / FAMILY HISTORY      has a past medical history of Acute renal failure (HCC), Allergic rhinitis, Anxiety, Asthma, CAD (coronary artery disease), Chronic back pain, Chronic obstructive pulmonary disease (HCC), Chronic obstructive pulmonary disease (HCC), Chronic pain, Chronic use of opiate drugs therapeutic purposes, Congestive heart failure (HCC), Depression, Headache(784.0), Hiatal hernia, Hypercholesteremia, Hypertension, Kidney stones, Obesity, Osteoarthritis, Postlaminectomy syndrome, Sacral decubitus ulcer, stage III (HCC), Seizure (HCC), SVT (supraventricular tachycardia) (HCC), Type II or unspecified type diabetes mellitus without mention of complication, not stated as uncontrolled, Unspecified sleep apnea, and Urinary incontinence.       has a past surgical history that includes Spine surgery; Nerve Block (04/23/2012); Nerve Block (05/22/2012); Nerve Block (01/14/2013); Nerve Block (01/21/2013); Nerve Block (01/28/2013); Cardiac catheterization (01/2013); Lumbar spine surgery (2007); Vena Cava Filter Placement (2007); Tonsillectomy; joint replacement; knee surgery

## 2024-10-19 LAB
ALBUMIN SERPL-MCNC: 2.6 G/DL (ref 3.5–5.2)
ALBUMIN/GLOB SERPL: 1 {RATIO} (ref 1–2.5)
ALP SERPL-CCNC: 67 U/L (ref 35–104)
ALT SERPL-CCNC: 8 U/L (ref 10–35)
ANION GAP SERPL CALCULATED.3IONS-SCNC: 9 MMOL/L (ref 9–16)
AST SERPL-CCNC: 18 U/L (ref 10–35)
BASOPHILS # BLD: 0.03 K/UL (ref 0–0.2)
BASOPHILS NFR BLD: 0 % (ref 0–2)
BILIRUB SERPL-MCNC: 0.2 MG/DL (ref 0–1.2)
BUN SERPL-MCNC: 21 MG/DL (ref 8–23)
CALCIUM SERPL-MCNC: 8.9 MG/DL (ref 8.6–10.4)
CHLORIDE SERPL-SCNC: 105 MMOL/L (ref 98–107)
CO2 SERPL-SCNC: 26 MMOL/L (ref 20–31)
CREAT SERPL-MCNC: 0.9 MG/DL (ref 0.5–0.9)
CRP SERPL HS-MCNC: 31.8 MG/L (ref 0–5)
EOSINOPHIL # BLD: 0.82 K/UL (ref 0–0.44)
EOSINOPHILS RELATIVE PERCENT: 7 % (ref 1–4)
ERYTHROCYTE [DISTWIDTH] IN BLOOD BY AUTOMATED COUNT: 16.1 % (ref 11.8–14.4)
GFR, ESTIMATED: 68 ML/MIN/1.73M2
GLUCOSE BLD-MCNC: 131 MG/DL (ref 65–105)
GLUCOSE BLD-MCNC: 167 MG/DL (ref 65–105)
GLUCOSE BLD-MCNC: 184 MG/DL (ref 65–105)
GLUCOSE BLD-MCNC: 244 MG/DL (ref 65–105)
GLUCOSE SERPL-MCNC: 136 MG/DL (ref 74–99)
HCT VFR BLD AUTO: 37.9 % (ref 36.3–47.1)
HGB BLD-MCNC: 11.3 G/DL (ref 11.9–15.1)
IMM GRANULOCYTES # BLD AUTO: 0.24 K/UL (ref 0–0.3)
IMM GRANULOCYTES NFR BLD: 2 %
LYMPHOCYTES NFR BLD: 2.6 K/UL (ref 1.1–3.7)
LYMPHOCYTES RELATIVE PERCENT: 22 % (ref 24–43)
MCH RBC QN AUTO: 27.3 PG (ref 25.2–33.5)
MCHC RBC AUTO-ENTMCNC: 29.8 G/DL (ref 28.4–34.8)
MCV RBC AUTO: 91.5 FL (ref 82.6–102.9)
MONOCYTES NFR BLD: 0.44 K/UL (ref 0.1–1.2)
MONOCYTES NFR BLD: 4 % (ref 3–12)
NEUTROPHILS NFR BLD: 65 % (ref 36–65)
NEUTS SEG NFR BLD: 7.78 K/UL (ref 1.5–8.1)
NRBC BLD-RTO: 0 PER 100 WBC
PLATELET # BLD AUTO: 387 K/UL (ref 138–453)
PMV BLD AUTO: 9.5 FL (ref 8.1–13.5)
POTASSIUM SERPL-SCNC: 3.9 MMOL/L (ref 3.7–5.3)
PROCALCITONIN SERPL-MCNC: 0.13 NG/ML (ref 0–0.09)
PROT SERPL-MCNC: 5.6 G/DL (ref 6.6–8.7)
RBC # BLD AUTO: 4.14 M/UL (ref 3.95–5.11)
RBC # BLD: ABNORMAL 10*6/UL
SODIUM SERPL-SCNC: 140 MMOL/L (ref 136–145)
WBC OTHER # BLD: 11.9 K/UL (ref 3.5–11.3)

## 2024-10-19 PROCEDURE — 86140 C-REACTIVE PROTEIN: CPT

## 2024-10-19 PROCEDURE — 84145 PROCALCITONIN (PCT): CPT

## 2024-10-19 PROCEDURE — 2580000003 HC RX 258

## 2024-10-19 PROCEDURE — 6370000000 HC RX 637 (ALT 250 FOR IP)

## 2024-10-19 PROCEDURE — 1200000000 HC SEMI PRIVATE

## 2024-10-19 PROCEDURE — 36415 COLL VENOUS BLD VENIPUNCTURE: CPT

## 2024-10-19 PROCEDURE — 80053 COMPREHEN METABOLIC PANEL: CPT

## 2024-10-19 PROCEDURE — 85025 COMPLETE CBC W/AUTO DIFF WBC: CPT

## 2024-10-19 PROCEDURE — 99232 SBSQ HOSP IP/OBS MODERATE 35: CPT | Performed by: STUDENT IN AN ORGANIZED HEALTH CARE EDUCATION/TRAINING PROGRAM

## 2024-10-19 PROCEDURE — 82947 ASSAY GLUCOSE BLOOD QUANT: CPT

## 2024-10-19 RX ORDER — METOPROLOL TARTRATE 25 MG/1
25 TABLET, FILM COATED ORAL 2 TIMES DAILY
Status: DISCONTINUED | OUTPATIENT
Start: 2024-10-19 | End: 2024-10-24 | Stop reason: HOSPADM

## 2024-10-19 RX ORDER — CETIRIZINE HYDROCHLORIDE 10 MG/1
10 TABLET ORAL ONCE
Status: COMPLETED | OUTPATIENT
Start: 2024-10-19 | End: 2024-10-19

## 2024-10-19 RX ORDER — ASPIRIN 81 MG/1
81 TABLET, CHEWABLE ORAL DAILY
Status: DISCONTINUED | OUTPATIENT
Start: 2024-10-19 | End: 2024-10-24 | Stop reason: HOSPADM

## 2024-10-19 RX ORDER — CLONIDINE HYDROCHLORIDE 0.1 MG/1
0.1 TABLET ORAL DAILY
Status: DISCONTINUED | OUTPATIENT
Start: 2024-10-20 | End: 2024-10-20

## 2024-10-19 RX ORDER — INSULIN GLARGINE 100 [IU]/ML
15 INJECTION, SOLUTION SUBCUTANEOUS 2 TIMES DAILY
Status: DISCONTINUED | OUTPATIENT
Start: 2024-10-19 | End: 2024-10-20

## 2024-10-19 RX ORDER — METOPROLOL TARTRATE 25 MG/1
25 TABLET, FILM COATED ORAL 2 TIMES DAILY
Status: DISCONTINUED | OUTPATIENT
Start: 2024-10-19 | End: 2024-10-19

## 2024-10-19 RX ORDER — LEVETIRACETAM 100 MG/ML
500 SOLUTION ORAL 2 TIMES DAILY
Status: DISCONTINUED | OUTPATIENT
Start: 2024-10-19 | End: 2024-10-24 | Stop reason: HOSPADM

## 2024-10-19 RX ADMIN — SODIUM CHLORIDE, PRESERVATIVE FREE 10 ML: 5 INJECTION INTRAVENOUS at 21:49

## 2024-10-19 RX ADMIN — LEVETIRACETAM 500 MG: 100 SOLUTION ORAL at 11:15

## 2024-10-19 RX ADMIN — ASPIRIN 81 MG 81 MG: 81 TABLET ORAL at 11:15

## 2024-10-19 RX ADMIN — GABAPENTIN 100 MG: 100 CAPSULE ORAL at 21:57

## 2024-10-19 RX ADMIN — SODIUM CHLORIDE, PRESERVATIVE FREE 10 ML: 5 INJECTION INTRAVENOUS at 09:24

## 2024-10-19 RX ADMIN — CETIRIZINE HYDROCHLORIDE 10 MG: 10 TABLET, FILM COATED ORAL at 14:31

## 2024-10-19 RX ADMIN — METOPROLOL TARTRATE 25 MG: 25 TABLET, FILM COATED ORAL at 14:31

## 2024-10-19 RX ADMIN — Medication: at 21:55

## 2024-10-19 RX ADMIN — CLOPIDOGREL BISULFATE 75 MG: 75 TABLET ORAL at 09:23

## 2024-10-19 RX ADMIN — AMIODARONE HYDROCHLORIDE 200 MG: 200 TABLET ORAL at 09:23

## 2024-10-19 RX ADMIN — GABAPENTIN 100 MG: 100 CAPSULE ORAL at 14:32

## 2024-10-19 RX ADMIN — INSULIN LISPRO 2 UNITS: 100 INJECTION, SOLUTION INTRAVENOUS; SUBCUTANEOUS at 17:20

## 2024-10-19 RX ADMIN — INSULIN GLARGINE 15 UNITS: 100 INJECTION, SOLUTION SUBCUTANEOUS at 11:15

## 2024-10-19 RX ADMIN — Medication: at 11:16

## 2024-10-19 RX ADMIN — APIXABAN 5 MG: 5 TABLET, FILM COATED ORAL at 21:51

## 2024-10-19 RX ADMIN — GABAPENTIN 100 MG: 100 CAPSULE ORAL at 09:23

## 2024-10-19 RX ADMIN — FUROSEMIDE 40 MG: 40 TABLET ORAL at 08:52

## 2024-10-19 RX ADMIN — LEVETIRACETAM 500 MG: 100 SOLUTION ORAL at 21:57

## 2024-10-19 RX ADMIN — INSULIN LISPRO 2 UNITS: 100 INJECTION, SOLUTION INTRAVENOUS; SUBCUTANEOUS at 11:15

## 2024-10-19 RX ADMIN — INSULIN GLARGINE 15 UNITS: 100 INJECTION, SOLUTION SUBCUTANEOUS at 21:55

## 2024-10-19 RX ADMIN — ATORVASTATIN CALCIUM 80 MG: 80 TABLET, FILM COATED ORAL at 21:51

## 2024-10-19 RX ADMIN — APIXABAN 5 MG: 5 TABLET, FILM COATED ORAL at 09:23

## 2024-10-19 ASSESSMENT — PAIN SCALES - GENERAL: PAINLEVEL_OUTOF10: 0

## 2024-10-19 NOTE — PROGRESS NOTES
Report called Rachna ESPINOZA on 3C. 1751pm Called patient's daughter, An. Notified that patient was being transferred to [facility].

## 2024-10-19 NOTE — DISCHARGE INSTR - COC
Continuity of Care Form    Patient Name: Leatha Mason   :  1950  MRN:  [id number]    Admit date:  10/17/2024  Discharge date:  10/24/2024    Code Status Order: Full Code   Advance Directives:   Advance Care Flowsheet Documentation             Admitting Physician:  Che Vale MD  PCP: Lenore Bobby APRN - CNP    Discharging Nurse: OLGA Luque  Discharging Hospital Unit/Room#: OBS   Discharging Unit Phone Number: [phone]    Emergency Contact:   Extended Emergency Contact Information  Primary Emergency Contact: An Romero  Home Phone: [phone]  Relation: Child  Secondary Emergency Contact: Latoya Romero  Address: NOT AVAILABLE  Home Phone: [phone]  Mobile Phone: [phone]  Relation: Child  Preferred language: English   needed? No    Past Surgical History:  Past Surgical History:   Procedure Laterality Date    CARDIAC CATHETERIZATION  2013    patent stents    COLONOSCOPY  2015    normal    CORONARY ANGIOPLASTY WITH STENT PLACEMENT  1012-16    stents x 3 - [employer] stents 1.5T or less    GASTROSTOMY TUBE PLACEMENT  2020    EGD PEG TUBE PLACEMENT    GASTROSTOMY TUBE PLACEMENT N/A 2020    EGD PEG TUBE PLACEMENT performed by Salvador Aguayo MD at [facility] OR    INSERT MIDLINE CATHETER  2021         JOINT REPLACEMENT      left knee    KNEE SURGERY  10/21/2013    rt knee synvisc injection     KNEE SURGERY  2013    knee synvisc injection rt #2    KNEE SURGERY  2013    rt knee synvisc inj    LUMBAR SPINE SURGERY  2007    NERVE BLOCK  2012    Right MBNB L3, L4, L5    NERVE BLOCK  2012    Right MBNB L3, L4, and L5     NERVE BLOCK  2013    Right knee injection #1 - Synvisc    NERVE BLOCK  2013    Right knee injection #2 - Synvisc    NERVE BLOCK  2013    Rigth knee synvisc injection #3    NERVE BLOCK Right 2017    Rt genicular nerve block. no steroid used    OTHER SURGICAL HISTORY Right

## 2024-10-19 NOTE — PROGRESS NOTES
Notified primary service of the following via secure message: Patient takes her medications crushed. Could you please change Aspirin to chewable and also Protonix and Keppra to a formulary patient can take, possibly liquid form? Unable to give morning doses. Also, Held Norvasc and Losartan this morning due to BP 90/54, repeat 104/65. See orders.    1412pm Notified primary service of the following via secure message: Could you please change Metoprolol order so that patient gets 25mg dose now, instead of 9pm? BACKGROUND: -120. /90. Metoprolol dose was not given this am and dose was changed to 25mg, to start at 9pm tonight. Earlier today BP was low so Norvasc and Losartan were held. Also can patient have prn medication for itching? See orders.    1725pm Notified primary service of the following via secure message: Notification: Patient only urinated in brief once this morning, small amount in brief. Just bladder scanned patient - 334ml. Primary service responded to give patient time to try and urinated, if she does not contact service and straight cath order will be placed.

## 2024-10-19 NOTE — CARE COORDINATION
TRANSITIONAL CARE PLANNING/ DISCHARGE ONGOING EVALUATION    Hospital Day: 1    Reason for Admission: Hyperglycemia [R73.9]  Hyperosmolar hyperglycemic state (HHS) (HCC) [E11.00]         Readmission Risk              Risk of Unplanned Readmission:  28            Patient goals/Treatment Preferences/Transitional Plan:   SNF rererrals made yesterday #1 [facility] # 2 [facility] # 3 [facility]  Need pt/ot evals   [facility]ed [facility] to f/u on referral left vm   Called ashanti with [facility] nursing will look at referral Monday  Called Cat with [facility] to f/u on referral will look into it and call back    12:42 call from cat with [facility] can accept   [facility]ed [facility] left another message       16:48 called daughter anaya to update ok to pursue precert for [facility]   called cat with [facility] will submit for precert

## 2024-10-19 NOTE — PLAN OF CARE
Problem: Discharge Planning  Goal: Discharge to home or other facility with appropriate resources  10/19/2024 1857 by Rachna Jeffery RN  Outcome: Progressing  10/19/2024 1753 by Angelina Cardona RN  Outcome: Progressing  10/19/2024 0515 by Joby Ernst RN  Outcome: Progressing     Problem: Pain  Goal: Verbalizes/displays adequate comfort level or baseline comfort level  10/19/2024 1857 by Rachna Jeffery RN  Outcome: Progressing  10/19/2024 1753 by Angelina Cardona RN  Outcome: Progressing  10/19/2024 0515 by Joby Ernst RN  Outcome: Progressing     Problem: Skin/Tissue Integrity  Goal: Absence of new skin breakdown  Description: 1.  Monitor for areas of redness and/or skin breakdown  2.  Assess vascular access sites hourly  3.  Every 4-6 hours minimum:  Change oxygen saturation probe site  4.  Every 4-6 hours:  If on nasal continuous positive airway pressure, respiratory therapy assess nares and determine need for appliance change or resting period.  10/19/2024 1857 by Rachna Jeffery RN  Outcome: Progressing  10/19/2024 1753 by Angelina Cardona RN  Outcome: Progressing  10/19/2024 0515 by Joby Ernst RN  Outcome: Progressing     Problem: Safety - Adult  Goal: Free from fall injury  10/19/2024 1753 by Angelina Cardona RN  Outcome: Progressing  10/19/2024 0515 by Joby Ernst RN  Outcome: Progressing

## 2024-10-19 NOTE — PLAN OF CARE
Problem: Discharge Planning  Goal: Discharge to home or other facility with appropriate resources  10/19/2024 1753 by Angelina Cardona RN  Outcome: Progressing  10/19/2024 0515 by Joby Ernst RN  Outcome: Progressing     Problem: Pain  Goal: Verbalizes/displays adequate comfort level or baseline comfort level  10/19/2024 1753 by Angelina Cardona RN  Outcome: Progressing  10/19/2024 0515 by Joby Ernst RN  Outcome: Progressing     Problem: Skin/Tissue Integrity  Goal: Absence of new skin breakdown  Description: 1.  Monitor for areas of redness and/or skin breakdown  2.  Assess vascular access sites hourly  3.  Every 4-6 hours minimum:  Change oxygen saturation probe site  4.  Every 4-6 hours:  If on nasal continuous positive airway pressure, respiratory therapy assess nares and determine need for appliance change or resting period.  10/19/2024 1753 by Angelina Cardona RN  Outcome: Progressing  10/19/2024 0515 by Joby Ernst RN  Outcome: Progressing     Problem: Safety - Adult  Goal: Free from fall injury  10/19/2024 1753 by Angelina Cardona RN  Outcome: Progressing  10/19/2024 0515 by Joby Ernst RN  Outcome: Progressing      Family

## 2024-10-19 NOTE — PROGRESS NOTES
Occupational Therapy    [facility]  Occupational Therapy Not Seen Note    DATE: 10/19/2024    NAME: Leatha Mason  MRN: [id number]   : 1950      Patient not seen this date for Occupational Therapy due to:    Patient is not appropriate for active participation in OT evaluation/treatment at this time d/t spoke with RN, initially okay'd OT session, prior to OT entering patient began to have episodes of tachycardia, RN advised hold OT this date      Electronically signed by Roya Mills OT on 10/19/2024 at 2:19 PM

## 2024-10-19 NOTE — PROGRESS NOTES
[facility]     Department of Internal Medicine - Staff Internal Medicine Teaching Service          ADMISSION NOTE/HISTORY AND PHYSICAL EXAMINATION   Date: 10/19/2024  Patient Name: Leatha Mason  Date of admission: 10/17/2024  8:23 PM  YOB: 1950  PCP: Lenore Bobby APRN - CNP  History Obtained From:  family member - daughter    Interval History      - patient at baseline   - not complaining of new complaints, although     HISTORY OF PRESENTING ILLNESS     The patient is a pleasant 74 y.o. 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  Per the patient's daughter her Dexcom has been reporting hypoglycemic events over the past day.  Patient also recently discharged from the hospital with regimen of linagliptin and glimepiride for diabetic management.  Patient's daughter said she was compliant with his diabetic regimen, but still reports high glucose levels.    Also there was concern for the patient was having left-sided facial droop, arm weakness, slurred speech, and confusion.  Patient does have baseline NIH that is over 10, at least.  Exact NIH not well-documented on recent discharge summary.    Due to suppose an onset strokelike symptoms patient was brought to the emergency department as code stroke.  Stroke team evaluated the patient.  NIH SS DMV 18.  Per patient's daughter her dysarthria seems to be at baseline.  Left-sided hemiplegia was at baseline.  Per stroke team her exam findings do not seem to be changed from neurology notes in 2023 and 2024.  CT head conducted which showed old infarcts in the right frontal parietal temporal lobes and the right basal ganglia/insular cortex and the

## 2024-10-20 LAB
ALBUMIN SERPL-MCNC: 2.9 G/DL (ref 3.5–5.2)
ALBUMIN/GLOB SERPL: 1 {RATIO} (ref 1–2.5)
ALP SERPL-CCNC: 77 U/L (ref 35–104)
ALT SERPL-CCNC: 11 U/L (ref 10–35)
ANION GAP SERPL CALCULATED.3IONS-SCNC: 11 MMOL/L (ref 9–16)
AST SERPL-CCNC: 26 U/L (ref 10–35)
BASOPHILS # BLD: 0.04 K/UL (ref 0–0.2)
BASOPHILS NFR BLD: 0 % (ref 0–2)
BILIRUB SERPL-MCNC: 0.3 MG/DL (ref 0–1.2)
BUN SERPL-MCNC: 26 MG/DL (ref 8–23)
CALCIUM SERPL-MCNC: 9.2 MG/DL (ref 8.6–10.4)
CHLORIDE SERPL-SCNC: 106 MMOL/L (ref 98–107)
CO2 SERPL-SCNC: 24 MMOL/L (ref 20–31)
CREAT SERPL-MCNC: 1.4 MG/DL (ref 0.5–0.9)
EOSINOPHIL # BLD: 0.61 K/UL (ref 0–0.44)
EOSINOPHILS RELATIVE PERCENT: 5 % (ref 1–4)
ERYTHROCYTE [DISTWIDTH] IN BLOOD BY AUTOMATED COUNT: 16.2 % (ref 11.8–14.4)
GFR, ESTIMATED: 39 ML/MIN/1.73M2
GLUCOSE BLD-MCNC: 118 MG/DL (ref 65–105)
GLUCOSE BLD-MCNC: 190 MG/DL (ref 65–105)
GLUCOSE BLD-MCNC: 230 MG/DL (ref 65–105)
GLUCOSE BLD-MCNC: 232 MG/DL (ref 65–105)
GLUCOSE SERPL-MCNC: 112 MG/DL (ref 74–99)
HCT VFR BLD AUTO: 37.9 % (ref 36.3–47.1)
HGB BLD-MCNC: 11.7 G/DL (ref 11.9–15.1)
IMM GRANULOCYTES # BLD AUTO: 0.21 K/UL (ref 0–0.3)
IMM GRANULOCYTES NFR BLD: 2 %
LYMPHOCYTES NFR BLD: 2.65 K/UL (ref 1.1–3.7)
LYMPHOCYTES RELATIVE PERCENT: 21 % (ref 24–43)
MCH RBC QN AUTO: 27.7 PG (ref 25.2–33.5)
MCHC RBC AUTO-ENTMCNC: 30.9 G/DL (ref 28.4–34.8)
MCV RBC AUTO: 89.8 FL (ref 82.6–102.9)
MONOCYTES NFR BLD: 0.79 K/UL (ref 0.1–1.2)
MONOCYTES NFR BLD: 6 % (ref 3–12)
NEUTROPHILS NFR BLD: 66 % (ref 36–65)
NEUTS SEG NFR BLD: 8.06 K/UL (ref 1.5–8.1)
NRBC BLD-RTO: 0 PER 100 WBC
PLATELET # BLD AUTO: 371 K/UL (ref 138–453)
PMV BLD AUTO: 9.3 FL (ref 8.1–13.5)
POTASSIUM SERPL-SCNC: 4.4 MMOL/L (ref 3.7–5.3)
PROT SERPL-MCNC: 6.2 G/DL (ref 6.6–8.7)
RBC # BLD AUTO: 4.22 M/UL (ref 3.95–5.11)
RBC # BLD: ABNORMAL 10*6/UL
SODIUM SERPL-SCNC: 141 MMOL/L (ref 136–145)
WBC OTHER # BLD: 12.4 K/UL (ref 3.5–11.3)

## 2024-10-20 PROCEDURE — 93005 ELECTROCARDIOGRAM TRACING: CPT

## 2024-10-20 PROCEDURE — 97162 PT EVAL MOD COMPLEX 30 MIN: CPT

## 2024-10-20 PROCEDURE — 97167 OT EVAL HIGH COMPLEX 60 MIN: CPT

## 2024-10-20 PROCEDURE — 6370000000 HC RX 637 (ALT 250 FOR IP)

## 2024-10-20 PROCEDURE — 85025 COMPLETE CBC W/AUTO DIFF WBC: CPT

## 2024-10-20 PROCEDURE — 97530 THERAPEUTIC ACTIVITIES: CPT

## 2024-10-20 PROCEDURE — 2580000003 HC RX 258

## 2024-10-20 PROCEDURE — 51701 INSERT BLADDER CATHETER: CPT

## 2024-10-20 PROCEDURE — 99232 SBSQ HOSP IP/OBS MODERATE 35: CPT | Performed by: STUDENT IN AN ORGANIZED HEALTH CARE EDUCATION/TRAINING PROGRAM

## 2024-10-20 PROCEDURE — 93005 ELECTROCARDIOGRAM TRACING: CPT | Performed by: INTERNAL MEDICINE

## 2024-10-20 PROCEDURE — 1200000000 HC SEMI PRIVATE

## 2024-10-20 PROCEDURE — 82947 ASSAY GLUCOSE BLOOD QUANT: CPT

## 2024-10-20 PROCEDURE — 36415 COLL VENOUS BLD VENIPUNCTURE: CPT

## 2024-10-20 PROCEDURE — 6360000002 HC RX W HCPCS

## 2024-10-20 PROCEDURE — 80053 COMPREHEN METABOLIC PANEL: CPT

## 2024-10-20 PROCEDURE — 51798 US URINE CAPACITY MEASURE: CPT

## 2024-10-20 RX ORDER — INSULIN GLARGINE 100 [IU]/ML
10 INJECTION, SOLUTION SUBCUTANEOUS 2 TIMES DAILY
Status: DISCONTINUED | OUTPATIENT
Start: 2024-10-20 | End: 2024-10-24

## 2024-10-20 RX ORDER — CLONIDINE HYDROCHLORIDE 0.1 MG/1
0.1 TABLET ORAL DAILY
Status: DISCONTINUED | OUTPATIENT
Start: 2024-10-20 | End: 2024-10-20

## 2024-10-20 RX ORDER — SODIUM CHLORIDE 9 MG/ML
INJECTION, SOLUTION INTRAVENOUS CONTINUOUS
Status: ACTIVE | OUTPATIENT
Start: 2024-10-20 | End: 2024-10-21

## 2024-10-20 RX ORDER — CLONIDINE HYDROCHLORIDE 0.1 MG/1
0.1 TABLET ORAL DAILY
Status: DISCONTINUED | OUTPATIENT
Start: 2024-10-20 | End: 2024-10-21

## 2024-10-20 RX ORDER — LORAZEPAM 2 MG/ML
1 INJECTION INTRAMUSCULAR ONCE
Status: COMPLETED | OUTPATIENT
Start: 2024-10-20 | End: 2024-10-20

## 2024-10-20 RX ADMIN — CLOPIDOGREL BISULFATE 75 MG: 75 TABLET ORAL at 08:48

## 2024-10-20 RX ADMIN — METOPROLOL TARTRATE 25 MG: 25 TABLET, FILM COATED ORAL at 08:48

## 2024-10-20 RX ADMIN — AMLODIPINE BESYLATE 10 MG: 10 TABLET ORAL at 08:48

## 2024-10-20 RX ADMIN — Medication: at 09:53

## 2024-10-20 RX ADMIN — APIXABAN 5 MG: 5 TABLET, FILM COATED ORAL at 08:48

## 2024-10-20 RX ADMIN — INSULIN LISPRO 2 UNITS: 100 INJECTION, SOLUTION INTRAVENOUS; SUBCUTANEOUS at 20:11

## 2024-10-20 RX ADMIN — LEVETIRACETAM 500 MG: 100 SOLUTION ORAL at 08:48

## 2024-10-20 RX ADMIN — APIXABAN 5 MG: 5 TABLET, FILM COATED ORAL at 20:12

## 2024-10-20 RX ADMIN — CLONIDINE HYDROCHLORIDE 0.1 MG: 0.1 TABLET ORAL at 08:48

## 2024-10-20 RX ADMIN — AMIODARONE HYDROCHLORIDE 200 MG: 200 TABLET ORAL at 08:48

## 2024-10-20 RX ADMIN — SODIUM CHLORIDE, PRESERVATIVE FREE 10 ML: 5 INJECTION INTRAVENOUS at 20:12

## 2024-10-20 RX ADMIN — LEVETIRACETAM 500 MG: 100 SOLUTION ORAL at 20:13

## 2024-10-20 RX ADMIN — ATORVASTATIN CALCIUM 80 MG: 80 TABLET, FILM COATED ORAL at 20:12

## 2024-10-20 RX ADMIN — INSULIN GLARGINE 10 UNITS: 100 INJECTION, SOLUTION SUBCUTANEOUS at 20:11

## 2024-10-20 RX ADMIN — GABAPENTIN 100 MG: 100 CAPSULE ORAL at 20:12

## 2024-10-20 RX ADMIN — SODIUM CHLORIDE, PRESERVATIVE FREE 10 ML: 5 INJECTION INTRAVENOUS at 08:48

## 2024-10-20 RX ADMIN — INSULIN GLARGINE 10 UNITS: 100 INJECTION, SOLUTION SUBCUTANEOUS at 08:48

## 2024-10-20 RX ADMIN — ASPIRIN 81 MG 81 MG: 81 TABLET ORAL at 08:48

## 2024-10-20 RX ADMIN — METOPROLOL TARTRATE 25 MG: 25 TABLET, FILM COATED ORAL at 20:12

## 2024-10-20 RX ADMIN — LORAZEPAM 1 MG: 2 INJECTION INTRAMUSCULAR; INTRAVENOUS at 09:53

## 2024-10-20 RX ADMIN — GABAPENTIN 100 MG: 100 CAPSULE ORAL at 08:48

## 2024-10-20 RX ADMIN — GABAPENTIN 100 MG: 100 CAPSULE ORAL at 15:19

## 2024-10-20 RX ADMIN — Medication: at 20:12

## 2024-10-20 RX ADMIN — INSULIN LISPRO 2 UNITS: 100 INJECTION, SOLUTION INTRAVENOUS; SUBCUTANEOUS at 17:25

## 2024-10-20 RX ADMIN — SODIUM CHLORIDE: 9 INJECTION, SOLUTION INTRAVENOUS at 10:41

## 2024-10-20 RX ADMIN — FUROSEMIDE 40 MG: 40 TABLET ORAL at 08:48

## 2024-10-20 NOTE — PLAN OF CARE
Problem: Discharge Planning  Goal: Discharge to home or other facility with appropriate resources  10/20/2024 0519 by Marcie Sheppard RN  Outcome: Progressing  10/19/2024 1857 by Rachna Jeffery RN  Outcome: Progressing  10/19/2024 1753 by Angelina Cardona RN  Outcome: Progressing     Problem: Pain  Goal: Verbalizes/displays adequate comfort level or baseline comfort level  10/20/2024 0519 by Marcie Sheppard RN  Outcome: Progressing  10/19/2024 1857 by Rachna Jeffery RN  Outcome: Progressing  10/19/2024 1753 by Angelina Cardona RN  Outcome: Progressing     Problem: Skin/Tissue Integrity  Goal: Absence of new skin breakdown  Description: 1.  Monitor for areas of redness and/or skin breakdown  2.  Assess vascular access sites hourly  3.  Every 4-6 hours minimum:  Change oxygen saturation probe site  4.  Every 4-6 hours:  If on nasal continuous positive airway pressure, respiratory therapy assess nares and determine need for appliance change or resting period.  10/20/2024 0519 by Marcie Sheppard RN  Outcome: Progressing  10/19/2024 1857 by Rachna Jeffery RN  Outcome: Progressing  10/19/2024 1753 by Angelina Cardona RN  Outcome: Progressing     Problem: Safety - Adult  Goal: Free from fall injury  10/20/2024 0519 by Marcie Sheppard RN  Outcome: Progressing  10/19/2024 1753 by Angelina Cardona RN  Outcome: Progressing

## 2024-10-20 NOTE — PROGRESS NOTES
Physical Therapy  Facility/Department: [address] MED SURG  Physical Therapy Initial Assessment    Name: Leatha Mason  : 1950  MRN: [id number]  Date of Service: 10/20/2024    Discharge Recommendations: Further therapy recommended at discharge.  Chief Complaint   Patient presents with    Hyperglycemia     The patient is a pleasant 74 y.o. 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  Per the patient's daughter her Dexcom has been reporting hypoglycemic events over the past day.  Patient also recently discharged from the hospital with regimen of linagliptin and glimepiride for diabetic management.  Patient's daughter said she was compliant with his diabetic regimen, but still reports high glucose levels.      PT Equipment Recommendations  Equipment Needed: No      Patient Diagnosis(es): The encounter diagnosis was Hyperosmolar hyperglycemic state (HHS) (HCC).  Past Medical History:  has a past medical history of Acute renal failure (HCC), Allergic rhinitis, Anxiety, Asthma, CAD (coronary artery disease), Chronic back pain, Chronic obstructive pulmonary disease (HCC), Chronic obstructive pulmonary disease (HCC), Chronic pain, Chronic use of opiate drugs therapeutic purposes, Congestive heart failure (HCC), Depression, Headache(784.0), Hiatal hernia, Hypercholesteremia, Hypertension, Kidney stones, Obesity, Osteoarthritis, Postlaminectomy syndrome, Sacral decubitus ulcer, stage III (HCC), Seizure (HCC), SVT (supraventricular tachycardia) (HCC), Type II or unspecified type diabetes mellitus without mention of complication, not stated as uncontrolled, Unspecified sleep apnea, and Urinary incontinence.  Past Surgical History:  has a past surgical

## 2024-10-20 NOTE — PLAN OF CARE
Problem: Discharge Planning  Goal: Discharge to home or other facility with appropriate resources  10/20/2024 0530 by Marcie Sheppard RN  Outcome: Progressing  10/20/2024 0520 by Marcie Sheppard RN  Outcome: Progressing  10/20/2024 0519 by Marcie Sheppard RN  Outcome: Progressing  10/19/2024 1857 by Rachna Jeffery RN  Outcome: Progressing  10/19/2024 1753 by Angelina Cardona RN  Outcome: Progressing     Problem: Pain  Goal: Verbalizes/displays adequate comfort level or baseline comfort level  10/20/2024 0530 by Marcie Sheppard RN  Outcome: Progressing  10/20/2024 0520 by Marcie Sheppard RN  Outcome: Progressing  10/20/2024 0519 by Marcie Sheppard RN  Outcome: Progressing  10/19/2024 1857 by Rachna Jeffery RN  Outcome: Progressing  10/19/2024 1753 by Angelina Cardona RN  Outcome: Progressing     Problem: Skin/Tissue Integrity  Goal: Absence of new skin breakdown  Description: 1.  Monitor for areas of redness and/or skin breakdown  2.  Assess vascular access sites hourly  3.  Every 4-6 hours minimum:  Change oxygen saturation probe site  4.  Every 4-6 hours:  If on nasal continuous positive airway pressure, respiratory therapy assess nares and determine need for appliance change or resting period.  10/20/2024 0530 by Marcie Sheppard RN  Outcome: Progressing  10/20/2024 0520 by Marcie Sheppard RN  Outcome: Progressing  10/20/2024 0519 by Marcie Sheppard RN  Outcome: Progressing  10/19/2024 1857 by Rachna Jeffery RN  Outcome: Progressing  10/19/2024 1753 by Angelina Cardona RN  Outcome: Progressing     Problem: Safety - Adult  Goal: Free from fall injury  10/20/2024 0530 by Marcie Sheppard RN  Outcome: Progressing  10/20/2024 0520 by Marcie Sheppard RN  Outcome: Progressing  10/20/2024 0519 by Marcie Sheppard RN  Outcome: Progressing  10/19/2024 1753 by Angelina Cardona RN  Outcome: Progressing

## 2024-10-20 NOTE — PROGRESS NOTES
stents 1.5T or less    GASTROSTOMY TUBE PLACEMENT  12/28/2020    EGD PEG TUBE PLACEMENT    GASTROSTOMY TUBE PLACEMENT N/A 12/28/2020    EGD PEG TUBE PLACEMENT performed by Salvador Aguayo MD at [facility] OR    INSERT MIDLINE CATHETER  01/21/2021         JOINT REPLACEMENT      left knee    KNEE SURGERY  10/21/2013    rt knee synvisc injection     KNEE SURGERY  12/02/2013    knee synvisc injection rt #2    KNEE SURGERY  12/09/2013    rt knee synvisc inj    LUMBAR SPINE SURGERY  2007    NERVE BLOCK  04/23/2012    Right MBNB L3, L4, L5    NERVE BLOCK  05/22/2012    Right MBNB L3, L4, and L5     NERVE BLOCK  01/14/2013    Right knee injection #1 - Synvisc    NERVE BLOCK  01/21/2013    Right knee injection #2 - Synvisc    NERVE BLOCK  01/28/2013    Rigth knee synvisc injection #3    NERVE BLOCK Right 04/17/2017    Rt genicular nerve block. no steroid used    OTHER SURGICAL HISTORY Right 07/14/2014    synvisc one knee injection    OTHER SURGICAL HISTORY Right 03/16/2015    synvisc one knee injection    OTHER SURGICAL HISTORY Right 06/13/2016    synvisc right knee injection    SPINE SURGERY      TONSILLECTOMY      UPPER GASTROINTESTINAL ENDOSCOPY      VENA CAVA FILTER PLACEMENT  2007    PE and B/L LE emboli       ALLERGIES     Bactrim, Diphenhydramine, Penicillins, Pregabalin, Trimethoprim, and Tylenol [acetaminophen]    MEDICATIONS PRIOR TO ADMISSION     Prior to Admission medications    Medication Sig Start Date End Date Taking? Authorizing Provider   linagliptin (TRADJENTA) 5 MG tablet Take 1 tablet by mouth daily 10/7/24   Lianna Cleary MD   cloNIDine (CATAPRES) 0.1 MG tablet Take 1 tablet by mouth 2 times daily 10/7/24   Lianna Cleary MD   glimepiride (AMARYL) 1 MG tablet Take 1 tablet by mouth every morning 10/7/24   Lianna Cleary MD   metoprolol tartrate (LOPRESSOR) 50 MG tablet Take 1 tablet by mouth 2 times daily 10/4/24   Lianna Cleary MD   amiodarone (CORDARONE) 200 MG tablet Take 1 tablet by mouth  Normal breath sounds. No stridor. No wheezing.   Abdominal:      General: Bowel sounds are normal. There is no distension.      Palpations: There is no mass.      Tenderness: There is no abdominal tenderness.   Musculoskeletal:      Right lower leg: Edema (1+) present.      Left lower leg: Edema (1+) present.   Neurological:      Mental Status: She is alert.      Cranial Nerves: Cranial nerve deficit present.      Sensory: Sensory deficit present.      Motor: Weakness present.      Comments: Left upper and lower extremity strength 0 out of 5  Right upper and lower extremity strength 3 out of 5    Left-sided facial droop  Significant dysarthria noted  Leftward gaze palsy             INVESTIGATIONS     Laboratory Testing:     Recent Results (from the past 24 hour(s))   Procalcitonin    Collection Time: 10/19/24 11:02 AM   Result Value Ref Range    Procalcitonin 0.13 (H) 0.00 - 0.09 ng/mL   C-Reactive Protein    Collection Time: 10/19/24 11:02 AM   Result Value Ref Range    CRP 31.8 (H) 0.0 - 5.0 mg/L   POC Glucose Fingerstick    Collection Time: 10/19/24 11:11 AM   Result Value Ref Range    POC Glucose 184 (H) 65 - 105 mg/dL   POC Glucose Fingerstick    Collection Time: 10/19/24  4:04 PM   Result Value Ref Range    POC Glucose 244 (H) 65 - 105 mg/dL   POC Glucose Fingerstick    Collection Time: 10/19/24  9:04 PM   Result Value Ref Range    POC Glucose 167 (H) 65 - 105 mg/dL       Imaging:   CTA HEAD NECK W CONTRAST    Result Date: 10/17/2024  Severe stenosis in the proximal M1 segment of the right MCA, stable. Moderate stenosis in the mid M1 segment of the left MCA, stable. Mild-to-moderate stenosis in the cavernous/supraclinoid segments of the bilateral internal carotid arteries. 40% stenosis in the basilar artery. Severe stenosis at the origin of the right vertebral artery, stable. 60% stenosis in the V1 and V2 segments of the left vertebral artery, stable. 50% stenosis in the proximal left internal carotid artery,

## 2024-10-20 NOTE — PROGRESS NOTES
SLP ALL NOTES  [facility]  Speech Language Pathology    Date: 10/20/2024  Patient Name: Leatha Mason  YOB: 1950   AGE: 74 y.o.  MRN: [id number]        Patient Not Available for Speech Therapy     Due to:  [] Testing  [] Hemodialysis  [] Cancelled by RN  [] Surgery   [] Intubation/Sedation/Pain Medication  [] Medical instability  [x] Other:  Pt sleeping; even daughter is unable to awaken pt      Brief HX  Pt admitted 10/17/2024  for hyperglycemia and hyperosmolar hyperglycemic state.     Has h/o large right CVA in 2020 with residual left hemiparesis, dysarthria and dysphagia.       PRIOR SPEECH-LANGUAGE PATHOLOGY SERVICES  Modified Barium Swallow Study 9/24/2024  Pt aspirated SILENTLY with thin liquids  Appears able to safely swallow Pureed/Dysphagia I and drink Moderately Thick Liquids (honey thick)    Byjwkw-Sapwtweo-Kgmzhdmox Evaluation 9/24/2024  Moderate Cognitive-Communicative impairments (recall, reasoning, thought organization)  Moderate Dysarthria        Next scheduled treatment: 10/21/2024  Completed by: Mirlande Sousa, SLP, M.Ed. CCC-SLP

## 2024-10-20 NOTE — PROGRESS NOTES
Occupational Therapy  Occupational Therapy Initial Evaluation  Facility/Department: [address] MED SURG     Patient Name: Leatha Mason        MRN: [id number]    : 1950    Date of Service: 10/20/2024    Discharge Recommendations  Discharge Recommendations: Patient would benefit from continued therapy after discharge  OT Equipment Recommendations  Equipment Needed:  (CTA)    Chief Complaint   Patient presents with    Hyperglycemia     Past Medical History:  has a past medical history of Acute renal failure (HCC), Allergic rhinitis, Anxiety, Asthma, CAD (coronary artery disease), Chronic back pain, Chronic obstructive pulmonary disease (HCC), Chronic obstructive pulmonary disease (HCC), Chronic pain, Chronic use of opiate drugs therapeutic purposes, Congestive heart failure (HCC), Depression, Headache(784.0), Hiatal hernia, Hypercholesteremia, Hypertension, Kidney stones, Obesity, Osteoarthritis, Postlaminectomy syndrome, Sacral decubitus ulcer, stage III (HCC), Seizure (HCC), SVT (supraventricular tachycardia) (HCC), Type II or unspecified type diabetes mellitus without mention of complication, not stated as uncontrolled, Unspecified sleep apnea, and Urinary incontinence.  Past Surgical History:  has a past surgical history that includes Spine surgery; Nerve Block (2012); Nerve Block (2012); Nerve Block (2013); Nerve Block (2013); Nerve Block (2013); Cardiac catheterization (2013); Lumbar spine surgery (); Vena Cava Filter Placement (); Tonsillectomy; joint replacement; knee surgery (10/21/2013); knee surgery (2013); knee surgery (2013); other surgical history (Right, 2014); other surgical history (Right, 2015); Upper gastrointestinal endoscopy; Colonoscopy (2015); other surgical history (Right, 2016); Coronary angioplasty with stent (1012-16); Nerve Block (Right, 2017); Gastrostomy tube placement (2020); Gastrostomy tube

## 2024-10-21 LAB
ALBUMIN SERPL-MCNC: 2.7 G/DL (ref 3.5–5.2)
ALBUMIN/GLOB SERPL: 1 {RATIO} (ref 1–2.5)
ALP SERPL-CCNC: 70 U/L (ref 35–104)
ALT SERPL-CCNC: 13 U/L (ref 10–35)
ANION GAP SERPL CALCULATED.3IONS-SCNC: 8 MMOL/L (ref 9–16)
AST SERPL-CCNC: 22 U/L (ref 10–35)
BASOPHILS # BLD: 0.03 K/UL (ref 0–0.2)
BASOPHILS NFR BLD: 0 % (ref 0–2)
BILIRUB SERPL-MCNC: 0.3 MG/DL (ref 0–1.2)
BUN SERPL-MCNC: 28 MG/DL (ref 8–23)
CALCIUM SERPL-MCNC: 8.9 MG/DL (ref 8.6–10.4)
CHLORIDE SERPL-SCNC: 109 MMOL/L (ref 98–107)
CO2 SERPL-SCNC: 23 MMOL/L (ref 20–31)
CREAT SERPL-MCNC: 1.1 MG/DL (ref 0.5–0.9)
EKG ATRIAL RATE: 75 BPM
EKG ATRIAL RATE: 83 BPM
EKG P AXIS: -2 DEGREES
EKG P AXIS: 48 DEGREES
EKG P-R INTERVAL: 172 MS
EKG P-R INTERVAL: 174 MS
EKG Q-T INTERVAL: 404 MS
EKG Q-T INTERVAL: 446 MS
EKG QRS DURATION: 84 MS
EKG QRS DURATION: 92 MS
EKG QTC CALCULATION (BAZETT): 474 MS
EKG QTC CALCULATION (BAZETT): 498 MS
EKG R AXIS: -14 DEGREES
EKG R AXIS: -17 DEGREES
EKG T AXIS: 18 DEGREES
EKG T AXIS: 24 DEGREES
EKG VENTRICULAR RATE: 75 BPM
EKG VENTRICULAR RATE: 83 BPM
EOSINOPHIL # BLD: 0.7 K/UL (ref 0–0.44)
EOSINOPHILS RELATIVE PERCENT: 7 % (ref 1–4)
ERYTHROCYTE [DISTWIDTH] IN BLOOD BY AUTOMATED COUNT: 16.3 % (ref 11.8–14.4)
GFR, ESTIMATED: 52 ML/MIN/1.73M2
GLUCOSE BLD-MCNC: 111 MG/DL (ref 65–105)
GLUCOSE BLD-MCNC: 142 MG/DL (ref 65–105)
GLUCOSE BLD-MCNC: 69 MG/DL (ref 65–105)
GLUCOSE BLD-MCNC: 89 MG/DL (ref 65–105)
GLUCOSE BLD-MCNC: 93 MG/DL (ref 65–105)
GLUCOSE BLD-MCNC: 93 MG/DL (ref 65–105)
GLUCOSE SERPL-MCNC: 75 MG/DL (ref 74–99)
HCT VFR BLD AUTO: 34.6 % (ref 36.3–47.1)
HGB BLD-MCNC: 10.4 G/DL (ref 11.9–15.1)
IMM GRANULOCYTES # BLD AUTO: 0.12 K/UL (ref 0–0.3)
IMM GRANULOCYTES NFR BLD: 1 %
LYMPHOCYTES NFR BLD: 3.22 K/UL (ref 1.1–3.7)
LYMPHOCYTES RELATIVE PERCENT: 31 % (ref 24–43)
MCH RBC QN AUTO: 27.7 PG (ref 25.2–33.5)
MCHC RBC AUTO-ENTMCNC: 30.1 G/DL (ref 28.4–34.8)
MCV RBC AUTO: 92 FL (ref 82.6–102.9)
MONOCYTES NFR BLD: 0.77 K/UL (ref 0.1–1.2)
MONOCYTES NFR BLD: 7 % (ref 3–12)
NEUTROPHILS NFR BLD: 54 % (ref 36–65)
NEUTS SEG NFR BLD: 5.72 K/UL (ref 1.5–8.1)
NRBC BLD-RTO: 0 PER 100 WBC
PLATELET # BLD AUTO: 297 K/UL (ref 138–453)
PMV BLD AUTO: 9.2 FL (ref 8.1–13.5)
POTASSIUM SERPL-SCNC: 4 MMOL/L (ref 3.7–5.3)
PROT SERPL-MCNC: 5.8 G/DL (ref 6.6–8.7)
RBC # BLD AUTO: 3.76 M/UL (ref 3.95–5.11)
RBC # BLD: ABNORMAL 10*6/UL
SODIUM SERPL-SCNC: 140 MMOL/L (ref 136–145)
TROPONIN I SERPL HS-MCNC: 52 NG/L (ref 0–14)
TROPONIN I SERPL HS-MCNC: 56 NG/L (ref 0–14)
WBC OTHER # BLD: 10.6 K/UL (ref 3.5–11.3)

## 2024-10-21 PROCEDURE — 93005 ELECTROCARDIOGRAM TRACING: CPT

## 2024-10-21 PROCEDURE — 99232 SBSQ HOSP IP/OBS MODERATE 35: CPT | Performed by: STUDENT IN AN ORGANIZED HEALTH CARE EDUCATION/TRAINING PROGRAM

## 2024-10-21 PROCEDURE — 85025 COMPLETE CBC W/AUTO DIFF WBC: CPT

## 2024-10-21 PROCEDURE — 93010 ELECTROCARDIOGRAM REPORT: CPT | Performed by: INTERNAL MEDICINE

## 2024-10-21 PROCEDURE — 1200000000 HC SEMI PRIVATE

## 2024-10-21 PROCEDURE — 80053 COMPREHEN METABOLIC PANEL: CPT

## 2024-10-21 PROCEDURE — 6370000000 HC RX 637 (ALT 250 FOR IP)

## 2024-10-21 PROCEDURE — 84484 ASSAY OF TROPONIN QUANT: CPT

## 2024-10-21 PROCEDURE — 36415 COLL VENOUS BLD VENIPUNCTURE: CPT

## 2024-10-21 PROCEDURE — 2580000003 HC RX 258

## 2024-10-21 PROCEDURE — 82947 ASSAY GLUCOSE BLOOD QUANT: CPT

## 2024-10-21 PROCEDURE — 93005 ELECTROCARDIOGRAM TRACING: CPT | Performed by: INTERNAL MEDICINE

## 2024-10-21 PROCEDURE — 92523 SPEECH SOUND LANG COMPREHEN: CPT

## 2024-10-21 RX ORDER — PANTOPRAZOLE SODIUM 40 MG/1
40 TABLET, DELAYED RELEASE ORAL
Status: DISCONTINUED | OUTPATIENT
Start: 2024-10-22 | End: 2024-10-24 | Stop reason: HOSPADM

## 2024-10-21 RX ORDER — ACETAMINOPHEN 325 MG/1
650 TABLET ORAL EVERY 4 HOURS PRN
Status: CANCELLED | OUTPATIENT
Start: 2024-10-21

## 2024-10-21 RX ORDER — INSULIN LISPRO 100 [IU]/ML
0-4 INJECTION, SOLUTION INTRAVENOUS; SUBCUTANEOUS
Status: DISCONTINUED | OUTPATIENT
Start: 2024-10-21 | End: 2024-10-24 | Stop reason: HOSPADM

## 2024-10-21 RX ORDER — CLONIDINE HYDROCHLORIDE 0.1 MG/1
0.1 TABLET ORAL EVERY MORNING
Qty: 30 TABLET | Refills: 0 | Status: CANCELLED | OUTPATIENT
Start: 2024-10-21 | End: 2024-11-20

## 2024-10-21 RX ORDER — ACETAMINOPHEN 325 MG/1
650 TABLET ORAL EVERY 4 HOURS PRN
Status: DISCONTINUED | OUTPATIENT
Start: 2024-10-21 | End: 2024-10-24 | Stop reason: HOSPADM

## 2024-10-21 RX ADMIN — ASPIRIN 81 MG 81 MG: 81 TABLET ORAL at 08:47

## 2024-10-21 RX ADMIN — CLOPIDOGREL BISULFATE 75 MG: 75 TABLET ORAL at 08:47

## 2024-10-21 RX ADMIN — GABAPENTIN 100 MG: 100 CAPSULE ORAL at 13:56

## 2024-10-21 RX ADMIN — ATORVASTATIN CALCIUM 80 MG: 80 TABLET, FILM COATED ORAL at 20:57

## 2024-10-21 RX ADMIN — CLONIDINE HYDROCHLORIDE 0.1 MG: 0.1 TABLET ORAL at 08:47

## 2024-10-21 RX ADMIN — APIXABAN 5 MG: 5 TABLET, FILM COATED ORAL at 20:57

## 2024-10-21 RX ADMIN — LOSARTAN POTASSIUM 50 MG: 50 TABLET, FILM COATED ORAL at 11:10

## 2024-10-21 RX ADMIN — ACETAMINOPHEN 650 MG: 325 TABLET ORAL at 11:10

## 2024-10-21 RX ADMIN — Medication: at 08:48

## 2024-10-21 RX ADMIN — LEVETIRACETAM 500 MG: 100 SOLUTION ORAL at 20:57

## 2024-10-21 RX ADMIN — APIXABAN 5 MG: 5 TABLET, FILM COATED ORAL at 08:46

## 2024-10-21 RX ADMIN — AMIODARONE HYDROCHLORIDE 200 MG: 200 TABLET ORAL at 08:46

## 2024-10-21 RX ADMIN — GABAPENTIN 100 MG: 100 CAPSULE ORAL at 20:57

## 2024-10-21 RX ADMIN — SODIUM CHLORIDE, PRESERVATIVE FREE 10 ML: 5 INJECTION INTRAVENOUS at 08:47

## 2024-10-21 RX ADMIN — LEVETIRACETAM 500 MG: 100 SOLUTION ORAL at 08:46

## 2024-10-21 RX ADMIN — AMLODIPINE BESYLATE 10 MG: 10 TABLET ORAL at 08:47

## 2024-10-21 RX ADMIN — Medication: at 20:55

## 2024-10-21 RX ADMIN — INSULIN GLARGINE 10 UNITS: 100 INJECTION, SOLUTION SUBCUTANEOUS at 20:56

## 2024-10-21 RX ADMIN — SODIUM CHLORIDE, PRESERVATIVE FREE 10 ML: 5 INJECTION INTRAVENOUS at 20:54

## 2024-10-21 RX ADMIN — METOPROLOL TARTRATE 25 MG: 25 TABLET, FILM COATED ORAL at 08:46

## 2024-10-21 RX ADMIN — GABAPENTIN 100 MG: 100 CAPSULE ORAL at 08:47

## 2024-10-21 ASSESSMENT — PAIN SCALES - GENERAL: PAINLEVEL_OUTOF10: 2

## 2024-10-21 NOTE — PROGRESS NOTES
Facility/Department: [address] MED SURG  Initial Speech/Language/Cognitive Assessment    NAME: Leatha Mason  : 1950   MRN: [id number]  ADMISSION DATE: 10/17/2024  ADMITTING DIAGNOSIS: has HTN (hypertension); Asthma; Knee osteoarthritis; Edema; Spinal stenosis in cervical region; Chest pain; YUDITH (obstructive sleep apnea); Morbid obesity; Knee pain, bilateral; S/P TKR (total knee replacement); Urge incontinence of urine; Lumbar spondylosis; Cervical spondylosis; Chronic use of opiate drugs therapeutic purposes; Chronic knee pain; Acute coronary syndrome (HCC); Congestive heart failure ([facility]); Lymphadenopathy; Chronic pain; Coronary artery disease involving native coronary artery without angina pectoris; Chronic prescription opiate use; Type 2 diabetes mellitus with complication, without long-term current use of insulin ([facility]); S/P coronary artery stent placement - RCA 10/12/16 - Dr. allan; Anxiety; Major depressive disorder; Postlaminectomy syndrome, lumbar; Primary osteoarthritis of right knee; Long term (current) use of antithrombotics/antiplatelets; Cerebrovascular accident (CVA) due to embolic occlusion of right middle cerebral artery ([facility]); Cerebrovascular accident (CVA) ([facility]); Right middle cerebral artery stroke ([facility]); Ischemic cerebral stroke due to intracranial large artery atherosclerosis ([facility]); Hypernatremia; Oropharyngeal dysphagia; Seizure ([facility]); History of CVA (cerebrovascular accident); 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 ([facility]); Sacral decubitus ulcer, stage III ([facility]); Hypertensive heart disease with heart failure ([facility]); Type 2 diabetes mellitus with hyperglycemia ([facility]); Hemiplegia and hemiparesis following cerebral infarction affecting left non-dominant side ([facility]); Skin tear of right lower leg without complication; Encephalopathy, metabolic; NSTEMI (non-ST elevated myocardial infarction) ([facility]); Acute renal failure ([facility]); Chronic obstructive pulmonary

## 2024-10-21 NOTE — CARE COORDINATION
Transitional planning: Message to return call to Cat with [facility].    1022 Phone call to Cat with [facility]. She is questioning whether we started precert or if they should. They need to start precert due to the [place] insurance. She will have it started at this time.

## 2024-10-21 NOTE — DISCHARGE INSTRUCTIONS
- continue to use lantus 15 units nightly.    -If you begin to experience any symptoms such as chest pain, shortness of breath, nausea, vomiting, dizziness, drowsiness, abdominal pain, loss of consciousness, or any other symptoms you find concerning please return to the ED for follow-up evaluation.  -If you have been given medication please take them as prescribed. Do not take more medication than recommended at any given time.   -Please follow-up with your primary care provider within 7 days for continued care.   -Please feel free return to the hospital if your symptoms worsen or any new concerning symptoms develop.  Follow-up with your primary care physician as needed for all other the concerns.

## 2024-10-21 NOTE — PROGRESS NOTES
1348- per phlebotomy, pt is a hard stick and will need another person to draw pts troponin.     1824- Writer contacted phlebotomy to have pts troponin draw. Per phleb, they just did shift change and will be up to draw troponin.

## 2024-10-22 LAB
ALBUMIN SERPL-MCNC: 2.9 G/DL (ref 3.5–5.2)
ALBUMIN/GLOB SERPL: 1 {RATIO} (ref 1–2.5)
ALP SERPL-CCNC: 73 U/L (ref 35–104)
ALT SERPL-CCNC: 14 U/L (ref 10–35)
ANION GAP SERPL CALCULATED.3IONS-SCNC: 11 MMOL/L (ref 9–16)
AST SERPL-CCNC: 31 U/L (ref 10–35)
BASOPHILS # BLD: 0.03 K/UL (ref 0–0.2)
BASOPHILS NFR BLD: 0 % (ref 0–2)
BILIRUB SERPL-MCNC: 0.3 MG/DL (ref 0–1.2)
BUN SERPL-MCNC: 31 MG/DL (ref 8–23)
CALCIUM SERPL-MCNC: 8.8 MG/DL (ref 8.6–10.4)
CHLORIDE SERPL-SCNC: 105 MMOL/L (ref 98–107)
CO2 SERPL-SCNC: 22 MMOL/L (ref 20–31)
CREAT SERPL-MCNC: 0.9 MG/DL (ref 0.5–0.9)
EOSINOPHIL # BLD: 0.57 K/UL (ref 0–0.44)
EOSINOPHILS RELATIVE PERCENT: 6 % (ref 1–4)
ERYTHROCYTE [DISTWIDTH] IN BLOOD BY AUTOMATED COUNT: 16.2 % (ref 11.8–14.4)
GFR, ESTIMATED: 70 ML/MIN/1.73M2
GLUCOSE BLD-MCNC: 129 MG/DL (ref 65–105)
GLUCOSE BLD-MCNC: 181 MG/DL (ref 65–105)
GLUCOSE BLD-MCNC: 206 MG/DL (ref 65–105)
GLUCOSE BLD-MCNC: 210 MG/DL (ref 65–105)
GLUCOSE SERPL-MCNC: 155 MG/DL (ref 74–99)
HCT VFR BLD AUTO: 31.1 % (ref 36.3–47.1)
HGB BLD-MCNC: 9.9 G/DL (ref 11.9–15.1)
IMM GRANULOCYTES # BLD AUTO: 0.07 K/UL (ref 0–0.3)
IMM GRANULOCYTES NFR BLD: 1 %
LYMPHOCYTES NFR BLD: 2.82 K/UL (ref 1.1–3.7)
LYMPHOCYTES RELATIVE PERCENT: 29 % (ref 24–43)
MCH RBC QN AUTO: 27.4 PG (ref 25.2–33.5)
MCHC RBC AUTO-ENTMCNC: 31.8 G/DL (ref 28.4–34.8)
MCV RBC AUTO: 86.1 FL (ref 82.6–102.9)
MONOCYTES NFR BLD: 0.83 K/UL (ref 0.1–1.2)
MONOCYTES NFR BLD: 9 % (ref 3–12)
NEUTROPHILS NFR BLD: 55 % (ref 36–65)
NEUTS SEG NFR BLD: 5.28 K/UL (ref 1.5–8.1)
NRBC BLD-RTO: 0 PER 100 WBC
PLATELET # BLD AUTO: 245 K/UL (ref 138–453)
PMV BLD AUTO: 9.3 FL (ref 8.1–13.5)
POTASSIUM SERPL-SCNC: 4.4 MMOL/L (ref 3.7–5.3)
PROT SERPL-MCNC: 6 G/DL (ref 6.6–8.7)
RBC # BLD AUTO: 3.61 M/UL (ref 3.95–5.11)
RBC # BLD: ABNORMAL 10*6/UL
SODIUM SERPL-SCNC: 138 MMOL/L (ref 136–145)
WBC OTHER # BLD: 9.6 K/UL (ref 3.5–11.3)

## 2024-10-22 PROCEDURE — 85025 COMPLETE CBC W/AUTO DIFF WBC: CPT

## 2024-10-22 PROCEDURE — 97110 THERAPEUTIC EXERCISES: CPT

## 2024-10-22 PROCEDURE — 97112 NEUROMUSCULAR REEDUCATION: CPT

## 2024-10-22 PROCEDURE — 97530 THERAPEUTIC ACTIVITIES: CPT

## 2024-10-22 PROCEDURE — 6370000000 HC RX 637 (ALT 250 FOR IP)

## 2024-10-22 PROCEDURE — 1200000000 HC SEMI PRIVATE

## 2024-10-22 PROCEDURE — 51798 US URINE CAPACITY MEASURE: CPT

## 2024-10-22 PROCEDURE — 36415 COLL VENOUS BLD VENIPUNCTURE: CPT

## 2024-10-22 PROCEDURE — 80053 COMPREHEN METABOLIC PANEL: CPT

## 2024-10-22 PROCEDURE — 2580000003 HC RX 258

## 2024-10-22 PROCEDURE — 99213 OFFICE O/P EST LOW 20 MIN: CPT

## 2024-10-22 PROCEDURE — 99233 SBSQ HOSP IP/OBS HIGH 50: CPT | Performed by: INTERNAL MEDICINE

## 2024-10-22 PROCEDURE — 82947 ASSAY GLUCOSE BLOOD QUANT: CPT

## 2024-10-22 RX ADMIN — Medication: at 09:57

## 2024-10-22 RX ADMIN — APIXABAN 5 MG: 5 TABLET, FILM COATED ORAL at 21:11

## 2024-10-22 RX ADMIN — LEVETIRACETAM 500 MG: 100 SOLUTION ORAL at 09:45

## 2024-10-22 RX ADMIN — SODIUM CHLORIDE, PRESERVATIVE FREE 10 ML: 5 INJECTION INTRAVENOUS at 09:55

## 2024-10-22 RX ADMIN — LEVETIRACETAM 500 MG: 100 SOLUTION ORAL at 21:11

## 2024-10-22 RX ADMIN — APIXABAN 5 MG: 5 TABLET, FILM COATED ORAL at 09:45

## 2024-10-22 RX ADMIN — PANTOPRAZOLE SODIUM 40 MG: 40 TABLET, DELAYED RELEASE ORAL at 09:45

## 2024-10-22 RX ADMIN — METOPROLOL TARTRATE 25 MG: 25 TABLET, FILM COATED ORAL at 21:11

## 2024-10-22 RX ADMIN — INSULIN GLARGINE 10 UNITS: 100 INJECTION, SOLUTION SUBCUTANEOUS at 09:46

## 2024-10-22 RX ADMIN — INSULIN LISPRO 1 UNITS: 100 INJECTION, SOLUTION INTRAVENOUS; SUBCUTANEOUS at 21:12

## 2024-10-22 RX ADMIN — ACETAMINOPHEN 650 MG: 325 TABLET ORAL at 14:16

## 2024-10-22 RX ADMIN — GABAPENTIN 100 MG: 100 CAPSULE ORAL at 21:11

## 2024-10-22 RX ADMIN — SODIUM CHLORIDE, PRESERVATIVE FREE 10 ML: 5 INJECTION INTRAVENOUS at 21:11

## 2024-10-22 RX ADMIN — INSULIN LISPRO 1 UNITS: 100 INJECTION, SOLUTION INTRAVENOUS; SUBCUTANEOUS at 12:23

## 2024-10-22 RX ADMIN — Medication: at 21:20

## 2024-10-22 RX ADMIN — ATORVASTATIN CALCIUM 80 MG: 80 TABLET, FILM COATED ORAL at 21:11

## 2024-10-22 RX ADMIN — AMLODIPINE BESYLATE 10 MG: 10 TABLET ORAL at 09:45

## 2024-10-22 RX ADMIN — LOSARTAN POTASSIUM 50 MG: 50 TABLET, FILM COATED ORAL at 09:45

## 2024-10-22 RX ADMIN — INSULIN GLARGINE 10 UNITS: 100 INJECTION, SOLUTION SUBCUTANEOUS at 21:12

## 2024-10-22 RX ADMIN — METOPROLOL TARTRATE 25 MG: 25 TABLET, FILM COATED ORAL at 09:45

## 2024-10-22 RX ADMIN — GABAPENTIN 100 MG: 100 CAPSULE ORAL at 09:49

## 2024-10-22 RX ADMIN — AMIODARONE HYDROCHLORIDE 200 MG: 200 TABLET ORAL at 09:45

## 2024-10-22 RX ADMIN — CLOPIDOGREL BISULFATE 75 MG: 75 TABLET ORAL at 09:45

## 2024-10-22 RX ADMIN — GABAPENTIN 100 MG: 100 CAPSULE ORAL at 14:16

## 2024-10-22 RX ADMIN — ASPIRIN 81 MG 81 MG: 81 TABLET ORAL at 09:45

## 2024-10-22 ASSESSMENT — PAIN DESCRIPTION - DESCRIPTORS
DESCRIPTORS: SORE
DESCRIPTORS: ACHING
DESCRIPTORS: SORE

## 2024-10-22 ASSESSMENT — PAIN SCALES - GENERAL
PAINLEVEL_OUTOF10: 10
PAINLEVEL_OUTOF10: 6
PAINLEVEL_OUTOF10: 2
PAINLEVEL_OUTOF10: 8

## 2024-10-22 ASSESSMENT — PAIN DESCRIPTION - LOCATION
LOCATION: SACRUM
LOCATION: COCCYX
LOCATION: SACRUM

## 2024-10-22 ASSESSMENT — PAIN DESCRIPTION - ORIENTATION: ORIENTATION: MID

## 2024-10-22 NOTE — PLAN OF CARE
Problem: Discharge Planning  Goal: Discharge to home or other facility with appropriate resources  10/22/2024 0405 by Kathy Olvera, RN  Outcome: Progressing    Problem: Pain  Goal: Verbalizes/displays adequate comfort level or baseline comfort level  10/22/2024 0405 by Kathy Olvera, RN  Outcome: Progressing    Problem: Skin/Tissue Integrity  Goal: Absence of new skin breakdown  Description: 1.  Monitor for areas of redness and/or skin breakdown  2.  Assess vascular access sites hourly  3.  Every 4-6 hours minimum:  Change oxygen saturation probe site  4.  Every 4-6 hours:  If on nasal continuous positive airway pressure, respiratory therapy assess nares and determine need for appliance change or resting period.  10/22/2024 0405 by Kathy Olvera, RN  Outcome: Progressing    Problem: Safety - Adult  Goal: Free from fall injury  10/22/2024 0405 by Kathy Olvera, RN  Outcome: Progressing

## 2024-10-22 NOTE — CARE COORDINATION
Transitional planning: Phone call to Cat with [facility] to check on precert status. It is still pending. Notified her there are current PT/OT notes available.

## 2024-10-22 NOTE — PROGRESS NOTES
[facility] Nurse  Consult Note       NAME:  Leatha Mason  MEDICAL RECORD NUMBER:  [id number]  AGE: 74 y.o.   GENDER: female  : 1950  TODAY'S DATE:  10/22/2024    Subjective:     Reason for WOCN Evaluation and Assessment: \"wound care\"      Leatha Mason is a 74 y.o. female referred by:   [x] Physician  [] Nursing  [] Other:     Wound Identification:  Wound Type: pressure  Contributing Factors: diabetes, poor glucose control, chronic pressure, decreased mobility, shear force, obesity, incontinence of stool, and incontinence of urine            Objective:      BP (!) 159/90   Pulse 80   Temp 98.8 °F (37.1 °C) (Axillary)   Resp 18   Ht 1.575 m (5' 2\")   Wt 101.3 kg (223 lb 6.4 oz)   LMP  (LMP Unknown)   SpO2 100%   BMI 40.86 kg/m²   Scott Risk Score: Scott Scale Score: 13    LABS    CBC:   Lab Results   Component Value Date/Time    WBC 9.6 10/22/2024 06:34 AM    RBC 3.61 10/22/2024 06:34 AM    RBC 4.21 2012 01:03 PM    HGB 9.9 10/22/2024 06:34 AM    HCT 31.1 10/22/2024 06:34 AM     CMP:  Albumin:  No results found for: \"LABALBU\"  PT/INR:    Lab Results   Component Value Date/Time    PROTIME 14.3 2024 06:30 PM    INR 1.1 2024 06:30 PM     HgBA1c:    Lab Results   Component Value Date/Time    LABA1C 12.5 2024 08:26 AM     PTT: No components found for: \"LABPTT\"      Assessment:   Soft, large BM found on exam.  Hygiene provided.  Optifoam sacrum dressing removed. Zinc oxide cream washed from pedro-wound skin.    Small area of non-intact skin found on the left medial calf. Foam dressing covering is appropriate.   Bilateral heels with preventative foams in place. No wounds noted.   Generalized, fine flakes/extremely dry skin noted. Cream applied.       Measurements:     10/22/24 1049   Wound 23 Sacrum   Date First Assessed/Time First Assessed: 23 1200   Present on Original Admission: Yes  Primary Wound Type: Pressure Injury  Location: Sacrum   Wound  Image    Wound Etiology Pressure Stage 3   Dressing Status New dressing applied   Wound Cleansed Soap and water   Dressing/Treatment Hydrofiber Ag;Foam   Dressing Change Due 10/23/24   Wound Length (cm) 4.5 cm   Wound Width (cm) 2.6 cm   Wound Depth (cm) 0.3 cm   Wound Surface Area (cm^2) 11.7 cm^2   Change in Wound Size % (l*w) -420   Wound Volume (cm^3) 3.51 cm^3   Wound Healing % -680   Wound Assessment Superficial;Subcutaneous;Pink/red   Drainage Amount Small (< 25%)   Drainage Description Serosanguinous   Odor None   Mary-wound Assessment Fragile;Denuded;Maceration              Response to treatment:  Poorly tolerated by patient. Ms Mason was agitated throughout care. Unable to direct her to tasks or reasons for care being provided despite multiple attempts.         Plan:   Sacrum wound: cleanse with foam cleanser and warm water, rinse and pat dry. Cover the wound with a 2x2\" Opticell Ag hydrofiber dressing, secure with an Optifoam 4x4\" silicone bordered foam. Change daily and prn soilage.   Plan of Care:   [x]  Turn and reposition every 2 hours while in bed.     [x] Float heels off of bed with pillows under calves.      [x] Apply zinc oxide cream twice daily and as needed after incontinent episodes to the perianal and buttock skin     [x] Perform routine incontinence care with use of foam cleanser.     [x] Use single layer moisture wicking underpad.     [x] Use comfort glide system and wedges to reposition patient.     [x] Keep the head of the bed below 30 degrees unless contraindicated.       [x] Encourage good nutritional intake and fluids. Consult dietician if needed.    Specialty Bed Required : Yes   [] Low Air Loss   [x] Pressure Redistribution  [] Fluid Immersion  [] Bariatric  [] Total Pressure Relief  [] Other:     Discharge Plan:  TBD    Patient/Caregiver Teaching:  Ms Mason is not oriented to place or situation at this time. Her daughter is not at bedside currently.   Level of patient/caregiver

## 2024-10-22 NOTE — PROGRESS NOTES
Physical Therapy  Facility/Department: [address] MED SURG  Physical Therapy Daily Treatment Note    Name: Leatha Mason  : 1950  MRN: [id number]  Date of Service: 10/22/2024    Discharge Recommendations:  Patient would benefit from continued therapy after discharge   PT Equipment Recommendations  Equipment Needed: No      Patient Diagnosis(es): The primary encounter diagnosis was Hyperosmolar hyperglycemic state (HHS) (HCC). A diagnosis of Moderate major depression (HCC) was also pertinent to this visit.  Past Medical History:  has a past medical history of Acute renal failure (HCC), Allergic rhinitis, Anxiety, Asthma, CAD (coronary artery disease), Chronic back pain, Chronic obstructive pulmonary disease (HCC), Chronic obstructive pulmonary disease (HCC), Chronic pain, Chronic use of opiate drugs therapeutic purposes, Congestive heart failure (HCC), Depression, Headache(784.0), Hiatal hernia, Hypercholesteremia, Hypertension, Kidney stones, Obesity, Osteoarthritis, Postlaminectomy syndrome, Sacral decubitus ulcer, stage III (HCC), Seizure (HCC), SVT (supraventricular tachycardia) (HCC), Type II or unspecified type diabetes mellitus without mention of complication, not stated as uncontrolled, Unspecified sleep apnea, and Urinary incontinence.  Past Surgical History:  has a past surgical history that includes Spine surgery; Nerve Block (2012); Nerve Block (2012); Nerve Block (2013); Nerve Block (2013); Nerve Block (2013); Cardiac catheterization (2013); Lumbar spine surgery (); Vena Cava Filter Placement (); Tonsillectomy; joint replacement; knee surgery (10/21/2013); knee surgery (2013); knee surgery (2013); other surgical history (Right, 2014); other surgical history (Right, 2015); Upper gastrointestinal endoscopy; Colonoscopy (2015); other surgical history (Right, 2016); Coronary angioplasty with stent (1012-16); Nerve Block (Right,

## 2024-10-22 NOTE — PROGRESS NOTES
Occupational Therapy  Facility/Department: [address] MED SURG   Daily Treatment Note  Patient Name: Leatha Mason        MRN: [id number]    : 1950    Date of Service: 10/22/2024    Discharge Recommendations  Discharge Recommendations: Patient would benefit from continued therapy after discharge         Assessment  Performance deficits / Impairments: Decreased functional mobility ;Decreased ADL status;Decreased ROM;Decreased strength;Decreased safe awareness;Decreased cognition;Decreased endurance;Decreased balance;Decreased high-level IADLs;Decreased fine motor control;Decreased coordination;Decreased posture  Prognosis: Fair  Activity Tolerance  Activity Tolerance: Treatment limited secondary to decreased cognition;Treatment limited secondary to agitation  Activity Tolerance Comments: pt limited to command following d/t aggititaion at times, can be redirected at times.  Safety Devices  Type of Devices: All fall risk precautions in place;Call light within reach;Bed alarm in place;Left in bed;Nurse notified    Restrictions/Precautions  Restrictions/Precautions  Restrictions/Precautions: Fall Risk;Up as Tolerated;Contact Precautions  Position Activity Restriction  Other position/activity restrictions: Old infarctions in the R frontal parietal temporal lobes, the right basal ganglia/insula cortex in the left cerebellar hemisphere. Per imaging. Hx of L side weakness.    Subjective  General  Patient assessed for rehabilitation services?: Yes  General Comment  Comments: RN ok'd OT this date. Pt pleasant, cooperative, and agreeable to therapy throughout entire session. Pt verbalized pain in stomach, however did not appropriately rate; repositioned at end of session for best comfort.    Objective  Orientation  Overall Orientation Status: Impaired  Orientation Level: Oriented to person;Disoriented to place;Disoriented to time;Disoriented to situation  Cognition  Overall Cognitive Status: Exceptions  Following Commands:  Inconsistently follows commands  Attention Span: Difficulty dividing attention  Memory: Impaired  Safety Judgement: Impaired  Problem Solving: Impaired  Insights: Not aware of deficits  Initiation: Requires cues for all  Sequencing: Requires cues for all  Cognition Comment: Pt not appropriately following commands, pt needs MAX cues to initiate and assist in tasks. Pt appeared aggigtated with most commands.    Activities of Daily Living  Grooming: Maximum assistance  Grooming Skilled Clinical Factors: pt needed [place] to complete face washing d/t poor command following and declining most commands    Balance  Balance  Sitting: With support (MAX A to sit EOB with posterior/pushing off bed, faciliated x3 L lateral leans with MAX A for midline. Pt sat EOB ~10-15mins)  Standing:  (BRITNI, pt does not stand at baseline per RN report)    Transfers/Mobility  Bed mobility  Supine to Sit: 2 Person assistance;Maximum assistance  Sit to Supine: 2 Person assistance;Maximum assistance  Scootin Person assistance;Maximal assistance  Bed Mobility Comments: MAX A for all bed mobility and to scoot hips out, pt intially pushed off bed and leans into second person with posterior lean, requiring hips to be pulled back on a few occasions.    Transfers  Sit to stand: Unable to assess  Stand to sit: Unable to assess  Transfer Comments: unsafe to attempt         Functional Mobility: Unable to assess (Comment)  Functional Mobility Skilled Clinical Factors: BRITNI d/t poor sitting balance.      Patient Education  Patient Education  Education Given To: Patient  Education Provided: Plan of Care;Transfer Training;Fall Prevention Strategies  Education Method: Verbal;Demonstration  Barriers to Learning: Cognition  Education Outcome: Continued education needed    Goals  Short Term Goals  Time Frame for Short Term Goals: By discharge, pt will  Short Term Goal 1: demo feeding/grooming/UB ADLs with Min A, adaptive techniques PRN.  Short Term Goal 2: demo LB

## 2024-10-22 NOTE — PROGRESS NOTES
[facility]     Department of Internal Medicine - Staff Internal Medicine Teaching Service          ADMISSION NOTE/HISTORY AND PHYSICAL EXAMINATION   Date: 10/22/2024  Patient Name: Leatha Mason  Date of admission: 10/17/2024  8:23 PM  YOB: 1950  PCP: Lenore Bobby APRN - CNP  History Obtained From:  family member - daughter    Chief complaint     Patient vitally stable.  States she feels Cold.  Further review of system unable to complete due to patient's mentation  Patient had acute events overnight.  HISTORY OF PRESENTING ILLNESS     The patient is a pleasant 74 y.o. 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  Per the patient's daughter her Dexcom has been reporting hypoglycemic events over the past day.  Patient also recently discharged from the hospital with regimen of linagliptin and glimepiride for diabetic management.  Patient's daughter said she was compliant with his diabetic regimen, but still reports high glucose levels.    Also there was concern for the patient was having left-sided facial droop, arm weakness, slurred speech, and confusion.  Patient does have baseline NIH that is over 10, at least.  Exact NIH not well-documented on recent discharge summary.    Due to suppose an onset strokelike symptoms patient was brought to the emergency department as code stroke.  Stroke team evaluated the patient.  NIH SS DMV 18.  Per patient's daughter her dysarthria seems to be at baseline.  Left-sided hemiplegia was at baseline.  Per stroke team her exam findings do not seem to be changed from neurology notes in 2023 and 2024.  CT head conducted which showed old infarcts in the right  Phosphatase 73 35 - 104 U/L    ALT 14 10 - 35 U/L    AST 31 10 - 35 U/L   POC Glucose Fingerstick    Collection Time: 10/22/24  9:09 AM   Result Value Ref Range    POC Glucose 129 (H) 65 - 105 mg/dL       Imaging:   CTA HEAD NECK W CONTRAST    Result Date: 10/17/2024  Severe stenosis in the proximal M1 segment of the right MCA, stable. Moderate stenosis in the mid M1 segment of the left MCA, stable. Mild-to-moderate stenosis in the cavernous/supraclinoid segments of the bilateral internal carotid arteries. 40% stenosis in the basilar artery. Severe stenosis at the origin of the right vertebral artery, stable. 60% stenosis in the V1 and V2 segments of the left vertebral artery, stable. 50% stenosis in the proximal left internal carotid artery, stable. 50% stenosis in the proximal left subclavian artery.     CT Head W/O Contrast    Result Date: 10/17/2024  No acute intracranial abnormality. Old infarctions in the right frontal parietal temporal lobes, the right basal ganglia/insula cortex in the left cerebellar hemisphere, stable. Old lacunar infarct in the left head of caudate nucleus. Moderate parenchymal volume loss. Moderate chronic microvascular disease.       ASSESSMENT & PLAN     Principal Problem:    Type 2 diabetes mellitus with hyperglycemia (HCC)  Active Problems:    Asthma    S/P coronary artery stent placement - RCA 10/12/16 - Dr. allan    Cerebrovascular accident (CVA) ([facility])    Right middle cerebral artery stroke ([facility])    Oropharyngeal dysphagia    Seizure ([facility])    PAF (paroxysmal atrial fibrillation) ([facility])    Hyperglycemia  Resolved Problems:    * No resolved hospital problems. *      Type 2 diabetes mellitus with hyperglycemia   Hyperosmolar statis secondary to hyperglycemia  - presents to the ED with glucose over 400  - discharged with linagliptin and glimepride   - fasting . Lantus increased to 15 units BID  - Patient glucose trended normally.  Patient received Lantus 30 twice daily and 4

## 2024-10-22 NOTE — PLAN OF CARE
Problem: Discharge Planning  Goal: Discharge to home or other facility with appropriate resources  10/22/2024 1720 by Leonela Najera RN  Outcome: Progressing  10/22/2024 0405 by Kathy Olvera RN  Outcome: Progressing     Problem: Pain  Goal: Verbalizes/displays adequate comfort level or baseline comfort level  10/22/2024 1720 by Leonela Najera RN  Outcome: Progressing  10/22/2024 0405 by Kathy Olvera RN  Outcome: Progressing     Problem: Skin/Tissue Integrity  Goal: Absence of new skin breakdown  Description: 1.  Monitor for areas of redness and/or skin breakdown  2.  Assess vascular access sites hourly  3.  Every 4-6 hours minimum:  Change oxygen saturation probe site  4.  Every 4-6 hours:  If on nasal continuous positive airway pressure, respiratory therapy assess nares and determine need for appliance change or resting period.  10/22/2024 1720 by Leonela Najera RN  Outcome: Progressing  10/22/2024 0405 by Kathy Olvera RN  Outcome: Progressing     Problem: Safety - Adult  Goal: Free from fall injury  10/22/2024 1720 by Leonela Najera RN  Outcome: Progressing  10/22/2024 0405 by Kathy Olvera RN  Outcome: Progressing

## 2024-10-23 LAB
EKG ATRIAL RATE: 118 BPM
EKG ATRIAL RATE: 75 BPM
EKG P AXIS: 44 DEGREES
EKG P AXIS: 76 DEGREES
EKG P-R INTERVAL: 148 MS
EKG P-R INTERVAL: 172 MS
EKG Q-T INTERVAL: 376 MS
EKG Q-T INTERVAL: 434 MS
EKG QRS DURATION: 76 MS
EKG QRS DURATION: 84 MS
EKG QTC CALCULATION (BAZETT): 488 MS
EKG QTC CALCULATION (BAZETT): 527 MS
EKG R AXIS: -13 DEGREES
EKG R AXIS: -16 DEGREES
EKG T AXIS: 0 DEGREES
EKG T AXIS: 0 DEGREES
EKG VENTRICULAR RATE: 118 BPM
EKG VENTRICULAR RATE: 76 BPM
GLUCOSE BLD-MCNC: 135 MG/DL (ref 65–105)
GLUCOSE BLD-MCNC: 186 MG/DL (ref 65–105)
GLUCOSE BLD-MCNC: 196 MG/DL (ref 65–105)
GLUCOSE BLD-MCNC: 212 MG/DL (ref 65–105)

## 2024-10-23 PROCEDURE — 92526 ORAL FUNCTION THERAPY: CPT

## 2024-10-23 PROCEDURE — 6360000002 HC RX W HCPCS

## 2024-10-23 PROCEDURE — 93010 ELECTROCARDIOGRAM REPORT: CPT | Performed by: INTERNAL MEDICINE

## 2024-10-23 PROCEDURE — 82947 ASSAY GLUCOSE BLOOD QUANT: CPT

## 2024-10-23 PROCEDURE — 92507 TX SP LANG VOICE COMM INDIV: CPT

## 2024-10-23 PROCEDURE — 1200000000 HC SEMI PRIVATE

## 2024-10-23 PROCEDURE — 2580000003 HC RX 258

## 2024-10-23 PROCEDURE — 6370000000 HC RX 637 (ALT 250 FOR IP)

## 2024-10-23 PROCEDURE — 99233 SBSQ HOSP IP/OBS HIGH 50: CPT | Performed by: INTERNAL MEDICINE

## 2024-10-23 RX ORDER — METOPROLOL TARTRATE 25 MG/1
25 TABLET, FILM COATED ORAL 2 TIMES DAILY
Qty: 60 TABLET | Refills: 3 | Status: SHIPPED | OUTPATIENT
Start: 2024-10-23

## 2024-10-23 RX ORDER — HYDRALAZINE HYDROCHLORIDE 20 MG/ML
10 INJECTION INTRAMUSCULAR; INTRAVENOUS EVERY 6 HOURS PRN
Status: DISCONTINUED | OUTPATIENT
Start: 2024-10-23 | End: 2024-10-24 | Stop reason: HOSPADM

## 2024-10-23 RX ORDER — INSULIN GLARGINE 100 [IU]/ML
10 INJECTION, SOLUTION SUBCUTANEOUS NIGHTLY
Qty: 5 ADJUSTABLE DOSE PRE-FILLED PEN SYRINGE | Refills: 1 | Status: SHIPPED | OUTPATIENT
Start: 2024-10-23 | End: 2024-10-23

## 2024-10-23 RX ORDER — SERTRALINE HYDROCHLORIDE 25 MG/1
75 TABLET, FILM COATED ORAL DAILY
Qty: 90 TABLET | Refills: 0 | Status: CANCELLED | OUTPATIENT
Start: 2024-10-23

## 2024-10-23 RX ORDER — PANTOPRAZOLE SODIUM 40 MG/1
40 TABLET, DELAYED RELEASE ORAL
Qty: 30 TABLET | Refills: 3 | Status: SHIPPED | OUTPATIENT
Start: 2024-10-23

## 2024-10-23 RX ORDER — LANCETS 30 GAUGE
EACH MISCELLANEOUS
Qty: 50 EACH | Refills: 10 | Status: SHIPPED | OUTPATIENT
Start: 2024-10-23

## 2024-10-23 RX ORDER — FUROSEMIDE 40 MG/1
40 TABLET ORAL DAILY
Status: DISCONTINUED | OUTPATIENT
Start: 2024-10-23 | End: 2024-10-24 | Stop reason: HOSPADM

## 2024-10-23 RX ORDER — CARBOXYMETHYLCELLULOSE SODIUM 10 MG/ML
1 GEL OPHTHALMIC 3 TIMES DAILY
Status: DISCONTINUED | OUTPATIENT
Start: 2024-10-23 | End: 2024-10-24 | Stop reason: HOSPADM

## 2024-10-23 RX ORDER — INSULIN GLARGINE 100 [IU]/ML
10 INJECTION, SOLUTION SUBCUTANEOUS 2 TIMES DAILY
Qty: 5 ADJUSTABLE DOSE PRE-FILLED PEN SYRINGE | Refills: 1 | Status: SHIPPED | OUTPATIENT
Start: 2024-10-23 | End: 2024-10-24 | Stop reason: HOSPADM

## 2024-10-23 RX ORDER — CLONIDINE HYDROCHLORIDE 0.1 MG/1
0.1 TABLET ORAL 2 TIMES DAILY
Status: DISCONTINUED | OUTPATIENT
Start: 2024-10-23 | End: 2024-10-24 | Stop reason: HOSPADM

## 2024-10-23 RX ORDER — UBIQUINOL 100 MG
1 CAPSULE ORAL
Qty: 100 EACH | Refills: 3 | Status: SHIPPED | OUTPATIENT
Start: 2024-10-23

## 2024-10-23 RX ORDER — CLOPIDOGREL BISULFATE 75 MG/1
75 TABLET ORAL DAILY
Qty: 90 TABLET | Refills: 0 | Status: SHIPPED | OUTPATIENT
Start: 2024-10-23 | End: 2025-01-21

## 2024-10-23 RX ADMIN — SODIUM CHLORIDE, PRESERVATIVE FREE 10 ML: 5 INJECTION INTRAVENOUS at 09:16

## 2024-10-23 RX ADMIN — Medication: at 22:13

## 2024-10-23 RX ADMIN — ASPIRIN 81 MG 81 MG: 81 TABLET ORAL at 09:16

## 2024-10-23 RX ADMIN — APIXABAN 5 MG: 5 TABLET, FILM COATED ORAL at 09:16

## 2024-10-23 RX ADMIN — ATORVASTATIN CALCIUM 80 MG: 80 TABLET, FILM COATED ORAL at 20:56

## 2024-10-23 RX ADMIN — PANTOPRAZOLE SODIUM 40 MG: 40 TABLET, DELAYED RELEASE ORAL at 09:16

## 2024-10-23 RX ADMIN — AMLODIPINE BESYLATE 10 MG: 10 TABLET ORAL at 09:16

## 2024-10-23 RX ADMIN — INSULIN GLARGINE 10 UNITS: 100 INJECTION, SOLUTION SUBCUTANEOUS at 22:11

## 2024-10-23 RX ADMIN — SODIUM CHLORIDE, PRESERVATIVE FREE 10 ML: 5 INJECTION INTRAVENOUS at 21:01

## 2024-10-23 RX ADMIN — INSULIN LISPRO 1 UNITS: 100 INJECTION, SOLUTION INTRAVENOUS; SUBCUTANEOUS at 22:10

## 2024-10-23 RX ADMIN — CARBOXYMETHYLCELLULOSE SODIUM 1 DROP: 10 GEL OPHTHALMIC at 22:12

## 2024-10-23 RX ADMIN — LOSARTAN POTASSIUM 50 MG: 50 TABLET, FILM COATED ORAL at 09:15

## 2024-10-23 RX ADMIN — HYDRALAZINE HYDROCHLORIDE 10 MG: 20 INJECTION INTRAMUSCULAR; INTRAVENOUS at 09:14

## 2024-10-23 RX ADMIN — Medication: at 09:28

## 2024-10-23 RX ADMIN — INSULIN LISPRO 1 UNITS: 100 INJECTION, SOLUTION INTRAVENOUS; SUBCUTANEOUS at 17:28

## 2024-10-23 RX ADMIN — CLONIDINE HYDROCHLORIDE 0.1 MG: 0.1 TABLET ORAL at 09:16

## 2024-10-23 RX ADMIN — GABAPENTIN 100 MG: 100 CAPSULE ORAL at 09:16

## 2024-10-23 RX ADMIN — GABAPENTIN 100 MG: 100 CAPSULE ORAL at 20:53

## 2024-10-23 RX ADMIN — CLONIDINE HYDROCHLORIDE 0.1 MG: 0.1 TABLET ORAL at 20:54

## 2024-10-23 RX ADMIN — AMIODARONE HYDROCHLORIDE 200 MG: 200 TABLET ORAL at 09:21

## 2024-10-23 RX ADMIN — GABAPENTIN 100 MG: 100 CAPSULE ORAL at 16:39

## 2024-10-23 RX ADMIN — METOPROLOL TARTRATE 25 MG: 25 TABLET, FILM COATED ORAL at 09:15

## 2024-10-23 RX ADMIN — APIXABAN 5 MG: 5 TABLET, FILM COATED ORAL at 20:55

## 2024-10-23 RX ADMIN — LEVETIRACETAM 500 MG: 100 SOLUTION ORAL at 09:16

## 2024-10-23 RX ADMIN — LEVETIRACETAM 500 MG: 100 SOLUTION ORAL at 20:59

## 2024-10-23 RX ADMIN — METOPROLOL TARTRATE 25 MG: 25 TABLET, FILM COATED ORAL at 20:54

## 2024-10-23 RX ADMIN — CLOPIDOGREL BISULFATE 75 MG: 75 TABLET ORAL at 09:16

## 2024-10-23 RX ADMIN — INSULIN GLARGINE 10 UNITS: 100 INJECTION, SOLUTION SUBCUTANEOUS at 09:35

## 2024-10-23 RX ADMIN — FUROSEMIDE 40 MG: 40 TABLET ORAL at 12:34

## 2024-10-23 NOTE — PROGRESS NOTES
Physical Therapy        Physical Therapy Cancel Note      DATE: 10/23/2024    NAME: Leatha Mason  MRN: [id number]   : 1950      Patient not seen this date for Physical Therapy due to:    Patient Declined: \"pt states no, don't do that\" when attempting to stretch bilat LE       Electronically signed by IRINEO BLAIR PTA on 10/23/2024 at 10:38 AM

## 2024-10-23 NOTE — PROGRESS NOTES
[facility]     Department of Internal Medicine - Staff Internal Medicine Teaching Service          ADMISSION NOTE/HISTORY AND PHYSICAL EXAMINATION   Date: 10/23/2024  Patient Name: Leatha Mason  Date of admission: 10/17/2024  8:23 PM  YOB: 1950  PCP: Lenore Bobby APRN - CNP  History Obtained From:  family member - daughter    Chief complaint     Left-sided facial droop.    HISTORY OF PRESENTING ILLNESS     The patient is a pleasant 74 y.o. 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  Per the patient's daughter her Dexcom has been reporting hypoglycemic events over the past day.  Patient also recently discharged from the hospital with regimen of linagliptin and glimepiride for diabetic management.  Patient's daughter said she was compliant with his diabetic regimen, but still reports high glucose levels.    Also there was concern for the patient was having left-sided facial droop, arm weakness, slurred speech, and confusion.  Patient does have baseline NIH that is over 10, at least.  Exact NIH not well-documented on recent discharge summary.    Due to suppose an onset strokelike symptoms patient was brought to the emergency department as code stroke.  Stroke team evaluated the patient.  NIH SS DMV 18.  Per patient's daughter her dysarthria seems to be at baseline.  Left-sided hemiplegia was at baseline.  Per stroke team her exam findings do not seem to be changed from neurology notes in 2023 and 2024.  CT head conducted which showed old infarcts in the right frontal parietal temporal lobes and the right basal ganglia/insular cortex and the left cerebral hemisphere which are

## 2024-10-23 NOTE — PLAN OF CARE
Problem: Discharge Planning  Goal: Discharge to home or other facility with appropriate resources  10/23/2024 0407 by Kathy Olvera, RN  Outcome: Progressing    Problem: Pain  Goal: Verbalizes/displays adequate comfort level or baseline comfort level  10/23/2024 0407 by Kathy Olvera, RN  Outcome: Progressing    Problem: Skin/Tissue Integrity  Goal: Absence of new skin breakdown  Description: 1.  Monitor for areas of redness and/or skin breakdown  2.  Assess vascular access sites hourly  3.  Every 4-6 hours minimum:  Change oxygen saturation probe site  4.  Every 4-6 hours:  If on nasal continuous positive airway pressure, respiratory therapy assess nares and determine need for appliance change or resting period.  10/23/2024 0407 by Kathy Olvera, RN  Outcome: Progressing    Problem: Safety - Adult  Goal: Free from fall injury  10/23/2024 0407 by Kathy Olvera, RN  Outcome: Progressing

## 2024-10-23 NOTE — CARE COORDINATION
TRANSITIONAL CARE PLANNING/ DISCHARGE ONGOING EVALUATION    Hospital Day: 5    Reason for Admission: Hyperglycemia [R73.9]  Hyperosmolar hyperglycemic state (HHS) (HCC) [E11.00]         Readmission Risk              Risk of Unplanned Readmission:  33            Patient goals/Treatment Preferences/Transitional Plan:     Called cat with [facility] to f/u on precert left       15:30 Cat called from [facility] still await precert

## 2024-10-23 NOTE — PROGRESS NOTES
Speech Language Pathology  [facility]    Speech Language & Dysphagia Treatment Note    Date: 10/23/2024  Patient’s Name: Leatha Mason  MRN: [id number]  Diagnosis:   Patient Active Problem List   Diagnosis Code    HTN (hypertension) I10    Asthma J45.909    Knee osteoarthritis M17.9    Edema R60.9    Spinal stenosis in cervical region M48.02    Chest pain R07.9    YUDITH (obstructive sleep apnea) G47.33    Morbid obesity E66.01    Knee pain, bilateral M25.561, M25.562    S/P TKR (total knee replacement) Z96.659    Urge incontinence of urine N39.41    Lumbar spondylosis M47.816    Cervical spondylosis M47.812    Chronic use of opiate drugs therapeutic purposes Z79.891    Chronic knee pain M25.569, G89.29    Acute coronary syndrome (HCC) I24.9    Congestive heart failure ([facility]) I50.9    Lymphadenopathy R59.1    Chronic pain G89.29    Coronary artery disease involving native coronary artery without angina pectoris I25.10    Chronic prescription opiate use Z79.891    Type 2 diabetes mellitus with complication, without long-term current use of insulin ([facility]) E11.8    S/P coronary artery stent placement - RCA 10/12/16 - Dr. allan Z95.5    Anxiety F41.9    Major depressive disorder F32.9    Postlaminectomy syndrome, lumbar M96.1    Primary osteoarthritis of right knee M17.11    Long term (current) use of antithrombotics/antiplatelets Z79.02    Cerebrovascular accident (CVA) due to embolic occlusion of right middle cerebral artery ([facility]) I63.411    Cerebrovascular accident (CVA) ([facility]) I63.9    Right middle cerebral artery stroke ([facility]) I63.511    Ischemic cerebral stroke due to intracranial large artery atherosclerosis ([facility]) I63.59    Hypernatremia E87.0    Oropharyngeal dysphagia R13.12    Seizure ([facility]) R56.9    History of CVA (cerebrovascular accident) Z86.73    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 Educated pt on compensator strategies such as small bites/sip, one bite/sip at a time, and sitting upright 90 degrees for all PO intake. Pt verbalized understanding. Pt unable to follow complex commands to complete dysphagia management exercises this date.     Plan:  [x] Continue ST services    [] Discharge from ST:      Discharge recommendations: []  Further therapy recommended at discharge.The patient should be able to tolerate at least 3 hours of therapy per day over 5 days or 15 hours over 7 days. [x] Further therapy recommended at discharge.   [] No therapy recommended at discharge.      Treatment completed by: Trupti Chavez M.A., CCC-SLP

## 2024-10-24 VITALS
HEIGHT: 62 IN | TEMPERATURE: 98.5 F | HEART RATE: 86 BPM | OXYGEN SATURATION: 99 % | WEIGHT: 223.4 LBS | RESPIRATION RATE: 18 BRPM | BODY MASS INDEX: 41.11 KG/M2 | SYSTOLIC BLOOD PRESSURE: 100 MMHG | DIASTOLIC BLOOD PRESSURE: 82 MMHG

## 2024-10-24 LAB
ANION GAP SERPL CALCULATED.3IONS-SCNC: 11 MMOL/L (ref 9–16)
BASOPHILS # BLD: 0.03 K/UL (ref 0–0.2)
BASOPHILS NFR BLD: 0 % (ref 0–2)
BUN SERPL-MCNC: 18 MG/DL (ref 8–23)
CALCIUM SERPL-MCNC: 8.8 MG/DL (ref 8.6–10.4)
CHLORIDE SERPL-SCNC: 107 MMOL/L (ref 98–107)
CO2 SERPL-SCNC: 22 MMOL/L (ref 20–31)
CREAT SERPL-MCNC: 0.7 MG/DL (ref 0.5–0.9)
EOSINOPHIL # BLD: 0.28 K/UL (ref 0–0.44)
EOSINOPHILS RELATIVE PERCENT: 3 % (ref 1–4)
ERYTHROCYTE [DISTWIDTH] IN BLOOD BY AUTOMATED COUNT: 16.3 % (ref 11.8–14.4)
GFR, ESTIMATED: >90 ML/MIN/1.73M2
GLUCOSE BLD-MCNC: 106 MG/DL (ref 65–105)
GLUCOSE BLD-MCNC: 189 MG/DL (ref 65–105)
GLUCOSE BLD-MCNC: 205 MG/DL (ref 65–105)
GLUCOSE BLD-MCNC: 206 MG/DL (ref 65–105)
GLUCOSE SERPL-MCNC: 238 MG/DL (ref 74–99)
HCT VFR BLD AUTO: 31.5 % (ref 36.3–47.1)
HGB BLD-MCNC: 9.6 G/DL (ref 11.9–15.1)
IMM GRANULOCYTES # BLD AUTO: 0.07 K/UL (ref 0–0.3)
IMM GRANULOCYTES NFR BLD: 1 %
LYMPHOCYTES NFR BLD: 2.54 K/UL (ref 1.1–3.7)
LYMPHOCYTES RELATIVE PERCENT: 30 % (ref 24–43)
MCH RBC QN AUTO: 27.3 PG (ref 25.2–33.5)
MCHC RBC AUTO-ENTMCNC: 30.5 G/DL (ref 28.4–34.8)
MCV RBC AUTO: 89.5 FL (ref 82.6–102.9)
MONOCYTES NFR BLD: 0.72 K/UL (ref 0.1–1.2)
MONOCYTES NFR BLD: 9 % (ref 3–12)
NEUTROPHILS NFR BLD: 57 % (ref 36–65)
NEUTS SEG NFR BLD: 4.83 K/UL (ref 1.5–8.1)
NRBC BLD-RTO: 0 PER 100 WBC
PLATELET # BLD AUTO: 252 K/UL (ref 138–453)
PMV BLD AUTO: 9.3 FL (ref 8.1–13.5)
POTASSIUM SERPL-SCNC: 3.8 MMOL/L (ref 3.7–5.3)
RBC # BLD AUTO: 3.52 M/UL (ref 3.95–5.11)
RBC # BLD: ABNORMAL 10*6/UL
SODIUM SERPL-SCNC: 140 MMOL/L (ref 136–145)
WBC OTHER # BLD: 8.5 K/UL (ref 3.5–11.3)

## 2024-10-24 PROCEDURE — 82947 ASSAY GLUCOSE BLOOD QUANT: CPT

## 2024-10-24 PROCEDURE — 36415 COLL VENOUS BLD VENIPUNCTURE: CPT

## 2024-10-24 PROCEDURE — 2580000003 HC RX 258

## 2024-10-24 PROCEDURE — 97530 THERAPEUTIC ACTIVITIES: CPT

## 2024-10-24 PROCEDURE — 99233 SBSQ HOSP IP/OBS HIGH 50: CPT | Performed by: INTERNAL MEDICINE

## 2024-10-24 PROCEDURE — 99213 OFFICE O/P EST LOW 20 MIN: CPT

## 2024-10-24 PROCEDURE — 97535 SELF CARE MNGMENT TRAINING: CPT

## 2024-10-24 PROCEDURE — 6370000000 HC RX 637 (ALT 250 FOR IP)

## 2024-10-24 PROCEDURE — 85025 COMPLETE CBC W/AUTO DIFF WBC: CPT

## 2024-10-24 PROCEDURE — 80048 BASIC METABOLIC PNL TOTAL CA: CPT

## 2024-10-24 RX ORDER — INSULIN GLARGINE 100 [IU]/ML
15 INJECTION, SOLUTION SUBCUTANEOUS NIGHTLY
Qty: 5 ADJUSTABLE DOSE PRE-FILLED PEN SYRINGE | Refills: 3 | Status: SHIPPED | OUTPATIENT
Start: 2024-10-24

## 2024-10-24 RX ORDER — NYSTATIN 100000 [USP'U]/G
POWDER TOPICAL
Qty: 30 G | Refills: 1 | Status: SHIPPED | OUTPATIENT
Start: 2024-10-24

## 2024-10-24 RX ORDER — INSULIN GLARGINE 100 [IU]/ML
15 INJECTION, SOLUTION SUBCUTANEOUS NIGHTLY
Status: DISCONTINUED | OUTPATIENT
Start: 2024-10-25 | End: 2024-10-24 | Stop reason: HOSPADM

## 2024-10-24 RX ADMIN — INSULIN LISPRO 1 UNITS: 100 INJECTION, SOLUTION INTRAVENOUS; SUBCUTANEOUS at 21:10

## 2024-10-24 RX ADMIN — Medication: at 10:23

## 2024-10-24 RX ADMIN — SODIUM CHLORIDE, PRESERVATIVE FREE 10 ML: 5 INJECTION INTRAVENOUS at 10:24

## 2024-10-24 RX ADMIN — METOPROLOL TARTRATE 25 MG: 25 TABLET, FILM COATED ORAL at 10:22

## 2024-10-24 RX ADMIN — LOSARTAN POTASSIUM 50 MG: 50 TABLET, FILM COATED ORAL at 10:22

## 2024-10-24 RX ADMIN — CARBOXYMETHYLCELLULOSE SODIUM 1 DROP: 10 GEL OPHTHALMIC at 10:23

## 2024-10-24 RX ADMIN — CLOPIDOGREL BISULFATE 75 MG: 75 TABLET ORAL at 10:22

## 2024-10-24 RX ADMIN — GABAPENTIN 100 MG: 100 CAPSULE ORAL at 21:10

## 2024-10-24 RX ADMIN — ATORVASTATIN CALCIUM 80 MG: 80 TABLET, FILM COATED ORAL at 21:10

## 2024-10-24 RX ADMIN — LEVETIRACETAM 500 MG: 100 SOLUTION ORAL at 21:10

## 2024-10-24 RX ADMIN — PANTOPRAZOLE SODIUM 40 MG: 40 TABLET, DELAYED RELEASE ORAL at 06:14

## 2024-10-24 RX ADMIN — INSULIN LISPRO 1 UNITS: 100 INJECTION, SOLUTION INTRAVENOUS; SUBCUTANEOUS at 18:02

## 2024-10-24 RX ADMIN — AMLODIPINE BESYLATE 10 MG: 10 TABLET ORAL at 10:22

## 2024-10-24 RX ADMIN — CLONIDINE HYDROCHLORIDE 0.1 MG: 0.1 TABLET ORAL at 10:22

## 2024-10-24 RX ADMIN — FUROSEMIDE 40 MG: 40 TABLET ORAL at 10:22

## 2024-10-24 RX ADMIN — CARBOXYMETHYLCELLULOSE SODIUM 1 DROP: 10 GEL OPHTHALMIC at 15:47

## 2024-10-24 RX ADMIN — GABAPENTIN 100 MG: 100 CAPSULE ORAL at 10:22

## 2024-10-24 RX ADMIN — GABAPENTIN 100 MG: 100 CAPSULE ORAL at 15:47

## 2024-10-24 RX ADMIN — CARBOXYMETHYLCELLULOSE SODIUM 1 DROP: 10 GEL OPHTHALMIC at 21:10

## 2024-10-24 RX ADMIN — LEVETIRACETAM 500 MG: 100 SOLUTION ORAL at 10:24

## 2024-10-24 RX ADMIN — AMIODARONE HYDROCHLORIDE 200 MG: 200 TABLET ORAL at 10:22

## 2024-10-24 RX ADMIN — APIXABAN 5 MG: 5 TABLET, FILM COATED ORAL at 10:22

## 2024-10-24 RX ADMIN — ASPIRIN 81 MG 81 MG: 81 TABLET ORAL at 10:22

## 2024-10-24 RX ADMIN — APIXABAN 5 MG: 5 TABLET, FILM COATED ORAL at 21:10

## 2024-10-24 NOTE — PROGRESS NOTES
CLINICAL PHARMACY NOTE: MEDS TO BEDS    Total # of Prescriptions Filled: 7   The following medications were delivered to the patient:  Clopidogrel 75mg  Alcohol pads 70%  Pantoprazole 40mg tabs  Metoprolol tart 25mg tabs  One touch verio strips  One touch lancets  One touch verio meter    Additional Documentation:  Delivered to RN on 10/24 at 5:25P. No copay.

## 2024-10-24 NOTE — PLAN OF CARE
Problem: Discharge Planning  Goal: Discharge to home or other facility with appropriate resources  Outcome: Progressing     Problem: Pain  Goal: Verbalizes/displays adequate comfort level or baseline comfort level  10/24/2024 1737 by Ene Hancock RN  Outcome: Progressing  10/24/2024 0623 by Gal Roque RN  Outcome: Progressing     Problem: Skin/Tissue Integrity  Goal: Absence of new skin breakdown  Description: 1.  Monitor for areas of redness and/or skin breakdown  2.  Assess vascular access sites hourly  3.  Every 4-6 hours minimum:  Change oxygen saturation probe site  4.  Every 4-6 hours:  If on nasal continuous positive airway pressure, respiratory therapy assess nares and determine need for appliance change or resting period.  10/24/2024 1737 by Ene Hancock RN  Outcome: Progressing  10/24/2024 0623 by Gal Roque RN  Outcome: Progressing     Problem: Safety - Adult  Goal: Free from fall injury  10/24/2024 1737 by Ene Hancock RN  Outcome: Progressing  10/24/2024 0623 by Gal Roque RN  Outcome: Progressing

## 2024-10-24 NOTE — PROGRESS NOTES
moisturizing creams applied to generalized dry skin, sacral wound care completed.       Response to treatment:  tolerated well with repeated encouragement to allow care.            Plan:   Sacrum wound: cleanse with foam cleanser and warm water, rinse and pat dry. Cover the wound with a 2x2\" Opticell Ag hydrofiber dressing, secure with an Optifoam 4x4\" silicone bordered foam. Change daily and prn soilage.   Plan of Care:   [x]  Turn and reposition every 2 hours while in bed.     [x] Float heels off of bed with pillows under calves.      [x] Apply zinc oxide cream twice daily and as needed after incontinent episodes to the perianal and buttock skin     [x] Perform routine incontinence care with use of foam cleanser.     [x] Use single layer moisture wicking underpad.     [x] Use comfort glide system and wedges to reposition patient.     [x] Keep the head of the bed below 30 degrees unless contraindicated.        [x] Encourage good nutritional intake and fluids. Consult dietician if needed.     Specialty Bed Required : Yes   [] Low Air Loss   [x] Pressure Redistribution  [] Fluid Immersion  [] Bariatric  [] Total Pressure Relief  [] Other:      Discharge Plan:  TBD     Patient/Caregiver Teaching:  Ms Mason is not fully oriented to place or situation at this time. Her daughter is not at bedside currently.   Level of patient/caregiver understanding:    [] Indicates understanding                [] Needs reinforcement  [x] Unsuccessful                                  [] Verbal Understanding  [] Demonstrated understanding        [x] No evidence of learning  [] Refused teaching                           [] N/A     Contact the Wound Ostomy RN on-call during working hours Monday-Friday 9824-8591 via [employer] by searching \"wound\" under \"groups\" and selecting the on-call clinician. Sending messages via individual names will not reach a clinician.        Electronically signed by Jhoana Gallardo RN, CWON on

## 2024-10-24 NOTE — PROGRESS NOTES
assistance;Maximum assistance  Rolling to Right: 2 Person assistance;Maximum assistance  Supine to Sit: Dependent/Total;2 Person assistance  Sit to Supine: Dependent/Total;2 Person assistance  Bed Mobility Comments: Pt required max Ax2 for rolling side to side for RN to address wounds and hygiene. Dependent x2 for supine<->sit and max A for sitting EOB for 15 minutes.  Transfers  Comment: Unsafe to attempt due to max A needed to sit EOB        Balance  Posture: Poor  Sitting - Static: Poor  Sitting - Dynamic: Poor;-  Comments: Assessed sitting balance EOB with max A for 15 minutes    OutComes Score    AM-PAC - Mobility    AM-PAC Basic Mobility - Inpatient   How much help is needed turning from your back to your side while in a flat bed without using bedrails?: Total  How much help is needed moving from lying on your back to sitting on the side of a flat bed without using bedrails?: Total  How much help is needed moving to and from a bed to a chair?: Total  How much help is needed standing up from a chair using your arms?: Total  How much help is needed walking in hospital room?: Total  How much help is needed climbing 3-5 steps with a railing?: Total  AM-PAC Inpatient Mobility Raw Score : 6  AM-PAC Inpatient T-Scale Score : 23.55  Mobility Inpatient CMS 0-100% Score: 100  Mobility Inpatient CMS G-Code Modifier : CN    Goals  Short Term Goals  Time Frame for Short Term Goals: 14 visits  Short Term Goal 1: Complete bed mobility with mod A  Short Term Goal 2: Complete transfers with max Ax1 and appropriate device  Short Term Goal 3: Sit on EOB x10 minutes with fair dynamic balance  Short Term Goal 4: Participate in 30 minutes of therapy to promote endurance  Short Term Goal 5: Propel w/c 50 ft with mod Ax1       Education  Patient Education  Education Given To: Patient  Education Provided: Role of Therapy;Transfer Training  Education Method: Verbal  Barriers to Learning: Cognition  Education Outcome: Continued  education needed;Verbalized understanding;Unable to demonstrate understanding      Therapy Time   Individual Concurrent Group Co-treatment   Time In 1355         Time Out 1445         Minutes 50         Timed Code Treatment Minutes: 40 Minutes   Co- treatment with OT warranted secondary to decreased patient safety and independence with functional mobility requiring skilled physical assistance of two professionals to simultaneously address individualized discipline goals. PT is addressing sitting balance, appropriateness for transfers , while OT is addressing their individualized functional mobility/self-care task.       Karlo Peña, PTA

## 2024-10-24 NOTE — PROGRESS NOTES
Facility/Department: [address] MED SURG   Daily Treatment Note  Patient Name: Leatha Mason        MRN: [id number]    : 1950    Date of Service: 10/24/2024    Discharge Recommendations  Discharge Recommendations: Patient would benefit from continued therapy after discharge    OT Equipment Recommendations  Equipment Needed: No    Assessment  Performance deficits / Impairments: Decreased functional mobility ;Decreased ADL status;Decreased ROM;Decreased strength;Decreased safe awareness;Decreased cognition;Decreased endurance;Decreased balance;Decreased high-level IADLs;Decreased fine motor control;Decreased coordination;Decreased posture  Assessment: Pt unsafe to return to prior living arrangements this date d/t requiring significant assistance with all ADLs. Pt would continue to benefit from OT services to address deficits listed above and improve overall functional performance prior to discharge.  Prognosis: Fair  Decision Making: High Complexity  REQUIRES OT FOLLOW-UP: Yes  Activity Tolerance  Activity Tolerance: Treatment limited secondary to decreased cognition;Treatment limited secondary to agitation  Activity Tolerance Comments: pt slightly agitated at times, more so in begining of session but able to be redirected and comforted to ease as session progressess  Safety Devices  Type of Devices: All fall risk precautions in place;Call light within reach;Bed alarm in place;Left in bed;Nurse notified;Heels elevated for pressure relief;All jarred prominences offloaded  Restraints  Restraints Initially in Place: No    Restrictions/Precautions  Restrictions/Precautions  Restrictions/Precautions: Fall Risk;Up as Tolerated;Contact Precautions  Required Braces or Orthoses?: No  Position Activity Restriction  Other position/activity restrictions: Old infarctions in the R frontal parietal temporal lobes, the right basal ganglia/insula cortex in the left cerebellar hemisphere. Per imaging. Hx of L side  cuing to clean face. Pt unable to unscrew chapstick cap despite cuing and assist, performed for pt. Pt bringing chapstick up to mouth but using finger rather than tip of chapstick despite cuing. Small amount of chapstick placed on pt finger to apply with success.  UE Bathing: Dependent/Total  UE Bathing Skilled Clinical Factors: Performed in supine this date with lotion and powder applied by wound care for improved skin integrity  LE Bathing: Dependent/Total  LE Bathing Skilled Clinical Factors: Performed in rolling with wound care and nursing  UE Dressing: Maximum assistance  UE Dressing Skilled Clinical Factors: Cuing to bring BUE up to thred arms to change saturated gown this date. Pt able to participate with cuing  Toileting: Dependent/Total  Toileting Skilled Clinical Factors: assist needed for pericare, personal hygiene and bedding change d/t small BM and bed saturated this date with max A x2 needed to roll in addition to assist to perform  Additional Comments: Pt supine upon arrival with saturated bedding following urination. Co-treat with PT and wound care with nursing present to re-dress pt wounds, complete bathing/dressing/toileting tasks as well as to apply skin protectants. pt assisted in these tasks rolling in bed, cuing pt to use bed rails to assist with minimal success. Pt instead grabbing onto writers clothing or hands for support despite cuing. Once completed, pt assisted to EOB (see bed mobility) to address sitting balance and engage in functional activity. pt washed face, applied lip balm and engaged in posture correcting activity while EOB. Pt returned to supine and positioned for comfort with bottom offloaded on pillows and BLE up on pillows for comfort.    Balance  Balance  Sitting: With support (Max A ~15 mins seated EOB to engage in functional tasks. pt utilizing bed rail with RUE for support with cuing. Not attempting to correct posture when support decreased)  Standing:  (does not stand at

## 2024-10-24 NOTE — PROGRESS NOTES
[facility]     Department of Internal Medicine - Staff Internal Medicine Teaching Service          ADMISSION NOTE/HISTORY AND PHYSICAL EXAMINATION   Date: 10/24/2024  Patient Name: Leatha Mason  Date of admission: 10/17/2024  8:23 PM  YOB: 1950  PCP: Lenore Bobby APRN - CNP  History Obtained From:  family member - daughter    Chief complaint     Left-sided facial droop.    Interval history       - no acute overnight events  -  this morning. Lantus held due to patient eating little of her breakfast  - patient's dry skin flaking is extensive still. Emollients applied by nursing staff     HISTORY OF PRESENTING ILLNESS     The patient is a pleasant 74 y.o. 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  Per the patient's daughter her Dexcom has been reporting hypoglycemic events over the past day.  Patient also recently discharged from the hospital with regimen of linagliptin and glimepiride for diabetic management.  Patient's daughter said she was compliant with his diabetic regimen, but still reports high glucose levels.    Also there was concern for the patient was having left-sided facial droop, arm weakness, slurred speech, and confusion.  Patient does have baseline NIH that is over 10, at least.  Exact NIH not well-documented on recent discharge summary.    Due to suppose an onset strokelike symptoms patient was brought to the emergency department as code stroke.  Stroke team evaluated the patient.  NIH SS DMV 18.  Per patient's daughter her dysarthria seems to be at baseline.  Left-sided hemiplegia was at baseline.  Per stroke team her exam findings do not seem to be changed from  Never   Substance and Sexual Activity    Alcohol use: Yes     Alcohol/week: 1.0 standard drink of alcohol     Types: 1 Cans of beer per week     Comment: occasionally    Drug use: No    Sexual activity: Not on file   Other Topics Concern    Not on file   Social History Narrative    Not on file     Social Determinants of Health     Financial Resource Strain: Not on file   Food Insecurity: No Food Insecurity (10/18/2024)    Hunger Vital Sign     Worried About Running Out of Food in the Last Year: Never true     Ran Out of Food in the Last Year: Never true   Transportation Needs: No Transportation Needs (10/18/2024)    PRAPARE - Transportation     Lack of Transportation (Medical): No     Lack of Transportation (Non-Medical): No   Physical Activity: Not on file   Stress: Not on file   Social Connections: Not on file   Intimate Partner Violence: Unknown (3/27/2024)    Received from [facility]     Fear of Current or Ex-Partner: Not on file     Emotionally Abused: Not on file     Physically Abused: Not on file     Sexually Abused: Not on file     Physically or Sexually Abused: Not on file   Housing Stability: Low Risk  (10/18/2024)    Housing Stability Vital Sign     Unable to Pay for Housing in the Last Year: No     Number of Times Moved in the Last Year: 1     Homeless in the Last Year: No        FAMILY HISTORY     Family History   Problem Relation Age of Onset    Diabetes Mother     Cancer Father     High Blood Pressure Sister     High Blood Pressure Brother        PHYSICAL EXAM     Vitals: BP (!) 143/85   Pulse 78   Temp 98.4 °F (36.9 °C) (Oral)   Resp 17   Ht 1.575 m (5' 2\")   Wt 101.3 kg (223 lb 6.4 oz)   LMP  (LMP Unknown)   SpO2 97%   BMI 40.86 kg/m²   Tmax: Temp (24hrs), Av.5 °F (36.9 °C), Min:98.3 °F (36.8 °C), Max:98.8 °F (37.1 °C)    Last Body weight:   Wt Readings from Last 3 Encounters:   10/18/24 101.3 kg (223 lb 6.4 oz)   10/07/24 101.3 kg (223 lb 6.4 oz)

## 2024-10-24 NOTE — CARE COORDINATION
Transitional planning: Phone call to Cat with [facility] to check on status of precert. It remains as a pending status.    1515 To patient room to speak with family with RN and house supervisor. Family very upset about patient care and want to take patient home today. They are requesting to speak with the physicians caring for patient.    1519 PS message to Dr. Brownlee to notify of family request to speak with physician regarding discharge to home and suggesting to bring attending. Response they are in didactics until 4 pm. Writer requested they come to see family at that time.    1522 Notified family and house supervisor that physicians are in a meeting until 4pm and will come by afterwards.    1627 Transport request faxed to [facility] with 1830 requested transport time.    1643 Phone call from [place] with [facility]. Transport confirmed for 9 pm. Ene ESPINOZA notified.    1646 Left message for radhika Nolan with transport time.    Discharge Report    [facility]  Clinical Case Management Department  Written by: Kari Davila RN    Patient Name: Leatha Mason  Attending Provider: Earl Chandler MD  Admit Date: 10/17/2024  8:23 PM  MRN: [id number]  Account: [id number]                     : 1950  Discharge Date: 10/24/2024      Disposition: home    Kari Davila RN

## 2024-10-24 NOTE — PLAN OF CARE
Problem: Pain  Goal: Verbalizes/displays adequate comfort level or baseline comfort level  10/24/2024 0623 by Gal Roque, RN  Outcome: Progressing     Problem: Skin/Tissue Integrity  Goal: Absence of new skin breakdown  Description: 1.  Monitor for areas of redness and/or skin breakdown  2.  Assess vascular access sites hourly  3.  Every 4-6 hours minimum:  Change oxygen saturation probe site  4.  Every 4-6 hours:  If on nasal continuous positive airway pressure, respiratory therapy assess nares and determine need for appliance change or resting period.  10/24/2024 0623 by Gal Roque, RN  Outcome: Progressing     Problem: Safety - Adult  Goal: Free from fall injury  10/24/2024 0623 by Gal Rqoue, RN  Outcome: Progressing

## 2024-10-25 NOTE — PLAN OF CARE
Problem: Discharge Planning  Goal: Discharge to home or other facility with appropriate resources  10/24/2024 2137 by Michele Song RN  Outcome: Completed  10/24/2024 1737 by Ene Hancock RN  Outcome: Progressing     Problem: Pain  Goal: Verbalizes/displays adequate comfort level or baseline comfort level  10/24/2024 2137 by Michele Song RN  Outcome: Completed  10/24/2024 1737 by Ene Hancock RN  Outcome: Progressing     Problem: Skin/Tissue Integrity  Goal: Absence of new skin breakdown  Description: 1.  Monitor for areas of redness and/or skin breakdown  2.  Assess vascular access sites hourly  3.  Every 4-6 hours minimum:  Change oxygen saturation probe site  4.  Every 4-6 hours:  If on nasal continuous positive airway pressure, respiratory therapy assess nares and determine need for appliance change or resting period.  10/24/2024 2137 by Michele Song RN  Outcome: Completed  10/24/2024 1737 by Ene Hancock RN  Outcome: Progressing     Problem: Safety - Adult  Goal: Free from fall injury  10/24/2024 2137 by Michele Song RN  Outcome: Completed  10/24/2024 1737 by Ene Hancock RN  Outcome: Progressing

## 2024-10-26 PROBLEM — R79.89 ELEVATED TROPONIN: Status: RESOLVED | Noted: 2024-09-26 | Resolved: 2024-10-26

## 2024-10-27 NOTE — DISCHARGE SUMMARY
[facility]     Department of Internal Medicine - Staff Internal Medicine Teaching Service    INPATIENT DISCHARGE SUMMARY      Patient Identification:  Leatha Mason is a 74 y.o. female.  :  1950  MRN: [id number]     Acct: [id number]   PCP: Lenore Bobby APRN - CNP  Admit Date:  10/17/2024  Discharge date and time: 10/24/2024  9:38 PM   Attending Provider: No att. providers found                                     ACTIVE DISCHARGE DIAGNOSES     Hospital Problem Lists:  Principal Problem:    Type 2 diabetes mellitus with hyperglycemia (HCC)  Active Problems:    Asthma    S/P coronary artery stent placement - RCA 10/12/16 - Dr. allan    Cerebrovascular accident (CVA) (HCC)    Right middle cerebral artery stroke (HCC)    Oropharyngeal dysphagia    Seizure (HCC)    PAF (paroxysmal atrial fibrillation) (HCC)    Hyperglycemia  Resolved Problems:    * No resolved hospital problems. *      HOSPITAL STAY     Brief Inpatient course:   Leatha Mason is a 74 y.o. female who was admitted for the management of Type 2 diabetes mellitus with hyperglycemia (HCC), presented to the emergency department with left-sided facial droop, arm weakness, slurred speech and confusion.  Patient was recently seen in the hospital for management of HHS.  She was discharged on oral antidiabetic medication.  Per patient's daughter patient has a Dexcom which consistently showed a reading of high.  Patient was brought to the ER.  Exact NIH was not well-documented.  Stroke alert was called and patient was found to have NIH score of 18.  Patient does have baseline dysarthria and left-sided hemiplegia.  Neurology team did not find acute changes from notes in  and .  CT imaging showed old infarct and old stenosis previously seen on images.  Patient subsequently came to baseline.  Patient family was advised to arrange for SNF for better rehab of patient.  During her hospital stay patient was

## 2024-11-19 RX ORDER — PRAVASTATIN SODIUM 40 MG
40 TABLET ORAL NIGHTLY
Qty: 30 TABLET | Refills: 3 | OUTPATIENT
Start: 2024-11-19

## 2024-12-11 RX ORDER — NYSTATIN 100000 [USP'U]/G
POWDER TOPICAL
Qty: 30 G | Refills: 1 | OUTPATIENT
Start: 2024-12-11

## 2025-05-23 ENCOUNTER — HOSPITAL ENCOUNTER (OUTPATIENT)
Age: 75
Setting detail: SPECIMEN
Discharge: HOME | End: 2025-05-23

## 2025-05-23 LAB
25(OH)D3 SERPL-MCNC: 58.2 NG/ML (ref 30–100)
ALBUMIN SERPL-MCNC: 3.3 G/DL (ref 3.5–5.2)
ALBUMIN/GLOB SERPL: 1.1 {RATIO} (ref 1–2.5)
ALP SERPL-CCNC: 88 U/L (ref 35–104)
ALT SERPL-CCNC: 21 U/L (ref 10–35)
ANION GAP SERPL CALCULATED.3IONS-SCNC: 11 MMOL/L (ref 9–16)
AST SERPL-CCNC: 28 U/L (ref 10–35)
BILIRUB SERPL-MCNC: 0.2 MG/DL (ref 0–1.2)
BNP SERPL-MCNC: 605 PG/ML (ref 0–125)
BUN SERPL-MCNC: 31 MG/DL (ref 8–23)
CALCIUM SERPL-MCNC: 9.7 MG/DL (ref 8.6–10.4)
CHLORIDE SERPL-SCNC: 108 MMOL/L (ref 98–107)
CHOLEST SERPL-MCNC: 122 MG/DL (ref 0–199)
CHOLESTEROL/HDL RATIO: 3.9
CO2 SERPL-SCNC: 26 MMOL/L (ref 20–31)
CREAT SERPL-MCNC: 0.8 MG/DL (ref 0.6–0.9)
FOLATE SERPL-MCNC: 21.7 NG/ML (ref 4.8–24.2)
GFR, ESTIMATED: 78 ML/MIN/1.73M2
GLUCOSE P FAST SERPL-MCNC: 104 MG/DL (ref 74–99)
HDLC SERPL-MCNC: 31 MG/DL
LDLC SERPL CALC-MCNC: 76 MG/DL (ref 0–100)
MAGNESIUM SERPL-MCNC: 2 MG/DL (ref 1.6–2.4)
POTASSIUM SERPL-SCNC: 3.9 MMOL/L (ref 3.7–5.3)
PROT SERPL-MCNC: 6.4 G/DL (ref 6.6–8.7)
SODIUM SERPL-SCNC: 145 MMOL/L (ref 136–145)
T3FREE SERPL-MCNC: 1.81 PG/ML (ref 2–4.4)
T4 FREE SERPL-MCNC: 1.1 NG/DL (ref 0.92–1.68)
TRIGL SERPL-MCNC: 74 MG/DL (ref 0–149)
TSH SERPL DL<=0.05 MIU/L-ACNC: 1.54 UIU/ML (ref 0.27–4.2)
VLDLC SERPL CALC-MCNC: 15 MG/DL (ref 1–30)

## 2025-06-05 ENCOUNTER — HOSPITAL ENCOUNTER (OUTPATIENT)
Age: 75
Setting detail: SPECIMEN
Discharge: HOME | End: 2025-06-05

## 2025-06-05 LAB
ERYTHROCYTE [DISTWIDTH] IN BLOOD BY AUTOMATED COUNT: 14.7 % (ref 11.8–14.4)
HCT VFR BLD AUTO: 34.3 % (ref 36.3–47.1)
HGB BLD-MCNC: 11 G/DL (ref 11.9–15.1)
MCH RBC QN AUTO: 27.9 PG (ref 25.2–33.5)
MCHC RBC AUTO-ENTMCNC: 32.1 G/DL (ref 28.4–34.8)
MCV RBC AUTO: 87.1 FL (ref 82.6–102.9)
NRBC BLD-RTO: 0 PER 100 WBC
PLATELET # BLD AUTO: 213 K/UL (ref 138–453)
PMV BLD AUTO: 9.9 FL (ref 8.1–13.5)
RBC # BLD AUTO: 3.94 M/UL (ref 3.95–5.11)
WBC OTHER # BLD: 6.5 K/UL (ref 3.5–11.3)

## (undated) DEVICE — KIT PEG 20FR STD PUL EN ACCS DEV ENDOVIVE

## (undated) DEVICE — BINDER ABD 3XL H9XL71 82IN E UNISX 3 PNL